# Patient Record
Sex: FEMALE | Race: WHITE | Employment: UNEMPLOYED | URBAN - METROPOLITAN AREA
[De-identification: names, ages, dates, MRNs, and addresses within clinical notes are randomized per-mention and may not be internally consistent; named-entity substitution may affect disease eponyms.]

---

## 2017-09-13 ENCOUNTER — TELEPHONE (OUTPATIENT)
Dept: INTERNAL MEDICINE CLINIC | Age: 37
End: 2017-09-13

## 2018-01-01 ENCOUNTER — HOSPITAL ENCOUNTER (EMERGENCY)
Age: 38
Discharge: HOME | End: 2018-11-21
Payer: MEDICAID

## 2018-01-01 ENCOUNTER — HOSPITAL ENCOUNTER (OUTPATIENT)
Age: 38
Setting detail: OBSERVATION
Discharge: HOME | End: 2018-12-22
Attending: EMERGENCY MEDICINE | Admitting: OBSTETRICS & GYNECOLOGY
Payer: MEDICAID

## 2018-01-01 ENCOUNTER — TELEPHONE (OUTPATIENT)
Dept: ORTHOPEDIC SURGERY | Age: 38
End: 2018-01-01

## 2018-01-01 ENCOUNTER — APPOINTMENT (OUTPATIENT)
Dept: GENERAL RADIOLOGY | Age: 38
End: 2018-01-01
Payer: MEDICAID

## 2018-01-01 ENCOUNTER — APPOINTMENT (OUTPATIENT)
Dept: ULTRASOUND IMAGING | Age: 38
End: 2018-01-01
Payer: MEDICAID

## 2018-01-01 ENCOUNTER — OFFICE VISIT (OUTPATIENT)
Dept: ORTHOPEDIC SURGERY | Age: 38
End: 2018-01-01
Payer: MEDICAID

## 2018-01-01 VITALS
OXYGEN SATURATION: 94 % | HEART RATE: 102 BPM | HEIGHT: 66 IN | BODY MASS INDEX: 25.71 KG/M2 | SYSTOLIC BLOOD PRESSURE: 128 MMHG | DIASTOLIC BLOOD PRESSURE: 82 MMHG | RESPIRATION RATE: 16 BRPM | WEIGHT: 160 LBS | TEMPERATURE: 98.8 F

## 2018-01-01 VITALS
BODY MASS INDEX: 29.73 KG/M2 | SYSTOLIC BLOOD PRESSURE: 114 MMHG | TEMPERATURE: 98.4 F | RESPIRATION RATE: 16 BRPM | WEIGHT: 185 LBS | DIASTOLIC BLOOD PRESSURE: 74 MMHG | HEART RATE: 88 BPM | OXYGEN SATURATION: 96 % | HEIGHT: 66 IN

## 2018-01-01 VITALS
HEART RATE: 113 BPM | HEIGHT: 66 IN | DIASTOLIC BLOOD PRESSURE: 84 MMHG | WEIGHT: 188 LBS | BODY MASS INDEX: 30.22 KG/M2 | SYSTOLIC BLOOD PRESSURE: 122 MMHG

## 2018-01-01 DIAGNOSIS — M06.9 RHEUMATOID ARTHRITIS, INVOLVING UNSPECIFIED SITE, UNSPECIFIED RHEUMATOID FACTOR PRESENCE: ICD-10-CM

## 2018-01-01 DIAGNOSIS — Z87.39 H/O RHEUMATOID ARTHRITIS: ICD-10-CM

## 2018-01-01 DIAGNOSIS — M25.562 LEFT KNEE PAIN, UNSPECIFIED CHRONICITY: Primary | ICD-10-CM

## 2018-01-01 DIAGNOSIS — M25.561 RIGHT KNEE PAIN, UNSPECIFIED CHRONICITY: ICD-10-CM

## 2018-01-01 DIAGNOSIS — N93.9 VAGINAL BLEEDING: ICD-10-CM

## 2018-01-01 DIAGNOSIS — D62 ACUTE BLOOD LOSS ANEMIA: Primary | ICD-10-CM

## 2018-01-01 DIAGNOSIS — M25.462 EFFUSION OF LEFT KNEE: Primary | ICD-10-CM

## 2018-01-01 LAB
A/G RATIO: 1.3 (ref 1.1–2.2)
ABO/RH: NORMAL
ALBUMIN SERPL-MCNC: 3.7 G/DL (ref 3.4–5)
ALP BLD-CCNC: 63 U/L (ref 40–129)
ALT SERPL-CCNC: 22 U/L (ref 10–40)
ANA INTERPRETATION: NORMAL
ANION GAP SERPL CALCULATED.3IONS-SCNC: 14 MMOL/L (ref 3–16)
ANTI-NUCLEAR ANTIBODY (ANA): NEGATIVE
ANTIBODY SCREEN: NORMAL
AST SERPL-CCNC: 19 U/L (ref 15–37)
BASOPHILS ABSOLUTE: 0.1 K/UL (ref 0–0.2)
BASOPHILS RELATIVE PERCENT: 1.2 %
BILIRUB SERPL-MCNC: <0.2 MG/DL (ref 0–1)
BUN BLDV-MCNC: 11 MG/DL (ref 7–20)
C-REACTIVE PROTEIN: 4.7 MG/L (ref 0–5.1)
CALCIUM SERPL-MCNC: 9 MG/DL (ref 8.3–10.6)
CHLORIDE BLD-SCNC: 101 MMOL/L (ref 99–110)
CO2: 23 MMOL/L (ref 21–32)
CREAT SERPL-MCNC: <0.5 MG/DL (ref 0.6–1.1)
EOSINOPHILS ABSOLUTE: 0.1 K/UL (ref 0–0.6)
EOSINOPHILS RELATIVE PERCENT: 1.9 %
GFR AFRICAN AMERICAN: >60
GFR NON-AFRICAN AMERICAN: >60
GLOBULIN: 2.9 G/DL
GLUCOSE BLD-MCNC: 133 MG/DL (ref 70–99)
GONADOTROPIN, CHORIONIC (HCG) QUANT: <5 MIU/ML
HCT VFR BLD CALC: 24.9 % (ref 36–48)
HCT VFR BLD CALC: 26 % (ref 36–48)
HCT VFR BLD CALC: 27.4 % (ref 36–48)
HCT VFR BLD CALC: 36.2 % (ref 36–48)
HEMOGLOBIN: 11.8 G/DL (ref 12–16)
HEMOGLOBIN: 8.2 G/DL (ref 12–16)
HEMOGLOBIN: 8.5 G/DL (ref 12–16)
HEMOGLOBIN: 9.2 G/DL (ref 12–16)
LYMPHOCYTES ABSOLUTE: 1.9 K/UL (ref 1–5.1)
LYMPHOCYTES RELATIVE PERCENT: 27.3 %
MCH RBC QN AUTO: 29.5 PG (ref 26–34)
MCH RBC QN AUTO: 30.5 PG (ref 26–34)
MCHC RBC AUTO-ENTMCNC: 32.4 G/DL (ref 31–36)
MCHC RBC AUTO-ENTMCNC: 33.5 G/DL (ref 31–36)
MCV RBC AUTO: 90.9 FL (ref 80–100)
MCV RBC AUTO: 91 FL (ref 80–100)
MONOCYTES ABSOLUTE: 0.3 K/UL (ref 0–1.3)
MONOCYTES RELATIVE PERCENT: 4 %
NEUTROPHILS ABSOLUTE: 4.5 K/UL (ref 1.7–7.7)
NEUTROPHILS RELATIVE PERCENT: 65.6 %
PDW BLD-RTO: 13.2 % (ref 12.4–15.4)
PDW BLD-RTO: 14 % (ref 12.4–15.4)
PLATELET # BLD: 266 K/UL (ref 135–450)
PLATELET # BLD: 448 K/UL (ref 135–450)
PMV BLD AUTO: 9.5 FL (ref 5–10.5)
PMV BLD AUTO: 9.5 FL (ref 5–10.5)
POTASSIUM SERPL-SCNC: 3.8 MMOL/L (ref 3.5–5.1)
RBC # BLD: 3.02 M/UL (ref 4–5.2)
RBC # BLD: 3.99 M/UL (ref 4–5.2)
RHEUMATOID FACTOR: 11 IU/ML
SEDIMENTATION RATE, ERYTHROCYTE: 27 MM/HR (ref 0–20)
SODIUM BLD-SCNC: 138 MMOL/L (ref 136–145)
TOTAL PROTEIN: 6.6 G/DL (ref 6.4–8.2)
TSH REFLEX: 2.7 UIU/ML (ref 0.27–4.2)
URIC ACID, SERUM: 4.6 MG/DL (ref 2.6–6)
WBC # BLD: 6.9 K/UL (ref 4–11)
WBC # BLD: 8.7 K/UL (ref 4–11)

## 2018-01-01 PROCEDURE — 6360000002 HC RX W HCPCS: Performed by: OBSTETRICS & GYNECOLOGY

## 2018-01-01 PROCEDURE — G8417 CALC BMI ABV UP PARAM F/U: HCPCS | Performed by: ORTHOPAEDIC SURGERY

## 2018-01-01 PROCEDURE — G0378 HOSPITAL OBSERVATION PER HR: HCPCS

## 2018-01-01 PROCEDURE — 2580000003 HC RX 258: Performed by: OBSTETRICS & GYNECOLOGY

## 2018-01-01 PROCEDURE — 76830 TRANSVAGINAL US NON-OB: CPT

## 2018-01-01 PROCEDURE — 96375 TX/PRO/DX INJ NEW DRUG ADDON: CPT

## 2018-01-01 PROCEDURE — 85025 COMPLETE CBC W/AUTO DIFF WBC: CPT

## 2018-01-01 PROCEDURE — 2580000003 HC RX 258

## 2018-01-01 PROCEDURE — 80053 COMPREHEN METABOLIC PANEL: CPT

## 2018-01-01 PROCEDURE — 6370000000 HC RX 637 (ALT 250 FOR IP): Performed by: NURSE PRACTITIONER

## 2018-01-01 PROCEDURE — 99285 EMERGENCY DEPT VISIT HI MDM: CPT

## 2018-01-01 PROCEDURE — 6370000000 HC RX 637 (ALT 250 FOR IP): Performed by: OBSTETRICS & GYNECOLOGY

## 2018-01-01 PROCEDURE — 99284 EMERGENCY DEPT VISIT MOD MDM: CPT

## 2018-01-01 PROCEDURE — 99203 OFFICE O/P NEW LOW 30 MIN: CPT | Performed by: ORTHOPAEDIC SURGERY

## 2018-01-01 PROCEDURE — 85014 HEMATOCRIT: CPT

## 2018-01-01 PROCEDURE — 85018 HEMOGLOBIN: CPT

## 2018-01-01 PROCEDURE — 84702 CHORIONIC GONADOTROPIN TEST: CPT

## 2018-01-01 PROCEDURE — 73564 X-RAY EXAM KNEE 4 OR MORE: CPT

## 2018-01-01 PROCEDURE — 2580000003 HC RX 258: Performed by: PHYSICIAN ASSISTANT

## 2018-01-01 PROCEDURE — 20610 DRAIN/INJ JOINT/BURSA W/O US: CPT | Performed by: ORTHOPAEDIC SURGERY

## 2018-01-01 PROCEDURE — G8427 DOCREV CUR MEDS BY ELIG CLIN: HCPCS | Performed by: ORTHOPAEDIC SURGERY

## 2018-01-01 PROCEDURE — 1036F TOBACCO NON-USER: CPT | Performed by: ORTHOPAEDIC SURGERY

## 2018-01-01 PROCEDURE — 96376 TX/PRO/DX INJ SAME DRUG ADON: CPT

## 2018-01-01 PROCEDURE — 86901 BLOOD TYPING SEROLOGIC RH(D): CPT

## 2018-01-01 PROCEDURE — 36415 COLL VENOUS BLD VENIPUNCTURE: CPT

## 2018-01-01 PROCEDURE — 96374 THER/PROPH/DIAG INJ IV PUSH: CPT

## 2018-01-01 PROCEDURE — 86900 BLOOD TYPING SEROLOGIC ABO: CPT

## 2018-01-01 PROCEDURE — 84443 ASSAY THYROID STIM HORMONE: CPT

## 2018-01-01 PROCEDURE — G8484 FLU IMMUNIZE NO ADMIN: HCPCS | Performed by: ORTHOPAEDIC SURGERY

## 2018-01-01 PROCEDURE — 86850 RBC ANTIBODY SCREEN: CPT

## 2018-01-01 RX ORDER — IBUPROFEN 800 MG/1
800 TABLET ORAL EVERY 8 HOURS PRN
Qty: 20 TABLET | Refills: 0 | Status: SHIPPED | OUTPATIENT
Start: 2018-01-01 | End: 2018-01-01 | Stop reason: ALTCHOICE

## 2018-01-01 RX ORDER — 0.9 % SODIUM CHLORIDE 0.9 %
1000 INTRAVENOUS SOLUTION INTRAVENOUS ONCE
Status: COMPLETED | OUTPATIENT
Start: 2018-01-01 | End: 2018-01-01

## 2018-01-01 RX ORDER — NAPROXEN 250 MG/1
500 TABLET ORAL ONCE
Status: COMPLETED | OUTPATIENT
Start: 2018-01-01 | End: 2018-01-01

## 2018-01-01 RX ORDER — BETAMETHASONE SODIUM PHOSPHATE AND BETAMETHASONE ACETATE 3; 3 MG/ML; MG/ML
12 INJECTION, SUSPENSION INTRA-ARTICULAR; INTRALESIONAL; INTRAMUSCULAR; SOFT TISSUE ONCE
Status: DISCONTINUED | OUTPATIENT
Start: 2018-01-01 | End: 2018-01-01 | Stop reason: HOSPADM

## 2018-01-01 RX ORDER — METHYLPREDNISOLONE 4 MG/1
TABLET ORAL
Qty: 1 KIT | Refills: 0 | Status: SHIPPED | OUTPATIENT
Start: 2018-01-01 | End: 2019-01-01 | Stop reason: ALTCHOICE

## 2018-01-01 RX ORDER — DOCUSATE SODIUM 100 MG/1
100 CAPSULE, LIQUID FILLED ORAL 2 TIMES DAILY
Status: DISCONTINUED | OUTPATIENT
Start: 2018-01-01 | End: 2018-01-01 | Stop reason: HOSPADM

## 2018-01-01 RX ORDER — SODIUM CHLORIDE 0.9 % (FLUSH) 0.9 %
10 SYRINGE (ML) INJECTION PRN
Status: DISCONTINUED | OUTPATIENT
Start: 2018-01-01 | End: 2018-01-01 | Stop reason: HOSPADM

## 2018-01-01 RX ORDER — SODIUM CHLORIDE, SODIUM LACTATE, POTASSIUM CHLORIDE, CALCIUM CHLORIDE 600; 310; 30; 20 MG/100ML; MG/100ML; MG/100ML; MG/100ML
INJECTION, SOLUTION INTRAVENOUS CONTINUOUS
Status: DISCONTINUED | OUTPATIENT
Start: 2018-01-01 | End: 2018-01-01 | Stop reason: HOSPADM

## 2018-01-01 RX ORDER — PROMETHAZINE HYDROCHLORIDE 25 MG/1
25 TABLET ORAL EVERY 6 HOURS PRN
Qty: 20 TABLET | Refills: 0 | Status: SHIPPED | OUTPATIENT
Start: 2018-01-01 | End: 2018-01-01

## 2018-01-01 RX ORDER — PSEUDOEPHEDRINE HCL 30 MG
100 TABLET ORAL 2 TIMES DAILY
Qty: 30 CAPSULE | Refills: 0 | Status: SHIPPED | OUTPATIENT
Start: 2018-01-01 | End: 2019-01-01 | Stop reason: ALTCHOICE

## 2018-01-01 RX ORDER — OXYCODONE HYDROCHLORIDE AND ACETAMINOPHEN 5; 325 MG/1; MG/1
1 TABLET ORAL
Status: COMPLETED | OUTPATIENT
Start: 2018-01-01 | End: 2018-01-01

## 2018-01-01 RX ORDER — PROMETHAZINE HYDROCHLORIDE 25 MG/1
25 TABLET ORAL EVERY 6 HOURS PRN
Status: DISCONTINUED | OUTPATIENT
Start: 2018-01-01 | End: 2018-01-01 | Stop reason: HOSPADM

## 2018-01-01 RX ORDER — SODIUM CHLORIDE 0.9 % (FLUSH) 0.9 %
10 SYRINGE (ML) INJECTION EVERY 12 HOURS SCHEDULED
Status: DISCONTINUED | OUTPATIENT
Start: 2018-01-01 | End: 2018-01-01 | Stop reason: HOSPADM

## 2018-01-01 RX ORDER — ACETAMINOPHEN 325 MG/1
650 TABLET ORAL EVERY 4 HOURS PRN
Status: DISCONTINUED | OUTPATIENT
Start: 2018-01-01 | End: 2018-01-01 | Stop reason: HOSPADM

## 2018-01-01 RX ORDER — ONDANSETRON 2 MG/ML
4 INJECTION INTRAMUSCULAR; INTRAVENOUS EVERY 6 HOURS PRN
Status: DISCONTINUED | OUTPATIENT
Start: 2018-01-01 | End: 2018-01-01 | Stop reason: HOSPADM

## 2018-01-01 RX ADMIN — OXYCODONE AND ACETAMINOPHEN 1 TABLET: 5; 325 TABLET ORAL at 08:22

## 2018-01-01 RX ADMIN — WATER 5 ML: 1 INJECTION INTRAMUSCULAR; INTRAVENOUS; SUBCUTANEOUS at 00:23

## 2018-01-01 RX ADMIN — Medication 10 ML: at 00:23

## 2018-01-01 RX ADMIN — CONJUGATED ESTROGENS 25 MG: 25 INJECTION, POWDER, LYOPHILIZED, FOR SOLUTION INTRAMUSCULAR; INTRAVENOUS at 15:01

## 2018-01-01 RX ADMIN — SODIUM CHLORIDE, POTASSIUM CHLORIDE, SODIUM LACTATE AND CALCIUM CHLORIDE: 600; 310; 30; 20 INJECTION, SOLUTION INTRAVENOUS at 15:43

## 2018-01-01 RX ADMIN — Medication 10 ML: at 08:48

## 2018-01-01 RX ADMIN — NAPROXEN 500 MG: 250 TABLET ORAL at 07:34

## 2018-01-01 RX ADMIN — ONDANSETRON HYDROCHLORIDE 4 MG: 2 INJECTION, SOLUTION INTRAMUSCULAR; INTRAVENOUS at 19:19

## 2018-01-01 RX ADMIN — OXYCODONE AND ACETAMINOPHEN 1 TABLET: 5; 325 TABLET ORAL at 07:34

## 2018-01-01 RX ADMIN — DOCUSATE SODIUM 100 MG: 100 CAPSULE, LIQUID FILLED ORAL at 15:43

## 2018-01-01 RX ADMIN — ACETAMINOPHEN 650 MG: 325 TABLET, FILM COATED ORAL at 03:06

## 2018-01-01 RX ADMIN — PROMETHAZINE HYDROCHLORIDE 25 MG: 25 TABLET ORAL at 04:11

## 2018-01-01 RX ADMIN — DOCUSATE SODIUM 100 MG: 100 CAPSULE, LIQUID FILLED ORAL at 08:48

## 2018-01-01 RX ADMIN — SODIUM CHLORIDE 1000 ML: 9 INJECTION, SOLUTION INTRAVENOUS at 11:34

## 2018-01-01 RX ADMIN — ACETAMINOPHEN 650 MG: 325 TABLET, FILM COATED ORAL at 18:29

## 2018-01-01 RX ADMIN — CONJUGATED ESTROGENS 25 MG: 25 INJECTION, POWDER, LYOPHILIZED, FOR SOLUTION INTRAMUSCULAR; INTRAVENOUS at 00:23

## 2018-01-01 RX ADMIN — ONDANSETRON HYDROCHLORIDE 4 MG: 2 INJECTION, SOLUTION INTRAMUSCULAR; INTRAVENOUS at 03:06

## 2018-01-01 ASSESSMENT — PAIN SCALES - GENERAL
PAINLEVEL_OUTOF10: 9
PAINLEVEL_OUTOF10: 10
PAINLEVEL_OUTOF10: 4
PAINLEVEL_OUTOF10: 0
PAINLEVEL_OUTOF10: 3
PAINLEVEL_OUTOF10: 3
PAINLEVEL_OUTOF10: 10

## 2018-01-01 ASSESSMENT — ENCOUNTER SYMPTOMS
SORE THROAT: 0
NAUSEA: 0
RHINORRHEA: 0
DIARRHEA: 0
DIARRHEA: 0
BLOOD IN STOOL: 0
ABDOMINAL PAIN: 0
VOMITING: 0
CONSTIPATION: 0
NAUSEA: 0
ABDOMINAL PAIN: 0
VOMITING: 0
SHORTNESS OF BREATH: 0
SHORTNESS OF BREATH: 0
CONSTIPATION: 0

## 2018-01-01 ASSESSMENT — PAIN DESCRIPTION - ORIENTATION: ORIENTATION: LOWER

## 2018-01-01 ASSESSMENT — PAIN DESCRIPTION - LOCATION: LOCATION: ABDOMEN

## 2018-07-23 LAB
HPV COMMENT: ABNORMAL
HPV TYPE 16: DETECTED
HPV TYPE 18: NOT DETECTED
HPVOH (OTHER TYPES): NOT DETECTED

## 2018-11-21 NOTE — ED NOTES
PA notified of HR, medicated with additional percocet ok for D/C     Christiano Chao RN  11/21/18 5597

## 2018-11-21 NOTE — ED PROVIDER NOTES
hematuria. Musculoskeletal: Positive for joint swelling. Skin: Negative for rash. Neurological: Negative for weakness and headaches. All other systems reviewed and are negative. Positives and Pertinent negatives as per HPI. Except as noted above in the ROS, all other systems were reviewed and negative. PAST MEDICAL HISTORY     Past Medical History:   Diagnosis Date    Endometriosis     Fibromyalgia     GERD (gastroesophageal reflux disease)     Hip fracture ([facility] [address]     LEFT     Hypoglycemia     Ovarian cyst     Seizures ([facility] [address]          SURGICAL HISTORY       Past Surgical History:   Procedure Laterality Date    BRONCHOSCOPY  10/16/14    Urgent intubation, direct laryngoscopy, subglottic tracheoscopy    BRONCHOSCOPY  10/18/2014    WITH EXTUBATION IN OR AND LARYNGOSCOPY     SECTION      x 3    CHOLECYSTECTOMY           CURRENT MEDICATIONS       Previous Medications    ALBUTEROL (PROVENTIL HFA;VENTOLIN HFA) 108 (90 BASE) MCG/ACT INHALER    Inhale 2 puffs into the lungs every 4 hours as needed for Wheezing. BACLOFEN (LIORESAL) 20 MG TABLET    Take 20 mg by mouth 3 times daily    DULOXETINE (CYMBALTA) 60 MG EXTENDED RELEASE CAPSULE    Take 60 mg by mouth daily    ESCITALOPRAM (LEXAPRO) 10 MG TABLET      Take 20 mg by mouth daily     GABAPENTIN (NEURONTIN) 300 MG CAPSULE    Take 300 mg by mouth 2 times daily    KETOROLAC (TORADOL) 10 MG TABLET    Take 1 tablet by mouth every 6 hours as needed for Pain    NORETHINDRONE-ETHINYL ESTRADIOL (JUNEL FE 1/20) 1-20 MG-MCG PER TABLET    Take 1 tablet by mouth daily    NORGESTIM-ETH ESTRAD TRIPHASIC (TRI-PREVIFEM PO)    Take 1 tablet by mouth daily    OMEPRAZOLE (PRILOSEC) 20 MG CAPSULE    Take 20 mg by mouth daily    ONDANSETRON (ZOFRAN ODT) 4 MG DISINTEGRATING TABLET    Take 1-2 tablets by mouth every 12 hours as needed for Nausea May Sub regular tablet (non-ODT) if insurance does not cover ODT.     ONDANSETRON (ZOFRAN ODT) 4 MG occasionally wordsare mis-transcribed.)    MAGGIE Cantrell CNP (electronically signed)        MAGGIE Cantrell CNP  11/21/18 6536

## 2018-12-21 PROBLEM — N93.9 VAGINAL BLEEDING: Status: ACTIVE | Noted: 2018-01-01

## 2018-12-21 NOTE — ED PROVIDER NOTES
I independently performed a history and physical on Damion [place] Aimee Beatty. All diagnostic, treatment, and disposition decisions were made by myself in conjunction with the advanced practice provider. Briefly, this is a 41 y.o. female here for vaginal bleeding, worse over the past 3 days. She is passing clots. Lower abdominal pain. Are moderate. Denies trauma or injury. No chest pain. On exam, mild to moderate lower abdominal tenderness, slightly pale, normal orientation. No acute distress. Obese. No respiratory distress    Screenings            MDM      Case has been discussed with the gynecology team.  I felt that she was slightly pale in color. She is hemodynamically stable, although did have some drop some grams of hemoglobin. They did request an ultrasound. Ultrasound was performed. GYN end up admitting the patient. Patient will probably be transfused by the gynecology team.      Critical Care  There was a high probability of life-threatening clinical deterioration in the patient's condition requiring my urgent intervention. Total critical care time with the patient was 33 minutes excluding separately reportable procedures. Critical care required due to patients acute blood loss anemia, vaginal bleeding      Patient Referrals:  Shirley Sofia, [address] [place] [address]  [phone]            Discharge Medications:  New Prescriptions    No medications on file       FINAL IMPRESSION  1. Acute blood loss anemia    2. Vaginal bleeding        Blood pressure 127/66, pulse 91, temperature 98.6 °F (37 °C), temperature source Oral, resp. rate 16, height 5' 6\" (1.676 m), weight 185 lb (83.9 kg), last menstrual period 11/19/2018, SpO2 99 %. For further details of [facility] emergency department encounter, please see documentation by advanced practice provider.         Abundio Kelly III, DO  12/21/18 7695

## 2018-12-22 NOTE — DISCHARGE SUMMARY
Physician Discharge Summary     Patient ID:  Alejandro Puri  [id number]  89 y.o.  1980    Admit date: 12/21/2018    Discharge date: 12/22/2018     Admitting Physician: Rosana Mcneil    Discharge Diagnoses: Vaginal bleeding [N93.9]  Vaginal bleeding [N93.9] Severe anemia    Discharged Condition: STABLE    Procedures Performed: none    Hospital Course: Patient was admitted with severe DUB and anemia, responded well to IV premarin     Discharge Exam:  Appears well, AVSS, abdomen soft  No VBin over 12 hours    Disposition:good    Patient Instructions:   PO Pg for 21 days, PO fe    Diet: regular      Discharge Medication:    [facility] Medication Instructions MPB:[id number]    Printed on:12/22/18 6737   Medication Information                      baclofen (LIORESAL) 20 MG tablet  Take 20 mg by mouth 2 times daily              docusate sodium (COLACE, DULCOLAX) 100 MG CAPS  Take 100 mg by mouth 2 times daily             DULoxetine (CYMBALTA) 60 MG extended release capsule  Take 60 mg by mouth daily             gabapentin (NEURONTIN) 300 MG capsule  Take 300 mg by mouth 3 times daily. .             methylPREDNISolone (MEDROL, CAROLYN,) 4 MG tablet  Take by mouth.              norethindrone (AYGESTIN) 5 MG tablet  Take 1 tablet by mouth every 12 hours for 21 days             promethazine (PHENERGAN) 25 MG tablet  Take 1 tablet by mouth every 6 hours as needed for Nausea                  Follow-up with Dr Rosmery Boston in 1 week    Signed:  Rosana Mcneil  12/22/2018  5:36 PM

## 2018-12-31 NOTE — PROGRESS NOTES
plateau or extensor mechanism. Range of Motion:  0 to 120 with mild pain in both knees at the extremes of motion    Strength:  Quad 5 minus bilateral / 5  ; Hamstrings 5 bilateral / 5. Gross motor to hip and ankle intact, no pain with logroll the hip. Stinchfield examination negative. Special Tests:      Negative Lachman    negative anterior drawer    no posterior sag    no posterior drawer   Discomfort with patellar grind in both knees.  does not open to valgus or varus stress at 0 or 30°    negative Tony's    negative Homans    Posterior tibial pulses are +2/4 capillary refill is brisk sensation is intact. Skin: There are no rashes, ulcerations or lesions. Gait: Tandem gait without significant limp. Radiology:     X-rays obtained 11/21/18 and today and reviewed in office:  Views between her x-rays obtained November 21 of her left knee as well as standing bilateral AP, flexion PA and skyline views today as well as lateral view of the right knee there are now 4 views of both knees for review. Impression No evidence for acute fracture or subluxation / dislocation. Both knees show some mild effusion in the knees. Left knee shows some calcific change in the suprapatellar pouch but no lytic changes noted in either knee. Both knee showed neutral alignment. No significant joint space narrowing bilaterally. Impression:  Encounter Diagnoses   Name Primary?     Left knee pain, unspecified chronicity Yes    Right knee pain, unspecified chronicity     Rheumatoid arthritis, involving unspecified site, unspecified rheumatoid factor presence ([facility] [address]        Office Procedures:  Orders Placed This Encounter   Procedures    XR Knee Bilateral Standing     Order Specific Question:   Reason for exam:     Answer:   Knee Pain    XR KNEE RIGHT (3 VIEWS)     3V Rt Knee AP Standing     Order Specific Question:   Reason for exam:     Answer:   Knee Pain    RHEUMATOID FACTOR     Standing Status:

## 2019-01-01 ENCOUNTER — APPOINTMENT (OUTPATIENT)
Dept: CT IMAGING | Age: 39
DRG: 463 | End: 2019-01-01
Payer: MEDICAID

## 2019-01-01 ENCOUNTER — APPOINTMENT (OUTPATIENT)
Dept: ULTRASOUND IMAGING | Age: 39
DRG: 530 | End: 2019-01-01
Payer: MEDICAID

## 2019-01-01 ENCOUNTER — APPOINTMENT (OUTPATIENT)
Dept: CT IMAGING | Age: 39
DRG: 530 | End: 2019-01-01
Payer: MEDICAID

## 2019-01-01 ENCOUNTER — OFFICE VISIT (OUTPATIENT)
Dept: SURGERY | Age: 39
End: 2019-01-01
Payer: MEDICAID

## 2019-01-01 ENCOUNTER — TELEPHONE (OUTPATIENT)
Dept: SURGERY | Age: 39
End: 2019-01-01

## 2019-01-01 ENCOUNTER — APPOINTMENT (OUTPATIENT)
Dept: GENERAL RADIOLOGY | Age: 39
DRG: 530 | End: 2019-01-01
Payer: MEDICAID

## 2019-01-01 ENCOUNTER — HOSPITAL ENCOUNTER (EMERGENCY)
Age: 39
Discharge: HOME | End: 2019-02-10
Attending: EMERGENCY MEDICINE
Payer: MEDICAID

## 2019-01-01 ENCOUNTER — ANESTHESIA EVENT (OUTPATIENT)
Dept: OPERATING ROOM | Age: 39
DRG: 530 | End: 2019-01-01
Payer: MEDICAID

## 2019-01-01 ENCOUNTER — HOSPITAL ENCOUNTER (OUTPATIENT)
Dept: NUCLEAR MEDICINE | Age: 39
Discharge: HOME | End: 2019-03-25
Payer: MEDICAID

## 2019-01-01 ENCOUNTER — APPOINTMENT (OUTPATIENT)
Dept: CT IMAGING | Age: 39
DRG: 469 | End: 2019-01-01
Payer: MEDICAID

## 2019-01-01 ENCOUNTER — APPOINTMENT (OUTPATIENT)
Dept: INTERVENTIONAL RADIOLOGY/VASCULAR | Age: 39
DRG: 530 | End: 2019-01-01
Payer: MEDICAID

## 2019-01-01 ENCOUNTER — HOSPITAL ENCOUNTER (OUTPATIENT)
Dept: ONCOLOGY | Age: 39
Setting detail: INFUSION SERIES
Discharge: HOME | End: 2019-02-08
Payer: MEDICAID

## 2019-01-01 ENCOUNTER — OFFICE VISIT (OUTPATIENT)
Dept: RHEUMATOLOGY | Age: 39
End: 2019-01-01
Payer: MEDICAID

## 2019-01-01 ENCOUNTER — HOSPITAL ENCOUNTER (OUTPATIENT)
Dept: MRI IMAGING | Age: 39
Discharge: HOME | End: 2019-06-17
Payer: MEDICAID

## 2019-01-01 ENCOUNTER — HOSPITAL ENCOUNTER (INPATIENT)
Age: 39
LOS: 3 days | Discharge: HOME | DRG: 463 | End: 2019-04-20
Attending: EMERGENCY MEDICINE | Admitting: INTERNAL MEDICINE
Payer: MEDICAID

## 2019-01-01 ENCOUNTER — HOSPITAL ENCOUNTER (EMERGENCY)
Age: 39
Discharge: HOME | End: 2019-01-18
Attending: EMERGENCY MEDICINE
Payer: MEDICAID

## 2019-01-01 ENCOUNTER — ANESTHESIA (OUTPATIENT)
Dept: OPERATING ROOM | Age: 39
DRG: 530 | End: 2019-01-01
Payer: MEDICAID

## 2019-01-01 ENCOUNTER — HOSPITAL ENCOUNTER (OUTPATIENT)
Dept: CT IMAGING | Age: 39
Discharge: HOME | End: 2019-01-28
Payer: MEDICAID

## 2019-01-01 ENCOUNTER — TELEPHONE (OUTPATIENT)
Dept: INTERNAL MEDICINE CLINIC | Age: 39
End: 2019-01-01

## 2019-01-01 ENCOUNTER — TELEPHONE (OUTPATIENT)
Dept: GENERAL RADIOLOGY | Age: 39
End: 2019-01-01

## 2019-01-01 ENCOUNTER — OFFICE VISIT (OUTPATIENT)
Dept: PULMONOLOGY | Age: 39
End: 2019-01-01
Payer: MEDICAID

## 2019-01-01 ENCOUNTER — APPOINTMENT (OUTPATIENT)
Dept: GENERAL RADIOLOGY | Age: 39
DRG: 463 | End: 2019-01-01
Payer: MEDICAID

## 2019-01-01 ENCOUNTER — HOSPITAL ENCOUNTER (INPATIENT)
Age: 39
LOS: 12 days | Discharge: HOME | DRG: 530 | End: 2019-02-27
Attending: EMERGENCY MEDICINE | Admitting: INTERNAL MEDICINE
Payer: MEDICAID

## 2019-01-01 ENCOUNTER — HOSPITAL ENCOUNTER (INPATIENT)
Age: 39
LOS: 5 days | Discharge: HOME | DRG: 530 | End: 2019-03-05
Attending: EMERGENCY MEDICINE | Admitting: HOSPITALIST
Payer: MEDICAID

## 2019-01-01 ENCOUNTER — OFFICE VISIT (OUTPATIENT)
Dept: NEUROLOGY | Age: 39
End: 2019-01-01
Payer: MEDICAID

## 2019-01-01 ENCOUNTER — APPOINTMENT (OUTPATIENT)
Dept: MRI IMAGING | Age: 39
DRG: 530 | End: 2019-01-01
Payer: MEDICAID

## 2019-01-01 ENCOUNTER — HOSPITAL ENCOUNTER (OUTPATIENT)
Age: 39
Discharge: HOME | End: 2019-01-11
Payer: MEDICAID

## 2019-01-01 ENCOUNTER — HOSPITAL ENCOUNTER (OUTPATIENT)
Dept: GENERAL RADIOLOGY | Age: 39
Discharge: HOME | End: 2019-01-11
Payer: MEDICAID

## 2019-01-01 ENCOUNTER — HOSPITAL ENCOUNTER (INPATIENT)
Age: 39
LOS: 4 days | Discharge: HOME | DRG: 469 | End: 2019-07-07
Attending: EMERGENCY MEDICINE | Admitting: INTERNAL MEDICINE
Payer: MEDICAID

## 2019-01-01 ENCOUNTER — HOSPITAL ENCOUNTER (OUTPATIENT)
Age: 39
Discharge: HOME | DRG: 530 | End: 2019-02-14
Payer: MEDICAID

## 2019-01-01 ENCOUNTER — APPOINTMENT (OUTPATIENT)
Dept: CT IMAGING | Age: 39
End: 2019-01-01
Payer: MEDICAID

## 2019-01-01 ENCOUNTER — APPOINTMENT (OUTPATIENT)
Dept: GENERAL RADIOLOGY | Age: 39
DRG: 469 | End: 2019-01-01
Payer: MEDICAID

## 2019-01-01 ENCOUNTER — ANESTHESIA EVENT (OUTPATIENT)
Dept: OPERATING ROOM | Age: 39
DRG: 463 | End: 2019-01-01
Payer: MEDICAID

## 2019-01-01 ENCOUNTER — TELEPHONE (OUTPATIENT)
Dept: PULMONOLOGY | Age: 39
End: 2019-01-01

## 2019-01-01 ENCOUNTER — HOSPITAL ENCOUNTER (OUTPATIENT)
Dept: ONCOLOGY | Age: 39
Setting detail: INFUSION SERIES
End: 2019-01-01
Payer: MEDICAID

## 2019-01-01 ENCOUNTER — HOSPITAL ENCOUNTER (INPATIENT)
Age: 39
LOS: 21 days | DRG: 530 | End: 2019-08-07
Attending: EMERGENCY MEDICINE | Admitting: INTERNAL MEDICINE
Payer: MEDICAID

## 2019-01-01 ENCOUNTER — HOSPITAL ENCOUNTER (EMERGENCY)
Age: 39
Discharge: HOME | DRG: 530 | End: 2019-07-17
Attending: FAMILY MEDICINE
Payer: MEDICAID

## 2019-01-01 ENCOUNTER — TELEPHONE (OUTPATIENT)
Dept: RHEUMATOLOGY | Age: 39
End: 2019-01-01

## 2019-01-01 ENCOUNTER — HOSPITAL ENCOUNTER (OUTPATIENT)
Dept: CT IMAGING | Age: 39
Discharge: HOME | End: 2019-06-17
Payer: MEDICAID

## 2019-01-01 ENCOUNTER — ANESTHESIA (OUTPATIENT)
Dept: OPERATING ROOM | Age: 39
DRG: 463 | End: 2019-01-01
Payer: MEDICAID

## 2019-01-01 ENCOUNTER — HOSPITAL ENCOUNTER (OUTPATIENT)
Dept: PULMONOLOGY | Age: 39
Discharge: HOME | DRG: 530 | End: 2019-02-14
Payer: MEDICAID

## 2019-01-01 ENCOUNTER — HOSPITAL ENCOUNTER (OUTPATIENT)
Age: 39
Discharge: HOME | End: 2019-01-28
Payer: MEDICAID

## 2019-01-01 VITALS
WEIGHT: 193 LBS | BODY MASS INDEX: 31.15 KG/M2 | TEMPERATURE: 97.1 F | RESPIRATION RATE: 16 BRPM | HEART RATE: 107 BPM | OXYGEN SATURATION: 97 % | DIASTOLIC BLOOD PRESSURE: 77 MMHG | SYSTOLIC BLOOD PRESSURE: 109 MMHG

## 2019-01-01 VITALS
HEART RATE: 99 BPM | SYSTOLIC BLOOD PRESSURE: 95 MMHG | RESPIRATION RATE: 16 BRPM | WEIGHT: 193.2 LBS | OXYGEN SATURATION: 93 % | BODY MASS INDEX: 30.32 KG/M2 | HEIGHT: 67 IN | TEMPERATURE: 97.6 F | DIASTOLIC BLOOD PRESSURE: 60 MMHG

## 2019-01-01 VITALS
WEIGHT: 195 LBS | HEART RATE: 104 BPM | DIASTOLIC BLOOD PRESSURE: 73 MMHG | OXYGEN SATURATION: 99 % | RESPIRATION RATE: 18 BRPM | SYSTOLIC BLOOD PRESSURE: 111 MMHG | BODY MASS INDEX: 31.47 KG/M2

## 2019-01-01 VITALS
TEMPERATURE: 98.6 F | HEART RATE: 87 BPM | OXYGEN SATURATION: 100 % | RESPIRATION RATE: 16 BRPM | SYSTOLIC BLOOD PRESSURE: 109 MMHG | BODY MASS INDEX: 29.82 KG/M2 | HEIGHT: 67 IN | DIASTOLIC BLOOD PRESSURE: 62 MMHG | WEIGHT: 190 LBS

## 2019-01-01 VITALS
HEIGHT: 66 IN | HEART RATE: 95 BPM | OXYGEN SATURATION: 100 % | RESPIRATION RATE: 20 BRPM | WEIGHT: 195 LBS | SYSTOLIC BLOOD PRESSURE: 106 MMHG | DIASTOLIC BLOOD PRESSURE: 58 MMHG | BODY MASS INDEX: 31.34 KG/M2 | TEMPERATURE: 98.2 F

## 2019-01-01 VITALS
OXYGEN SATURATION: 95 % | BODY MASS INDEX: 31.15 KG/M2 | WEIGHT: 193.8 LBS | DIASTOLIC BLOOD PRESSURE: 67 MMHG | HEIGHT: 66 IN | RESPIRATION RATE: 18 BRPM | SYSTOLIC BLOOD PRESSURE: 102 MMHG | HEART RATE: 96 BPM | TEMPERATURE: 98.2 F

## 2019-01-01 VITALS
DIASTOLIC BLOOD PRESSURE: 78 MMHG | SYSTOLIC BLOOD PRESSURE: 106 MMHG | HEART RATE: 88 BPM | WEIGHT: 190.6 LBS | BODY MASS INDEX: 30.76 KG/M2

## 2019-01-01 VITALS
SYSTOLIC BLOOD PRESSURE: 158 MMHG | OXYGEN SATURATION: 100 % | WEIGHT: 192.7 LBS | RESPIRATION RATE: 16 BRPM | DIASTOLIC BLOOD PRESSURE: 95 MMHG | BODY MASS INDEX: 30.97 KG/M2 | TEMPERATURE: 97.6 F | HEIGHT: 66 IN | HEART RATE: 70 BPM

## 2019-01-01 VITALS
HEART RATE: 85 BPM | OXYGEN SATURATION: 96 % | HEIGHT: 67 IN | BODY MASS INDEX: 30.51 KG/M2 | TEMPERATURE: 97.1 F | WEIGHT: 194.4 LBS | DIASTOLIC BLOOD PRESSURE: 88 MMHG | RESPIRATION RATE: 16 BRPM | SYSTOLIC BLOOD PRESSURE: 123 MMHG

## 2019-01-01 VITALS
SYSTOLIC BLOOD PRESSURE: 143 MMHG | DIASTOLIC BLOOD PRESSURE: 85 MMHG | OXYGEN SATURATION: 94 % | BODY MASS INDEX: 27.47 KG/M2 | RESPIRATION RATE: 18 BRPM | TEMPERATURE: 98.1 F | WEIGHT: 175 LBS | HEIGHT: 67 IN | HEART RATE: 78 BPM

## 2019-01-01 VITALS
OXYGEN SATURATION: 100 % | RESPIRATION RATE: 15 BRPM | SYSTOLIC BLOOD PRESSURE: 132 MMHG | DIASTOLIC BLOOD PRESSURE: 62 MMHG

## 2019-01-01 VITALS
HEIGHT: 67 IN | HEART RATE: 80 BPM | BODY MASS INDEX: 29.98 KG/M2 | DIASTOLIC BLOOD PRESSURE: 85 MMHG | SYSTOLIC BLOOD PRESSURE: 132 MMHG | WEIGHT: 191 LBS

## 2019-01-01 VITALS
SYSTOLIC BLOOD PRESSURE: 116 MMHG | DIASTOLIC BLOOD PRESSURE: 72 MMHG | HEART RATE: 88 BPM | WEIGHT: 186.6 LBS | BODY MASS INDEX: 29.99 KG/M2 | HEIGHT: 66 IN

## 2019-01-01 VITALS
DIASTOLIC BLOOD PRESSURE: 77 MMHG | TEMPERATURE: 97.9 F | SYSTOLIC BLOOD PRESSURE: 133 MMHG | RESPIRATION RATE: 15 BRPM | OXYGEN SATURATION: 100 %

## 2019-01-01 VITALS
RESPIRATION RATE: 18 BRPM | HEART RATE: 105 BPM | WEIGHT: 184 LBS | BODY MASS INDEX: 28.82 KG/M2 | SYSTOLIC BLOOD PRESSURE: 122 MMHG | OXYGEN SATURATION: 99 % | DIASTOLIC BLOOD PRESSURE: 88 MMHG

## 2019-01-01 VITALS
OXYGEN SATURATION: 78 % | HEIGHT: 67 IN | WEIGHT: 170.86 LBS | BODY MASS INDEX: 26.82 KG/M2 | SYSTOLIC BLOOD PRESSURE: 89 MMHG | HEART RATE: 129 BPM | TEMPERATURE: 102.8 F | DIASTOLIC BLOOD PRESSURE: 63 MMHG | RESPIRATION RATE: 50 BRPM

## 2019-01-01 VITALS
DIASTOLIC BLOOD PRESSURE: 71 MMHG | WEIGHT: 192 LBS | HEIGHT: 66 IN | BODY MASS INDEX: 30.86 KG/M2 | SYSTOLIC BLOOD PRESSURE: 106 MMHG

## 2019-01-01 VITALS — OXYGEN SATURATION: 98 % | DIASTOLIC BLOOD PRESSURE: 110 MMHG | SYSTOLIC BLOOD PRESSURE: 181 MMHG

## 2019-01-01 VITALS — SYSTOLIC BLOOD PRESSURE: 130 MMHG | OXYGEN SATURATION: 100 % | TEMPERATURE: 97.2 F | DIASTOLIC BLOOD PRESSURE: 77 MMHG

## 2019-01-01 VITALS — TEMPERATURE: 98 F | DIASTOLIC BLOOD PRESSURE: 70 MMHG | HEART RATE: 108 BPM | SYSTOLIC BLOOD PRESSURE: 113 MMHG

## 2019-01-01 VITALS — HEART RATE: 103 BPM | RESPIRATION RATE: 18 BRPM | OXYGEN SATURATION: 97 %

## 2019-01-01 DIAGNOSIS — C53.9 MALIGNANT NEOPLASM OF CERVIX, UNSPECIFIED SITE (HCC): ICD-10-CM

## 2019-01-01 DIAGNOSIS — M25.50 MULTIPLE JOINT PAIN: ICD-10-CM

## 2019-01-01 DIAGNOSIS — R10.31 RLQ ABDOMINAL PAIN: ICD-10-CM

## 2019-01-01 DIAGNOSIS — M35.00 SICCA COMPLEX (HCC): ICD-10-CM

## 2019-01-01 DIAGNOSIS — J18.9 MULTIFOCAL PNEUMONIA: Primary | ICD-10-CM

## 2019-01-01 DIAGNOSIS — C53.0 MALIGNANT NEOPLASM OF ENDOCERVIX (HCC): ICD-10-CM

## 2019-01-01 DIAGNOSIS — N39.0 COMPLICATED UTI (URINARY TRACT INFECTION): ICD-10-CM

## 2019-01-01 DIAGNOSIS — R91.8 LUNG NODULES: ICD-10-CM

## 2019-01-01 DIAGNOSIS — E83.42 HYPOMAGNESEMIA: ICD-10-CM

## 2019-01-01 DIAGNOSIS — C56.9 MALIGNANT NEOPLASM OF OVARY, UNSPECIFIED LATERALITY (HCC): ICD-10-CM

## 2019-01-01 DIAGNOSIS — R23.1 PALLOR: Primary | ICD-10-CM

## 2019-01-01 DIAGNOSIS — N13.30 HYDROURETERONEPHROSIS: ICD-10-CM

## 2019-01-01 DIAGNOSIS — Z79.899 HIGH RISK MEDICATION USE: ICD-10-CM

## 2019-01-01 DIAGNOSIS — R52 INTRACTABLE PAIN: ICD-10-CM

## 2019-01-01 DIAGNOSIS — N30.01 ACUTE CYSTITIS WITH HEMATURIA: ICD-10-CM

## 2019-01-01 DIAGNOSIS — N93.9 VAGINAL BLEEDING: ICD-10-CM

## 2019-01-01 DIAGNOSIS — Z85.9 HISTORY OF CANCER: ICD-10-CM

## 2019-01-01 DIAGNOSIS — M32.9 H/O SYSTEMIC LUPUS ERYTHEMATOSUS (SLE) (HCC): ICD-10-CM

## 2019-01-01 DIAGNOSIS — N13.5 URETERAL OBSTRUCTION, LEFT: ICD-10-CM

## 2019-01-01 DIAGNOSIS — G89.3 NEOPLASM RELATED PAIN: ICD-10-CM

## 2019-01-01 DIAGNOSIS — R06.09 DOE (DYSPNEA ON EXERTION): ICD-10-CM

## 2019-01-01 DIAGNOSIS — R31.0 GROSS HEMATURIA: ICD-10-CM

## 2019-01-01 DIAGNOSIS — N93.8 DUB (DYSFUNCTIONAL UTERINE BLEEDING): ICD-10-CM

## 2019-01-01 DIAGNOSIS — M32.8 OTHER FORMS OF SYSTEMIC LUPUS ERYTHEMATOSUS, UNSPECIFIED ORGAN INVOLVEMENT STATUS (HCC): Chronic | ICD-10-CM

## 2019-01-01 DIAGNOSIS — C78.00 MALIGNANT NEOPLASM METASTATIC TO LUNG, UNSPECIFIED LATERALITY (HCC): ICD-10-CM

## 2019-01-01 DIAGNOSIS — R91.8 PULMONARY NODULES: ICD-10-CM

## 2019-01-01 DIAGNOSIS — D64.9 ANEMIA, UNSPECIFIED TYPE: ICD-10-CM

## 2019-01-01 DIAGNOSIS — O99.019 APLASTIC ANEMIA SECONDARY TO PREGNANCY, ANTEPARTUM (HCC): ICD-10-CM

## 2019-01-01 DIAGNOSIS — G43.019 INTRACTABLE MIGRAINE WITHOUT AURA AND WITHOUT STATUS MIGRAINOSUS: ICD-10-CM

## 2019-01-01 DIAGNOSIS — R76.0 LUPUS ANTICOAGULANT POSITIVE: Chronic | ICD-10-CM

## 2019-01-01 DIAGNOSIS — M79.7 FIBROMYALGIA: Primary | ICD-10-CM

## 2019-01-01 DIAGNOSIS — R51.9 INTRACTABLE HEADACHE, UNSPECIFIED CHRONICITY PATTERN, UNSPECIFIED HEADACHE TYPE: ICD-10-CM

## 2019-01-01 DIAGNOSIS — R91.8 PULMONARY NODULES: Primary | ICD-10-CM

## 2019-01-01 DIAGNOSIS — A41.9 SEPTICEMIA (HCC): ICD-10-CM

## 2019-01-01 DIAGNOSIS — N13.30 BILATERAL HYDRONEPHROSIS: ICD-10-CM

## 2019-01-01 DIAGNOSIS — D64.9 CHRONIC ANEMIA: ICD-10-CM

## 2019-01-01 DIAGNOSIS — Z87.39 HISTORY OF JUVENILE RHEUMATOID ARTHRITIS: Primary | ICD-10-CM

## 2019-01-01 DIAGNOSIS — N12 PYELITIS: ICD-10-CM

## 2019-01-01 DIAGNOSIS — R10.31 RLQ ABDOMINAL PAIN: Primary | ICD-10-CM

## 2019-01-01 DIAGNOSIS — T45.1X5A CINV (CHEMOTHERAPY-INDUCED NAUSEA AND VOMITING): ICD-10-CM

## 2019-01-01 DIAGNOSIS — D61.9 APLASTIC ANEMIA SECONDARY TO PREGNANCY, ANTEPARTUM (HCC): ICD-10-CM

## 2019-01-01 DIAGNOSIS — R77.8 ELEVATED TROPONIN: ICD-10-CM

## 2019-01-01 DIAGNOSIS — Z87.39 HISTORY OF JUVENILE RHEUMATOID ARTHRITIS: Chronic | ICD-10-CM

## 2019-01-01 DIAGNOSIS — T40.601A: ICD-10-CM

## 2019-01-01 DIAGNOSIS — R10.2 PELVIC PAIN: Primary | ICD-10-CM

## 2019-01-01 DIAGNOSIS — R11.2 CINV (CHEMOTHERAPY-INDUCED NAUSEA AND VOMITING): ICD-10-CM

## 2019-01-01 DIAGNOSIS — N17.9 ACUTE RENAL FAILURE, UNSPECIFIED ACUTE RENAL FAILURE TYPE (HCC): Primary | ICD-10-CM

## 2019-01-01 DIAGNOSIS — J18.9 PNEUMONIA DUE TO ORGANISM: ICD-10-CM

## 2019-01-01 DIAGNOSIS — R91.1 PULMONARY NODULE: ICD-10-CM

## 2019-01-01 DIAGNOSIS — Z87.39 HISTORY OF JUVENILE RHEUMATOID ARTHRITIS: Primary | Chronic | ICD-10-CM

## 2019-01-01 DIAGNOSIS — R10.9 FLANK PAIN: Primary | ICD-10-CM

## 2019-01-01 DIAGNOSIS — R79.89 ABNORMAL LFTS: ICD-10-CM

## 2019-01-01 DIAGNOSIS — R91.8 LUNG NODULES: Primary | ICD-10-CM

## 2019-01-01 DIAGNOSIS — C34.90 PRIMARY MALIGNANT NEOPLASM OF LUNG METASTATIC TO OTHER SITE, UNSPECIFIED LATERALITY (HCC): ICD-10-CM

## 2019-01-01 DIAGNOSIS — R90.89 ABNORMAL FINDING ON MRI OF BRAIN: Primary | ICD-10-CM

## 2019-01-01 DIAGNOSIS — A41.9 SEPSIS, DUE TO UNSPECIFIED ORGANISM: Primary | ICD-10-CM

## 2019-01-01 DIAGNOSIS — Z86.69 HISTORY OF SEIZURE DISORDER: ICD-10-CM

## 2019-01-01 DIAGNOSIS — C53.9 MALIGNANT NEOPLASM OF CERVIX, UNSPECIFIED SITE (HCC): Primary | ICD-10-CM

## 2019-01-01 DIAGNOSIS — R00.0 SINUS TACHYCARDIA: ICD-10-CM

## 2019-01-01 DIAGNOSIS — M54.50 ACUTE BILATERAL LOW BACK PAIN WITHOUT SCIATICA: Primary | ICD-10-CM

## 2019-01-01 DIAGNOSIS — R06.02 SHORTNESS OF BREATH: Primary | ICD-10-CM

## 2019-01-01 DIAGNOSIS — R11.2 NAUSEA AND VOMITING, INTRACTABILITY OF VOMITING NOT SPECIFIED, UNSPECIFIED VOMITING TYPE: ICD-10-CM

## 2019-01-01 DIAGNOSIS — R10.9 RIGHT FLANK PAIN: Primary | ICD-10-CM

## 2019-01-01 LAB
(1,3)-BETA-D-GLUCAN (FUNGITELL) INTERPRETATION: NEGATIVE
(1,3)-BETA-D-GLUCAN (FUNGITELL): <31 PG/ML
A/G RATIO: 0.8 (ref 1.1–2.2)
A/G RATIO: 0.8 (ref 1.1–2.2)
A/G RATIO: 0.9 (ref 1.1–2.2)
A/G RATIO: 1 (ref 1.1–2.2)
A/G RATIO: 1.1 (ref 1.1–2.2)
A/G RATIO: 1.2 (ref 1.1–2.2)
A/G RATIO: 1.3 (ref 1.1–2.2)
A/G RATIO: 1.3 (ref 1.1–2.2)
A/G RATIO: 1.5 (ref 1.1–2.2)
A/G RATIO: 1.8 (ref 1.1–2.2)
ABO/RH: NORMAL
ACANTHOCYTES: ABNORMAL
ALBUMIN SERPL-MCNC: 2.7 G/DL (ref 3.4–5)
ALBUMIN SERPL-MCNC: 2.8 G/DL (ref 3.4–5)
ALBUMIN SERPL-MCNC: 2.9 G/DL (ref 3.4–5)
ALBUMIN SERPL-MCNC: 3.3 G/DL (ref 3.4–5)
ALBUMIN SERPL-MCNC: 3.4 G/DL (ref 3.4–5)
ALBUMIN SERPL-MCNC: 3.5 G/DL (ref 3.4–5)
ALBUMIN SERPL-MCNC: 3.5 G/DL (ref 3.4–5)
ALBUMIN SERPL-MCNC: 3.6 G/DL (ref 3.1–4.9)
ALBUMIN SERPL-MCNC: 3.7 G/DL (ref 3.4–5)
ALBUMIN SERPL-MCNC: 3.7 G/DL (ref 3.4–5)
ALBUMIN SERPL-MCNC: 3.8 G/DL (ref 3.4–5)
ALBUMIN SERPL-MCNC: 3.9 G/DL (ref 3.4–5)
ALBUMIN SERPL-MCNC: 4 G/DL (ref 3.4–5)
ALBUMIN SERPL-MCNC: 4.1 G/DL (ref 3.4–5)
ALBUMIN SERPL-MCNC: 4.4 G/DL (ref 3.4–5)
ALP BLD-CCNC: 100 U/L (ref 40–129)
ALP BLD-CCNC: 113 U/L (ref 40–129)
ALP BLD-CCNC: 130 U/L (ref 40–129)
ALP BLD-CCNC: 187 U/L (ref 40–129)
ALP BLD-CCNC: 189 U/L (ref 40–129)
ALP BLD-CCNC: 233 U/L (ref 40–129)
ALP BLD-CCNC: 561 U/L (ref 40–129)
ALP BLD-CCNC: 563 U/L (ref 40–129)
ALP BLD-CCNC: 622 U/L (ref 40–129)
ALP BLD-CCNC: 653 U/L (ref 40–129)
ALP BLD-CCNC: 657 U/L (ref 40–129)
ALP BLD-CCNC: 665 U/L (ref 40–129)
ALP BLD-CCNC: 677 U/L (ref 40–129)
ALP BLD-CCNC: 70 U/L (ref 40–129)
ALP BLD-CCNC: 77 U/L (ref 40–129)
ALP BLD-CCNC: 88 U/L (ref 40–129)
ALP BLD-CCNC: 93 U/L (ref 40–129)
ALP BLD-CCNC: 97 U/L (ref 40–129)
ALPHA-1-GLOBULIN: 0.4 G/DL (ref 0.2–0.4)
ALPHA-2-GLOBULIN: 1.3 G/DL (ref 0.4–1.1)
ALT SERPL-CCNC: 105 U/L (ref 10–40)
ALT SERPL-CCNC: 111 U/L (ref 10–40)
ALT SERPL-CCNC: 115 U/L (ref 10–40)
ALT SERPL-CCNC: 119 U/L (ref 10–40)
ALT SERPL-CCNC: 121 U/L (ref 10–40)
ALT SERPL-CCNC: 130 U/L (ref 10–40)
ALT SERPL-CCNC: 134 U/L (ref 10–40)
ALT SERPL-CCNC: 136 U/L (ref 10–40)
ALT SERPL-CCNC: 136 U/L (ref 10–40)
ALT SERPL-CCNC: 22 U/L (ref 10–40)
ALT SERPL-CCNC: 23 U/L (ref 10–40)
ALT SERPL-CCNC: 24 U/L (ref 10–40)
ALT SERPL-CCNC: 28 U/L (ref 10–40)
ALT SERPL-CCNC: 31 U/L (ref 10–40)
ALT SERPL-CCNC: 42 U/L (ref 10–40)
ALT SERPL-CCNC: 49 U/L (ref 10–40)
ALT SERPL-CCNC: 58 U/L (ref 10–40)
ALT SERPL-CCNC: 94 U/L (ref 10–40)
AMMONIA: 41 UMOL/L (ref 11–51)
AMMONIA: 67 UMOL/L (ref 11–51)
AMORPHOUS: ABNORMAL /HPF
AMORPHOUS: ABNORMAL /HPF
ANION GAP SERPL CALCULATED.3IONS-SCNC: 10 MMOL/L (ref 3–16)
ANION GAP SERPL CALCULATED.3IONS-SCNC: 11 MMOL/L (ref 3–16)
ANION GAP SERPL CALCULATED.3IONS-SCNC: 12 MMOL/L (ref 3–16)
ANION GAP SERPL CALCULATED.3IONS-SCNC: 13 MMOL/L (ref 3–16)
ANION GAP SERPL CALCULATED.3IONS-SCNC: 14 MMOL/L (ref 3–16)
ANION GAP SERPL CALCULATED.3IONS-SCNC: 15 MMOL/L (ref 3–16)
ANION GAP SERPL CALCULATED.3IONS-SCNC: 15 MMOL/L (ref 3–16)
ANION GAP SERPL CALCULATED.3IONS-SCNC: 18 MMOL/L (ref 3–16)
ANION GAP SERPL CALCULATED.3IONS-SCNC: 8 MMOL/L (ref 3–16)
ANION GAP SERPL CALCULATED.3IONS-SCNC: 9 MMOL/L (ref 3–16)
ANISOCYTOSIS: ABNORMAL
ANTI-DSDNA IGG: <1 IU/ML (ref 0–9)
ANTI-NUCLEAR ANTIBODY (ANA): NEGATIVE
ANTI-SMITH IGG: <0.2 AI (ref 0–0.9)
ANTIBODY SCREEN: NORMAL
ANTICARDIOLIPIN IGG ANTIBODY: 2 GPL (ref 0–14)
APTT: 35.6 SEC (ref 26–36)
APTT: 37.9 SEC (ref 26–36)
APTT: 41.2 SEC (ref 26–36)
ASPERGILLUS ANTIBODY ID: NORMAL
ASPERGILLUS GALACTO AG: NEGATIVE
ASPERGILLUS GALACTO INDEX: 0.05
AST SERPL-CCNC: 208 U/L (ref 15–37)
AST SERPL-CCNC: 220 U/L (ref 15–37)
AST SERPL-CCNC: 23 U/L (ref 15–37)
AST SERPL-CCNC: 25 U/L (ref 15–37)
AST SERPL-CCNC: 253 U/L (ref 15–37)
AST SERPL-CCNC: 27 U/L (ref 15–37)
AST SERPL-CCNC: 27 U/L (ref 15–37)
AST SERPL-CCNC: 294 U/L (ref 15–37)
AST SERPL-CCNC: 295 U/L (ref 15–37)
AST SERPL-CCNC: 31 U/L (ref 15–37)
AST SERPL-CCNC: 320 U/L (ref 15–37)
AST SERPL-CCNC: 337 U/L (ref 15–37)
AST SERPL-CCNC: 35 U/L (ref 15–37)
AST SERPL-CCNC: 39 U/L (ref 15–37)
AST SERPL-CCNC: 46 U/L (ref 15–37)
AST SERPL-CCNC: 93 U/L (ref 15–37)
AST SERPL-CCNC: 96 U/L (ref 15–37)
AST SERPL-CCNC: 97 U/L (ref 15–37)
BACTERIA: ABNORMAL /HPF
BACTERIA: ABNORMAL /HPF
BANDED NEUTROPHILS RELATIVE PERCENT: 1 % (ref 0–7)
BANDED NEUTROPHILS RELATIVE PERCENT: 10 % (ref 0–7)
BANDED NEUTROPHILS RELATIVE PERCENT: 10 % (ref 0–7)
BANDED NEUTROPHILS RELATIVE PERCENT: 18 % (ref 0–7)
BANDED NEUTROPHILS RELATIVE PERCENT: 3 % (ref 0–7)
BANDED NEUTROPHILS RELATIVE PERCENT: 5 % (ref 0–7)
BASE EXCESS ARTERIAL: -1.9 MMOL/L (ref -3–3)
BASE EXCESS ARTERIAL: 5.1 MMOL/L (ref -3–3)
BASOPHILIC STIPPLING: ABNORMAL
BASOPHILS ABSOLUTE: 0 K/UL (ref 0–0.2)
BASOPHILS ABSOLUTE: 0.1 K/UL (ref 0–0.1)
BASOPHILS ABSOLUTE: 0.1 K/UL (ref 0–0.2)
BASOPHILS ABSOLUTE: 0.2 K/UL (ref 0–0.2)
BASOPHILS ABSOLUTE: 0.3 K/UL (ref 0–0.2)
BASOPHILS RELATIVE PERCENT: 0 %
BASOPHILS RELATIVE PERCENT: 0.2 %
BASOPHILS RELATIVE PERCENT: 0.3 %
BASOPHILS RELATIVE PERCENT: 0.4 %
BASOPHILS RELATIVE PERCENT: 0.6 %
BASOPHILS RELATIVE PERCENT: 0.6 %
BASOPHILS RELATIVE PERCENT: 0.7 %
BASOPHILS RELATIVE PERCENT: 0.8
BASOPHILS RELATIVE PERCENT: 0.8 %
BASOPHILS RELATIVE PERCENT: 0.9 %
BASOPHILS RELATIVE PERCENT: 1 %
BASOPHILS RELATIVE PERCENT: 1.1 %
BASOPHILS RELATIVE PERCENT: 1.2 %
BASOPHILS RELATIVE PERCENT: 1.3 %
BASOPHILS RELATIVE PERCENT: 1.4 %
BASOPHILS RELATIVE PERCENT: 1.4 %
BASOPHILS RELATIVE PERCENT: 2 %
BASOPHILS RELATIVE PERCENT: 2 %
BASOPHILS RELATIVE PERCENT: 2.5 %
BETA GLOBULIN: 1.5 G/DL (ref 0.9–1.6)
BETA-2 GLYCOPROTEIN 1 IGG ANTIBODY: 0 SGU (ref 0–20)
BETA-2 GLYCOPROTEIN 1 IGM ANTIBODY: 2 SMU (ref 0–20)
BILIRUB SERPL-MCNC: 0.4 MG/DL (ref 0–1)
BILIRUB SERPL-MCNC: 0.5 MG/DL (ref 0–1)
BILIRUB SERPL-MCNC: 0.6 MG/DL (ref 0–1)
BILIRUB SERPL-MCNC: 0.8 MG/DL (ref 0–1)
BILIRUB SERPL-MCNC: 2.1 MG/DL (ref 0–1)
BILIRUB SERPL-MCNC: 2.8 MG/DL (ref 0–1)
BILIRUB SERPL-MCNC: 3.4 MG/DL (ref 0–1)
BILIRUB SERPL-MCNC: 3.9 MG/DL (ref 0–1)
BILIRUB SERPL-MCNC: 4.6 MG/DL (ref 0–1)
BILIRUB SERPL-MCNC: 4.7 MG/DL (ref 0–1)
BILIRUB SERPL-MCNC: 5.6 MG/DL (ref 0–1)
BILIRUB SERPL-MCNC: <0.2 MG/DL (ref 0–1)
BILIRUBIN DIRECT: 2.3 MG/DL (ref 0–0.3)
BILIRUBIN DIRECT: 3.5 MG/DL (ref 0–0.3)
BILIRUBIN DIRECT: 3.9 MG/DL (ref 0–0.3)
BILIRUBIN DIRECT: 4.6 MG/DL (ref 0–0.3)
BILIRUBIN DIRECT: <0.2 MG/DL (ref 0–0.3)
BILIRUBIN URINE: ABNORMAL
BILIRUBIN URINE: NEGATIVE
BILIRUBIN, INDIRECT: 0.4 MG/DL (ref 0–1)
BILIRUBIN, INDIRECT: 0.5 MG/DL (ref 0–1)
BILIRUBIN, INDIRECT: 0.8 MG/DL (ref 0–1)
BILIRUBIN, INDIRECT: 1 MG/DL (ref 0–1)
BILIRUBIN, INDIRECT: ABNORMAL MG/DL (ref 0–1)
BLASTOMYCES ANTIBODY ID: NORMAL
BLASTOMYCES ANTIBODY ID: NORMAL
BLOOD BANK DISPENSE STATUS: NORMAL
BLOOD BANK PRODUCT CODE: NORMAL
BLOOD CULTURE, ROUTINE: ABNORMAL
BLOOD CULTURE, ROUTINE: ABNORMAL
BLOOD CULTURE, ROUTINE: NORMAL
BLOOD, URINE: ABNORMAL
BPU ID: NORMAL
BUN BLDV-MCNC: 10 MG/DL (ref 7–20)
BUN BLDV-MCNC: 11 MG/DL (ref 7–20)
BUN BLDV-MCNC: 12 MG/DL (ref 7–20)
BUN BLDV-MCNC: 13 MG/DL (ref 7–20)
BUN BLDV-MCNC: 14 MG/DL (ref 7–20)
BUN BLDV-MCNC: 14 MG/DL (ref 7–20)
BUN BLDV-MCNC: 15 MG/DL (ref 7–20)
BUN BLDV-MCNC: 15 MG/DL (ref 7–20)
BUN BLDV-MCNC: 16 MG/DL (ref 7–20)
BUN BLDV-MCNC: 17 MG/DL (ref 7–20)
BUN BLDV-MCNC: 18 MG/DL (ref 7–20)
BUN BLDV-MCNC: 18 MG/DL (ref 7–20)
BUN BLDV-MCNC: 29 MG/DL (ref 7–20)
BUN BLDV-MCNC: 3 MG/DL (ref 7–20)
BUN BLDV-MCNC: 31 MG/DL (ref 7–20)
BUN BLDV-MCNC: 34 MG/DL (ref 7–20)
BUN BLDV-MCNC: 35 MG/DL (ref 7–20)
BUN BLDV-MCNC: 36 MG/DL (ref 7–20)
BUN BLDV-MCNC: 4 MG/DL (ref 7–20)
BUN BLDV-MCNC: 6 MG/DL (ref 7–20)
BUN BLDV-MCNC: 7 MG/DL (ref 7–20)
BUN BLDV-MCNC: 8 MG/DL (ref 7–20)
BUN BLDV-MCNC: 9 MG/DL (ref 7–20)
BUN BLDV-MCNC: 9 MG/DL (ref 7–20)
C3 COMPLEMENT: 127 MG/DL (ref 90–180)
C3 COMPLEMENT: 141.4 MG/DL (ref 90–180)
C4 COMPLEMENT: 19.9 MG/DL (ref 10–40)
C4 COMPLEMENT: 30.8 MG/DL (ref 10–40)
CALCIUM IONIZED: 1.07 MMOL/L (ref 1.12–1.32)
CALCIUM IONIZED: 1.08 MMOL/L (ref 1.12–1.32)
CALCIUM IONIZED: 1.12 MMOL/L (ref 1.12–1.32)
CALCIUM IONIZED: 1.14 MMOL/L (ref 1.12–1.32)
CALCIUM IONIZED: 1.14 MMOL/L (ref 1.12–1.32)
CALCIUM SERPL-MCNC: 10.2 MG/DL (ref 8.3–10.6)
CALCIUM SERPL-MCNC: 7.9 MG/DL (ref 8.3–10.6)
CALCIUM SERPL-MCNC: 8 MG/DL (ref 8.3–10.6)
CALCIUM SERPL-MCNC: 8.1 MG/DL (ref 8.3–10.6)
CALCIUM SERPL-MCNC: 8.1 MG/DL (ref 8.3–10.6)
CALCIUM SERPL-MCNC: 8.4 MG/DL (ref 8.3–10.6)
CALCIUM SERPL-MCNC: 8.4 MG/DL (ref 8.3–10.6)
CALCIUM SERPL-MCNC: 8.5 MG/DL (ref 8.3–10.6)
CALCIUM SERPL-MCNC: 8.5 MG/DL (ref 8.3–10.6)
CALCIUM SERPL-MCNC: 8.6 MG/DL (ref 8.3–10.6)
CALCIUM SERPL-MCNC: 8.7 MG/DL (ref 8.3–10.6)
CALCIUM SERPL-MCNC: 8.8 MG/DL (ref 8.3–10.6)
CALCIUM SERPL-MCNC: 8.9 MG/DL (ref 8.3–10.6)
CALCIUM SERPL-MCNC: 9 MG/DL (ref 8.3–10.6)
CALCIUM SERPL-MCNC: 9.1 MG/DL (ref 8.3–10.6)
CALCIUM SERPL-MCNC: 9.2 MG/DL (ref 8.3–10.6)
CALCIUM SERPL-MCNC: 9.3 MG/DL (ref 8.3–10.6)
CALCIUM SERPL-MCNC: 9.5 MG/DL (ref 8.3–10.6)
CALCIUM SERPL-MCNC: 9.5 MG/DL (ref 8.3–10.6)
CALCIUM SERPL-MCNC: 9.7 MG/DL (ref 8.3–10.6)
CALCIUM SERPL-MCNC: 9.9 MG/DL (ref 8.3–10.6)
CARBOXYHEMOGLOBIN ARTERIAL: 0 % (ref 0–1.5)
CARBOXYHEMOGLOBIN ARTERIAL: 1 % (ref 0–1.5)
CARDIOLIPIN AB IGM: 6 MPL (ref 0–12)
CASTS UA: ABNORMAL /LPF
CCP IGG ANTIBODIES: 3 UNITS (ref 0–19)
CHLORIDE BLD-SCNC: 100 MMOL/L (ref 99–110)
CHLORIDE BLD-SCNC: 101 MMOL/L (ref 99–110)
CHLORIDE BLD-SCNC: 102 MMOL/L (ref 99–110)
CHLORIDE BLD-SCNC: 103 MMOL/L (ref 99–110)
CHLORIDE BLD-SCNC: 104 MMOL/L (ref 99–110)
CHLORIDE BLD-SCNC: 105 MMOL/L (ref 99–110)
CHLORIDE BLD-SCNC: 105 MMOL/L (ref 99–110)
CHLORIDE BLD-SCNC: 106 MMOL/L (ref 99–110)
CHLORIDE BLD-SCNC: 107 MMOL/L (ref 99–110)
CHLORIDE BLD-SCNC: 108 MMOL/L (ref 99–110)
CHLORIDE BLD-SCNC: 109 MMOL/L (ref 99–110)
CHLORIDE BLD-SCNC: 109 MMOL/L (ref 99–110)
CHLORIDE BLD-SCNC: 111 MMOL/L (ref 99–110)
CHLORIDE BLD-SCNC: 95 MMOL/L (ref 99–110)
CHLORIDE BLD-SCNC: 97 MMOL/L (ref 99–110)
CHLORIDE BLD-SCNC: 97 MMOL/L (ref 99–110)
CHLORIDE BLD-SCNC: 98 MMOL/L (ref 99–110)
CHLORIDE BLD-SCNC: 99 MMOL/L (ref 99–110)
CLARITY: ABNORMAL
CLARITY: CLEAR
CLARITY: CLEAR
CO2: 21 MMOL/L (ref 21–32)
CO2: 22 MMOL/L (ref 21–32)
CO2: 23 MMOL/L (ref 21–32)
CO2: 23 MMOL/L (ref 21–32)
CO2: 24 MMOL/L (ref 21–32)
CO2: 25 MMOL/L (ref 21–32)
CO2: 26 MMOL/L (ref 21–32)
CO2: 27 MMOL/L (ref 21–32)
CO2: 28 MMOL/L (ref 21–32)
CO2: 29 MMOL/L (ref 21–32)
CO2: 29 MMOL/L (ref 21–32)
CO2: 30 MMOL/L (ref 21–32)
CO2: 32 MMOL/L (ref 21–32)
COCCIDIOIDES AG EIA: NORMAL
COCCIDIOIDES AG-SOURCE: NORMAL
COCCIDIOIDES ANTIBODY ID: NORMAL
COCCIDIOIDES ANTIBODY ID: NORMAL
COLOR: ABNORMAL
COLOR: YELLOW
COMMENT UA: ABNORMAL
CREAT SERPL-MCNC: 0.5 MG/DL (ref 0.6–1.1)
CREAT SERPL-MCNC: 0.5 MG/DL (ref 0.6–1.1)
CREAT SERPL-MCNC: 0.6 MG/DL (ref 0.6–1.1)
CREAT SERPL-MCNC: 0.7 MG/DL (ref 0.6–1.1)
CREAT SERPL-MCNC: 0.8 MG/DL (ref 0.6–1.1)
CREAT SERPL-MCNC: 0.9 MG/DL (ref 0.6–1.1)
CREAT SERPL-MCNC: 1.1 MG/DL (ref 0.6–1.1)
CREAT SERPL-MCNC: 1.5 MG/DL (ref 0.6–1.1)
CREAT SERPL-MCNC: 3.1 MG/DL (ref 0.6–1.1)
CREAT SERPL-MCNC: 3.9 MG/DL (ref 0.6–1.1)
CREAT SERPL-MCNC: 4.5 MG/DL (ref 0.6–1.1)
CREAT SERPL-MCNC: <0.5 MG/DL (ref 0.6–1.1)
CREATININE URINE: 68.8 MG/DL (ref 28–259)
CRYPTOCOCCAL ANTIGEN: NEGATIVE
CULTURE, BLOOD 2: NORMAL
DESCRIPTION BLOOD BANK: NORMAL
DOHLE BODIES: PRESENT
DRVVT CONFIRMATION TEST: POSITIVE RATIO
DRVVT SCREEN: 54 SEC (ref 33–44)
DRVVT,DIL: 45 SEC (ref 33–44)
EKG ATRIAL RATE: 105 BPM
EKG ATRIAL RATE: 125 BPM
EKG ATRIAL RATE: 128 BPM
EKG DIAGNOSIS: NORMAL
EKG P AXIS: -9 DEGREES
EKG P AXIS: 30 DEGREES
EKG P AXIS: 34 DEGREES
EKG P AXIS: 40 DEGREES
EKG P AXIS: 60 DEGREES
EKG P-R INTERVAL: 104 MS
EKG P-R INTERVAL: 132 MS
EKG P-R INTERVAL: 148 MS
EKG Q-T INTERVAL: 304 MS
EKG Q-T INTERVAL: 306 MS
EKG Q-T INTERVAL: 338 MS
EKG Q-T INTERVAL: 362 MS
EKG Q-T INTERVAL: 422 MS
EKG QRS DURATION: 80 MS
EKG QRS DURATION: 82 MS
EKG QRS DURATION: 82 MS
EKG QRS DURATION: 86 MS
EKG QRS DURATION: 88 MS
EKG QTC CALCULATION (BAZETT): 404 MS
EKG QTC CALCULATION (BAZETT): 443 MS
EKG QTC CALCULATION (BAZETT): 446 MS
EKG QTC CALCULATION (BAZETT): 478 MS
EKG QTC CALCULATION (BAZETT): 609 MS
EKG R AXIS: 48 DEGREES
EKG R AXIS: 52 DEGREES
EKG R AXIS: 57 DEGREES
EKG R AXIS: 63 DEGREES
EKG R AXIS: 92 DEGREES
EKG T AXIS: 16 DEGREES
EKG T AXIS: 23 DEGREES
EKG T AXIS: 27 DEGREES
EKG T AXIS: 3 DEGREES
EKG T AXIS: 58 DEGREES
EKG VENTRICULAR RATE: 105 BPM
EKG VENTRICULAR RATE: 125 BPM
EKG VENTRICULAR RATE: 128 BPM
ENA TO SSA (RO) ANTIBODY: NEGATIVE EU
ENA TO SSB (LA) ANTIBODY: NEGATIVE EU
EOSINOPHILS ABSOLUTE: 0 K/UL (ref 0–0.6)
EOSINOPHILS ABSOLUTE: 0.1 K/UL (ref 0–0.6)
EOSINOPHILS ABSOLUTE: 0.2 K/UL (ref 0–0.4)
EOSINOPHILS ABSOLUTE: 0.2 K/UL (ref 0–0.6)
EOSINOPHILS ABSOLUTE: 0.3 K/UL (ref 0–0.6)
EOSINOPHILS ABSOLUTE: 0.3 K/UL (ref 0–0.6)
EOSINOPHILS ABSOLUTE: 0.5 K/UL (ref 0–0.6)
EOSINOPHILS RELATIVE PERCENT: 0 %
EOSINOPHILS RELATIVE PERCENT: 0.1 %
EOSINOPHILS RELATIVE PERCENT: 0.1 %
EOSINOPHILS RELATIVE PERCENT: 0.2 %
EOSINOPHILS RELATIVE PERCENT: 0.3 %
EOSINOPHILS RELATIVE PERCENT: 1 %
EOSINOPHILS RELATIVE PERCENT: 1.1 %
EOSINOPHILS RELATIVE PERCENT: 1.2 %
EOSINOPHILS RELATIVE PERCENT: 1.3 %
EOSINOPHILS RELATIVE PERCENT: 1.4 %
EOSINOPHILS RELATIVE PERCENT: 1.5 %
EOSINOPHILS RELATIVE PERCENT: 1.7 %
EOSINOPHILS RELATIVE PERCENT: 1.8 %
EOSINOPHILS RELATIVE PERCENT: 1.9 %
EOSINOPHILS RELATIVE PERCENT: 2 %
EOSINOPHILS RELATIVE PERCENT: 2.1
EOSINOPHILS RELATIVE PERCENT: 2.2 %
EOSINOPHILS RELATIVE PERCENT: 2.3 %
EOSINOPHILS RELATIVE PERCENT: 2.4 %
EOSINOPHILS RELATIVE PERCENT: 2.5 %
EOSINOPHILS RELATIVE PERCENT: 2.8 %
EOSINOPHILS RELATIVE PERCENT: 3 %
EOSINOPHILS RELATIVE PERCENT: 3.2 %
EOSINOPHILS RELATIVE PERCENT: 3.4 %
EOSINOPHILS RELATIVE PERCENT: 3.5 %
EOSINOPHILS RELATIVE PERCENT: 3.6 %
EOSINOPHILS RELATIVE PERCENT: 3.7 %
EOSINOPHILS RELATIVE PERCENT: 4.2 %
EOSINOPHILS RELATIVE PERCENT: 4.4 %
EOSINOPHILS RELATIVE PERCENT: 4.8 %
EPITHELIAL CELLS, UA: 1 /HPF (ref 0–5)
EPITHELIAL CELLS, UA: 2 /HPF (ref 0–5)
EPITHELIAL CELLS, UA: 3 /HPF (ref 0–5)
EPITHELIAL CELLS, UA: 3 /HPF (ref 0–5)
EPITHELIAL CELLS, UA: ABNORMAL /HPF
EPITHELIAL CELLS, UA: ABNORMAL /HPF
F-ACTIN AB IGG: 13 UNITS (ref 0–19)
FERRITIN: 614.1 NG/ML (ref 15–150)
FERRITIN: 829.2 NG/ML (ref 15–150)
FOLATE: 17.18 NG/ML (ref 4.78–24.2)
G-6-PD, QUANT: 17.2 U/G HB (ref 9.9–16.6)
GAMMA GLOBULIN: 0.9 G/DL (ref 0.6–1.8)
GAMMA GLUTAMYL TRANSFERASE: 108 U/L (ref 5–36)
GAMMA GLUTAMYL TRANSFERASE: 19 U/L (ref 5–36)
GFR AFRICAN AMERICAN: 13
GFR AFRICAN AMERICAN: 16
GFR AFRICAN AMERICAN: 20
GFR AFRICAN AMERICAN: 47
GFR AFRICAN AMERICAN: >60
GFR NON-AFRICAN AMERICAN: 11
GFR NON-AFRICAN AMERICAN: 13
GFR NON-AFRICAN AMERICAN: 17
GFR NON-AFRICAN AMERICAN: 39
GFR NON-AFRICAN AMERICAN: 56
GFR NON-AFRICAN AMERICAN: >60
GLOBULIN: 1.9 G/DL
GLOBULIN: 2.5 G/DL
GLOBULIN: 2.9 G/DL
GLOBULIN: 3 G/DL
GLOBULIN: 3.1 G/DL
GLOBULIN: 3.1 G/DL
GLOBULIN: 3.2 G/DL
GLOBULIN: 3.3 G/DL
GLOBULIN: 3.3 G/DL
GLOBULIN: 3.4 G/DL
GLOBULIN: 3.5 G/DL
GLOBULIN: 3.6 G/DL
GLOBULIN: 3.6 G/DL
GLUCOSE BLD-MCNC: 102 MG/DL (ref 70–99)
GLUCOSE BLD-MCNC: 102 MG/DL (ref 70–99)
GLUCOSE BLD-MCNC: 103 MG/DL (ref 70–99)
GLUCOSE BLD-MCNC: 104 MG/DL (ref 70–99)
GLUCOSE BLD-MCNC: 110 MG/DL (ref 70–99)
GLUCOSE BLD-MCNC: 111 MG/DL (ref 70–99)
GLUCOSE BLD-MCNC: 111 MG/DL (ref 70–99)
GLUCOSE BLD-MCNC: 115 MG/DL (ref 70–99)
GLUCOSE BLD-MCNC: 115 MG/DL (ref 70–99)
GLUCOSE BLD-MCNC: 117 MG/DL (ref 70–99)
GLUCOSE BLD-MCNC: 118 MG/DL (ref 70–99)
GLUCOSE BLD-MCNC: 119 MG/DL (ref 70–99)
GLUCOSE BLD-MCNC: 119 MG/DL (ref 70–99)
GLUCOSE BLD-MCNC: 120 MG/DL (ref 70–99)
GLUCOSE BLD-MCNC: 121 MG/DL (ref 70–99)
GLUCOSE BLD-MCNC: 126 MG/DL (ref 70–99)
GLUCOSE BLD-MCNC: 127 MG/DL (ref 70–99)
GLUCOSE BLD-MCNC: 128 MG/DL (ref 70–99)
GLUCOSE BLD-MCNC: 130 MG/DL (ref 70–99)
GLUCOSE BLD-MCNC: 133 MG/DL (ref 70–99)
GLUCOSE BLD-MCNC: 135 MG/DL (ref 70–99)
GLUCOSE BLD-MCNC: 135 MG/DL (ref 70–99)
GLUCOSE BLD-MCNC: 136 MG/DL (ref 70–99)
GLUCOSE BLD-MCNC: 137 MG/DL (ref 70–99)
GLUCOSE BLD-MCNC: 138 MG/DL (ref 70–99)
GLUCOSE BLD-MCNC: 142 MG/DL (ref 70–99)
GLUCOSE BLD-MCNC: 144 MG/DL (ref 70–99)
GLUCOSE BLD-MCNC: 145 MG/DL (ref 70–99)
GLUCOSE BLD-MCNC: 153 MG/DL (ref 70–99)
GLUCOSE BLD-MCNC: 156 MG/DL (ref 70–99)
GLUCOSE BLD-MCNC: 163 MG/DL (ref 70–99)
GLUCOSE BLD-MCNC: 163 MG/DL (ref 70–99)
GLUCOSE BLD-MCNC: 168 MG/DL (ref 70–99)
GLUCOSE BLD-MCNC: 173 MG/DL (ref 70–99)
GLUCOSE BLD-MCNC: 191 MG/DL (ref 70–99)
GLUCOSE BLD-MCNC: 82 MG/DL (ref 70–99)
GLUCOSE BLD-MCNC: 91 MG/DL (ref 70–99)
GLUCOSE BLD-MCNC: 94 MG/DL (ref 70–99)
GLUCOSE BLD-MCNC: 95 MG/DL (ref 70–99)
GLUCOSE BLD-MCNC: 95 MG/DL (ref 70–99)
GLUCOSE BLD-MCNC: 97 MG/DL (ref 70–99)
GLUCOSE BLD-MCNC: 97 MG/DL (ref 70–99)
GLUCOSE BLD-MCNC: 98 MG/DL (ref 70–99)
GLUCOSE BLD-MCNC: 98 MG/DL (ref 70–99)
GLUCOSE URINE: ABNORMAL MG/DL
GLUCOSE URINE: NEGATIVE MG/DL
GONADOTROPIN, CHORIONIC (HCG) QUANT: <5 MIU/ML
HAPTOGLOBIN: 70 MG/DL (ref 30–200)
HAV IGM SER IA-ACNC: NORMAL
HCG QUALITATIVE: NEGATIVE
HCG(URINE) PREGNANCY TEST: NEGATIVE
HCO3 ARTERIAL: 22.8 MMOL/L (ref 21–29)
HCO3 ARTERIAL: 28.8 MMOL/L (ref 21–29)
HCT VFR BLD CALC: 19.8 % (ref 36–48)
HCT VFR BLD CALC: 21.2 % (ref 36–48)
HCT VFR BLD CALC: 21.4 % (ref 36–48)
HCT VFR BLD CALC: 22.2 % (ref 36–48)
HCT VFR BLD CALC: 22.7 % (ref 36–48)
HCT VFR BLD CALC: 23.9 % (ref 36–48)
HCT VFR BLD CALC: 24.1 % (ref 36–48)
HCT VFR BLD CALC: 24.1 % (ref 36–48)
HCT VFR BLD CALC: 24.3 % (ref 36–48)
HCT VFR BLD CALC: 24.7 % (ref 36–48)
HCT VFR BLD CALC: 24.7 % (ref 36–48)
HCT VFR BLD CALC: 24.9 % (ref 36–48)
HCT VFR BLD CALC: 25.2 % (ref 36–48)
HCT VFR BLD CALC: 25.3 % (ref 36–48)
HCT VFR BLD CALC: 26 % (ref 36–48)
HCT VFR BLD CALC: 26 % (ref 36–48)
HCT VFR BLD CALC: 26.6 % (ref 36–48)
HCT VFR BLD CALC: 26.9 % (ref 36–48)
HCT VFR BLD CALC: 26.9 % (ref 36–48)
HCT VFR BLD CALC: 27.8 % (ref 36–48)
HCT VFR BLD CALC: 27.9 % (ref 36–48)
HCT VFR BLD CALC: 28 % (ref 36–48)
HCT VFR BLD CALC: 28.2 % (ref 36–48)
HCT VFR BLD CALC: 28.4 % (ref 36–48)
HCT VFR BLD CALC: 28.6 % (ref 36–48)
HCT VFR BLD CALC: 28.8 % (ref 36–48)
HCT VFR BLD CALC: 29 % (ref 36–48)
HCT VFR BLD CALC: 29.1 % (ref 36–48)
HCT VFR BLD CALC: 29.5 % (ref 36–48)
HCT VFR BLD CALC: 29.6 % (ref 36–48)
HCT VFR BLD CALC: 29.8 % (ref 35.8–46.5)
HCT VFR BLD CALC: 30 % (ref 36–48)
HCT VFR BLD CALC: 30.3 % (ref 36–48)
HCT VFR BLD CALC: 30.5 % (ref 36–48)
HCT VFR BLD CALC: 30.9 % (ref 36–48)
HCT VFR BLD CALC: 31.2 % (ref 36–48)
HCT VFR BLD CALC: 31.4 % (ref 36–48)
HCT VFR BLD CALC: 31.7 % (ref 36–48)
HCT VFR BLD CALC: 32 % (ref 36–48)
HCT VFR BLD CALC: 32.2 % (ref 36–48)
HCT VFR BLD CALC: 32.6 % (ref 36–48)
HCT VFR BLD CALC: 32.6 % (ref 36–48)
HCT VFR BLD CALC: 34.5 % (ref 36–48)
HCT VFR BLD CALC: 36.7 % (ref 36–48)
HCT VFR BLD CALC: 37.1 % (ref 36–48)
HEMOGLOBIN, ART, EXTENDED: 12.1 G/DL (ref 12–16)
HEMOGLOBIN, ART, EXTENDED: 8.8 G/DL (ref 12–16)
HEMOGLOBIN: 10.1 G/DL (ref 12–16)
HEMOGLOBIN: 10.1 G/DL (ref 12–16)
HEMOGLOBIN: 10.2 G/DL (ref 12–16)
HEMOGLOBIN: 10.2 G/DL (ref 12–16)
HEMOGLOBIN: 10.3 G/DL (ref 12–16)
HEMOGLOBIN: 11.1 G/DL (ref 12–16)
HEMOGLOBIN: 11.5 G/DL (ref 12–16)
HEMOGLOBIN: 11.6 G/DL (ref 12–16)
HEMOGLOBIN: 6.2 G/DL (ref 12–16)
HEMOGLOBIN: 6.8 G/DL (ref 12–16)
HEMOGLOBIN: 7 G/DL (ref 12–16)
HEMOGLOBIN: 7.4 G/DL (ref 12–16)
HEMOGLOBIN: 7.6 G/DL (ref 12–16)
HEMOGLOBIN: 7.8 G/DL (ref 12–16)
HEMOGLOBIN: 7.9 G/DL (ref 12–16)
HEMOGLOBIN: 7.9 G/DL (ref 12–16)
HEMOGLOBIN: 8 G/DL (ref 12–16)
HEMOGLOBIN: 8.1 G/DL (ref 12–16)
HEMOGLOBIN: 8.2 G/DL (ref 12–16)
HEMOGLOBIN: 8.2 G/DL (ref 12–16)
HEMOGLOBIN: 8.3 G/DL (ref 12–16)
HEMOGLOBIN: 8.4 G/DL (ref 12–16)
HEMOGLOBIN: 8.5 G/DL (ref 12–16)
HEMOGLOBIN: 8.8 G/DL (ref 12–16)
HEMOGLOBIN: 8.9 G/DL (ref 12–16)
HEMOGLOBIN: 9.1 G/DL (ref 12–16)
HEMOGLOBIN: 9.2 G/DL (ref 12–16)
HEMOGLOBIN: 9.3 G/DL (ref 12–16)
HEMOGLOBIN: 9.4 G/DL (ref 12.1–15.8)
HEMOGLOBIN: 9.4 G/DL (ref 12–16)
HEMOGLOBIN: 9.4 G/DL (ref 12–16)
HEMOGLOBIN: 9.8 G/DL (ref 12–16)
HEMOGLOBIN: 9.9 G/DL (ref 12–16)
HEPATITIS B CORE IGM ANTIBODY: NORMAL
HEPATITIS B SURFACE ANTIGEN INTERPRETATION: NORMAL
HEPATITIS C ANTIBODY INTERPRETATION: NORMAL
HEXAGONAL PHOSPHOLIPID NEUTRALIZAT TEST: ABNORMAL
HISTOPLASMA ABS, ID: NORMAL
HISTOPLASMA ANTIGEN URINE INTERP: NOT DETECTED
HISTOPLASMA ANTIGEN URINE: NOT DETECTED NG/ML
HYALINE CASTS: 1 /LPF (ref 0–8)
HYALINE CASTS: 1 /LPF (ref 0–8)
HYALINE CASTS: 2 /LPF (ref 0–8)
HYALINE CASTS: 3 /LPF (ref 0–8)
HYALINE CASTS: 3 /LPF (ref 0–8)
HYALINE CASTS: 5 /LPF (ref 0–8)
HYALINE CASTS: 5 /LPF (ref 0–8)
HYALINE CASTS: 9 /LPF (ref 0–8)
HYPOCHROMIA: ABNORMAL
IMMATURE RETIC FRACT: 0.65 (ref 0.21–0.37)
INR BLD: 1.08 (ref 0.86–1.14)
INR BLD: 1.11 (ref 0.86–1.14)
INR BLD: 1.13 (ref 0.86–1.14)
INR BLD: 1.21 (ref 0.86–1.14)
INR BLD: 1.25 (ref 0.86–1.14)
INR BLD: 1.42 (ref 0.86–1.14)
IRON SATURATION: 61 % (ref 15–50)
IRON SATURATION: 9 % (ref 15–50)
IRON: 126 UG/DL (ref 37–145)
IRON: 18 UG/DL (ref 37–145)
KETONES, URINE: ABNORMAL MG/DL
KETONES, URINE: NEGATIVE MG/DL
L. PNEUMOPHILA SEROGP 1 UR AG: NORMAL
L. PNEUMOPHILA SEROGP 1 UR AG: NORMAL
LACTATE DEHYDROGENASE: 311 U/L (ref 100–190)
LACTIC ACID, SEPSIS: 0.9 MMOL/L (ref 0.4–1.9)
LACTIC ACID, SEPSIS: 0.9 MMOL/L (ref 0.4–1.9)
LACTIC ACID, SEPSIS: 1 MMOL/L (ref 0.4–1.9)
LACTIC ACID, SEPSIS: 1 MMOL/L (ref 0.4–1.9)
LACTIC ACID, SEPSIS: 1.3 MMOL/L (ref 0.4–1.9)
LACTIC ACID, SEPSIS: 1.3 MMOL/L (ref 0.4–1.9)
LACTIC ACID, SEPSIS: 1.6 MMOL/L (ref 0.4–1.9)
LACTIC ACID, SEPSIS: 1.8 MMOL/L (ref 0.4–1.9)
LACTIC ACID, SEPSIS: 2.3 MMOL/L (ref 0.4–1.9)
LACTIC ACID: 0.8 MMOL/L (ref 0.4–2)
LACTIC ACID: 1.3 MMOL/L (ref 0.4–2)
LACTIC ACID: 1.9 MMOL/L (ref 0.4–2)
LACTIC ACID: 2.3 MMOL/L (ref 0.4–2)
LACTIC ACID: 2.5 MMOL/L (ref 0.4–2)
LACTIC ACID: 6.9 MMOL/L (ref 0.4–2)
LEFT VENTRICULAR EJECTION FRACTION HIGH VALUE: 60 %
LEFT VENTRICULAR EJECTION FRACTION MODE: NORMAL
LEUKOCYTE ESTERASE, URINE: ABNORMAL
LEUKOCYTE ESTERASE, URINE: NEGATIVE
LEUKOCYTE ESTERASE, URINE: NEGATIVE
LIPASE: 25 U/L (ref 13–60)
LUPUS ANTICOAG INTERP: ABNORMAL
LV EF: 63 %
LVEF MODALITY: NORMAL
LYMPHOCYTES ABSOLUTE: 0.3 K/UL (ref 1–5.1)
LYMPHOCYTES ABSOLUTE: 0.5 K/UL (ref 1–5.1)
LYMPHOCYTES ABSOLUTE: 0.6 K/UL (ref 1–5.1)
LYMPHOCYTES ABSOLUTE: 0.6 K/UL (ref 1–5.1)
LYMPHOCYTES ABSOLUTE: 0.8 K/UL (ref 1–5.1)
LYMPHOCYTES ABSOLUTE: 0.9 K/UL (ref 1–5.1)
LYMPHOCYTES ABSOLUTE: 1 K/UL (ref 1–5.1)
LYMPHOCYTES ABSOLUTE: 1 K/UL (ref 1–5.1)
LYMPHOCYTES ABSOLUTE: 1.1 K/UL (ref 1–5.1)
LYMPHOCYTES ABSOLUTE: 1.2 K/UL (ref 1–5.1)
LYMPHOCYTES ABSOLUTE: 1.3 K/UL (ref 1–5.1)
LYMPHOCYTES ABSOLUTE: 1.4 K/UL (ref 1–5.1)
LYMPHOCYTES ABSOLUTE: 1.5 K/UL (ref 1–5.1)
LYMPHOCYTES ABSOLUTE: 1.5 K/UL (ref 1–5.1)
LYMPHOCYTES ABSOLUTE: 1.6 K/UL (ref 1–5.1)
LYMPHOCYTES ABSOLUTE: 1.7 K/UL (ref 1–5.1)
LYMPHOCYTES ABSOLUTE: 1.8 K/UL (ref 0.8–3.6)
LYMPHOCYTES ABSOLUTE: 1.9 K/UL (ref 1–5.1)
LYMPHOCYTES ABSOLUTE: 2 K/UL (ref 1–5.1)
LYMPHOCYTES ABSOLUTE: 2 K/UL (ref 1–5.1)
LYMPHOCYTES ABSOLUTE: 2.3 K/UL (ref 1–5.1)
LYMPHOCYTES ABSOLUTE: 2.3 K/UL (ref 1–5.1)
LYMPHOCYTES ABSOLUTE: 2.4 K/UL (ref 1–5.1)
LYMPHOCYTES RELATIVE PERCENT: 1.9 %
LYMPHOCYTES RELATIVE PERCENT: 10 %
LYMPHOCYTES RELATIVE PERCENT: 10.2 %
LYMPHOCYTES RELATIVE PERCENT: 10.9 %
LYMPHOCYTES RELATIVE PERCENT: 11.1 %
LYMPHOCYTES RELATIVE PERCENT: 12.2 %
LYMPHOCYTES RELATIVE PERCENT: 12.4 %
LYMPHOCYTES RELATIVE PERCENT: 13 %
LYMPHOCYTES RELATIVE PERCENT: 13.3 %
LYMPHOCYTES RELATIVE PERCENT: 13.6 %
LYMPHOCYTES RELATIVE PERCENT: 13.9 %
LYMPHOCYTES RELATIVE PERCENT: 14 %
LYMPHOCYTES RELATIVE PERCENT: 14.9 %
LYMPHOCYTES RELATIVE PERCENT: 15 %
LYMPHOCYTES RELATIVE PERCENT: 16 %
LYMPHOCYTES RELATIVE PERCENT: 16.2 %
LYMPHOCYTES RELATIVE PERCENT: 17.8 %
LYMPHOCYTES RELATIVE PERCENT: 19.1 %
LYMPHOCYTES RELATIVE PERCENT: 19.9 %
LYMPHOCYTES RELATIVE PERCENT: 20.6 %
LYMPHOCYTES RELATIVE PERCENT: 20.9 %
LYMPHOCYTES RELATIVE PERCENT: 21.7 %
LYMPHOCYTES RELATIVE PERCENT: 21.8 %
LYMPHOCYTES RELATIVE PERCENT: 22.2
LYMPHOCYTES RELATIVE PERCENT: 23.5 %
LYMPHOCYTES RELATIVE PERCENT: 25.5 %
LYMPHOCYTES RELATIVE PERCENT: 25.8 %
LYMPHOCYTES RELATIVE PERCENT: 26 %
LYMPHOCYTES RELATIVE PERCENT: 29.1 %
LYMPHOCYTES RELATIVE PERCENT: 29.7 %
LYMPHOCYTES RELATIVE PERCENT: 30.1 %
LYMPHOCYTES RELATIVE PERCENT: 31.7 %
LYMPHOCYTES RELATIVE PERCENT: 38.5 %
LYMPHOCYTES RELATIVE PERCENT: 39.6 %
LYMPHOCYTES RELATIVE PERCENT: 4 %
LYMPHOCYTES RELATIVE PERCENT: 6 %
LYMPHOCYTES RELATIVE PERCENT: 7.1 %
LYMPHOCYTES RELATIVE PERCENT: 7.3 %
LYMPHOCYTES RELATIVE PERCENT: 8.8 %
LYMPHOCYTES RELATIVE PERCENT: 9 %
LYMPHOCYTES RELATIVE PERCENT: 9.1 %
MACROCYTES: ABNORMAL
MACROCYTES: ABNORMAL
MAGNESIUM: 1.2 MG/DL (ref 1.8–2.4)
MAGNESIUM: 1.4 MG/DL (ref 1.8–2.4)
MAGNESIUM: 1.4 MG/DL (ref 1.8–2.4)
MAGNESIUM: 1.5 MG/DL (ref 1.8–2.4)
MAGNESIUM: 1.5 MG/DL (ref 1.8–2.4)
MAGNESIUM: 1.6 MG/DL (ref 1.8–2.4)
MAGNESIUM: 1.6 MG/DL (ref 1.8–2.4)
MAGNESIUM: 1.7 MG/DL (ref 1.8–2.4)
MAGNESIUM: 1.9 MG/DL (ref 1.8–2.4)
MCH RBC QN AUTO: 25.1 PG (ref 26–34)
MCH RBC QN AUTO: 25.1 PG (ref 28.4–33.4)
MCH RBC QN AUTO: 25.2 PG (ref 26–34)
MCH RBC QN AUTO: 25.5 PG (ref 26–34)
MCH RBC QN AUTO: 25.6 PG (ref 26–34)
MCH RBC QN AUTO: 25.6 PG (ref 26–34)
MCH RBC QN AUTO: 25.7 PG (ref 26–34)
MCH RBC QN AUTO: 25.8 PG (ref 26–34)
MCH RBC QN AUTO: 25.9 PG (ref 26–34)
MCH RBC QN AUTO: 25.9 PG (ref 26–34)
MCH RBC QN AUTO: 26.2 PG (ref 26–34)
MCH RBC QN AUTO: 26.4 PG (ref 26–34)
MCH RBC QN AUTO: 26.6 PG (ref 26–34)
MCH RBC QN AUTO: 26.7 PG (ref 26–34)
MCH RBC QN AUTO: 27.2 PG (ref 26–34)
MCH RBC QN AUTO: 31.1 PG (ref 26–34)
MCH RBC QN AUTO: 31.5 PG (ref 26–34)
MCH RBC QN AUTO: 32.1 PG (ref 26–34)
MCH RBC QN AUTO: 32.2 PG (ref 26–34)
MCH RBC QN AUTO: 32.2 PG (ref 26–34)
MCH RBC QN AUTO: 32.3 PG (ref 26–34)
MCH RBC QN AUTO: 32.6 PG (ref 26–34)
MCH RBC QN AUTO: 32.8 PG (ref 26–34)
MCH RBC QN AUTO: 33 PG (ref 26–34)
MCH RBC QN AUTO: 33 PG (ref 26–34)
MCH RBC QN AUTO: 33.3 PG (ref 26–34)
MCH RBC QN AUTO: 33.3 PG (ref 26–34)
MCH RBC QN AUTO: 33.4 PG (ref 26–34)
MCH RBC QN AUTO: 33.4 PG (ref 26–34)
MCH RBC QN AUTO: 33.8 PG (ref 26–34)
MCH RBC QN AUTO: 33.9 PG (ref 26–34)
MCH RBC QN AUTO: 34.3 PG (ref 26–34)
MCH RBC QN AUTO: 34.4 PG (ref 26–34)
MCH RBC QN AUTO: 34.6 PG (ref 26–34)
MCH RBC QN AUTO: 34.9 PG (ref 26–34)
MCHC RBC AUTO-ENTMCNC: 30.9 G/DL (ref 31–36)
MCHC RBC AUTO-ENTMCNC: 31 G/DL (ref 31–36)
MCHC RBC AUTO-ENTMCNC: 31.1 G/DL (ref 31–36)
MCHC RBC AUTO-ENTMCNC: 31.3 G/DL (ref 31–36)
MCHC RBC AUTO-ENTMCNC: 31.4 G/DL (ref 31–36)
MCHC RBC AUTO-ENTMCNC: 31.5 G/DL (ref 31–36)
MCHC RBC AUTO-ENTMCNC: 31.5 G/DL (ref 31–36)
MCHC RBC AUTO-ENTMCNC: 31.6 G/DL (ref 31.1–37)
MCHC RBC AUTO-ENTMCNC: 31.6 G/DL (ref 31–36)
MCHC RBC AUTO-ENTMCNC: 31.6 G/DL (ref 31–36)
MCHC RBC AUTO-ENTMCNC: 31.7 G/DL (ref 31–36)
MCHC RBC AUTO-ENTMCNC: 31.8 G/DL (ref 31–36)
MCHC RBC AUTO-ENTMCNC: 31.9 G/DL (ref 31–36)
MCHC RBC AUTO-ENTMCNC: 32 G/DL (ref 31–36)
MCHC RBC AUTO-ENTMCNC: 32.1 G/DL (ref 31–36)
MCHC RBC AUTO-ENTMCNC: 32.2 G/DL (ref 31–36)
MCHC RBC AUTO-ENTMCNC: 32.3 G/DL (ref 31–36)
MCHC RBC AUTO-ENTMCNC: 32.4 G/DL (ref 31–36)
MCHC RBC AUTO-ENTMCNC: 32.5 G/DL (ref 31–36)
MCHC RBC AUTO-ENTMCNC: 32.6 G/DL (ref 31–36)
MCHC RBC AUTO-ENTMCNC: 32.6 G/DL (ref 31–36)
MCHC RBC AUTO-ENTMCNC: 32.7 G/DL (ref 31–36)
MCHC RBC AUTO-ENTMCNC: 32.8 G/DL (ref 31–36)
MCHC RBC AUTO-ENTMCNC: 33.4 G/DL (ref 31–36)
MCHC RBC AUTO-ENTMCNC: 33.5 G/DL (ref 31–36)
MCHC RBC AUTO-ENTMCNC: 34.3 G/DL (ref 31–36)
MCV RBC AUTO: 100 FL (ref 80–100)
MCV RBC AUTO: 100.1 FL (ref 80–100)
MCV RBC AUTO: 100.2 FL (ref 80–100)
MCV RBC AUTO: 101.1 FL (ref 80–100)
MCV RBC AUTO: 101.2 FL (ref 80–100)
MCV RBC AUTO: 101.7 FL (ref 80–100)
MCV RBC AUTO: 101.9 FL (ref 80–100)
MCV RBC AUTO: 102.4 FL (ref 80–100)
MCV RBC AUTO: 102.4 FL (ref 80–100)
MCV RBC AUTO: 102.6 FL (ref 80–100)
MCV RBC AUTO: 102.8 FL (ref 80–100)
MCV RBC AUTO: 103.2 FL (ref 80–100)
MCV RBC AUTO: 103.2 FL (ref 80–100)
MCV RBC AUTO: 103.5 FL (ref 80–100)
MCV RBC AUTO: 104 FL (ref 80–100)
MCV RBC AUTO: 104.8 FL (ref 80–100)
MCV RBC AUTO: 104.9 FL (ref 80–100)
MCV RBC AUTO: 105.4 FL (ref 80–100)
MCV RBC AUTO: 105.6 FL (ref 80–100)
MCV RBC AUTO: 109.4 FL (ref 80–100)
MCV RBC AUTO: 79.4 FL (ref 85–99)
MCV RBC AUTO: 80.6 FL (ref 80–100)
MCV RBC AUTO: 80.7 FL (ref 80–100)
MCV RBC AUTO: 81 FL (ref 80–100)
MCV RBC AUTO: 81.1 FL (ref 80–100)
MCV RBC AUTO: 81.2 FL (ref 80–100)
MCV RBC AUTO: 81.3 FL (ref 80–100)
MCV RBC AUTO: 81.5 FL (ref 80–100)
MCV RBC AUTO: 81.7 FL (ref 80–100)
MCV RBC AUTO: 81.8 FL (ref 80–100)
MCV RBC AUTO: 81.9 FL (ref 80–100)
MCV RBC AUTO: 82.3 FL (ref 80–100)
MCV RBC AUTO: 82.6 FL (ref 80–100)
MCV RBC AUTO: 83 FL (ref 80–100)
MCV RBC AUTO: 83.1 FL (ref 80–100)
MCV RBC AUTO: 83.3 FL (ref 80–100)
MCV RBC AUTO: 83.4 FL (ref 80–100)
MCV RBC AUTO: 83.5 FL (ref 80–100)
MCV RBC AUTO: 83.9 FL (ref 80–100)
MCV RBC AUTO: 84.6 FL (ref 80–100)
MCV RBC AUTO: 99.6 FL (ref 80–100)
MCV RBC AUTO: 99.8 FL (ref 80–100)
METAMYELOCYTES RELATIVE PERCENT: 1 %
METAMYELOCYTES RELATIVE PERCENT: 2 %
METAMYELOCYTES RELATIVE PERCENT: 2 %
METAMYELOCYTES RELATIVE PERCENT: 3 %
METAMYELOCYTES RELATIVE PERCENT: 4 %
METHEMOGLOBIN ARTERIAL: 0.7 %
METHEMOGLOBIN ARTERIAL: 0.8 %
MICROCYTES: ABNORMAL
MICROSCOPIC EXAMINATION: YES
MONOCYTES ABSOLUTE: 0 K/UL (ref 0–1.3)
MONOCYTES ABSOLUTE: 0.1 K/UL (ref 0–1.3)
MONOCYTES ABSOLUTE: 0.2 K/UL (ref 0–1.3)
MONOCYTES ABSOLUTE: 0.3 K/UL (ref 0–1.3)
MONOCYTES ABSOLUTE: 0.4 K/UL (ref 0.3–0.9)
MONOCYTES ABSOLUTE: 0.4 K/UL (ref 0–1.3)
MONOCYTES ABSOLUTE: 0.5 K/UL (ref 0–1.3)
MONOCYTES ABSOLUTE: 0.6 K/UL (ref 0–1.3)
MONOCYTES ABSOLUTE: 0.7 K/UL (ref 0–1.3)
MONOCYTES ABSOLUTE: 0.7 K/UL (ref 0–1.3)
MONOCYTES ABSOLUTE: 0.9 K/UL (ref 0–1.3)
MONOCYTES ABSOLUTE: 1.3 K/UL (ref 0–1.3)
MONOCYTES RELATIVE PERCENT: 0.3 %
MONOCYTES RELATIVE PERCENT: 0.6 %
MONOCYTES RELATIVE PERCENT: 1 %
MONOCYTES RELATIVE PERCENT: 1 %
MONOCYTES RELATIVE PERCENT: 2 %
MONOCYTES RELATIVE PERCENT: 3 %
MONOCYTES RELATIVE PERCENT: 3 %
MONOCYTES RELATIVE PERCENT: 3.1 %
MONOCYTES RELATIVE PERCENT: 3.8 %
MONOCYTES RELATIVE PERCENT: 4 %
MONOCYTES RELATIVE PERCENT: 4.3 %
MONOCYTES RELATIVE PERCENT: 4.5 %
MONOCYTES RELATIVE PERCENT: 4.6
MONOCYTES RELATIVE PERCENT: 4.7 %
MONOCYTES RELATIVE PERCENT: 4.7 %
MONOCYTES RELATIVE PERCENT: 5 %
MONOCYTES RELATIVE PERCENT: 5 %
MONOCYTES RELATIVE PERCENT: 5.2 %
MONOCYTES RELATIVE PERCENT: 5.3 %
MONOCYTES RELATIVE PERCENT: 5.6 %
MONOCYTES RELATIVE PERCENT: 5.7 %
MONOCYTES RELATIVE PERCENT: 5.7 %
MONOCYTES RELATIVE PERCENT: 6 %
MONOCYTES RELATIVE PERCENT: 6.4 %
MONOCYTES RELATIVE PERCENT: 6.4 %
MONOCYTES RELATIVE PERCENT: 6.6 %
MONOCYTES RELATIVE PERCENT: 6.6 %
MONOCYTES RELATIVE PERCENT: 6.7 %
MONOCYTES RELATIVE PERCENT: 6.7 %
MONOCYTES RELATIVE PERCENT: 7.4 %
MONOCYTES RELATIVE PERCENT: 7.5 %
MONOCYTES RELATIVE PERCENT: 7.8 %
MONOCYTES RELATIVE PERCENT: 7.9 %
MONOCYTES RELATIVE PERCENT: 8 %
MONOCYTES RELATIVE PERCENT: 8 %
MONOCYTES RELATIVE PERCENT: 8.4 %
MONOCYTES RELATIVE PERCENT: 8.6 %
MONOCYTES RELATIVE PERCENT: 8.6 %
MONOCYTES RELATIVE PERCENT: 8.8 %
MONOCYTES RELATIVE PERCENT: 9 %
MONOCYTES RELATIVE PERCENT: 9.1 %
MYELOCYTE PERCENT: 1 %
NEUTROPHILS ABSOLUTE: 1.8 K/UL (ref 1.7–7.7)
NEUTROPHILS ABSOLUTE: 11.2 K/UL (ref 1.7–7.7)
NEUTROPHILS ABSOLUTE: 11.4 K/UL (ref 1.7–7.7)
NEUTROPHILS ABSOLUTE: 12 K/UL (ref 1.7–7.7)
NEUTROPHILS ABSOLUTE: 12.1 K/UL (ref 1.7–7.7)
NEUTROPHILS ABSOLUTE: 12.3 K/UL (ref 1.7–7.7)
NEUTROPHILS ABSOLUTE: 12.6 K/UL (ref 1.7–7.7)
NEUTROPHILS ABSOLUTE: 14.1 K/UL (ref 1.7–7.7)
NEUTROPHILS ABSOLUTE: 15.5 K/UL (ref 1.7–7.7)
NEUTROPHILS ABSOLUTE: 16.6 K/UL (ref 1.7–7.7)
NEUTROPHILS ABSOLUTE: 2 K/UL (ref 1.7–7.7)
NEUTROPHILS ABSOLUTE: 2.3 K/UL (ref 1.7–7.7)
NEUTROPHILS ABSOLUTE: 2.3 K/UL (ref 1.7–7.7)
NEUTROPHILS ABSOLUTE: 2.4 K/UL (ref 1.7–7.7)
NEUTROPHILS ABSOLUTE: 2.5 K/UL (ref 1.7–7.7)
NEUTROPHILS ABSOLUTE: 2.5 K/UL (ref 1.7–7.7)
NEUTROPHILS ABSOLUTE: 2.7 K/UL (ref 1.7–7.7)
NEUTROPHILS ABSOLUTE: 2.8 K/UL (ref 1.7–7.7)
NEUTROPHILS ABSOLUTE: 20 K/UL (ref 1.7–7.7)
NEUTROPHILS ABSOLUTE: 3.2 K/UL (ref 1.7–7.7)
NEUTROPHILS ABSOLUTE: 3.3 K/UL (ref 1.7–7.7)
NEUTROPHILS ABSOLUTE: 4.5 K/UL (ref 1.7–7.7)
NEUTROPHILS ABSOLUTE: 5.2 K/UL (ref 1.7–7.7)
NEUTROPHILS ABSOLUTE: 5.3 K/UL (ref 1.7–7.7)
NEUTROPHILS ABSOLUTE: 5.3 K/UL (ref 1.7–7.7)
NEUTROPHILS ABSOLUTE: 5.5 K/UL (ref 1.7–7.7)
NEUTROPHILS ABSOLUTE: 5.6 K/UL (ref 2–7.3)
NEUTROPHILS ABSOLUTE: 5.9 K/UL (ref 1.7–7.7)
NEUTROPHILS ABSOLUTE: 6 K/UL (ref 1.7–7.7)
NEUTROPHILS ABSOLUTE: 6.1 K/UL (ref 1.7–7.7)
NEUTROPHILS ABSOLUTE: 6.1 K/UL (ref 1.7–7.7)
NEUTROPHILS ABSOLUTE: 6.2 K/UL (ref 1.7–7.7)
NEUTROPHILS ABSOLUTE: 6.3 K/UL (ref 1.7–7.7)
NEUTROPHILS ABSOLUTE: 6.5 K/UL (ref 1.7–7.7)
NEUTROPHILS ABSOLUTE: 6.8 K/UL (ref 1.7–7.7)
NEUTROPHILS ABSOLUTE: 6.9 K/UL (ref 1.7–7.7)
NEUTROPHILS ABSOLUTE: 7.2 K/UL (ref 1.7–7.7)
NEUTROPHILS ABSOLUTE: 7.2 K/UL (ref 1.7–7.7)
NEUTROPHILS ABSOLUTE: 7.6 K/UL (ref 1.7–7.7)
NEUTROPHILS RELATIVE PERCENT: 45.8 %
NEUTROPHILS RELATIVE PERCENT: 50.4 %
NEUTROPHILS RELATIVE PERCENT: 55 %
NEUTROPHILS RELATIVE PERCENT: 55.5 %
NEUTROPHILS RELATIVE PERCENT: 55.8 %
NEUTROPHILS RELATIVE PERCENT: 58.3 %
NEUTROPHILS RELATIVE PERCENT: 58.7 %
NEUTROPHILS RELATIVE PERCENT: 63 %
NEUTROPHILS RELATIVE PERCENT: 63.7 %
NEUTROPHILS RELATIVE PERCENT: 64 %
NEUTROPHILS RELATIVE PERCENT: 64.9 %
NEUTROPHILS RELATIVE PERCENT: 66.3 %
NEUTROPHILS RELATIVE PERCENT: 67.6 %
NEUTROPHILS RELATIVE PERCENT: 68 %
NEUTROPHILS RELATIVE PERCENT: 68.1 %
NEUTROPHILS RELATIVE PERCENT: 69 %
NEUTROPHILS RELATIVE PERCENT: 70 %
NEUTROPHILS RELATIVE PERCENT: 70.5 %
NEUTROPHILS RELATIVE PERCENT: 72.4 %
NEUTROPHILS RELATIVE PERCENT: 74.9 %
NEUTROPHILS RELATIVE PERCENT: 76 %
NEUTROPHILS RELATIVE PERCENT: 76 %
NEUTROPHILS RELATIVE PERCENT: 77 %
NEUTROPHILS RELATIVE PERCENT: 77 %
NEUTROPHILS RELATIVE PERCENT: 79 %
NEUTROPHILS RELATIVE PERCENT: 79.2 %
NEUTROPHILS RELATIVE PERCENT: 79.6 %
NEUTROPHILS RELATIVE PERCENT: 79.8 %
NEUTROPHILS RELATIVE PERCENT: 80 %
NEUTROPHILS RELATIVE PERCENT: 80 %
NEUTROPHILS RELATIVE PERCENT: 83.2 %
NEUTROPHILS RELATIVE PERCENT: 84.1 %
NEUTROPHILS RELATIVE PERCENT: 84.4 %
NEUTROPHILS RELATIVE PERCENT: 85 %
NEUTROPHILS RELATIVE PERCENT: 85 %
NEUTROPHILS RELATIVE PERCENT: 85.1 %
NEUTROPHILS RELATIVE PERCENT: 85.6 %
NEUTROPHILS RELATIVE PERCENT: 91.7 %
NEUTROPHILS RELATIVE PERCENT: 92.1 %
NEUTROPHILS RELATIVE PERCENT: 96.8 %
NITRITE, URINE: ABNORMAL
NITRITE, URINE: NEGATIVE
NITRITE, URINE: POSITIVE
NUCLEATED RED BLOOD CELLS: 2 /100 WBC
O2 CONTENT ARTERIAL: 12 ML/DL
O2 CONTENT ARTERIAL: 15 ML/DL
O2 SAT, ARTERIAL: 89.3 %
O2 SAT, ARTERIAL: 95.8 %
O2 THERAPY: ABNORMAL
O2 THERAPY: ABNORMAL
ORGANISM: ABNORMAL
OSMOLALITY URINE: 198 MOSM/KG (ref 390–1070)
OVALOCYTES: ABNORMAL
OVALOCYTES: ABNORMAL
PCO2 ARTERIAL: 37.4 MMHG (ref 35–45)
PCO2 ARTERIAL: 38.3 MMHG (ref 35–45)
PDW BLD-RTO: 16.6 % (ref 12.4–15.4)
PDW BLD-RTO: 17 % (ref 12.4–15.4)
PDW BLD-RTO: 17.1 % (ref 12.4–15.4)
PDW BLD-RTO: 17.1 % (ref 12.4–15.4)
PDW BLD-RTO: 17.2 % (ref 12.4–15.4)
PDW BLD-RTO: 17.4 % (ref 11.7–15.2)
PDW BLD-RTO: 17.4 % (ref 12.4–15.4)
PDW BLD-RTO: 17.4 % (ref 12.4–15.4)
PDW BLD-RTO: 17.7 % (ref 12.4–15.4)
PDW BLD-RTO: 17.8 % (ref 12.4–15.4)
PDW BLD-RTO: 17.9 % (ref 12.4–15.4)
PDW BLD-RTO: 18.1 % (ref 12.4–15.4)
PDW BLD-RTO: 18.2 % (ref 12.4–15.4)
PDW BLD-RTO: 18.3 % (ref 12.4–15.4)
PDW BLD-RTO: 18.3 % (ref 12.4–15.4)
PDW BLD-RTO: 18.4 % (ref 12.4–15.4)
PDW BLD-RTO: 18.5 % (ref 12.4–15.4)
PDW BLD-RTO: 18.9 % (ref 12.4–15.4)
PDW BLD-RTO: 19.4 % (ref 12.4–15.4)
PDW BLD-RTO: 19.5 % (ref 12.4–15.4)
PDW BLD-RTO: 19.6 % (ref 12.4–15.4)
PDW BLD-RTO: 19.7 % (ref 12.4–15.4)
PDW BLD-RTO: 19.7 % (ref 12.4–15.4)
PDW BLD-RTO: 20.1 % (ref 12.4–15.4)
PDW BLD-RTO: 20.3 % (ref 12.4–15.4)
PDW BLD-RTO: 20.4 % (ref 12.4–15.4)
PDW BLD-RTO: 20.5 % (ref 12.4–15.4)
PDW BLD-RTO: 20.7 % (ref 12.4–15.4)
PDW BLD-RTO: 20.9 % (ref 12.4–15.4)
PDW BLD-RTO: 21.2 % (ref 12.4–15.4)
PDW BLD-RTO: 21.3 % (ref 12.4–15.4)
PDW BLD-RTO: 21.4 % (ref 12.4–15.4)
PDW BLD-RTO: 21.6 % (ref 12.4–15.4)
PDW BLD-RTO: 21.7 % (ref 12.4–15.4)
PDW BLD-RTO: 21.9 % (ref 12.4–15.4)
PDW BLD-RTO: 22 % (ref 12.4–15.4)
PDW BLD-RTO: 22.4 % (ref 12.4–15.4)
PDW BLD-RTO: 23 % (ref 12.4–15.4)
PERFORMED ON: ABNORMAL
PERFORMED ON: ABNORMAL
PH ARTERIAL: 7.39 (ref 7.35–7.45)
PH ARTERIAL: 7.49 (ref 7.35–7.45)
PH UA: 5.5
PH UA: 5.5 (ref 5–8)
PH UA: 5.5 (ref 5–8)
PH UA: 6
PH UA: 6 (ref 5–8)
PH UA: 6.5
PH UA: 7
PH UA: ABNORMAL
PH UA: ABNORMAL (ref 5–8)
PH UA: ABNORMAL (ref 5–8)
PH VENOUS: 7.36 (ref 7.35–7.45)
PH VENOUS: 7.4 (ref 7.35–7.45)
PH VENOUS: 7.41 (ref 7.35–7.45)
PH VENOUS: 7.43 (ref 7.35–7.45)
PH VENOUS: 7.44 (ref 7.35–7.45)
PHOSPHORUS: 2.7 MG/DL (ref 2.5–4.9)
PHOSPHORUS: 3.1 MG/DL (ref 2.5–4.9)
PHOSPHORUS: 3.4 MG/DL (ref 2.5–4.9)
PHOSPHORUS: 3.5 MG/DL (ref 2.5–4.9)
PHOSPHORUS: 4.2 MG/DL (ref 2.5–4.9)
PHOSPHORUS: 4.3 MG/DL (ref 2.5–4.9)
PLATELET # BLD: 127 K/UL (ref 135–450)
PLATELET # BLD: 137 K/UL (ref 135–450)
PLATELET # BLD: 138 K/UL (ref 135–450)
PLATELET # BLD: 138 K/UL (ref 135–450)
PLATELET # BLD: 143 K/UL (ref 135–450)
PLATELET # BLD: 147 K/UL (ref 135–450)
PLATELET # BLD: 148 K/UL (ref 135–450)
PLATELET # BLD: 150 K/UL (ref 135–450)
PLATELET # BLD: 161 K/UL (ref 135–450)
PLATELET # BLD: 161 K/UL (ref 135–450)
PLATELET # BLD: 163 K/UL (ref 135–450)
PLATELET # BLD: 172 K/UL (ref 135–450)
PLATELET # BLD: 178 K/UL (ref 135–450)
PLATELET # BLD: 196 K/UL (ref 135–450)
PLATELET # BLD: 212 K/UL (ref 135–450)
PLATELET # BLD: 216 K/UL (ref 135–450)
PLATELET # BLD: 217 K/UL (ref 135–450)
PLATELET # BLD: 228 K/UL (ref 135–450)
PLATELET # BLD: 235 K/UL (ref 135–450)
PLATELET # BLD: 240 K/UL (ref 135–450)
PLATELET # BLD: 240 K/UL (ref 135–450)
PLATELET # BLD: 244 K/UL (ref 135–450)
PLATELET # BLD: 249 K/UL (ref 135–450)
PLATELET # BLD: 251 K/UL (ref 135–450)
PLATELET # BLD: 286 K/UL (ref 135–450)
PLATELET # BLD: 316 K/UL (ref 135–450)
PLATELET # BLD: 320 K/UL (ref 135–450)
PLATELET # BLD: 323 K/UL (ref 135–450)
PLATELET # BLD: 337 K/UL (ref 135–450)
PLATELET # BLD: 340 K/UL (ref 135–450)
PLATELET # BLD: 340 K/UL (ref 135–450)
PLATELET # BLD: 355 K/UL (ref 135–450)
PLATELET # BLD: 356 K/UL (ref 135–450)
PLATELET # BLD: 362 K/UL (ref 135–450)
PLATELET # BLD: 409 K/UL (ref 135–450)
PLATELET # BLD: 424 K/UL (ref 135–450)
PLATELET # BLD: 453 K/UL (ref 135–450)
PLATELET # BLD: 486 K/UL (ref 154–393)
PLATELET # BLD: 85 K/UL (ref 135–450)
PLATELET # BLD: 87 K/UL (ref 135–450)
PLATELET # BLD: 91 K/UL (ref 135–450)
PLATELET # BLD: 91 K/UL (ref 135–450)
PLATELET SLIDE REVIEW: ABNORMAL
PLATELET SLIDE REVIEW: ADEQUATE
PLT NEUTA: ABNORMAL
PMV BLD AUTO: 10 FL (ref 5–10.5)
PMV BLD AUTO: 10.1 FL (ref 5–10.5)
PMV BLD AUTO: 10.2 FL (ref 5–10.5)
PMV BLD AUTO: 10.4 FL (ref 5–10.5)
PMV BLD AUTO: 10.7 FL (ref 5–10.5)
PMV BLD AUTO: 10.7 FL (ref 5–10.5)
PMV BLD AUTO: 8.3 FL (ref 5–10.5)
PMV BLD AUTO: 8.5 FL (ref 5–10.5)
PMV BLD AUTO: 8.6 FL (ref 5–10.5)
PMV BLD AUTO: 8.7 FL (ref 5–10.5)
PMV BLD AUTO: 8.8 FL (ref 5–10.5)
PMV BLD AUTO: 8.8 FL (ref 5–10.5)
PMV BLD AUTO: 8.9 FL (ref 5–10.5)
PMV BLD AUTO: 9 FL (ref 5–10.5)
PMV BLD AUTO: 9.1 FL (ref 5–10.5)
PMV BLD AUTO: 9.2 FL (ref 5–10.5)
PMV BLD AUTO: 9.2 FL (ref 5–10.5)
PMV BLD AUTO: 9.3 FL (ref 5–10.5)
PMV BLD AUTO: 9.4 FL (ref 5–10.5)
PMV BLD AUTO: 9.5 FL (ref 5–10.5)
PMV BLD AUTO: 9.6 FL (ref 5–10.5)
PMV BLD AUTO: 9.6 FL (ref 5–10.5)
PMV BLD AUTO: 9.7 FL (ref 5–10.5)
PMV BLD AUTO: 9.7 FL (ref 5–10.5)
PMV BLD AUTO: 9.8 FL (ref 5–10.5)
PO2 ARTERIAL: 104 MMHG (ref 75–108)
PO2 ARTERIAL: 61.2 MMHG (ref 75–108)
POLYCHROMASIA: ABNORMAL
POTASSIUM REFLEX MAGNESIUM: 3.2 MMOL/L (ref 3.5–5.1)
POTASSIUM REFLEX MAGNESIUM: 3.4 MMOL/L (ref 3.5–5.1)
POTASSIUM REFLEX MAGNESIUM: 3.7 MMOL/L (ref 3.5–5.1)
POTASSIUM REFLEX MAGNESIUM: 3.9 MMOL/L (ref 3.5–5.1)
POTASSIUM REFLEX MAGNESIUM: 3.9 MMOL/L (ref 3.5–5.1)
POTASSIUM REFLEX MAGNESIUM: 4 MMOL/L (ref 3.5–5.1)
POTASSIUM REFLEX MAGNESIUM: 4.1 MMOL/L (ref 3.5–5.1)
POTASSIUM REFLEX MAGNESIUM: 4.2 MMOL/L (ref 3.5–5.1)
POTASSIUM REFLEX MAGNESIUM: 4.3 MMOL/L (ref 3.5–5.1)
POTASSIUM REFLEX MAGNESIUM: 4.4 MMOL/L (ref 3.5–5.1)
POTASSIUM REFLEX MAGNESIUM: 4.5 MMOL/L (ref 3.5–5.1)
POTASSIUM REFLEX MAGNESIUM: 5 MMOL/L (ref 3.5–5.1)
POTASSIUM SERPL-SCNC: 3.4 MMOL/L (ref 3.5–5.1)
POTASSIUM SERPL-SCNC: 3.4 MMOL/L (ref 3.5–5.1)
POTASSIUM SERPL-SCNC: 3.6 MMOL/L (ref 3.5–5.1)
POTASSIUM SERPL-SCNC: 3.8 MMOL/L (ref 3.5–5.1)
POTASSIUM SERPL-SCNC: 3.9 MMOL/L (ref 3.5–5.1)
POTASSIUM SERPL-SCNC: 4 MMOL/L (ref 3.5–5.1)
POTASSIUM SERPL-SCNC: 4 MMOL/L (ref 3.5–5.1)
POTASSIUM SERPL-SCNC: 4.1 MMOL/L (ref 3.5–5.1)
POTASSIUM SERPL-SCNC: 4.2 MMOL/L (ref 3.5–5.1)
POTASSIUM SERPL-SCNC: 4.2 MMOL/L (ref 3.5–5.1)
POTASSIUM SERPL-SCNC: 4.3 MMOL/L (ref 3.5–5.1)
POTASSIUM SERPL-SCNC: 4.4 MMOL/L (ref 3.5–5.1)
POTASSIUM SERPL-SCNC: 4.4 MMOL/L (ref 3.5–5.1)
POTASSIUM SERPL-SCNC: 4.5 MMOL/L (ref 3.5–5.1)
POTASSIUM SERPL-SCNC: 4.5 MMOL/L (ref 3.5–5.1)
POTASSIUM SERPL-SCNC: 4.6 MMOL/L (ref 3.5–5.1)
POTASSIUM SERPL-SCNC: 4.7 MMOL/L (ref 3.5–5.1)
POTASSIUM SERPL-SCNC: 5 MMOL/L (ref 3.5–5.1)
PREGNANCY, URINE: NEGATIVE
PRO-BNP: 34 PG/ML (ref 0–124)
PRO-BNP: 587 PG/ML (ref 0–124)
PRO-BNP: 5946 PG/ML (ref 0–124)
PRO-BNP: 75 PG/ML (ref 0–124)
PRO-BNP: 90 PG/ML (ref 0–124)
PRO-BNP: ABNORMAL PG/ML (ref 0–124)
PROCALCITONIN: 0.09 NG/ML (ref 0–0.15)
PROCALCITONIN: 0.29 NG/ML (ref 0–0.15)
PROCALCITONIN: 0.54 NG/ML (ref 0–0.15)
PROMONOCYTES: 1 %
PROTEIN PROTEIN: 0.09 G/DL
PROTEIN PROTEIN: 43 MG/DL
PROTEIN PROTEIN: 89 MG/DL
PROTEIN UA: 100 MG/DL
PROTEIN UA: >=300 MG/DL
PROTEIN UA: ABNORMAL MG/DL
PROTEIN UA: NEGATIVE MG/DL
PROTHROMBIN TIME: 12.3 SEC (ref 9.8–13)
PROTHROMBIN TIME: 12.7 SEC (ref 9.8–13)
PROTHROMBIN TIME: 12.9 SEC (ref 9.8–13)
PROTHROMBIN TIME: 13.8 SEC (ref 9.8–13)
PROTHROMBIN TIME: 14.3 SEC (ref 9.8–13)
PROTHROMBIN TIME: 16.2 SEC (ref 9.8–13)
PT D: 13.4 SEC (ref 12–15.5)
PTT D: 44 SEC (ref 32–48)
PTT-D CORR REFLEX: ABNORMAL SEC (ref 32–48)
PTT-HEPARIN NEUTRALIZED: ABNORMAL SEC (ref 32–48)
RAPID INFLUENZA  B AGN: NEGATIVE
RAPID INFLUENZA A AGN: NEGATIVE
RBC # BLD: 1.8 M/UL (ref 4–5.2)
RBC # BLD: 2.01 M/UL (ref 4–5.2)
RBC # BLD: 2.15 M/UL (ref 4–5.2)
RBC # BLD: 2.31 M/UL (ref 4–5.2)
RBC # BLD: 2.33 M/UL (ref 4–5.2)
RBC # BLD: 2.56 M/UL (ref 4–5.2)
RBC # BLD: 2.59 M/UL (ref 4–5.2)
RBC # BLD: 2.64 M/UL (ref 4–5.2)
RBC # BLD: 2.71 M/UL (ref 4–5.2)
RBC # BLD: 2.74 M/UL (ref 4–5.2)
RBC # BLD: 2.81 M/UL (ref 4–5.2)
RBC # BLD: 2.85 M/UL (ref 4–5.2)
RBC # BLD: 2.9 M/UL (ref 4–5.2)
RBC # BLD: 2.93 M/UL (ref 4–5.2)
RBC # BLD: 2.95 M/UL (ref 4–5.2)
RBC # BLD: 2.96 M/UL (ref 4–5.2)
RBC # BLD: 2.99 M/UL (ref 4–5.2)
RBC # BLD: 3 M/UL (ref 4–5.2)
RBC # BLD: 3.01 M/UL (ref 4–5.2)
RBC # BLD: 3.04 M/UL (ref 4–5.2)
RBC # BLD: 3.07 M/UL (ref 4–5.2)
RBC # BLD: 3.08 M/UL (ref 4–5.2)
RBC # BLD: 3.12 M/UL (ref 4–5.2)
RBC # BLD: 3.15 M/UL (ref 4–5.2)
RBC # BLD: 3.18 M/UL (ref 4–5.2)
RBC # BLD: 3.22 M/UL (ref 4–5.2)
RBC # BLD: 3.23 M/UL (ref 4–5.2)
RBC # BLD: 3.24 M/UL (ref 4–5.2)
RBC # BLD: 3.29 M/UL (ref 4–5.2)
RBC # BLD: 3.34 M/UL (ref 4–5.2)
RBC # BLD: 3.39 M/UL (ref 4–5.2)
RBC # BLD: 3.43 M/UL (ref 4–5.2)
RBC # BLD: 3.45 M/UL (ref 4–5.2)
RBC # BLD: 3.55 M/UL (ref 4–5.2)
RBC # BLD: 3.58 M/UL (ref 4–5.2)
RBC # BLD: 3.62 M/UL (ref 4–5.2)
RBC # BLD: 3.67 M/UL (ref 4–5.2)
RBC # BLD: 3.71 M/UL (ref 4–5.2)
RBC # BLD: 3.75 M/UL (ref 3.86–5.17)
RBC # BLD: 3.76 M/UL (ref 4–5.2)
RBC # BLD: 3.85 M/UL (ref 4–5.2)
RBC # BLD: 3.91 M/UL (ref 4–5.2)
RBC # BLD: NORMAL 10*6/UL
RBC UA: 281 /HPF (ref 0–4)
RBC UA: 30 /HPF (ref 0–4)
RBC UA: >900 /HPF (ref 0–4)
RBC UA: ABNORMAL /HPF (ref 0–2)
REPORT: NORMAL
REPTILASE TIME: ABNORMAL SEC
RESPIRATORY PANEL PCR: NORMAL
RETICULOCYTE ABSOLUTE COUNT: 0.18 M/UL (ref 0.02–0.1)
RETICULOCYTE COUNT PCT: 6.44 % (ref 0.5–2.18)
SEGMENTED NEUTROPHILS RELATIVE PERCENT: 70.3
SLIDE REVIEW: ABNORMAL
SODIUM BLD-SCNC: 131 MMOL/L (ref 136–145)
SODIUM BLD-SCNC: 135 MMOL/L (ref 136–145)
SODIUM BLD-SCNC: 136 MMOL/L (ref 136–145)
SODIUM BLD-SCNC: 137 MMOL/L (ref 136–145)
SODIUM BLD-SCNC: 138 MMOL/L (ref 136–145)
SODIUM BLD-SCNC: 139 MMOL/L (ref 136–145)
SODIUM BLD-SCNC: 140 MMOL/L (ref 136–145)
SODIUM BLD-SCNC: 141 MMOL/L (ref 136–145)
SODIUM BLD-SCNC: 142 MMOL/L (ref 136–145)
SODIUM BLD-SCNC: 143 MMOL/L (ref 136–145)
SODIUM BLD-SCNC: 144 MMOL/L (ref 136–145)
SODIUM BLD-SCNC: 146 MMOL/L (ref 136–145)
SODIUM URINE: 33 MMOL/L
SPE/IFE INTERPRETATION: NORMAL
SPECIFIC GRAVITY UA: 1.01
SPECIFIC GRAVITY UA: 1.01
SPECIFIC GRAVITY UA: 1.01 (ref 1–1.03)
SPECIFIC GRAVITY UA: 1.02
SPECIFIC GRAVITY UA: 1.02
SPECIFIC GRAVITY UA: 1.02 (ref 1–1.03)
SPECIFIC GRAVITY UA: 1.02 (ref 1–1.03)
SPECIFIC GRAVITY UA: 1.03
SPECIFIC GRAVITY UA: <=1.005 (ref 1–1.03)
SPECIFIC GRAVITY UA: <=1.005 (ref 1–1.03)
STREP PNEUMONIAE ANTIGEN, URINE: NORMAL
STREP PNEUMONIAE ANTIGEN, URINE: NORMAL
TCO2 ARTERIAL: 53.6 MMOL/L
TCO2 ARTERIAL: 67.2 MMOL/L
THROMBIN TIME: ABNORMAL SEC (ref 14.7–19.5)
TOTAL CK: 771 U/L (ref 26–192)
TOTAL IRON BINDING CAPACITY: 202 UG/DL (ref 260–445)
TOTAL IRON BINDING CAPACITY: 208 UG/DL (ref 260–445)
TOTAL PROTEIN: 5.4 G/DL (ref 6.4–8.2)
TOTAL PROTEIN: 5.8 G/DL (ref 6.4–8.2)
TOTAL PROTEIN: 5.9 G/DL (ref 6.4–8.2)
TOTAL PROTEIN: 5.9 G/DL (ref 6.4–8.2)
TOTAL PROTEIN: 6 G/DL (ref 6.4–8.2)
TOTAL PROTEIN: 6 G/DL (ref 6.4–8.2)
TOTAL PROTEIN: 6.1 G/DL (ref 6.4–8.2)
TOTAL PROTEIN: 6.1 G/DL (ref 6.4–8.2)
TOTAL PROTEIN: 6.3 G/DL (ref 6.4–8.2)
TOTAL PROTEIN: 6.4 G/DL (ref 6.4–8.2)
TOTAL PROTEIN: 6.5 G/DL (ref 6.4–8.2)
TOTAL PROTEIN: 6.6 G/DL (ref 6.4–8.2)
TOTAL PROTEIN: 6.7 G/DL (ref 6.4–8.2)
TOTAL PROTEIN: 7 G/DL (ref 6.4–8.2)
TOTAL PROTEIN: 7.5 G/DL (ref 6.4–8.2)
TOTAL PROTEIN: 7.5 G/DL (ref 6.4–8.2)
TOTAL PROTEIN: 7.6 G/DL (ref 6.4–8.2)
TOTAL PROTEIN: 7.7 G/DL (ref 6.4–8.2)
TOTAL PROTEIN: 7.8 G/DL (ref 6.4–8.2)
TOXIC GRANULATION: PRESENT
TRANSFERRIN: 163 MG/DL (ref 200–360)
TROPONIN: 0.02 NG/ML
TROPONIN: 0.02 NG/ML
TROPONIN: 0.06 NG/ML
TROPONIN: 0.1 NG/ML
URINE CULTURE, ROUTINE: ABNORMAL
URINE CULTURE, ROUTINE: NORMAL
URINE ELECTROPHORESIS INTERP: NORMAL
URINE REFLEX TO CULTURE: ABNORMAL
URINE REFLEX TO CULTURE: YES
URINE TYPE: ABNORMAL
UROBILINOGEN, URINE: 0.2 E.U./DL
UROBILINOGEN, URINE: 1 E.U./DL
UROBILINOGEN, URINE: ABNORMAL E.U./DL
VACUOLATED NEUTROPHILS: PRESENT
VANCOMYCIN RANDOM: 13.7 UG/ML
VANCOMYCIN TROUGH: 10.9 UG/ML (ref 10–20)
VANCOMYCIN TROUGH: 11.9 UG/ML (ref 10–20)
VANCOMYCIN TROUGH: 7.1 UG/ML (ref 10–20)
VITAMIN B-12: >2000 PG/ML (ref 211–911)
VITAMIN B-12: >2000 PG/ML (ref 211–911)
WBC # BLD: 13.5 K/UL (ref 4–11)
WBC # BLD: 14.1 K/UL (ref 4–11)
WBC # BLD: 14.4 K/UL (ref 4–11)
WBC # BLD: 14.6 K/UL (ref 4–11)
WBC # BLD: 14.7 K/UL (ref 4–11)
WBC # BLD: 15.5 K/UL (ref 4–11)
WBC # BLD: 16 K/UL (ref 4–11)
WBC # BLD: 16.1 K/UL (ref 4–11)
WBC # BLD: 18 K/UL (ref 4–11)
WBC # BLD: 21.7 K/UL (ref 4–11)
WBC # BLD: 3.6 K/UL (ref 4–11)
WBC # BLD: 3.7 K/UL (ref 4–11)
WBC # BLD: 3.9 K/UL (ref 4–11)
WBC # BLD: 3.9 K/UL (ref 4–11)
WBC # BLD: 4 K/UL (ref 4–11)
WBC # BLD: 4.1 K/UL (ref 4–11)
WBC # BLD: 4.1 K/UL (ref 4–11)
WBC # BLD: 4.3 K/UL (ref 4–11)
WBC # BLD: 4.5 K/UL (ref 4–11)
WBC # BLD: 4.6 K/UL (ref 4–11)
WBC # BLD: 4.7 K/UL (ref 4–11)
WBC # BLD: 5.5 K/UL (ref 4–11)
WBC # BLD: 6.6 K/UL (ref 4–11)
WBC # BLD: 6.6 K/UL (ref 4–11)
WBC # BLD: 6.7 K/UL (ref 4–11)
WBC # BLD: 6.9 K/UL (ref 4–11)
WBC # BLD: 7.8 K/UL (ref 4–11)
WBC # BLD: 8 K/UL (ref 4–10.5)
WBC # BLD: 8.5 K/UL (ref 4–11)
WBC # BLD: 8.6 K/UL (ref 4–11)
WBC # BLD: 8.7 K/UL (ref 4–11)
WBC # BLD: 8.9 K/UL (ref 4–11)
WBC # BLD: 9 K/UL (ref 4–11)
WBC # BLD: 9.1 K/UL (ref 4–11)
WBC # BLD: 9.1 K/UL (ref 4–11)
WBC # BLD: 9.4 K/UL (ref 4–11)
WBC # BLD: 9.6 K/UL (ref 4–11)
WBC # BLD: 9.8 K/UL (ref 4–11)
WBC UA: 13 /HPF (ref 0–5)
WBC UA: 19 /HPF (ref 0–5)
WBC UA: 23 /HPF (ref 0–5)
WBC UA: 4 /HPF (ref 0–5)
WBC UA: 4 /HPF (ref 0–5)
WBC UA: 5 /HPF (ref 0–5)
WBC UA: 7 /HPF (ref 0–5)
WBC UA: 8 /HPF (ref 0–5)
WBC UA: ABNORMAL /HPF (ref 0–5)

## 2019-01-01 PROCEDURE — 6370000000 HC RX 637 (ALT 250 FOR IP): Performed by: OBSTETRICS & GYNECOLOGY

## 2019-01-01 PROCEDURE — 86923 COMPATIBILITY TEST ELECTRIC: CPT

## 2019-01-01 PROCEDURE — 7100000000 HC PACU RECOVERY - FIRST 15 MIN: Performed by: UROLOGY

## 2019-01-01 PROCEDURE — 6370000000 HC RX 637 (ALT 250 FOR IP): Performed by: UROLOGY

## 2019-01-01 PROCEDURE — 2580000003 HC RX 258: Performed by: INTERNAL MEDICINE

## 2019-01-01 PROCEDURE — 81001 URINALYSIS AUTO W/SCOPE: CPT

## 2019-01-01 PROCEDURE — 80048 BASIC METABOLIC PNL TOTAL CA: CPT

## 2019-01-01 PROCEDURE — 2580000003 HC RX 258

## 2019-01-01 PROCEDURE — C1769 GUIDE WIRE: HCPCS

## 2019-01-01 PROCEDURE — 6360000002 HC RX W HCPCS: Performed by: RADIOLOGY

## 2019-01-01 PROCEDURE — 99223 1ST HOSP IP/OBS HIGH 75: CPT | Performed by: INTERNAL MEDICINE

## 2019-01-01 PROCEDURE — 6360000002 HC RX W HCPCS: Performed by: INTERNAL MEDICINE

## 2019-01-01 PROCEDURE — 6370000000 HC RX 637 (ALT 250 FOR IP): Performed by: RADIOLOGY

## 2019-01-01 PROCEDURE — 94640 AIRWAY INHALATION TREATMENT: CPT

## 2019-01-01 PROCEDURE — 94760 N-INVAS EAR/PLS OXIMETRY 1: CPT

## 2019-01-01 PROCEDURE — 96375 TX/PRO/DX INJ NEW DRUG ADDON: CPT

## 2019-01-01 PROCEDURE — 86606 ASPERGILLUS ANTIBODY: CPT

## 2019-01-01 PROCEDURE — 6370000000 HC RX 637 (ALT 250 FOR IP): Performed by: INTERNAL MEDICINE

## 2019-01-01 PROCEDURE — 85610 PROTHROMBIN TIME: CPT

## 2019-01-01 PROCEDURE — 82728 ASSAY OF FERRITIN: CPT

## 2019-01-01 PROCEDURE — 86146 BETA-2 GLYCOPROTEIN ANTIBODY: CPT

## 2019-01-01 PROCEDURE — 3600000004 HC SURGERY LEVEL 4 BASE: Performed by: UROLOGY

## 2019-01-01 PROCEDURE — 2500000003 HC RX 250 WO HCPCS: Performed by: RADIOLOGY

## 2019-01-01 PROCEDURE — 99254 IP/OBS CNSLTJ NEW/EST MOD 60: CPT | Performed by: PSYCHIATRY & NEUROLOGY

## 2019-01-01 PROCEDURE — 99244 OFF/OP CNSLTJ NEW/EST MOD 40: CPT | Performed by: INTERNAL MEDICINE

## 2019-01-01 PROCEDURE — 83605 ASSAY OF LACTIC ACID: CPT

## 2019-01-01 PROCEDURE — 6370000000 HC RX 637 (ALT 250 FOR IP): Performed by: NURSE PRACTITIONER

## 2019-01-01 PROCEDURE — 99233 SBSQ HOSP IP/OBS HIGH 50: CPT | Performed by: INTERNAL MEDICINE

## 2019-01-01 PROCEDURE — 86900 BLOOD TYPING SEROLOGIC ABO: CPT

## 2019-01-01 PROCEDURE — 2700000000 HC OXYGEN THERAPY PER DAY

## 2019-01-01 PROCEDURE — 2580000003 HC RX 258: Performed by: NURSE PRACTITIONER

## 2019-01-01 PROCEDURE — 83735 ASSAY OF MAGNESIUM: CPT

## 2019-01-01 PROCEDURE — 0TP98DZ REMOVAL OF INTRALUMINAL DEVICE FROM URETER, VIA NATURAL OR ARTIFICIAL OPENING ENDOSCOPIC: ICD-10-PCS | Performed by: UROLOGY

## 2019-01-01 PROCEDURE — 86160 COMPLEMENT ANTIGEN: CPT

## 2019-01-01 PROCEDURE — 6360000004 HC RX CONTRAST MEDICATION: Performed by: EMERGENCY MEDICINE

## 2019-01-01 PROCEDURE — 2500000003 HC RX 250 WO HCPCS: Performed by: NURSE ANESTHETIST, CERTIFIED REGISTERED

## 2019-01-01 PROCEDURE — 71260 CT THORAX DX C+: CPT

## 2019-01-01 PROCEDURE — 2709999900 HC NON-CHARGEABLE SUPPLY: Performed by: UROLOGY

## 2019-01-01 PROCEDURE — 02HV33Z INSERTION OF INFUSION DEVICE INTO SUPERIOR VENA CAVA, PERCUTANEOUS APPROACH: ICD-10-PCS | Performed by: RADIOLOGY

## 2019-01-01 PROCEDURE — 84145 PROCALCITONIN (PCT): CPT

## 2019-01-01 PROCEDURE — G8484 FLU IMMUNIZE NO ADMIN: HCPCS | Performed by: INTERNAL MEDICINE

## 2019-01-01 PROCEDURE — 6370000000 HC RX 637 (ALT 250 FOR IP): Performed by: PHYSICIAN ASSISTANT

## 2019-01-01 PROCEDURE — 94729 DIFFUSING CAPACITY: CPT

## 2019-01-01 PROCEDURE — 2500000003 HC RX 250 WO HCPCS: Performed by: INTERNAL MEDICINE

## 2019-01-01 PROCEDURE — 94668 MNPJ CHEST WALL SBSQ: CPT

## 2019-01-01 PROCEDURE — 1200000000 HC SEMI PRIVATE

## 2019-01-01 PROCEDURE — 36430 TRANSFUSION BLD/BLD COMPNT: CPT

## 2019-01-01 PROCEDURE — 87305 ASPERGILLUS AG IA: CPT

## 2019-01-01 PROCEDURE — 6360000002 HC RX W HCPCS: Performed by: EMERGENCY MEDICINE

## 2019-01-01 PROCEDURE — 2580000003 HC RX 258: Performed by: ANESTHESIOLOGY

## 2019-01-01 PROCEDURE — 94761 N-INVAS EAR/PLS OXIMETRY MLT: CPT

## 2019-01-01 PROCEDURE — 3E04305 INTRODUCTION OF OTHER ANTINEOPLASTIC INTO CENTRAL VEIN, PERCUTANEOUS APPROACH: ICD-10-PCS | Performed by: INTERNAL MEDICINE

## 2019-01-01 PROCEDURE — 6360000002 HC RX W HCPCS: Performed by: ANESTHESIOLOGY

## 2019-01-01 PROCEDURE — 96361 HYDRATE IV INFUSION ADD-ON: CPT

## 2019-01-01 PROCEDURE — 76937 US GUIDE VASCULAR ACCESS: CPT

## 2019-01-01 PROCEDURE — 0TJB8ZZ INSPECTION OF BLADDER, VIA NATURAL OR ARTIFICIAL OPENING ENDOSCOPIC: ICD-10-PCS | Performed by: UROLOGY

## 2019-01-01 PROCEDURE — 85014 HEMATOCRIT: CPT

## 2019-01-01 PROCEDURE — 71046 X-RAY EXAM CHEST 2 VIEWS: CPT

## 2019-01-01 PROCEDURE — 96415 CHEMO IV INFUSION ADDL HR: CPT

## 2019-01-01 PROCEDURE — 96374 THER/PROPH/DIAG INJ IV PUSH: CPT

## 2019-01-01 PROCEDURE — 80076 HEPATIC FUNCTION PANEL: CPT

## 2019-01-01 PROCEDURE — 87086 URINE CULTURE/COLONY COUNT: CPT

## 2019-01-01 PROCEDURE — 6360000002 HC RX W HCPCS: Performed by: OBSTETRICS & GYNECOLOGY

## 2019-01-01 PROCEDURE — 71045 X-RAY EXAM CHEST 1 VIEW: CPT

## 2019-01-01 PROCEDURE — 99285 EMERGENCY DEPT VISIT HI MDM: CPT

## 2019-01-01 PROCEDURE — 87385 HISTOPLASMA CAPSUL AG IA: CPT

## 2019-01-01 PROCEDURE — 6370000000 HC RX 637 (ALT 250 FOR IP): Performed by: ANESTHESIOLOGY

## 2019-01-01 PROCEDURE — 94680 O2 UPTK RST&XERS DIR SIMPLE: CPT

## 2019-01-01 PROCEDURE — 6370000000 HC RX 637 (ALT 250 FOR IP): Performed by: HOSPITALIST

## 2019-01-01 PROCEDURE — 74177 CT ABD & PELVIS W/CONTRAST: CPT

## 2019-01-01 PROCEDURE — 6360000002 HC RX W HCPCS: Performed by: HOSPITALIST

## 2019-01-01 PROCEDURE — 85025 COMPLETE CBC W/AUTO DIFF WBC: CPT

## 2019-01-01 PROCEDURE — 99232 SBSQ HOSP IP/OBS MODERATE 35: CPT | Performed by: INTERNAL MEDICINE

## 2019-01-01 PROCEDURE — 36415 COLL VENOUS BLD VENIPUNCTURE: CPT

## 2019-01-01 PROCEDURE — 86698 HISTOPLASMA ANTIBODY: CPT

## 2019-01-01 PROCEDURE — 6360000004 HC RX CONTRAST MEDICATION: Performed by: UROLOGY

## 2019-01-01 PROCEDURE — 87040 BLOOD CULTURE FOR BACTERIA: CPT

## 2019-01-01 PROCEDURE — 2500000003 HC RX 250 WO HCPCS: Performed by: UROLOGY

## 2019-01-01 PROCEDURE — 84703 CHORIONIC GONADOTROPIN ASSAY: CPT

## 2019-01-01 PROCEDURE — 80202 ASSAY OF VANCOMYCIN: CPT

## 2019-01-01 PROCEDURE — 99152 MOD SED SAME PHYS/QHP 5/>YRS: CPT

## 2019-01-01 PROCEDURE — 80053 COMPREHEN METABOLIC PANEL: CPT

## 2019-01-01 PROCEDURE — 85045 AUTOMATED RETICULOCYTE COUNT: CPT

## 2019-01-01 PROCEDURE — 36591 DRAW BLOOD OFF VENOUS DEVICE: CPT

## 2019-01-01 PROCEDURE — 2500000003 HC RX 250 WO HCPCS: Performed by: PHYSICIAN ASSISTANT

## 2019-01-01 PROCEDURE — 2580000003 HC RX 258: Performed by: RADIOLOGY

## 2019-01-01 PROCEDURE — 6360000004 HC RX CONTRAST MEDICATION: Performed by: NURSE PRACTITIONER

## 2019-01-01 PROCEDURE — 2500000003 HC RX 250 WO HCPCS

## 2019-01-01 PROCEDURE — 1111F DSCHRG MED/CURRENT MED MERGE: CPT | Performed by: INTERNAL MEDICINE

## 2019-01-01 PROCEDURE — 36592 COLLECT BLOOD FROM PICC: CPT

## 2019-01-01 PROCEDURE — 93010 ELECTROCARDIOGRAM REPORT: CPT | Performed by: INTERNAL MEDICINE

## 2019-01-01 PROCEDURE — 3600000014 HC SURGERY LEVEL 4 ADDTL 15MIN: Performed by: UROLOGY

## 2019-01-01 PROCEDURE — C1894 INTRO/SHEATH, NON-LASER: HCPCS | Performed by: UROLOGY

## 2019-01-01 PROCEDURE — 84166 PROTEIN E-PHORESIS/URINE/CSF: CPT

## 2019-01-01 PROCEDURE — 96417 CHEMO IV INFUS EACH ADDL SEQ: CPT

## 2019-01-01 PROCEDURE — 83880 ASSAY OF NATRIURETIC PEPTIDE: CPT

## 2019-01-01 PROCEDURE — 84300 ASSAY OF URINE SODIUM: CPT

## 2019-01-01 PROCEDURE — G8417 CALC BMI ABV UP PARAM F/U: HCPCS | Performed by: INTERNAL MEDICINE

## 2019-01-01 PROCEDURE — 2580000003 HC RX 258: Performed by: UROLOGY

## 2019-01-01 PROCEDURE — 82607 VITAMIN B-12: CPT

## 2019-01-01 PROCEDURE — 1036F TOBACCO NON-USER: CPT | Performed by: INTERNAL MEDICINE

## 2019-01-01 PROCEDURE — 74420 UROGRAPHY RTRGR +-KUB: CPT

## 2019-01-01 PROCEDURE — 76770 US EXAM ABDO BACK WALL COMP: CPT

## 2019-01-01 PROCEDURE — G8417 CALC BMI ABV UP PARAM F/U: HCPCS | Performed by: SURGERY

## 2019-01-01 PROCEDURE — 86038 ANTINUCLEAR ANTIBODIES: CPT

## 2019-01-01 PROCEDURE — 99213 OFFICE O/P EST LOW 20 MIN: CPT | Performed by: INTERNAL MEDICINE

## 2019-01-01 PROCEDURE — 78306 BONE IMAGING WHOLE BODY: CPT

## 2019-01-01 PROCEDURE — 2580000003 HC RX 258: Performed by: HOSPITALIST

## 2019-01-01 PROCEDURE — 82330 ASSAY OF CALCIUM: CPT

## 2019-01-01 PROCEDURE — 85730 THROMBOPLASTIN TIME PARTIAL: CPT

## 2019-01-01 PROCEDURE — 99284 EMERGENCY DEPT VISIT MOD MDM: CPT

## 2019-01-01 PROCEDURE — 6360000002 HC RX W HCPCS: Performed by: UROLOGY

## 2019-01-01 PROCEDURE — 94010 BREATHING CAPACITY TEST: CPT

## 2019-01-01 PROCEDURE — 86235 NUCLEAR ANTIGEN ANTIBODY: CPT

## 2019-01-01 PROCEDURE — C1769 GUIDE WIRE: HCPCS | Performed by: UROLOGY

## 2019-01-01 PROCEDURE — 80074 ACUTE HEPATITIS PANEL: CPT

## 2019-01-01 PROCEDURE — 87449 NOS EACH ORGANISM AG IA: CPT

## 2019-01-01 PROCEDURE — 2500000003 HC RX 250 WO HCPCS: Performed by: ANESTHESIOLOGY

## 2019-01-01 PROCEDURE — 83935 ASSAY OF URINE OSMOLALITY: CPT

## 2019-01-01 PROCEDURE — 84156 ASSAY OF PROTEIN URINE: CPT

## 2019-01-01 PROCEDURE — 0T768DZ DILATION OF RIGHT URETER WITH INTRALUMINAL DEVICE, VIA NATURAL OR ARTIFICIAL OPENING ENDOSCOPIC: ICD-10-PCS | Performed by: RADIOLOGY

## 2019-01-01 PROCEDURE — 6360000002 HC RX W HCPCS: Performed by: PHYSICIAN ASSISTANT

## 2019-01-01 PROCEDURE — 94770 HC ETCO2 MONITOR DAILY: CPT

## 2019-01-01 PROCEDURE — 2500000003 HC RX 250 WO HCPCS: Performed by: HOSPITALIST

## 2019-01-01 PROCEDURE — 2000000000 HC ICU R&B

## 2019-01-01 PROCEDURE — 6360000004 HC RX CONTRAST MEDICATION: Performed by: INTERNAL MEDICINE

## 2019-01-01 PROCEDURE — 84484 ASSAY OF TROPONIN QUANT: CPT

## 2019-01-01 PROCEDURE — 3700000000 HC ANESTHESIA ATTENDED CARE: Performed by: UROLOGY

## 2019-01-01 PROCEDURE — 83540 ASSAY OF IRON: CPT

## 2019-01-01 PROCEDURE — 86901 BLOOD TYPING SEROLOGIC RH(D): CPT

## 2019-01-01 PROCEDURE — P9016 RBC LEUKOCYTES REDUCED: HCPCS

## 2019-01-01 PROCEDURE — 71250 CT THORAX DX C-: CPT

## 2019-01-01 PROCEDURE — 93970 EXTREMITY STUDY: CPT

## 2019-01-01 PROCEDURE — 2580000003 HC RX 258: Performed by: OBSTETRICS & GYNECOLOGY

## 2019-01-01 PROCEDURE — 73130 X-RAY EXAM OF HAND: CPT

## 2019-01-01 PROCEDURE — 70553 MRI BRAIN STEM W/O & W/DYE: CPT

## 2019-01-01 PROCEDURE — 86850 RBC ANTIBODY SCREEN: CPT

## 2019-01-01 PROCEDURE — A9503 TC99M MEDRONATE: HCPCS | Performed by: OBSTETRICS & GYNECOLOGY

## 2019-01-01 PROCEDURE — 6360000002 HC RX W HCPCS: Performed by: NURSE ANESTHETIST, CERTIFIED REGISTERED

## 2019-01-01 PROCEDURE — 93005 ELECTROCARDIOGRAM TRACING: CPT | Performed by: PHYSICIAN ASSISTANT

## 2019-01-01 PROCEDURE — 82550 ASSAY OF CK (CPK): CPT

## 2019-01-01 PROCEDURE — 83690 ASSAY OF LIPASE: CPT

## 2019-01-01 PROCEDURE — 86612 BLASTOMYCES ANTIBODY: CPT

## 2019-01-01 PROCEDURE — 94664 DEMO&/EVAL PT USE INHALER: CPT

## 2019-01-01 PROCEDURE — 82140 ASSAY OF AMMONIA: CPT

## 2019-01-01 PROCEDURE — G8427 DOCREV CUR MEDS BY ELIG CLIN: HCPCS | Performed by: PSYCHIATRY & NEUROLOGY

## 2019-01-01 PROCEDURE — 76705 ECHO EXAM OF ABDOMEN: CPT

## 2019-01-01 PROCEDURE — 93005 ELECTROCARDIOGRAM TRACING: CPT | Performed by: EMERGENCY MEDICINE

## 2019-01-01 PROCEDURE — 99244 OFF/OP CNSLTJ NEW/EST MOD 40: CPT | Performed by: SURGERY

## 2019-01-01 PROCEDURE — 99255 IP/OBS CONSLTJ NEW/EST HI 80: CPT | Performed by: INTERNAL MEDICINE

## 2019-01-01 PROCEDURE — 86635 COCCIDIOIDES ANTIBODY: CPT

## 2019-01-01 PROCEDURE — 85018 HEMOGLOBIN: CPT

## 2019-01-01 PROCEDURE — 6360000004 HC RX CONTRAST MEDICATION: Performed by: PHYSICIAN ASSISTANT

## 2019-01-01 PROCEDURE — 84100 ASSAY OF PHOSPHORUS: CPT

## 2019-01-01 PROCEDURE — 6360000004 HC RX CONTRAST MEDICATION: Performed by: FAMILY MEDICINE

## 2019-01-01 PROCEDURE — 84466 ASSAY OF TRANSFERRIN: CPT

## 2019-01-01 PROCEDURE — 96376 TX/PRO/DX INJ SAME DRUG ADON: CPT

## 2019-01-01 PROCEDURE — 0T9130Z DRAINAGE OF LEFT KIDNEY WITH DRAINAGE DEVICE, PERCUTANEOUS APPROACH: ICD-10-PCS | Performed by: RADIOLOGY

## 2019-01-01 PROCEDURE — 36600 WITHDRAWAL OF ARTERIAL BLOOD: CPT

## 2019-01-01 PROCEDURE — 93306 TTE W/DOPPLER COMPLETE: CPT

## 2019-01-01 PROCEDURE — G8427 DOCREV CUR MEDS BY ELIG CLIN: HCPCS | Performed by: INTERNAL MEDICINE

## 2019-01-01 PROCEDURE — 3700000001 HC ADD 15 MINUTES (ANESTHESIA): Performed by: UROLOGY

## 2019-01-01 PROCEDURE — C1726 CATH, BAL DIL, NON-VASCULAR: HCPCS | Performed by: UROLOGY

## 2019-01-01 PROCEDURE — 99153 MOD SED SAME PHYS/QHP EA: CPT

## 2019-01-01 PROCEDURE — 6360000002 HC RX W HCPCS: Performed by: NURSE PRACTITIONER

## 2019-01-01 PROCEDURE — 92526 ORAL FUNCTION THERAPY: CPT

## 2019-01-01 PROCEDURE — 84155 ASSAY OF PROTEIN SERUM: CPT

## 2019-01-01 PROCEDURE — 0T768DZ DILATION OF RIGHT URETER WITH INTRALUMINAL DEVICE, VIA NATURAL OR ARTIFICIAL OPENING ENDOSCOPIC: ICD-10-PCS | Performed by: UROLOGY

## 2019-01-01 PROCEDURE — 99232 SBSQ HOSP IP/OBS MODERATE 35: CPT | Performed by: PSYCHIATRY & NEUROLOGY

## 2019-01-01 PROCEDURE — 7100000001 HC PACU RECOVERY - ADDTL 15 MIN: Performed by: UROLOGY

## 2019-01-01 PROCEDURE — 94660 CPAP INITIATION&MGMT: CPT

## 2019-01-01 PROCEDURE — 99214 OFFICE O/P EST MOD 30 MIN: CPT | Performed by: INTERNAL MEDICINE

## 2019-01-01 PROCEDURE — 82803 BLOOD GASES ANY COMBINATION: CPT

## 2019-01-01 PROCEDURE — 1800000000 HC LEAVE OF ABSENCE

## 2019-01-01 PROCEDURE — 6360000002 HC RX W HCPCS

## 2019-01-01 PROCEDURE — 6360000004 HC RX CONTRAST MEDICATION: Performed by: SURGERY

## 2019-01-01 PROCEDURE — 84165 PROTEIN E-PHORESIS SERUM: CPT

## 2019-01-01 PROCEDURE — 99221 1ST HOSP IP/OBS SF/LOW 40: CPT | Performed by: INTERNAL MEDICINE

## 2019-01-01 PROCEDURE — 86225 DNA ANTIBODY NATIVE: CPT

## 2019-01-01 PROCEDURE — 87486 CHLMYD PNEUM DNA AMP PROBE: CPT

## 2019-01-01 PROCEDURE — 2580000003 HC RX 258: Performed by: PHYSICIAN ASSISTANT

## 2019-01-01 PROCEDURE — 93005 ELECTROCARDIOGRAM TRACING: CPT | Performed by: FAMILY MEDICINE

## 2019-01-01 PROCEDURE — 86335 IMMUNFIX E-PHORSIS/URINE/CSF: CPT

## 2019-01-01 PROCEDURE — 99283 EMERGENCY DEPT VISIT LOW MDM: CPT

## 2019-01-01 PROCEDURE — 6370000000 HC RX 637 (ALT 250 FOR IP): Performed by: FAMILY MEDICINE

## 2019-01-01 PROCEDURE — 82955 ASSAY OF G6PD ENZYME: CPT

## 2019-01-01 PROCEDURE — 99291 CRITICAL CARE FIRST HOUR: CPT | Performed by: INTERNAL MEDICINE

## 2019-01-01 PROCEDURE — 70450 CT HEAD/BRAIN W/O DYE: CPT

## 2019-01-01 PROCEDURE — 36593 DECLOT VASCULAR DEVICE: CPT

## 2019-01-01 PROCEDURE — 86200 CCP ANTIBODY: CPT

## 2019-01-01 PROCEDURE — 2580000003 HC RX 258: Performed by: NURSE ANESTHETIST, CERTIFIED REGISTERED

## 2019-01-01 PROCEDURE — 87581 M.PNEUMON DNA AMP PROBE: CPT

## 2019-01-01 PROCEDURE — G8484 FLU IMMUNIZE NO ADMIN: HCPCS | Performed by: SURGERY

## 2019-01-01 PROCEDURE — 94667 MNPJ CHEST WALL 1ST: CPT

## 2019-01-01 PROCEDURE — G8427 DOCREV CUR MEDS BY ELIG CLIN: HCPCS | Performed by: SURGERY

## 2019-01-01 PROCEDURE — C1729 CATH, DRAINAGE: HCPCS

## 2019-01-01 PROCEDURE — 6360000002 HC RX W HCPCS: Performed by: FAMILY MEDICINE

## 2019-01-01 PROCEDURE — 83516 IMMUNOASSAY NONANTIBODY: CPT

## 2019-01-01 PROCEDURE — 87633 RESP VIRUS 12-25 TARGETS: CPT

## 2019-01-01 PROCEDURE — 77001 FLUOROGUIDE FOR VEIN DEVICE: CPT

## 2019-01-01 PROCEDURE — 3430000000 HC RX DIAGNOSTIC RADIOPHARMACEUTICAL: Performed by: OBSTETRICS & GYNECOLOGY

## 2019-01-01 PROCEDURE — 74176 CT ABD & PELVIS W/O CONTRAST: CPT

## 2019-01-01 PROCEDURE — 0T9030Z DRAINAGE OF RIGHT KIDNEY WITH DRAINAGE DEVICE, PERCUTANEOUS APPROACH: ICD-10-PCS | Performed by: RADIOLOGY

## 2019-01-01 PROCEDURE — BT1D0ZZ FLUOROSCOPY OF RIGHT KIDNEY, URETER AND BLADDER USING HIGH OSMOLAR CONTRAST: ICD-10-PCS | Performed by: UROLOGY

## 2019-01-01 PROCEDURE — 92610 EVALUATE SWALLOWING FUNCTION: CPT

## 2019-01-01 PROCEDURE — C1887 CATHETER, GUIDING: HCPCS

## 2019-01-01 PROCEDURE — 0T7D8ZZ DILATION OF URETHRA, VIA NATURAL OR ARTIFICIAL OPENING ENDOSCOPIC: ICD-10-PCS | Performed by: UROLOGY

## 2019-01-01 PROCEDURE — 50432 PLMT NEPHROSTOMY CATHETER: CPT

## 2019-01-01 PROCEDURE — A9579 GAD-BASE MR CONTRAST NOS,1ML: HCPCS | Performed by: NURSE PRACTITIONER

## 2019-01-01 PROCEDURE — 82570 ASSAY OF URINE CREATININE: CPT

## 2019-01-01 PROCEDURE — 82977 ASSAY OF GGT: CPT

## 2019-01-01 PROCEDURE — 36561 INSERT TUNNELED CV CATH: CPT

## 2019-01-01 PROCEDURE — BT1DZZZ FLUOROSCOPY OF RIGHT KIDNEY, URETER AND BLADDER: ICD-10-PCS | Performed by: UROLOGY

## 2019-01-01 PROCEDURE — 85613 RUSSELL VIPER VENOM DILUTED: CPT

## 2019-01-01 PROCEDURE — 83010 ASSAY OF HAPTOGLOBIN QUANT: CPT

## 2019-01-01 PROCEDURE — 96365 THER/PROPH/DIAG IV INF INIT: CPT

## 2019-01-01 PROCEDURE — A9579 GAD-BASE MR CONTRAST NOS,1ML: HCPCS | Performed by: INTERNAL MEDICINE

## 2019-01-01 PROCEDURE — C1894 INTRO/SHEATH, NON-LASER: HCPCS

## 2019-01-01 PROCEDURE — 86147 CARDIOLIPIN ANTIBODY EA IG: CPT

## 2019-01-01 PROCEDURE — 0JH63WZ INSERTION OF TOTALLY IMPLANTABLE VASCULAR ACCESS DEVICE INTO CHEST SUBCUTANEOUS TISSUE AND FASCIA, PERCUTANEOUS APPROACH: ICD-10-PCS | Performed by: RADIOLOGY

## 2019-01-01 PROCEDURE — 87804 INFLUENZA ASSAY W/OPTIC: CPT

## 2019-01-01 PROCEDURE — 84702 CHORIONIC GONADOTROPIN TEST: CPT

## 2019-01-01 PROCEDURE — 87327 CRYPTOCOCCUS NEOFORM AG IA: CPT

## 2019-01-01 PROCEDURE — 87798 DETECT AGENT NOS DNA AMP: CPT

## 2019-01-01 PROCEDURE — 83550 IRON BINDING TEST: CPT

## 2019-01-01 PROCEDURE — 82746 ASSAY OF FOLIC ACID SERUM: CPT

## 2019-01-01 PROCEDURE — G8417 CALC BMI ABV UP PARAM F/U: HCPCS | Performed by: PSYCHIATRY & NEUROLOGY

## 2019-01-01 PROCEDURE — 94060 EVALUATION OF WHEEZING: CPT

## 2019-01-01 PROCEDURE — 83615 LACTATE (LD) (LDH) ENZYME: CPT

## 2019-01-01 PROCEDURE — 99211 OFF/OP EST MAY X REQ PHY/QHP: CPT

## 2019-01-01 PROCEDURE — BT131ZZ FLUOROSCOPY OF BILATERAL KIDNEYS USING LOW OSMOLAR CONTRAST: ICD-10-PCS | Performed by: RADIOLOGY

## 2019-01-01 PROCEDURE — 94726 PLETHYSMOGRAPHY LUNG VOLUMES: CPT

## 2019-01-01 PROCEDURE — C1758 CATHETER, URETERAL: HCPCS | Performed by: UROLOGY

## 2019-01-01 PROCEDURE — 2580000003 HC RX 258: Performed by: EMERGENCY MEDICINE

## 2019-01-01 PROCEDURE — 6360000004 HC RX CONTRAST MEDICATION: Performed by: OBSTETRICS & GYNECOLOGY

## 2019-01-01 PROCEDURE — 2580000003 HC RX 258: Performed by: FAMILY MEDICINE

## 2019-01-01 PROCEDURE — 99245 OFF/OP CONSLTJ NEW/EST HI 55: CPT | Performed by: PSYCHIATRY & NEUROLOGY

## 2019-01-01 PROCEDURE — 2500000003 HC RX 250 WO HCPCS: Performed by: FAMILY MEDICINE

## 2019-01-01 PROCEDURE — 85027 COMPLETE CBC AUTOMATED: CPT

## 2019-01-01 PROCEDURE — 50694 PLMT URETERAL STENT PRQ: CPT

## 2019-01-01 PROCEDURE — C2625 STENT, NON-COR, TEM W/DEL SY: HCPCS | Performed by: UROLOGY

## 2019-01-01 DEVICE — RESONANCE, METALLIC URETERAL STENT AND INTRODUCER
Type: IMPLANTABLE DEVICE | Site: URETER | Status: FUNCTIONAL
Brand: RESONANCE

## 2019-01-01 RX ORDER — KETOROLAC TROMETHAMINE 15 MG/ML
15 INJECTION, SOLUTION INTRAMUSCULAR; INTRAVENOUS EVERY 6 HOURS PRN
Status: DISCONTINUED | OUTPATIENT
Start: 2019-01-01 | End: 2019-01-01

## 2019-01-01 RX ORDER — ONDANSETRON 2 MG/ML
8 INJECTION INTRAMUSCULAR; INTRAVENOUS ONCE
Status: COMPLETED | OUTPATIENT
Start: 2019-01-01 | End: 2019-01-01

## 2019-01-01 RX ORDER — ONDANSETRON 2 MG/ML
INJECTION INTRAMUSCULAR; INTRAVENOUS
Status: COMPLETED
Start: 2019-01-01 | End: 2019-01-01

## 2019-01-01 RX ORDER — 0.9 % SODIUM CHLORIDE 0.9 %
250 INTRAVENOUS SOLUTION INTRAVENOUS ONCE
Status: COMPLETED | OUTPATIENT
Start: 2019-01-01 | End: 2019-01-01

## 2019-01-01 RX ORDER — ATROPA BELLADONNA AND OPIUM 16.2; 3 MG/1; MG/1
30 SUPPOSITORY RECTAL EVERY 8 HOURS PRN
Status: DISCONTINUED | OUTPATIENT
Start: 2019-01-01 | End: 2019-01-01 | Stop reason: ALTCHOICE

## 2019-01-01 RX ORDER — PROMETHAZINE HYDROCHLORIDE 25 MG/ML
6.25 INJECTION, SOLUTION INTRAMUSCULAR; INTRAVENOUS
Status: DISCONTINUED | OUTPATIENT
Start: 2019-01-01 | End: 2019-01-01 | Stop reason: HOSPADM

## 2019-01-01 RX ORDER — HYDROMORPHONE HYDROCHLORIDE 1 MG/ML
0.5 INJECTION, SOLUTION INTRAMUSCULAR; INTRAVENOUS; SUBCUTANEOUS EVERY 4 HOURS PRN
Status: DISCONTINUED | OUTPATIENT
Start: 2019-01-01 | End: 2019-01-01

## 2019-01-01 RX ORDER — LORAZEPAM 2 MG/ML
1 INJECTION INTRAMUSCULAR ONCE
Status: COMPLETED | OUTPATIENT
Start: 2019-01-01 | End: 2019-01-01

## 2019-01-01 RX ORDER — FENTANYL 50 UG/H
1 PATCH TRANSDERMAL
Status: DISCONTINUED | OUTPATIENT
Start: 2019-01-01 | End: 2019-01-01

## 2019-01-01 RX ORDER — LIDOCAINE HYDROCHLORIDE 20 MG/ML
INJECTION, SOLUTION INFILTRATION; PERINEURAL PRN
Status: DISCONTINUED | OUTPATIENT
Start: 2019-01-01 | End: 2019-01-01 | Stop reason: SDUPTHER

## 2019-01-01 RX ORDER — SODIUM CHLORIDE 9 MG/ML
INJECTION, SOLUTION INTRAVENOUS
Status: COMPLETED
Start: 2019-01-01 | End: 2019-01-01

## 2019-01-01 RX ORDER — OXYCODONE HYDROCHLORIDE 5 MG/1
10 TABLET ORAL
Status: DISCONTINUED | OUTPATIENT
Start: 2019-01-01 | End: 2019-01-01

## 2019-01-01 RX ORDER — MIDAZOLAM HYDROCHLORIDE 1 MG/ML
INJECTION INTRAMUSCULAR; INTRAVENOUS
Status: COMPLETED | OUTPATIENT
Start: 2019-01-01 | End: 2019-01-01

## 2019-01-01 RX ORDER — METRONIDAZOLE 500 MG/1
500 TABLET ORAL 3 TIMES DAILY
Qty: 30 TABLET | Refills: 0 | Status: SHIPPED | OUTPATIENT
Start: 2019-01-01 | End: 2019-01-01

## 2019-01-01 RX ORDER — OXYCODONE HYDROCHLORIDE 15 MG/1
15 TABLET ORAL
Status: DISCONTINUED | OUTPATIENT
Start: 2019-01-01 | End: 2019-01-01 | Stop reason: HOSPADM

## 2019-01-01 RX ORDER — PROMETHAZINE HYDROCHLORIDE 25 MG/ML
12.5 INJECTION, SOLUTION INTRAMUSCULAR; INTRAVENOUS EVERY 6 HOURS PRN
Status: DISCONTINUED | OUTPATIENT
Start: 2019-01-01 | End: 2019-01-01

## 2019-01-01 RX ORDER — LABETALOL HYDROCHLORIDE 5 MG/ML
5 INJECTION, SOLUTION INTRAVENOUS EVERY 10 MIN PRN
Status: DISCONTINUED | OUTPATIENT
Start: 2019-01-01 | End: 2019-01-01 | Stop reason: HOSPADM

## 2019-01-01 RX ORDER — POTASSIUM CHLORIDE 7.45 MG/ML
10 INJECTION INTRAVENOUS PRN
Status: DISCONTINUED | OUTPATIENT
Start: 2019-01-01 | End: 2019-01-01 | Stop reason: ALTCHOICE

## 2019-01-01 RX ORDER — LORAZEPAM 1 MG/1
1 TABLET ORAL EVERY 8 HOURS PRN
COMMUNITY

## 2019-01-01 RX ORDER — ACETAMINOPHEN 325 MG/1
650 TABLET ORAL ONCE
Status: COMPLETED | OUTPATIENT
Start: 2019-01-01 | End: 2019-01-01

## 2019-01-01 RX ORDER — KETOROLAC TROMETHAMINE 30 MG/ML
INJECTION, SOLUTION INTRAMUSCULAR; INTRAVENOUS PRN
Status: DISCONTINUED | OUTPATIENT
Start: 2019-01-01 | End: 2019-01-01 | Stop reason: SDUPTHER

## 2019-01-01 RX ORDER — ONDANSETRON 2 MG/ML
4 INJECTION INTRAMUSCULAR; INTRAVENOUS EVERY 6 HOURS PRN
Status: DISCONTINUED | OUTPATIENT
Start: 2019-01-01 | End: 2019-01-01 | Stop reason: HOSPADM

## 2019-01-01 RX ORDER — PHENAZOPYRIDINE HYDROCHLORIDE 100 MG/1
100 TABLET, FILM COATED ORAL 3 TIMES DAILY PRN
Qty: 30 TABLET | Refills: 0 | Status: SHIPPED | OUTPATIENT
Start: 2019-01-01 | End: 2019-01-01

## 2019-01-01 RX ORDER — PROMETHAZINE HYDROCHLORIDE 25 MG/1
25 TABLET ORAL EVERY 6 HOURS PRN
Status: ON HOLD | COMMUNITY
End: 2019-01-01 | Stop reason: SDUPTHER

## 2019-01-01 RX ORDER — MORPHINE SULFATE 2 MG/ML
2 INJECTION, SOLUTION INTRAMUSCULAR; INTRAVENOUS
Status: DISCONTINUED | OUTPATIENT
Start: 2019-01-01 | End: 2019-01-01

## 2019-01-01 RX ORDER — FENTANYL CITRATE 50 UG/ML
INJECTION, SOLUTION INTRAMUSCULAR; INTRAVENOUS PRN
Status: DISCONTINUED | OUTPATIENT
Start: 2019-01-01 | End: 2019-01-01 | Stop reason: SDUPTHER

## 2019-01-01 RX ORDER — OXYCODONE HYDROCHLORIDE 5 MG/1
10 TABLET ORAL EVERY 4 HOURS PRN
Status: DISCONTINUED | OUTPATIENT
Start: 2019-01-01 | End: 2019-01-01 | Stop reason: HOSPADM

## 2019-01-01 RX ORDER — PHENAZOPYRIDINE HYDROCHLORIDE 100 MG/1
200 TABLET, FILM COATED ORAL 3 TIMES DAILY PRN
Qty: 8 TABLET | Refills: 0 | Status: SHIPPED | OUTPATIENT
Start: 2019-01-01 | End: 2019-01-01

## 2019-01-01 RX ORDER — ZOLPIDEM TARTRATE 10 MG/1
10 TABLET ORAL NIGHTLY
Status: DISCONTINUED | OUTPATIENT
Start: 2019-01-01 | End: 2019-01-01 | Stop reason: HOSPADM

## 2019-01-01 RX ORDER — OXYCODONE HYDROCHLORIDE AND ACETAMINOPHEN 5; 325 MG/1; MG/1
TABLET ORAL
Refills: 0 | Status: ON HOLD | COMMUNITY
Start: 2019-01-01 | End: 2019-01-01 | Stop reason: HOSPADM

## 2019-01-01 RX ORDER — OXYCODONE AND ACETAMINOPHEN 10; 325 MG/1; MG/1
1 TABLET ORAL EVERY 4 HOURS PRN
Status: DISCONTINUED | OUTPATIENT
Start: 2019-01-01 | End: 2019-01-01

## 2019-01-01 RX ORDER — PROPOFOL 10 MG/ML
INJECTION, EMULSION INTRAVENOUS PRN
Status: DISCONTINUED | OUTPATIENT
Start: 2019-01-01 | End: 2019-01-01 | Stop reason: SDUPTHER

## 2019-01-01 RX ORDER — MORPHINE SULFATE 4 MG/ML
4 INJECTION, SOLUTION INTRAMUSCULAR; INTRAVENOUS ONCE
Status: COMPLETED | OUTPATIENT
Start: 2019-01-01 | End: 2019-01-01

## 2019-01-01 RX ORDER — ZOLPIDEM TARTRATE 5 MG/1
5 TABLET ORAL NIGHTLY PRN
Status: DISCONTINUED | OUTPATIENT
Start: 2019-01-01 | End: 2019-01-01 | Stop reason: HOSPADM

## 2019-01-01 RX ORDER — IBUPROFEN 800 MG/1
800 TABLET ORAL EVERY 8 HOURS PRN
Status: DISCONTINUED | OUTPATIENT
Start: 2019-01-01 | End: 2019-01-01 | Stop reason: HOSPADM

## 2019-01-01 RX ORDER — HYDROMORPHONE HYDROCHLORIDE 1 MG/ML
1 INJECTION, SOLUTION INTRAMUSCULAR; INTRAVENOUS; SUBCUTANEOUS ONCE
Status: DISCONTINUED | OUTPATIENT
Start: 2019-01-01 | End: 2019-01-01

## 2019-01-01 RX ORDER — ONDANSETRON 8 MG/1
8 TABLET, ORALLY DISINTEGRATING ORAL ONCE
Status: DISCONTINUED | OUTPATIENT
Start: 2019-01-01 | End: 2019-01-01

## 2019-01-01 RX ORDER — POLYETHYLENE GLYCOL 3350 17 G/17G
17 POWDER, FOR SOLUTION ORAL DAILY PRN
Status: DISCONTINUED | OUTPATIENT
Start: 2019-01-01 | End: 2019-01-01

## 2019-01-01 RX ORDER — BACLOFEN 10 MG/1
20 TABLET ORAL 2 TIMES DAILY
Status: DISCONTINUED | OUTPATIENT
Start: 2019-01-01 | End: 2019-01-01 | Stop reason: HOSPADM

## 2019-01-01 RX ORDER — TAMSULOSIN HYDROCHLORIDE 0.4 MG/1
0.4 CAPSULE ORAL DAILY
Status: DISCONTINUED | OUTPATIENT
Start: 2019-01-01 | End: 2019-01-01 | Stop reason: HOSPADM

## 2019-01-01 RX ORDER — 0.9 % SODIUM CHLORIDE 0.9 %
30 INTRAVENOUS SOLUTION INTRAVENOUS ONCE
Status: COMPLETED | OUTPATIENT
Start: 2019-01-01 | End: 2019-01-01

## 2019-01-01 RX ORDER — FENTANYL CITRATE 50 UG/ML
100 INJECTION, SOLUTION INTRAMUSCULAR; INTRAVENOUS ONCE
Status: COMPLETED | OUTPATIENT
Start: 2019-01-01 | End: 2019-01-01

## 2019-01-01 RX ORDER — SODIUM CHLORIDE 0.9 % (FLUSH) 0.9 %
10 SYRINGE (ML) INJECTION PRN
Status: CANCELLED | OUTPATIENT
Start: 2019-01-01

## 2019-01-01 RX ORDER — FUROSEMIDE 10 MG/ML
40 INJECTION INTRAMUSCULAR; INTRAVENOUS 2 TIMES DAILY
Status: DISCONTINUED | OUTPATIENT
Start: 2019-01-01 | End: 2019-01-01

## 2019-01-01 RX ORDER — MEPERIDINE HYDROCHLORIDE 25 MG/ML
12.5 INJECTION INTRAMUSCULAR; INTRAVENOUS; SUBCUTANEOUS EVERY 5 MIN PRN
Status: DISCONTINUED | OUTPATIENT
Start: 2019-01-01 | End: 2019-01-01 | Stop reason: HOSPADM

## 2019-01-01 RX ORDER — FENTANYL CITRATE 50 UG/ML
INJECTION, SOLUTION INTRAMUSCULAR; INTRAVENOUS
Status: COMPLETED | OUTPATIENT
Start: 2019-01-01 | End: 2019-01-01

## 2019-01-01 RX ORDER — BACLOFEN 10 MG/1
10 TABLET ORAL 2 TIMES DAILY
Status: DISCONTINUED | OUTPATIENT
Start: 2019-01-01 | End: 2019-01-01

## 2019-01-01 RX ORDER — HYDROMORPHONE HCL 110MG/55ML
PATIENT CONTROLLED ANALGESIA SYRINGE INTRAVENOUS PRN
Status: DISCONTINUED | OUTPATIENT
Start: 2019-01-01 | End: 2019-01-01 | Stop reason: SDUPTHER

## 2019-01-01 RX ORDER — LABETALOL HYDROCHLORIDE 5 MG/ML
10 INJECTION, SOLUTION INTRAVENOUS ONCE
Status: COMPLETED | OUTPATIENT
Start: 2019-01-01 | End: 2019-01-01

## 2019-01-01 RX ORDER — OXYCODONE HYDROCHLORIDE 15 MG/1
7.5 TABLET ORAL EVERY 4 HOURS PRN
Qty: 15 TABLET | Refills: 0
Start: 2019-01-01 | End: 2019-01-01

## 2019-01-01 RX ORDER — OXYCODONE HYDROCHLORIDE 5 MG/1
5 TABLET ORAL
Status: DISCONTINUED | OUTPATIENT
Start: 2019-01-01 | End: 2019-01-01

## 2019-01-01 RX ORDER — 0.9 % SODIUM CHLORIDE 0.9 %
1000 INTRAVENOUS SOLUTION INTRAVENOUS ONCE
Status: COMPLETED | OUTPATIENT
Start: 2019-01-01 | End: 2019-01-01

## 2019-01-01 RX ORDER — SODIUM CHLORIDE 9 MG/ML
INJECTION, SOLUTION INTRAVENOUS CONTINUOUS
Status: DISCONTINUED | OUTPATIENT
Start: 2019-01-01 | End: 2019-01-01

## 2019-01-01 RX ORDER — ONDANSETRON 2 MG/ML
4 INJECTION INTRAMUSCULAR; INTRAVENOUS ONCE
Status: COMPLETED | OUTPATIENT
Start: 2019-01-01 | End: 2019-01-01

## 2019-01-01 RX ORDER — ALBUTEROL SULFATE 90 UG/1
4 AEROSOL, METERED RESPIRATORY (INHALATION) ONCE
Status: COMPLETED | OUTPATIENT
Start: 2019-01-01 | End: 2019-01-01

## 2019-01-01 RX ORDER — KETOROLAC TROMETHAMINE 30 MG/ML
30 INJECTION, SOLUTION INTRAMUSCULAR; INTRAVENOUS ONCE
Status: COMPLETED | OUTPATIENT
Start: 2019-01-01 | End: 2019-01-01

## 2019-01-01 RX ORDER — SENNA PLUS 8.6 MG/1
1 TABLET ORAL NIGHTLY
Status: DISCONTINUED | OUTPATIENT
Start: 2019-01-01 | End: 2019-01-01 | Stop reason: HOSPADM

## 2019-01-01 RX ORDER — SODIUM CHLORIDE 0.9 % (FLUSH) 0.9 %
10 SYRINGE (ML) INJECTION PRN
Status: DISCONTINUED | OUTPATIENT
Start: 2019-01-01 | End: 2019-01-01 | Stop reason: HOSPADM

## 2019-01-01 RX ORDER — IPRATROPIUM BROMIDE AND ALBUTEROL SULFATE 2.5; .5 MG/3ML; MG/3ML
1 SOLUTION RESPIRATORY (INHALATION) 3 TIMES DAILY
Status: DISCONTINUED | OUTPATIENT
Start: 2019-01-01 | End: 2019-01-01 | Stop reason: HOSPADM

## 2019-01-01 RX ORDER — KETOROLAC TROMETHAMINE 15 MG/ML
15 INJECTION, SOLUTION INTRAMUSCULAR; INTRAVENOUS EVERY 6 HOURS PRN
Status: DISCONTINUED | OUTPATIENT
Start: 2019-01-01 | End: 2019-01-01 | Stop reason: HOSPADM

## 2019-01-01 RX ORDER — MAGNESIUM SULFATE IN WATER 40 MG/ML
2 INJECTION, SOLUTION INTRAVENOUS ONCE
Status: COMPLETED | OUTPATIENT
Start: 2019-01-01 | End: 2019-01-01

## 2019-01-01 RX ORDER — BACLOFEN 10 MG/1
10 TABLET ORAL 2 TIMES DAILY
Status: DISCONTINUED | OUTPATIENT
Start: 2019-01-01 | End: 2019-01-01 | Stop reason: HOSPADM

## 2019-01-01 RX ORDER — LIDOCAINE HYDROCHLORIDE 10 MG/ML
INJECTION, SOLUTION EPIDURAL; INFILTRATION; INTRACAUDAL; PERINEURAL
Status: DISCONTINUED
Start: 2019-01-01 | End: 2019-01-01 | Stop reason: WASHOUT

## 2019-01-01 RX ORDER — GABAPENTIN 300 MG/1
300 CAPSULE ORAL NIGHTLY
Qty: 30 CAPSULE | Refills: 0
Start: 2019-01-01 | End: 2019-01-01

## 2019-01-01 RX ORDER — PROMETHAZINE HYDROCHLORIDE 25 MG/ML
25 INJECTION, SOLUTION INTRAMUSCULAR; INTRAVENOUS EVERY 4 HOURS PRN
Status: DISCONTINUED | OUTPATIENT
Start: 2019-01-01 | End: 2019-01-01

## 2019-01-01 RX ORDER — HYDROMORPHONE HYDROCHLORIDE 1 MG/ML
1 INJECTION, SOLUTION INTRAMUSCULAR; INTRAVENOUS; SUBCUTANEOUS
Status: DISCONTINUED | OUTPATIENT
Start: 2019-01-01 | End: 2019-01-01

## 2019-01-01 RX ORDER — ROCURONIUM BROMIDE 10 MG/ML
INJECTION, SOLUTION INTRAVENOUS PRN
Status: DISCONTINUED | OUTPATIENT
Start: 2019-01-01 | End: 2019-01-01 | Stop reason: SDUPTHER

## 2019-01-01 RX ORDER — HYDROMORPHONE HYDROCHLORIDE 1 MG/ML
0.5 INJECTION, SOLUTION INTRAMUSCULAR; INTRAVENOUS; SUBCUTANEOUS EVERY 4 HOURS PRN
Status: DISCONTINUED | OUTPATIENT
Start: 2019-01-01 | End: 2019-01-01 | Stop reason: HOSPADM

## 2019-01-01 RX ORDER — TAMSULOSIN HYDROCHLORIDE 0.4 MG/1
0.4 CAPSULE ORAL DAILY
Qty: 30 CAPSULE | Refills: 3 | Status: SHIPPED | OUTPATIENT
Start: 2019-01-01

## 2019-01-01 RX ORDER — HYDROMORPHONE HCL 110MG/55ML
0.25 PATIENT CONTROLLED ANALGESIA SYRINGE INTRAVENOUS EVERY 5 MIN PRN
Status: DISCONTINUED | OUTPATIENT
Start: 2019-01-01 | End: 2019-01-01 | Stop reason: HOSPADM

## 2019-01-01 RX ORDER — SODIUM CHLORIDE 0.9 % (FLUSH) 0.9 %
10 SYRINGE (ML) INJECTION EVERY 12 HOURS SCHEDULED
Status: DISCONTINUED | OUTPATIENT
Start: 2019-01-01 | End: 2019-01-01

## 2019-01-01 RX ORDER — ACETAMINOPHEN 500 MG
500 TABLET ORAL EVERY 4 HOURS PRN
Status: DISCONTINUED | OUTPATIENT
Start: 2019-01-01 | End: 2019-01-01 | Stop reason: HOSPADM

## 2019-01-01 RX ORDER — SODIUM CHLORIDE 9 MG/ML
INJECTION, SOLUTION INTRAVENOUS CONTINUOUS
Status: DISCONTINUED | OUTPATIENT
Start: 2019-01-01 | End: 2019-01-01 | Stop reason: HOSPADM

## 2019-01-01 RX ORDER — PROCHLORPERAZINE MALEATE 10 MG
10 TABLET ORAL EVERY 6 HOURS PRN
Status: ON HOLD | COMMUNITY
End: 2019-01-01 | Stop reason: HOSPADM

## 2019-01-01 RX ORDER — GUAIFENESIN 600 MG/1
600 TABLET, EXTENDED RELEASE ORAL 2 TIMES DAILY
Status: DISCONTINUED | OUTPATIENT
Start: 2019-01-01 | End: 2019-01-01 | Stop reason: HOSPADM

## 2019-01-01 RX ORDER — ACETAMINOPHEN 325 MG/1
650 TABLET ORAL EVERY 4 HOURS PRN
Status: DISCONTINUED | OUTPATIENT
Start: 2019-01-01 | End: 2019-01-01 | Stop reason: HOSPADM

## 2019-01-01 RX ORDER — HYDROMORPHONE HYDROCHLORIDE 1 MG/ML
1 INJECTION, SOLUTION INTRAMUSCULAR; INTRAVENOUS; SUBCUTANEOUS EVERY 4 HOURS PRN
Status: DISCONTINUED | OUTPATIENT
Start: 2019-01-01 | End: 2019-01-01 | Stop reason: HOSPADM

## 2019-01-01 RX ORDER — HYDROMORPHONE HCL 110MG/55ML
2 PATIENT CONTROLLED ANALGESIA SYRINGE INTRAVENOUS
Status: DISCONTINUED | OUTPATIENT
Start: 2019-01-01 | End: 2019-01-01

## 2019-01-01 RX ORDER — ONDANSETRON 4 MG/1
4 TABLET, ORALLY DISINTEGRATING ORAL EVERY 8 HOURS PRN
Qty: 42 TABLET | Refills: 1 | Status: SHIPPED | OUTPATIENT
Start: 2019-01-01 | End: 2019-01-01

## 2019-01-01 RX ORDER — SENNA AND DOCUSATE SODIUM 50; 8.6 MG/1; MG/1
2 TABLET, FILM COATED ORAL DAILY
Status: DISCONTINUED | OUTPATIENT
Start: 2019-01-01 | End: 2019-01-01 | Stop reason: HOSPADM

## 2019-01-01 RX ORDER — ONDANSETRON 8 MG/1
8 TABLET, ORALLY DISINTEGRATING ORAL EVERY 8 HOURS PRN
Status: ACTIVE | OUTPATIENT
Start: 2019-01-01 | End: 2019-01-01

## 2019-01-01 RX ORDER — OXYCODONE HYDROCHLORIDE 5 MG/1
5 TABLET ORAL PRN
Status: DISCONTINUED | OUTPATIENT
Start: 2019-01-01 | End: 2019-01-01 | Stop reason: HOSPADM

## 2019-01-01 RX ORDER — NALOXONE HYDROCHLORIDE 0.4 MG/ML
0.4 INJECTION, SOLUTION INTRAMUSCULAR; INTRAVENOUS; SUBCUTANEOUS PRN
Status: DISCONTINUED | OUTPATIENT
Start: 2019-01-01 | End: 2019-01-01

## 2019-01-01 RX ORDER — HYDROXYCHLOROQUINE SULFATE 200 MG/1
TABLET, FILM COATED ORAL
Qty: 60 TABLET | Refills: 2 | Status: SHIPPED | OUTPATIENT
Start: 2019-01-01 | End: 2019-01-01 | Stop reason: ALTCHOICE

## 2019-01-01 RX ORDER — DULOXETIN HYDROCHLORIDE 20 MG/1
40 CAPSULE, DELAYED RELEASE ORAL ONCE
Status: DISCONTINUED | OUTPATIENT
Start: 2019-01-01 | End: 2019-01-01

## 2019-01-01 RX ORDER — VANCOMYCIN HYDROCHLORIDE 1 G/200ML
1000 INJECTION, SOLUTION INTRAVENOUS ONCE
Status: COMPLETED | OUTPATIENT
Start: 2019-01-01 | End: 2019-01-01

## 2019-01-01 RX ORDER — PROMETHAZINE HYDROCHLORIDE 25 MG/1
25 TABLET ORAL EVERY 6 HOURS PRN
Status: DISCONTINUED | OUTPATIENT
Start: 2019-01-01 | End: 2019-01-01 | Stop reason: HOSPADM

## 2019-01-01 RX ORDER — DULOXETIN HYDROCHLORIDE 30 MG/1
60 CAPSULE, DELAYED RELEASE ORAL DAILY
Status: DISCONTINUED | OUTPATIENT
Start: 2019-01-01 | End: 2019-01-01 | Stop reason: HOSPADM

## 2019-01-01 RX ORDER — HYDROCODONE BITARTRATE AND ACETAMINOPHEN 5; 325 MG/1; MG/1
1 TABLET ORAL PRN
Status: DISCONTINUED | OUTPATIENT
Start: 2019-01-01 | End: 2019-01-01 | Stop reason: HOSPADM

## 2019-01-01 RX ORDER — SODIUM CHLORIDE 0.9 % (FLUSH) 0.9 %
10 SYRINGE (ML) INJECTION EVERY 12 HOURS SCHEDULED
Status: DISCONTINUED | OUTPATIENT
Start: 2019-01-01 | End: 2019-01-01 | Stop reason: HOSPADM

## 2019-01-01 RX ORDER — GABAPENTIN 300 MG/1
300 CAPSULE ORAL NIGHTLY
Status: DISCONTINUED | OUTPATIENT
Start: 2019-01-01 | End: 2019-01-01 | Stop reason: HOSPADM

## 2019-01-01 RX ORDER — ONDANSETRON 2 MG/ML
INJECTION INTRAMUSCULAR; INTRAVENOUS PRN
Status: DISCONTINUED | OUTPATIENT
Start: 2019-01-01 | End: 2019-01-01 | Stop reason: SDUPTHER

## 2019-01-01 RX ORDER — PHENAZOPYRIDINE HYDROCHLORIDE 100 MG/1
200 TABLET, FILM COATED ORAL
Status: DISCONTINUED | OUTPATIENT
Start: 2019-01-01 | End: 2019-01-01

## 2019-01-01 RX ORDER — HYDRALAZINE HYDROCHLORIDE 20 MG/ML
10 INJECTION INTRAMUSCULAR; INTRAVENOUS EVERY 6 HOURS PRN
Status: DISCONTINUED | OUTPATIENT
Start: 2019-01-01 | End: 2019-01-01

## 2019-01-01 RX ORDER — ONDANSETRON 4 MG/1
4 TABLET, FILM COATED ORAL EVERY 8 HOURS PRN
COMMUNITY

## 2019-01-01 RX ORDER — MORPHINE SULFATE 30 MG/1
30 TABLET, FILM COATED, EXTENDED RELEASE ORAL 2 TIMES DAILY
Status: DISCONTINUED | OUTPATIENT
Start: 2019-01-01 | End: 2019-01-01 | Stop reason: HOSPADM

## 2019-01-01 RX ORDER — MORPHINE SULFATE 4 MG/ML
4 INJECTION, SOLUTION INTRAMUSCULAR; INTRAVENOUS EVERY 30 MIN PRN
Status: DISCONTINUED | OUTPATIENT
Start: 2019-01-01 | End: 2019-01-01 | Stop reason: HOSPADM

## 2019-01-01 RX ORDER — MORPHINE SULFATE 2 MG/ML
2 INJECTION, SOLUTION INTRAMUSCULAR; INTRAVENOUS EVERY 4 HOURS PRN
Status: DISCONTINUED | OUTPATIENT
Start: 2019-01-01 | End: 2019-01-01

## 2019-01-01 RX ORDER — ACETAMINOPHEN 500 MG
500 TABLET ORAL EVERY 6 HOURS
Status: DISCONTINUED | OUTPATIENT
Start: 2019-01-01 | End: 2019-01-01 | Stop reason: HOSPADM

## 2019-01-01 RX ORDER — LIDOCAINE HYDROCHLORIDE 20 MG/ML
INJECTION, SOLUTION EPIDURAL; INFILTRATION; INTRACAUDAL; PERINEURAL PRN
Status: DISCONTINUED | OUTPATIENT
Start: 2019-01-01 | End: 2019-01-01 | Stop reason: SDUPTHER

## 2019-01-01 RX ORDER — HYDROMORPHONE HCL 110MG/55ML
0.5 PATIENT CONTROLLED ANALGESIA SYRINGE INTRAVENOUS EVERY 5 MIN PRN
Status: DISCONTINUED | OUTPATIENT
Start: 2019-01-01 | End: 2019-01-01 | Stop reason: HOSPADM

## 2019-01-01 RX ORDER — OXYCODONE AND ACETAMINOPHEN 7.5; 325 MG/1; MG/1
1 TABLET ORAL EVERY 4 HOURS PRN
Status: DISCONTINUED | OUTPATIENT
Start: 2019-01-01 | End: 2019-01-01

## 2019-01-01 RX ORDER — DIPHENHYDRAMINE HYDROCHLORIDE 50 MG/ML
50 INJECTION INTRAMUSCULAR; INTRAVENOUS ONCE
Status: COMPLETED | OUTPATIENT
Start: 2019-01-01 | End: 2019-01-01

## 2019-01-01 RX ORDER — LORAZEPAM 1 MG/1
1 TABLET ORAL EVERY 8 HOURS PRN
Status: DISCONTINUED | OUTPATIENT
Start: 2019-01-01 | End: 2019-01-01 | Stop reason: HOSPADM

## 2019-01-01 RX ORDER — TOPIRAMATE 25 MG/1
25 TABLET ORAL 2 TIMES DAILY
Status: DISCONTINUED | OUTPATIENT
Start: 2019-01-01 | End: 2019-01-01

## 2019-01-01 RX ORDER — FAMOTIDINE 20 MG/1
20 TABLET, FILM COATED ORAL 2 TIMES DAILY
Status: DISCONTINUED | OUTPATIENT
Start: 2019-01-01 | End: 2019-01-01 | Stop reason: HOSPADM

## 2019-01-01 RX ORDER — ZOLPIDEM TARTRATE 5 MG/1
5 TABLET ORAL NIGHTLY PRN
COMMUNITY

## 2019-01-01 RX ORDER — DULOXETIN HYDROCHLORIDE 60 MG/1
60 CAPSULE, DELAYED RELEASE ORAL DAILY
Status: DISCONTINUED | OUTPATIENT
Start: 2019-01-01 | End: 2019-01-01 | Stop reason: SDUPTHER

## 2019-01-01 RX ORDER — DULOXETIN HYDROCHLORIDE 20 MG/1
20 CAPSULE, DELAYED RELEASE ORAL DAILY
Status: DISCONTINUED | OUTPATIENT
Start: 2019-01-01 | End: 2019-01-01 | Stop reason: HOSPADM

## 2019-01-01 RX ORDER — VANCOMYCIN HYDROCHLORIDE 1 G/200ML
1000 INJECTION, SOLUTION INTRAVENOUS EVERY 12 HOURS
Status: DISCONTINUED | OUTPATIENT
Start: 2019-01-01 | End: 2019-01-01 | Stop reason: DRUGHIGH

## 2019-01-01 RX ORDER — SODIUM CHLORIDE 9 MG/ML
INJECTION, SOLUTION INTRAVENOUS ONCE
Status: COMPLETED | OUTPATIENT
Start: 2019-01-01 | End: 2019-01-01

## 2019-01-01 RX ORDER — LORAZEPAM 1 MG/1
1 TABLET ORAL EVERY 8 HOURS PRN
Status: DISCONTINUED | OUTPATIENT
Start: 2019-01-01 | End: 2019-01-01

## 2019-01-01 RX ORDER — SODIUM CHLORIDE 9 MG/ML
INJECTION, SOLUTION INTRAVENOUS CONTINUOUS
Status: CANCELLED | OUTPATIENT
Start: 2019-01-01

## 2019-01-01 RX ORDER — ONDANSETRON 2 MG/ML
8 INJECTION INTRAMUSCULAR; INTRAVENOUS EVERY 8 HOURS PRN
Status: DISCONTINUED | OUTPATIENT
Start: 2019-01-01 | End: 2019-01-01

## 2019-01-01 RX ORDER — POTASSIUM CHLORIDE 20 MEQ/1
40 TABLET, EXTENDED RELEASE ORAL PRN
Status: DISCONTINUED | OUTPATIENT
Start: 2019-01-01 | End: 2019-01-01 | Stop reason: HOSPADM

## 2019-01-01 RX ORDER — PROCHLORPERAZINE MALEATE 5 MG/1
10 TABLET ORAL EVERY 6 HOURS PRN
Status: DISCONTINUED | OUTPATIENT
Start: 2019-01-01 | End: 2019-01-01 | Stop reason: HOSPADM

## 2019-01-01 RX ORDER — DIPHENHYDRAMINE HYDROCHLORIDE 50 MG/ML
25 INJECTION INTRAMUSCULAR; INTRAVENOUS ONCE
Status: DISCONTINUED | OUTPATIENT
Start: 2019-01-01 | End: 2019-01-01

## 2019-01-01 RX ORDER — PROMETHAZINE HYDROCHLORIDE 25 MG/ML
6.25 INJECTION, SOLUTION INTRAMUSCULAR; INTRAVENOUS EVERY 30 MIN PRN
Status: DISCONTINUED | OUTPATIENT
Start: 2019-01-01 | End: 2019-01-01 | Stop reason: HOSPADM

## 2019-01-01 RX ORDER — OXYCODONE HYDROCHLORIDE 15 MG/1
15 TABLET ORAL
Status: ON HOLD | COMMUNITY
End: 2019-01-01 | Stop reason: SDUPTHER

## 2019-01-01 RX ORDER — LORAZEPAM 2 MG/ML
INJECTION INTRAMUSCULAR
Status: DISCONTINUED
Start: 2019-01-01 | End: 2019-01-01 | Stop reason: HOSPADM

## 2019-01-01 RX ORDER — HYDROMORPHONE HYDROCHLORIDE 1 MG/ML
1 INJECTION, SOLUTION INTRAMUSCULAR; INTRAVENOUS; SUBCUTANEOUS
Status: DISCONTINUED | OUTPATIENT
Start: 2019-01-01 | End: 2019-01-01 | Stop reason: SDUPTHER

## 2019-01-01 RX ORDER — MAGNESIUM HYDROXIDE 1200 MG/15ML
LIQUID ORAL
Status: COMPLETED | OUTPATIENT
Start: 2019-01-01 | End: 2019-01-01

## 2019-01-01 RX ORDER — OXYCODONE HYDROCHLORIDE 15 MG/1
30 TABLET ORAL EVERY 4 HOURS PRN
Status: DISCONTINUED | OUTPATIENT
Start: 2019-01-01 | End: 2019-01-01

## 2019-01-01 RX ORDER — HALOPERIDOL 5 MG/ML
5 INJECTION INTRAMUSCULAR ONCE
Status: DISCONTINUED | OUTPATIENT
Start: 2019-01-01 | End: 2019-01-01

## 2019-01-01 RX ORDER — ATROPA BELLADONNA AND OPIUM 16.2; 6 MG/1; MG/1
SUPPOSITORY RECTAL
Status: COMPLETED | OUTPATIENT
Start: 2019-01-01 | End: 2019-01-01

## 2019-01-01 RX ORDER — AMITRIPTYLINE HYDROCHLORIDE 25 MG/1
25 TABLET, FILM COATED ORAL NIGHTLY
Qty: 30 TABLET | Refills: 5 | Status: SHIPPED | OUTPATIENT
Start: 2019-01-01

## 2019-01-01 RX ORDER — DEXAMETHASONE SODIUM PHOSPHATE 4 MG/ML
INJECTION, SOLUTION INTRA-ARTICULAR; INTRALESIONAL; INTRAMUSCULAR; INTRAVENOUS; SOFT TISSUE PRN
Status: DISCONTINUED | OUTPATIENT
Start: 2019-01-01 | End: 2019-01-01 | Stop reason: SDUPTHER

## 2019-01-01 RX ORDER — ONDANSETRON 4 MG/1
4 TABLET, ORALLY DISINTEGRATING ORAL EVERY 8 HOURS PRN
Status: DISCONTINUED | OUTPATIENT
Start: 2019-01-01 | End: 2019-01-01

## 2019-01-01 RX ORDER — OXYCODONE HYDROCHLORIDE AND ACETAMINOPHEN 5; 325 MG/1; MG/1
1 TABLET ORAL EVERY 6 HOURS PRN
Status: DISCONTINUED | OUTPATIENT
Start: 2019-01-01 | End: 2019-01-01

## 2019-01-01 RX ORDER — NITROFURANTOIN 25; 75 MG/1; MG/1
100 CAPSULE ORAL 2 TIMES DAILY
Qty: 20 CAPSULE | Refills: 0 | Status: SHIPPED | OUTPATIENT
Start: 2019-01-01 | End: 2019-01-01

## 2019-01-01 RX ORDER — KETOROLAC TROMETHAMINE 30 MG/ML
30 INJECTION, SOLUTION INTRAMUSCULAR; INTRAVENOUS EVERY 6 HOURS PRN
Status: DISCONTINUED | OUTPATIENT
Start: 2019-01-01 | End: 2019-01-01

## 2019-01-01 RX ORDER — SODIUM CHLORIDE 9 MG/ML
INJECTION, SOLUTION INTRAVENOUS CONTINUOUS PRN
Status: DISCONTINUED | OUTPATIENT
Start: 2019-01-01 | End: 2019-01-01 | Stop reason: SDUPTHER

## 2019-01-01 RX ORDER — AMITRIPTYLINE HYDROCHLORIDE 25 MG/1
25 TABLET, FILM COATED ORAL NIGHTLY
Status: DISCONTINUED | OUTPATIENT
Start: 2019-01-01 | End: 2019-01-01 | Stop reason: ALTCHOICE

## 2019-01-01 RX ORDER — MAGNESIUM SULFATE IN WATER 40 MG/ML
2 INJECTION, SOLUTION INTRAVENOUS ONCE
Status: DISCONTINUED | OUTPATIENT
Start: 2019-01-01 | End: 2019-01-01

## 2019-01-01 RX ORDER — MORPHINE SULFATE 30 MG/1
30 TABLET, FILM COATED, EXTENDED RELEASE ORAL EVERY 12 HOURS SCHEDULED
Status: DISCONTINUED | OUTPATIENT
Start: 2019-01-01 | End: 2019-01-01 | Stop reason: HOSPADM

## 2019-01-01 RX ORDER — MAGNESIUM SULFATE 1 G/100ML
1 INJECTION INTRAVENOUS PRN
Status: DISCONTINUED | OUTPATIENT
Start: 2019-01-01 | End: 2019-01-01 | Stop reason: HOSPADM

## 2019-01-01 RX ORDER — BACLOFEN 10 MG/1
10 TABLET ORAL 3 TIMES DAILY
Status: DISCONTINUED | OUTPATIENT
Start: 2019-01-01 | End: 2019-01-01

## 2019-01-01 RX ORDER — MORPHINE SULFATE 15 MG/1
15 TABLET, FILM COATED, EXTENDED RELEASE ORAL EVERY 12 HOURS SCHEDULED
Status: DISCONTINUED | OUTPATIENT
Start: 2019-01-01 | End: 2019-01-01

## 2019-01-01 RX ORDER — IPRATROPIUM BROMIDE AND ALBUTEROL SULFATE 2.5; .5 MG/3ML; MG/3ML
1 SOLUTION RESPIRATORY (INHALATION) 3 TIMES DAILY
Status: DISCONTINUED | OUTPATIENT
Start: 2019-01-01 | End: 2019-01-01

## 2019-01-01 RX ORDER — KETAMINE HCL IN NACL, ISO-OSM 100MG/10ML
SYRINGE (ML) INJECTION PRN
Status: DISCONTINUED | OUTPATIENT
Start: 2019-01-01 | End: 2019-01-01 | Stop reason: SDUPTHER

## 2019-01-01 RX ORDER — PHENAZOPYRIDINE HYDROCHLORIDE 100 MG/1
100 TABLET, FILM COATED ORAL 3 TIMES DAILY PRN
Status: DISCONTINUED | OUTPATIENT
Start: 2019-01-01 | End: 2019-01-01

## 2019-01-01 RX ORDER — OXYCODONE HYDROCHLORIDE AND ACETAMINOPHEN 5; 325 MG/1; MG/1
1 TABLET ORAL EVERY 4 HOURS PRN
Qty: 12 TABLET | Refills: 0 | Status: ON HOLD | OUTPATIENT
Start: 2019-01-01 | End: 2019-01-01 | Stop reason: HOSPADM

## 2019-01-01 RX ORDER — LIDOCAINE 50 MG/G
1 PATCH TOPICAL DAILY
Qty: 30 PATCH | Refills: 0 | Status: SHIPPED | OUTPATIENT
Start: 2019-01-01 | End: 2019-01-01

## 2019-01-01 RX ORDER — GABAPENTIN 300 MG/1
300 CAPSULE ORAL 3 TIMES DAILY
Status: DISCONTINUED | OUTPATIENT
Start: 2019-01-01 | End: 2019-01-01 | Stop reason: DRUGHIGH

## 2019-01-01 RX ORDER — FUROSEMIDE 10 MG/ML
40 INJECTION INTRAMUSCULAR; INTRAVENOUS ONCE
Status: COMPLETED | OUTPATIENT
Start: 2019-01-01 | End: 2019-01-01

## 2019-01-01 RX ORDER — DIPHENHYDRAMINE HYDROCHLORIDE 50 MG/ML
12.5 INJECTION INTRAMUSCULAR; INTRAVENOUS
Status: DISCONTINUED | OUTPATIENT
Start: 2019-01-01 | End: 2019-01-01 | Stop reason: HOSPADM

## 2019-01-01 RX ORDER — OXYCODONE HYDROCHLORIDE AND ACETAMINOPHEN 5; 325 MG/1; MG/1
1 TABLET ORAL EVERY 4 HOURS PRN
Status: DISCONTINUED | OUTPATIENT
Start: 2019-01-01 | End: 2019-01-01

## 2019-01-01 RX ORDER — KETOROLAC TROMETHAMINE 15 MG/ML
15 INJECTION, SOLUTION INTRAMUSCULAR; INTRAVENOUS ONCE
Status: COMPLETED | OUTPATIENT
Start: 2019-01-01 | End: 2019-01-01

## 2019-01-01 RX ORDER — KETOROLAC TROMETHAMINE 30 MG/ML
15 INJECTION, SOLUTION INTRAMUSCULAR; INTRAVENOUS ONCE
Status: COMPLETED | OUTPATIENT
Start: 2019-01-01 | End: 2019-01-01

## 2019-01-01 RX ORDER — ASPIRIN 81 MG/1
324 TABLET, CHEWABLE ORAL ONCE
Status: COMPLETED | OUTPATIENT
Start: 2019-01-01 | End: 2019-01-01

## 2019-01-01 RX ORDER — BACTERIOSTATIC SODIUM CHLORIDE 0.9 %
15 VIAL (ML) INJECTION PRN
Status: DISCONTINUED | OUTPATIENT
Start: 2019-01-01 | End: 2019-01-01 | Stop reason: HOSPADM

## 2019-01-01 RX ORDER — PHENAZOPYRIDINE HYDROCHLORIDE 100 MG/1
200 TABLET, FILM COATED ORAL 2 TIMES DAILY
Status: DISCONTINUED | OUTPATIENT
Start: 2019-01-01 | End: 2019-01-01 | Stop reason: HOSPADM

## 2019-01-01 RX ORDER — LIDOCAINE HYDROCHLORIDE 10 MG/ML
10 INJECTION, SOLUTION EPIDURAL; INFILTRATION; INTRACAUDAL; PERINEURAL ONCE
Status: COMPLETED | OUTPATIENT
Start: 2019-01-01 | End: 2019-01-01

## 2019-01-01 RX ORDER — METOCLOPRAMIDE HYDROCHLORIDE 5 MG/ML
10 INJECTION INTRAMUSCULAR; INTRAVENOUS ONCE
Status: COMPLETED | OUTPATIENT
Start: 2019-01-01 | End: 2019-01-01

## 2019-01-01 RX ORDER — ONDANSETRON 8 MG/1
8 TABLET, ORALLY DISINTEGRATING ORAL EVERY 8 HOURS PRN
Status: DISCONTINUED | OUTPATIENT
Start: 2019-01-01 | End: 2019-01-01 | Stop reason: HOSPADM

## 2019-01-01 RX ORDER — NALOXONE HYDROCHLORIDE 1 MG/ML
1 INJECTION INTRAMUSCULAR; INTRAVENOUS; SUBCUTANEOUS ONCE
Status: COMPLETED | OUTPATIENT
Start: 2019-01-01 | End: 2019-01-01

## 2019-01-01 RX ORDER — SENNA PLUS 8.6 MG/1
1 TABLET ORAL 3 TIMES DAILY
Status: DISCONTINUED | OUTPATIENT
Start: 2019-01-01 | End: 2019-01-01

## 2019-01-01 RX ORDER — ACETAMINOPHEN 325 MG/1
650 TABLET ORAL EVERY 4 HOURS PRN
Status: DISCONTINUED | OUTPATIENT
Start: 2019-01-01 | End: 2019-01-01

## 2019-01-01 RX ORDER — PROMETHAZINE HYDROCHLORIDE 25 MG/ML
25 INJECTION, SOLUTION INTRAMUSCULAR; INTRAVENOUS EVERY 6 HOURS PRN
Status: DISCONTINUED | OUTPATIENT
Start: 2019-01-01 | End: 2019-01-01 | Stop reason: HOSPADM

## 2019-01-01 RX ORDER — DULOXETIN HYDROCHLORIDE 20 MG/1
20 CAPSULE, DELAYED RELEASE ORAL DAILY
Status: DISCONTINUED | OUTPATIENT
Start: 2019-01-01 | End: 2019-01-01

## 2019-01-01 RX ORDER — CYCLOBENZAPRINE HCL 10 MG
10 TABLET ORAL 3 TIMES DAILY PRN
Status: DISCONTINUED | OUTPATIENT
Start: 2019-01-01 | End: 2019-01-01

## 2019-01-01 RX ORDER — BISACODYL 10 MG
10 SUPPOSITORY, RECTAL RECTAL DAILY PRN
Status: DISCONTINUED | OUTPATIENT
Start: 2019-01-01 | End: 2019-01-01 | Stop reason: HOSPADM

## 2019-01-01 RX ORDER — LINEZOLID 2 MG/ML
600 INJECTION, SOLUTION INTRAVENOUS EVERY 12 HOURS
Status: DISCONTINUED | OUTPATIENT
Start: 2019-01-01 | End: 2019-01-01

## 2019-01-01 RX ORDER — TRAZODONE HYDROCHLORIDE 50 MG/1
50 TABLET ORAL NIGHTLY
Status: DISCONTINUED | OUTPATIENT
Start: 2019-01-01 | End: 2019-01-01

## 2019-01-01 RX ORDER — BACLOFEN 20 MG/1
10 TABLET ORAL 2 TIMES DAILY
Qty: 60 TABLET | Refills: 0 | Status: SHIPPED | OUTPATIENT
Start: 2019-01-01

## 2019-01-01 RX ORDER — DIPHENHYDRAMINE HCL 25 MG
25 TABLET ORAL EVERY 6 HOURS PRN
Status: DISCONTINUED | OUTPATIENT
Start: 2019-01-01 | End: 2019-01-01 | Stop reason: HOSPADM

## 2019-01-01 RX ORDER — IBUPROFEN 400 MG/1
800 TABLET ORAL EVERY 8 HOURS PRN
Status: DISCONTINUED | OUTPATIENT
Start: 2019-01-01 | End: 2019-01-01 | Stop reason: HOSPADM

## 2019-01-01 RX ORDER — POTASSIUM CHLORIDE 7.45 MG/ML
10 INJECTION INTRAVENOUS PRN
Status: DISCONTINUED | OUTPATIENT
Start: 2019-01-01 | End: 2019-01-01 | Stop reason: HOSPADM

## 2019-01-01 RX ORDER — ONDANSETRON 4 MG/1
4 TABLET, ORALLY DISINTEGRATING ORAL EVERY 8 HOURS PRN
Qty: 20 TABLET | Refills: 0 | Status: SHIPPED | OUTPATIENT
Start: 2019-01-01 | End: 2019-01-01

## 2019-01-01 RX ORDER — HYDROCODONE BITARTRATE AND ACETAMINOPHEN 5; 325 MG/1; MG/1
TABLET ORAL
Refills: 0 | COMMUNITY
Start: 2019-01-01 | End: 2019-01-01

## 2019-01-01 RX ORDER — SODIUM CHLORIDE, SODIUM LACTATE, POTASSIUM CHLORIDE, AND CALCIUM CHLORIDE .6; .31; .03; .02 G/100ML; G/100ML; G/100ML; G/100ML
500 INJECTION, SOLUTION INTRAVENOUS ONCE
Status: DISCONTINUED | OUTPATIENT
Start: 2019-01-01 | End: 2019-01-01

## 2019-01-01 RX ORDER — FENTANYL CITRATE 50 UG/ML
25 INJECTION, SOLUTION INTRAMUSCULAR; INTRAVENOUS EVERY 5 MIN PRN
Status: DISCONTINUED | OUTPATIENT
Start: 2019-01-01 | End: 2019-01-01 | Stop reason: HOSPADM

## 2019-01-01 RX ORDER — FAMOTIDINE 20 MG/1
20 TABLET, FILM COATED ORAL 2 TIMES DAILY PRN
Status: DISCONTINUED | OUTPATIENT
Start: 2019-01-01 | End: 2019-01-01

## 2019-01-01 RX ORDER — OXYCODONE HYDROCHLORIDE 15 MG/1
15 TABLET ORAL EVERY 6 HOURS PRN
Qty: 1 TABLET | Refills: 0
Start: 2019-01-01 | End: 2019-01-01

## 2019-01-01 RX ORDER — LORAZEPAM 2 MG/ML
INJECTION INTRAMUSCULAR
Status: COMPLETED
Start: 2019-01-01 | End: 2019-01-01

## 2019-01-01 RX ORDER — ONDANSETRON 4 MG/1
4 TABLET, ORALLY DISINTEGRATING ORAL EVERY 8 HOURS PRN
Status: DISCONTINUED | OUTPATIENT
Start: 2019-01-01 | End: 2019-01-01 | Stop reason: HOSPADM

## 2019-01-01 RX ORDER — HYDROMORPHONE HYDROCHLORIDE 1 MG/ML
1 INJECTION, SOLUTION INTRAMUSCULAR; INTRAVENOUS; SUBCUTANEOUS ONCE
Status: COMPLETED | OUTPATIENT
Start: 2019-01-01 | End: 2019-01-01

## 2019-01-01 RX ORDER — OXYCODONE HYDROCHLORIDE 5 MG/1
5 TABLET ORAL EVERY 4 HOURS PRN
Status: DISCONTINUED | OUTPATIENT
Start: 2019-01-01 | End: 2019-01-01

## 2019-01-01 RX ORDER — PHENAZOPYRIDINE HYDROCHLORIDE 100 MG/1
100 TABLET, FILM COATED ORAL
Status: DISCONTINUED | OUTPATIENT
Start: 2019-01-01 | End: 2019-01-01

## 2019-01-01 RX ORDER — VANCOMYCIN HYDROCHLORIDE 1 G/200ML
1000 INJECTION, SOLUTION INTRAVENOUS ONCE
Status: DISCONTINUED | OUTPATIENT
Start: 2019-01-01 | End: 2019-01-01

## 2019-01-01 RX ORDER — TOPIRAMATE 25 MG/1
TABLET ORAL
Qty: 90 TABLET | Refills: 1 | Status: SHIPPED | OUTPATIENT
Start: 2019-01-01

## 2019-01-01 RX ORDER — IPRATROPIUM BROMIDE AND ALBUTEROL SULFATE 2.5; .5 MG/3ML; MG/3ML
1 SOLUTION RESPIRATORY (INHALATION) 4 TIMES DAILY
Status: DISCONTINUED | OUTPATIENT
Start: 2019-01-01 | End: 2019-01-01 | Stop reason: HOSPADM

## 2019-01-01 RX ORDER — FENTANYL CITRATE 50 UG/ML
50 INJECTION, SOLUTION INTRAMUSCULAR; INTRAVENOUS ONCE
Status: COMPLETED | OUTPATIENT
Start: 2019-01-01 | End: 2019-01-01

## 2019-01-01 RX ORDER — IBUPROFEN 800 MG/1
800 TABLET ORAL ONCE
Status: COMPLETED | OUTPATIENT
Start: 2019-01-01 | End: 2019-01-01

## 2019-01-01 RX ORDER — DULOXETIN HYDROCHLORIDE 60 MG/1
60 CAPSULE, DELAYED RELEASE ORAL DAILY
Status: DISCONTINUED | OUTPATIENT
Start: 2019-01-01 | End: 2019-01-01

## 2019-01-01 RX ORDER — HYDROMORPHONE HYDROCHLORIDE 1 MG/ML
1 INJECTION, SOLUTION INTRAMUSCULAR; INTRAVENOUS; SUBCUTANEOUS
Status: DISCONTINUED | OUTPATIENT
Start: 2019-01-01 | End: 2019-01-01 | Stop reason: HOSPADM

## 2019-01-01 RX ORDER — NALOXONE HYDROCHLORIDE 0.4 MG/ML
0.1 INJECTION, SOLUTION INTRAMUSCULAR; INTRAVENOUS; SUBCUTANEOUS PRN
Status: DISCONTINUED | OUTPATIENT
Start: 2019-01-01 | End: 2019-01-01 | Stop reason: HOSPADM

## 2019-01-01 RX ORDER — FENTANYL 25 UG/H
1 PATCH TRANSDERMAL
Status: DISCONTINUED | OUTPATIENT
Start: 2019-01-01 | End: 2019-01-01

## 2019-01-01 RX ORDER — NALOXONE HYDROCHLORIDE 1 MG/ML
2 INJECTION INTRAMUSCULAR; INTRAVENOUS; SUBCUTANEOUS ONCE
Status: COMPLETED | OUTPATIENT
Start: 2019-01-01 | End: 2019-01-01

## 2019-01-01 RX ORDER — HYDROMORPHONE HYDROCHLORIDE 1 MG/ML
0.5 INJECTION, SOLUTION INTRAMUSCULAR; INTRAVENOUS; SUBCUTANEOUS
Status: DISCONTINUED | OUTPATIENT
Start: 2019-01-01 | End: 2019-01-01 | Stop reason: HOSPADM

## 2019-01-01 RX ORDER — ZOLPIDEM TARTRATE 5 MG/1
5 TABLET ORAL NIGHTLY PRN
Status: DISCONTINUED | OUTPATIENT
Start: 2019-01-01 | End: 2019-01-01

## 2019-01-01 RX ORDER — ATROPA BELLADONNA AND OPIUM 16.2; 3 MG/1; MG/1
30 SUPPOSITORY RECTAL EVERY 8 HOURS PRN
Status: DISCONTINUED | OUTPATIENT
Start: 2019-01-01 | End: 2019-01-01 | Stop reason: HOSPADM

## 2019-01-01 RX ORDER — BUPIVACAINE HYDROCHLORIDE 5 MG/ML
10 INJECTION, SOLUTION EPIDURAL; INTRACAUDAL
Status: COMPLETED | OUTPATIENT
Start: 2019-01-01 | End: 2019-01-01

## 2019-01-01 RX ORDER — DIPHENHYDRAMINE HYDROCHLORIDE 50 MG/ML
25 INJECTION INTRAMUSCULAR; INTRAVENOUS EVERY 6 HOURS PRN
Status: DISCONTINUED | OUTPATIENT
Start: 2019-01-01 | End: 2019-01-01 | Stop reason: HOSPADM

## 2019-01-01 RX ORDER — CLONIDINE HYDROCHLORIDE 0.1 MG/1
0.2 TABLET ORAL EVERY 6 HOURS PRN
Status: DISCONTINUED | OUTPATIENT
Start: 2019-01-01 | End: 2019-01-01

## 2019-01-01 RX ORDER — PHENAZOPYRIDINE HYDROCHLORIDE 100 MG/1
100 TABLET, FILM COATED ORAL 3 TIMES DAILY PRN
Status: DISCONTINUED | OUTPATIENT
Start: 2019-01-01 | End: 2019-01-01 | Stop reason: HOSPADM

## 2019-01-01 RX ORDER — MAGNESIUM SULFATE 1 G/100ML
1 INJECTION INTRAVENOUS ONCE
Status: COMPLETED | OUTPATIENT
Start: 2019-01-01 | End: 2019-01-01

## 2019-01-01 RX ORDER — IBUPROFEN 800 MG/1
800 TABLET ORAL EVERY 8 HOURS PRN
Qty: 20 TABLET | Refills: 0 | Status: SHIPPED | OUTPATIENT
Start: 2019-01-01 | End: 2019-01-01

## 2019-01-01 RX ORDER — SENNA PLUS 8.6 MG/1
1 TABLET ORAL 3 TIMES DAILY
Status: DISCONTINUED | OUTPATIENT
Start: 2019-01-01 | End: 2019-01-01 | Stop reason: HOSPADM

## 2019-01-01 RX ORDER — POTASSIUM CHLORIDE 20 MEQ/1
40 TABLET, EXTENDED RELEASE ORAL PRN
Status: DISCONTINUED | OUTPATIENT
Start: 2019-01-01 | End: 2019-01-01

## 2019-01-01 RX ORDER — ONDANSETRON 2 MG/ML
4 INJECTION INTRAMUSCULAR; INTRAVENOUS EVERY 4 HOURS PRN
Status: DISCONTINUED | OUTPATIENT
Start: 2019-01-01 | End: 2019-01-01 | Stop reason: HOSPADM

## 2019-01-01 RX ORDER — MAGNESIUM OXIDE 400 MG/1
400 TABLET ORAL DAILY
Qty: 30 TABLET | Refills: 1 | Status: SHIPPED | OUTPATIENT
Start: 2019-01-01

## 2019-01-01 RX ORDER — BACLOFEN 10 MG/1
10 TABLET ORAL DAILY
Status: DISCONTINUED | OUTPATIENT
Start: 2019-01-01 | End: 2019-01-01

## 2019-01-01 RX ORDER — 0.9 % SODIUM CHLORIDE 0.9 %
2000 INTRAVENOUS SOLUTION INTRAVENOUS ONCE
Status: COMPLETED | OUTPATIENT
Start: 2019-01-01 | End: 2019-01-01

## 2019-01-01 RX ORDER — HYDROCODONE BITARTRATE AND ACETAMINOPHEN 5; 325 MG/1; MG/1
2 TABLET ORAL PRN
Status: DISCONTINUED | OUTPATIENT
Start: 2019-01-01 | End: 2019-01-01 | Stop reason: HOSPADM

## 2019-01-01 RX ORDER — 0.9 % SODIUM CHLORIDE 0.9 %
500 INTRAVENOUS SOLUTION INTRAVENOUS PRN
Status: DISCONTINUED | OUTPATIENT
Start: 2019-01-01 | End: 2019-01-01 | Stop reason: HOSPADM

## 2019-01-01 RX ORDER — SODIUM CHLORIDE 0.9 % (FLUSH) 0.9 %
10 SYRINGE (ML) INJECTION EVERY 12 HOURS SCHEDULED
Status: CANCELLED | OUTPATIENT
Start: 2019-01-01

## 2019-01-01 RX ORDER — FERROUS SULFATE 325(65) MG
325 TABLET ORAL 2 TIMES DAILY WITH MEALS
Status: DISCONTINUED | OUTPATIENT
Start: 2019-01-01 | End: 2019-01-01 | Stop reason: HOSPADM

## 2019-01-01 RX ORDER — FENTANYL CITRATE 50 UG/ML
50 INJECTION, SOLUTION INTRAMUSCULAR; INTRAVENOUS EVERY 5 MIN PRN
Status: DISCONTINUED | OUTPATIENT
Start: 2019-01-01 | End: 2019-01-01 | Stop reason: HOSPADM

## 2019-01-01 RX ORDER — MIDAZOLAM HYDROCHLORIDE 1 MG/ML
INJECTION INTRAMUSCULAR; INTRAVENOUS PRN
Status: DISCONTINUED | OUTPATIENT
Start: 2019-01-01 | End: 2019-01-01 | Stop reason: SDUPTHER

## 2019-01-01 RX ORDER — LORAZEPAM 2 MG/ML
0.5 INJECTION INTRAMUSCULAR ONCE
Status: COMPLETED | OUTPATIENT
Start: 2019-01-01 | End: 2019-01-01

## 2019-01-01 RX ORDER — LORAZEPAM 2 MG/ML
2 INJECTION INTRAMUSCULAR ONCE
Status: COMPLETED | OUTPATIENT
Start: 2019-01-01 | End: 2019-01-01

## 2019-01-01 RX ORDER — GABAPENTIN 100 MG/1
100 CAPSULE ORAL 3 TIMES DAILY
Status: DISCONTINUED | OUTPATIENT
Start: 2019-01-01 | End: 2019-01-01

## 2019-01-01 RX ORDER — MORPHINE SULFATE 30 MG/1
30 TABLET, FILM COATED, EXTENDED RELEASE ORAL 2 TIMES DAILY
Status: DISCONTINUED | OUTPATIENT
Start: 2019-01-01 | End: 2019-01-01

## 2019-01-01 RX ORDER — 0.9 % SODIUM CHLORIDE 0.9 %
500 VIAL (ML) INJECTION PRN
Status: DISCONTINUED | OUTPATIENT
Start: 2019-01-01 | End: 2019-01-01 | Stop reason: HOSPADM

## 2019-01-01 RX ORDER — SODIUM CHLORIDE 9 MG/ML
INJECTION, SOLUTION INTRAVENOUS
Status: DISPENSED
Start: 2019-01-01 | End: 2019-01-01

## 2019-01-01 RX ORDER — ATROPA BELLADONNA AND OPIUM 16.2; 3 MG/1; MG/1
60 SUPPOSITORY RECTAL EVERY 8 HOURS PRN
Status: DISCONTINUED | OUTPATIENT
Start: 2019-01-01 | End: 2019-01-01

## 2019-01-01 RX ORDER — MORPHINE SULFATE 30 MG/1
30 TABLET, FILM COATED, EXTENDED RELEASE ORAL 2 TIMES DAILY
Status: ON HOLD | COMMUNITY
End: 2019-01-01 | Stop reason: HOSPADM

## 2019-01-01 RX ORDER — ACETAMINOPHEN 325 MG/1
650 TABLET ORAL EVERY 6 HOURS PRN
COMMUNITY

## 2019-01-01 RX ORDER — SENNA AND DOCUSATE SODIUM 50; 8.6 MG/1; MG/1
2 TABLET, FILM COATED ORAL DAILY PRN
Status: DISCONTINUED | OUTPATIENT
Start: 2019-01-01 | End: 2019-01-01

## 2019-01-01 RX ORDER — OXYCODONE HYDROCHLORIDE AND ACETAMINOPHEN 5; 325 MG/1; MG/1
2 TABLET ORAL ONCE
Status: COMPLETED | OUTPATIENT
Start: 2019-01-01 | End: 2019-01-01

## 2019-01-01 RX ORDER — NEOSTIGMINE METHYLSULFATE 5 MG/5 ML
SYRINGE (ML) INTRAVENOUS PRN
Status: DISCONTINUED | OUTPATIENT
Start: 2019-01-01 | End: 2019-01-01 | Stop reason: SDUPTHER

## 2019-01-01 RX ORDER — GLYCOPYRROLATE 1 MG/5 ML
SYRINGE (ML) INTRAVENOUS PRN
Status: DISCONTINUED | OUTPATIENT
Start: 2019-01-01 | End: 2019-01-01 | Stop reason: SDUPTHER

## 2019-01-01 RX ORDER — METRONIDAZOLE 500 MG/1
500 TABLET ORAL 3 TIMES DAILY
Qty: 21 TABLET | Refills: 0 | Status: SHIPPED | OUTPATIENT
Start: 2019-01-01 | End: 2019-01-01

## 2019-01-01 RX ORDER — SODIUM CHLORIDE 0.9 % (FLUSH) 0.9 %
10 SYRINGE (ML) INJECTION PRN
Status: DISCONTINUED | OUTPATIENT
Start: 2019-01-01 | End: 2019-01-01

## 2019-01-01 RX ORDER — HYDROMORPHONE HCL 110MG/55ML
4 PATIENT CONTROLLED ANALGESIA SYRINGE INTRAVENOUS EVERY 4 HOURS PRN
Status: DISPENSED | OUTPATIENT
Start: 2019-01-01 | End: 2019-01-01

## 2019-01-01 RX ORDER — ACETAMINOPHEN 325 MG/1
650 TABLET ORAL EVERY 6 HOURS PRN
Status: DISCONTINUED | OUTPATIENT
Start: 2019-01-01 | End: 2019-01-01

## 2019-01-01 RX ORDER — DULOXETIN HYDROCHLORIDE 60 MG/1
60 CAPSULE, DELAYED RELEASE ORAL DAILY
Status: DISCONTINUED | OUTPATIENT
Start: 2019-01-01 | End: 2019-01-01 | Stop reason: DRUGHIGH

## 2019-01-01 RX ORDER — TC 99M MEDRONATE 20 MG/10ML
25.22 INJECTION, POWDER, LYOPHILIZED, FOR SOLUTION INTRAVENOUS
Status: COMPLETED | OUTPATIENT
Start: 2019-01-01 | End: 2019-01-01

## 2019-01-01 RX ORDER — GABAPENTIN 300 MG/1
300 CAPSULE ORAL 3 TIMES DAILY
Status: DISCONTINUED | OUTPATIENT
Start: 2019-01-01 | End: 2019-01-01 | Stop reason: HOSPADM

## 2019-01-01 RX ORDER — SENNA PLUS 8.6 MG/1
2 TABLET ORAL 2 TIMES DAILY
Status: DISCONTINUED | OUTPATIENT
Start: 2019-01-01 | End: 2019-01-01 | Stop reason: HOSPADM

## 2019-01-01 RX ORDER — HYDRALAZINE HYDROCHLORIDE 20 MG/ML
5 INJECTION INTRAMUSCULAR; INTRAVENOUS ONCE
Status: COMPLETED | OUTPATIENT
Start: 2019-01-01 | End: 2019-01-01

## 2019-01-01 RX ORDER — HYDROMORPHONE HYDROCHLORIDE 1 MG/ML
0.5 INJECTION, SOLUTION INTRAMUSCULAR; INTRAVENOUS; SUBCUTANEOUS
Status: DISCONTINUED | OUTPATIENT
Start: 2019-01-01 | End: 2019-01-01

## 2019-01-01 RX ORDER — LORAZEPAM 2 MG/ML
0.25 INJECTION INTRAMUSCULAR EVERY 6 HOURS PRN
Status: DISCONTINUED | OUTPATIENT
Start: 2019-01-01 | End: 2019-01-01 | Stop reason: HOSPADM

## 2019-01-01 RX ORDER — PROMETHAZINE HYDROCHLORIDE 25 MG/ML
6.25 INJECTION, SOLUTION INTRAMUSCULAR; INTRAVENOUS EVERY 6 HOURS PRN
Status: DISCONTINUED | OUTPATIENT
Start: 2019-01-01 | End: 2019-01-01 | Stop reason: HOSPADM

## 2019-01-01 RX ORDER — GABAPENTIN 400 MG/1
400 CAPSULE ORAL NIGHTLY
Status: DISCONTINUED | OUTPATIENT
Start: 2019-01-01 | End: 2019-01-01

## 2019-01-01 RX ORDER — DIPHENHYDRAMINE HYDROCHLORIDE 50 MG/ML
25 INJECTION INTRAMUSCULAR; INTRAVENOUS ONCE
Status: COMPLETED | OUTPATIENT
Start: 2019-01-01 | End: 2019-01-01

## 2019-01-01 RX ORDER — PROMETHAZINE HYDROCHLORIDE 25 MG/1
25 TABLET ORAL EVERY 6 HOURS PRN
Qty: 56 TABLET | Refills: 1 | Status: SHIPPED | OUTPATIENT
Start: 2019-01-01 | End: 2019-01-01

## 2019-01-01 RX ORDER — PROMETHAZINE HYDROCHLORIDE 25 MG/1
25 TABLET ORAL EVERY 6 HOURS PRN
Qty: 30 TABLET | Refills: 0 | Status: SHIPPED | OUTPATIENT
Start: 2019-01-01 | End: 2019-01-01

## 2019-01-01 RX ORDER — HYDROMORPHONE HCL 110MG/55ML
0.5 PATIENT CONTROLLED ANALGESIA SYRINGE INTRAVENOUS EVERY 5 MIN PRN
Status: COMPLETED | OUTPATIENT
Start: 2019-01-01 | End: 2019-01-01

## 2019-01-01 RX ORDER — KETOROLAC TROMETHAMINE 15 MG/ML
15 INJECTION, SOLUTION INTRAMUSCULAR; INTRAVENOUS EVERY 6 HOURS
Status: COMPLETED | OUTPATIENT
Start: 2019-01-01 | End: 2019-01-01

## 2019-01-01 RX ORDER — FENTANYL CITRATE 50 UG/ML
25 INJECTION, SOLUTION INTRAMUSCULAR; INTRAVENOUS EVERY 5 MIN PRN
Status: COMPLETED | OUTPATIENT
Start: 2019-01-01 | End: 2019-01-01

## 2019-01-01 RX ORDER — PROMETHAZINE HYDROCHLORIDE 25 MG/ML
12.5 INJECTION, SOLUTION INTRAMUSCULAR; INTRAVENOUS EVERY 4 HOURS PRN
Status: DISCONTINUED | OUTPATIENT
Start: 2019-01-01 | End: 2019-01-01 | Stop reason: HOSPADM

## 2019-01-01 RX ORDER — SODIUM CHLORIDE 0.9 % (FLUSH) 0.9 %
10 SYRINGE (ML) INJECTION EVERY 12 HOURS
Status: DISCONTINUED | OUTPATIENT
Start: 2019-01-01 | End: 2019-01-01

## 2019-01-01 RX ORDER — IPRATROPIUM BROMIDE AND ALBUTEROL SULFATE 2.5; .5 MG/3ML; MG/3ML
1 SOLUTION RESPIRATORY (INHALATION) EVERY 4 HOURS PRN
Status: DISCONTINUED | OUTPATIENT
Start: 2019-01-01 | End: 2019-01-01 | Stop reason: HOSPADM

## 2019-01-01 RX ORDER — HYDRALAZINE HYDROCHLORIDE 20 MG/ML
10 INJECTION INTRAMUSCULAR; INTRAVENOUS EVERY 6 HOURS PRN
Status: DISCONTINUED | OUTPATIENT
Start: 2019-01-01 | End: 2019-01-01 | Stop reason: HOSPADM

## 2019-01-01 RX ORDER — BUPIVACAINE HYDROCHLORIDE 5 MG/ML
30 INJECTION, SOLUTION EPIDURAL; INTRACAUDAL ONCE
Status: COMPLETED | OUTPATIENT
Start: 2019-01-01 | End: 2019-01-01

## 2019-01-01 RX ORDER — DULOXETIN HYDROCHLORIDE 60 MG/1
60 CAPSULE, DELAYED RELEASE ORAL NIGHTLY
Status: DISCONTINUED | OUTPATIENT
Start: 2019-01-01 | End: 2019-01-01 | Stop reason: HOSPADM

## 2019-01-01 RX ORDER — SODIUM CHLORIDE 0.9 % (FLUSH) 0.9 %
10 SYRINGE (ML) INJECTION ONCE
Status: COMPLETED | OUTPATIENT
Start: 2019-01-01 | End: 2019-01-01

## 2019-01-01 RX ORDER — TOPIRAMATE 25 MG/1
25 TABLET ORAL 2 TIMES DAILY
Status: DISCONTINUED | OUTPATIENT
Start: 2019-01-01 | End: 2019-01-01 | Stop reason: HOSPADM

## 2019-01-01 RX ORDER — IBUPROFEN 800 MG/1
800 TABLET ORAL
COMMUNITY
Start: 2019-01-01 | End: 2019-01-01 | Stop reason: SDUPTHER

## 2019-01-01 RX ORDER — IPRATROPIUM BROMIDE AND ALBUTEROL SULFATE 2.5; .5 MG/3ML; MG/3ML
1 SOLUTION RESPIRATORY (INHALATION)
Status: DISCONTINUED | OUTPATIENT
Start: 2019-01-01 | End: 2019-01-01

## 2019-01-01 RX ORDER — PROMETHAZINE HYDROCHLORIDE 25 MG/1
25 TABLET ORAL EVERY 6 HOURS PRN
Refills: 3 | Status: ON HOLD | COMMUNITY
Start: 2019-01-01 | End: 2019-01-01

## 2019-01-01 RX ORDER — DIPHENHYDRAMINE HYDROCHLORIDE 50 MG/ML
INJECTION INTRAMUSCULAR; INTRAVENOUS
Status: COMPLETED
Start: 2019-01-01 | End: 2019-01-01

## 2019-01-01 RX ORDER — CALCIUM POLYCARBOPHIL 625 MG 625 MG/1
625 TABLET ORAL DAILY
Status: DISCONTINUED | OUTPATIENT
Start: 2019-01-01 | End: 2019-01-01 | Stop reason: HOSPADM

## 2019-01-01 RX ORDER — OXYCODONE HYDROCHLORIDE 15 MG/1
15 TABLET ORAL EVERY 6 HOURS PRN
Status: DISCONTINUED | OUTPATIENT
Start: 2019-01-01 | End: 2019-01-01

## 2019-01-01 RX ORDER — ONDANSETRON 2 MG/ML
4 INJECTION INTRAMUSCULAR; INTRAVENOUS EVERY 6 HOURS PRN
Status: DISCONTINUED | OUTPATIENT
Start: 2019-01-01 | End: 2019-01-01

## 2019-01-01 RX ORDER — PROMETHAZINE HYDROCHLORIDE 12.5 MG/1
12.5 TABLET ORAL EVERY 6 HOURS PRN
COMMUNITY

## 2019-01-01 RX ORDER — AMITRIPTYLINE HYDROCHLORIDE 25 MG/1
25 TABLET, FILM COATED ORAL ONCE
Status: COMPLETED | OUTPATIENT
Start: 2019-01-01 | End: 2019-01-01

## 2019-01-01 RX ORDER — PROMETHAZINE HYDROCHLORIDE 25 MG/ML
6.25 INJECTION, SOLUTION INTRAMUSCULAR; INTRAVENOUS PRN
Status: DISCONTINUED | OUTPATIENT
Start: 2019-01-01 | End: 2019-01-01 | Stop reason: HOSPADM

## 2019-01-01 RX ORDER — IBUPROFEN 800 MG/1
800 TABLET ORAL EVERY 8 HOURS PRN
Qty: 20 TABLET | Refills: 0 | Status: ON HOLD | OUTPATIENT
Start: 2019-01-01 | End: 2019-01-01 | Stop reason: HOSPADM

## 2019-01-01 RX ORDER — SODIUM CHLORIDE 0.9 % (FLUSH) 0.9 %
10 SYRINGE (ML) INJECTION EVERY 12 HOURS SCHEDULED
Status: DISCONTINUED | OUTPATIENT
Start: 2019-01-01 | End: 2019-01-01 | Stop reason: SDUPTHER

## 2019-01-01 RX ORDER — FENTANYL 25 UG/H
1 PATCH TRANSDERMAL
Qty: 1 PATCH | Refills: 0
Start: 2019-01-01 | End: 2019-08-19

## 2019-01-01 RX ORDER — LIDOCAINE HYDROCHLORIDE AND EPINEPHRINE BITARTRATE 20; .01 MG/ML; MG/ML
20 INJECTION, SOLUTION SUBCUTANEOUS ONCE
Status: COMPLETED | OUTPATIENT
Start: 2019-01-01 | End: 2019-01-01

## 2019-01-01 RX ORDER — ZALEPLON 5 MG/1
5 CAPSULE ORAL NIGHTLY
Status: DISCONTINUED | OUTPATIENT
Start: 2019-01-01 | End: 2019-01-01 | Stop reason: ALTCHOICE

## 2019-01-01 RX ORDER — PROMETHAZINE HYDROCHLORIDE 25 MG/1
25 TABLET ORAL EVERY 6 HOURS PRN
Status: DISCONTINUED | OUTPATIENT
Start: 2019-01-01 | End: 2019-01-01 | Stop reason: SDUPTHER

## 2019-01-01 RX ORDER — ATROPA BELLADONNA AND OPIUM 16.2; 3 MG/1; MG/1
30 SUPPOSITORY RECTAL EVERY 8 HOURS PRN
Status: DISCONTINUED | OUTPATIENT
Start: 2019-01-01 | End: 2019-01-01

## 2019-01-01 RX ORDER — ATROPA BELLADONNA AND OPIUM 16.2; 3 MG/1; MG/1
30 SUPPOSITORY RECTAL EVERY 8 HOURS PRN
Qty: 5 SUPPOSITORY | Refills: 0 | Status: SHIPPED | OUTPATIENT
Start: 2019-01-01 | End: 2019-01-01

## 2019-01-01 RX ORDER — POLYVINYL ALCOHOL 14 MG/ML
1 SOLUTION/ DROPS OPHTHALMIC PRN
Status: DISCONTINUED | OUTPATIENT
Start: 2019-01-01 | End: 2019-01-01 | Stop reason: HOSPADM

## 2019-01-01 RX ORDER — LEVOFLOXACIN 500 MG/1
500 TABLET, FILM COATED ORAL DAILY
Qty: 7 TABLET | Refills: 0 | Status: SHIPPED | OUTPATIENT
Start: 2019-01-01 | End: 2019-01-01

## 2019-01-01 RX ORDER — FENTANYL 25 UG/H
1 PATCH TRANSDERMAL
Status: CANCELLED | OUTPATIENT
Start: 2019-01-01

## 2019-01-01 RX ORDER — AMOXICILLIN AND CLAVULANATE POTASSIUM 875; 125 MG/1; MG/1
1 TABLET, FILM COATED ORAL 2 TIMES DAILY
Qty: 14 TABLET | Refills: 0 | Status: SHIPPED | OUTPATIENT
Start: 2019-01-01 | End: 2019-01-01 | Stop reason: HOSPADM

## 2019-01-01 RX ORDER — HYDROMORPHONE HYDROCHLORIDE 1 MG/ML
1 INJECTION, SOLUTION INTRAMUSCULAR; INTRAVENOUS; SUBCUTANEOUS EVERY 4 HOURS PRN
Status: DISCONTINUED | OUTPATIENT
Start: 2019-01-01 | End: 2019-01-01

## 2019-01-01 RX ORDER — BACLOFEN 10 MG/1
10 TABLET ORAL ONCE
Status: COMPLETED | OUTPATIENT
Start: 2019-01-01 | End: 2019-01-01

## 2019-01-01 RX ORDER — MAGNESIUM SULFATE 1 G/100ML
1 INJECTION INTRAVENOUS PRN
Status: DISCONTINUED | OUTPATIENT
Start: 2019-01-01 | End: 2019-01-01

## 2019-01-01 RX ORDER — MORPHINE SULFATE 4 MG/ML
4 INJECTION, SOLUTION INTRAMUSCULAR; INTRAVENOUS EVERY 4 HOURS PRN
Status: DISCONTINUED | OUTPATIENT
Start: 2019-01-01 | End: 2019-01-01

## 2019-01-01 RX ORDER — POTASSIUM CHLORIDE 7.45 MG/ML
10 INJECTION INTRAVENOUS PRN
Status: DISCONTINUED | OUTPATIENT
Start: 2019-01-01 | End: 2019-01-01

## 2019-01-01 RX ORDER — MORPHINE SULFATE 30 MG/1
30 TABLET, FILM COATED, EXTENDED RELEASE ORAL EVERY 12 HOURS SCHEDULED
Qty: 60 TABLET | Refills: 0 | Status: SHIPPED | OUTPATIENT
Start: 2019-01-01 | End: 2019-01-01

## 2019-01-01 RX ORDER — CIPROFLOXACIN 500 MG/1
500 TABLET, FILM COATED ORAL 2 TIMES DAILY
Qty: 20 TABLET | Refills: 0 | Status: SHIPPED | OUTPATIENT
Start: 2019-01-01 | End: 2019-01-01

## 2019-01-01 RX ORDER — PHENAZOPYRIDINE HYDROCHLORIDE 100 MG/1
200 TABLET, FILM COATED ORAL
Status: DISCONTINUED | OUTPATIENT
Start: 2019-01-01 | End: 2019-01-01 | Stop reason: HOSPADM

## 2019-01-01 RX ORDER — SENNA PLUS 8.6 MG/1
2 TABLET ORAL 2 TIMES DAILY
Status: DISCONTINUED | OUTPATIENT
Start: 2019-01-01 | End: 2019-01-01

## 2019-01-01 RX ORDER — CALCIUM CARBONATE 200(500)MG
1000 TABLET,CHEWABLE ORAL 4 TIMES DAILY PRN
Status: DISCONTINUED | OUTPATIENT
Start: 2019-01-01 | End: 2019-01-01

## 2019-01-01 RX ORDER — HEPARIN SODIUM 5000 [USP'U]/ML
5000 INJECTION, SOLUTION INTRAVENOUS; SUBCUTANEOUS EVERY 8 HOURS SCHEDULED
Status: DISCONTINUED | OUTPATIENT
Start: 2019-01-01 | End: 2019-01-01

## 2019-01-01 RX ORDER — SENNA PLUS 8.6 MG/1
1 TABLET ORAL 3 TIMES DAILY
COMMUNITY

## 2019-01-01 RX ORDER — DULOXETIN HYDROCHLORIDE 60 MG/1
60 CAPSULE, DELAYED RELEASE ORAL DAILY
Status: DISCONTINUED | OUTPATIENT
Start: 2019-01-01 | End: 2019-01-01 | Stop reason: HOSPADM

## 2019-01-01 RX ORDER — SODIUM CHLORIDE 0.9 % (FLUSH) 0.9 %
10 SYRINGE (ML) INJECTION PRN
Status: DISCONTINUED | OUTPATIENT
Start: 2019-01-01 | End: 2019-01-01 | Stop reason: SDUPTHER

## 2019-01-01 RX ORDER — PHENAZOPYRIDINE HYDROCHLORIDE 100 MG/1
200 TABLET, FILM COATED ORAL ONCE
Status: COMPLETED | OUTPATIENT
Start: 2019-01-01 | End: 2019-01-01

## 2019-01-01 RX ORDER — SUCCINYLCHOLINE/SOD CL,ISO/PF 200MG/10ML
SYRINGE (ML) INTRAVENOUS PRN
Status: DISCONTINUED | OUTPATIENT
Start: 2019-01-01 | End: 2019-01-01 | Stop reason: SDUPTHER

## 2019-01-01 RX ORDER — BACLOFEN 10 MG/1
10 TABLET ORAL 2 TIMES DAILY
Status: COMPLETED | OUTPATIENT
Start: 2019-01-01 | End: 2019-01-01

## 2019-01-01 RX ORDER — ACETAMINOPHEN 650 MG/1
650 SUPPOSITORY RECTAL EVERY 4 HOURS PRN
Status: DISCONTINUED | OUTPATIENT
Start: 2019-01-01 | End: 2019-01-01

## 2019-01-01 RX ORDER — BUPIVACAINE HYDROCHLORIDE AND EPINEPHRINE 2.5; 5 MG/ML; UG/ML
7.5 INJECTION, SOLUTION EPIDURAL; INFILTRATION; INTRACAUDAL; PERINEURAL ONCE
Status: COMPLETED | OUTPATIENT
Start: 2019-01-01 | End: 2019-01-01

## 2019-01-01 RX ORDER — OXYCODONE HYDROCHLORIDE 5 MG/1
10 TABLET ORAL PRN
Status: DISCONTINUED | OUTPATIENT
Start: 2019-01-01 | End: 2019-01-01 | Stop reason: HOSPADM

## 2019-01-01 RX ORDER — SODIUM CHLORIDE 9 MG/ML
INJECTION, SOLUTION INTRAVENOUS ONCE
Status: DISCONTINUED | OUTPATIENT
Start: 2019-01-01 | End: 2019-01-01

## 2019-01-01 RX ORDER — PROMETHAZINE HYDROCHLORIDE 25 MG/ML
12.5 INJECTION, SOLUTION INTRAMUSCULAR; INTRAVENOUS EVERY 4 HOURS PRN
Status: DISCONTINUED | OUTPATIENT
Start: 2019-01-01 | End: 2019-01-01

## 2019-01-01 RX ORDER — ACETAMINOPHEN 80 MG
TABLET,CHEWABLE ORAL
Status: DISPENSED
Start: 2019-01-01 | End: 2019-01-01

## 2019-01-01 RX ORDER — PHENYLEPHRINE HCL IN 0.9% NACL 1 MG/10 ML
SYRINGE (ML) INTRAVENOUS PRN
Status: DISCONTINUED | OUTPATIENT
Start: 2019-01-01 | End: 2019-01-01 | Stop reason: SDUPTHER

## 2019-01-01 RX ORDER — PHENAZOPYRIDINE HYDROCHLORIDE 100 MG/1
100 TABLET, FILM COATED ORAL 3 TIMES DAILY PRN
COMMUNITY
End: 2019-01-01

## 2019-01-01 RX ORDER — TAMSULOSIN HYDROCHLORIDE 0.4 MG/1
0.4 CAPSULE ORAL DAILY
Status: DISCONTINUED | OUTPATIENT
Start: 2019-01-01 | End: 2019-01-01

## 2019-01-01 RX ORDER — ACETAMINOPHEN 325 MG/1
650 TABLET ORAL EVERY 6 HOURS PRN
Status: DISCONTINUED | OUTPATIENT
Start: 2019-01-01 | End: 2019-01-01 | Stop reason: HOSPADM

## 2019-01-01 RX ORDER — POLYETHYLENE GLYCOL 3350 17 G/17G
17 POWDER, FOR SOLUTION ORAL DAILY PRN
Status: DISCONTINUED | OUTPATIENT
Start: 2019-01-01 | End: 2019-01-01 | Stop reason: HOSPADM

## 2019-01-01 RX ORDER — OXYCODONE HYDROCHLORIDE 15 MG/1
15 TABLET ORAL
Qty: 112 TABLET | Refills: 0 | Status: SHIPPED | OUTPATIENT
Start: 2019-01-01 | End: 2019-01-01

## 2019-01-01 RX ADMIN — ONDANSETRON 8 MG: 2 INJECTION INTRAMUSCULAR; INTRAVENOUS at 20:26

## 2019-01-01 RX ADMIN — PROMETHAZINE HYDROCHLORIDE 12.5 MG: 25 INJECTION INTRAMUSCULAR; INTRAVENOUS at 10:16

## 2019-01-01 RX ADMIN — TOPIRAMATE 25 MG: 25 TABLET, FILM COATED ORAL at 22:20

## 2019-01-01 RX ADMIN — GABAPENTIN 300 MG: 300 CAPSULE ORAL at 09:45

## 2019-01-01 RX ADMIN — FUROSEMIDE 40 MG: 10 INJECTION, SOLUTION INTRAMUSCULAR; INTRAVENOUS at 16:22

## 2019-01-01 RX ADMIN — FENTANYL CITRATE 50 MCG: 50 INJECTION, SOLUTION INTRAMUSCULAR; INTRAVENOUS at 15:51

## 2019-01-01 RX ADMIN — SODIUM CHLORIDE: 9 INJECTION, SOLUTION INTRAVENOUS at 00:57

## 2019-01-01 RX ADMIN — Medication 1 DROP: at 14:34

## 2019-01-01 RX ADMIN — HYDROMORPHONE HYDROCHLORIDE 0.5 MG: 1 INJECTION, SOLUTION INTRAMUSCULAR; INTRAVENOUS; SUBCUTANEOUS at 09:52

## 2019-01-01 RX ADMIN — HYDROMORPHONE HYDROCHLORIDE 1 MG: 1 INJECTION, SOLUTION INTRAMUSCULAR; INTRAVENOUS; SUBCUTANEOUS at 13:00

## 2019-01-01 RX ADMIN — BACLOFEN 10 MG: 10 TABLET ORAL at 08:22

## 2019-01-01 RX ADMIN — PEGFILGRASTIM 6 MG: 6 INJECTION SUBCUTANEOUS at 22:25

## 2019-01-01 RX ADMIN — PROCHLORPERAZINE MALEATE 10 MG: 5 TABLET, FILM COATED ORAL at 09:12

## 2019-01-01 RX ADMIN — TOPIRAMATE 25 MG: 25 TABLET, FILM COATED ORAL at 09:33

## 2019-01-01 RX ADMIN — PROPOFOL 80 MG: 10 INJECTION, EMULSION INTRAVENOUS at 13:57

## 2019-01-01 RX ADMIN — DULOXETINE HYDROCHLORIDE 60 MG: 60 CAPSULE, DELAYED RELEASE ORAL at 22:13

## 2019-01-01 RX ADMIN — MORPHINE SULFATE 30 MG: 30 TABLET, FILM COATED, EXTENDED RELEASE ORAL at 11:17

## 2019-01-01 RX ADMIN — TOPIRAMATE 25 MG: 25 TABLET, FILM COATED ORAL at 20:30

## 2019-01-01 RX ADMIN — GABAPENTIN 300 MG: 300 CAPSULE ORAL at 21:20

## 2019-01-01 RX ADMIN — FENTANYL CITRATE 100 MCG: 50 INJECTION, SOLUTION INTRAMUSCULAR; INTRAVENOUS at 12:16

## 2019-01-01 RX ADMIN — DIPHENHYDRAMINE HCL 25 MG: 25 TABLET ORAL at 21:41

## 2019-01-01 RX ADMIN — Medication 10 ML: at 11:23

## 2019-01-01 RX ADMIN — TOPIRAMATE 25 MG: 25 TABLET, FILM COATED ORAL at 23:00

## 2019-01-01 RX ADMIN — SENNOSIDES AND DOCUSATE SODIUM 2 TABLET: 8.6; 5 TABLET ORAL at 08:52

## 2019-01-01 RX ADMIN — TOPIRAMATE 25 MG: 25 TABLET, FILM COATED ORAL at 21:20

## 2019-01-01 RX ADMIN — HYDROMORPHONE HYDROCHLORIDE 1 MG: 1 INJECTION, SOLUTION INTRAMUSCULAR; INTRAVENOUS; SUBCUTANEOUS at 05:18

## 2019-01-01 RX ADMIN — Medication 10 ML: at 20:31

## 2019-01-01 RX ADMIN — GABAPENTIN 300 MG: 300 CAPSULE ORAL at 08:01

## 2019-01-01 RX ADMIN — HYDROMORPHONE HYDROCHLORIDE 1 MG: 1 INJECTION, SOLUTION INTRAMUSCULAR; INTRAVENOUS; SUBCUTANEOUS at 08:28

## 2019-01-01 RX ADMIN — ONDANSETRON 8 MG: 2 INJECTION INTRAMUSCULAR; INTRAVENOUS at 11:23

## 2019-01-01 RX ADMIN — Medication 10 MG: at 16:46

## 2019-01-01 RX ADMIN — BACLOFEN 10 MG: 10 TABLET ORAL at 09:13

## 2019-01-01 RX ADMIN — ZOLPIDEM TARTRATE 10 MG: 10 TABLET, FILM COATED ORAL at 21:49

## 2019-01-01 RX ADMIN — CLONIDINE HYDROCHLORIDE 0.2 MG: 0.1 TABLET ORAL at 01:37

## 2019-01-01 RX ADMIN — ANTACID TABLETS 1000 MG: 500 TABLET, CHEWABLE ORAL at 01:45

## 2019-01-01 RX ADMIN — PROCHLORPERAZINE MALEATE 10 MG: 5 TABLET, FILM COATED ORAL at 17:32

## 2019-01-01 RX ADMIN — Medication 10 ML: at 08:57

## 2019-01-01 RX ADMIN — PROMETHAZINE HYDROCHLORIDE 25 MG: 25 TABLET ORAL at 20:29

## 2019-01-01 RX ADMIN — Medication 10 ML: at 21:50

## 2019-01-01 RX ADMIN — CYCLOBENZAPRINE HYDROCHLORIDE 10 MG: 10 TABLET, FILM COATED ORAL at 07:45

## 2019-01-01 RX ADMIN — HYDROMORPHONE HYDROCHLORIDE 2 MG: 2 INJECTION INTRAMUSCULAR; INTRAVENOUS; SUBCUTANEOUS at 17:29

## 2019-01-01 RX ADMIN — Medication: at 12:51

## 2019-01-01 RX ADMIN — BACLOFEN 10 MG: 10 TABLET ORAL at 08:33

## 2019-01-01 RX ADMIN — SENNOSIDES 17.2 MG: 8.6 TABLET, FILM COATED ORAL at 20:19

## 2019-01-01 RX ADMIN — ENOXAPARIN SODIUM 40 MG: 40 INJECTION SUBCUTANEOUS at 22:59

## 2019-01-01 RX ADMIN — SODIUM CHLORIDE: 9 INJECTION, SOLUTION INTRAVENOUS at 13:50

## 2019-01-01 RX ADMIN — IPRATROPIUM BROMIDE AND ALBUTEROL SULFATE 1 AMPULE: .5; 3 SOLUTION RESPIRATORY (INHALATION) at 20:00

## 2019-01-01 RX ADMIN — PHENAZOPYRIDINE HYDROCHLORIDE 200 MG: 100 TABLET ORAL at 11:17

## 2019-01-01 RX ADMIN — LORAZEPAM 1 MG: 1 TABLET ORAL at 21:31

## 2019-01-01 RX ADMIN — IPRATROPIUM BROMIDE AND ALBUTEROL SULFATE 1 AMPULE: .5; 3 SOLUTION RESPIRATORY (INHALATION) at 15:46

## 2019-01-01 RX ADMIN — HYDROMORPHONE HYDROCHLORIDE 1 MG: 1 INJECTION, SOLUTION INTRAMUSCULAR; INTRAVENOUS; SUBCUTANEOUS at 05:11

## 2019-01-01 RX ADMIN — PHENAZOPYRIDINE HYDROCHLORIDE 200 MG: 100 TABLET ORAL at 11:11

## 2019-01-01 RX ADMIN — MORPHINE SULFATE 30 MG: 30 TABLET, FILM COATED, EXTENDED RELEASE ORAL at 21:06

## 2019-01-01 RX ADMIN — OXYCODONE HYDROCHLORIDE 15 MG: 15 TABLET ORAL at 07:02

## 2019-01-01 RX ADMIN — HYDRALAZINE HYDROCHLORIDE 10 MG: 20 INJECTION INTRAMUSCULAR; INTRAVENOUS at 00:35

## 2019-01-01 RX ADMIN — CEFEPIME HYDROCHLORIDE 1 G: 1 INJECTION, POWDER, FOR SOLUTION INTRAMUSCULAR; INTRAVENOUS at 04:33

## 2019-01-01 RX ADMIN — TAMSULOSIN HYDROCHLORIDE 0.4 MG: 0.4 CAPSULE ORAL at 08:39

## 2019-01-01 RX ADMIN — ACETAMINOPHEN 500 MG: 500 TABLET, FILM COATED ORAL at 09:34

## 2019-01-01 RX ADMIN — Medication 1 DROP: at 08:34

## 2019-01-01 RX ADMIN — HYDROMORPHONE HYDROCHLORIDE 2 MG: 2 INJECTION INTRAMUSCULAR; INTRAVENOUS; SUBCUTANEOUS at 23:06

## 2019-01-01 RX ADMIN — HYDROMORPHONE HYDROCHLORIDE 0.4 MG: 2 INJECTION, SOLUTION INTRAMUSCULAR; INTRAVENOUS; SUBCUTANEOUS at 14:04

## 2019-01-01 RX ADMIN — PHENAZOPYRIDINE HYDROCHLORIDE 200 MG: 100 TABLET ORAL at 09:13

## 2019-01-01 RX ADMIN — HYDROMORPHONE HYDROCHLORIDE 2 MG: 2 INJECTION INTRAMUSCULAR; INTRAVENOUS; SUBCUTANEOUS at 08:04

## 2019-01-01 RX ADMIN — SENNOSIDES 17.2 MG: 8.6 TABLET, FILM COATED ORAL at 20:30

## 2019-01-01 RX ADMIN — SODIUM CHLORIDE: 9 INJECTION, SOLUTION INTRAVENOUS at 08:57

## 2019-01-01 RX ADMIN — SODIUM CHLORIDE: 9 INJECTION, SOLUTION INTRAVENOUS at 06:55

## 2019-01-01 RX ADMIN — ZOLPIDEM TARTRATE 5 MG: 5 TABLET ORAL at 22:08

## 2019-01-01 RX ADMIN — TOPIRAMATE 25 MG: 25 TABLET, FILM COATED ORAL at 22:01

## 2019-01-01 RX ADMIN — HYDROMORPHONE HYDROCHLORIDE 1 MG: 1 INJECTION, SOLUTION INTRAMUSCULAR; INTRAVENOUS; SUBCUTANEOUS at 00:01

## 2019-01-01 RX ADMIN — PROMETHAZINE HYDROCHLORIDE 25 MG: 25 TABLET ORAL at 14:09

## 2019-01-01 RX ADMIN — ACETAMINOPHEN 650 MG: 325 TABLET, FILM COATED ORAL at 15:07

## 2019-01-01 RX ADMIN — BACLOFEN 10 MG: 10 TABLET ORAL at 05:19

## 2019-01-01 RX ADMIN — SODIUM CHLORIDE 250 ML: 9 INJECTION, SOLUTION INTRAVENOUS at 03:55

## 2019-01-01 RX ADMIN — OXYCODONE HYDROCHLORIDE 15 MG: 15 TABLET ORAL at 06:37

## 2019-01-01 RX ADMIN — Medication 0.4 MG: at 14:02

## 2019-01-01 RX ADMIN — PHENAZOPYRIDINE HYDROCHLORIDE 200 MG: 100 TABLET ORAL at 08:22

## 2019-01-01 RX ADMIN — DEXAMETHASONE SODIUM PHOSPHATE: 4 INJECTION, SOLUTION INTRAMUSCULAR; INTRAVENOUS at 01:29

## 2019-01-01 RX ADMIN — Medication 1 G: at 13:22

## 2019-01-01 RX ADMIN — NALOXEGOL OXALATE 25 MG: 25 TABLET, FILM COATED ORAL at 06:50

## 2019-01-01 RX ADMIN — OXYCODONE HYDROCHLORIDE AND ACETAMINOPHEN 1 TABLET: 7.5; 325 TABLET ORAL at 17:09

## 2019-01-01 RX ADMIN — HYDROMORPHONE HYDROCHLORIDE 0.5 MG: 1 INJECTION, SOLUTION INTRAMUSCULAR; INTRAVENOUS; SUBCUTANEOUS at 22:14

## 2019-01-01 RX ADMIN — DIPHENHYDRAMINE HCL 25 MG: 25 TABLET ORAL at 07:15

## 2019-01-01 RX ADMIN — SODIUM CHLORIDE: 9 INJECTION, SOLUTION INTRAVENOUS at 02:31

## 2019-01-01 RX ADMIN — PROMETHAZINE HYDROCHLORIDE 12.5 MG: 25 INJECTION INTRAMUSCULAR; INTRAVENOUS at 12:46

## 2019-01-01 RX ADMIN — PHENAZOPYRIDINE HYDROCHLORIDE 200 MG: 100 TABLET ORAL at 12:20

## 2019-01-01 RX ADMIN — Medication 10 MG: at 02:03

## 2019-01-01 RX ADMIN — SODIUM CHLORIDE: 9 INJECTION, SOLUTION INTRAVENOUS at 12:23

## 2019-01-01 RX ADMIN — MAGNESIUM SULFATE HEPTAHYDRATE 2 G: 40 INJECTION, SOLUTION INTRAVENOUS at 12:50

## 2019-01-01 RX ADMIN — ATROPA BELLADONNA AND OPIUM 60 MG: 16.2; 3 SUPPOSITORY RECTAL at 21:59

## 2019-01-01 RX ADMIN — HYDROMORPHONE HYDROCHLORIDE 1 MG: 1 INJECTION, SOLUTION INTRAMUSCULAR; INTRAVENOUS; SUBCUTANEOUS at 17:23

## 2019-01-01 RX ADMIN — GABAPENTIN 300 MG: 300 CAPSULE ORAL at 09:33

## 2019-01-01 RX ADMIN — Medication 10 ML: at 21:46

## 2019-01-01 RX ADMIN — Medication 10 ML: at 11:58

## 2019-01-01 RX ADMIN — Medication 10 ML: at 09:27

## 2019-01-01 RX ADMIN — TOPIRAMATE 25 MG: 25 TABLET, FILM COATED ORAL at 07:44

## 2019-01-01 RX ADMIN — HYDRALAZINE HYDROCHLORIDE 10 MG: 20 INJECTION INTRAMUSCULAR; INTRAVENOUS at 00:05

## 2019-01-01 RX ADMIN — DULOXETINE HYDROCHLORIDE 60 MG: 60 CAPSULE, DELAYED RELEASE ORAL at 09:15

## 2019-01-01 RX ADMIN — PROMETHAZINE HYDROCHLORIDE 12.5 MG: 25 INJECTION INTRAMUSCULAR; INTRAVENOUS at 11:16

## 2019-01-01 RX ADMIN — VANCOMYCIN HYDROCHLORIDE 1250 MG: 10 INJECTION, POWDER, LYOPHILIZED, FOR SOLUTION INTRAVENOUS at 09:05

## 2019-01-01 RX ADMIN — HEPARIN SODIUM 5000 UNITS: 5000 INJECTION INTRAVENOUS; SUBCUTANEOUS at 05:30

## 2019-01-01 RX ADMIN — Medication 10 ML: at 20:20

## 2019-01-01 RX ADMIN — LORAZEPAM 1 MG: 1 TABLET ORAL at 00:57

## 2019-01-01 RX ADMIN — ONDANSETRON 4 MG: 2 INJECTION INTRAMUSCULAR; INTRAVENOUS at 23:12

## 2019-01-01 RX ADMIN — DULOXETINE HYDROCHLORIDE 60 MG: 30 CAPSULE, DELAYED RELEASE ORAL at 08:01

## 2019-01-01 RX ADMIN — DULOXETINE HYDROCHLORIDE 20 MG: 20 CAPSULE, DELAYED RELEASE ORAL at 09:03

## 2019-01-01 RX ADMIN — VANCOMYCIN HYDROCHLORIDE 1250 MG: 10 INJECTION, POWDER, LYOPHILIZED, FOR SOLUTION INTRAVENOUS at 17:26

## 2019-01-01 RX ADMIN — MAGNESIUM OXIDE TAB 400 MG (241.3 MG ELEMENTAL MG) 400 MG: 400 (241.3 MG) TAB at 08:48

## 2019-01-01 RX ADMIN — HYOSCYAMINE SULFATE 125 MCG: 0.12 TABLET ORAL; SUBLINGUAL at 03:44

## 2019-01-01 RX ADMIN — PHENAZOPYRIDINE HYDROCHLORIDE 200 MG: 100 TABLET ORAL at 08:33

## 2019-01-01 RX ADMIN — TRAZODONE HYDROCHLORIDE 50 MG: 50 TABLET ORAL at 20:19

## 2019-01-01 RX ADMIN — FERROUS SULFATE TAB 325 MG (65 MG ELEMENTAL FE) 325 MG: 325 (65 FE) TAB at 09:35

## 2019-01-01 RX ADMIN — IOPAMIDOL 50 ML: 755 INJECTION, SOLUTION INTRAVENOUS at 14:32

## 2019-01-01 RX ADMIN — Medication 0.2 MG: at 15:37

## 2019-01-01 RX ADMIN — CEFEPIME HYDROCHLORIDE 2 G: 2 INJECTION, POWDER, FOR SOLUTION INTRAVENOUS at 12:12

## 2019-01-01 RX ADMIN — BACLOFEN 10 MG: 10 TABLET ORAL at 20:29

## 2019-01-01 RX ADMIN — Medication 10 ML: at 10:07

## 2019-01-01 RX ADMIN — PROMETHAZINE HYDROCHLORIDE 12.5 MG: 25 INJECTION INTRAMUSCULAR; INTRAVENOUS at 12:59

## 2019-01-01 RX ADMIN — Medication 1 DROP: at 20:53

## 2019-01-01 RX ADMIN — ZOLPIDEM TARTRATE 5 MG: 5 TABLET ORAL at 21:20

## 2019-01-01 RX ADMIN — PHENAZOPYRIDINE HYDROCHLORIDE 200 MG: 100 TABLET ORAL at 15:45

## 2019-01-01 RX ADMIN — ONDANSETRON 8 MG: 2 INJECTION INTRAMUSCULAR; INTRAVENOUS at 09:01

## 2019-01-01 RX ADMIN — FENTANYL CITRATE 25 MCG: 50 INJECTION INTRAMUSCULAR; INTRAVENOUS at 14:51

## 2019-01-01 RX ADMIN — HYDROMORPHONE HYDROCHLORIDE 2 MG: 2 INJECTION INTRAMUSCULAR; INTRAVENOUS; SUBCUTANEOUS at 16:17

## 2019-01-01 RX ADMIN — IOPAMIDOL 75 ML: 755 INJECTION, SOLUTION INTRAVENOUS at 10:39

## 2019-01-01 RX ADMIN — MEROPENEM 1 G: 1 INJECTION, POWDER, FOR SOLUTION INTRAVENOUS at 04:13

## 2019-01-01 RX ADMIN — HYDROMORPHONE HYDROCHLORIDE 2 MG: 2 INJECTION INTRAMUSCULAR; INTRAVENOUS; SUBCUTANEOUS at 04:47

## 2019-01-01 RX ADMIN — MORPHINE SULFATE 30 MG: 30 TABLET, FILM COATED, EXTENDED RELEASE ORAL at 21:14

## 2019-01-01 RX ADMIN — LINACLOTIDE 145 MCG: 145 CAPSULE, GELATIN COATED ORAL at 06:43

## 2019-01-01 RX ADMIN — FENTANYL CITRATE 100 MCG: 50 INJECTION INTRAMUSCULAR; INTRAVENOUS at 13:40

## 2019-01-01 RX ADMIN — TOPIRAMATE 25 MG: 25 TABLET, FILM COATED ORAL at 09:53

## 2019-01-01 RX ADMIN — HYDROMORPHONE HYDROCHLORIDE 0.5 MG: 1 INJECTION, SOLUTION INTRAMUSCULAR; INTRAVENOUS; SUBCUTANEOUS at 16:24

## 2019-01-01 RX ADMIN — PROMETHAZINE HYDROCHLORIDE 25 MG: 25 INJECTION INTRAMUSCULAR; INTRAVENOUS at 20:10

## 2019-01-01 RX ADMIN — PROMETHAZINE HYDROCHLORIDE 12.5 MG: 25 INJECTION INTRAMUSCULAR; INTRAVENOUS at 06:59

## 2019-01-01 RX ADMIN — LIDOCAINE HYDROCHLORIDE 80 MG: 20 INJECTION, SOLUTION EPIDURAL; INFILTRATION; INTRACAUDAL; PERINEURAL at 11:56

## 2019-01-01 RX ADMIN — OXYCODONE HYDROCHLORIDE 10 MG: 5 TABLET ORAL at 13:04

## 2019-01-01 RX ADMIN — Medication 10 ML: at 08:59

## 2019-01-01 RX ADMIN — ONDANSETRON 4 MG: 2 INJECTION INTRAMUSCULAR; INTRAVENOUS at 15:07

## 2019-01-01 RX ADMIN — POTASSIUM CHLORIDE 10 MEQ: 7.46 INJECTION, SOLUTION INTRAVENOUS at 00:08

## 2019-01-01 RX ADMIN — Medication 20 ML: at 16:26

## 2019-01-01 RX ADMIN — HYDROMORPHONE HYDROCHLORIDE 1 MG: 1 INJECTION, SOLUTION INTRAMUSCULAR; INTRAVENOUS; SUBCUTANEOUS at 06:04

## 2019-01-01 RX ADMIN — SENNOSIDES AND DOCUSATE SODIUM 2 TABLET: 8.6; 5 TABLET ORAL at 10:18

## 2019-01-01 RX ADMIN — GABAPENTIN 300 MG: 300 CAPSULE ORAL at 08:58

## 2019-01-01 RX ADMIN — PROMETHAZINE HYDROCHLORIDE 25 MG: 25 TABLET ORAL at 05:05

## 2019-01-01 RX ADMIN — FAMOTIDINE 20 MG: 20 TABLET ORAL at 21:24

## 2019-01-01 RX ADMIN — BACLOFEN 10 MG: 10 TABLET ORAL at 20:53

## 2019-01-01 RX ADMIN — PHENAZOPYRIDINE HYDROCHLORIDE 200 MG: 100 TABLET ORAL at 13:41

## 2019-01-01 RX ADMIN — VASOPRESSIN 0.03 UNITS/MIN: 20 INJECTION INTRAVENOUS at 01:15

## 2019-01-01 RX ADMIN — SODIUM CHLORIDE: 9 INJECTION, SOLUTION INTRAVENOUS at 22:54

## 2019-01-01 RX ADMIN — POTASSIUM CHLORIDE 40 MEQ: 1500 TABLET, EXTENDED RELEASE ORAL at 08:22

## 2019-01-01 RX ADMIN — SENNOSIDES 17.2 MG: 8.6 TABLET, FILM COATED ORAL at 20:25

## 2019-01-01 RX ADMIN — HYDROMORPHONE HYDROCHLORIDE 0.5 MG: 1 INJECTION, SOLUTION INTRAMUSCULAR; INTRAVENOUS; SUBCUTANEOUS at 16:02

## 2019-01-01 RX ADMIN — ENOXAPARIN SODIUM 40 MG: 40 INJECTION SUBCUTANEOUS at 19:57

## 2019-01-01 RX ADMIN — Medication 10 ML: at 08:54

## 2019-01-01 RX ADMIN — PHENAZOPYRIDINE HYDROCHLORIDE 200 MG: 100 TABLET ORAL at 11:05

## 2019-01-01 RX ADMIN — GABAPENTIN 300 MG: 300 CAPSULE ORAL at 17:09

## 2019-01-01 RX ADMIN — VANCOMYCIN HYDROCHLORIDE 1250 MG: 10 INJECTION, POWDER, LYOPHILIZED, FOR SOLUTION INTRAVENOUS at 14:45

## 2019-01-01 RX ADMIN — OXYCODONE HYDROCHLORIDE 15 MG: 15 TABLET ORAL at 17:41

## 2019-01-01 RX ADMIN — HYDROMORPHONE HYDROCHLORIDE 0.5 MG: 2 INJECTION INTRAMUSCULAR; INTRAVENOUS; SUBCUTANEOUS at 15:03

## 2019-01-01 RX ADMIN — CEFEPIME HYDROCHLORIDE 2 G: 2 INJECTION, POWDER, FOR SOLUTION INTRAVENOUS at 11:23

## 2019-01-01 RX ADMIN — ENOXAPARIN SODIUM 40 MG: 40 INJECTION SUBCUTANEOUS at 20:28

## 2019-01-01 RX ADMIN — OXYCODONE HYDROCHLORIDE 10 MG: 5 TABLET ORAL at 05:30

## 2019-01-01 RX ADMIN — PHENAZOPYRIDINE HYDROCHLORIDE 200 MG: 100 TABLET ORAL at 08:51

## 2019-01-01 RX ADMIN — GABAPENTIN 300 MG: 300 CAPSULE ORAL at 13:14

## 2019-01-01 RX ADMIN — IBUPROFEN 800 MG: 800 TABLET, FILM COATED ORAL at 23:10

## 2019-01-01 RX ADMIN — SODIUM CHLORIDE 150 MG: 9 INJECTION, SOLUTION INTRAVENOUS at 15:32

## 2019-01-01 RX ADMIN — PROMETHAZINE HYDROCHLORIDE 12.5 MG: 25 INJECTION INTRAMUSCULAR; INTRAVENOUS at 13:41

## 2019-01-01 RX ADMIN — AMITRIPTYLINE HYDROCHLORIDE 25 MG: 25 TABLET, FILM COATED ORAL at 21:30

## 2019-01-01 RX ADMIN — TRAZODONE HYDROCHLORIDE 50 MG: 50 TABLET ORAL at 22:53

## 2019-01-01 RX ADMIN — Medication 1 G: at 13:00

## 2019-01-01 RX ADMIN — SODIUM CHLORIDE 100 ML: 9 INJECTION, SOLUTION INTRAVENOUS at 15:24

## 2019-01-01 RX ADMIN — PROMETHAZINE HYDROCHLORIDE 12.5 MG: 25 INJECTION INTRAMUSCULAR; INTRAVENOUS at 14:45

## 2019-01-01 RX ADMIN — NALOXEGOL OXALATE 25 MG: 25 TABLET, FILM COATED ORAL at 05:49

## 2019-01-01 RX ADMIN — NALOXEGOL OXALATE 25 MG: 25 TABLET, FILM COATED ORAL at 17:27

## 2019-01-01 RX ADMIN — AMITRIPTYLINE HYDROCHLORIDE 25 MG: 25 TABLET, FILM COATED ORAL at 21:14

## 2019-01-01 RX ADMIN — ONDANSETRON 4 MG: 2 INJECTION INTRAMUSCULAR; INTRAVENOUS at 10:22

## 2019-01-01 RX ADMIN — MIDAZOLAM HYDROCHLORIDE 2 MG: 1 INJECTION INTRAMUSCULAR; INTRAVENOUS at 09:04

## 2019-01-01 RX ADMIN — ONDANSETRON 8 MG: 2 INJECTION INTRAMUSCULAR; INTRAVENOUS at 02:32

## 2019-01-01 RX ADMIN — IOHEXOL 50 ML: 240 INJECTION, SOLUTION INTRATHECAL; INTRAVASCULAR; INTRAVENOUS; ORAL at 19:31

## 2019-01-01 RX ADMIN — Medication 10 ML: at 00:09

## 2019-01-01 RX ADMIN — ACETAMINOPHEN 650 MG: 325 TABLET, FILM COATED ORAL at 04:50

## 2019-01-01 RX ADMIN — ROCURONIUM BROMIDE 30 MG: 10 INJECTION, SOLUTION INTRAVENOUS at 12:12

## 2019-01-01 RX ADMIN — OXYCODONE HYDROCHLORIDE 10 MG: 5 TABLET ORAL at 19:20

## 2019-01-01 RX ADMIN — FUROSEMIDE 40 MG: 10 INJECTION, SOLUTION INTRAMUSCULAR; INTRAVENOUS at 17:52

## 2019-01-01 RX ADMIN — BUPIVACAINE HYDROCHLORIDE 10 ML: 5 INJECTION, SOLUTION EPIDURAL; INTRACAUDAL at 14:26

## 2019-01-01 RX ADMIN — PHENAZOPYRIDINE HYDROCHLORIDE 200 MG: 100 TABLET ORAL at 17:48

## 2019-01-01 RX ADMIN — Medication 1 DROP: at 12:47

## 2019-01-01 RX ADMIN — TAMSULOSIN HYDROCHLORIDE 0.4 MG: 0.4 CAPSULE ORAL at 08:33

## 2019-01-01 RX ADMIN — GABAPENTIN 300 MG: 300 CAPSULE ORAL at 14:25

## 2019-01-01 RX ADMIN — Medication 10 MG: at 10:36

## 2019-01-01 RX ADMIN — ZOLPIDEM TARTRATE 10 MG: 10 TABLET, FILM COATED ORAL at 20:37

## 2019-01-01 RX ADMIN — Medication 1 DROP: at 14:56

## 2019-01-01 RX ADMIN — TOPIRAMATE 25 MG: 25 TABLET, FILM COATED ORAL at 20:53

## 2019-01-01 RX ADMIN — LORAZEPAM 1 MG: 1 TABLET ORAL at 09:48

## 2019-01-01 RX ADMIN — SODIUM CHLORIDE: 9 INJECTION, SOLUTION INTRAVENOUS at 15:44

## 2019-01-01 RX ADMIN — KETOROLAC TROMETHAMINE 30 MG: 30 INJECTION, SOLUTION INTRAMUSCULAR at 02:33

## 2019-01-01 RX ADMIN — HYDROMORPHONE HYDROCHLORIDE 4 MG: 2 INJECTION INTRAMUSCULAR; INTRAVENOUS; SUBCUTANEOUS at 12:33

## 2019-01-01 RX ADMIN — OXYCODONE HYDROCHLORIDE 15 MG: 15 TABLET ORAL at 10:17

## 2019-01-01 RX ADMIN — MIDAZOLAM HYDROCHLORIDE 1 MG: 1 INJECTION INTRAMUSCULAR; INTRAVENOUS at 16:02

## 2019-01-01 RX ADMIN — Medication 10 MG: at 07:11

## 2019-01-01 RX ADMIN — Medication 1 G: at 14:34

## 2019-01-01 RX ADMIN — PROCHLORPERAZINE EDISYLATE 10 MG: 5 INJECTION INTRAMUSCULAR; INTRAVENOUS at 09:42

## 2019-01-01 RX ADMIN — GABAPENTIN 300 MG: 300 CAPSULE ORAL at 20:27

## 2019-01-01 RX ADMIN — Medication 10 MG: at 00:30

## 2019-01-01 RX ADMIN — BACLOFEN 10 MG: 10 TABLET ORAL at 20:00

## 2019-01-01 RX ADMIN — SODIUM CHLORIDE: 9 INJECTION, SOLUTION INTRAVENOUS at 19:44

## 2019-01-01 RX ADMIN — BACLOFEN 20 MG: 10 TABLET ORAL at 09:33

## 2019-01-01 RX ADMIN — SODIUM CHLORIDE: 9 INJECTION, SOLUTION INTRAVENOUS at 21:39

## 2019-01-01 RX ADMIN — BACLOFEN 10 MG: 10 TABLET ORAL at 10:51

## 2019-01-01 RX ADMIN — PROMETHAZINE HYDROCHLORIDE 12.5 MG: 25 INJECTION INTRAMUSCULAR; INTRAVENOUS at 04:37

## 2019-01-01 RX ADMIN — MAGNESIUM OXIDE TAB 400 MG (241.3 MG ELEMENTAL MG) 400 MG: 400 (241.3 MG) TAB at 09:21

## 2019-01-01 RX ADMIN — TOPIRAMATE 25 MG: 25 TABLET, FILM COATED ORAL at 22:39

## 2019-01-01 RX ADMIN — LIDOCAINE HYDROCHLORIDE,EPINEPHRINE BITARTRATE 3 ML: 20; .01 INJECTION, SOLUTION INFILTRATION; PERINEURAL at 09:39

## 2019-01-01 RX ADMIN — OXYCODONE HYDROCHLORIDE AND ACETAMINOPHEN 1 TABLET: 7.5; 325 TABLET ORAL at 07:28

## 2019-01-01 RX ADMIN — VANCOMYCIN HYDROCHLORIDE 1500 MG: 10 INJECTION, POWDER, LYOPHILIZED, FOR SOLUTION INTRAVENOUS at 04:20

## 2019-01-01 RX ADMIN — BISACODYL 5 MG: 5 TABLET, COATED ORAL at 06:25

## 2019-01-01 RX ADMIN — MORPHINE SULFATE 30 MG: 30 TABLET, FILM COATED, EXTENDED RELEASE ORAL at 08:58

## 2019-01-01 RX ADMIN — OXYCODONE HYDROCHLORIDE AND ACETAMINOPHEN 1 TABLET: 7.5; 325 TABLET ORAL at 00:25

## 2019-01-01 RX ADMIN — DULOXETINE HYDROCHLORIDE 60 MG: 60 CAPSULE, DELAYED RELEASE ORAL at 08:33

## 2019-01-01 RX ADMIN — HYDROMORPHONE HYDROCHLORIDE 1 MG: 1 INJECTION, SOLUTION INTRAMUSCULAR; INTRAVENOUS; SUBCUTANEOUS at 03:57

## 2019-01-01 RX ADMIN — IPRATROPIUM BROMIDE AND ALBUTEROL SULFATE 1 AMPULE: .5; 3 SOLUTION RESPIRATORY (INHALATION) at 15:29

## 2019-01-01 RX ADMIN — Medication 1 DROP: at 13:46

## 2019-01-01 RX ADMIN — DEXAMETHASONE SODIUM PHOSPHATE 10 MG: 4 INJECTION, SOLUTION INTRAMUSCULAR; INTRAVENOUS at 12:01

## 2019-01-01 RX ADMIN — VANCOMYCIN HYDROCHLORIDE 1250 MG: 10 INJECTION, POWDER, LYOPHILIZED, FOR SOLUTION INTRAVENOUS at 16:29

## 2019-01-01 RX ADMIN — SODIUM CHLORIDE 1000 ML: 9 INJECTION, SOLUTION INTRAVENOUS at 12:15

## 2019-01-01 RX ADMIN — ACETAMINOPHEN 500 MG: 500 TABLET, FILM COATED ORAL at 03:44

## 2019-01-01 RX ADMIN — GABAPENTIN 300 MG: 300 CAPSULE ORAL at 13:02

## 2019-01-01 RX ADMIN — DULOXETINE HYDROCHLORIDE 60 MG: 60 CAPSULE, DELAYED RELEASE ORAL at 09:25

## 2019-01-01 RX ADMIN — MEROPENEM 1 G: 1 INJECTION, POWDER, FOR SOLUTION INTRAVENOUS at 03:51

## 2019-01-01 RX ADMIN — Medication 10 ML: at 23:00

## 2019-01-01 RX ADMIN — PROMETHAZINE HYDROCHLORIDE 25 MG: 25 INJECTION INTRAMUSCULAR; INTRAVENOUS at 01:46

## 2019-01-01 RX ADMIN — BACLOFEN 20 MG: 10 TABLET ORAL at 20:36

## 2019-01-01 RX ADMIN — VANCOMYCIN HYDROCHLORIDE 1500 MG: 10 INJECTION, POWDER, LYOPHILIZED, FOR SOLUTION INTRAVENOUS at 21:36

## 2019-01-01 RX ADMIN — TOPIRAMATE 25 MG: 25 TABLET, FILM COATED ORAL at 20:31

## 2019-01-01 RX ADMIN — DIPHENHYDRAMINE HCL 25 MG: 25 TABLET ORAL at 12:01

## 2019-01-01 RX ADMIN — OXYCODONE HYDROCHLORIDE AND ACETAMINOPHEN 1 TABLET: 7.5; 325 TABLET ORAL at 18:37

## 2019-01-01 RX ADMIN — ACETAMINOPHEN 650 MG: 325 TABLET, FILM COATED ORAL at 17:46

## 2019-01-01 RX ADMIN — ONDANSETRON 4 MG: 2 INJECTION INTRAMUSCULAR; INTRAVENOUS at 16:19

## 2019-01-01 RX ADMIN — MAGNESIUM OXIDE TAB 400 MG (241.3 MG ELEMENTAL MG) 400 MG: 400 (241.3 MG) TAB at 10:06

## 2019-01-01 RX ADMIN — ONDANSETRON 4 MG: 2 INJECTION INTRAMUSCULAR; INTRAVENOUS at 13:43

## 2019-01-01 RX ADMIN — SENNOSIDES 17.2 MG: 8.6 TABLET, FILM COATED ORAL at 09:13

## 2019-01-01 RX ADMIN — MEROPENEM 1 G: 1 INJECTION, POWDER, FOR SOLUTION INTRAVENOUS at 11:59

## 2019-01-01 RX ADMIN — Medication 10 ML: at 09:34

## 2019-01-01 RX ADMIN — SENNOSIDES 8.6 MG: 8.6 TABLET, FILM COATED ORAL at 21:24

## 2019-01-01 RX ADMIN — Medication 10 MG: at 08:30

## 2019-01-01 RX ADMIN — BACLOFEN 20 MG: 10 TABLET ORAL at 00:17

## 2019-01-01 RX ADMIN — NALOXEGOL OXALATE 25 MG: 25 TABLET, FILM COATED ORAL at 06:01

## 2019-01-01 RX ADMIN — ZOLPIDEM TARTRATE 10 MG: 10 TABLET, FILM COATED ORAL at 00:05

## 2019-01-01 RX ADMIN — Medication 10 ML: at 21:32

## 2019-01-01 RX ADMIN — MAGNESIUM SULFATE HEPTAHYDRATE 2 G: 40 INJECTION, SOLUTION INTRAVENOUS at 20:23

## 2019-01-01 RX ADMIN — MIDAZOLAM HYDROCHLORIDE 1 MG: 1 INJECTION INTRAMUSCULAR; INTRAVENOUS at 09:10

## 2019-01-01 RX ADMIN — TOPIRAMATE 25 MG: 25 TABLET, FILM COATED ORAL at 22:02

## 2019-01-01 RX ADMIN — GABAPENTIN 300 MG: 300 CAPSULE ORAL at 20:05

## 2019-01-01 RX ADMIN — DULOXETINE HYDROCHLORIDE 60 MG: 60 CAPSULE, DELAYED RELEASE ORAL at 10:06

## 2019-01-01 RX ADMIN — DULOXETINE HYDROCHLORIDE 60 MG: 60 CAPSULE, DELAYED RELEASE ORAL at 08:46

## 2019-01-01 RX ADMIN — NALOXONE HYDROCHLORIDE 1 MG: 1 INJECTION PARENTERAL at 15:12

## 2019-01-01 RX ADMIN — HEPARIN SODIUM 5000 UNITS: 5000 INJECTION INTRAVENOUS; SUBCUTANEOUS at 14:37

## 2019-01-01 RX ADMIN — IOPAMIDOL 75 ML: 755 INJECTION, SOLUTION INTRAVENOUS at 15:29

## 2019-01-01 RX ADMIN — PROMETHAZINE HYDROCHLORIDE 25 MG: 25 TABLET ORAL at 14:39

## 2019-01-01 RX ADMIN — Medication 10 ML: at 04:34

## 2019-01-01 RX ADMIN — HYOSCYAMINE SULFATE 125 MCG: 0.12 TABLET ORAL; SUBLINGUAL at 18:48

## 2019-01-01 RX ADMIN — SENNOSIDES 17.2 MG: 8.6 TABLET, FILM COATED ORAL at 20:31

## 2019-01-01 RX ADMIN — MORPHINE SULFATE 4 MG: 4 INJECTION INTRAVENOUS at 23:24

## 2019-01-01 RX ADMIN — Medication: at 09:41

## 2019-01-01 RX ADMIN — HYDROMORPHONE HYDROCHLORIDE 0.5 MG: 1 INJECTION, SOLUTION INTRAMUSCULAR; INTRAVENOUS; SUBCUTANEOUS at 00:17

## 2019-01-01 RX ADMIN — FAMOTIDINE 20 MG: 20 TABLET ORAL at 00:17

## 2019-01-01 RX ADMIN — PROMETHAZINE HYDROCHLORIDE 12.5 MG: 25 INJECTION INTRAMUSCULAR; INTRAVENOUS at 08:14

## 2019-01-01 RX ADMIN — MEROPENEM 1 G: 1 INJECTION, POWDER, FOR SOLUTION INTRAVENOUS at 11:05

## 2019-01-01 RX ADMIN — PHENAZOPYRIDINE HYDROCHLORIDE 200 MG: 100 TABLET ORAL at 12:46

## 2019-01-01 RX ADMIN — SODIUM CHLORIDE 1000 ML: 9 INJECTION, SOLUTION INTRAVENOUS at 14:20

## 2019-01-01 RX ADMIN — HYDROMORPHONE HYDROCHLORIDE 1 MG: 1 INJECTION, SOLUTION INTRAMUSCULAR; INTRAVENOUS; SUBCUTANEOUS at 06:54

## 2019-01-01 RX ADMIN — SODIUM CHLORIDE 2000 ML: 9 INJECTION, SOLUTION INTRAVENOUS at 02:32

## 2019-01-01 RX ADMIN — GABAPENTIN 300 MG: 300 CAPSULE ORAL at 21:19

## 2019-01-01 RX ADMIN — IPRATROPIUM BROMIDE AND ALBUTEROL SULFATE 1 AMPULE: .5; 3 SOLUTION RESPIRATORY (INHALATION) at 08:23

## 2019-01-01 RX ADMIN — AMITRIPTYLINE HYDROCHLORIDE 25 MG: 25 TABLET, FILM COATED ORAL at 21:20

## 2019-01-01 RX ADMIN — PHENAZOPYRIDINE HYDROCHLORIDE 200 MG: 100 TABLET ORAL at 11:47

## 2019-01-01 RX ADMIN — SODIUM CHLORIDE: 9 INJECTION, SOLUTION INTRAVENOUS at 13:02

## 2019-01-01 RX ADMIN — OXYCODONE HYDROCHLORIDE 10 MG: 5 TABLET ORAL at 06:42

## 2019-01-01 RX ADMIN — OXYCODONE HYDROCHLORIDE AND ACETAMINOPHEN 1 TABLET: 7.5; 325 TABLET ORAL at 08:48

## 2019-01-01 RX ADMIN — PROMETHAZINE HYDROCHLORIDE 12.5 MG: 25 INJECTION INTRAMUSCULAR; INTRAVENOUS at 16:37

## 2019-01-01 RX ADMIN — HYDROMORPHONE HYDROCHLORIDE 4 MG: 2 INJECTION INTRAMUSCULAR; INTRAVENOUS; SUBCUTANEOUS at 15:38

## 2019-01-01 RX ADMIN — OXYCODONE HYDROCHLORIDE 15 MG: 15 TABLET ORAL at 00:42

## 2019-01-01 RX ADMIN — SODIUM CHLORIDE: 9 INJECTION, SOLUTION INTRAVENOUS at 17:56

## 2019-01-01 RX ADMIN — OXYCODONE HYDROCHLORIDE 5 MG: 5 TABLET ORAL at 15:19

## 2019-01-01 RX ADMIN — SODIUM CHLORIDE: 9 INJECTION, SOLUTION INTRAVENOUS at 00:12

## 2019-01-01 RX ADMIN — ATROPA BELLADONNA AND OPIUM 30 MG: 16.2; 3 SUPPOSITORY RECTAL at 06:03

## 2019-01-01 RX ADMIN — IPRATROPIUM BROMIDE AND ALBUTEROL SULFATE 1 AMPULE: .5; 3 SOLUTION RESPIRATORY (INHALATION) at 21:12

## 2019-01-01 RX ADMIN — Medication 1 DROP: at 07:48

## 2019-01-01 RX ADMIN — HYDROMORPHONE HYDROCHLORIDE 2 MG: 2 INJECTION INTRAMUSCULAR; INTRAVENOUS; SUBCUTANEOUS at 10:54

## 2019-01-01 RX ADMIN — KETOROLAC TROMETHAMINE 30 MG: 30 INJECTION, SOLUTION INTRAMUSCULAR; INTRAVENOUS at 15:42

## 2019-01-01 RX ADMIN — POTASSIUM CHLORIDE 40 MEQ: 1500 TABLET, EXTENDED RELEASE ORAL at 17:34

## 2019-01-01 RX ADMIN — TAMSULOSIN HYDROCHLORIDE 0.4 MG: 0.4 CAPSULE ORAL at 09:47

## 2019-01-01 RX ADMIN — DULOXETINE HYDROCHLORIDE 60 MG: 60 CAPSULE, DELAYED RELEASE ORAL at 20:00

## 2019-01-01 RX ADMIN — Medication 10 ML: at 18:50

## 2019-01-01 RX ADMIN — SODIUM CHLORIDE: 9 INJECTION, SOLUTION INTRAVENOUS at 08:08

## 2019-01-01 RX ADMIN — SODIUM CHLORIDE: 9 INJECTION, SOLUTION INTRAVENOUS at 20:51

## 2019-01-01 RX ADMIN — OXYCODONE HYDROCHLORIDE 10 MG: 5 TABLET ORAL at 19:36

## 2019-01-01 RX ADMIN — OXYCODONE HYDROCHLORIDE 5 MG: 5 TABLET ORAL at 22:59

## 2019-01-01 RX ADMIN — Medication 1 DROP: at 09:26

## 2019-01-01 RX ADMIN — OXYCODONE HYDROCHLORIDE 10 MG: 5 TABLET ORAL at 15:34

## 2019-01-01 RX ADMIN — TOPIRAMATE 25 MG: 25 TABLET, FILM COATED ORAL at 20:16

## 2019-01-01 RX ADMIN — GADOTERIDOL 17 ML: 279.3 INJECTION, SOLUTION INTRAVENOUS at 12:08

## 2019-01-01 RX ADMIN — SODIUM CHLORIDE 250 ML: 9 INJECTION, SOLUTION INTRAVENOUS at 20:26

## 2019-01-01 RX ADMIN — SODIUM CHLORIDE: 9 INJECTION, SOLUTION INTRAVENOUS at 06:52

## 2019-01-01 RX ADMIN — HYDROMORPHONE HYDROCHLORIDE 2 MG: 2 INJECTION INTRAMUSCULAR; INTRAVENOUS; SUBCUTANEOUS at 18:08

## 2019-01-01 RX ADMIN — Medication 1 DROP: at 13:22

## 2019-01-01 RX ADMIN — Medication 10 MG: at 00:50

## 2019-01-01 RX ADMIN — SENNOSIDES 8.6 MG: 8.6 TABLET, FILM COATED ORAL at 20:26

## 2019-01-01 RX ADMIN — FERRIC CARBOXYMALTOSE INJECTION 750 MG: 50 INJECTION, SOLUTION INTRAVENOUS at 14:46

## 2019-01-01 RX ADMIN — LORAZEPAM 1 MG: 1 TABLET ORAL at 09:31

## 2019-01-01 RX ADMIN — DIPHENHYDRAMINE HCL 25 MG: 25 TABLET ORAL at 18:18

## 2019-01-01 RX ADMIN — BACLOFEN 20 MG: 10 TABLET ORAL at 09:30

## 2019-01-01 RX ADMIN — MORPHINE SULFATE 30 MG: 30 TABLET, FILM COATED, EXTENDED RELEASE ORAL at 20:37

## 2019-01-01 RX ADMIN — ONDANSETRON 8 MG: 2 INJECTION INTRAMUSCULAR; INTRAVENOUS at 00:50

## 2019-01-01 RX ADMIN — MORPHINE SULFATE 2 MG: 2 INJECTION, SOLUTION INTRAMUSCULAR; INTRAVENOUS at 06:10

## 2019-01-01 RX ADMIN — SODIUM CHLORIDE: 9 INJECTION, SOLUTION INTRAVENOUS at 10:44

## 2019-01-01 RX ADMIN — DULOXETINE HYDROCHLORIDE 60 MG: 60 CAPSULE, DELAYED RELEASE ORAL at 21:33

## 2019-01-01 RX ADMIN — PROMETHAZINE HYDROCHLORIDE 6.25 MG: 25 INJECTION INTRAMUSCULAR; INTRAVENOUS at 23:02

## 2019-01-01 RX ADMIN — SENNOSIDES 8.6 MG: 8.6 TABLET, FILM COATED ORAL at 09:02

## 2019-01-01 RX ADMIN — Medication 10 ML: at 21:38

## 2019-01-01 RX ADMIN — FENTANYL CITRATE 50 MCG: 50 INJECTION INTRAMUSCULAR; INTRAVENOUS at 20:55

## 2019-01-01 RX ADMIN — MIDAZOLAM HYDROCHLORIDE 1 MG: 1 INJECTION INTRAMUSCULAR; INTRAVENOUS at 16:06

## 2019-01-01 RX ADMIN — TOPIRAMATE 25 MG: 25 TABLET, FILM COATED ORAL at 20:20

## 2019-01-01 RX ADMIN — OXYCODONE HYDROCHLORIDE 5 MG: 5 TABLET ORAL at 03:39

## 2019-01-01 RX ADMIN — ENOXAPARIN SODIUM 40 MG: 40 INJECTION SUBCUTANEOUS at 09:02

## 2019-01-01 RX ADMIN — PROCHLORPERAZINE EDISYLATE 10 MG: 5 INJECTION INTRAMUSCULAR; INTRAVENOUS at 05:14

## 2019-01-01 RX ADMIN — HYOSCYAMINE SULFATE 125 MCG: 0.12 TABLET ORAL; SUBLINGUAL at 11:04

## 2019-01-01 RX ADMIN — MEROPENEM 1 G: 1 INJECTION, POWDER, FOR SOLUTION INTRAVENOUS at 17:41

## 2019-01-01 RX ADMIN — HYDROMORPHONE HYDROCHLORIDE 1 MG: 1 INJECTION, SOLUTION INTRAMUSCULAR; INTRAVENOUS; SUBCUTANEOUS at 06:21

## 2019-01-01 RX ADMIN — TAMSULOSIN HYDROCHLORIDE 0.4 MG: 0.4 CAPSULE ORAL at 08:04

## 2019-01-01 RX ADMIN — CEFEPIME HYDROCHLORIDE 2 G: 2 INJECTION, POWDER, FOR SOLUTION INTRAVENOUS at 06:44

## 2019-01-01 RX ADMIN — NALOXEGOL OXALATE 25 MG: 25 TABLET, FILM COATED ORAL at 04:43

## 2019-01-01 RX ADMIN — Medication 10 ML: at 04:30

## 2019-01-01 RX ADMIN — KETOROLAC TROMETHAMINE 15 MG: 15 INJECTION, SOLUTION INTRAMUSCULAR; INTRAVENOUS at 04:28

## 2019-01-01 RX ADMIN — DIPHENHYDRAMINE HCL 25 MG: 25 TABLET ORAL at 20:27

## 2019-01-01 RX ADMIN — SENNOSIDES AND DOCUSATE SODIUM 2 TABLET: 8.6; 5 TABLET ORAL at 09:30

## 2019-01-01 RX ADMIN — GABAPENTIN 300 MG: 300 CAPSULE ORAL at 21:32

## 2019-01-01 RX ADMIN — SENNOSIDES 17.2 MG: 8.6 TABLET, FILM COATED ORAL at 09:24

## 2019-01-01 RX ADMIN — BACLOFEN 10 MG: 10 TABLET ORAL at 15:11

## 2019-01-01 RX ADMIN — PHENAZOPYRIDINE HYDROCHLORIDE 200 MG: 100 TABLET ORAL at 08:57

## 2019-01-01 RX ADMIN — MIDAZOLAM HYDROCHLORIDE 1 MG: 1 INJECTION INTRAMUSCULAR; INTRAVENOUS at 15:54

## 2019-01-01 RX ADMIN — ONDANSETRON 4 MG: 2 INJECTION INTRAMUSCULAR; INTRAVENOUS at 15:38

## 2019-01-01 RX ADMIN — Medication 10 ML: at 11:38

## 2019-01-01 RX ADMIN — TAMSULOSIN HYDROCHLORIDE 0.4 MG: 0.4 CAPSULE ORAL at 09:02

## 2019-01-01 RX ADMIN — OXYCODONE HYDROCHLORIDE 5 MG: 5 TABLET ORAL at 03:37

## 2019-01-01 RX ADMIN — SODIUM CHLORIDE: 9 INJECTION, SOLUTION INTRAVENOUS at 12:59

## 2019-01-01 RX ADMIN — BACLOFEN 10 MG: 10 TABLET ORAL at 09:15

## 2019-01-01 RX ADMIN — Medication 1 DROP: at 22:43

## 2019-01-01 RX ADMIN — PHENAZOPYRIDINE HYDROCHLORIDE 200 MG: 100 TABLET ORAL at 13:21

## 2019-01-01 RX ADMIN — ONDANSETRON 4 MG: 2 INJECTION INTRAMUSCULAR; INTRAVENOUS at 09:31

## 2019-01-01 RX ADMIN — Medication 10 ML: at 10:18

## 2019-01-01 RX ADMIN — GUAIFENESIN 600 MG: 600 TABLET, EXTENDED RELEASE ORAL at 08:01

## 2019-01-01 RX ADMIN — HYDROMORPHONE HYDROCHLORIDE 2 MG: 2 INJECTION INTRAMUSCULAR; INTRAVENOUS; SUBCUTANEOUS at 15:43

## 2019-01-01 RX ADMIN — GABAPENTIN 300 MG: 300 CAPSULE ORAL at 14:40

## 2019-01-01 RX ADMIN — TOPIRAMATE 25 MG: 25 TABLET, FILM COATED ORAL at 20:26

## 2019-01-01 RX ADMIN — KETOROLAC TROMETHAMINE 15 MG: 15 INJECTION, SOLUTION INTRAMUSCULAR; INTRAVENOUS at 00:45

## 2019-01-01 RX ADMIN — KETOROLAC TROMETHAMINE 15 MG: 15 INJECTION, SOLUTION INTRAMUSCULAR; INTRAVENOUS at 12:44

## 2019-01-01 RX ADMIN — PROPOFOL 50 MG: 10 INJECTION, EMULSION INTRAVENOUS at 14:07

## 2019-01-01 RX ADMIN — BACLOFEN 20 MG: 10 TABLET ORAL at 21:41

## 2019-01-01 RX ADMIN — SENNOSIDES 8.6 MG: 8.6 TABLET, FILM COATED ORAL at 10:39

## 2019-01-01 RX ADMIN — BACLOFEN 20 MG: 10 TABLET ORAL at 08:01

## 2019-01-01 RX ADMIN — Medication 10 ML: at 20:01

## 2019-01-01 RX ADMIN — DEXAMETHASONE SODIUM PHOSPHATE: 4 INJECTION, SOLUTION INTRAMUSCULAR; INTRAVENOUS at 15:32

## 2019-01-01 RX ADMIN — LINACLOTIDE 145 MCG: 145 CAPSULE, GELATIN COATED ORAL at 09:48

## 2019-01-01 RX ADMIN — CEFEPIME HYDROCHLORIDE 2 G: 2 INJECTION, POWDER, FOR SOLUTION INTRAVENOUS at 06:18

## 2019-01-01 RX ADMIN — HYDROMORPHONE HYDROCHLORIDE 2 MG: 2 INJECTION INTRAMUSCULAR; INTRAVENOUS; SUBCUTANEOUS at 14:06

## 2019-01-01 RX ADMIN — DULOXETINE HYDROCHLORIDE 60 MG: 60 CAPSULE, DELAYED RELEASE ORAL at 09:53

## 2019-01-01 RX ADMIN — SENNOSIDES 8.6 MG: 8.6 TABLET, FILM COATED ORAL at 09:26

## 2019-01-01 RX ADMIN — MORPHINE SULFATE 15 MG: 15 TABLET, FILM COATED, EXTENDED RELEASE ORAL at 09:31

## 2019-01-01 RX ADMIN — Medication 10 MG: at 23:07

## 2019-01-01 RX ADMIN — SODIUM CHLORIDE 7 ML: 9 INJECTION, SOLUTION INTRAMUSCULAR; INTRAVENOUS; SUBCUTANEOUS at 09:40

## 2019-01-01 RX ADMIN — IPRATROPIUM BROMIDE AND ALBUTEROL SULFATE 1 AMPULE: .5; 3 SOLUTION RESPIRATORY (INHALATION) at 15:36

## 2019-01-01 RX ADMIN — GABAPENTIN 300 MG: 300 CAPSULE ORAL at 21:41

## 2019-01-01 RX ADMIN — Medication 10 MG: at 20:12

## 2019-01-01 RX ADMIN — NORETHINDRONE ACETATE 5 MG: 5 TABLET ORAL at 20:10

## 2019-01-01 RX ADMIN — PROPOFOL 40 MG: 10 INJECTION, EMULSION INTRAVENOUS at 14:04

## 2019-01-01 RX ADMIN — IPRATROPIUM BROMIDE AND ALBUTEROL SULFATE 1 AMPULE: .5; 3 SOLUTION RESPIRATORY (INHALATION) at 12:56

## 2019-01-01 RX ADMIN — FERROUS SULFATE TAB 325 MG (65 MG ELEMENTAL FE) 325 MG: 325 (65 FE) TAB at 09:49

## 2019-01-01 RX ADMIN — DIPHENHYDRAMINE HYDROCHLORIDE 25 MG: 50 INJECTION INTRAMUSCULAR; INTRAVENOUS at 14:46

## 2019-01-01 RX ADMIN — OXYCODONE HYDROCHLORIDE 10 MG: 5 TABLET ORAL at 09:33

## 2019-01-01 RX ADMIN — SODIUM CHLORIDE: 9 INJECTION, SOLUTION INTRAVENOUS at 00:37

## 2019-01-01 RX ADMIN — Medication 10 ML: at 08:52

## 2019-01-01 RX ADMIN — SODIUM CHLORIDE: 9 INJECTION, SOLUTION INTRAVENOUS at 05:48

## 2019-01-01 RX ADMIN — Medication 10 ML: at 08:53

## 2019-01-01 RX ADMIN — KETOROLAC TROMETHAMINE 15 MG: 15 INJECTION, SOLUTION INTRAMUSCULAR; INTRAVENOUS at 21:42

## 2019-01-01 RX ADMIN — ONDANSETRON 4 MG: 2 INJECTION INTRAMUSCULAR; INTRAVENOUS at 02:42

## 2019-01-01 RX ADMIN — HYDROMORPHONE HYDROCHLORIDE 2 MG: 2 INJECTION INTRAMUSCULAR; INTRAVENOUS; SUBCUTANEOUS at 16:08

## 2019-01-01 RX ADMIN — ACETAMINOPHEN 500 MG: 500 TABLET, FILM COATED ORAL at 16:20

## 2019-01-01 RX ADMIN — Medication 10 ML: at 11:20

## 2019-01-01 RX ADMIN — HEPARIN SODIUM 5000 UNITS: 5000 INJECTION INTRAVENOUS; SUBCUTANEOUS at 14:13

## 2019-01-01 RX ADMIN — IPRATROPIUM BROMIDE AND ALBUTEROL SULFATE 1 AMPULE: .5; 3 SOLUTION RESPIRATORY (INHALATION) at 15:51

## 2019-01-01 RX ADMIN — GABAPENTIN 300 MG: 300 CAPSULE ORAL at 20:09

## 2019-01-01 RX ADMIN — LORAZEPAM 1 MG: 1 TABLET ORAL at 11:12

## 2019-01-01 RX ADMIN — GABAPENTIN 300 MG: 300 CAPSULE ORAL at 08:51

## 2019-01-01 RX ADMIN — FERROUS SULFATE TAB 325 MG (65 MG ELEMENTAL FE) 325 MG: 325 (65 FE) TAB at 09:02

## 2019-01-01 RX ADMIN — SENNOSIDES 17.2 MG: 8.6 TABLET, FILM COATED ORAL at 21:19

## 2019-01-01 RX ADMIN — PALONOSETRON 0.25 MG: 0.05 INJECTION, SOLUTION INTRAVENOUS at 15:32

## 2019-01-01 RX ADMIN — SODIUM CHLORIDE 1000 ML: 9 INJECTION, SOLUTION INTRAVENOUS at 04:15

## 2019-01-01 RX ADMIN — PHENAZOPYRIDINE HYDROCHLORIDE 200 MG: 100 TABLET ORAL at 23:26

## 2019-01-01 RX ADMIN — DULOXETINE HYDROCHLORIDE 60 MG: 60 CAPSULE, DELAYED RELEASE ORAL at 20:27

## 2019-01-01 RX ADMIN — BACLOFEN 20 MG: 10 TABLET ORAL at 21:20

## 2019-01-01 RX ADMIN — SENNOSIDES 17.2 MG: 8.6 TABLET, FILM COATED ORAL at 21:12

## 2019-01-01 RX ADMIN — HYDROMORPHONE HYDROCHLORIDE 1 MG: 1 INJECTION, SOLUTION INTRAMUSCULAR; INTRAVENOUS; SUBCUTANEOUS at 11:08

## 2019-01-01 RX ADMIN — PROMETHAZINE HYDROCHLORIDE 12.5 MG: 25 INJECTION INTRAMUSCULAR; INTRAVENOUS at 06:26

## 2019-01-01 RX ADMIN — HYDROMORPHONE HYDROCHLORIDE 0.5 MG: 2 INJECTION, SOLUTION INTRAMUSCULAR; INTRAVENOUS; SUBCUTANEOUS at 14:40

## 2019-01-01 RX ADMIN — Medication 50 MCG: at 13:50

## 2019-01-01 RX ADMIN — CEFEPIME HYDROCHLORIDE 1 G: 1 INJECTION, POWDER, FOR SOLUTION INTRAMUSCULAR; INTRAVENOUS at 16:49

## 2019-01-01 RX ADMIN — PROMETHAZINE HYDROCHLORIDE 25 MG: 25 TABLET ORAL at 15:19

## 2019-01-01 RX ADMIN — Medication: at 16:15

## 2019-01-01 RX ADMIN — KETOROLAC TROMETHAMINE 15 MG: 15 INJECTION, SOLUTION INTRAMUSCULAR; INTRAVENOUS at 06:41

## 2019-01-01 RX ADMIN — BACLOFEN 10 MG: 10 TABLET ORAL at 09:36

## 2019-01-01 RX ADMIN — GABAPENTIN 300 MG: 300 CAPSULE ORAL at 21:24

## 2019-01-01 RX ADMIN — ZOLPIDEM TARTRATE 10 MG: 10 TABLET, FILM COATED ORAL at 01:29

## 2019-01-01 RX ADMIN — MAGNESIUM OXIDE TAB 400 MG (241.3 MG ELEMENTAL MG) 400 MG: 400 (241.3 MG) TAB at 11:36

## 2019-01-01 RX ADMIN — DULOXETINE HYDROCHLORIDE 60 MG: 60 CAPSULE, DELAYED RELEASE ORAL at 20:37

## 2019-01-01 RX ADMIN — Medication: at 19:20

## 2019-01-01 RX ADMIN — PROMETHAZINE HYDROCHLORIDE 25 MG: 25 TABLET ORAL at 20:31

## 2019-01-01 RX ADMIN — Medication 10 ML: at 11:56

## 2019-01-01 RX ADMIN — OXYCODONE HYDROCHLORIDE 10 MG: 5 TABLET ORAL at 21:51

## 2019-01-01 RX ADMIN — PHENAZOPYRIDINE HYDROCHLORIDE 200 MG: 100 TABLET ORAL at 09:33

## 2019-01-01 RX ADMIN — NALOXEGOL OXALATE 25 MG: 25 TABLET, FILM COATED ORAL at 08:50

## 2019-01-01 RX ADMIN — PHENAZOPYRIDINE HYDROCHLORIDE 200 MG: 100 TABLET ORAL at 12:33

## 2019-01-01 RX ADMIN — TAMSULOSIN HYDROCHLORIDE 0.4 MG: 0.4 CAPSULE ORAL at 09:30

## 2019-01-01 RX ADMIN — KETOROLAC TROMETHAMINE 15 MG: 15 INJECTION, SOLUTION INTRAMUSCULAR; INTRAVENOUS at 09:13

## 2019-01-01 RX ADMIN — ONDANSETRON 4 MG: 2 INJECTION INTRAMUSCULAR; INTRAVENOUS at 21:20

## 2019-01-01 RX ADMIN — MAGNESIUM SULFATE HEPTAHYDRATE 2 G: 40 INJECTION, SOLUTION INTRAVENOUS at 10:05

## 2019-01-01 RX ADMIN — HYDROMORPHONE HYDROCHLORIDE 0.5 MG: 1 INJECTION, SOLUTION INTRAMUSCULAR; INTRAVENOUS; SUBCUTANEOUS at 09:25

## 2019-01-01 RX ADMIN — HYDROMORPHONE HYDROCHLORIDE 1 MG: 1 INJECTION, SOLUTION INTRAMUSCULAR; INTRAVENOUS; SUBCUTANEOUS at 16:19

## 2019-01-01 RX ADMIN — MEROPENEM 1 G: 1 INJECTION, POWDER, FOR SOLUTION INTRAVENOUS at 19:46

## 2019-01-01 RX ADMIN — POTASSIUM CHLORIDE 10 MEQ: 7.46 INJECTION, SOLUTION INTRAVENOUS at 05:18

## 2019-01-01 RX ADMIN — IPRATROPIUM BROMIDE AND ALBUTEROL SULFATE 1 AMPULE: .5; 3 SOLUTION RESPIRATORY (INHALATION) at 19:42

## 2019-01-01 RX ADMIN — MAGNESIUM OXIDE TAB 400 MG (241.3 MG ELEMENTAL MG) 400 MG: 400 (241.3 MG) TAB at 08:51

## 2019-01-01 RX ADMIN — Medication 1 DROP: at 09:25

## 2019-01-01 RX ADMIN — HYDROMORPHONE HYDROCHLORIDE 2 MG: 2 INJECTION INTRAMUSCULAR; INTRAVENOUS; SUBCUTANEOUS at 08:01

## 2019-01-01 RX ADMIN — Medication 10 ML: at 20:35

## 2019-01-01 RX ADMIN — PHENAZOPYRIDINE HYDROCHLORIDE 200 MG: 100 TABLET ORAL at 17:26

## 2019-01-01 RX ADMIN — MORPHINE SULFATE 2 MG: 2 INJECTION, SOLUTION INTRAMUSCULAR; INTRAVENOUS at 11:17

## 2019-01-01 RX ADMIN — LORAZEPAM 1 MG: 2 INJECTION INTRAMUSCULAR at 14:54

## 2019-01-01 RX ADMIN — FENTANYL CITRATE 25 MCG: 50 INJECTION, SOLUTION INTRAMUSCULAR; INTRAVENOUS at 15:41

## 2019-01-01 RX ADMIN — ACETAMINOPHEN 650 MG: 325 TABLET, FILM COATED ORAL at 11:01

## 2019-01-01 RX ADMIN — HYDROMORPHONE HYDROCHLORIDE 1 MG: 1 INJECTION, SOLUTION INTRAMUSCULAR; INTRAVENOUS; SUBCUTANEOUS at 17:14

## 2019-01-01 RX ADMIN — PROMETHAZINE HYDROCHLORIDE 12.5 MG: 25 INJECTION INTRAMUSCULAR; INTRAVENOUS at 15:43

## 2019-01-01 RX ADMIN — PHENAZOPYRIDINE HYDROCHLORIDE 200 MG: 100 TABLET ORAL at 11:31

## 2019-01-01 RX ADMIN — HYDROMORPHONE HYDROCHLORIDE 2 MG: 2 INJECTION INTRAMUSCULAR; INTRAVENOUS; SUBCUTANEOUS at 23:00

## 2019-01-01 RX ADMIN — PROMETHAZINE HYDROCHLORIDE 25 MG: 25 TABLET ORAL at 08:32

## 2019-01-01 RX ADMIN — MORPHINE SULFATE 4 MG: 4 INJECTION INTRAVENOUS at 21:56

## 2019-01-01 RX ADMIN — ONDANSETRON 4 MG: 2 INJECTION INTRAMUSCULAR; INTRAVENOUS at 20:04

## 2019-01-01 RX ADMIN — SODIUM CHLORIDE: 9 INJECTION, SOLUTION INTRAVENOUS at 04:32

## 2019-01-01 RX ADMIN — HYDRALAZINE HYDROCHLORIDE 10 MG: 20 INJECTION INTRAMUSCULAR; INTRAVENOUS at 06:32

## 2019-01-01 RX ADMIN — SODIUM CHLORIDE 1779 ML: 9 INJECTION, SOLUTION INTRAVENOUS at 19:02

## 2019-01-01 RX ADMIN — HYDROMORPHONE HYDROCHLORIDE 1 MG: 1 INJECTION, SOLUTION INTRAMUSCULAR; INTRAVENOUS; SUBCUTANEOUS at 11:11

## 2019-01-01 RX ADMIN — MORPHINE SULFATE 30 MG: 30 TABLET, FILM COATED, EXTENDED RELEASE ORAL at 09:03

## 2019-01-01 RX ADMIN — GABAPENTIN 300 MG: 300 CAPSULE ORAL at 22:57

## 2019-01-01 RX ADMIN — TOPIRAMATE 25 MG: 25 TABLET, FILM COATED ORAL at 21:12

## 2019-01-01 RX ADMIN — OXYCODONE HYDROCHLORIDE 15 MG: 15 TABLET ORAL at 14:09

## 2019-01-01 RX ADMIN — CEFEPIME HYDROCHLORIDE 2 G: 2 INJECTION, POWDER, FOR SOLUTION INTRAVENOUS at 17:23

## 2019-01-01 RX ADMIN — Medication 10 ML: at 09:36

## 2019-01-01 RX ADMIN — Medication 10 MG: at 18:50

## 2019-01-01 RX ADMIN — PROMETHAZINE HYDROCHLORIDE 12.5 MG: 25 INJECTION INTRAMUSCULAR; INTRAVENOUS at 15:08

## 2019-01-01 RX ADMIN — BACLOFEN 10 MG: 10 TABLET ORAL at 10:55

## 2019-01-01 RX ADMIN — LIDOCAINE HYDROCHLORIDE 20 MG: 20 INJECTION, SOLUTION EPIDURAL; INFILTRATION; INTRACAUDAL; PERINEURAL at 14:04

## 2019-01-01 RX ADMIN — POLYETHYLENE GLYCOL 3350 17 G: 17 POWDER, FOR SOLUTION ORAL at 15:43

## 2019-01-01 RX ADMIN — IPRATROPIUM BROMIDE AND ALBUTEROL SULFATE 1 AMPULE: .5; 3 SOLUTION RESPIRATORY (INHALATION) at 08:24

## 2019-01-01 RX ADMIN — PHENAZOPYRIDINE HYDROCHLORIDE 200 MG: 100 TABLET ORAL at 09:45

## 2019-01-01 RX ADMIN — BACLOFEN 20 MG: 10 TABLET ORAL at 20:09

## 2019-01-01 RX ADMIN — Medication: at 05:45

## 2019-01-01 RX ADMIN — SODIUM CHLORIDE 250 ML: 9 INJECTION, SOLUTION INTRAVENOUS at 17:26

## 2019-01-01 RX ADMIN — PROMETHAZINE HYDROCHLORIDE 25 MG: 25 TABLET ORAL at 05:11

## 2019-01-01 RX ADMIN — IPRATROPIUM BROMIDE AND ALBUTEROL SULFATE 1 AMPULE: .5; 3 SOLUTION RESPIRATORY (INHALATION) at 12:14

## 2019-01-01 RX ADMIN — Medication 10 ML: at 09:45

## 2019-01-01 RX ADMIN — PROMETHAZINE HYDROCHLORIDE 6.25 MG: 25 INJECTION INTRAMUSCULAR; INTRAVENOUS at 18:47

## 2019-01-01 RX ADMIN — GABAPENTIN 300 MG: 300 CAPSULE ORAL at 14:53

## 2019-01-01 RX ADMIN — ZOLPIDEM TARTRATE 10 MG: 10 TABLET, FILM COATED ORAL at 20:00

## 2019-01-01 RX ADMIN — ACETAMINOPHEN 500 MG: 500 TABLET, FILM COATED ORAL at 20:36

## 2019-01-01 RX ADMIN — ONDANSETRON 4 MG: 2 INJECTION INTRAMUSCULAR; INTRAVENOUS at 16:18

## 2019-01-01 RX ADMIN — OXYCODONE HYDROCHLORIDE 15 MG: 15 TABLET ORAL at 18:13

## 2019-01-01 RX ADMIN — MAGNESIUM HYDROXIDE 30 ML: 400 SUSPENSION ORAL at 15:26

## 2019-01-01 RX ADMIN — OXYCODONE HYDROCHLORIDE 15 MG: 15 TABLET ORAL at 06:43

## 2019-01-01 RX ADMIN — Medication 10 ML: at 12:26

## 2019-01-01 RX ADMIN — SODIUM CHLORIDE 20 ML: 9 INJECTION, SOLUTION INTRAVENOUS at 14:25

## 2019-01-01 RX ADMIN — IPRATROPIUM BROMIDE AND ALBUTEROL SULFATE 1 AMPULE: .5; 3 SOLUTION RESPIRATORY (INHALATION) at 14:02

## 2019-01-01 RX ADMIN — LORAZEPAM 1 MG: 2 INJECTION INTRAMUSCULAR; INTRAVENOUS at 14:54

## 2019-01-01 RX ADMIN — CEFEPIME HYDROCHLORIDE 2 G: 2 INJECTION, POWDER, FOR SOLUTION INTRAVENOUS at 05:59

## 2019-01-01 RX ADMIN — OXYCODONE HYDROCHLORIDE 10 MG: 5 TABLET ORAL at 12:01

## 2019-01-01 RX ADMIN — ACETAMINOPHEN 500 MG: 500 TABLET, FILM COATED ORAL at 02:00

## 2019-01-01 RX ADMIN — ONDANSETRON 4 MG: 2 INJECTION INTRAMUSCULAR; INTRAVENOUS at 13:38

## 2019-01-01 RX ADMIN — CEFEPIME HYDROCHLORIDE 2 G: 2 INJECTION, POWDER, FOR SOLUTION INTRAVENOUS at 18:21

## 2019-01-01 RX ADMIN — ACETAMINOPHEN 500 MG: 500 TABLET, FILM COATED ORAL at 12:32

## 2019-01-01 RX ADMIN — ZOLPIDEM TARTRATE 10 MG: 10 TABLET, FILM COATED ORAL at 21:33

## 2019-01-01 RX ADMIN — OXYCODONE HYDROCHLORIDE 15 MG: 15 TABLET ORAL at 05:06

## 2019-01-01 RX ADMIN — DIPHENHYDRAMINE HCL 25 MG: 25 TABLET ORAL at 15:19

## 2019-01-01 RX ADMIN — OXYCODONE HYDROCHLORIDE 10 MG: 5 TABLET ORAL at 18:37

## 2019-01-01 RX ADMIN — DIPHENHYDRAMINE HCL 25 MG: 25 TABLET ORAL at 06:42

## 2019-01-01 RX ADMIN — ATROPA BELLADONNA AND OPIUM 60 MG: 16.2; 3 SUPPOSITORY RECTAL at 03:17

## 2019-01-01 RX ADMIN — NALOXEGOL OXALATE 25 MG: 25 TABLET, FILM COATED ORAL at 05:53

## 2019-01-01 RX ADMIN — FERROUS SULFATE TAB 325 MG (65 MG ELEMENTAL FE) 325 MG: 325 (65 FE) TAB at 08:57

## 2019-01-01 RX ADMIN — SENNOSIDES AND DOCUSATE SODIUM 2 TABLET: 8.6; 5 TABLET ORAL at 11:36

## 2019-01-01 RX ADMIN — Medication 10 MG: at 09:07

## 2019-01-01 RX ADMIN — HYDROMORPHONE HYDROCHLORIDE 2 MG: 2 INJECTION INTRAMUSCULAR; INTRAVENOUS; SUBCUTANEOUS at 11:36

## 2019-01-01 RX ADMIN — SENNOSIDES 17.2 MG: 8.6 TABLET, FILM COATED ORAL at 22:01

## 2019-01-01 RX ADMIN — GABAPENTIN 300 MG: 300 CAPSULE ORAL at 21:06

## 2019-01-01 RX ADMIN — OXYCODONE HYDROCHLORIDE AND ACETAMINOPHEN 1 TABLET: 5; 325 TABLET ORAL at 11:33

## 2019-01-01 RX ADMIN — OXYCODONE HYDROCHLORIDE 10 MG: 5 TABLET ORAL at 06:22

## 2019-01-01 RX ADMIN — PHENAZOPYRIDINE HYDROCHLORIDE 200 MG: 100 TABLET ORAL at 17:53

## 2019-01-01 RX ADMIN — SODIUM CHLORIDE: 9 INJECTION, SOLUTION INTRAVENOUS at 14:36

## 2019-01-01 RX ADMIN — ONDANSETRON 8 MG: 2 INJECTION INTRAMUSCULAR; INTRAVENOUS at 13:51

## 2019-01-01 RX ADMIN — IBUPROFEN 800 MG: 800 TABLET, FILM COATED ORAL at 09:02

## 2019-01-01 RX ADMIN — KETOROLAC TROMETHAMINE 15 MG: 15 INJECTION, SOLUTION INTRAMUSCULAR; INTRAVENOUS at 20:59

## 2019-01-01 RX ADMIN — Medication 10 ML: at 20:30

## 2019-01-01 RX ADMIN — LIDOCAINE HYDROCHLORIDE 100 MG: 20 INJECTION, SOLUTION INFILTRATION; PERINEURAL at 08:10

## 2019-01-01 RX ADMIN — ONDANSETRON 4 MG: 2 INJECTION INTRAMUSCULAR; INTRAVENOUS at 19:01

## 2019-01-01 RX ADMIN — OXYCODONE HYDROCHLORIDE AND ACETAMINOPHEN 1 TABLET: 7.5; 325 TABLET ORAL at 09:00

## 2019-01-01 RX ADMIN — PROMETHAZINE HYDROCHLORIDE 12.5 MG: 25 INJECTION INTRAMUSCULAR; INTRAVENOUS at 11:48

## 2019-01-01 RX ADMIN — VANCOMYCIN HYDROCHLORIDE 1250 MG: 10 INJECTION, POWDER, LYOPHILIZED, FOR SOLUTION INTRAVENOUS at 21:40

## 2019-01-01 RX ADMIN — GABAPENTIN 300 MG: 300 CAPSULE ORAL at 20:26

## 2019-01-01 RX ADMIN — PROMETHAZINE HYDROCHLORIDE 12.5 MG: 25 INJECTION INTRAMUSCULAR; INTRAVENOUS at 00:01

## 2019-01-01 RX ADMIN — Medication 4 PUFF: at 15:31

## 2019-01-01 RX ADMIN — PROMETHAZINE HYDROCHLORIDE 12.5 MG: 25 INJECTION INTRAMUSCULAR; INTRAVENOUS at 11:32

## 2019-01-01 RX ADMIN — KETOROLAC TROMETHAMINE 15 MG: 15 INJECTION, SOLUTION INTRAMUSCULAR; INTRAVENOUS at 20:31

## 2019-01-01 RX ADMIN — ENOXAPARIN SODIUM 40 MG: 40 INJECTION SUBCUTANEOUS at 09:18

## 2019-01-01 RX ADMIN — MORPHINE SULFATE 2 MG: 2 INJECTION, SOLUTION INTRAMUSCULAR; INTRAVENOUS at 17:52

## 2019-01-01 RX ADMIN — ACETAMINOPHEN 650 MG: 650 SUPPOSITORY RECTAL at 22:00

## 2019-01-01 RX ADMIN — PROCHLORPERAZINE MALEATE 10 MG: 5 TABLET, FILM COATED ORAL at 13:18

## 2019-01-01 RX ADMIN — GABAPENTIN 300 MG: 300 CAPSULE ORAL at 08:39

## 2019-01-01 RX ADMIN — Medication 10 MG: at 00:11

## 2019-01-01 RX ADMIN — ASPIRIN 81 MG 324 MG: 81 TABLET ORAL at 16:42

## 2019-01-01 RX ADMIN — BACLOFEN 10 MG: 10 TABLET ORAL at 20:19

## 2019-01-01 RX ADMIN — HYDROMORPHONE HYDROCHLORIDE 1 MG: 1 INJECTION, SOLUTION INTRAMUSCULAR; INTRAVENOUS; SUBCUTANEOUS at 00:35

## 2019-01-01 RX ADMIN — OXYCODONE HYDROCHLORIDE 10 MG: 5 TABLET ORAL at 13:14

## 2019-01-01 RX ADMIN — FAMOTIDINE 20 MG: 20 TABLET ORAL at 09:02

## 2019-01-01 RX ADMIN — DIPHENHYDRAMINE HCL 25 MG: 25 TABLET ORAL at 21:57

## 2019-01-01 RX ADMIN — BACLOFEN 10 MG: 10 TABLET ORAL at 20:38

## 2019-01-01 RX ADMIN — TOPIRAMATE 25 MG: 25 TABLET, FILM COATED ORAL at 20:00

## 2019-01-01 RX ADMIN — Medication 10 ML: at 12:00

## 2019-01-01 RX ADMIN — BACLOFEN 10 MG: 10 TABLET ORAL at 13:21

## 2019-01-01 RX ADMIN — PROMETHAZINE HYDROCHLORIDE 12.5 MG: 25 INJECTION INTRAMUSCULAR; INTRAVENOUS at 05:24

## 2019-01-01 RX ADMIN — MORPHINE SULFATE 30 MG: 30 TABLET, FILM COATED, EXTENDED RELEASE ORAL at 20:51

## 2019-01-01 RX ADMIN — HYDROMORPHONE HYDROCHLORIDE 1 MG: 1 INJECTION, SOLUTION INTRAMUSCULAR; INTRAVENOUS; SUBCUTANEOUS at 18:06

## 2019-01-01 RX ADMIN — HEPARIN SODIUM 5000 UNITS: 5000 INJECTION INTRAVENOUS; SUBCUTANEOUS at 05:09

## 2019-01-01 RX ADMIN — DULOXETINE HYDROCHLORIDE 60 MG: 60 CAPSULE, DELAYED RELEASE ORAL at 08:04

## 2019-01-01 RX ADMIN — Medication 25.22 MILLICURIE: at 08:44

## 2019-01-01 RX ADMIN — BACLOFEN 10 MG: 10 TABLET ORAL at 20:25

## 2019-01-01 RX ADMIN — HYDROMORPHONE HYDROCHLORIDE 0.5 MG: 1 INJECTION, SOLUTION INTRAMUSCULAR; INTRAVENOUS; SUBCUTANEOUS at 09:30

## 2019-01-01 RX ADMIN — LINACLOTIDE 145 MCG: 145 CAPSULE, GELATIN COATED ORAL at 11:05

## 2019-01-01 RX ADMIN — KETOROLAC TROMETHAMINE 15 MG: 15 INJECTION, SOLUTION INTRAMUSCULAR; INTRAVENOUS at 15:40

## 2019-01-01 RX ADMIN — Medication 400 MG: at 10:06

## 2019-01-01 RX ADMIN — HYDROMORPHONE HYDROCHLORIDE 1 MG: 1 INJECTION, SOLUTION INTRAMUSCULAR; INTRAVENOUS; SUBCUTANEOUS at 09:07

## 2019-01-01 RX ADMIN — HYDROMORPHONE HYDROCHLORIDE 1 MG: 1 INJECTION, SOLUTION INTRAMUSCULAR; INTRAVENOUS; SUBCUTANEOUS at 19:07

## 2019-01-01 RX ADMIN — OXYCODONE HYDROCHLORIDE 15 MG: 15 TABLET ORAL at 19:46

## 2019-01-01 RX ADMIN — HYDROMORPHONE HYDROCHLORIDE 0.5 MG: 1 INJECTION, SOLUTION INTRAMUSCULAR; INTRAVENOUS; SUBCUTANEOUS at 17:50

## 2019-01-01 RX ADMIN — TOPIRAMATE 25 MG: 25 TABLET, FILM COATED ORAL at 08:49

## 2019-01-01 RX ADMIN — HYDROMORPHONE HYDROCHLORIDE 1 MG: 1 INJECTION, SOLUTION INTRAMUSCULAR; INTRAVENOUS; SUBCUTANEOUS at 19:56

## 2019-01-01 RX ADMIN — HYDROMORPHONE HYDROCHLORIDE 1 MG: 1 INJECTION, SOLUTION INTRAMUSCULAR; INTRAVENOUS; SUBCUTANEOUS at 08:34

## 2019-01-01 RX ADMIN — Medication 10 MG: at 12:15

## 2019-01-01 RX ADMIN — PROMETHAZINE HYDROCHLORIDE 25 MG: 25 TABLET ORAL at 11:08

## 2019-01-01 RX ADMIN — SENNOSIDES 17.2 MG: 8.6 TABLET, FILM COATED ORAL at 10:06

## 2019-01-01 RX ADMIN — IOPAMIDOL 75 ML: 755 INJECTION, SOLUTION INTRAVENOUS at 19:31

## 2019-01-01 RX ADMIN — PROMETHAZINE HYDROCHLORIDE 25 MG: 25 TABLET ORAL at 09:20

## 2019-01-01 RX ADMIN — Medication 10 ML: at 15:46

## 2019-01-01 RX ADMIN — NALOXEGOL OXALATE 25 MG: 25 TABLET, FILM COATED ORAL at 06:02

## 2019-01-01 RX ADMIN — KETOROLAC TROMETHAMINE 30 MG: 30 INJECTION, SOLUTION INTRAMUSCULAR; INTRAVENOUS at 23:25

## 2019-01-01 RX ADMIN — AMITRIPTYLINE HYDROCHLORIDE 25 MG: 25 TABLET, FILM COATED ORAL at 22:02

## 2019-01-01 RX ADMIN — GADOTERIDOL 15 ML: 279.3 INJECTION, SOLUTION INTRAVENOUS at 12:39

## 2019-01-01 RX ADMIN — LABETALOL HYDROCHLORIDE 10 MG: 5 INJECTION INTRAVENOUS at 15:32

## 2019-01-01 RX ADMIN — OXYCODONE HYDROCHLORIDE 10 MG: 5 TABLET ORAL at 08:48

## 2019-01-01 RX ADMIN — NYSTATIN 5 ML: 100000 SUSPENSION ORAL at 20:40

## 2019-01-01 RX ADMIN — ONDANSETRON 4 MG: 2 INJECTION INTRAMUSCULAR; INTRAVENOUS at 15:45

## 2019-01-01 RX ADMIN — SODIUM CHLORIDE: 9 INJECTION, SOLUTION INTRAVENOUS at 20:30

## 2019-01-01 RX ADMIN — MORPHINE SULFATE 30 MG: 30 TABLET, FILM COATED, EXTENDED RELEASE ORAL at 20:36

## 2019-01-01 RX ADMIN — PROMETHAZINE HYDROCHLORIDE 12.5 MG: 25 INJECTION INTRAMUSCULAR; INTRAVENOUS at 17:56

## 2019-01-01 RX ADMIN — PROMETHAZINE HYDROCHLORIDE 25 MG: 25 TABLET ORAL at 18:06

## 2019-01-01 RX ADMIN — ZOLPIDEM TARTRATE 5 MG: 5 TABLET ORAL at 22:37

## 2019-01-01 RX ADMIN — TAMSULOSIN HYDROCHLORIDE 0.4 MG: 0.4 CAPSULE ORAL at 11:12

## 2019-01-01 RX ADMIN — CEFEPIME HYDROCHLORIDE 2 G: 2 INJECTION, POWDER, FOR SOLUTION INTRAVENOUS at 18:18

## 2019-01-01 RX ADMIN — NALOXEGOL OXALATE 25 MG: 25 TABLET, FILM COATED ORAL at 07:54

## 2019-01-01 RX ADMIN — SODIUM CHLORIDE: 9 INJECTION, SOLUTION INTRAVENOUS at 21:46

## 2019-01-01 RX ADMIN — CYCLOBENZAPRINE HYDROCHLORIDE 10 MG: 10 TABLET, FILM COATED ORAL at 16:18

## 2019-01-01 RX ADMIN — SODIUM CHLORIDE 1000 ML: 9 INJECTION, SOLUTION INTRAVENOUS at 14:21

## 2019-01-01 RX ADMIN — FENTANYL CITRATE 25 MCG: 50 INJECTION INTRAMUSCULAR; INTRAVENOUS at 15:04

## 2019-01-01 RX ADMIN — Medication 10 ML: at 20:53

## 2019-01-01 RX ADMIN — BACLOFEN 20 MG: 10 TABLET ORAL at 11:36

## 2019-01-01 RX ADMIN — MORPHINE SULFATE 30 MG: 30 TABLET, FILM COATED, EXTENDED RELEASE ORAL at 21:20

## 2019-01-01 RX ADMIN — ENOXAPARIN SODIUM 40 MG: 40 INJECTION SUBCUTANEOUS at 09:35

## 2019-01-01 RX ADMIN — PHENAZOPYRIDINE HYDROCHLORIDE 200 MG: 100 TABLET ORAL at 09:49

## 2019-01-01 RX ADMIN — FERROUS SULFATE TAB 325 MG (65 MG ELEMENTAL FE) 325 MG: 325 (65 FE) TAB at 17:34

## 2019-01-01 RX ADMIN — Medication 10 ML: at 08:38

## 2019-01-01 RX ADMIN — HYDROMORPHONE HYDROCHLORIDE 1 MG: 1 INJECTION, SOLUTION INTRAMUSCULAR; INTRAVENOUS; SUBCUTANEOUS at 08:08

## 2019-01-01 RX ADMIN — PROMETHAZINE HYDROCHLORIDE 25 MG: 25 TABLET ORAL at 10:00

## 2019-01-01 RX ADMIN — BACLOFEN 20 MG: 10 TABLET ORAL at 20:10

## 2019-01-01 RX ADMIN — ZOLPIDEM TARTRATE 10 MG: 10 TABLET, FILM COATED ORAL at 20:09

## 2019-01-01 RX ADMIN — DIPHENHYDRAMINE HCL 25 MG: 25 TABLET ORAL at 19:09

## 2019-01-01 RX ADMIN — GABAPENTIN 300 MG: 300 CAPSULE ORAL at 12:45

## 2019-01-01 RX ADMIN — Medication 10 ML: at 21:24

## 2019-01-01 RX ADMIN — SENNOSIDES 8.6 MG: 8.6 TABLET, FILM COATED ORAL at 21:20

## 2019-01-01 RX ADMIN — Medication 10 ML: at 09:01

## 2019-01-01 RX ADMIN — SENNOSIDES 8.6 MG: 8.6 TABLET, FILM COATED ORAL at 21:14

## 2019-01-01 RX ADMIN — HYDROMORPHONE HYDROCHLORIDE 2 MG: 2 INJECTION INTRAMUSCULAR; INTRAVENOUS; SUBCUTANEOUS at 23:57

## 2019-01-01 RX ADMIN — MAGNESIUM SULFATE HEPTAHYDRATE 2 G: 40 INJECTION, SOLUTION INTRAVENOUS at 21:14

## 2019-01-01 RX ADMIN — CEFEPIME HYDROCHLORIDE 2 G: 2 INJECTION, POWDER, FOR SOLUTION INTRAVENOUS at 20:41

## 2019-01-01 RX ADMIN — FENTANYL CITRATE 25 MCG: 50 INJECTION INTRAMUSCULAR; INTRAVENOUS at 15:19

## 2019-01-01 RX ADMIN — MAGNESIUM HYDROXIDE 30 ML: 400 SUSPENSION ORAL at 21:33

## 2019-01-01 RX ADMIN — DULOXETINE HYDROCHLORIDE 60 MG: 60 CAPSULE, DELAYED RELEASE ORAL at 08:48

## 2019-01-01 RX ADMIN — MEROPENEM 1 G: 1 INJECTION, POWDER, FOR SOLUTION INTRAVENOUS at 05:20

## 2019-01-01 RX ADMIN — MAGNESIUM OXIDE TAB 400 MG (241.3 MG ELEMENTAL MG) 400 MG: 400 (241.3 MG) TAB at 08:05

## 2019-01-01 RX ADMIN — ACETAMINOPHEN 650 MG: 325 TABLET, FILM COATED ORAL at 16:48

## 2019-01-01 RX ADMIN — FERROUS SULFATE TAB 325 MG (65 MG ELEMENTAL FE) 325 MG: 325 (65 FE) TAB at 16:24

## 2019-01-01 RX ADMIN — DULOXETINE HYDROCHLORIDE 20 MG: 20 CAPSULE, DELAYED RELEASE ORAL at 10:06

## 2019-01-01 RX ADMIN — MORPHINE SULFATE 30 MG: 30 TABLET, FILM COATED, EXTENDED RELEASE ORAL at 10:06

## 2019-01-01 RX ADMIN — TOPIRAMATE 25 MG: 25 TABLET, FILM COATED ORAL at 09:24

## 2019-01-01 RX ADMIN — ACETAMINOPHEN 500 MG: 500 TABLET, FILM COATED ORAL at 21:30

## 2019-01-01 RX ADMIN — KETOROLAC TROMETHAMINE 15 MG: 15 INJECTION, SOLUTION INTRAMUSCULAR; INTRAVENOUS at 04:14

## 2019-01-01 RX ADMIN — GABAPENTIN 300 MG: 300 CAPSULE ORAL at 09:34

## 2019-01-01 RX ADMIN — FENTANYL CITRATE 50 MCG: 50 INJECTION, SOLUTION INTRAMUSCULAR; INTRAVENOUS at 16:02

## 2019-01-01 RX ADMIN — TAMSULOSIN HYDROCHLORIDE 0.4 MG: 0.4 CAPSULE ORAL at 09:03

## 2019-01-01 RX ADMIN — BACLOFEN 20 MG: 10 TABLET ORAL at 09:45

## 2019-01-01 RX ADMIN — HYDROMORPHONE HYDROCHLORIDE 1 MG: 1 INJECTION, SOLUTION INTRAMUSCULAR; INTRAVENOUS; SUBCUTANEOUS at 21:37

## 2019-01-01 RX ADMIN — HYDROMORPHONE HYDROCHLORIDE 0.5 MG: 1 INJECTION, SOLUTION INTRAMUSCULAR; INTRAVENOUS; SUBCUTANEOUS at 01:36

## 2019-01-01 RX ADMIN — MIDAZOLAM HYDROCHLORIDE 1 MG: 1 INJECTION INTRAMUSCULAR; INTRAVENOUS at 15:58

## 2019-01-01 RX ADMIN — HYDROMORPHONE HYDROCHLORIDE 2 MG: 2 INJECTION INTRAMUSCULAR; INTRAVENOUS; SUBCUTANEOUS at 18:01

## 2019-01-01 RX ADMIN — HYDROMORPHONE HYDROCHLORIDE 0.5 MG: 1 INJECTION, SOLUTION INTRAMUSCULAR; INTRAVENOUS; SUBCUTANEOUS at 17:34

## 2019-01-01 RX ADMIN — OXYCODONE HYDROCHLORIDE 15 MG: 15 TABLET ORAL at 15:01

## 2019-01-01 RX ADMIN — Medication 10 ML: at 08:39

## 2019-01-01 RX ADMIN — PROMETHAZINE HYDROCHLORIDE 25 MG: 25 TABLET ORAL at 17:18

## 2019-01-01 RX ADMIN — DULOXETINE HYDROCHLORIDE 20 MG: 20 CAPSULE, DELAYED RELEASE ORAL at 20:16

## 2019-01-01 RX ADMIN — HYDRALAZINE HYDROCHLORIDE 5 MG: 20 INJECTION INTRAMUSCULAR; INTRAVENOUS at 16:09

## 2019-01-01 RX ADMIN — IOPAMIDOL 75 ML: 755 INJECTION, SOLUTION INTRAVENOUS at 21:33

## 2019-01-01 RX ADMIN — PROPOFOL 160 MG: 10 INJECTION, EMULSION INTRAVENOUS at 11:56

## 2019-01-01 RX ADMIN — LINEZOLID 600 MG: 600 INJECTION, SOLUTION INTRAVENOUS at 17:43

## 2019-01-01 RX ADMIN — OXYCODONE HYDROCHLORIDE 10 MG: 5 TABLET ORAL at 11:20

## 2019-01-01 RX ADMIN — BACLOFEN 10 MG: 10 TABLET ORAL at 16:49

## 2019-01-01 RX ADMIN — PROMETHAZINE HYDROCHLORIDE 12.5 MG: 25 INJECTION INTRAMUSCULAR; INTRAVENOUS at 00:13

## 2019-01-01 RX ADMIN — ENOXAPARIN SODIUM 40 MG: 40 INJECTION SUBCUTANEOUS at 22:40

## 2019-01-01 RX ADMIN — SODIUM CHLORIDE 200 MG: 9 INJECTION, SOLUTION INTRAVENOUS at 15:31

## 2019-01-01 RX ADMIN — LIDOCAINE HYDROCHLORIDE 10 ML: 10 INJECTION, SOLUTION EPIDURAL; INFILTRATION; INTRACAUDAL; PERINEURAL at 09:41

## 2019-01-01 RX ADMIN — SENNOSIDES 17.2 MG: 8.6 TABLET, FILM COATED ORAL at 11:37

## 2019-01-01 RX ADMIN — BACLOFEN 20 MG: 10 TABLET ORAL at 20:05

## 2019-01-01 RX ADMIN — FAMOTIDINE 20 MG: 20 TABLET ORAL at 08:58

## 2019-01-01 RX ADMIN — BACLOFEN 20 MG: 10 TABLET ORAL at 22:48

## 2019-01-01 RX ADMIN — AMITRIPTYLINE HYDROCHLORIDE 25 MG: 25 TABLET, FILM COATED ORAL at 20:25

## 2019-01-01 RX ADMIN — PHENAZOPYRIDINE HYDROCHLORIDE 200 MG: 100 TABLET ORAL at 17:14

## 2019-01-01 RX ADMIN — ENOXAPARIN SODIUM 40 MG: 40 INJECTION SUBCUTANEOUS at 20:06

## 2019-01-01 RX ADMIN — FENTANYL CITRATE 150 MCG: 50 INJECTION, SOLUTION INTRAMUSCULAR; INTRAVENOUS at 11:53

## 2019-01-01 RX ADMIN — BISACODYL 5 MG: 5 TABLET, COATED ORAL at 20:08

## 2019-01-01 RX ADMIN — ACETAMINOPHEN 500 MG: 500 TABLET, FILM COATED ORAL at 12:45

## 2019-01-01 RX ADMIN — SODIUM CHLORIDE: 9 INJECTION, SOLUTION INTRAVENOUS at 05:30

## 2019-01-01 RX ADMIN — HEPARIN SODIUM 5000 UNITS: 5000 INJECTION INTRAVENOUS; SUBCUTANEOUS at 20:32

## 2019-01-01 RX ADMIN — SODIUM CHLORIDE: 9 INJECTION, SOLUTION INTRAVENOUS at 08:02

## 2019-01-01 RX ADMIN — Medication 10 ML: at 21:14

## 2019-01-01 RX ADMIN — KETOROLAC TROMETHAMINE 15 MG: 15 INJECTION, SOLUTION INTRAMUSCULAR; INTRAVENOUS at 13:45

## 2019-01-01 RX ADMIN — TOPIRAMATE 25 MG: 25 TABLET, FILM COATED ORAL at 10:05

## 2019-01-01 RX ADMIN — PHENAZOPYRIDINE HYDROCHLORIDE 200 MG: 100 TABLET ORAL at 17:22

## 2019-01-01 RX ADMIN — PHENAZOPYRIDINE HYDROCHLORIDE 200 MG: 100 TABLET ORAL at 09:02

## 2019-01-01 RX ADMIN — Medication 10 MG: at 16:00

## 2019-01-01 RX ADMIN — Medication 10 ML: at 21:05

## 2019-01-01 RX ADMIN — HYDROMORPHONE HYDROCHLORIDE 2 MG: 2 INJECTION INTRAMUSCULAR; INTRAVENOUS; SUBCUTANEOUS at 13:08

## 2019-01-01 RX ADMIN — Medication 1 G: at 15:38

## 2019-01-01 RX ADMIN — Medication: at 13:27

## 2019-01-01 RX ADMIN — Medication 1 DROP: at 10:21

## 2019-01-01 RX ADMIN — CEFEPIME HYDROCHLORIDE 1 G: 1 INJECTION, POWDER, FOR SOLUTION INTRAMUSCULAR; INTRAVENOUS at 04:46

## 2019-01-01 RX ADMIN — IPRATROPIUM BROMIDE AND ALBUTEROL SULFATE 1 AMPULE: .5; 3 SOLUTION RESPIRATORY (INHALATION) at 08:11

## 2019-01-01 RX ADMIN — MAGNESIUM HYDROXIDE 30 ML: 400 SUSPENSION ORAL at 20:57

## 2019-01-01 RX ADMIN — LINACLOTIDE 145 MCG: 145 CAPSULE, GELATIN COATED ORAL at 10:18

## 2019-01-01 RX ADMIN — LABETALOL HYDROCHLORIDE 5 MG: 5 INJECTION INTRAVENOUS at 15:21

## 2019-01-01 RX ADMIN — SODIUM CHLORIDE 1000 ML: 9 INJECTION, SOLUTION INTRAVENOUS at 14:38

## 2019-01-01 RX ADMIN — ENOXAPARIN SODIUM 40 MG: 40 INJECTION SUBCUTANEOUS at 11:50

## 2019-01-01 RX ADMIN — NALOXEGOL OXALATE 25 MG: 25 TABLET, FILM COATED ORAL at 05:09

## 2019-01-01 RX ADMIN — BACLOFEN 10 MG: 10 TABLET ORAL at 21:30

## 2019-01-01 RX ADMIN — IOPAMIDOL 75 ML: 755 INJECTION, SOLUTION INTRAVENOUS at 15:49

## 2019-01-01 RX ADMIN — ONDANSETRON 4 MG: 2 INJECTION INTRAMUSCULAR; INTRAVENOUS at 11:56

## 2019-01-01 RX ADMIN — Medication 10 ML: at 19:10

## 2019-01-01 RX ADMIN — OXYCODONE HYDROCHLORIDE 10 MG: 5 TABLET ORAL at 09:45

## 2019-01-01 RX ADMIN — ENOXAPARIN SODIUM 40 MG: 40 INJECTION SUBCUTANEOUS at 09:51

## 2019-01-01 RX ADMIN — ONDANSETRON 4 MG: 2 INJECTION INTRAMUSCULAR; INTRAVENOUS at 19:20

## 2019-01-01 RX ADMIN — CEFEPIME HYDROCHLORIDE 1 G: 1 INJECTION, POWDER, FOR SOLUTION INTRAMUSCULAR; INTRAVENOUS at 16:45

## 2019-01-01 RX ADMIN — BACLOFEN 10 MG: 10 TABLET ORAL at 21:04

## 2019-01-01 RX ADMIN — GABAPENTIN 300 MG: 300 CAPSULE ORAL at 21:02

## 2019-01-01 RX ADMIN — Medication: at 21:35

## 2019-01-01 RX ADMIN — Medication 10 ML: at 11:40

## 2019-01-01 RX ADMIN — VANCOMYCIN HYDROCHLORIDE 1500 MG: 10 INJECTION, POWDER, LYOPHILIZED, FOR SOLUTION INTRAVENOUS at 07:50

## 2019-01-01 RX ADMIN — Medication: at 15:33

## 2019-01-01 RX ADMIN — SODIUM CHLORIDE: 9 INJECTION, SOLUTION INTRAVENOUS at 00:18

## 2019-01-01 RX ADMIN — OXYCODONE AND ACETAMINOPHEN 1 TABLET: 5; 325 TABLET ORAL at 06:39

## 2019-01-01 RX ADMIN — HYDROMORPHONE HYDROCHLORIDE 2 MG: 2 INJECTION INTRAMUSCULAR; INTRAVENOUS; SUBCUTANEOUS at 22:37

## 2019-01-01 RX ADMIN — DULOXETINE HYDROCHLORIDE 60 MG: 60 CAPSULE, DELAYED RELEASE ORAL at 09:12

## 2019-01-01 RX ADMIN — OXYCODONE HYDROCHLORIDE 10 MG: 5 TABLET ORAL at 16:19

## 2019-01-01 RX ADMIN — BACLOFEN 10 MG: 10 TABLET ORAL at 18:00

## 2019-01-01 RX ADMIN — HYDROMORPHONE HYDROCHLORIDE 1 MG: 1 INJECTION, SOLUTION INTRAMUSCULAR; INTRAVENOUS; SUBCUTANEOUS at 20:08

## 2019-01-01 RX ADMIN — PHENAZOPYRIDINE HYDROCHLORIDE 200 MG: 100 TABLET ORAL at 09:24

## 2019-01-01 RX ADMIN — Medication: at 08:59

## 2019-01-01 RX ADMIN — METHYLNALTREXONE BROMIDE 12 MG: 12 INJECTION, SOLUTION SUBCUTANEOUS at 06:26

## 2019-01-01 RX ADMIN — Medication 10 ML: at 22:21

## 2019-01-01 RX ADMIN — MAGNESIUM OXIDE TAB 400 MG (241.3 MG ELEMENTAL MG) 400 MG: 400 (241.3 MG) TAB at 09:15

## 2019-01-01 RX ADMIN — HYDROMORPHONE HYDROCHLORIDE 1 MG: 1 INJECTION, SOLUTION INTRAMUSCULAR; INTRAVENOUS; SUBCUTANEOUS at 03:03

## 2019-01-01 RX ADMIN — HYDROMORPHONE HYDROCHLORIDE 1 MG: 1 INJECTION, SOLUTION INTRAMUSCULAR; INTRAVENOUS; SUBCUTANEOUS at 19:09

## 2019-01-01 RX ADMIN — HYDROMORPHONE HYDROCHLORIDE 1 MG: 1 INJECTION, SOLUTION INTRAMUSCULAR; INTRAVENOUS; SUBCUTANEOUS at 14:33

## 2019-01-01 RX ADMIN — Medication 10 ML: at 09:03

## 2019-01-01 RX ADMIN — MEROPENEM 1 G: 1 INJECTION, POWDER, FOR SOLUTION INTRAVENOUS at 18:13

## 2019-01-01 RX ADMIN — ONDANSETRON 8 MG: 2 INJECTION INTRAMUSCULAR; INTRAVENOUS at 21:56

## 2019-01-01 RX ADMIN — PROMETHAZINE HYDROCHLORIDE 25 MG: 25 TABLET ORAL at 15:07

## 2019-01-01 RX ADMIN — LORAZEPAM 1 MG: 1 TABLET ORAL at 12:20

## 2019-01-01 RX ADMIN — SODIUM CHLORIDE: 9 INJECTION, SOLUTION INTRAVENOUS at 17:31

## 2019-01-01 RX ADMIN — HYDROMORPHONE HYDROCHLORIDE 2 MG: 2 INJECTION INTRAMUSCULAR; INTRAVENOUS; SUBCUTANEOUS at 13:51

## 2019-01-01 RX ADMIN — IPRATROPIUM BROMIDE AND ALBUTEROL SULFATE 1 AMPULE: .5; 3 SOLUTION RESPIRATORY (INHALATION) at 22:21

## 2019-01-01 RX ADMIN — Medication 10 ML: at 15:45

## 2019-01-01 RX ADMIN — PROMETHAZINE HYDROCHLORIDE 12.5 MG: 25 INJECTION INTRAMUSCULAR; INTRAVENOUS at 17:48

## 2019-01-01 RX ADMIN — SODIUM CHLORIDE: 9 INJECTION, SOLUTION INTRAVENOUS at 14:35

## 2019-01-01 RX ADMIN — MIDAZOLAM HYDROCHLORIDE 2 MG: 1 INJECTION, SOLUTION INTRAMUSCULAR; INTRAVENOUS at 11:45

## 2019-01-01 RX ADMIN — Medication 10 ML: at 08:22

## 2019-01-01 RX ADMIN — PHENYLEPHRINE HYDROCHLORIDE 100 MCG: 10 INJECTION INTRAVENOUS at 15:34

## 2019-01-01 RX ADMIN — PHENAZOPYRIDINE HYDROCHLORIDE 100 MG: 100 TABLET ORAL at 08:39

## 2019-01-01 RX ADMIN — OXYCODONE HYDROCHLORIDE 10 MG: 5 TABLET ORAL at 21:29

## 2019-01-01 RX ADMIN — PROMETHAZINE HYDROCHLORIDE 25 MG: 25 INJECTION INTRAMUSCULAR; INTRAVENOUS at 01:24

## 2019-01-01 RX ADMIN — HYDROMORPHONE HYDROCHLORIDE 0.4 MG: 2 INJECTION, SOLUTION INTRAMUSCULAR; INTRAVENOUS; SUBCUTANEOUS at 14:08

## 2019-01-01 RX ADMIN — ACETAMINOPHEN 650 MG: 325 TABLET, FILM COATED ORAL at 04:34

## 2019-01-01 RX ADMIN — LIDOCAINE HYDROCHLORIDE 40 MG: 20 INJECTION, SOLUTION EPIDURAL; INFILTRATION; INTRACAUDAL; PERINEURAL at 14:00

## 2019-01-01 RX ADMIN — Medication 10 ML: at 10:33

## 2019-01-01 RX ADMIN — OXYCODONE HYDROCHLORIDE AND ACETAMINOPHEN 1 TABLET: 10; 325 TABLET ORAL at 13:27

## 2019-01-01 RX ADMIN — Medication 10 ML: at 20:05

## 2019-01-01 RX ADMIN — BENZOCAINE, MENTHOL 1 LOZENGE: 15; 3.6 LOZENGE ORAL at 10:18

## 2019-01-01 RX ADMIN — HYDROMORPHONE HYDROCHLORIDE 1 MG: 1 INJECTION, SOLUTION INTRAMUSCULAR; INTRAVENOUS; SUBCUTANEOUS at 09:29

## 2019-01-01 RX ADMIN — SENNOSIDES 8.6 MG: 8.6 TABLET, FILM COATED ORAL at 14:44

## 2019-01-01 RX ADMIN — CLONIDINE HYDROCHLORIDE 0.2 MG: 0.1 TABLET ORAL at 06:59

## 2019-01-01 RX ADMIN — OXYCODONE HYDROCHLORIDE 15 MG: 15 TABLET ORAL at 20:23

## 2019-01-01 RX ADMIN — Medication 10 ML: at 23:47

## 2019-01-01 RX ADMIN — SENNOSIDES 17.2 MG: 8.6 TABLET, FILM COATED ORAL at 19:56

## 2019-01-01 RX ADMIN — CEFEPIME HYDROCHLORIDE 2 G: 2 INJECTION, POWDER, FOR SOLUTION INTRAVENOUS at 00:42

## 2019-01-01 RX ADMIN — OXYCODONE HYDROCHLORIDE 10 MG: 5 TABLET ORAL at 11:09

## 2019-01-01 RX ADMIN — LIDOCAINE HYDROCHLORIDE 40 MG: 20 INJECTION, SOLUTION EPIDURAL; INFILTRATION; INTRACAUDAL; PERINEURAL at 13:57

## 2019-01-01 RX ADMIN — KETOROLAC TROMETHAMINE 30 MG: 30 INJECTION, SOLUTION INTRAMUSCULAR at 14:38

## 2019-01-01 RX ADMIN — MORPHINE SULFATE 30 MG: 30 TABLET, FILM COATED, EXTENDED RELEASE ORAL at 08:42

## 2019-01-01 RX ADMIN — GABAPENTIN 300 MG: 300 CAPSULE ORAL at 20:10

## 2019-01-01 RX ADMIN — BACLOFEN 20 MG: 10 TABLET ORAL at 08:52

## 2019-01-01 RX ADMIN — DULOXETINE HYDROCHLORIDE 60 MG: 60 CAPSULE, DELAYED RELEASE ORAL at 18:46

## 2019-01-01 RX ADMIN — IPRATROPIUM BROMIDE AND ALBUTEROL SULFATE 1 AMPULE: .5; 3 SOLUTION RESPIRATORY (INHALATION) at 20:37

## 2019-01-01 RX ADMIN — ATROPA BELLADONNA AND OPIUM 30 MG: 16.2; 3 SUPPOSITORY RECTAL at 01:40

## 2019-01-01 RX ADMIN — HYDROMORPHONE HYDROCHLORIDE 2 MG: 2 INJECTION INTRAMUSCULAR; INTRAVENOUS; SUBCUTANEOUS at 15:17

## 2019-01-01 RX ADMIN — PROMETHAZINE HYDROCHLORIDE 12.5 MG: 25 INJECTION INTRAMUSCULAR; INTRAVENOUS at 20:19

## 2019-01-01 RX ADMIN — VANCOMYCIN HYDROCHLORIDE 1500 MG: 10 INJECTION, POWDER, LYOPHILIZED, FOR SOLUTION INTRAVENOUS at 16:24

## 2019-01-01 RX ADMIN — FENTANYL CITRATE 50 MCG: 50 INJECTION, SOLUTION INTRAMUSCULAR; INTRAVENOUS at 15:54

## 2019-01-01 RX ADMIN — FENTANYL CITRATE 25 MCG: 50 INJECTION, SOLUTION INTRAMUSCULAR; INTRAVENOUS at 15:24

## 2019-01-01 RX ADMIN — NALOXEGOL OXALATE 25 MG: 25 TABLET, FILM COATED ORAL at 06:30

## 2019-01-01 RX ADMIN — Medication 10 ML: at 09:41

## 2019-01-01 RX ADMIN — HYDROMORPHONE HYDROCHLORIDE 1 MG: 1 INJECTION, SOLUTION INTRAMUSCULAR; INTRAVENOUS; SUBCUTANEOUS at 00:08

## 2019-01-01 RX ADMIN — FENTANYL CITRATE 25 MCG: 50 INJECTION, SOLUTION INTRAMUSCULAR; INTRAVENOUS at 15:40

## 2019-01-01 RX ADMIN — GABAPENTIN 300 MG: 300 CAPSULE ORAL at 08:52

## 2019-01-01 RX ADMIN — DULOXETINE HYDROCHLORIDE 60 MG: 30 CAPSULE, DELAYED RELEASE ORAL at 08:58

## 2019-01-01 RX ADMIN — OXYCODONE HYDROCHLORIDE 15 MG: 15 TABLET ORAL at 17:09

## 2019-01-01 RX ADMIN — BUPIVACAINE HYDROCHLORIDE AND EPINEPHRINE BITARTRATE 3 ML: 2.5; .005 INJECTION, SOLUTION EPIDURAL; INFILTRATION; INTRACAUDAL; PERINEURAL at 09:38

## 2019-01-01 RX ADMIN — IOPAMIDOL 20 ML: 755 INJECTION, SOLUTION INTRAVENOUS at 16:26

## 2019-01-01 RX ADMIN — Medication 10 ML: at 13:31

## 2019-01-01 RX ADMIN — DULOXETINE HYDROCHLORIDE 60 MG: 60 CAPSULE, DELAYED RELEASE ORAL at 09:47

## 2019-01-01 RX ADMIN — HYDROMORPHONE HYDROCHLORIDE 1 MG: 1 INJECTION, SOLUTION INTRAMUSCULAR; INTRAVENOUS; SUBCUTANEOUS at 16:03

## 2019-01-01 RX ADMIN — Medication 10 ML: at 08:47

## 2019-01-01 RX ADMIN — HYDROMORPHONE HYDROCHLORIDE 2 MG: 2 INJECTION INTRAMUSCULAR; INTRAVENOUS; SUBCUTANEOUS at 08:57

## 2019-01-01 RX ADMIN — CEFEPIME HYDROCHLORIDE 2 G: 2 INJECTION, POWDER, FOR SOLUTION INTRAVENOUS at 06:02

## 2019-01-01 RX ADMIN — IPRATROPIUM BROMIDE AND ALBUTEROL SULFATE 1 AMPULE: .5; 3 SOLUTION RESPIRATORY (INHALATION) at 20:14

## 2019-01-01 RX ADMIN — BUPIVACAINE HYDROCHLORIDE 10 MG: 5 INJECTION, SOLUTION EPIDURAL; INTRACAUDAL; PERINEURAL at 16:25

## 2019-01-01 RX ADMIN — MAGNESIUM OXIDE TAB 400 MG (241.3 MG ELEMENTAL MG) 400 MG: 400 (241.3 MG) TAB at 08:33

## 2019-01-01 RX ADMIN — KETOROLAC TROMETHAMINE 15 MG: 15 INJECTION, SOLUTION INTRAMUSCULAR; INTRAVENOUS at 18:21

## 2019-01-01 RX ADMIN — BACLOFEN 10 MG: 10 TABLET ORAL at 11:36

## 2019-01-01 RX ADMIN — HYDROMORPHONE HYDROCHLORIDE 2 MG: 2 INJECTION INTRAMUSCULAR; INTRAVENOUS; SUBCUTANEOUS at 03:19

## 2019-01-01 RX ADMIN — Medication 1 G: at 14:44

## 2019-01-01 RX ADMIN — SENNOSIDES 17.2 MG: 8.6 TABLET, FILM COATED ORAL at 11:34

## 2019-01-01 RX ADMIN — KETOROLAC TROMETHAMINE 15 MG: 15 INJECTION, SOLUTION INTRAMUSCULAR; INTRAVENOUS at 22:48

## 2019-01-01 RX ADMIN — CEFEPIME HYDROCHLORIDE 2 G: 2 INJECTION, POWDER, FOR SOLUTION INTRAVENOUS at 12:55

## 2019-01-01 RX ADMIN — SODIUM CHLORIDE: 9 INJECTION, SOLUTION INTRAVENOUS at 22:28

## 2019-01-01 RX ADMIN — HYDROMORPHONE HYDROCHLORIDE 1 MG: 1 INJECTION, SOLUTION INTRAMUSCULAR; INTRAVENOUS; SUBCUTANEOUS at 22:47

## 2019-01-01 RX ADMIN — Medication 10 ML: at 20:23

## 2019-01-01 RX ADMIN — GABAPENTIN 300 MG: 300 CAPSULE ORAL at 09:02

## 2019-01-01 RX ADMIN — LABETALOL HYDROCHLORIDE 10 MG: 5 INJECTION INTRAVENOUS at 15:54

## 2019-01-01 RX ADMIN — MORPHINE SULFATE 30 MG: 30 TABLET, FILM COATED, EXTENDED RELEASE ORAL at 20:16

## 2019-01-01 RX ADMIN — DULOXETINE HYDROCHLORIDE 60 MG: 60 CAPSULE, DELAYED RELEASE ORAL at 21:41

## 2019-01-01 RX ADMIN — Medication 1 DROP: at 08:46

## 2019-01-01 RX ADMIN — AMITRIPTYLINE HYDROCHLORIDE 25 MG: 25 TABLET, FILM COATED ORAL at 22:40

## 2019-01-01 RX ADMIN — KETOROLAC TROMETHAMINE 15 MG: 15 INJECTION, SOLUTION INTRAMUSCULAR; INTRAVENOUS at 14:40

## 2019-01-01 RX ADMIN — FAMOTIDINE 20 MG: 20 TABLET, FILM COATED ORAL at 20:30

## 2019-01-01 RX ADMIN — CEFEPIME HYDROCHLORIDE 1 G: 1 INJECTION, POWDER, FOR SOLUTION INTRAMUSCULAR; INTRAVENOUS at 17:19

## 2019-01-01 RX ADMIN — CYCLOBENZAPRINE HYDROCHLORIDE 10 MG: 10 TABLET, FILM COATED ORAL at 03:24

## 2019-01-01 RX ADMIN — HYDROMORPHONE HYDROCHLORIDE 0.5 MG: 1 INJECTION, SOLUTION INTRAMUSCULAR; INTRAVENOUS; SUBCUTANEOUS at 22:28

## 2019-01-01 RX ADMIN — PHENYLEPHRINE HYDROCHLORIDE 100 MCG: 10 INJECTION INTRAVENOUS at 15:52

## 2019-01-01 RX ADMIN — Medication 10 ML: at 17:39

## 2019-01-01 RX ADMIN — ALTEPLASE 1 MG: 2.2 INJECTION, POWDER, LYOPHILIZED, FOR SOLUTION INTRAVENOUS at 17:43

## 2019-01-01 RX ADMIN — POLYETHYLENE GLYCOL 3350 17 G: 17 POWDER, FOR SOLUTION ORAL at 10:00

## 2019-01-01 RX ADMIN — OXYCODONE HYDROCHLORIDE AND ACETAMINOPHEN 2 TABLET: 5; 325 TABLET ORAL at 00:39

## 2019-01-01 RX ADMIN — BACLOFEN 10 MG: 10 TABLET ORAL at 07:44

## 2019-01-01 RX ADMIN — PROMETHAZINE HYDROCHLORIDE 25 MG: 25 TABLET ORAL at 13:15

## 2019-01-01 RX ADMIN — Medication 10 ML: at 08:02

## 2019-01-01 RX ADMIN — IPRATROPIUM BROMIDE AND ALBUTEROL SULFATE 1 AMPULE: .5; 3 SOLUTION RESPIRATORY (INHALATION) at 08:54

## 2019-01-01 RX ADMIN — ONDANSETRON 8 MG: 2 INJECTION INTRAMUSCULAR; INTRAVENOUS at 21:50

## 2019-01-01 RX ADMIN — GABAPENTIN 300 MG: 300 CAPSULE ORAL at 08:47

## 2019-01-01 RX ADMIN — ONDANSETRON 4 MG: 2 INJECTION INTRAMUSCULAR; INTRAVENOUS at 18:40

## 2019-01-01 RX ADMIN — Medication 10 ML: at 08:10

## 2019-01-01 RX ADMIN — NYSTATIN 5 ML: 100000 SUSPENSION ORAL at 11:32

## 2019-01-01 RX ADMIN — ZOLPIDEM TARTRATE 5 MG: 5 TABLET ORAL at 21:31

## 2019-01-01 RX ADMIN — PROMETHAZINE HYDROCHLORIDE 12.5 MG: 25 INJECTION INTRAMUSCULAR; INTRAVENOUS at 17:38

## 2019-01-01 RX ADMIN — PROMETHAZINE HYDROCHLORIDE 12.5 MG: 25 INJECTION INTRAMUSCULAR; INTRAVENOUS at 10:37

## 2019-01-01 RX ADMIN — DULOXETINE HYDROCHLORIDE 60 MG: 60 CAPSULE, DELAYED RELEASE ORAL at 22:48

## 2019-01-01 RX ADMIN — Medication 1 DROP: at 15:00

## 2019-01-01 RX ADMIN — PROMETHAZINE HYDROCHLORIDE 12.5 MG: 25 INJECTION INTRAMUSCULAR; INTRAVENOUS at 19:16

## 2019-01-01 RX ADMIN — SODIUM CHLORIDE: 9 INJECTION, SOLUTION INTRAVENOUS at 00:45

## 2019-01-01 RX ADMIN — MAGNESIUM OXIDE TAB 400 MG (241.3 MG ELEMENTAL MG) 400 MG: 400 (241.3 MG) TAB at 08:22

## 2019-01-01 RX ADMIN — LORAZEPAM 1 MG: 1 TABLET ORAL at 20:20

## 2019-01-01 RX ADMIN — OXYCODONE HYDROCHLORIDE 5 MG: 5 TABLET ORAL at 07:15

## 2019-01-01 RX ADMIN — HYDROMORPHONE HYDROCHLORIDE 2 MG: 2 INJECTION INTRAMUSCULAR; INTRAVENOUS; SUBCUTANEOUS at 10:50

## 2019-01-01 RX ADMIN — ENOXAPARIN SODIUM 40 MG: 40 INJECTION SUBCUTANEOUS at 20:43

## 2019-01-01 RX ADMIN — ENOXAPARIN SODIUM 40 MG: 40 INJECTION SUBCUTANEOUS at 21:13

## 2019-01-01 RX ADMIN — SODIUM CHLORIDE: 9 INJECTION, SOLUTION INTRAVENOUS at 12:25

## 2019-01-01 RX ADMIN — Medication 3 MG: at 14:02

## 2019-01-01 RX ADMIN — PHENAZOPYRIDINE HYDROCHLORIDE 200 MG: 100 TABLET ORAL at 16:18

## 2019-01-01 RX ADMIN — ENOXAPARIN SODIUM 40 MG: 40 INJECTION SUBCUTANEOUS at 20:29

## 2019-01-01 RX ADMIN — OXYCODONE HYDROCHLORIDE 15 MG: 15 TABLET ORAL at 16:20

## 2019-01-01 RX ADMIN — IPRATROPIUM BROMIDE AND ALBUTEROL SULFATE 1 AMPULE: .5; 3 SOLUTION RESPIRATORY (INHALATION) at 13:15

## 2019-01-01 RX ADMIN — FUROSEMIDE 40 MG: 10 INJECTION, SOLUTION INTRAMUSCULAR; INTRAVENOUS at 17:29

## 2019-01-01 RX ADMIN — BACLOFEN 10 MG: 10 TABLET ORAL at 22:59

## 2019-01-01 RX ADMIN — IPRATROPIUM BROMIDE AND ALBUTEROL SULFATE 1 AMPULE: .5; 3 SOLUTION RESPIRATORY (INHALATION) at 09:43

## 2019-01-01 RX ADMIN — IBUPROFEN 800 MG: 800 TABLET, FILM COATED ORAL at 08:32

## 2019-01-01 RX ADMIN — ACETAMINOPHEN 500 MG: 500 TABLET, FILM COATED ORAL at 17:13

## 2019-01-01 RX ADMIN — PROMETHAZINE HYDROCHLORIDE 12.5 MG: 25 INJECTION INTRAMUSCULAR; INTRAVENOUS at 18:09

## 2019-01-01 RX ADMIN — DULOXETINE HYDROCHLORIDE 60 MG: 60 CAPSULE, DELAYED RELEASE ORAL at 20:23

## 2019-01-01 RX ADMIN — MORPHINE SULFATE 2 MG: 2 INJECTION, SOLUTION INTRAMUSCULAR; INTRAVENOUS at 09:05

## 2019-01-01 RX ADMIN — TOPIRAMATE 25 MG: 25 TABLET, FILM COATED ORAL at 22:30

## 2019-01-01 RX ADMIN — SENNOSIDES 17.2 MG: 8.6 TABLET, FILM COATED ORAL at 21:46

## 2019-01-01 RX ADMIN — BISACODYL 5 MG: 5 TABLET, COATED ORAL at 20:49

## 2019-01-01 RX ADMIN — SODIUM CHLORIDE 250 ML: 9 INJECTION, SOLUTION INTRAVENOUS at 11:10

## 2019-01-01 RX ADMIN — HYDROMORPHONE HYDROCHLORIDE 2 MG: 2 INJECTION INTRAMUSCULAR; INTRAVENOUS; SUBCUTANEOUS at 09:46

## 2019-01-01 RX ADMIN — TRAZODONE HYDROCHLORIDE 50 MG: 50 TABLET ORAL at 21:12

## 2019-01-01 RX ADMIN — HYOSCYAMINE SULFATE 125 MCG: 0.12 TABLET ORAL; SUBLINGUAL at 21:02

## 2019-01-01 RX ADMIN — GABAPENTIN 300 MG: 300 CAPSULE ORAL at 09:26

## 2019-01-01 RX ADMIN — TAMSULOSIN HYDROCHLORIDE 0.4 MG: 0.4 CAPSULE ORAL at 09:23

## 2019-01-01 RX ADMIN — HYDROMORPHONE HYDROCHLORIDE 0.5 MG: 1 INJECTION, SOLUTION INTRAMUSCULAR; INTRAVENOUS; SUBCUTANEOUS at 06:33

## 2019-01-01 RX ADMIN — BACLOFEN 20 MG: 10 TABLET ORAL at 22:57

## 2019-01-01 RX ADMIN — MORPHINE SULFATE 30 MG: 30 TABLET, FILM COATED, EXTENDED RELEASE ORAL at 20:27

## 2019-01-01 RX ADMIN — HYDROMORPHONE HYDROCHLORIDE 0.5 MG: 1 INJECTION, SOLUTION INTRAMUSCULAR; INTRAVENOUS; SUBCUTANEOUS at 08:59

## 2019-01-01 RX ADMIN — OXYCODONE HYDROCHLORIDE 5 MG: 5 TABLET ORAL at 18:18

## 2019-01-01 RX ADMIN — Medication 160 MG: at 11:56

## 2019-01-01 RX ADMIN — TOPIRAMATE 25 MG: 25 TABLET, FILM COATED ORAL at 21:46

## 2019-01-01 RX ADMIN — ONDANSETRON 4 MG: 2 INJECTION INTRAMUSCULAR; INTRAVENOUS at 00:35

## 2019-01-01 RX ADMIN — HYOSCYAMINE SULFATE 125 MCG: 0.12 TABLET ORAL; SUBLINGUAL at 04:10

## 2019-01-01 RX ADMIN — HYDROMORPHONE HYDROCHLORIDE 2 MG: 2 INJECTION INTRAMUSCULAR; INTRAVENOUS; SUBCUTANEOUS at 21:51

## 2019-01-01 RX ADMIN — OXYCODONE AND ACETAMINOPHEN 1 TABLET: 5; 325 TABLET ORAL at 12:05

## 2019-01-01 RX ADMIN — ONDANSETRON 8 MG: 2 INJECTION INTRAMUSCULAR; INTRAVENOUS at 08:48

## 2019-01-01 RX ADMIN — BACLOFEN 10 MG: 10 TABLET ORAL at 08:50

## 2019-01-01 RX ADMIN — CEFEPIME HYDROCHLORIDE 2 G: 2 INJECTION, POWDER, FOR SOLUTION INTRAVENOUS at 20:23

## 2019-01-01 RX ADMIN — FERROUS SULFATE TAB 325 MG (65 MG ELEMENTAL FE) 325 MG: 325 (65 FE) TAB at 18:08

## 2019-01-01 RX ADMIN — DULOXETINE HYDROCHLORIDE 60 MG: 30 CAPSULE, DELAYED RELEASE ORAL at 09:49

## 2019-01-01 RX ADMIN — MORPHINE SULFATE 30 MG: 30 TABLET, FILM COATED, EXTENDED RELEASE ORAL at 10:39

## 2019-01-01 RX ADMIN — BACLOFEN 20 MG: 10 TABLET ORAL at 09:02

## 2019-01-01 RX ADMIN — Medication 1 G: at 15:07

## 2019-01-01 RX ADMIN — Medication 1 DROP: at 21:28

## 2019-01-01 RX ADMIN — SODIUM CHLORIDE: 9 INJECTION, SOLUTION INTRAVENOUS at 17:27

## 2019-01-01 RX ADMIN — OXYCODONE HYDROCHLORIDE 5 MG: 5 TABLET ORAL at 08:56

## 2019-01-01 RX ADMIN — TOPIRAMATE 25 MG: 25 TABLET, FILM COATED ORAL at 09:13

## 2019-01-01 RX ADMIN — Medication 10 ML: at 20:16

## 2019-01-01 RX ADMIN — ONDANSETRON 4 MG: 2 INJECTION INTRAMUSCULAR; INTRAVENOUS at 14:21

## 2019-01-01 RX ADMIN — IOPAMIDOL 75 ML: 755 INJECTION, SOLUTION INTRAVENOUS at 20:22

## 2019-01-01 RX ADMIN — ONDANSETRON 4 MG: 2 INJECTION INTRAMUSCULAR; INTRAVENOUS at 04:36

## 2019-01-01 RX ADMIN — MORPHINE SULFATE 30 MG: 30 TABLET, FILM COATED, EXTENDED RELEASE ORAL at 09:34

## 2019-01-01 RX ADMIN — Medication 10 ML: at 22:00

## 2019-01-01 RX ADMIN — GUAIFENESIN 600 MG: 600 TABLET, EXTENDED RELEASE ORAL at 08:57

## 2019-01-01 RX ADMIN — ACETAMINOPHEN 650 MG: 325 TABLET, FILM COATED ORAL at 19:15

## 2019-01-01 RX ADMIN — SENNOSIDES 8.6 MG: 8.6 TABLET, FILM COATED ORAL at 20:16

## 2019-01-01 RX ADMIN — DULOXETINE HYDROCHLORIDE 60 MG: 60 CAPSULE, DELAYED RELEASE ORAL at 09:21

## 2019-01-01 RX ADMIN — IPRATROPIUM BROMIDE AND ALBUTEROL SULFATE 1 AMPULE: .5; 3 SOLUTION RESPIRATORY (INHALATION) at 13:01

## 2019-01-01 RX ADMIN — HYDROMORPHONE HYDROCHLORIDE 2 MG: 2 INJECTION INTRAMUSCULAR; INTRAVENOUS; SUBCUTANEOUS at 16:20

## 2019-01-01 RX ADMIN — BACLOFEN 20 MG: 10 TABLET ORAL at 07:56

## 2019-01-01 RX ADMIN — Medication 10 ML: at 21:06

## 2019-01-01 RX ADMIN — METOCLOPRAMIDE 10 MG: 5 INJECTION, SOLUTION INTRAMUSCULAR; INTRAVENOUS at 15:16

## 2019-01-01 RX ADMIN — PROMETHAZINE HYDROCHLORIDE 25 MG: 25 TABLET ORAL at 09:22

## 2019-01-01 RX ADMIN — MEROPENEM 1 G: 1 INJECTION, POWDER, FOR SOLUTION INTRAVENOUS at 11:06

## 2019-01-01 RX ADMIN — ENOXAPARIN SODIUM 40 MG: 40 INJECTION SUBCUTANEOUS at 14:46

## 2019-01-01 RX ADMIN — TOPIRAMATE 25 MG: 25 TABLET, FILM COATED ORAL at 20:38

## 2019-01-01 RX ADMIN — BACLOFEN 20 MG: 10 TABLET ORAL at 21:24

## 2019-01-01 RX ADMIN — HYDROMORPHONE HYDROCHLORIDE 1 MG: 1 INJECTION, SOLUTION INTRAMUSCULAR; INTRAVENOUS; SUBCUTANEOUS at 21:50

## 2019-01-01 RX ADMIN — PROMETHAZINE HYDROCHLORIDE 12.5 MG: 25 INJECTION INTRAMUSCULAR; INTRAVENOUS at 00:48

## 2019-01-01 RX ADMIN — FAMOTIDINE 20 MG: 20 TABLET ORAL at 08:01

## 2019-01-01 RX ADMIN — ATROPA BELLADONNA AND OPIUM 30 MG: 16.2; 3 SUPPOSITORY RECTAL at 04:19

## 2019-01-01 RX ADMIN — OXYCODONE HYDROCHLORIDE 15 MG: 15 TABLET ORAL at 19:23

## 2019-01-01 RX ADMIN — SODIUM CHLORIDE: 9 INJECTION, SOLUTION INTRAVENOUS at 15:16

## 2019-01-01 RX ADMIN — ACETAMINOPHEN 500 MG: 500 TABLET, FILM COATED ORAL at 09:49

## 2019-01-01 RX ADMIN — HYDROMORPHONE HYDROCHLORIDE 2 MG: 2 INJECTION INTRAMUSCULAR; INTRAVENOUS; SUBCUTANEOUS at 19:48

## 2019-01-01 RX ADMIN — VANCOMYCIN HYDROCHLORIDE 1250 MG: 10 INJECTION, POWDER, LYOPHILIZED, FOR SOLUTION INTRAVENOUS at 05:23

## 2019-01-01 RX ADMIN — HYDROMORPHONE HYDROCHLORIDE 2 MG: 2 INJECTION INTRAMUSCULAR; INTRAVENOUS; SUBCUTANEOUS at 15:37

## 2019-01-01 RX ADMIN — ALTEPLASE 1 MG: 2.2 INJECTION, POWDER, LYOPHILIZED, FOR SOLUTION INTRAVENOUS at 10:33

## 2019-01-01 RX ADMIN — HYDROMORPHONE HYDROCHLORIDE 1 MG: 1 INJECTION, SOLUTION INTRAMUSCULAR; INTRAVENOUS; SUBCUTANEOUS at 11:32

## 2019-01-01 RX ADMIN — SODIUM CHLORIDE: 9 INJECTION, SOLUTION INTRAVENOUS at 15:21

## 2019-01-01 RX ADMIN — PROMETHAZINE HYDROCHLORIDE 12.5 MG: 25 INJECTION INTRAMUSCULAR; INTRAVENOUS at 22:08

## 2019-01-01 RX ADMIN — TAMSULOSIN HYDROCHLORIDE 0.4 MG: 0.4 CAPSULE ORAL at 09:13

## 2019-01-01 RX ADMIN — HYDROMORPHONE HYDROCHLORIDE 1 MG: 1 INJECTION, SOLUTION INTRAMUSCULAR; INTRAVENOUS; SUBCUTANEOUS at 03:16

## 2019-01-01 RX ADMIN — ACETAMINOPHEN 500 MG: 500 TABLET, FILM COATED ORAL at 04:10

## 2019-01-01 RX ADMIN — PACLITAXEL 270 MG: 6 INJECTION, SOLUTION INTRAVENOUS at 16:44

## 2019-01-01 RX ADMIN — SODIUM CHLORIDE: 9 INJECTION, SOLUTION INTRAVENOUS at 03:31

## 2019-01-01 RX ADMIN — HEPARIN SODIUM IN SODIUM CHLORIDE 30 ML/HR: 200 INJECTION INTRAVENOUS at 09:41

## 2019-01-01 RX ADMIN — HYDROMORPHONE HYDROCHLORIDE 0.5 MG: 1 INJECTION, SOLUTION INTRAMUSCULAR; INTRAVENOUS; SUBCUTANEOUS at 18:22

## 2019-01-01 RX ADMIN — MORPHINE SULFATE 15 MG: 15 TABLET, FILM COATED, EXTENDED RELEASE ORAL at 07:56

## 2019-01-01 RX ADMIN — AMITRIPTYLINE HYDROCHLORIDE 25 MG: 25 TABLET, FILM COATED ORAL at 20:19

## 2019-01-01 RX ADMIN — KETOROLAC TROMETHAMINE 30 MG: 30 INJECTION, SOLUTION INTRAMUSCULAR; INTRAVENOUS at 17:38

## 2019-01-01 RX ADMIN — LORAZEPAM 0.25 MG: 2 INJECTION, SOLUTION INTRAMUSCULAR; INTRAVENOUS at 12:11

## 2019-01-01 RX ADMIN — OXYCODONE AND ACETAMINOPHEN 1 TABLET: 5; 325 TABLET ORAL at 18:20

## 2019-01-01 RX ADMIN — NALOXEGOL OXALATE 25 MG: 25 TABLET, FILM COATED ORAL at 06:49

## 2019-01-01 RX ADMIN — FENTANYL CITRATE 25 MCG: 50 INJECTION, SOLUTION INTRAMUSCULAR; INTRAVENOUS at 15:27

## 2019-01-01 RX ADMIN — Medication 1 DROP: at 09:36

## 2019-01-01 RX ADMIN — PROMETHAZINE HYDROCHLORIDE 12.5 MG: 25 INJECTION INTRAMUSCULAR; INTRAVENOUS at 18:03

## 2019-01-01 RX ADMIN — ONDANSETRON 4 MG: 2 INJECTION INTRAMUSCULAR; INTRAVENOUS at 05:18

## 2019-01-01 RX ADMIN — KETOROLAC TROMETHAMINE 30 MG: 30 INJECTION, SOLUTION INTRAMUSCULAR at 13:27

## 2019-01-01 RX ADMIN — BACLOFEN 10 MG: 10 TABLET ORAL at 09:48

## 2019-01-01 RX ADMIN — KETOROLAC TROMETHAMINE 15 MG: 15 INJECTION, SOLUTION INTRAMUSCULAR; INTRAVENOUS at 09:45

## 2019-01-01 RX ADMIN — BACLOFEN 20 MG: 10 TABLET ORAL at 09:05

## 2019-01-01 RX ADMIN — OXYCODONE HYDROCHLORIDE AND ACETAMINOPHEN 1 TABLET: 7.5; 325 TABLET ORAL at 22:43

## 2019-01-01 RX ADMIN — BACLOFEN 10 MG: 10 TABLET ORAL at 22:20

## 2019-01-01 RX ADMIN — BACLOFEN 20 MG: 10 TABLET ORAL at 08:39

## 2019-01-01 RX ADMIN — SODIUM CHLORIDE: 9 INJECTION, SOLUTION INTRAVENOUS at 18:34

## 2019-01-01 RX ADMIN — SENNOSIDES 17.2 MG: 8.6 TABLET, FILM COATED ORAL at 20:53

## 2019-01-01 RX ADMIN — TAMSULOSIN HYDROCHLORIDE 0.4 MG: 0.4 CAPSULE ORAL at 13:18

## 2019-01-01 RX ADMIN — Medication 10 ML: at 15:33

## 2019-01-01 RX ADMIN — ENOXAPARIN SODIUM 40 MG: 40 INJECTION SUBCUTANEOUS at 08:39

## 2019-01-01 RX ADMIN — CEFEPIME HYDROCHLORIDE 2 G: 2 INJECTION, POWDER, FOR SOLUTION INTRAVENOUS at 09:16

## 2019-01-01 RX ADMIN — KETOROLAC TROMETHAMINE 15 MG: 15 INJECTION, SOLUTION INTRAMUSCULAR; INTRAVENOUS at 13:02

## 2019-01-01 RX ADMIN — BACLOFEN 20 MG: 10 TABLET ORAL at 20:23

## 2019-01-01 RX ADMIN — MORPHINE SULFATE 30 MG: 30 TABLET, FILM COATED, EXTENDED RELEASE ORAL at 09:02

## 2019-01-01 RX ADMIN — Medication 10 ML: at 20:26

## 2019-01-01 RX ADMIN — GABAPENTIN 300 MG: 300 CAPSULE ORAL at 09:48

## 2019-01-01 RX ADMIN — SODIUM CHLORIDE: 9 INJECTION, SOLUTION INTRAVENOUS at 23:07

## 2019-01-01 RX ADMIN — PROPOFOL 100 MG: 10 INJECTION, EMULSION INTRAVENOUS at 08:16

## 2019-01-01 RX ADMIN — HYDROMORPHONE HYDROCHLORIDE 1 MG: 1 INJECTION, SOLUTION INTRAMUSCULAR; INTRAVENOUS; SUBCUTANEOUS at 11:58

## 2019-01-01 RX ADMIN — DIPHENHYDRAMINE HCL 25 MG: 25 TABLET ORAL at 06:22

## 2019-01-01 RX ADMIN — NALOXEGOL OXALATE 25 MG: 25 TABLET, FILM COATED ORAL at 05:34

## 2019-01-01 RX ADMIN — MAGNESIUM OXIDE TAB 400 MG (241.3 MG ELEMENTAL MG) 400 MG: 400 (241.3 MG) TAB at 08:04

## 2019-01-01 RX ADMIN — PROMETHAZINE HYDROCHLORIDE 12.5 MG: 25 INJECTION INTRAMUSCULAR; INTRAVENOUS at 23:23

## 2019-01-01 RX ADMIN — HYDROMORPHONE HYDROCHLORIDE 1 MG: 1 INJECTION, SOLUTION INTRAMUSCULAR; INTRAVENOUS; SUBCUTANEOUS at 14:22

## 2019-01-01 RX ADMIN — BISACODYL 5 MG: 5 TABLET, COATED ORAL at 08:48

## 2019-01-01 RX ADMIN — Medication 1 DROP: at 13:30

## 2019-01-01 RX ADMIN — DULOXETINE HYDROCHLORIDE 60 MG: 60 CAPSULE, DELAYED RELEASE ORAL at 20:09

## 2019-01-01 RX ADMIN — PROMETHAZINE HYDROCHLORIDE 12.5 MG: 25 INJECTION INTRAMUSCULAR; INTRAVENOUS at 04:19

## 2019-01-01 RX ADMIN — PHENAZOPYRIDINE HYDROCHLORIDE 200 MG: 100 TABLET ORAL at 16:33

## 2019-01-01 RX ADMIN — SENNOSIDES 17.2 MG: 8.6 TABLET, FILM COATED ORAL at 23:00

## 2019-01-01 RX ADMIN — OXYCODONE HYDROCHLORIDE 15 MG: 15 TABLET ORAL at 04:46

## 2019-01-01 RX ADMIN — HYDROMORPHONE HYDROCHLORIDE 2 MG: 2 INJECTION INTRAMUSCULAR; INTRAVENOUS; SUBCUTANEOUS at 05:37

## 2019-01-01 RX ADMIN — METOCLOPRAMIDE 10 MG: 5 INJECTION, SOLUTION INTRAMUSCULAR; INTRAVENOUS at 16:40

## 2019-01-01 RX ADMIN — CEFEPIME HYDROCHLORIDE 2 G: 2 INJECTION, POWDER, FOR SOLUTION INTRAVENOUS at 06:22

## 2019-01-01 RX ADMIN — Medication 1 DROP: at 11:46

## 2019-01-01 RX ADMIN — BACLOFEN 20 MG: 10 TABLET ORAL at 20:26

## 2019-01-01 RX ADMIN — Medication 10 ML: at 09:46

## 2019-01-01 RX ADMIN — PROCHLORPERAZINE MALEATE 10 MG: 5 TABLET, FILM COATED ORAL at 01:45

## 2019-01-01 RX ADMIN — KETOROLAC TROMETHAMINE 15 MG: 15 INJECTION, SOLUTION INTRAMUSCULAR; INTRAVENOUS at 08:52

## 2019-01-01 RX ADMIN — BACLOFEN 10 MG: 10 TABLET ORAL at 21:19

## 2019-01-01 RX ADMIN — ENOXAPARIN SODIUM 40 MG: 40 INJECTION SUBCUTANEOUS at 21:12

## 2019-01-01 RX ADMIN — HYDROMORPHONE HYDROCHLORIDE 0.5 MG: 1 INJECTION, SOLUTION INTRAMUSCULAR; INTRAVENOUS; SUBCUTANEOUS at 08:01

## 2019-01-01 RX ADMIN — Medication: at 06:13

## 2019-01-01 RX ADMIN — ENOXAPARIN SODIUM 40 MG: 40 INJECTION SUBCUTANEOUS at 20:23

## 2019-01-01 RX ADMIN — PROMETHAZINE HYDROCHLORIDE 12.5 MG: 25 INJECTION INTRAMUSCULAR; INTRAVENOUS at 11:11

## 2019-01-01 RX ADMIN — Medication 10 ML: at 20:17

## 2019-01-01 RX ADMIN — OXYCODONE HYDROCHLORIDE 15 MG: 15 TABLET ORAL at 22:40

## 2019-01-01 RX ADMIN — MORPHINE SULFATE 4 MG: 4 INJECTION INTRAVENOUS at 14:39

## 2019-01-01 RX ADMIN — Medication 1 DROP: at 20:22

## 2019-01-01 RX ADMIN — GUAIFENESIN 600 MG: 600 TABLET, EXTENDED RELEASE ORAL at 09:49

## 2019-01-01 RX ADMIN — HEPARIN SODIUM 5000 UNITS: 5000 INJECTION INTRAVENOUS; SUBCUTANEOUS at 21:29

## 2019-01-01 RX ADMIN — GABAPENTIN 300 MG: 300 CAPSULE ORAL at 10:18

## 2019-01-01 RX ADMIN — Medication 10 ML: at 17:34

## 2019-01-01 RX ADMIN — TRAZODONE HYDROCHLORIDE 50 MG: 50 TABLET ORAL at 20:38

## 2019-01-01 RX ADMIN — SODIUM CHLORIDE 250 ML: 9 INJECTION, SOLUTION INTRAVENOUS at 00:34

## 2019-01-01 RX ADMIN — LINACLOTIDE 145 MCG: 145 CAPSULE, GELATIN COATED ORAL at 09:45

## 2019-01-01 RX ADMIN — MEROPENEM 1 G: 1 INJECTION, POWDER, FOR SOLUTION INTRAVENOUS at 03:41

## 2019-01-01 RX ADMIN — MAGESIUM CITRATE 296 ML: 1.75 LIQUID ORAL at 11:43

## 2019-01-01 RX ADMIN — SENNOSIDES 17.2 MG: 8.6 TABLET, FILM COATED ORAL at 22:20

## 2019-01-01 RX ADMIN — PROMETHAZINE HYDROCHLORIDE 6.25 MG: 25 INJECTION INTRAMUSCULAR; INTRAVENOUS at 16:21

## 2019-01-01 RX ADMIN — LORAZEPAM 1 MG: 1 TABLET ORAL at 22:41

## 2019-01-01 RX ADMIN — CISPLATIN: 1 INJECTION, SOLUTION INTRAVENOUS at 01:23

## 2019-01-01 RX ADMIN — MAGNESIUM OXIDE TAB 400 MG (241.3 MG ELEMENTAL MG) 400 MG: 400 (241.3 MG) TAB at 11:10

## 2019-01-01 RX ADMIN — IPRATROPIUM BROMIDE AND ALBUTEROL SULFATE 1 AMPULE: .5; 3 SOLUTION RESPIRATORY (INHALATION) at 08:13

## 2019-01-01 RX ADMIN — Medication 10 ML: at 20:39

## 2019-01-01 RX ADMIN — IPRATROPIUM BROMIDE AND ALBUTEROL SULFATE 1 AMPULE: .5; 3 SOLUTION RESPIRATORY (INHALATION) at 15:40

## 2019-01-01 RX ADMIN — GUAIFENESIN 600 MG: 600 TABLET, EXTENDED RELEASE ORAL at 20:36

## 2019-01-01 RX ADMIN — IPRATROPIUM BROMIDE AND ALBUTEROL SULFATE 1 AMPULE: .5; 3 SOLUTION RESPIRATORY (INHALATION) at 13:23

## 2019-01-01 RX ADMIN — IBUPROFEN 800 MG: 800 TABLET, FILM COATED ORAL at 18:47

## 2019-01-01 RX ADMIN — PROMETHAZINE HYDROCHLORIDE 6.25 MG: 25 INJECTION INTRAMUSCULAR; INTRAVENOUS at 09:38

## 2019-01-01 RX ADMIN — SENNOSIDES AND DOCUSATE SODIUM 2 TABLET: 8.6; 5 TABLET ORAL at 09:33

## 2019-01-01 RX ADMIN — ZOLPIDEM TARTRATE 5 MG: 5 TABLET ORAL at 21:24

## 2019-01-01 RX ADMIN — PROMETHAZINE HYDROCHLORIDE 25 MG: 25 TABLET ORAL at 06:37

## 2019-01-01 RX ADMIN — HYDROMORPHONE HYDROCHLORIDE 2 MG: 2 INJECTION INTRAMUSCULAR; INTRAVENOUS; SUBCUTANEOUS at 03:28

## 2019-01-01 RX ADMIN — TAMSULOSIN HYDROCHLORIDE 0.4 MG: 0.4 CAPSULE ORAL at 08:22

## 2019-01-01 RX ADMIN — SODIUM CHLORIDE: 9 INJECTION, SOLUTION INTRAVENOUS at 16:49

## 2019-01-01 RX ADMIN — SENNOSIDES 17.2 MG: 8.6 TABLET, FILM COATED ORAL at 22:30

## 2019-01-01 RX ADMIN — BACLOFEN 10 MG: 10 TABLET ORAL at 19:56

## 2019-01-01 RX ADMIN — SODIUM CHLORIDE: 9 INJECTION, SOLUTION INTRAVENOUS at 12:15

## 2019-01-01 RX ADMIN — KETOROLAC TROMETHAMINE 15 MG: 15 INJECTION, SOLUTION INTRAMUSCULAR; INTRAVENOUS at 09:33

## 2019-01-01 RX ADMIN — PROMETHAZINE HYDROCHLORIDE 25 MG: 25 TABLET ORAL at 05:40

## 2019-01-01 RX ADMIN — HYDROMORPHONE HYDROCHLORIDE 1 MG: 1 INJECTION, SOLUTION INTRAMUSCULAR; INTRAVENOUS; SUBCUTANEOUS at 07:42

## 2019-01-01 RX ADMIN — NALOXEGOL OXALATE 25 MG: 25 TABLET, FILM COATED ORAL at 06:52

## 2019-01-01 RX ADMIN — HEPARIN SODIUM 5000 UNITS: 5000 INJECTION INTRAVENOUS; SUBCUTANEOUS at 05:44

## 2019-01-01 RX ADMIN — ENOXAPARIN SODIUM 40 MG: 40 INJECTION SUBCUTANEOUS at 20:01

## 2019-01-01 RX ADMIN — BACLOFEN 20 MG: 10 TABLET ORAL at 20:37

## 2019-01-01 RX ADMIN — ZOLPIDEM TARTRATE 10 MG: 10 TABLET, FILM COATED ORAL at 20:06

## 2019-01-01 RX ADMIN — ACETAMINOPHEN 650 MG: 325 TABLET, FILM COATED ORAL at 01:45

## 2019-01-01 RX ADMIN — TRAZODONE HYDROCHLORIDE 50 MG: 50 TABLET ORAL at 21:51

## 2019-01-01 RX ADMIN — DULOXETINE HYDROCHLORIDE 60 MG: 60 CAPSULE, DELAYED RELEASE ORAL at 08:22

## 2019-01-01 RX ADMIN — VANCOMYCIN HYDROCHLORIDE 1250 MG: 10 INJECTION, POWDER, LYOPHILIZED, FOR SOLUTION INTRAVENOUS at 03:53

## 2019-01-01 RX ADMIN — BACLOFEN 10 MG: 10 TABLET ORAL at 10:05

## 2019-01-01 RX ADMIN — DIPHENHYDRAMINE HYDROCHLORIDE 50 MG: 50 INJECTION, SOLUTION INTRAMUSCULAR; INTRAVENOUS at 15:33

## 2019-01-01 RX ADMIN — Medication 1 DROP: at 22:59

## 2019-01-01 RX ADMIN — SODIUM CHLORIDE 250 ML: 9 INJECTION, SOLUTION INTRAVENOUS at 23:02

## 2019-01-01 RX ADMIN — PROMETHAZINE HYDROCHLORIDE 25 MG: 25 TABLET ORAL at 18:46

## 2019-01-01 RX ADMIN — GABAPENTIN 300 MG: 300 CAPSULE ORAL at 13:44

## 2019-01-01 RX ADMIN — DULOXETINE HYDROCHLORIDE 20 MG: 20 CAPSULE, DELAYED RELEASE ORAL at 10:38

## 2019-01-01 RX ADMIN — Medication 10 ML: at 06:41

## 2019-01-01 RX ADMIN — VANCOMYCIN HYDROCHLORIDE 1250 MG: 10 INJECTION, POWDER, LYOPHILIZED, FOR SOLUTION INTRAVENOUS at 08:54

## 2019-01-01 RX ADMIN — MAGNESIUM OXIDE TAB 400 MG (241.3 MG ELEMENTAL MG) 400 MG: 400 (241.3 MG) TAB at 09:48

## 2019-01-01 RX ADMIN — IOPAMIDOL 75 ML: 755 INJECTION, SOLUTION INTRAVENOUS at 14:05

## 2019-01-01 RX ADMIN — OXYCODONE HYDROCHLORIDE 5 MG: 5 TABLET ORAL at 11:37

## 2019-01-01 RX ADMIN — PROMETHAZINE HYDROCHLORIDE 25 MG: 25 TABLET ORAL at 20:24

## 2019-01-01 RX ADMIN — PROMETHAZINE HYDROCHLORIDE 12.5 MG: 25 INJECTION INTRAMUSCULAR; INTRAVENOUS at 19:24

## 2019-01-01 RX ADMIN — Medication 1 G: at 13:54

## 2019-01-01 RX ADMIN — BACLOFEN 20 MG: 10 TABLET ORAL at 21:01

## 2019-01-01 RX ADMIN — SODIUM CHLORIDE: 9 INJECTION, SOLUTION INTRAVENOUS at 21:25

## 2019-01-01 RX ADMIN — Medication 20 MG: at 11:47

## 2019-01-01 RX ADMIN — PROMETHAZINE HYDROCHLORIDE 12.5 MG: 25 INJECTION INTRAMUSCULAR; INTRAVENOUS at 22:31

## 2019-01-01 RX ADMIN — ZOLPIDEM TARTRATE 10 MG: 10 TABLET, FILM COATED ORAL at 20:24

## 2019-01-01 RX ADMIN — POTASSIUM CHLORIDE 10 MEQ: 7.46 INJECTION, SOLUTION INTRAVENOUS at 08:02

## 2019-01-01 RX ADMIN — MIDAZOLAM HYDROCHLORIDE 1 MG: 1 INJECTION INTRAMUSCULAR; INTRAVENOUS at 15:51

## 2019-01-01 RX ADMIN — HYDROMORPHONE HYDROCHLORIDE 0.5 MG: 1 INJECTION, SOLUTION INTRAMUSCULAR; INTRAVENOUS; SUBCUTANEOUS at 03:07

## 2019-01-01 RX ADMIN — PHENAZOPYRIDINE HYDROCHLORIDE 200 MG: 100 TABLET ORAL at 20:37

## 2019-01-01 RX ADMIN — HYDROMORPHONE HYDROCHLORIDE 2 MG: 2 INJECTION INTRAMUSCULAR; INTRAVENOUS; SUBCUTANEOUS at 21:18

## 2019-01-01 RX ADMIN — TOPIRAMATE 25 MG: 25 TABLET, FILM COATED ORAL at 21:14

## 2019-01-01 RX ADMIN — PROMETHAZINE HYDROCHLORIDE 12.5 MG: 25 INJECTION INTRAMUSCULAR; INTRAVENOUS at 04:23

## 2019-01-01 RX ADMIN — PHENAZOPYRIDINE HYDROCHLORIDE 200 MG: 100 TABLET ORAL at 16:48

## 2019-01-01 RX ADMIN — AMITRIPTYLINE HYDROCHLORIDE 25 MG: 25 TABLET, FILM COATED ORAL at 21:46

## 2019-01-01 RX ADMIN — MEROPENEM 1 G: 1 INJECTION, POWDER, FOR SOLUTION INTRAVENOUS at 18:22

## 2019-01-01 RX ADMIN — TAZOBACTAM SODIUM AND PIPERACILLIN SODIUM 3.38 G: 375; 3 INJECTION, SOLUTION INTRAVENOUS at 19:21

## 2019-01-01 RX ADMIN — TOPIRAMATE 25 MG: 25 TABLET, FILM COATED ORAL at 10:06

## 2019-01-01 RX ADMIN — TOPIRAMATE 25 MG: 25 TABLET, FILM COATED ORAL at 21:30

## 2019-01-01 RX ADMIN — PROPOFOL 50 MG: 10 INJECTION, EMULSION INTRAVENOUS at 08:19

## 2019-01-01 RX ADMIN — GUAIFENESIN 600 MG: 600 TABLET, EXTENDED RELEASE ORAL at 09:03

## 2019-01-01 RX ADMIN — HYDROMORPHONE HYDROCHLORIDE 2 MG: 2 INJECTION INTRAMUSCULAR; INTRAVENOUS; SUBCUTANEOUS at 12:04

## 2019-01-01 RX ADMIN — GABAPENTIN 300 MG: 300 CAPSULE ORAL at 09:05

## 2019-01-01 RX ADMIN — Medication 10 ML: at 12:37

## 2019-01-01 RX ADMIN — OXYCODONE HYDROCHLORIDE AND ACETAMINOPHEN 1 TABLET: 7.5; 325 TABLET ORAL at 04:33

## 2019-01-01 RX ADMIN — DIPHENHYDRAMINE HCL 25 MG: 25 TABLET ORAL at 11:49

## 2019-01-01 RX ADMIN — TAMSULOSIN HYDROCHLORIDE 0.4 MG: 0.4 CAPSULE ORAL at 10:05

## 2019-01-01 RX ADMIN — IBUPROFEN 800 MG: 800 TABLET, FILM COATED ORAL at 18:08

## 2019-01-01 RX ADMIN — ACETAMINOPHEN 500 MG: 500 TABLET, FILM COATED ORAL at 09:02

## 2019-01-01 RX ADMIN — DIPHENHYDRAMINE HCL 25 MG: 25 TABLET ORAL at 19:20

## 2019-01-01 RX ADMIN — CYCLOBENZAPRINE HYDROCHLORIDE 10 MG: 10 TABLET, FILM COATED ORAL at 14:33

## 2019-01-01 RX ADMIN — ZOLPIDEM TARTRATE 5 MG: 5 TABLET ORAL at 20:30

## 2019-01-01 RX ADMIN — Medication 10 ML: at 10:35

## 2019-01-01 RX ADMIN — Medication 10 ML: at 20:36

## 2019-01-01 RX ADMIN — HYDROMORPHONE HYDROCHLORIDE 1 MG: 1 INJECTION, SOLUTION INTRAMUSCULAR; INTRAVENOUS; SUBCUTANEOUS at 14:53

## 2019-01-01 RX ADMIN — CYCLOBENZAPRINE HYDROCHLORIDE 10 MG: 10 TABLET, FILM COATED ORAL at 14:56

## 2019-01-01 RX ADMIN — ENOXAPARIN SODIUM 40 MG: 40 INJECTION SUBCUTANEOUS at 22:30

## 2019-01-01 RX ADMIN — SENNOSIDES 17.2 MG: 8.6 TABLET, FILM COATED ORAL at 08:48

## 2019-01-01 RX ADMIN — TAMSULOSIN HYDROCHLORIDE 0.4 MG: 0.4 CAPSULE ORAL at 10:38

## 2019-01-01 RX ADMIN — IOHEXOL 50 ML: 240 INJECTION, SOLUTION INTRATHECAL; INTRAVASCULAR; INTRAVENOUS; ORAL at 10:39

## 2019-01-01 RX ADMIN — DEXAMETHASONE SODIUM PHOSPHATE: 4 INJECTION, SOLUTION INTRAMUSCULAR; INTRAVENOUS at 23:59

## 2019-01-01 RX ADMIN — PROPOFOL 80 MG: 10 INJECTION, EMULSION INTRAVENOUS at 14:00

## 2019-01-01 RX ADMIN — PROMETHAZINE HYDROCHLORIDE 12.5 MG: 25 INJECTION INTRAMUSCULAR; INTRAVENOUS at 15:45

## 2019-01-01 RX ADMIN — FUROSEMIDE 40 MG: 10 INJECTION, SOLUTION INTRAMUSCULAR; INTRAVENOUS at 10:22

## 2019-01-01 RX ADMIN — VANCOMYCIN HYDROCHLORIDE 1500 MG: 10 INJECTION, POWDER, LYOPHILIZED, FOR SOLUTION INTRAVENOUS at 17:26

## 2019-01-01 RX ADMIN — IPRATROPIUM BROMIDE AND ALBUTEROL SULFATE 1 AMPULE: .5; 3 SOLUTION RESPIRATORY (INHALATION) at 09:12

## 2019-01-01 RX ADMIN — GABAPENTIN 300 MG: 300 CAPSULE ORAL at 22:48

## 2019-01-01 RX ADMIN — OXYCODONE HYDROCHLORIDE AND ACETAMINOPHEN 1 TABLET: 10; 325 TABLET ORAL at 09:43

## 2019-01-01 RX ADMIN — TOPIRAMATE 25 MG: 25 TABLET, FILM COATED ORAL at 09:30

## 2019-01-01 RX ADMIN — LORAZEPAM 1 MG: 1 TABLET ORAL at 09:08

## 2019-01-01 RX ADMIN — HYDROMORPHONE HYDROCHLORIDE 1 MG: 1 INJECTION, SOLUTION INTRAMUSCULAR; INTRAVENOUS; SUBCUTANEOUS at 13:44

## 2019-01-01 RX ADMIN — LORAZEPAM 1 MG: 1 TABLET ORAL at 15:26

## 2019-01-01 RX ADMIN — VANCOMYCIN HYDROCHLORIDE 1250 MG: 10 INJECTION, POWDER, LYOPHILIZED, FOR SOLUTION INTRAVENOUS at 21:24

## 2019-01-01 RX ADMIN — PROMETHAZINE HYDROCHLORIDE 12.5 MG: 25 INJECTION INTRAMUSCULAR; INTRAVENOUS at 22:33

## 2019-01-01 RX ADMIN — PROMETHAZINE HYDROCHLORIDE 12.5 MG: 25 INJECTION INTRAMUSCULAR; INTRAVENOUS at 20:15

## 2019-01-01 RX ADMIN — Medication 10 ML: at 20:10

## 2019-01-01 RX ADMIN — IPRATROPIUM BROMIDE AND ALBUTEROL SULFATE 1 AMPULE: .5; 3 SOLUTION RESPIRATORY (INHALATION) at 08:04

## 2019-01-01 RX ADMIN — Medication 10 ML: at 21:40

## 2019-01-01 RX ADMIN — BACLOFEN 20 MG: 10 TABLET ORAL at 09:49

## 2019-01-01 RX ADMIN — KETOROLAC TROMETHAMINE 15 MG: 15 INJECTION, SOLUTION INTRAMUSCULAR; INTRAVENOUS at 03:42

## 2019-01-01 RX ADMIN — SODIUM CHLORIDE 250 ML: 9 INJECTION, SOLUTION INTRAVENOUS at 09:35

## 2019-01-01 RX ADMIN — Medication 10 ML: at 22:30

## 2019-01-01 RX ADMIN — HYDROMORPHONE HYDROCHLORIDE 1 MG: 1 INJECTION, SOLUTION INTRAMUSCULAR; INTRAVENOUS; SUBCUTANEOUS at 04:53

## 2019-01-01 RX ADMIN — FERROUS SULFATE TAB 325 MG (65 MG ELEMENTAL FE) 325 MG: 325 (65 FE) TAB at 16:48

## 2019-01-01 RX ADMIN — MORPHINE SULFATE 4 MG: 4 INJECTION INTRAVENOUS at 02:34

## 2019-01-01 RX ADMIN — KETOROLAC TROMETHAMINE 15 MG: 15 INJECTION, SOLUTION INTRAMUSCULAR; INTRAVENOUS at 06:23

## 2019-01-01 RX ADMIN — Medication 1 G: at 17:52

## 2019-01-01 RX ADMIN — HYDROMORPHONE HYDROCHLORIDE 2 MG: 2 INJECTION INTRAMUSCULAR; INTRAVENOUS; SUBCUTANEOUS at 23:50

## 2019-01-01 RX ADMIN — Medication 10 ML: at 12:40

## 2019-01-01 RX ADMIN — LORAZEPAM 1 MG: 1 TABLET ORAL at 12:24

## 2019-01-01 RX ADMIN — ZOLPIDEM TARTRATE 5 MG: 5 TABLET ORAL at 20:53

## 2019-01-01 RX ADMIN — MAGNESIUM OXIDE TAB 400 MG (241.3 MG ELEMENTAL MG) 400 MG: 400 (241.3 MG) TAB at 09:24

## 2019-01-01 RX ADMIN — FENTANYL CITRATE 25 MCG: 50 INJECTION, SOLUTION INTRAMUSCULAR; INTRAVENOUS at 15:23

## 2019-01-01 RX ADMIN — PHENAZOPYRIDINE HYDROCHLORIDE 200 MG: 100 TABLET ORAL at 12:45

## 2019-01-01 RX ADMIN — GABAPENTIN 300 MG: 300 CAPSULE ORAL at 09:30

## 2019-01-01 RX ADMIN — HYDROMORPHONE HYDROCHLORIDE 1 MG: 1 INJECTION, SOLUTION INTRAMUSCULAR; INTRAVENOUS; SUBCUTANEOUS at 18:28

## 2019-01-01 RX ADMIN — ENOXAPARIN SODIUM 40 MG: 40 INJECTION SUBCUTANEOUS at 20:19

## 2019-01-01 RX ADMIN — TOPIRAMATE 25 MG: 25 TABLET, FILM COATED ORAL at 08:04

## 2019-01-01 RX ADMIN — FAMOTIDINE 40 MG: 10 INJECTION, SOLUTION INTRAVENOUS at 15:32

## 2019-01-01 RX ADMIN — PHENAZOPYRIDINE HYDROCHLORIDE 200 MG: 100 TABLET ORAL at 13:49

## 2019-01-01 RX ADMIN — Medication 1 DROP: at 21:43

## 2019-01-01 RX ADMIN — ONDANSETRON 4 MG: 2 INJECTION INTRAMUSCULAR; INTRAVENOUS at 00:44

## 2019-01-01 RX ADMIN — NORETHINDRONE ACETATE 5 MG: 5 TABLET ORAL at 11:17

## 2019-01-01 RX ADMIN — SODIUM CHLORIDE: 9 INJECTION, SOLUTION INTRAVENOUS at 16:36

## 2019-01-01 RX ADMIN — PROMETHAZINE HYDROCHLORIDE 12.5 MG: 25 INJECTION INTRAMUSCULAR; INTRAVENOUS at 18:49

## 2019-01-01 RX ADMIN — SENNOSIDES 17.2 MG: 8.6 TABLET, FILM COATED ORAL at 08:33

## 2019-01-01 RX ADMIN — PHENAZOPYRIDINE HYDROCHLORIDE 200 MG: 100 TABLET ORAL at 12:23

## 2019-01-01 RX ADMIN — Medication 10 ML: at 12:35

## 2019-01-01 RX ADMIN — CEFEPIME HYDROCHLORIDE 2 G: 2 INJECTION, POWDER, FOR SOLUTION INTRAVENOUS at 15:55

## 2019-01-01 RX ADMIN — METHYLNALTREXONE BROMIDE 12 MG: 12 INJECTION, SOLUTION SUBCUTANEOUS at 11:31

## 2019-01-01 RX ADMIN — DIPHENHYDRAMINE HCL 25 MG: 25 TABLET ORAL at 08:47

## 2019-01-01 RX ADMIN — PHENAZOPYRIDINE HYDROCHLORIDE 100 MG: 100 TABLET ORAL at 00:09

## 2019-01-01 RX ADMIN — HYDROMORPHONE HYDROCHLORIDE 2 MG: 2 INJECTION INTRAMUSCULAR; INTRAVENOUS; SUBCUTANEOUS at 01:13

## 2019-01-01 RX ADMIN — GUAIFENESIN 600 MG: 600 TABLET, EXTENDED RELEASE ORAL at 21:20

## 2019-01-01 RX ADMIN — SODIUM CHLORIDE: 9 INJECTION, SOLUTION INTRAVENOUS at 06:01

## 2019-01-01 RX ADMIN — IOPAMIDOL 75 ML: 755 INJECTION, SOLUTION INTRAVENOUS at 13:39

## 2019-01-01 RX ADMIN — Medication 10 ML: at 20:00

## 2019-01-01 RX ADMIN — Medication 1 G: at 13:50

## 2019-01-01 RX ADMIN — SENNOSIDES 17.2 MG: 8.6 TABLET, FILM COATED ORAL at 08:22

## 2019-01-01 RX ADMIN — NYSTATIN 5 ML: 100000 SUSPENSION ORAL at 00:47

## 2019-01-01 RX ADMIN — TAMSULOSIN HYDROCHLORIDE 0.4 MG: 0.4 CAPSULE ORAL at 09:15

## 2019-01-01 RX ADMIN — ANTACID TABLETS 1000 MG: 500 TABLET, CHEWABLE ORAL at 00:50

## 2019-01-01 RX ADMIN — PHENAZOPYRIDINE HYDROCHLORIDE 200 MG: 100 TABLET ORAL at 17:24

## 2019-01-01 RX ADMIN — ONDANSETRON 4 MG: 2 INJECTION INTRAMUSCULAR; INTRAVENOUS at 21:18

## 2019-01-01 RX ADMIN — Medication: at 07:40

## 2019-01-01 RX ADMIN — Medication 1 DROP: at 13:52

## 2019-01-01 RX ADMIN — KETOROLAC TROMETHAMINE 15 MG: 15 INJECTION, SOLUTION INTRAMUSCULAR; INTRAVENOUS at 18:47

## 2019-01-01 RX ADMIN — OXYCODONE HYDROCHLORIDE 5 MG: 5 TABLET ORAL at 19:39

## 2019-01-01 RX ADMIN — LORAZEPAM 1 MG: 1 TABLET ORAL at 21:14

## 2019-01-01 RX ADMIN — SENNOSIDES 17.2 MG: 8.6 TABLET, FILM COATED ORAL at 09:53

## 2019-01-01 RX ADMIN — HYDROMORPHONE HYDROCHLORIDE 0.5 MG: 1 INJECTION, SOLUTION INTRAMUSCULAR; INTRAVENOUS; SUBCUTANEOUS at 08:26

## 2019-01-01 RX ADMIN — CLONIDINE HYDROCHLORIDE 0.2 MG: 0.1 TABLET ORAL at 21:45

## 2019-01-01 RX ADMIN — Medication 10 ML: at 09:52

## 2019-01-01 RX ADMIN — GABAPENTIN 300 MG: 300 CAPSULE ORAL at 07:56

## 2019-01-01 RX ADMIN — MORPHINE SULFATE 2 MG: 2 INJECTION, SOLUTION INTRAMUSCULAR; INTRAVENOUS at 00:56

## 2019-01-01 RX ADMIN — HYDROMORPHONE HYDROCHLORIDE 1 MG: 1 INJECTION, SOLUTION INTRAMUSCULAR; INTRAVENOUS; SUBCUTANEOUS at 11:51

## 2019-01-01 RX ADMIN — SENNOSIDES AND DOCUSATE SODIUM 2 TABLET: 8.6; 5 TABLET ORAL at 07:56

## 2019-01-01 RX ADMIN — DIPHENHYDRAMINE HYDROCHLORIDE 25 MG: 50 INJECTION INTRAMUSCULAR; INTRAVENOUS at 09:05

## 2019-01-01 RX ADMIN — IPRATROPIUM BROMIDE AND ALBUTEROL SULFATE 1 AMPULE: .5; 3 SOLUTION RESPIRATORY (INHALATION) at 21:38

## 2019-01-01 RX ADMIN — OXYCODONE HYDROCHLORIDE 10 MG: 5 TABLET ORAL at 04:50

## 2019-01-01 RX ADMIN — VANCOMYCIN HYDROCHLORIDE 1250 MG: 10 INJECTION, POWDER, LYOPHILIZED, FOR SOLUTION INTRAVENOUS at 21:57

## 2019-01-01 RX ADMIN — SENNOSIDES 17.2 MG: 8.6 TABLET, FILM COATED ORAL at 21:51

## 2019-01-01 RX ADMIN — ACETAMINOPHEN 500 MG: 500 TABLET, FILM COATED ORAL at 13:43

## 2019-01-01 RX ADMIN — GABAPENTIN 300 MG: 300 CAPSULE ORAL at 13:18

## 2019-01-01 RX ADMIN — Medication: at 21:06

## 2019-01-01 RX ADMIN — OXYCODONE HYDROCHLORIDE 15 MG: 15 TABLET ORAL at 17:21

## 2019-01-01 RX ADMIN — BACLOFEN 10 MG: 10 TABLET ORAL at 10:39

## 2019-01-01 RX ADMIN — PROMETHAZINE HYDROCHLORIDE 12.5 MG: 25 INJECTION INTRAMUSCULAR; INTRAVENOUS at 05:39

## 2019-01-01 RX ADMIN — FENTANYL CITRATE 25 MCG: 50 INJECTION INTRAMUSCULAR; INTRAVENOUS at 15:37

## 2019-01-01 RX ADMIN — PROMETHAZINE HYDROCHLORIDE 12.5 MG: 25 INJECTION INTRAMUSCULAR; INTRAVENOUS at 10:28

## 2019-01-01 RX ADMIN — Medication 10 MG: at 17:29

## 2019-01-01 RX ADMIN — ONDANSETRON 4 MG: 2 INJECTION INTRAMUSCULAR; INTRAVENOUS at 15:48

## 2019-01-01 RX ADMIN — HYDROMORPHONE HYDROCHLORIDE 0.5 MG: 2 INJECTION, SOLUTION INTRAMUSCULAR; INTRAVENOUS; SUBCUTANEOUS at 14:33

## 2019-01-01 RX ADMIN — MAGNESIUM SULFATE HEPTAHYDRATE 2 G: 40 INJECTION, SOLUTION INTRAVENOUS at 16:08

## 2019-01-01 RX ADMIN — MAGNESIUM OXIDE TAB 400 MG (241.3 MG ELEMENTAL MG) 400 MG: 400 (241.3 MG) TAB at 08:46

## 2019-01-01 RX ADMIN — DULOXETINE HYDROCHLORIDE 60 MG: 60 CAPSULE, DELAYED RELEASE ORAL at 09:02

## 2019-01-01 RX ADMIN — Medication 10 ML: at 20:32

## 2019-01-01 RX ADMIN — DIPHENHYDRAMINE HCL 25 MG: 25 TABLET ORAL at 13:04

## 2019-01-01 RX ADMIN — MORPHINE SULFATE 30 MG: 30 TABLET, FILM COATED, EXTENDED RELEASE ORAL at 08:52

## 2019-01-01 RX ADMIN — SENNOSIDES 8.6 MG: 8.6 TABLET, FILM COATED ORAL at 08:39

## 2019-01-01 RX ADMIN — Medication 10 ML: at 05:18

## 2019-01-01 RX ADMIN — KETOROLAC TROMETHAMINE 30 MG: 30 INJECTION, SOLUTION INTRAMUSCULAR at 13:40

## 2019-01-01 RX ADMIN — IOPAMIDOL 75 ML: 755 INJECTION, SOLUTION INTRAVENOUS at 00:47

## 2019-01-01 RX ADMIN — HYDROMORPHONE HYDROCHLORIDE 0.5 MG: 2 INJECTION, SOLUTION INTRAMUSCULAR; INTRAVENOUS; SUBCUTANEOUS at 14:45

## 2019-01-01 RX ADMIN — SODIUM CHLORIDE: 9 INJECTION, SOLUTION INTRAVENOUS at 11:17

## 2019-01-01 RX ADMIN — OXYCODONE HYDROCHLORIDE 15 MG: 15 TABLET ORAL at 13:19

## 2019-01-01 RX ADMIN — PROMETHAZINE HYDROCHLORIDE 25 MG: 25 TABLET ORAL at 20:40

## 2019-01-01 RX ADMIN — BACLOFEN 20 MG: 10 TABLET ORAL at 10:18

## 2019-01-01 RX ADMIN — Medication 10 ML: at 11:47

## 2019-01-01 RX ADMIN — OXYCODONE HYDROCHLORIDE 10 MG: 5 TABLET ORAL at 06:43

## 2019-01-01 RX ADMIN — PHENYLEPHRINE HYDROCHLORIDE 100 MCG: 10 INJECTION INTRAVENOUS at 15:38

## 2019-01-01 RX ADMIN — HYDROMORPHONE HYDROCHLORIDE 2 MG: 2 INJECTION INTRAMUSCULAR; INTRAVENOUS; SUBCUTANEOUS at 05:13

## 2019-01-01 RX ADMIN — SENNOSIDES 17.2 MG: 8.6 TABLET, FILM COATED ORAL at 08:05

## 2019-01-01 RX ADMIN — FUROSEMIDE 40 MG: 10 INJECTION, SOLUTION INTRAMUSCULAR; INTRAVENOUS at 10:54

## 2019-01-01 RX ADMIN — ACETAMINOPHEN 500 MG: 500 TABLET, FILM COATED ORAL at 00:42

## 2019-01-01 RX ADMIN — TOPIRAMATE 25 MG: 25 TABLET, FILM COATED ORAL at 08:46

## 2019-01-01 RX ADMIN — HYDROMORPHONE HYDROCHLORIDE 1 MG: 1 INJECTION, SOLUTION INTRAMUSCULAR; INTRAVENOUS; SUBCUTANEOUS at 19:02

## 2019-01-01 RX ADMIN — BACLOFEN 20 MG: 10 TABLET ORAL at 08:36

## 2019-01-01 RX ADMIN — DEXAMETHASONE SODIUM PHOSPHATE 4 MG: 4 INJECTION, SOLUTION INTRAMUSCULAR; INTRAVENOUS at 08:14

## 2019-01-01 RX ADMIN — ONDANSETRON 8 MG: 2 INJECTION INTRAMUSCULAR; INTRAVENOUS at 17:09

## 2019-01-01 RX ADMIN — MORPHINE SULFATE 2 MG: 2 INJECTION, SOLUTION INTRAMUSCULAR; INTRAVENOUS at 20:11

## 2019-01-01 RX ADMIN — HYDROMORPHONE HYDROCHLORIDE 1 MG: 1 INJECTION, SOLUTION INTRAMUSCULAR; INTRAVENOUS; SUBCUTANEOUS at 01:16

## 2019-01-01 RX ADMIN — MAGNESIUM SULFATE HEPTAHYDRATE 1 G: 1 INJECTION, SOLUTION INTRAVENOUS at 20:29

## 2019-01-01 RX ADMIN — PROMETHAZINE HYDROCHLORIDE 6.25 MG: 25 INJECTION INTRAMUSCULAR; INTRAVENOUS at 10:11

## 2019-01-01 RX ADMIN — Medication 10 ML: at 08:48

## 2019-01-01 RX ADMIN — HYDROMORPHONE HYDROCHLORIDE 2 MG: 2 INJECTION INTRAMUSCULAR; INTRAVENOUS; SUBCUTANEOUS at 19:39

## 2019-01-01 RX ADMIN — Medication 1 DROP: at 09:12

## 2019-01-01 RX ADMIN — ONDANSETRON 4 MG: 2 INJECTION INTRAMUSCULAR; INTRAVENOUS at 08:41

## 2019-01-01 RX ADMIN — OXYCODONE HYDROCHLORIDE 15 MG: 15 TABLET ORAL at 03:19

## 2019-01-01 RX ADMIN — HYDROMORPHONE HYDROCHLORIDE 2 MG: 2 INJECTION INTRAMUSCULAR; INTRAVENOUS; SUBCUTANEOUS at 12:36

## 2019-01-01 RX ADMIN — MAGNESIUM OXIDE TAB 400 MG (241.3 MG ELEMENTAL MG) 400 MG: 400 (241.3 MG) TAB at 09:53

## 2019-01-01 RX ADMIN — SENNOSIDES 17.2 MG: 8.6 TABLET, FILM COATED ORAL at 09:15

## 2019-01-01 RX ADMIN — HYDROMORPHONE HYDROCHLORIDE 1 MG: 1 INJECTION, SOLUTION INTRAMUSCULAR; INTRAVENOUS; SUBCUTANEOUS at 02:58

## 2019-01-01 RX ADMIN — GABAPENTIN 300 MG: 300 CAPSULE ORAL at 13:43

## 2019-01-01 RX ADMIN — GUAIFENESIN 600 MG: 600 TABLET, EXTENDED RELEASE ORAL at 21:01

## 2019-01-01 RX ADMIN — LORAZEPAM 0.5 MG: 2 INJECTION INTRAMUSCULAR; INTRAVENOUS at 17:38

## 2019-01-01 RX ADMIN — GABAPENTIN 300 MG: 300 CAPSULE ORAL at 12:32

## 2019-01-01 RX ADMIN — GABAPENTIN 300 MG: 300 CAPSULE ORAL at 13:30

## 2019-01-01 RX ADMIN — SODIUM CHLORIDE: 9 INJECTION, SOLUTION INTRAVENOUS at 11:43

## 2019-01-01 RX ADMIN — DULOXETINE HYDROCHLORIDE 60 MG: 30 CAPSULE, DELAYED RELEASE ORAL at 09:33

## 2019-01-01 RX ADMIN — Medication 10 ML: at 21:12

## 2019-01-01 RX ADMIN — MORPHINE SULFATE 30 MG: 30 TABLET, FILM COATED, EXTENDED RELEASE ORAL at 10:17

## 2019-01-01 RX ADMIN — FAMOTIDINE 20 MG: 20 TABLET ORAL at 21:01

## 2019-01-01 RX ADMIN — SENNOSIDES 17.2 MG: 8.6 TABLET, FILM COATED ORAL at 07:45

## 2019-01-01 RX ADMIN — Medication 10 ML: at 12:10

## 2019-01-01 RX ADMIN — HYDROMORPHONE HYDROCHLORIDE 1 MG: 1 INJECTION, SOLUTION INTRAMUSCULAR; INTRAVENOUS; SUBCUTANEOUS at 12:36

## 2019-01-01 RX ADMIN — HYDRALAZINE HYDROCHLORIDE 10 MG: 20 INJECTION INTRAMUSCULAR; INTRAVENOUS at 19:02

## 2019-01-01 RX ADMIN — IPRATROPIUM BROMIDE AND ALBUTEROL SULFATE 1 AMPULE: .5; 3 SOLUTION RESPIRATORY (INHALATION) at 09:01

## 2019-01-01 RX ADMIN — KETOROLAC TROMETHAMINE 15 MG: 15 INJECTION, SOLUTION INTRAMUSCULAR; INTRAVENOUS at 15:22

## 2019-01-01 RX ADMIN — BACLOFEN 20 MG: 10 TABLET ORAL at 20:00

## 2019-01-01 RX ADMIN — IPRATROPIUM BROMIDE AND ALBUTEROL SULFATE 1 AMPULE: .5; 3 SOLUTION RESPIRATORY (INHALATION) at 16:13

## 2019-01-01 RX ADMIN — Medication 1 DROP: at 13:08

## 2019-01-01 RX ADMIN — MAGNESIUM OXIDE TAB 400 MG (241.3 MG ELEMENTAL MG) 400 MG: 400 (241.3 MG) TAB at 07:45

## 2019-01-01 RX ADMIN — ONDANSETRON 4 MG: 2 INJECTION INTRAMUSCULAR; INTRAVENOUS at 10:05

## 2019-01-01 RX ADMIN — CEFEPIME HYDROCHLORIDE 1 G: 1 INJECTION, POWDER, FOR SOLUTION INTRAMUSCULAR; INTRAVENOUS at 04:34

## 2019-01-01 RX ADMIN — BACLOFEN 10 MG: 10 TABLET ORAL at 21:20

## 2019-01-01 RX ADMIN — ONDANSETRON 8 MG: 2 INJECTION INTRAMUSCULAR; INTRAVENOUS at 04:33

## 2019-01-01 RX ADMIN — SENNOSIDES 17.2 MG: 8.6 TABLET, FILM COATED ORAL at 09:21

## 2019-01-01 RX ADMIN — BACLOFEN 20 MG: 10 TABLET ORAL at 21:06

## 2019-01-01 RX ADMIN — MORPHINE SULFATE 30 MG: 30 TABLET, FILM COATED, EXTENDED RELEASE ORAL at 20:31

## 2019-01-01 RX ADMIN — NALOXEGOL OXALATE 25 MG: 25 TABLET, FILM COATED ORAL at 06:09

## 2019-01-01 RX ADMIN — TOPIRAMATE 25 MG: 25 TABLET, FILM COATED ORAL at 11:12

## 2019-01-01 RX ADMIN — BACLOFEN 10 MG: 10 TABLET ORAL at 14:33

## 2019-01-01 RX ADMIN — ONDANSETRON 4 MG: 2 INJECTION INTRAMUSCULAR; INTRAVENOUS at 10:00

## 2019-01-01 RX ADMIN — ACETAMINOPHEN 500 MG: 500 TABLET, FILM COATED ORAL at 21:01

## 2019-01-01 RX ADMIN — PROMETHAZINE HYDROCHLORIDE 12.5 MG: 25 INJECTION INTRAMUSCULAR; INTRAVENOUS at 22:00

## 2019-01-01 RX ADMIN — FERROUS SULFATE TAB 325 MG (65 MG ELEMENTAL FE) 325 MG: 325 (65 FE) TAB at 08:37

## 2019-01-01 RX ADMIN — MAGNESIUM HYDROXIDE 30 ML: 400 SUSPENSION ORAL at 01:29

## 2019-01-01 RX ADMIN — KETOROLAC TROMETHAMINE 15 MG: 15 INJECTION, SOLUTION INTRAMUSCULAR; INTRAVENOUS at 03:22

## 2019-01-01 RX ADMIN — ONDANSETRON 4 MG: 2 INJECTION INTRAMUSCULAR; INTRAVENOUS at 02:32

## 2019-01-01 RX ADMIN — MAGNESIUM HYDROXIDE 30 ML: 400 SUSPENSION ORAL at 08:22

## 2019-01-01 RX ADMIN — SENNOSIDES 8.6 MG: 8.6 TABLET, FILM COATED ORAL at 13:18

## 2019-01-01 RX ADMIN — GABAPENTIN 300 MG: 300 CAPSULE ORAL at 15:40

## 2019-01-01 RX ADMIN — CEFEPIME HYDROCHLORIDE 2 G: 2 INJECTION, POWDER, FOR SOLUTION INTRAVENOUS at 02:45

## 2019-01-01 RX ADMIN — Medication 10 MG: at 17:04

## 2019-01-01 RX ADMIN — SODIUM CHLORIDE: 9 INJECTION, SOLUTION INTRAVENOUS at 02:33

## 2019-01-01 RX ADMIN — PROMETHAZINE HYDROCHLORIDE 25 MG: 25 TABLET ORAL at 06:11

## 2019-01-01 RX ADMIN — PROMETHAZINE HYDROCHLORIDE 25 MG: 25 TABLET ORAL at 21:41

## 2019-01-01 RX ADMIN — DEXAMETHASONE SODIUM PHOSPHATE 8 MG: 4 INJECTION, SOLUTION INTRAMUSCULAR; INTRAVENOUS at 15:24

## 2019-01-01 RX ADMIN — PHENAZOPYRIDINE HYDROCHLORIDE 200 MG: 100 TABLET ORAL at 17:36

## 2019-01-01 RX ADMIN — PROPOFOL 50 MG: 10 INJECTION, EMULSION INTRAVENOUS at 13:02

## 2019-01-01 RX ADMIN — VANCOMYCIN HYDROCHLORIDE 1250 MG: 10 INJECTION, POWDER, LYOPHILIZED, FOR SOLUTION INTRAVENOUS at 20:13

## 2019-01-01 RX ADMIN — DULOXETINE HYDROCHLORIDE 60 MG: 60 CAPSULE, DELAYED RELEASE ORAL at 21:49

## 2019-01-01 RX ADMIN — KETOROLAC TROMETHAMINE 15 MG: 15 INJECTION, SOLUTION INTRAMUSCULAR; INTRAVENOUS at 03:16

## 2019-01-01 RX ADMIN — OXYCODONE HYDROCHLORIDE 15 MG: 15 TABLET ORAL at 11:31

## 2019-01-01 RX ADMIN — FENTANYL CITRATE 50 MCG: 50 INJECTION, SOLUTION INTRAMUSCULAR; INTRAVENOUS at 16:09

## 2019-01-01 RX ADMIN — TOPIRAMATE 25 MG: 25 TABLET, FILM COATED ORAL at 09:03

## 2019-01-01 RX ADMIN — Medication 10 ML: at 08:07

## 2019-01-01 RX ADMIN — MORPHINE SULFATE 2 MG: 2 INJECTION, SOLUTION INTRAMUSCULAR; INTRAVENOUS at 01:46

## 2019-01-01 RX ADMIN — PHENAZOPYRIDINE HYDROCHLORIDE 200 MG: 100 TABLET ORAL at 16:24

## 2019-01-01 RX ADMIN — ONDANSETRON 4 MG: 2 INJECTION INTRAMUSCULAR; INTRAVENOUS at 12:13

## 2019-01-01 RX ADMIN — HYDROMORPHONE HYDROCHLORIDE 1 MG: 1 INJECTION, SOLUTION INTRAMUSCULAR; INTRAVENOUS; SUBCUTANEOUS at 16:21

## 2019-01-01 RX ADMIN — NALOXONE HYDROCHLORIDE 2 MG: 1 INJECTION PARENTERAL at 16:26

## 2019-01-01 RX ADMIN — Medication 1 DROP: at 15:50

## 2019-01-01 RX ADMIN — HYDROMORPHONE HYDROCHLORIDE 2 MG: 2 INJECTION INTRAMUSCULAR; INTRAVENOUS; SUBCUTANEOUS at 07:46

## 2019-01-01 RX ADMIN — ONDANSETRON 4 MG: 4 TABLET, ORALLY DISINTEGRATING ORAL at 18:53

## 2019-01-01 RX ADMIN — Medication 10 ML: at 09:32

## 2019-01-01 RX ADMIN — KETOROLAC TROMETHAMINE 15 MG: 15 INJECTION, SOLUTION INTRAMUSCULAR; INTRAVENOUS at 18:46

## 2019-01-01 RX ADMIN — OXYCODONE HYDROCHLORIDE 15 MG: 15 TABLET ORAL at 06:11

## 2019-01-01 RX ADMIN — IPRATROPIUM BROMIDE AND ALBUTEROL SULFATE 1 AMPULE: .5; 3 SOLUTION RESPIRATORY (INHALATION) at 04:44

## 2019-01-01 RX ADMIN — Medication 50 MCG: at 13:52

## 2019-01-01 RX ADMIN — SODIUM CHLORIDE: 9 INJECTION, SOLUTION INTRAVENOUS at 03:10

## 2019-01-01 RX ADMIN — HYDROMORPHONE HYDROCHLORIDE 2 MG: 2 INJECTION INTRAMUSCULAR; INTRAVENOUS; SUBCUTANEOUS at 17:43

## 2019-01-01 RX ADMIN — HYDROMORPHONE HYDROCHLORIDE 2 MG: 2 INJECTION INTRAMUSCULAR; INTRAVENOUS; SUBCUTANEOUS at 15:12

## 2019-01-01 RX ADMIN — DULOXETINE HYDROCHLORIDE 60 MG: 30 CAPSULE, DELAYED RELEASE ORAL at 09:02

## 2019-01-01 RX ADMIN — Medication 1 DROP: at 00:02

## 2019-01-01 RX ADMIN — GABAPENTIN 300 MG: 300 CAPSULE ORAL at 14:36

## 2019-01-01 RX ADMIN — SENNOSIDES 17.2 MG: 8.6 TABLET, FILM COATED ORAL at 08:46

## 2019-01-01 RX ADMIN — CALCIUM POLYCARBOPHIL 625 MG: 625 TABLET, FILM COATED ORAL at 14:24

## 2019-01-01 RX ADMIN — PHENAZOPYRIDINE HYDROCHLORIDE 200 MG: 100 TABLET ORAL at 17:01

## 2019-01-01 RX ADMIN — BACLOFEN 10 MG: 10 TABLET ORAL at 22:01

## 2019-01-01 RX ADMIN — OXYCODONE HYDROCHLORIDE 10 MG: 5 TABLET ORAL at 03:24

## 2019-01-01 RX ADMIN — TRAZODONE HYDROCHLORIDE 50 MG: 50 TABLET ORAL at 22:59

## 2019-01-01 RX ADMIN — ACETAMINOPHEN 500 MG: 500 TABLET, FILM COATED ORAL at 06:11

## 2019-01-01 RX ADMIN — ONDANSETRON 4 MG: 2 INJECTION INTRAMUSCULAR; INTRAVENOUS at 14:53

## 2019-01-01 RX ADMIN — GABAPENTIN 300 MG: 300 CAPSULE ORAL at 20:00

## 2019-01-01 RX ADMIN — FENTANYL CITRATE 25 MCG: 50 INJECTION, SOLUTION INTRAMUSCULAR; INTRAVENOUS at 15:30

## 2019-01-01 RX ADMIN — HYDROMORPHONE HYDROCHLORIDE 2 MG: 2 INJECTION INTRAMUSCULAR; INTRAVENOUS; SUBCUTANEOUS at 21:17

## 2019-01-01 RX ADMIN — GABAPENTIN 300 MG: 300 CAPSULE ORAL at 11:36

## 2019-01-01 RX ADMIN — MIDAZOLAM HYDROCHLORIDE 1 MG: 1 INJECTION INTRAMUSCULAR; INTRAVENOUS at 16:12

## 2019-01-01 RX ADMIN — MORPHINE SULFATE 30 MG: 30 TABLET, FILM COATED, EXTENDED RELEASE ORAL at 20:05

## 2019-01-01 RX ADMIN — Medication 10 ML: at 13:45

## 2019-01-01 RX ADMIN — SENNOSIDES 8.6 MG: 8.6 TABLET, FILM COATED ORAL at 09:30

## 2019-01-01 RX ADMIN — HYDROMORPHONE HYDROCHLORIDE 2 MG: 2 INJECTION INTRAMUSCULAR; INTRAVENOUS; SUBCUTANEOUS at 01:56

## 2019-01-01 RX ADMIN — PHENAZOPYRIDINE HYDROCHLORIDE 200 MG: 100 TABLET ORAL at 08:37

## 2019-01-01 RX ADMIN — SODIUM CHLORIDE: 9 INJECTION, SOLUTION INTRAVENOUS at 07:22

## 2019-01-01 RX ADMIN — Medication 10 ML: at 10:38

## 2019-01-01 RX ADMIN — BACLOFEN 10 MG: 10 TABLET ORAL at 09:21

## 2019-01-01 RX ADMIN — HYDRALAZINE HYDROCHLORIDE 10 MG: 20 INJECTION INTRAMUSCULAR; INTRAVENOUS at 18:00

## 2019-01-01 RX ADMIN — DULOXETINE HYDROCHLORIDE 60 MG: 60 CAPSULE, DELAYED RELEASE ORAL at 20:05

## 2019-01-01 RX ADMIN — MIDAZOLAM HYDROCHLORIDE 1 MG: 1 INJECTION INTRAMUSCULAR; INTRAVENOUS at 09:07

## 2019-01-01 RX ADMIN — GUAIFENESIN 600 MG: 600 TABLET, EXTENDED RELEASE ORAL at 21:24

## 2019-01-01 RX ADMIN — BACLOFEN 10 MG: 10 TABLET ORAL at 21:14

## 2019-01-01 RX ADMIN — DIPHENHYDRAMINE HYDROCHLORIDE 25 MG: 50 INJECTION, SOLUTION INTRAMUSCULAR; INTRAVENOUS at 16:40

## 2019-01-01 RX ADMIN — HYOSCYAMINE SULFATE 125 MCG: 0.12 TABLET ORAL; SUBLINGUAL at 13:49

## 2019-01-01 RX ADMIN — HYDROMORPHONE HYDROCHLORIDE 1 MG: 1 INJECTION, SOLUTION INTRAMUSCULAR; INTRAVENOUS; SUBCUTANEOUS at 06:03

## 2019-01-01 RX ADMIN — HYDRALAZINE HYDROCHLORIDE 10 MG: 20 INJECTION INTRAMUSCULAR; INTRAVENOUS at 05:49

## 2019-01-01 RX ADMIN — Medication 1 DROP: at 21:49

## 2019-01-01 RX ADMIN — Medication 10 ML: at 19:57

## 2019-01-01 RX ADMIN — IBUPROFEN 800 MG: 800 TABLET, FILM COATED ORAL at 22:06

## 2019-01-01 RX ADMIN — SODIUM CHLORIDE 2382 ML: 9 INJECTION, SOLUTION INTRAVENOUS at 21:57

## 2019-01-01 RX ADMIN — TAMSULOSIN HYDROCHLORIDE 0.4 MG: 0.4 CAPSULE ORAL at 08:50

## 2019-01-01 RX ADMIN — KETOROLAC TROMETHAMINE 15 MG: 15 INJECTION, SOLUTION INTRAMUSCULAR; INTRAVENOUS at 17:34

## 2019-01-01 RX ADMIN — HYDROMORPHONE HYDROCHLORIDE 2 MG: 2 INJECTION INTRAMUSCULAR; INTRAVENOUS; SUBCUTANEOUS at 21:05

## 2019-01-01 RX ADMIN — BACLOFEN 10 MG: 10 TABLET ORAL at 20:18

## 2019-01-01 RX ADMIN — Medication: at 10:11

## 2019-01-01 RX ADMIN — OXYCODONE HYDROCHLORIDE 15 MG: 15 TABLET ORAL at 04:33

## 2019-01-01 RX ADMIN — BACLOFEN 20 MG: 10 TABLET ORAL at 08:58

## 2019-01-01 RX ADMIN — GABAPENTIN 300 MG: 300 CAPSULE ORAL at 14:34

## 2019-01-01 RX ADMIN — Medication 10 ML: at 14:40

## 2019-01-01 RX ADMIN — PHENAZOPYRIDINE HYDROCHLORIDE 100 MG: 100 TABLET ORAL at 07:27

## 2019-01-01 RX ADMIN — SODIUM CHLORIDE: 9 INJECTION, SOLUTION INTRAVENOUS at 09:52

## 2019-01-01 RX ADMIN — MORPHINE SULFATE 4 MG: 4 INJECTION INTRAVENOUS at 15:16

## 2019-01-01 RX ADMIN — LINACLOTIDE 145 MCG: 145 CAPSULE, GELATIN COATED ORAL at 08:59

## 2019-01-01 RX ADMIN — FAMOTIDINE 20 MG: 20 TABLET ORAL at 20:36

## 2019-01-01 RX ADMIN — HYDROMORPHONE HYDROCHLORIDE 0.5 MG: 1 INJECTION, SOLUTION INTRAMUSCULAR; INTRAVENOUS; SUBCUTANEOUS at 09:47

## 2019-01-01 RX ADMIN — GABAPENTIN 300 MG: 300 CAPSULE ORAL at 20:30

## 2019-01-01 RX ADMIN — Medication 10 ML: at 21:47

## 2019-01-01 RX ADMIN — DULOXETINE HYDROCHLORIDE 60 MG: 60 CAPSULE, DELAYED RELEASE ORAL at 11:35

## 2019-01-01 RX ADMIN — GUAIFENESIN 600 MG: 600 TABLET, EXTENDED RELEASE ORAL at 09:34

## 2019-01-01 RX ADMIN — HYDROMORPHONE HYDROCHLORIDE 2 MG: 2 INJECTION INTRAMUSCULAR; INTRAVENOUS; SUBCUTANEOUS at 06:01

## 2019-01-01 RX ADMIN — CLONIDINE HYDROCHLORIDE 0.2 MG: 0.1 TABLET ORAL at 22:32

## 2019-01-01 RX ADMIN — TOPIRAMATE 25 MG: 25 TABLET, FILM COATED ORAL at 20:18

## 2019-01-01 RX ADMIN — ONDANSETRON 4 MG: 2 INJECTION INTRAMUSCULAR; INTRAVENOUS at 14:25

## 2019-01-01 RX ADMIN — OXYCODONE HYDROCHLORIDE AND ACETAMINOPHEN 1 TABLET: 7.5; 325 TABLET ORAL at 13:33

## 2019-01-01 RX ADMIN — Medication 10 ML: at 22:03

## 2019-01-01 RX ADMIN — Medication 10 ML: at 09:05

## 2019-01-01 RX ADMIN — CEFEPIME HYDROCHLORIDE 1 G: 1 INJECTION, POWDER, FOR SOLUTION INTRAMUSCULAR; INTRAVENOUS at 16:06

## 2019-01-01 RX ADMIN — MAGNESIUM SULFATE HEPTAHYDRATE 1 G: 1 INJECTION, SOLUTION INTRAVENOUS at 08:11

## 2019-01-01 RX ADMIN — VANCOMYCIN HYDROCHLORIDE 1250 MG: 10 INJECTION, POWDER, LYOPHILIZED, FOR SOLUTION INTRAVENOUS at 07:59

## 2019-01-01 RX ADMIN — SODIUM CHLORIDE 1000 ML: 9 INJECTION, SOLUTION INTRAVENOUS at 13:52

## 2019-01-01 RX ADMIN — SODIUM CHLORIDE: 9 INJECTION, SOLUTION INTRAVENOUS at 17:34

## 2019-01-01 RX ADMIN — DULOXETINE HYDROCHLORIDE 60 MG: 60 CAPSULE, DELAYED RELEASE ORAL at 11:37

## 2019-01-01 RX ADMIN — OXYCODONE HYDROCHLORIDE AND ACETAMINOPHEN 1 TABLET: 7.5; 325 TABLET ORAL at 00:50

## 2019-01-01 RX ADMIN — Medication 10 ML: at 21:07

## 2019-01-01 RX ADMIN — BACLOFEN 20 MG: 10 TABLET ORAL at 21:49

## 2019-01-01 RX ADMIN — ONDANSETRON 4 MG: 2 INJECTION INTRAMUSCULAR; INTRAVENOUS at 15:24

## 2019-01-01 RX ADMIN — BACLOFEN 10 MG: 10 TABLET ORAL at 21:46

## 2019-01-01 RX ADMIN — HYDROMORPHONE HYDROCHLORIDE 2 MG: 2 INJECTION INTRAMUSCULAR; INTRAVENOUS; SUBCUTANEOUS at 17:56

## 2019-01-01 RX ADMIN — HYDROMORPHONE HYDROCHLORIDE 0.5 MG: 1 INJECTION, SOLUTION INTRAMUSCULAR; INTRAVENOUS; SUBCUTANEOUS at 04:34

## 2019-01-01 RX ADMIN — OXYCODONE HYDROCHLORIDE AND ACETAMINOPHEN 1 TABLET: 7.5; 325 TABLET ORAL at 13:41

## 2019-01-01 RX ADMIN — ONDANSETRON 4 MG: 2 INJECTION INTRAMUSCULAR; INTRAVENOUS at 12:24

## 2019-01-01 RX ADMIN — PHENAZOPYRIDINE HYDROCHLORIDE 100 MG: 100 TABLET ORAL at 01:47

## 2019-01-01 RX ADMIN — Medication 1 DROP: at 21:11

## 2019-01-01 RX ADMIN — BACLOFEN 10 MG: 10 TABLET ORAL at 09:23

## 2019-01-01 RX ADMIN — HEPARIN SODIUM 5000 UNITS: 5000 INJECTION INTRAVENOUS; SUBCUTANEOUS at 21:26

## 2019-01-01 RX ADMIN — GABAPENTIN 300 MG: 300 CAPSULE ORAL at 20:37

## 2019-01-01 RX ADMIN — MORPHINE SULFATE 2 MG: 2 INJECTION, SOLUTION INTRAMUSCULAR; INTRAVENOUS at 05:30

## 2019-01-01 RX ADMIN — OXYCODONE HYDROCHLORIDE 15 MG: 15 TABLET ORAL at 09:31

## 2019-01-01 RX ADMIN — PHENAZOPYRIDINE HYDROCHLORIDE 200 MG: 100 TABLET ORAL at 12:32

## 2019-01-01 RX ADMIN — TAMSULOSIN HYDROCHLORIDE 0.4 MG: 0.4 CAPSULE ORAL at 07:45

## 2019-01-01 RX ADMIN — Medication 10 ML: at 21:20

## 2019-01-01 RX ADMIN — MORPHINE SULFATE 4 MG: 4 INJECTION INTRAVENOUS at 02:16

## 2019-01-01 RX ADMIN — HYDROMORPHONE HYDROCHLORIDE 0.5 MG: 1 INJECTION, SOLUTION INTRAMUSCULAR; INTRAVENOUS; SUBCUTANEOUS at 12:54

## 2019-01-01 RX ADMIN — HYDROMORPHONE HYDROCHLORIDE 2 MG: 2 INJECTION INTRAMUSCULAR; INTRAVENOUS; SUBCUTANEOUS at 11:09

## 2019-01-01 RX ADMIN — Medication: at 14:57

## 2019-01-01 RX ADMIN — IPRATROPIUM BROMIDE AND ALBUTEROL SULFATE 1 AMPULE: .5; 3 SOLUTION RESPIRATORY (INHALATION) at 09:13

## 2019-01-01 RX ADMIN — Medication 400 MG: at 10:40

## 2019-01-01 RX ADMIN — KETOROLAC TROMETHAMINE 15 MG: 15 INJECTION, SOLUTION INTRAMUSCULAR; INTRAVENOUS at 11:39

## 2019-01-01 RX ADMIN — Medication 10 ML: at 09:00

## 2019-01-01 RX ADMIN — Medication 10 ML: at 09:13

## 2019-01-01 RX ADMIN — Medication 10 MG: at 16:16

## 2019-01-01 RX ADMIN — PROMETHAZINE HYDROCHLORIDE 12.5 MG: 25 INJECTION INTRAMUSCULAR; INTRAVENOUS at 03:19

## 2019-01-01 RX ADMIN — OXYCODONE AND ACETAMINOPHEN 1 TABLET: 5; 325 TABLET ORAL at 18:54

## 2019-01-01 RX ADMIN — POTASSIUM CHLORIDE 10 MEQ: 7.46 INJECTION, SOLUTION INTRAVENOUS at 07:01

## 2019-01-01 RX ADMIN — SODIUM CHLORIDE: 9 INJECTION, SOLUTION INTRAVENOUS at 14:50

## 2019-01-01 RX ADMIN — FERROUS SULFATE TAB 325 MG (65 MG ELEMENTAL FE) 325 MG: 325 (65 FE) TAB at 17:40

## 2019-01-01 RX ADMIN — HYOSCYAMINE SULFATE 125 MCG: 0.12 TABLET ORAL; SUBLINGUAL at 21:30

## 2019-01-01 RX ADMIN — BACLOFEN 20 MG: 10 TABLET ORAL at 08:51

## 2019-01-01 RX ADMIN — BACLOFEN 10 MG: 10 TABLET ORAL at 13:36

## 2019-01-01 RX ADMIN — MORPHINE SULFATE 30 MG: 30 TABLET, FILM COATED, EXTENDED RELEASE ORAL at 21:24

## 2019-01-01 RX ADMIN — HYDROMORPHONE HYDROCHLORIDE 2 MG: 2 INJECTION INTRAMUSCULAR; INTRAVENOUS; SUBCUTANEOUS at 13:27

## 2019-01-01 RX ADMIN — KETOROLAC TROMETHAMINE 30 MG: 30 INJECTION, SOLUTION INTRAMUSCULAR at 13:52

## 2019-01-01 RX ADMIN — PROMETHAZINE HYDROCHLORIDE 12.5 MG: 25 INJECTION INTRAMUSCULAR; INTRAVENOUS at 06:48

## 2019-01-01 RX ADMIN — OXYCODONE HYDROCHLORIDE 10 MG: 5 TABLET ORAL at 17:51

## 2019-01-01 RX ADMIN — CEFEPIME HYDROCHLORIDE 2 G: 2 INJECTION, POWDER, FOR SOLUTION INTRAVENOUS at 12:06

## 2019-01-01 RX ADMIN — Medication 1 G: at 12:23

## 2019-01-01 RX ADMIN — LORAZEPAM 2 MG: 2 INJECTION, SOLUTION INTRAMUSCULAR; INTRAVENOUS at 01:48

## 2019-01-01 RX ADMIN — PHENAZOPYRIDINE HYDROCHLORIDE 200 MG: 100 TABLET ORAL at 07:45

## 2019-01-01 RX ADMIN — IPRATROPIUM BROMIDE AND ALBUTEROL SULFATE 1 AMPULE: .5; 3 SOLUTION RESPIRATORY (INHALATION) at 21:03

## 2019-01-01 RX ADMIN — BACLOFEN 10 MG: 10 TABLET ORAL at 08:49

## 2019-01-01 RX ADMIN — FENTANYL CITRATE 25 MCG: 50 INJECTION, SOLUTION INTRAMUSCULAR; INTRAVENOUS at 16:12

## 2019-01-01 RX ADMIN — AMITRIPTYLINE HYDROCHLORIDE 25 MG: 25 TABLET, FILM COATED ORAL at 22:20

## 2019-01-01 RX ADMIN — POTASSIUM CHLORIDE 10 MEQ: 7.46 INJECTION, SOLUTION INTRAVENOUS at 10:15

## 2019-01-01 RX ADMIN — PROPOFOL 200 MG: 10 INJECTION, EMULSION INTRAVENOUS at 15:21

## 2019-01-01 RX ADMIN — PROMETHAZINE HYDROCHLORIDE 12.5 MG: 25 INJECTION INTRAMUSCULAR; INTRAVENOUS at 16:17

## 2019-01-01 RX ADMIN — BACLOFEN 20 MG: 10 TABLET ORAL at 20:27

## 2019-01-01 RX ADMIN — LINACLOTIDE 145 MCG: 145 CAPSULE, GELATIN COATED ORAL at 10:34

## 2019-01-01 RX ADMIN — Medication 10 ML: at 05:40

## 2019-01-01 RX ADMIN — MORPHINE SULFATE 2 MG: 2 INJECTION, SOLUTION INTRAMUSCULAR; INTRAVENOUS at 22:24

## 2019-01-01 RX ADMIN — SENNOSIDES AND DOCUSATE SODIUM 2 TABLET: 8.6; 5 TABLET ORAL at 08:47

## 2019-01-01 RX ADMIN — PHENAZOPYRIDINE HYDROCHLORIDE 200 MG: 100 TABLET ORAL at 17:25

## 2019-01-01 RX ADMIN — Medication 10 ML: at 22:40

## 2019-01-01 RX ADMIN — GABAPENTIN 300 MG: 300 CAPSULE ORAL at 21:49

## 2019-01-01 RX ADMIN — HYDROMORPHONE HYDROCHLORIDE 1 MG: 1 INJECTION, SOLUTION INTRAMUSCULAR; INTRAVENOUS; SUBCUTANEOUS at 21:13

## 2019-01-01 RX ADMIN — FENTANYL CITRATE 50 MCG: 50 INJECTION, SOLUTION INTRAMUSCULAR; INTRAVENOUS at 08:11

## 2019-01-01 RX ADMIN — Medication 1 DROP: at 09:54

## 2019-01-01 RX ADMIN — TOPIRAMATE 25 MG: 25 TABLET, FILM COATED ORAL at 19:57

## 2019-01-01 RX ADMIN — SODIUM CHLORIDE: 9 INJECTION, SOLUTION INTRAVENOUS at 15:08

## 2019-01-01 RX ADMIN — BACLOFEN 10 MG: 10 TABLET ORAL at 09:03

## 2019-01-01 RX ADMIN — ZOLPIDEM TARTRATE 10 MG: 10 TABLET, FILM COATED ORAL at 21:41

## 2019-01-01 RX ADMIN — MEROPENEM 1 G: 1 INJECTION, POWDER, FOR SOLUTION INTRAVENOUS at 12:33

## 2019-01-01 RX ADMIN — Medication 1 G: at 16:46

## 2019-01-01 RX ADMIN — ROCURONIUM BROMIDE 20 MG: 10 INJECTION, SOLUTION INTRAVENOUS at 12:46

## 2019-01-01 RX ADMIN — Medication 10 ML: at 20:19

## 2019-01-01 RX ADMIN — SENNOSIDES 17.2 MG: 8.6 TABLET, FILM COATED ORAL at 20:38

## 2019-01-01 RX ADMIN — IPRATROPIUM BROMIDE AND ALBUTEROL SULFATE 1 AMPULE: .5; 3 SOLUTION RESPIRATORY (INHALATION) at 20:23

## 2019-01-01 RX ADMIN — Medication: at 23:24

## 2019-01-01 RX ADMIN — MIDAZOLAM HYDROCHLORIDE 2 MG: 1 INJECTION, SOLUTION INTRAMUSCULAR; INTRAVENOUS at 15:15

## 2019-01-01 RX ADMIN — HYDROMORPHONE HYDROCHLORIDE 2 MG: 2 INJECTION INTRAMUSCULAR; INTRAVENOUS; SUBCUTANEOUS at 08:46

## 2019-01-01 RX ADMIN — TOPIRAMATE 25 MG: 25 TABLET, FILM COATED ORAL at 09:15

## 2019-01-01 RX ADMIN — OXYCODONE HYDROCHLORIDE 15 MG: 15 TABLET ORAL at 07:27

## 2019-01-01 RX ADMIN — Medication 1 DROP: at 11:35

## 2019-01-01 RX ADMIN — HYDRALAZINE HYDROCHLORIDE 10 MG: 20 INJECTION INTRAMUSCULAR; INTRAVENOUS at 18:57

## 2019-01-01 RX ADMIN — SENNOSIDES 17.2 MG: 8.6 TABLET, FILM COATED ORAL at 21:20

## 2019-01-01 RX ADMIN — SODIUM CHLORIDE 250 ML: 9 INJECTION, SOLUTION INTRAVENOUS at 01:10

## 2019-01-01 RX ADMIN — IPRATROPIUM BROMIDE AND ALBUTEROL SULFATE 1 AMPULE: .5; 3 SOLUTION RESPIRATORY (INHALATION) at 13:41

## 2019-01-01 RX ADMIN — MAGNESIUM OXIDE TAB 400 MG (241.3 MG ELEMENTAL MG) 400 MG: 400 (241.3 MG) TAB at 09:13

## 2019-01-01 RX ADMIN — Medication 1 DROP: at 17:53

## 2019-01-01 RX ADMIN — FAMOTIDINE 20 MG: 20 TABLET ORAL at 21:20

## 2019-01-01 RX ADMIN — ACETAMINOPHEN 650 MG: 325 TABLET, FILM COATED ORAL at 15:32

## 2019-01-01 RX ADMIN — SENNOSIDES AND DOCUSATE SODIUM 2 TABLET: 8.6; 5 TABLET ORAL at 08:36

## 2019-01-01 RX ADMIN — Medication 10 ML: at 13:42

## 2019-01-01 RX ADMIN — ALTEPLASE 1 MG: 2.2 INJECTION, POWDER, LYOPHILIZED, FOR SOLUTION INTRAVENOUS at 20:30

## 2019-01-01 RX ADMIN — FAMOTIDINE 20 MG: 20 TABLET ORAL at 09:33

## 2019-01-01 RX ADMIN — SODIUM BICARBONATE 0.5 MEQ: 0.2 INJECTION, SOLUTION INTRAVENOUS at 09:40

## 2019-01-01 RX ADMIN — PHENAZOPYRIDINE HYDROCHLORIDE 200 MG: 100 TABLET ORAL at 08:04

## 2019-01-01 RX ADMIN — AMITRIPTYLINE HYDROCHLORIDE 25 MG: 25 TABLET, FILM COATED ORAL at 19:57

## 2019-01-01 RX ADMIN — PROMETHAZINE HYDROCHLORIDE 25 MG: 25 TABLET ORAL at 21:20

## 2019-01-01 RX ADMIN — ENOXAPARIN SODIUM 40 MG: 40 INJECTION SUBCUTANEOUS at 20:38

## 2019-01-01 RX ADMIN — Medication 10 ML: at 12:08

## 2019-01-01 RX ADMIN — HYDROMORPHONE HYDROCHLORIDE 1 MG: 1 INJECTION, SOLUTION INTRAMUSCULAR; INTRAVENOUS; SUBCUTANEOUS at 14:44

## 2019-01-01 RX ADMIN — ZOLPIDEM TARTRATE 5 MG: 5 TABLET ORAL at 20:19

## 2019-01-01 RX ADMIN — SENNOSIDES 17.2 MG: 8.6 TABLET, FILM COATED ORAL at 08:50

## 2019-01-01 RX ADMIN — CLONIDINE HYDROCHLORIDE 0.2 MG: 0.1 TABLET ORAL at 17:51

## 2019-01-01 RX ADMIN — TOPIRAMATE 25 MG: 25 TABLET, FILM COATED ORAL at 11:36

## 2019-01-01 RX ADMIN — DIPHENHYDRAMINE HCL 25 MG: 25 TABLET ORAL at 19:36

## 2019-01-01 RX ADMIN — Medication 10 ML: at 05:49

## 2019-01-01 RX ADMIN — ONDANSETRON 4 MG: 2 INJECTION INTRAMUSCULAR; INTRAVENOUS at 13:40

## 2019-01-01 RX ADMIN — HYDROMORPHONE HYDROCHLORIDE 1 MG: 1 INJECTION, SOLUTION INTRAMUSCULAR; INTRAVENOUS; SUBCUTANEOUS at 22:32

## 2019-01-01 RX ADMIN — HYDROMORPHONE HYDROCHLORIDE 2 MG: 2 INJECTION INTRAMUSCULAR; INTRAVENOUS; SUBCUTANEOUS at 12:50

## 2019-01-01 RX ADMIN — PROMETHAZINE HYDROCHLORIDE 25 MG: 25 INJECTION INTRAMUSCULAR; INTRAVENOUS at 09:07

## 2019-01-01 RX ADMIN — HYDROMORPHONE HYDROCHLORIDE 2 MG: 2 INJECTION INTRAMUSCULAR; INTRAVENOUS; SUBCUTANEOUS at 02:14

## 2019-01-01 RX ADMIN — HYDROMORPHONE HYDROCHLORIDE 1 MG: 1 INJECTION, SOLUTION INTRAMUSCULAR; INTRAVENOUS; SUBCUTANEOUS at 00:19

## 2019-01-01 RX ADMIN — HYDROMORPHONE HYDROCHLORIDE 1 MG: 1 INJECTION, SOLUTION INTRAMUSCULAR; INTRAVENOUS; SUBCUTANEOUS at 23:21

## 2019-01-01 RX ADMIN — HYDROMORPHONE HYDROCHLORIDE 0.5 MG: 1 INJECTION, SOLUTION INTRAMUSCULAR; INTRAVENOUS; SUBCUTANEOUS at 11:12

## 2019-01-01 RX ADMIN — HYDROMORPHONE HYDROCHLORIDE 2 MG: 2 INJECTION INTRAMUSCULAR; INTRAVENOUS; SUBCUTANEOUS at 06:52

## 2019-01-01 RX ADMIN — SODIUM CHLORIDE: 9 INJECTION, SOLUTION INTRAVENOUS at 05:05

## 2019-01-01 RX ADMIN — GABAPENTIN 300 MG: 300 CAPSULE ORAL at 20:35

## 2019-01-01 RX ADMIN — NALOXEGOL OXALATE 25 MG: 25 TABLET, FILM COATED ORAL at 06:10

## 2019-01-01 RX ADMIN — Medication 10 ML: at 05:53

## 2019-01-01 RX ADMIN — SODIUM CHLORIDE: 9 INJECTION, SOLUTION INTRAVENOUS at 21:34

## 2019-01-01 RX ADMIN — PROMETHAZINE HYDROCHLORIDE 12.5 MG: 25 INJECTION INTRAMUSCULAR; INTRAVENOUS at 22:17

## 2019-01-01 RX ADMIN — IPRATROPIUM BROMIDE AND ALBUTEROL SULFATE 1 AMPULE: .5; 3 SOLUTION RESPIRATORY (INHALATION) at 09:18

## 2019-01-01 RX ADMIN — BACLOFEN 10 MG: 10 TABLET ORAL at 11:10

## 2019-01-01 RX ADMIN — MAGNESIUM HYDROXIDE 30 ML: 400 SUSPENSION ORAL at 09:05

## 2019-01-01 RX ADMIN — Medication 100 MCG: at 14:00

## 2019-01-01 RX ADMIN — ZOLPIDEM TARTRATE 5 MG: 5 TABLET ORAL at 19:57

## 2019-01-01 RX ADMIN — ACETAMINOPHEN 500 MG: 500 TABLET, FILM COATED ORAL at 02:33

## 2019-01-01 RX ADMIN — Medication 1 G: at 15:00

## 2019-01-01 RX ADMIN — HYDROMORPHONE HYDROCHLORIDE 1 MG: 1 INJECTION, SOLUTION INTRAMUSCULAR; INTRAVENOUS; SUBCUTANEOUS at 07:21

## 2019-01-01 RX ADMIN — Medication 400 MG: at 09:03

## 2019-01-01 RX ADMIN — SODIUM CHLORIDE: 9 INJECTION, SOLUTION INTRAVENOUS at 03:37

## 2019-01-01 RX ADMIN — PROMETHAZINE HYDROCHLORIDE 12.5 MG: 25 INJECTION INTRAMUSCULAR; INTRAVENOUS at 00:47

## 2019-01-01 RX ADMIN — Medication 10 ML: at 16:44

## 2019-01-01 RX ADMIN — CYCLOBENZAPRINE HYDROCHLORIDE 10 MG: 10 TABLET, FILM COATED ORAL at 01:36

## 2019-01-01 RX ADMIN — POTASSIUM CHLORIDE 10 MEQ: 7.46 INJECTION, SOLUTION INTRAVENOUS at 09:16

## 2019-01-01 RX ADMIN — SENNOSIDES 17.2 MG: 8.6 TABLET, FILM COATED ORAL at 20:17

## 2019-01-01 RX ADMIN — METHYLNALTREXONE BROMIDE 12 MG: 12 INJECTION, SOLUTION SUBCUTANEOUS at 14:24

## 2019-01-01 RX ADMIN — OXYCODONE HYDROCHLORIDE 15 MG: 15 TABLET ORAL at 13:43

## 2019-01-01 RX ADMIN — HYDROMORPHONE HYDROCHLORIDE 1 MG: 1 INJECTION, SOLUTION INTRAMUSCULAR; INTRAVENOUS; SUBCUTANEOUS at 21:05

## 2019-01-01 RX ADMIN — Medication 10 ML: at 02:59

## 2019-01-01 RX ADMIN — Medication 10 ML: at 00:02

## 2019-01-01 RX ADMIN — POTASSIUM CHLORIDE 10 MEQ: 7.46 INJECTION, SOLUTION INTRAVENOUS at 12:12

## 2019-01-01 RX ADMIN — HYDROMORPHONE HYDROCHLORIDE 2 MG: 2 INJECTION INTRAMUSCULAR; INTRAVENOUS; SUBCUTANEOUS at 10:15

## 2019-01-01 RX ADMIN — SENNOSIDES 8.6 MG: 8.6 TABLET, FILM COATED ORAL at 21:06

## 2019-01-01 RX ADMIN — HYDROMORPHONE HYDROCHLORIDE 0.5 MG: 2 INJECTION, SOLUTION INTRAMUSCULAR; INTRAVENOUS; SUBCUTANEOUS at 14:28

## 2019-01-01 RX ADMIN — AMITRIPTYLINE HYDROCHLORIDE 25 MG: 25 TABLET, FILM COATED ORAL at 00:39

## 2019-01-01 RX ADMIN — GABAPENTIN 300 MG: 300 CAPSULE ORAL at 08:36

## 2019-01-01 RX ADMIN — Medication 10 ML: at 16:46

## 2019-01-01 RX ADMIN — PHENAZOPYRIDINE HYDROCHLORIDE 200 MG: 100 TABLET ORAL at 09:47

## 2019-01-01 RX ADMIN — SENNOSIDES 17.2 MG: 8.6 TABLET, FILM COATED ORAL at 09:48

## 2019-01-01 RX ADMIN — OXYCODONE HYDROCHLORIDE 15 MG: 15 TABLET ORAL at 15:45

## 2019-01-01 RX ADMIN — GABAPENTIN 300 MG: 300 CAPSULE ORAL at 20:23

## 2019-01-01 RX ADMIN — BACLOFEN 10 MG: 10 TABLET ORAL at 20:30

## 2019-01-01 RX ADMIN — Medication 10 ML: at 15:24

## 2019-01-01 RX ADMIN — ONDANSETRON 4 MG: 2 INJECTION INTRAMUSCULAR; INTRAVENOUS at 07:47

## 2019-01-01 RX ADMIN — Medication 10 MG: at 16:15

## 2019-01-01 RX ADMIN — ONDANSETRON 4 MG: 2 INJECTION INTRAMUSCULAR; INTRAVENOUS at 08:18

## 2019-01-01 RX ADMIN — IPRATROPIUM BROMIDE AND ALBUTEROL SULFATE 1 AMPULE: .5; 3 SOLUTION RESPIRATORY (INHALATION) at 12:12

## 2019-01-01 RX ADMIN — FENTANYL CITRATE 100 MCG: 50 INJECTION INTRAMUSCULAR; INTRAVENOUS at 14:30

## 2019-01-01 RX ADMIN — MAGNESIUM SULFATE HEPTAHYDRATE 2 G: 40 INJECTION, SOLUTION INTRAVENOUS at 17:34

## 2019-01-01 RX ADMIN — PHENAZOPYRIDINE HYDROCHLORIDE 200 MG: 100 TABLET ORAL at 17:51

## 2019-01-01 RX ADMIN — HYDRALAZINE HYDROCHLORIDE 10 MG: 20 INJECTION INTRAMUSCULAR; INTRAVENOUS at 08:14

## 2019-01-01 RX ADMIN — TOPIRAMATE 25 MG: 25 TABLET, FILM COATED ORAL at 08:50

## 2019-01-01 RX ADMIN — ONDANSETRON 4 MG: 2 INJECTION INTRAMUSCULAR; INTRAVENOUS at 01:16

## 2019-01-01 RX ADMIN — KETOROLAC TROMETHAMINE 15 MG: 30 INJECTION, SOLUTION INTRAMUSCULAR at 20:55

## 2019-01-01 RX ADMIN — MIDAZOLAM HYDROCHLORIDE 2 MG: 1 INJECTION, SOLUTION INTRAMUSCULAR; INTRAVENOUS at 08:10

## 2019-01-01 RX ADMIN — OXYCODONE HYDROCHLORIDE 15 MG: 15 TABLET ORAL at 01:47

## 2019-01-01 RX ADMIN — MORPHINE SULFATE 30 MG: 30 TABLET, FILM COATED, EXTENDED RELEASE ORAL at 21:01

## 2019-01-01 RX ADMIN — TOPIRAMATE 25 MG: 25 TABLET, FILM COATED ORAL at 09:48

## 2019-01-01 RX ADMIN — MORPHINE SULFATE 30 MG: 30 TABLET, FILM COATED, EXTENDED RELEASE ORAL at 20:23

## 2019-01-01 RX ADMIN — TOPIRAMATE 25 MG: 25 TABLET, FILM COATED ORAL at 08:33

## 2019-01-01 RX ADMIN — Medication: at 22:18

## 2019-01-01 RX ADMIN — Medication 10 ML: at 22:49

## 2019-01-01 RX ADMIN — FENTANYL CITRATE 50 MCG: 50 INJECTION, SOLUTION INTRAMUSCULAR; INTRAVENOUS at 08:15

## 2019-01-01 RX ADMIN — SODIUM CHLORIDE 1000 ML: 9 INJECTION, SOLUTION INTRAVENOUS at 13:40

## 2019-01-01 RX ADMIN — LINACLOTIDE 145 MCG: 145 CAPSULE, GELATIN COATED ORAL at 11:40

## 2019-01-01 RX ADMIN — PROMETHAZINE HYDROCHLORIDE 25 MG: 25 TABLET ORAL at 18:18

## 2019-01-01 RX ADMIN — ACETAMINOPHEN 500 MG: 500 TABLET, FILM COATED ORAL at 08:58

## 2019-01-01 RX ADMIN — Medication: at 21:27

## 2019-01-01 RX ADMIN — DIPHENHYDRAMINE HCL 25 MG: 25 TABLET ORAL at 14:35

## 2019-01-01 RX ADMIN — HYDROMORPHONE HYDROCHLORIDE 0.5 MG: 1 INJECTION, SOLUTION INTRAMUSCULAR; INTRAVENOUS; SUBCUTANEOUS at 13:27

## 2019-01-01 RX ADMIN — SODIUM CHLORIDE: 9 INJECTION, SOLUTION INTRAVENOUS at 16:20

## 2019-01-01 RX ADMIN — FAMOTIDINE 20 MG: 20 TABLET ORAL at 09:49

## 2019-01-01 RX ADMIN — LABETALOL HYDROCHLORIDE 5 MG: 5 INJECTION INTRAVENOUS at 15:02

## 2019-01-01 RX ADMIN — Medication 10 MG: at 14:35

## 2019-01-01 RX ADMIN — CEFEPIME HYDROCHLORIDE 2 G: 2 INJECTION, POWDER, FOR SOLUTION INTRAVENOUS at 00:19

## 2019-01-01 RX ADMIN — IPRATROPIUM BROMIDE AND ALBUTEROL SULFATE 1 AMPULE: .5; 3 SOLUTION RESPIRATORY (INHALATION) at 21:16

## 2019-01-01 RX ADMIN — DULOXETINE HYDROCHLORIDE 60 MG: 60 CAPSULE, DELAYED RELEASE ORAL at 08:51

## 2019-01-01 RX ADMIN — BACLOFEN 10 MG: 10 TABLET ORAL at 22:39

## 2019-01-01 RX ADMIN — KETOROLAC TROMETHAMINE 15 MG: 15 INJECTION, SOLUTION INTRAMUSCULAR; INTRAVENOUS at 21:41

## 2019-01-01 RX ADMIN — HYOSCYAMINE SULFATE 125 MCG: 0.12 TABLET ORAL; SUBLINGUAL at 04:20

## 2019-01-01 RX ADMIN — BACLOFEN 10 MG: 10 TABLET ORAL at 13:51

## 2019-01-01 RX ADMIN — HYDROMORPHONE HYDROCHLORIDE 2 MG: 2 INJECTION INTRAMUSCULAR; INTRAVENOUS; SUBCUTANEOUS at 23:47

## 2019-01-01 RX ADMIN — HYDROMORPHONE HYDROCHLORIDE 0.5 MG: 1 INJECTION, SOLUTION INTRAMUSCULAR; INTRAVENOUS; SUBCUTANEOUS at 21:26

## 2019-01-01 RX ADMIN — CALCIUM POLYCARBOPHIL 625 MG: 625 TABLET, FILM COATED ORAL at 11:50

## 2019-01-01 RX ADMIN — PHENAZOPYRIDINE HYDROCHLORIDE 100 MG: 100 TABLET ORAL at 20:26

## 2019-01-01 RX ADMIN — PHENAZOPYRIDINE HYDROCHLORIDE 200 MG: 100 TABLET ORAL at 11:51

## 2019-01-01 RX ADMIN — HYDROMORPHONE HYDROCHLORIDE 0.5 MG: 1 INJECTION, SOLUTION INTRAMUSCULAR; INTRAVENOUS; SUBCUTANEOUS at 10:33

## 2019-01-01 RX ADMIN — OXYCODONE HYDROCHLORIDE 10 MG: 5 TABLET ORAL at 18:43

## 2019-01-01 RX ADMIN — IBUPROFEN 800 MG: 800 TABLET, FILM COATED ORAL at 05:30

## 2019-01-01 RX ADMIN — PROMETHAZINE HYDROCHLORIDE 25 MG: 25 INJECTION INTRAMUSCULAR; INTRAVENOUS at 05:49

## 2019-01-01 RX ADMIN — HYDROMORPHONE HYDROCHLORIDE 0.5 MG: 1 INJECTION, SOLUTION INTRAMUSCULAR; INTRAVENOUS; SUBCUTANEOUS at 01:59

## 2019-01-01 RX ADMIN — PROMETHAZINE HYDROCHLORIDE 12.5 MG: 25 INJECTION INTRAMUSCULAR; INTRAVENOUS at 19:54

## 2019-01-01 RX ADMIN — BACLOFEN 10 MG: 10 TABLET ORAL at 22:30

## 2019-01-01 RX ADMIN — Medication 10 ML: at 14:47

## 2019-01-01 RX ADMIN — HYDROMORPHONE HYDROCHLORIDE 1 MG: 1 INJECTION, SOLUTION INTRAMUSCULAR; INTRAVENOUS; SUBCUTANEOUS at 16:20

## 2019-01-01 RX ADMIN — TOPIRAMATE 25 MG: 25 TABLET, FILM COATED ORAL at 10:40

## 2019-01-01 RX ADMIN — FENTANYL CITRATE 50 MCG: 50 INJECTION, SOLUTION INTRAMUSCULAR; INTRAVENOUS at 15:58

## 2019-01-01 RX ADMIN — VANCOMYCIN HYDROCHLORIDE 1000 MG: 1 INJECTION, SOLUTION INTRAVENOUS at 20:13

## 2019-01-01 RX ADMIN — HYDROMORPHONE HYDROCHLORIDE 0.5 MG: 1 INJECTION, SOLUTION INTRAMUSCULAR; INTRAVENOUS; SUBCUTANEOUS at 15:55

## 2019-01-01 RX ADMIN — ONDANSETRON 4 MG: 2 INJECTION INTRAMUSCULAR; INTRAVENOUS at 14:38

## 2019-01-01 RX ADMIN — BACLOFEN 10 MG: 10 TABLET ORAL at 13:01

## 2019-01-01 RX ADMIN — MORPHINE SULFATE 15 MG: 15 TABLET, FILM COATED, EXTENDED RELEASE ORAL at 20:00

## 2019-01-01 RX ADMIN — PROMETHAZINE HYDROCHLORIDE 12.5 MG: 25 INJECTION INTRAMUSCULAR; INTRAVENOUS at 06:30

## 2019-01-01 RX ADMIN — AMITRIPTYLINE HYDROCHLORIDE 25 MG: 25 TABLET, FILM COATED ORAL at 20:00

## 2019-01-01 RX ADMIN — SENNOSIDES 8.6 MG: 8.6 TABLET, FILM COATED ORAL at 08:04

## 2019-01-01 RX ADMIN — MORPHINE SULFATE 30 MG: 30 TABLET, FILM COATED, EXTENDED RELEASE ORAL at 08:37

## 2019-01-01 RX ADMIN — Medication 10 ML: at 08:46

## 2019-01-01 RX ADMIN — DULOXETINE HYDROCHLORIDE 60 MG: 60 CAPSULE, DELAYED RELEASE ORAL at 07:44

## 2019-01-01 RX ADMIN — ALTEPLASE 1 MG: 2.2 INJECTION, POWDER, LYOPHILIZED, FOR SOLUTION INTRAVENOUS at 09:47

## 2019-01-01 RX ADMIN — LORAZEPAM 1 MG: 1 TABLET ORAL at 10:11

## 2019-01-01 RX ADMIN — IPRATROPIUM BROMIDE AND ALBUTEROL SULFATE 1 AMPULE: .5; 3 SOLUTION RESPIRATORY (INHALATION) at 13:54

## 2019-01-01 RX ADMIN — HYDROMORPHONE HYDROCHLORIDE 1 MG: 1 INJECTION, SOLUTION INTRAMUSCULAR; INTRAVENOUS; SUBCUTANEOUS at 19:04

## 2019-01-01 RX ADMIN — PROMETHAZINE HYDROCHLORIDE 25 MG: 25 TABLET ORAL at 12:01

## 2019-01-01 RX ADMIN — CEFEPIME HYDROCHLORIDE 2 G: 2 INJECTION, POWDER, FOR SOLUTION INTRAVENOUS at 21:38

## 2019-01-01 RX ADMIN — HYDROMORPHONE HYDROCHLORIDE 2 MG: 2 INJECTION INTRAMUSCULAR; INTRAVENOUS; SUBCUTANEOUS at 10:04

## 2019-01-01 RX ADMIN — DULOXETINE HYDROCHLORIDE 60 MG: 60 CAPSULE, DELAYED RELEASE ORAL at 08:39

## 2019-01-01 RX ADMIN — HYDROMORPHONE HYDROCHLORIDE 1 MG: 1 INJECTION, SOLUTION INTRAMUSCULAR; INTRAVENOUS; SUBCUTANEOUS at 19:46

## 2019-01-01 RX ADMIN — ACETAMINOPHEN 500 MG: 500 TABLET, FILM COATED ORAL at 21:19

## 2019-01-01 RX ADMIN — HYDROMORPHONE HYDROCHLORIDE 0.5 MG: 1 INJECTION, SOLUTION INTRAMUSCULAR; INTRAVENOUS; SUBCUTANEOUS at 10:13

## 2019-01-01 RX ADMIN — HYDROMORPHONE HYDROCHLORIDE 1 MG: 1 INJECTION, SOLUTION INTRAMUSCULAR; INTRAVENOUS; SUBCUTANEOUS at 08:53

## 2019-01-01 RX ADMIN — OXYCODONE HYDROCHLORIDE AND ACETAMINOPHEN 1 TABLET: 7.5; 325 TABLET ORAL at 19:06

## 2019-01-01 RX ADMIN — Medication 10 MG: at 08:57

## 2019-01-01 RX ADMIN — SENNOSIDES 17.2 MG: 8.6 TABLET, FILM COATED ORAL at 21:30

## 2019-01-01 RX ADMIN — BACLOFEN 10 MG: 10 TABLET ORAL at 08:46

## 2019-01-01 RX ADMIN — PHENAZOPYRIDINE HYDROCHLORIDE 200 MG: 100 TABLET ORAL at 09:15

## 2019-01-01 RX ADMIN — PHENAZOPYRIDINE HYDROCHLORIDE 200 MG: 100 TABLET ORAL at 11:10

## 2019-01-01 RX ADMIN — HYDROMORPHONE HYDROCHLORIDE 1 MG: 1 INJECTION, SOLUTION INTRAMUSCULAR; INTRAVENOUS; SUBCUTANEOUS at 16:55

## 2019-01-01 RX ADMIN — ONDANSETRON 4 MG: 2 INJECTION INTRAMUSCULAR; INTRAVENOUS at 13:52

## 2019-01-01 RX ADMIN — ONDANSETRON 4 MG: 2 INJECTION INTRAMUSCULAR; INTRAVENOUS at 01:29

## 2019-01-01 RX ADMIN — PHENAZOPYRIDINE HYDROCHLORIDE 200 MG: 100 TABLET ORAL at 11:35

## 2019-01-01 RX ADMIN — Medication 10 ML: at 09:51

## 2019-01-01 RX ADMIN — NALOXEGOL OXALATE 25 MG: 25 TABLET, FILM COATED ORAL at 06:32

## 2019-01-01 RX ADMIN — PHENAZOPYRIDINE HYDROCHLORIDE 100 MG: 100 TABLET ORAL at 22:33

## 2019-01-01 RX ADMIN — ONDANSETRON 4 MG: 2 INJECTION INTRAMUSCULAR; INTRAVENOUS at 07:26

## 2019-01-01 RX ADMIN — SODIUM CHLORIDE, POTASSIUM CHLORIDE, SODIUM LACTATE AND CALCIUM CHLORIDE 500 ML: 600; 310; 30; 20 INJECTION, SOLUTION INTRAVENOUS at 01:12

## 2019-01-01 RX ADMIN — Medication 10 ML: at 09:33

## 2019-01-01 RX ADMIN — BACLOFEN 20 MG: 10 TABLET ORAL at 08:46

## 2019-01-01 RX ADMIN — OXYCODONE HYDROCHLORIDE 15 MG: 15 TABLET ORAL at 19:42

## 2019-01-01 RX ADMIN — OXYCODONE HYDROCHLORIDE AND ACETAMINOPHEN 1 TABLET: 7.5; 325 TABLET ORAL at 16:04

## 2019-01-01 RX ADMIN — TOPIRAMATE 25 MG: 25 TABLET, FILM COATED ORAL at 08:22

## 2019-01-01 RX ADMIN — AMITRIPTYLINE HYDROCHLORIDE 25 MG: 25 TABLET, FILM COATED ORAL at 20:53

## 2019-01-01 RX ADMIN — POTASSIUM CHLORIDE 10 MEQ: 7.46 INJECTION, SOLUTION INTRAVENOUS at 02:59

## 2019-01-01 RX ADMIN — OXYCODONE HYDROCHLORIDE 10 MG: 5 TABLET ORAL at 13:30

## 2019-01-01 RX ADMIN — OXYCODONE HYDROCHLORIDE 15 MG: 15 TABLET ORAL at 16:21

## 2019-01-01 RX ADMIN — Medication 10 ML: at 16:21

## 2019-01-01 RX ADMIN — HYDROMORPHONE HYDROCHLORIDE 1 MG: 1 INJECTION, SOLUTION INTRAMUSCULAR; INTRAVENOUS; SUBCUTANEOUS at 11:40

## 2019-01-01 RX ADMIN — MORPHINE SULFATE 15 MG: 15 TABLET, FILM COATED, EXTENDED RELEASE ORAL at 21:41

## 2019-01-01 RX ADMIN — PROMETHAZINE HYDROCHLORIDE 25 MG: 25 TABLET ORAL at 11:12

## 2019-01-01 RX ADMIN — Medication 10 ML: at 19:47

## 2019-01-01 RX ADMIN — ZOLPIDEM TARTRATE 10 MG: 10 TABLET, FILM COATED ORAL at 00:21

## 2019-01-01 RX ADMIN — PHENAZOPYRIDINE HYDROCHLORIDE 200 MG: 100 TABLET ORAL at 17:40

## 2019-01-01 RX ADMIN — MORPHINE SULFATE 30 MG: 30 TABLET, FILM COATED, EXTENDED RELEASE ORAL at 09:49

## 2019-01-01 RX ADMIN — LORAZEPAM 1 MG: 1 TABLET ORAL at 20:26

## 2019-01-01 RX ADMIN — GUAIFENESIN 600 MG: 600 TABLET, EXTENDED RELEASE ORAL at 00:17

## 2019-01-01 RX ADMIN — MEROPENEM 1 G: 1 INJECTION, POWDER, FOR SOLUTION INTRAVENOUS at 12:45

## 2019-01-01 RX ADMIN — PROMETHAZINE HYDROCHLORIDE 12.5 MG: 25 INJECTION INTRAMUSCULAR; INTRAVENOUS at 10:19

## 2019-01-01 RX ADMIN — IPRATROPIUM BROMIDE AND ALBUTEROL SULFATE 1 AMPULE: .5; 3 SOLUTION RESPIRATORY (INHALATION) at 12:40

## 2019-01-01 RX ADMIN — KETOROLAC TROMETHAMINE 15 MG: 15 INJECTION, SOLUTION INTRAMUSCULAR; INTRAVENOUS at 22:08

## 2019-01-01 RX ADMIN — HYDROMORPHONE HYDROCHLORIDE 0.5 MG: 1 INJECTION, SOLUTION INTRAMUSCULAR; INTRAVENOUS; SUBCUTANEOUS at 05:41

## 2019-01-01 RX ADMIN — DULOXETINE HYDROCHLORIDE 60 MG: 60 CAPSULE, DELAYED RELEASE ORAL at 22:57

## 2019-01-01 RX ADMIN — PHENAZOPYRIDINE HYDROCHLORIDE 200 MG: 100 TABLET ORAL at 16:21

## 2019-01-01 RX ADMIN — PHENAZOPYRIDINE HYDROCHLORIDE 200 MG: 100 TABLET ORAL at 17:23

## 2019-01-01 RX ADMIN — LORAZEPAM 1 MG: 1 TABLET ORAL at 20:31

## 2019-01-01 RX ADMIN — HYDROMORPHONE HYDROCHLORIDE 1 MG: 1 INJECTION, SOLUTION INTRAMUSCULAR; INTRAVENOUS; SUBCUTANEOUS at 16:06

## 2019-01-01 RX ADMIN — CEFEPIME 2 G: 2 INJECTION, POWDER, FOR SOLUTION INTRAVENOUS at 16:42

## 2019-01-01 RX ADMIN — Medication 10 ML: at 00:18

## 2019-01-01 RX ADMIN — AMITRIPTYLINE HYDROCHLORIDE 25 MG: 25 TABLET, FILM COATED ORAL at 20:29

## 2019-01-01 ASSESSMENT — ENCOUNTER SYMPTOMS
BACK PAIN: 0
STRIDOR: 0
BACK PAIN: 0
COUGH: 0
ABDOMINAL DISTENTION: 1
DIARRHEA: 0
ABDOMINAL PAIN: 1
COLOR CHANGE: 0
TROUBLE SWALLOWING: 0
CHEST TIGHTNESS: 0
COLOR CHANGE: 0
BLOOD IN STOOL: 0
PHOTOPHOBIA: 0
GASTROINTESTINAL NEGATIVE: 1
COLOR CHANGE: 0
COLOR CHANGE: 0
DIARRHEA: 0
SHORTNESS OF BREATH: 1
GASTROINTESTINAL NEGATIVE: 1
DIARRHEA: 0
APNEA: 0
COUGH: 0
COLOR CHANGE: 0
CHOKING: 0
CONSTIPATION: 1
PHOTOPHOBIA: 0
TROUBLE SWALLOWING: 0
SORE THROAT: 0
ABDOMINAL PAIN: 1
BACK PAIN: 1
RHINORRHEA: 0
APNEA: 0
ABDOMINAL DISTENTION: 1
WHEEZING: 0
VOMITING: 1
NAUSEA: 0
ABDOMINAL DISTENTION: 0
ABDOMINAL PAIN: 1
EYE REDNESS: 0
CHOKING: 0
SHORTNESS OF BREATH: 1
DIARRHEA: 0
COUGH: 1
VOMITING: 0
RHINORRHEA: 0
CONSTIPATION: 0
CHOKING: 0
CONSTIPATION: 1
SHORTNESS OF BREATH: 1
CHEST TIGHTNESS: 0
ABDOMINAL DISTENTION: 1
VOMITING: 0
COLOR CHANGE: 0
SINUS PRESSURE: 0
COUGH: 0
SINUS PRESSURE: 0
CHOKING: 0
COUGH: 1
SINUS PRESSURE: 0
EYE DISCHARGE: 0
CHOKING: 0
NAUSEA: 0
SINUS PRESSURE: 0
STRIDOR: 0
ANAL BLEEDING: 0
NAUSEA: 0
RHINORRHEA: 0
RHINORRHEA: 0
ABDOMINAL PAIN: 0
STRIDOR: 0
NAUSEA: 1
ABDOMINAL PAIN: 1
CHEST TIGHTNESS: 0
WHEEZING: 0
PHOTOPHOBIA: 0
DIARRHEA: 0
SHORTNESS OF BREATH: 0
STRIDOR: 0
EYE REDNESS: 0
VOMITING: 0
FACIAL SWELLING: 0
BACK PAIN: 1
DIARRHEA: 0
VOMITING: 1
STRIDOR: 0
BLOOD IN STOOL: 0
EYE PAIN: 0
PHOTOPHOBIA: 0
SHORTNESS OF BREATH: 1
CHOKING: 0
CONSTIPATION: 0
BLOOD IN STOOL: 0
SHORTNESS OF BREATH: 0
STRIDOR: 0
ABDOMINAL PAIN: 0
APNEA: 0
RHINORRHEA: 0
VOICE CHANGE: 0
TROUBLE SWALLOWING: 0
APNEA: 0
SHORTNESS OF BREATH: 1
CHEST TIGHTNESS: 0
NAUSEA: 0
ABDOMINAL PAIN: 0
COUGH: 1
DIARRHEA: 0
COUGH: 0
SHORTNESS OF BREATH: 0
BLOOD IN STOOL: 0
ABDOMINAL PAIN: 1
SHORTNESS OF BREATH: 1
COLOR CHANGE: 0
DIARRHEA: 0
CONSTIPATION: 0
SHORTNESS OF BREATH: 1
EYE DISCHARGE: 0
RHINORRHEA: 0
EYE REDNESS: 0
BACK PAIN: 0
ABDOMINAL PAIN: 1
ALLERGIC/IMMUNOLOGIC NEGATIVE: 1
CONSTIPATION: 0
PHOTOPHOBIA: 0
WHEEZING: 0
SORE THROAT: 1
CHEST TIGHTNESS: 0
SHORTNESS OF BREATH: 0
COUGH: 1
SORE THROAT: 0
NAUSEA: 0
NAUSEA: 1
NAUSEA: 1
EYE REDNESS: 0
CHEST TIGHTNESS: 0
SHORTNESS OF BREATH: 1
STRIDOR: 0
EYE ITCHING: 0
DIARRHEA: 0
SINUS PAIN: 0
DIARRHEA: 0
ABDOMINAL PAIN: 0
ABDOMINAL PAIN: 1
APNEA: 0
EYE DISCHARGE: 0
EYE DISCHARGE: 0
GASTROINTESTINAL NEGATIVE: 1
SHORTNESS OF BREATH: 0
BACK PAIN: 1
CHEST TIGHTNESS: 1
ANAL BLEEDING: 0
SHORTNESS OF BREATH: 1
SHORTNESS OF BREATH: 0
ABDOMINAL PAIN: 1
NAUSEA: 1
RHINORRHEA: 0
CHEST TIGHTNESS: 0
EYES NEGATIVE: 1
ABDOMINAL PAIN: 0
WHEEZING: 0
CONSTIPATION: 0
SHORTNESS OF BREATH: 1
DIARRHEA: 0
SORE THROAT: 0
TROUBLE SWALLOWING: 0
CHOKING: 0
STRIDOR: 0
APNEA: 0
SHORTNESS OF BREATH: 0
ABDOMINAL PAIN: 0
NAUSEA: 1
SHORTNESS OF BREATH: 1
NAUSEA: 0
CHEST TIGHTNESS: 0
APNEA: 0
VOICE CHANGE: 0
VOICE CHANGE: 0
CHEST TIGHTNESS: 0
ABDOMINAL DISTENTION: 0
FACIAL SWELLING: 0
COUGH: 0
ABDOMINAL PAIN: 0
ABDOMINAL PAIN: 1
RHINORRHEA: 0
SHORTNESS OF BREATH: 1
NAUSEA: 0
COUGH: 0
EYES NEGATIVE: 1
COUGH: 1
BLOOD IN STOOL: 0
CONSTIPATION: 0
ABDOMINAL PAIN: 0
STRIDOR: 0
FACIAL SWELLING: 0
BACK PAIN: 1
SHORTNESS OF BREATH: 1
CHOKING: 0
EYE DISCHARGE: 0
COUGH: 0
COUGH: 0
COLOR CHANGE: 0
SHORTNESS OF BREATH: 1
NAUSEA: 0
PHOTOPHOBIA: 0
FACIAL SWELLING: 0
CHEST TIGHTNESS: 1
COLOR CHANGE: 0
WHEEZING: 0
BLOOD IN STOOL: 0
SORE THROAT: 0
RECTAL PAIN: 0
COLOR CHANGE: 0
VOMITING: 1
FACIAL SWELLING: 0
ANAL BLEEDING: 0
NAUSEA: 0
BACK PAIN: 0
DIARRHEA: 0
ABDOMINAL PAIN: 1
ABDOMINAL DISTENTION: 1
EYE DISCHARGE: 0
TROUBLE SWALLOWING: 1
PHOTOPHOBIA: 0
SHORTNESS OF BREATH: 1
STRIDOR: 0
TROUBLE SWALLOWING: 0
STRIDOR: 0
FACIAL SWELLING: 0
BLOOD IN STOOL: 0
VOMITING: 0
EYE REDNESS: 0
DIARRHEA: 0
SHORTNESS OF BREATH: 0
ANAL BLEEDING: 0
EYE REDNESS: 0
COUGH: 0
COUGH: 0
RHINORRHEA: 0
BLOOD IN STOOL: 0
BACK PAIN: 1
CHEST TIGHTNESS: 0
APNEA: 0
ABDOMINAL DISTENTION: 0
DIARRHEA: 0
BLOOD IN STOOL: 0
EYE DISCHARGE: 0
ABDOMINAL DISTENTION: 0
DIARRHEA: 0
EYE PAIN: 0
EYE REDNESS: 0
SHORTNESS OF BREATH: 1
SHORTNESS OF BREATH: 0
EYE REDNESS: 0
BLOOD IN STOOL: 0
CONSTIPATION: 0
ABDOMINAL PAIN: 0
RESPIRATORY NEGATIVE: 1
COLOR CHANGE: 0
WHEEZING: 1
ABDOMINAL PAIN: 0
BLOOD IN STOOL: 0
CHOKING: 0
APNEA: 0
CHEST TIGHTNESS: 0
VOMITING: 0
ANAL BLEEDING: 0
NAUSEA: 0
TROUBLE SWALLOWING: 0
SHORTNESS OF BREATH: 1

## 2019-01-01 ASSESSMENT — PULMONARY FUNCTION TESTS
PIF_VALUE: 20
PIF_VALUE: 22
PIF_VALUE: 13
PIF_VALUE: 1
PIF_VALUE: 20
PIF_VALUE: 1
PIF_VALUE: 4
PIF_VALUE: 20
PIF_VALUE: 0
PIF_VALUE: 9
PIF_VALUE: 1
PIF_VALUE: 9
PIF_VALUE: 10
PIF_VALUE: 22
PIF_VALUE: 0
PIF_VALUE: 1
PIF_VALUE: 11
PIF_VALUE: 4
PIF_VALUE: 21
PIF_VALUE: 20
PIF_VALUE: 20
PIF_VALUE: 0
PIF_VALUE: 11
PIF_VALUE: 0
PIF_VALUE: 19
PIF_VALUE: 22
PIF_VALUE: 0
PIF_VALUE: 14
PIF_VALUE: 1
PIF_VALUE: 1
PIF_VALUE: 5
PIF_VALUE: 1
PIF_VALUE: 20
PIF_VALUE: 4
PIF_VALUE: 11
PIF_VALUE: 20
PIF_VALUE: 9
PIF_VALUE: 11
PIF_VALUE: 0
PIF_VALUE: 0
PIF_VALUE: 20
PIF_VALUE: 20
PIF_VALUE: 2
PIF_VALUE: 3
PIF_VALUE: 21
PIF_VALUE: 20
PIF_VALUE: 21
PIF_VALUE: 21
PIF_VALUE: 0
PIF_VALUE: 22
PIF_VALUE: 21
PIF_VALUE: 0
PIF_VALUE: 1
PIF_VALUE: 1
PIF_VALUE: 0
PIF_VALUE: 21
PIF_VALUE: 14
PIF_VALUE: 19
PIF_VALUE: 22
PIF_VALUE: 0
PIF_VALUE: 22
PIF_VALUE: 10
PIF_VALUE: 21
PIF_VALUE: 9
PIF_VALUE: 20
PIF_VALUE: 0
PIF_VALUE: 17
PIF_VALUE: 22
PIF_VALUE: 21
PIF_VALUE: 17
PIF_VALUE: 22
PIF_VALUE: 0
PIF_VALUE: 20
PIF_VALUE: 20
PIF_VALUE: 9
PIF_VALUE: 19
PIF_VALUE: 9
PIF_VALUE: 0
PIF_VALUE: 4
PIF_VALUE: 0
PIF_VALUE: 20
PIF_VALUE: 21
PIF_VALUE: 1
PIF_VALUE: 0
PIF_VALUE: 0
PIF_VALUE: 4
PIF_VALUE: 20
PIF_VALUE: 23
PIF_VALUE: 21
PIF_VALUE: 12
PIF_VALUE: 22
PIF_VALUE: 20
PIF_VALUE: 21
PIF_VALUE: 3
PIF_VALUE: 3
PIF_VALUE: 22
PIF_VALUE: 0
PIF_VALUE: 20
PIF_VALUE: 9
PIF_VALUE: 10
PIF_VALUE: 19
PIF_VALUE: 20
PIF_VALUE: 22
PIF_VALUE: 22
PIF_VALUE: 0
PIF_VALUE: 0
PIF_VALUE: 3
PIF_VALUE: 22
PIF_VALUE: 22
PIF_VALUE: 20
PIF_VALUE: 13
PIF_VALUE: 21
PIF_VALUE: 9
PIF_VALUE: 1
PIF_VALUE: 21
PIF_VALUE: 20
PIF_VALUE: 0
PIF_VALUE: 0
PIF_VALUE: 9
PIF_VALUE: 22
PIF_VALUE: 20
PIF_VALUE: 20
PIF_VALUE: 4
PIF_VALUE: 0
PIF_VALUE: 3
PIF_VALUE: 20
PIF_VALUE: 20
PIF_VALUE: 0
PIF_VALUE: 9
PIF_VALUE: 23
PIF_VALUE: 20
PIF_VALUE: 0
PIF_VALUE: 1
PIF_VALUE: 21
PIF_VALUE: 20
PIF_VALUE: 22
PIF_VALUE: 18
PIF_VALUE: 4
PIF_VALUE: 1
PIF_VALUE: 0
PIF_VALUE: 21
PIF_VALUE: 22
PIF_VALUE: 13
PIF_VALUE: 11
PIF_VALUE: 20
PIF_VALUE: 22
PIF_VALUE: 1
PIF_VALUE: 18
PIF_VALUE: 0
PIF_VALUE: 22
PIF_VALUE: 20
PIF_VALUE: 1
PIF_VALUE: 0
PIF_VALUE: 21
PIF_VALUE: 20
PIF_VALUE: 1
PIF_VALUE: 1
PIF_VALUE: 0
PIF_VALUE: 20
PIF_VALUE: 20
PIF_VALUE: 2
PIF_VALUE: 20
PIF_VALUE: 21
PIF_VALUE: 20
PIF_VALUE: 5
PIF_VALUE: 19
PIF_VALUE: 22
PIF_VALUE: 20
PIF_VALUE: 25
PIF_VALUE: 4
PIF_VALUE: 0
PIF_VALUE: 0
PIF_VALUE: 22
PIF_VALUE: 4
PIF_VALUE: 0
PIF_VALUE: 13
PIF_VALUE: 18
PIF_VALUE: 0
PIF_VALUE: 0
PIF_VALUE: 21
PIF_VALUE: 0
PIF_VALUE: 20
PIF_VALUE: 0
PIF_VALUE: 10
PIF_VALUE: 0
PIF_VALUE: 20
PIF_VALUE: 21
PIF_VALUE: 21
PIF_VALUE: 0
PIF_VALUE: 20
PIF_VALUE: 2
PIF_VALUE: 20
PIF_VALUE: 2
PIF_VALUE: 0
PIF_VALUE: 2
PIF_VALUE: 1
PIF_VALUE: 22
PIF_VALUE: 9
PIF_VALUE: 20
PIF_VALUE: 1
PIF_VALUE: 9
PIF_VALUE: 0
PIF_VALUE: 10
PIF_VALUE: 20
PIF_VALUE: 0
PIF_VALUE: 5
PIF_VALUE: 0
PIF_VALUE: 1
PIF_VALUE: 0
PIF_VALUE: 1
PIF_VALUE: 0
PIF_VALUE: 4
PIF_VALUE: 20
PIF_VALUE: 23
PIF_VALUE: 20
PIF_VALUE: 20
PIF_VALUE: 4
PIF_VALUE: 22
PIF_VALUE: 10
PIF_VALUE: 4
PIF_VALUE: 22
PIF_VALUE: 18
PIF_VALUE: 20
PIF_VALUE: 22
PIF_VALUE: 0
PIF_VALUE: 0
PIF_VALUE: 20
PIF_VALUE: 21
PIF_VALUE: 20
PIF_VALUE: 20
PIF_VALUE: 1
PIF_VALUE: 0
PIF_VALUE: 22
PIF_VALUE: 19
PIF_VALUE: 1
PIF_VALUE: 9
PIF_VALUE: 22
PIF_VALUE: 21
PIF_VALUE: 9
PIF_VALUE: 0
PIF_VALUE: 22
PIF_VALUE: 20
PIF_VALUE: 0
PIF_VALUE: 0
PIF_VALUE: 9
PIF_VALUE: 0
PIF_VALUE: 5
PIF_VALUE: 1
PIF_VALUE: 21
PIF_VALUE: 1

## 2019-01-01 ASSESSMENT — PAIN SCALES - GENERAL
PAINLEVEL_OUTOF10: 4
PAINLEVEL_OUTOF10: 7
PAINLEVEL_OUTOF10: 5
PAINLEVEL_OUTOF10: 7
PAINLEVEL_OUTOF10: 10
PAINLEVEL_OUTOF10: 8
PAINLEVEL_OUTOF10: 10
PAINLEVEL_OUTOF10: 8
PAINLEVEL_OUTOF10: 7
PAINLEVEL_OUTOF10: 9
PAINLEVEL_OUTOF10: 4
PAINLEVEL_OUTOF10: 5
PAINLEVEL_OUTOF10: 8
PAINLEVEL_OUTOF10: 7
PAINLEVEL_OUTOF10: 10
PAINLEVEL_OUTOF10: 0
PAINLEVEL_OUTOF10: 4
PAINLEVEL_OUTOF10: 5
PAINLEVEL_OUTOF10: 8
PAINLEVEL_OUTOF10: 4
PAINLEVEL_OUTOF10: 7
PAINLEVEL_OUTOF10: 7
PAINLEVEL_OUTOF10: 5
PAINLEVEL_OUTOF10: 3
PAINLEVEL_OUTOF10: 6
PAINLEVEL_OUTOF10: 5
PAINLEVEL_OUTOF10: 8
PAINLEVEL_OUTOF10: 10
PAINLEVEL_OUTOF10: 8
PAINLEVEL_OUTOF10: 7
PAINLEVEL_OUTOF10: 7
PAINLEVEL_OUTOF10: 4
PAINLEVEL_OUTOF10: 10
PAINLEVEL_OUTOF10: 8
PAINLEVEL_OUTOF10: 4
PAINLEVEL_OUTOF10: 10
PAINLEVEL_OUTOF10: 3
PAINLEVEL_OUTOF10: 6
PAINLEVEL_OUTOF10: 10
PAINLEVEL_OUTOF10: 9
PAINLEVEL_OUTOF10: 3
PAINLEVEL_OUTOF10: 9
PAINLEVEL_OUTOF10: 7
PAINLEVEL_OUTOF10: 8
PAINLEVEL_OUTOF10: 8
PAINLEVEL_OUTOF10: 6
PAINLEVEL_OUTOF10: 8
PAINLEVEL_OUTOF10: 7
PAINLEVEL_OUTOF10: 9
PAINLEVEL_OUTOF10: 9
PAINLEVEL_OUTOF10: 8
PAINLEVEL_OUTOF10: 10
PAINLEVEL_OUTOF10: 8
PAINLEVEL_OUTOF10: 8
PAINLEVEL_OUTOF10: 3
PAINLEVEL_OUTOF10: 8
PAINLEVEL_OUTOF10: 8
PAINLEVEL_OUTOF10: 5
PAINLEVEL_OUTOF10: 5
PAINLEVEL_OUTOF10: 7
PAINLEVEL_OUTOF10: 5
PAINLEVEL_OUTOF10: 0
PAINLEVEL_OUTOF10: 7
PAINLEVEL_OUTOF10: 9
PAINLEVEL_OUTOF10: 3
PAINLEVEL_OUTOF10: 7
PAINLEVEL_OUTOF10: 8
PAINLEVEL_OUTOF10: 8
PAINLEVEL_OUTOF10: 5
PAINLEVEL_OUTOF10: 10
PAINLEVEL_OUTOF10: 5
PAINLEVEL_OUTOF10: 7
PAINLEVEL_OUTOF10: 9
PAINLEVEL_OUTOF10: 8
PAINLEVEL_OUTOF10: 7
PAINLEVEL_OUTOF10: 5
PAINLEVEL_OUTOF10: 6
PAINLEVEL_OUTOF10: 7
PAINLEVEL_OUTOF10: 8
PAINLEVEL_OUTOF10: 9
PAINLEVEL_OUTOF10: 8
PAINLEVEL_OUTOF10: 7
PAINLEVEL_OUTOF10: 8
PAINLEVEL_OUTOF10: 8
PAINLEVEL_OUTOF10: 6
PAINLEVEL_OUTOF10: 10
PAINLEVEL_OUTOF10: 7
PAINLEVEL_OUTOF10: 3
PAINLEVEL_OUTOF10: 7
PAINLEVEL_OUTOF10: 10
PAINLEVEL_OUTOF10: 7
PAINLEVEL_OUTOF10: 8
PAINLEVEL_OUTOF10: 0
PAINLEVEL_OUTOF10: 7
PAINLEVEL_OUTOF10: 4
PAINLEVEL_OUTOF10: 4
PAINLEVEL_OUTOF10: 8
PAINLEVEL_OUTOF10: 9
PAINLEVEL_OUTOF10: 7
PAINLEVEL_OUTOF10: 6
PAINLEVEL_OUTOF10: 10
PAINLEVEL_OUTOF10: 7
PAINLEVEL_OUTOF10: 5
PAINLEVEL_OUTOF10: 3
PAINLEVEL_OUTOF10: 9
PAINLEVEL_OUTOF10: 7
PAINLEVEL_OUTOF10: 4
PAINLEVEL_OUTOF10: 9
PAINLEVEL_OUTOF10: 5
PAINLEVEL_OUTOF10: 8
PAINLEVEL_OUTOF10: 6
PAINLEVEL_OUTOF10: 10
PAINLEVEL_OUTOF10: 6
PAINLEVEL_OUTOF10: 4
PAINLEVEL_OUTOF10: 7
PAINLEVEL_OUTOF10: 8
PAINLEVEL_OUTOF10: 7
PAINLEVEL_OUTOF10: 10
PAINLEVEL_OUTOF10: 4
PAINLEVEL_OUTOF10: 6
PAINLEVEL_OUTOF10: 2
PAINLEVEL_OUTOF10: 6
PAINLEVEL_OUTOF10: 0
PAINLEVEL_OUTOF10: 6
PAINLEVEL_OUTOF10: 5
PAINLEVEL_OUTOF10: 7
PAINLEVEL_OUTOF10: 2
PAINLEVEL_OUTOF10: 7
PAINLEVEL_OUTOF10: 9
PAINLEVEL_OUTOF10: 8
PAINLEVEL_OUTOF10: 6
PAINLEVEL_OUTOF10: 5
PAINLEVEL_OUTOF10: 8
PAINLEVEL_OUTOF10: 8
PAINLEVEL_OUTOF10: 4
PAINLEVEL_OUTOF10: 6
PAINLEVEL_OUTOF10: 7
PAINLEVEL_OUTOF10: 7
PAINLEVEL_OUTOF10: 5
PAINLEVEL_OUTOF10: 7
PAINLEVEL_OUTOF10: 8
PAINLEVEL_OUTOF10: 5
PAINLEVEL_OUTOF10: 8
PAINLEVEL_OUTOF10: 6
PAINLEVEL_OUTOF10: 10
PAINLEVEL_OUTOF10: 7
PAINLEVEL_OUTOF10: 0
PAINLEVEL_OUTOF10: 8
PAINLEVEL_OUTOF10: 7
PAINLEVEL_OUTOF10: 9
PAINLEVEL_OUTOF10: 7
PAINLEVEL_OUTOF10: 8
PAINLEVEL_OUTOF10: 6
PAINLEVEL_OUTOF10: 5
PAINLEVEL_OUTOF10: 8
PAINLEVEL_OUTOF10: 5
PAINLEVEL_OUTOF10: 10
PAINLEVEL_OUTOF10: 6
PAINLEVEL_OUTOF10: 7
PAINLEVEL_OUTOF10: 3
PAINLEVEL_OUTOF10: 6
PAINLEVEL_OUTOF10: 8
PAINLEVEL_OUTOF10: 0
PAINLEVEL_OUTOF10: 9
PAINLEVEL_OUTOF10: 7
PAINLEVEL_OUTOF10: 7
PAINLEVEL_OUTOF10: 6
PAINLEVEL_OUTOF10: 5
PAINLEVEL_OUTOF10: 8
PAINLEVEL_OUTOF10: 7
PAINLEVEL_OUTOF10: 10
PAINLEVEL_OUTOF10: 6
PAINLEVEL_OUTOF10: 4
PAINLEVEL_OUTOF10: 10
PAINLEVEL_OUTOF10: 3
PAINLEVEL_OUTOF10: 9
PAINLEVEL_OUTOF10: 9
PAINLEVEL_OUTOF10: 6
PAINLEVEL_OUTOF10: 0
PAINLEVEL_OUTOF10: 5
PAINLEVEL_OUTOF10: 7
PAINLEVEL_OUTOF10: 3
PAINLEVEL_OUTOF10: 9
PAINLEVEL_OUTOF10: 7
PAINLEVEL_OUTOF10: 0
PAINLEVEL_OUTOF10: 6
PAINLEVEL_OUTOF10: 6
PAINLEVEL_OUTOF10: 7
PAINLEVEL_OUTOF10: 7
PAINLEVEL_OUTOF10: 5
PAINLEVEL_OUTOF10: 3
PAINLEVEL_OUTOF10: 6
PAINLEVEL_OUTOF10: 8
PAINLEVEL_OUTOF10: 6
PAINLEVEL_OUTOF10: 8
PAINLEVEL_OUTOF10: 10
PAINLEVEL_OUTOF10: 5
PAINLEVEL_OUTOF10: 0
PAINLEVEL_OUTOF10: 7
PAINLEVEL_OUTOF10: 6
PAINLEVEL_OUTOF10: 5
PAINLEVEL_OUTOF10: 7
PAINLEVEL_OUTOF10: 4
PAINLEVEL_OUTOF10: 7
PAINLEVEL_OUTOF10: 10
PAINLEVEL_OUTOF10: 7
PAINLEVEL_OUTOF10: 10
PAINLEVEL_OUTOF10: 9
PAINLEVEL_OUTOF10: 8
PAINLEVEL_OUTOF10: 8
PAINLEVEL_OUTOF10: 0
PAINLEVEL_OUTOF10: 7
PAINLEVEL_OUTOF10: 10
PAINLEVEL_OUTOF10: 3
PAINLEVEL_OUTOF10: 8
PAINLEVEL_OUTOF10: 7
PAINLEVEL_OUTOF10: 2
PAINLEVEL_OUTOF10: 7
PAINLEVEL_OUTOF10: 0
PAINLEVEL_OUTOF10: 7
PAINLEVEL_OUTOF10: 0
PAINLEVEL_OUTOF10: 7
PAINLEVEL_OUTOF10: 4
PAINLEVEL_OUTOF10: 2
PAINLEVEL_OUTOF10: 8
PAINLEVEL_OUTOF10: 6
PAINLEVEL_OUTOF10: 7
PAINLEVEL_OUTOF10: 4
PAINLEVEL_OUTOF10: 7
PAINLEVEL_OUTOF10: 0
PAINLEVEL_OUTOF10: 5
PAINLEVEL_OUTOF10: 0
PAINLEVEL_OUTOF10: 0
PAINLEVEL_OUTOF10: 7
PAINLEVEL_OUTOF10: 5
PAINLEVEL_OUTOF10: 6
PAINLEVEL_OUTOF10: 6
PAINLEVEL_OUTOF10: 7
PAINLEVEL_OUTOF10: 7
PAINLEVEL_OUTOF10: 4
PAINLEVEL_OUTOF10: 6
PAINLEVEL_OUTOF10: 6
PAINLEVEL_OUTOF10: 8
PAINLEVEL_OUTOF10: 8
PAINLEVEL_OUTOF10: 2
PAINLEVEL_OUTOF10: 6
PAINLEVEL_OUTOF10: 7
PAINLEVEL_OUTOF10: 8
PAINLEVEL_OUTOF10: 3
PAINLEVEL_OUTOF10: 10
PAINLEVEL_OUTOF10: 5
PAINLEVEL_OUTOF10: 0
PAINLEVEL_OUTOF10: 7
PAINLEVEL_OUTOF10: 6
PAINLEVEL_OUTOF10: 7
PAINLEVEL_OUTOF10: 5
PAINLEVEL_OUTOF10: 8
PAINLEVEL_OUTOF10: 7
PAINLEVEL_OUTOF10: 7
PAINLEVEL_OUTOF10: 5
PAINLEVEL_OUTOF10: 3
PAINLEVEL_OUTOF10: 6
PAINLEVEL_OUTOF10: 9
PAINLEVEL_OUTOF10: 7
PAINLEVEL_OUTOF10: 9
PAINLEVEL_OUTOF10: 2
PAINLEVEL_OUTOF10: 2
PAINLEVEL_OUTOF10: 3
PAINLEVEL_OUTOF10: 7
PAINLEVEL_OUTOF10: 8
PAINLEVEL_OUTOF10: 0
PAINLEVEL_OUTOF10: 7
PAINLEVEL_OUTOF10: 7
PAINLEVEL_OUTOF10: 4
PAINLEVEL_OUTOF10: 7
PAINLEVEL_OUTOF10: 9
PAINLEVEL_OUTOF10: 6
PAINLEVEL_OUTOF10: 8
PAINLEVEL_OUTOF10: 6
PAINLEVEL_OUTOF10: 8
PAINLEVEL_OUTOF10: 7
PAINLEVEL_OUTOF10: 4
PAINLEVEL_OUTOF10: 9
PAINLEVEL_OUTOF10: 4
PAINLEVEL_OUTOF10: 8
PAINLEVEL_OUTOF10: 7
PAINLEVEL_OUTOF10: 8
PAINLEVEL_OUTOF10: 6
PAINLEVEL_OUTOF10: 7
PAINLEVEL_OUTOF10: 10
PAINLEVEL_OUTOF10: 7
PAINLEVEL_OUTOF10: 8
PAINLEVEL_OUTOF10: 7
PAINLEVEL_OUTOF10: 4
PAINLEVEL_OUTOF10: 6
PAINLEVEL_OUTOF10: 0
PAINLEVEL_OUTOF10: 4
PAINLEVEL_OUTOF10: 4
PAINLEVEL_OUTOF10: 5
PAINLEVEL_OUTOF10: 10
PAINLEVEL_OUTOF10: 7
PAINLEVEL_OUTOF10: 6
PAINLEVEL_OUTOF10: 5
PAINLEVEL_OUTOF10: 7
PAINLEVEL_OUTOF10: 5
PAINLEVEL_OUTOF10: 5
PAINLEVEL_OUTOF10: 7
PAINLEVEL_OUTOF10: 4
PAINLEVEL_OUTOF10: 8
PAINLEVEL_OUTOF10: 10
PAINLEVEL_OUTOF10: 7
PAINLEVEL_OUTOF10: 6
PAINLEVEL_OUTOF10: 6
PAINLEVEL_OUTOF10: 7
PAINLEVEL_OUTOF10: 6
PAINLEVEL_OUTOF10: 10
PAINLEVEL_OUTOF10: 9
PAINLEVEL_OUTOF10: 10
PAINLEVEL_OUTOF10: 0
PAINLEVEL_OUTOF10: 7
PAINLEVEL_OUTOF10: 8
PAINLEVEL_OUTOF10: 8
PAINLEVEL_OUTOF10: 0
PAINLEVEL_OUTOF10: 8
PAINLEVEL_OUTOF10: 7
PAINLEVEL_OUTOF10: 4
PAINLEVEL_OUTOF10: 0
PAINLEVEL_OUTOF10: 0
PAINLEVEL_OUTOF10: 4
PAINLEVEL_OUTOF10: 5
PAINLEVEL_OUTOF10: 5
PAINLEVEL_OUTOF10: 9
PAINLEVEL_OUTOF10: 7
PAINLEVEL_OUTOF10: 3
PAINLEVEL_OUTOF10: 9
PAINLEVEL_OUTOF10: 7
PAINLEVEL_OUTOF10: 7
PAINLEVEL_OUTOF10: 10
PAINLEVEL_OUTOF10: 0
PAINLEVEL_OUTOF10: 5
PAINLEVEL_OUTOF10: 0
PAINLEVEL_OUTOF10: 6
PAINLEVEL_OUTOF10: 6
PAINLEVEL_OUTOF10: 4
PAINLEVEL_OUTOF10: 7
PAINLEVEL_OUTOF10: 9
PAINLEVEL_OUTOF10: 6
PAINLEVEL_OUTOF10: 7
PAINLEVEL_OUTOF10: 6
PAINLEVEL_OUTOF10: 10
PAINLEVEL_OUTOF10: 6
PAINLEVEL_OUTOF10: 3
PAINLEVEL_OUTOF10: 6
PAINLEVEL_OUTOF10: 5
PAINLEVEL_OUTOF10: 6
PAINLEVEL_OUTOF10: 2
PAINLEVEL_OUTOF10: 7
PAINLEVEL_OUTOF10: 0
PAINLEVEL_OUTOF10: 8
PAINLEVEL_OUTOF10: 3
PAINLEVEL_OUTOF10: 7
PAINLEVEL_OUTOF10: 2
PAINLEVEL_OUTOF10: 7
PAINLEVEL_OUTOF10: 8
PAINLEVEL_OUTOF10: 8
PAINLEVEL_OUTOF10: 4
PAINLEVEL_OUTOF10: 6
PAINLEVEL_OUTOF10: 10
PAINLEVEL_OUTOF10: 6
PAINLEVEL_OUTOF10: 6
PAINLEVEL_OUTOF10: 0
PAINLEVEL_OUTOF10: 8
PAINLEVEL_OUTOF10: 7
PAINLEVEL_OUTOF10: 7
PAINLEVEL_OUTOF10: 6
PAINLEVEL_OUTOF10: 7
PAINLEVEL_OUTOF10: 8
PAINLEVEL_OUTOF10: 4
PAINLEVEL_OUTOF10: 6
PAINLEVEL_OUTOF10: 8
PAINLEVEL_OUTOF10: 8
PAINLEVEL_OUTOF10: 9
PAINLEVEL_OUTOF10: 8
PAINLEVEL_OUTOF10: 7
PAINLEVEL_OUTOF10: 0
PAINLEVEL_OUTOF10: 5
PAINLEVEL_OUTOF10: 0
PAINLEVEL_OUTOF10: 4
PAINLEVEL_OUTOF10: 2
PAINLEVEL_OUTOF10: 9
PAINLEVEL_OUTOF10: 5
PAINLEVEL_OUTOF10: 5
PAINLEVEL_OUTOF10: 7
PAINLEVEL_OUTOF10: 7
PAINLEVEL_OUTOF10: 8
PAINLEVEL_OUTOF10: 0
PAINLEVEL_OUTOF10: 7
PAINLEVEL_OUTOF10: 6
PAINLEVEL_OUTOF10: 7
PAINLEVEL_OUTOF10: 6
PAINLEVEL_OUTOF10: 8
PAINLEVEL_OUTOF10: 6
PAINLEVEL_OUTOF10: 5
PAINLEVEL_OUTOF10: 6
PAINLEVEL_OUTOF10: 8
PAINLEVEL_OUTOF10: 9
PAINLEVEL_OUTOF10: 5
PAINLEVEL_OUTOF10: 8
PAINLEVEL_OUTOF10: 5
PAINLEVEL_OUTOF10: 7
PAINLEVEL_OUTOF10: 6
PAINLEVEL_OUTOF10: 8
PAINLEVEL_OUTOF10: 7
PAINLEVEL_OUTOF10: 10
PAINLEVEL_OUTOF10: 7
PAINLEVEL_OUTOF10: 5
PAINLEVEL_OUTOF10: 7
PAINLEVEL_OUTOF10: 5
PAINLEVEL_OUTOF10: 7
PAINLEVEL_OUTOF10: 3
PAINLEVEL_OUTOF10: 8
PAINLEVEL_OUTOF10: 0
PAINLEVEL_OUTOF10: 6
PAINLEVEL_OUTOF10: 8
PAINLEVEL_OUTOF10: 9
PAINLEVEL_OUTOF10: 5
PAINLEVEL_OUTOF10: 9
PAINLEVEL_OUTOF10: 6
PAINLEVEL_OUTOF10: 8
PAINLEVEL_OUTOF10: 2
PAINLEVEL_OUTOF10: 7
PAINLEVEL_OUTOF10: 7
PAINLEVEL_OUTOF10: 8
PAINLEVEL_OUTOF10: 8
PAINLEVEL_OUTOF10: 5
PAINLEVEL_OUTOF10: 7
PAINLEVEL_OUTOF10: 5
PAINLEVEL_OUTOF10: 7
PAINLEVEL_OUTOF10: 6
PAINLEVEL_OUTOF10: 8
PAINLEVEL_OUTOF10: 6
PAINLEVEL_OUTOF10: 8
PAINLEVEL_OUTOF10: 7
PAINLEVEL_OUTOF10: 8
PAINLEVEL_OUTOF10: 8
PAINLEVEL_OUTOF10: 6
PAINLEVEL_OUTOF10: 8
PAINLEVEL_OUTOF10: 7
PAINLEVEL_OUTOF10: 8
PAINLEVEL_OUTOF10: 0
PAINLEVEL_OUTOF10: 7
PAINLEVEL_OUTOF10: 9
PAINLEVEL_OUTOF10: 0
PAINLEVEL_OUTOF10: 10
PAINLEVEL_OUTOF10: 6
PAINLEVEL_OUTOF10: 4
PAINLEVEL_OUTOF10: 10
PAINLEVEL_OUTOF10: 4
PAINLEVEL_OUTOF10: 8
PAINLEVEL_OUTOF10: 8
PAINLEVEL_OUTOF10: 9
PAINLEVEL_OUTOF10: 8
PAINLEVEL_OUTOF10: 5
PAINLEVEL_OUTOF10: 0
PAINLEVEL_OUTOF10: 7
PAINLEVEL_OUTOF10: 3
PAINLEVEL_OUTOF10: 9
PAINLEVEL_OUTOF10: 8
PAINLEVEL_OUTOF10: 5
PAINLEVEL_OUTOF10: 0
PAINLEVEL_OUTOF10: 0
PAINLEVEL_OUTOF10: 7
PAINLEVEL_OUTOF10: 9
PAINLEVEL_OUTOF10: 7
PAINLEVEL_OUTOF10: 5
PAINLEVEL_OUTOF10: 8
PAINLEVEL_OUTOF10: 4
PAINLEVEL_OUTOF10: 6
PAINLEVEL_OUTOF10: 7
PAINLEVEL_OUTOF10: 7
PAINLEVEL_OUTOF10: 9
PAINLEVEL_OUTOF10: 9
PAINLEVEL_OUTOF10: 6
PAINLEVEL_OUTOF10: 7
PAINLEVEL_OUTOF10: 6
PAINLEVEL_OUTOF10: 7
PAINLEVEL_OUTOF10: 4
PAINLEVEL_OUTOF10: 8
PAINLEVEL_OUTOF10: 3
PAINLEVEL_OUTOF10: 7
PAINLEVEL_OUTOF10: 10
PAINLEVEL_OUTOF10: 8
PAINLEVEL_OUTOF10: 7
PAINLEVEL_OUTOF10: 7
PAINLEVEL_OUTOF10: 0
PAINLEVEL_OUTOF10: 8
PAINLEVEL_OUTOF10: 7
PAINLEVEL_OUTOF10: 8
PAINLEVEL_OUTOF10: 7
PAINLEVEL_OUTOF10: 10
PAINLEVEL_OUTOF10: 8
PAINLEVEL_OUTOF10: 4
PAINLEVEL_OUTOF10: 5
PAINLEVEL_OUTOF10: 9
PAINLEVEL_OUTOF10: 5
PAINLEVEL_OUTOF10: 7
PAINLEVEL_OUTOF10: 5
PAINLEVEL_OUTOF10: 3
PAINLEVEL_OUTOF10: 8
PAINLEVEL_OUTOF10: 8
PAINLEVEL_OUTOF10: 9
PAINLEVEL_OUTOF10: 8
PAINLEVEL_OUTOF10: 8
PAINLEVEL_OUTOF10: 9
PAINLEVEL_OUTOF10: 5
PAINLEVEL_OUTOF10: 8
PAINLEVEL_OUTOF10: 9
PAINLEVEL_OUTOF10: 7
PAINLEVEL_OUTOF10: 8
PAINLEVEL_OUTOF10: 7
PAINLEVEL_OUTOF10: 0
PAINLEVEL_OUTOF10: 4
PAINLEVEL_OUTOF10: 6
PAINLEVEL_OUTOF10: 9
PAINLEVEL_OUTOF10: 4
PAINLEVEL_OUTOF10: 6
PAINLEVEL_OUTOF10: 6
PAINLEVEL_OUTOF10: 9
PAINLEVEL_OUTOF10: 0
PAINLEVEL_OUTOF10: 5
PAINLEVEL_OUTOF10: 0
PAINLEVEL_OUTOF10: 7
PAINLEVEL_OUTOF10: 7
PAINLEVEL_OUTOF10: 6
PAINLEVEL_OUTOF10: 6
PAINLEVEL_OUTOF10: 4
PAINLEVEL_OUTOF10: 10
PAINLEVEL_OUTOF10: 10
PAINLEVEL_OUTOF10: 7
PAINLEVEL_OUTOF10: 9
PAINLEVEL_OUTOF10: 10
PAINLEVEL_OUTOF10: 3
PAINLEVEL_OUTOF10: 9
PAINLEVEL_OUTOF10: 10
PAINLEVEL_OUTOF10: 6
PAINLEVEL_OUTOF10: 7
PAINLEVEL_OUTOF10: 5
PAINLEVEL_OUTOF10: 7
PAINLEVEL_OUTOF10: 8
PAINLEVEL_OUTOF10: 6
PAINLEVEL_OUTOF10: 8
PAINLEVEL_OUTOF10: 7
PAINLEVEL_OUTOF10: 8
PAINLEVEL_OUTOF10: 7
PAINLEVEL_OUTOF10: 9
PAINLEVEL_OUTOF10: 5
PAINLEVEL_OUTOF10: 4
PAINLEVEL_OUTOF10: 10
PAINLEVEL_OUTOF10: 6
PAINLEVEL_OUTOF10: 5
PAINLEVEL_OUTOF10: 0
PAINLEVEL_OUTOF10: 4
PAINLEVEL_OUTOF10: 7
PAINLEVEL_OUTOF10: 10
PAINLEVEL_OUTOF10: 6
PAINLEVEL_OUTOF10: 7
PAINLEVEL_OUTOF10: 8
PAINLEVEL_OUTOF10: 8
PAINLEVEL_OUTOF10: 9
PAINLEVEL_OUTOF10: 10
PAINLEVEL_OUTOF10: 8
PAINLEVEL_OUTOF10: 7
PAINLEVEL_OUTOF10: 5
PAINLEVEL_OUTOF10: 7
PAINLEVEL_OUTOF10: 7

## 2019-01-01 ASSESSMENT — PAIN DESCRIPTION - LOCATION
LOCATION: BACK
LOCATION: ABDOMEN;CHEST
LOCATION: HEAD
LOCATION: FLANK
LOCATION: HIP;PELVIS
LOCATION: ABDOMEN;BACK
LOCATION: PELVIS
LOCATION: OTHER (COMMENT)
LOCATION: BACK
LOCATION: FLANK
LOCATION: OTHER (COMMENT)
LOCATION: FLANK;BACK
LOCATION: BACK
LOCATION: PERINEUM
LOCATION: HIP;PELVIS
LOCATION_2: ABDOMEN
LOCATION: BACK;CHEST
LOCATION: HIP;BACK
LOCATION: ABDOMEN
LOCATION: OTHER (COMMENT)
LOCATION: ABDOMEN;PELVIS
LOCATION: ABDOMEN;BACK
LOCATION: HIP;BACK
LOCATION: PELVIS
LOCATION: FLANK
LOCATION: BACK
LOCATION: BACK;PELVIS
LOCATION: PELVIS
LOCATION: PELVIS
LOCATION: BACK;CHEST
LOCATION: BACK
LOCATION: HIP;BACK
LOCATION: BACK
LOCATION: PELVIS
LOCATION: PELVIS
LOCATION: ABDOMEN;BACK
LOCATION: BACK
LOCATION: ABDOMEN
LOCATION: BACK
LOCATION: ABDOMEN;BACK
LOCATION: ABDOMEN
LOCATION: BACK
LOCATION: ABDOMEN
LOCATION: FLANK;PELVIS
LOCATION: BACK
LOCATION: BACK;PELVIS
LOCATION: HIP;BACK
LOCATION: ABDOMEN
LOCATION: ABDOMEN;BACK
LOCATION: HIP
LOCATION: BACK
LOCATION: PELVIS
LOCATION: PELVIS
LOCATION_2: ABDOMEN
LOCATION: ABDOMEN;CHEST
LOCATION: FLANK
LOCATION: ABDOMEN
LOCATION: FLANK
LOCATION: ABDOMEN
LOCATION: BACK;HIP
LOCATION: HIP;BACK
LOCATION: ABDOMEN;BACK
LOCATION: BACK
LOCATION: BACK;HIP
LOCATION: FLANK
LOCATION: BACK;CHEST
LOCATION: GENERALIZED
LOCATION: BACK
LOCATION: ABDOMEN;BACK
LOCATION: GENERALIZED
LOCATION: BACK
LOCATION: BACK;FLANK
LOCATION: BACK
LOCATION: BACK
LOCATION: GENERALIZED
LOCATION: ABDOMEN;PELVIS
LOCATION: ABDOMEN;BACK
LOCATION: BACK;CHEST
LOCATION_2: ABDOMEN
LOCATION: BACK
LOCATION: BACK;HIP
LOCATION: HIP;BACK
LOCATION: PELVIS
LOCATION: BACK
LOCATION_2: ABDOMEN
LOCATION: BACK
LOCATION: HIP;PELVIS
LOCATION: BACK;FLANK
LOCATION: FLANK
LOCATION: PERINEUM
LOCATION: HIP
LOCATION: BACK
LOCATION: ABDOMEN;BACK
LOCATION: OTHER (COMMENT)
LOCATION: ABDOMEN;HEAD
LOCATION: ABDOMEN;BACK
LOCATION: ABDOMEN;BACK
LOCATION: BACK;CHEST
LOCATION: FLANK
LOCATION: HIP;BACK
LOCATION: BACK;LEG
LOCATION: GENERALIZED
LOCATION: BACK;HIP;CHEST
LOCATION_2: BACK
LOCATION: BACK
LOCATION: BACK;PELVIS
LOCATION: PELVIS
LOCATION: BACK;ABDOMEN
LOCATION: BACK
LOCATION: BACK
LOCATION: ABDOMEN
LOCATION: BACK;GENERALIZED
LOCATION: HEAD
LOCATION: PELVIS
LOCATION: PELVIS
LOCATION: BACK;FLANK
LOCATION: BACK
LOCATION: GENERALIZED
LOCATION: ABDOMEN
LOCATION: BACK;CHEST;LEG

## 2019-01-01 ASSESSMENT — PAIN DESCRIPTION - PROGRESSION
CLINICAL_PROGRESSION: NOT CHANGED
CLINICAL_PROGRESSION: GRADUALLY WORSENING
CLINICAL_PROGRESSION: GRADUALLY IMPROVING
CLINICAL_PROGRESSION: GRADUALLY IMPROVING
CLINICAL_PROGRESSION: GRADUALLY WORSENING
CLINICAL_PROGRESSION: GRADUALLY IMPROVING
CLINICAL_PROGRESSION: NOT CHANGED
CLINICAL_PROGRESSION: GRADUALLY IMPROVING
CLINICAL_PROGRESSION: GRADUALLY IMPROVING
CLINICAL_PROGRESSION: NOT CHANGED
CLINICAL_PROGRESSION: GRADUALLY WORSENING
CLINICAL_PROGRESSION: GRADUALLY IMPROVING
CLINICAL_PROGRESSION: NOT CHANGED
CLINICAL_PROGRESSION: GRADUALLY IMPROVING
CLINICAL_PROGRESSION_2: NOT CHANGED
CLINICAL_PROGRESSION: GRADUALLY IMPROVING
CLINICAL_PROGRESSION: GRADUALLY IMPROVING
CLINICAL_PROGRESSION_2: NOT CHANGED
CLINICAL_PROGRESSION: GRADUALLY WORSENING
CLINICAL_PROGRESSION: GRADUALLY IMPROVING
CLINICAL_PROGRESSION: GRADUALLY WORSENING
CLINICAL_PROGRESSION: GRADUALLY IMPROVING
CLINICAL_PROGRESSION: GRADUALLY IMPROVING
CLINICAL_PROGRESSION: NOT CHANGED
CLINICAL_PROGRESSION: NOT CHANGED
CLINICAL_PROGRESSION: GRADUALLY IMPROVING
CLINICAL_PROGRESSION: GRADUALLY WORSENING
CLINICAL_PROGRESSION: GRADUALLY IMPROVING
CLINICAL_PROGRESSION: NOT CHANGED
CLINICAL_PROGRESSION: GRADUALLY IMPROVING
CLINICAL_PROGRESSION: NOT CHANGED
CLINICAL_PROGRESSION: GRADUALLY IMPROVING
CLINICAL_PROGRESSION: GRADUALLY IMPROVING
CLINICAL_PROGRESSION: GRADUALLY WORSENING
CLINICAL_PROGRESSION: GRADUALLY IMPROVING
CLINICAL_PROGRESSION: NOT CHANGED
CLINICAL_PROGRESSION: GRADUALLY IMPROVING
CLINICAL_PROGRESSION: NOT CHANGED
CLINICAL_PROGRESSION: GRADUALLY IMPROVING
CLINICAL_PROGRESSION_2: GRADUALLY WORSENING
CLINICAL_PROGRESSION: GRADUALLY IMPROVING
CLINICAL_PROGRESSION_2: NOT CHANGED
CLINICAL_PROGRESSION: GRADUALLY IMPROVING
CLINICAL_PROGRESSION_2: NOT CHANGED
CLINICAL_PROGRESSION: NOT CHANGED
CLINICAL_PROGRESSION: GRADUALLY IMPROVING
CLINICAL_PROGRESSION: GRADUALLY WORSENING
CLINICAL_PROGRESSION: NOT CHANGED
CLINICAL_PROGRESSION: GRADUALLY IMPROVING
CLINICAL_PROGRESSION: NOT CHANGED
CLINICAL_PROGRESSION: GRADUALLY WORSENING
CLINICAL_PROGRESSION: GRADUALLY WORSENING
CLINICAL_PROGRESSION: GRADUALLY IMPROVING
CLINICAL_PROGRESSION: GRADUALLY IMPROVING
CLINICAL_PROGRESSION: NOT CHANGED
CLINICAL_PROGRESSION: GRADUALLY IMPROVING
CLINICAL_PROGRESSION: GRADUALLY IMPROVING

## 2019-01-01 ASSESSMENT — PAIN DESCRIPTION - PAIN TYPE
TYPE: ACUTE PAIN
TYPE: ACUTE PAIN
TYPE_2: ACUTE PAIN
TYPE: ACUTE PAIN
TYPE: CHRONIC PAIN
TYPE: ACUTE PAIN
TYPE: SURGICAL PAIN
TYPE: ACUTE PAIN
TYPE: SURGICAL PAIN
TYPE: ACUTE PAIN
TYPE: CHRONIC PAIN
TYPE: CHRONIC PAIN
TYPE: ACUTE PAIN
TYPE: ACUTE PAIN
TYPE: CHRONIC PAIN;ACUTE PAIN
TYPE: ACUTE PAIN
TYPE: CHRONIC PAIN
TYPE: CHRONIC PAIN;ACUTE PAIN
TYPE: ACUTE PAIN
TYPE: CHRONIC PAIN
TYPE: ACUTE PAIN
TYPE: ACUTE PAIN;CHRONIC PAIN
TYPE: ACUTE PAIN
TYPE: SURGICAL PAIN
TYPE: ACUTE PAIN
TYPE: ACUTE PAIN;CHRONIC PAIN
TYPE: CHRONIC PAIN
TYPE: ACUTE PAIN
TYPE: ACUTE PAIN
TYPE: ACUTE PAIN;CHRONIC PAIN
TYPE: ACUTE PAIN
TYPE: ACUTE PAIN
TYPE_2: ACUTE PAIN
TYPE: ACUTE PAIN
TYPE_2: ACUTE PAIN
TYPE: ACUTE PAIN
TYPE_2: ACUTE PAIN
TYPE: ACUTE PAIN
TYPE_2: ACUTE PAIN
TYPE_2: ACUTE PAIN
TYPE: ACUTE PAIN
TYPE: ACUTE PAIN

## 2019-01-01 ASSESSMENT — PAIN DESCRIPTION - FREQUENCY
FREQUENCY: CONTINUOUS
FREQUENCY: INTERMITTENT
FREQUENCY: CONTINUOUS

## 2019-01-01 ASSESSMENT — PAIN DESCRIPTION - INTENSITY
RATING_2: 7
RATING_2: 8
RATING_2: 8
RATING_2: 7
RATING_2: 8
RATING_2: 7
RATING_2: 7

## 2019-01-01 ASSESSMENT — COPD QUESTIONNAIRES
CAT_SEVERITY: MODERATE
CAT_SEVERITY: MODERATE

## 2019-01-01 ASSESSMENT — PAIN DESCRIPTION - DESCRIPTORS
DESCRIPTORS: SHARP
DESCRIPTORS: ACHING
DESCRIPTORS_2: SHARP
DESCRIPTORS_2: SHARP
DESCRIPTORS: SHARP
DESCRIPTORS: SHARP
DESCRIPTORS: ACHING;HEADACHE
DESCRIPTORS: ACHING
DESCRIPTORS: DISCOMFORT
DESCRIPTORS: DISCOMFORT
DESCRIPTORS: SHARP;SHOOTING
DESCRIPTORS: ACHING
DESCRIPTORS_2: SHARP
DESCRIPTORS: ACHING
DESCRIPTORS: SHARP
DESCRIPTORS: ACHING
DESCRIPTORS: ACHING;SHARP
DESCRIPTORS: SHARP
DESCRIPTORS: ACHING;SHARP
DESCRIPTORS: SHARP
DESCRIPTORS: ACHING
DESCRIPTORS: SHARP;SHOOTING
DESCRIPTORS: HEADACHE
DESCRIPTORS: ACHING;SHARP
DESCRIPTORS: SHARP
DESCRIPTORS: DISCOMFORT
DESCRIPTORS_2: SHARP
DESCRIPTORS: SHARP
DESCRIPTORS: SHARP
DESCRIPTORS: SHARP;DISCOMFORT
DESCRIPTORS: ACHING
DESCRIPTORS: SHARP
DESCRIPTORS: SHARP
DESCRIPTORS: SHARP;SHOOTING
DESCRIPTORS: ACHING
DESCRIPTORS: SHARP
DESCRIPTORS: ACHING
DESCRIPTORS: DISCOMFORT
DESCRIPTORS: ACHING
DESCRIPTORS: SHARP;SHOOTING
DESCRIPTORS: HEADACHE
DESCRIPTORS: SHARP
DESCRIPTORS: SHARP
DESCRIPTORS: SHARP;DISCOMFORT
DESCRIPTORS: ACHING;DISCOMFORT
DESCRIPTORS: SHARP
DESCRIPTORS: SHARP
DESCRIPTORS: ACHING
DESCRIPTORS: SHARP
DESCRIPTORS: ACHING
DESCRIPTORS: SHARP;DISCOMFORT
DESCRIPTORS: DISCOMFORT
DESCRIPTORS: SHARP
DESCRIPTORS: SHARP;SHOOTING
DESCRIPTORS: ACHING
DESCRIPTORS: SHARP
DESCRIPTORS: SHARP
DESCRIPTORS: DISCOMFORT
DESCRIPTORS: SHARP;SHOOTING
DESCRIPTORS: SHARP
DESCRIPTORS_2: SHARP
DESCRIPTORS: SHARP

## 2019-01-01 ASSESSMENT — PAIN - FUNCTIONAL ASSESSMENT
PAIN_FUNCTIONAL_ASSESSMENT: ACTIVITIES ARE NOT PREVENTED
PAIN_FUNCTIONAL_ASSESSMENT: 0-10
PAIN_FUNCTIONAL_ASSESSMENT: ACTIVITIES ARE NOT PREVENTED

## 2019-01-01 ASSESSMENT — PAIN DESCRIPTION - ONSET
ONSET: ON-GOING
ONSET: PROGRESSIVE
ONSET: ON-GOING
ONSET_2: ON-GOING
ONSET: PROGRESSIVE
ONSET: PROGRESSIVE
ONSET: ON-GOING
ONSET_2: ON-GOING
ONSET: ON-GOING
ONSET_2: ON-GOING
ONSET: ON-GOING
ONSET_2: ON-GOING
ONSET: ON-GOING
ONSET_2: ON-GOING
ONSET: ON-GOING

## 2019-01-01 ASSESSMENT — PAIN DESCRIPTION - DURATION
DURATION_2: CONTINUOUS

## 2019-01-01 ASSESSMENT — PAIN SCALES - WONG BAKER
WONGBAKER_NUMERICALRESPONSE: 8
WONGBAKER_NUMERICALRESPONSE: 0

## 2019-01-01 ASSESSMENT — PAIN DESCRIPTION - ORIENTATION
ORIENTATION: MID
ORIENTATION: RIGHT
ORIENTATION: LEFT
ORIENTATION: LEFT
ORIENTATION: RIGHT
ORIENTATION: RIGHT
ORIENTATION: MID
ORIENTATION: LEFT
ORIENTATION: LEFT;RIGHT;LOWER
ORIENTATION: RIGHT
ORIENTATION_2: LOWER
ORIENTATION: LOWER
ORIENTATION: RIGHT;ANTERIOR;POSTERIOR;UPPER
ORIENTATION: LOWER
ORIENTATION: RIGHT;LEFT
ORIENTATION: MID
ORIENTATION: LOWER
ORIENTATION: RIGHT
ORIENTATION: MID
ORIENTATION_2: LOWER
ORIENTATION: RIGHT
ORIENTATION: RIGHT
ORIENTATION: MID
ORIENTATION: LOWER;RIGHT
ORIENTATION: MID
ORIENTATION: ANTERIOR;RIGHT
ORIENTATION: RIGHT
ORIENTATION: RIGHT;LOWER
ORIENTATION: ANTERIOR;RIGHT
ORIENTATION: INNER
ORIENTATION: ANTERIOR;RIGHT;LOWER
ORIENTATION: MID
ORIENTATION: LOWER
ORIENTATION: RIGHT
ORIENTATION_2: LOWER
ORIENTATION: LOWER
ORIENTATION: ANTERIOR;RIGHT
ORIENTATION: ANTERIOR;RIGHT
ORIENTATION: LOWER
ORIENTATION: RIGHT
ORIENTATION: ANTERIOR;RIGHT
ORIENTATION: RIGHT;LEFT;LOWER
ORIENTATION: MID
ORIENTATION: RIGHT;ANTERIOR
ORIENTATION: RIGHT;LEFT;MID
ORIENTATION: RIGHT
ORIENTATION_2: LOWER
ORIENTATION: ANTERIOR;RIGHT
ORIENTATION: LOWER
ORIENTATION: LOWER
ORIENTATION: LEFT;RIGHT;ANTERIOR
ORIENTATION: RIGHT;LEFT
ORIENTATION: RIGHT
ORIENTATION: LOWER
ORIENTATION: RIGHT
ORIENTATION: LEFT;RIGHT;LOWER
ORIENTATION: RIGHT;ANTERIOR;POSTERIOR;UPPER
ORIENTATION: ANTERIOR;RIGHT
ORIENTATION_2: LOWER
ORIENTATION: RIGHT
ORIENTATION: MID
ORIENTATION: MID
ORIENTATION: LEFT
ORIENTATION: LOWER
ORIENTATION: RIGHT
ORIENTATION: INNER
ORIENTATION: LOWER
ORIENTATION: LOWER
ORIENTATION: RIGHT
ORIENTATION: RIGHT;LEFT
ORIENTATION: INNER

## 2019-01-08 PROBLEM — Z87.39 HISTORY OF JUVENILE RHEUMATOID ARTHRITIS: Chronic | Status: ACTIVE | Noted: 2019-01-01

## 2019-01-22 PROBLEM — M32.9 SLE (SYSTEMIC LUPUS ERYTHEMATOSUS) (HCC): Chronic | Status: ACTIVE | Noted: 2019-01-01

## 2019-01-22 PROBLEM — R76.0 LUPUS ANTICOAGULANT POSITIVE: Chronic | Status: ACTIVE | Noted: 2019-01-01

## 2019-02-06 PROBLEM — D61.9 APLASTIC ANEMIA SECONDARY TO PREGNANCY, ANTEPARTUM (HCC): Status: ACTIVE | Noted: 2019-01-01

## 2019-02-06 PROBLEM — O99.019 APLASTIC ANEMIA SECONDARY TO PREGNANCY, ANTEPARTUM (HCC): Status: ACTIVE | Noted: 2019-01-01

## 2019-02-15 PROBLEM — C53.9 CERVICAL CANCER (HCC): Status: ACTIVE | Noted: 2019-01-01

## 2019-02-15 PROBLEM — R91.1 PULMONARY NODULE: Status: ACTIVE | Noted: 2019-01-01

## 2019-02-15 PROBLEM — N13.30 HYDROURETERONEPHROSIS: Status: ACTIVE | Noted: 2019-01-01

## 2019-02-15 PROBLEM — J18.9 PNEUMONIA: Status: ACTIVE | Noted: 2019-01-01

## 2019-02-15 NOTE — ED PROVIDER NOTES
[address] [facility]  eMERGENCY dEPARTMENT eNCOUnter        Pt Name: Yee Logan  MRN: [id number]  Kassidygfrehana 1980  Date of evaluation: 2/15/2019  Provider: Maryann Koyanagi, PA  PCP: Rosalba Boogie MD    This patient was seen and evaluated by the attending physician Delroy Xiong MD.      CHIEF COMPLAINT       Chief Complaint   Patient presents with    Cervical Cancer     Pt was sent over from Dr Nick Masterson office - states she was sent here from office - was told today that she has stage 3 cervical cancer and was sent here for \"scans and further testing\"       HISTORY OF PRESENT ILLNESS   (Location/Symptom, Timing/Onset, Context/Setting, Quality, Duration, Modifying Factors, Severity)  Note limiting factors. Yee Logan is a 41 y.o. female with past medical history of endometriosis, fibromyalgia, ovarian cyst and seizure disorder who presents to the ED with complaint of shortness of breath. Patient states she has a mass on her cervix and has seen oncology, Dr. Abi Perez, and told she most likely had cervical cancer. Patient states she had biopsies performed but states results will not be back next week. Patient states she has some pulmonary nodules and states she's had increasing shortness of breath especially with exertion. States she was sent to the ED for further evaluation and treatment and concern for potential pulmonary embolism. Patient states she does have some pleuritic pain but denies any chest pain at rest.  Denies hemoptysis, cough, fever/chills, rashes/lesions, becoming diaphoretic, lightheadedness/dizziness, urinary symptoms or changes in bowel movements. Denies vaginal bleeding or discharge. States his abdominal distention and pain that she rates as 7/10 as well. Became concerned and came to the ED at request of oncology and pulmonology for further evaluation and treatment.      Nursing Notes were all reviewed and agreed with or any disagreements were addressed  in the HPI. REVIEW OF SYSTEMS    (2-9 systems for level 4, 10 or more for level 5)     Review of Systems   Constitutional: Negative for activity change, appetite change, chills and fever. Respiratory: Positive for chest tightness and shortness of breath. Negative for cough, wheezing and stridor. Cardiovascular: Positive for chest pain. Negative for palpitations and leg swelling. Gastrointestinal: Positive for abdominal distention and abdominal pain. Negative for anal bleeding, blood in stool, constipation, diarrhea, nausea and vomiting. Genitourinary: Negative for decreased urine volume, difficulty urinating, dysuria, flank pain, frequency, hematuria, menstrual problem, pelvic pain, urgency, vaginal bleeding, vaginal discharge and vaginal pain. Musculoskeletal: Negative for arthralgias, myalgias, neck pain and neck stiffness. Skin: Negative for color change, pallor, rash and wound. Neurological: Negative for dizziness, light-headedness and headaches. Positives and Pertinent negatives as per HPI. Except as noted abovein the ROS, all other systems were reviewed and negative.        PAST MEDICAL HISTORY     Past Medical History:   Diagnosis Date    Endometriosis     Fibromyalgia     GERD (gastroesophageal reflux disease)     Hip fracture ([facility] [address]     LEFT     Hypoglycemia     Neuropathy     Ovarian cyst     Seizures ([facility] [address]          SURGICAL HISTORY     Past Surgical History:   Procedure Laterality Date    BRONCHOSCOPY  10/16/14    Urgent intubation, direct laryngoscopy, subglottic tracheoscopy    BRONCHOSCOPY  10/18/2014    WITH EXTUBATION IN OR AND LARYNGOSCOPY     SECTION      x 3    CHOLECYSTECTOMY           CURRENTMEDICATIONS       Previous Medications    BACLOFEN (LIORESAL) 20 MG TABLET    Take 20 mg by mouth 2 times daily     DULOXETINE (CYMBALTA) 60 MG EXTENDED RELEASE CAPSULE    Take 60 mg by mouth daily    GABAPENTIN (NEURONTIN) 300 MG CAPSULE    Take 300 mg by mouth 3 times daily. .    IBUPROFEN (ADVIL;MOTRIN) 800 MG TABLET    Take 1 tablet by mouth every 8 hours as needed for Pain or Fever    NORETHINDRONE (AYGESTIN) 5 MG TABLET    Take 1 tablet by mouth every 12 hours for 21 days    OXYCODONE-ACETAMINOPHEN (PERCOCET) 5-325 MG PER TABLET    TK 1 T PO Q 4 H PRN P    PROMETHAZINE (PHENERGAN) 25 MG TABLET    Take 25 mg by mouth every 6 hours as needed          ALLERGIES     Food and Spinach    FAMILYHISTORY       Family History   Problem Relation Age of Onset    Diabetes Mother     Crohn's Disease Sister     Hypertension Maternal Grandmother     Heart Failure Maternal Grandfather     Stroke Maternal Grandfather     Diabetes Paternal Grandmother     Asthma Paternal Grandfather           SOCIAL HISTORY       Social History     Social History    Marital status:      Spouse name: N/A    Number of children: N/A    Years of education: N/A     Occupational History          Real Estate     Social History Main Topics    Smoking status: Former Smoker     Packs/day: 0.25     Years: 3.00     Types: Cigarettes     Start date: 1998     Quit date: 4/26/2001    Smokeless tobacco: Never Used    Alcohol use No    Drug use: No    Sexual activity: Yes     Partners: Male     Other Topics Concern    None     Social History Narrative    None       SCREENINGS             PHYSICAL EXAM    (up to 7 for level 4, 8 or more for level 5)     ED Triage Vitals [02/15/19 1211]   BP Temp Temp Source Pulse Resp SpO2 Height Weight   110/74 98.1 °F (36.7 °C) Oral 120 18 96 % 5' 6\" (1.676 m) 190 lb (86.2 kg)       Physical Exam   Constitutional: She is oriented to person, place, and time. She appears well-developed and well-nourished. No distress. HENT:   Head: Normocephalic and atraumatic. Right Ear: External ear normal.   Left Ear: External ear normal.   Mouth/Throat: Oropharynx is clear and moist. No oropharyngeal exudate.    Eyes: Conjunctivae are at:  [facility] [address] [address] [place]   Phone [phone]   URINE RT REFLEX TO CULTURE - Abnormal; Notable for the following:     Clarity, UA CLOUDY (*)     Bilirubin Urine SMALL (*)     Ketones, Urine TRACE (*)     Blood, Urine LARGE (*)     Protein,  (*)     Leukocyte Esterase, Urine TRACE (*)     All other components within normal limits    Narrative:     Performed at:  [facility] [address], [place], [address] [place]   Phone [phone]   MICROSCOPIC URINALYSIS - Abnormal; Notable for the following:     Hyaline Casts, UA 9 (*)     All other components within normal limits    Narrative:     Called ABDIEL Haas informed of Clerical Error  Performed at:  [facility] [address], [place], [address] [place]   Phone [phone]   URINE CULTURE   CULTURE BLOOD #1   CULTURE BLOOD #2   BRAIN NATRIURETIC PEPTIDE    Narrative:     Performed at:  [facility] [address], [place], [address] [place]   Phone [phone]   HCG, SERUM, QUALITATIVE    Narrative:     Performed at:  [facility] [address], [place], [address] [place]   Phone [phone]   LIPASE    Narrative:     Performed at:  [facility] [address], [place], [address] [place]   Phone [phone]   LACTATE, SEPSIS    Narrative:     Performed at:  [facility] [address], [place], [address] [place]   Phone [phone]   LACTATE, SEPSIS       All other labs were within normal range or not returned as of this dictation. EKG: All EKG's are interpreted by the Emergency Department Physician who either signs orCo-signs this chart in the absence of a cardiologist.  Please see their note for interpretation of EKG.       RADIOLOGY:   Non-plain film images such as CT, Ultrasound and MRI are read by the than   left, possibly representing metastatic disease. CT CHEST PULMONARY EMBOLISM W CONTRAST   Final Result   1. No CT evidence of a pulmonary embolism. 2. No acute abnormality of the thoracic aorta. 3. Interval development of widespread ground-glass opacity throughout both   lungs with diffuse intralobular septal thickening. This most likely reflects   a combination of interstitial pulmonary edema and multifocal pneumonia. 4. Interval progression of multiple nodular foci of consolidative opacity   throughout both lungs, a few of which are cavitary in appearance. These   nodules are most likely infectious or inflammatory in etiology, and septic   emboli are a consideration. Given patient's history of cervical cancer,   metastatic disease is on the differential, though considered less likely. 5. New nonspecific moderate right hydronephrosis, without demonstrable cause. However, there is underlying wall thickening and enhancement of the urinary   bladder. This combination of findings could represent a cystitis in the   setting of urinary tract infection with resultant pyelitis and   hydronephrosis. However, given patient history of cervical cancer,   locoregional spread of tumor with resultant obstruction of the distal right   ureter may be a cause of the patient's right hydronephrosis. 6. Interval development of new irregularity of the cervix and nonspecific   endometrial thickening in comparison with the prior study of 1/28/2019. This   likely represents the patient's known underlying cervical cancer causing   obstruction of the endometrial with resultant endometrial thickening. This   could be further evaluated with a follow-up pelvic ultrasound. 7. Stable mild bilateral pelvic chain lymphadenopathy, right greater than   left, possibly representing metastatic disease. No results found.       PROCEDURES   Unless otherwise noted below, none     Procedures    CRITICAL CARE TIME Troponin 0.02. EKG interpreted by attending. Lipase normal.  Given history and physical examination patient is high-risk for PE and did not order dimer or chest x-ray. Did order CT of the chest abdomen pelvis with IV contrast.  CT showed no evidence of pulmonary embolism. No abnormality of the thoracic aorta. There does appear to be widespread groundglass opacities throughout the bilateral lungs with intralobular septal thickening. Radiologist felt likely representative of interstitial pulmonary edema and multifocal pneumonia. Patient's BMP unremarkable no evidence of fluid overload and believe likely represents pneumonitis/pneumonia at this time. There did appear to be multiple nodular foci throughout the bilateral lungs which appear potential cavitary in appearance. Radiologist felt most likely infectious or inflammatory at this time. The potential need to septic emboli. Patient will be treated for pneumonia. Could potentially be metastatic disease as well give a history of cervical cancer. The CT of the abdomen did come back and show moderate right-sided hydronephrosis with underlying wall thickening enhancement of the urinary bladder. Radiologist felt likely representative of cystitis with results and pyelitis and hydronephrosis. Given history of cervical cancer radiologist is concern for locoregional spread of the tumor with resultant obstruction of the distal right ureter. Given patient's history and physical examination has evidence of what appears to be pneumonia with potential metastatic disease versus infectious/inflammatory nodules versus septic emboli. Start on broad spectrum antibiotics. Will also cover for potential pyelitis with broad-spectrum antibiotics. Will require admission for further evaluation by pulmonology, oncology and urology. Case discussed with Dr. Tony Pereira, who admits for PCP, who agreed to admit the patient for further evaluation and treatment.         FINAL IMPRESSION 1. Shortness of breath    2. Elevated troponin    3. Pneumonia due to organism    4. Pyelitis    5. Hydroureteronephrosis          DISPOSITION/PLAN   DISPOSITION Admitted 02/15/2019 04:16:07 PM      PATIENT REFERREDTO:  Yordy Jarrett MD  [address]   [phone]            DISCHARGE MEDICATIONS:  New Prescriptions    No medications on file       DISCONTINUED MEDICATIONS:  Discontinued Medications    DOCUSATE SODIUM (COLACE, DULCOLAX) 100 MG CAPS    Take 100 mg by mouth 2 times daily    HYDROCODONE-ACETAMINOPHEN (NORCO) 5-325 MG PER TABLET    TK 1 T PO  Q 6 H PRF PAIN    HYDROXYCHLOROQUINE (PLAQUENIL) 200 MG TABLET    Take 200 mg with meal daily for 1 week then increase to 200 mg with meal twice daily. IBUPROFEN (ADVIL;MOTRIN) 800 MG TABLET    Take 800 mg by mouth    LIDOCAINE (LIDODERM) 5 %    Place 1 patch onto the skin daily 12 hours on, 12 hours off.     NORETHINDRONE (AYGESTIN) 5 MG TABLET    Take 5 mg by mouth    ONDANSETRON (ZOFRAN ODT) 4 MG DISINTEGRATING TABLET    Take 1 tablet by mouth every 8 hours as needed for Nausea              (Please note that portions ofthis note were completed with a voice recognition program.  Efforts were made to edit the dictations but occasionally words are mis-transcribed.)    MARITA Chiu (electronically signed)         MARITA Sinha  02/15/19 [phone]

## 2019-02-15 NOTE — ED NOTES
Pt. Called out stating that she is still having a lot of pain. Omar Vargas made aware and new orders obtained at this time for Morphine.      Ijeoma Kothari RN  02/15/19 7269

## 2019-02-15 NOTE — PROGRESS NOTES
Pt arrived to 5T. VSS. Oriented to room. Call light within reach. Complaining of 6/10 back pain, will medicate per orders.

## 2019-02-15 NOTE — ED NOTES
PtOni Sierra stuck X1 in her LAC for second blood culture and was successful. Blood cultures obtained, labeled at bedside, and sent down to the lab.        Kuldeep Almendarez RN  02/15/19 3695

## 2019-02-15 NOTE — ED PROVIDER NOTES
I independently performed a history and physical on [address] [facility] [address]. All diagnostic, treatment, and disposition decisions were made by myself in conjunction with the mid-level provider. Patient was being worked up for rheumatologic disease, was found of cervical mass which was biopsied and she was told she has stage III cervical cancer. She had known pulmonary nodules, that were to be followed, however with this diagnosis and the patient's air hunger for the last 3 weeks, tachycardia for the last week the concern was that this could be emboli. The patient does have antiphospholipid syndrome as well which is another risk factor for pulmonary embolism. CT PE study is pending. For further details of [facility] emergency department encounter, please see Sam's documentation. The Ekg interpreted by me shows  sinus tachycardia, rrio=202   Axis is   Normal  QTc is  normal  Intervals and Durations are unremarkable.       ST Segments: normal  No significant change from prior EKG dated July 2017               Hernando Burns MD  02/15/19 4365

## 2019-02-15 NOTE — ED NOTES
Nursing report given to Jenae Christianson RN with no questions or concerns. Pt. To floor via stretcher in stable condition.        Vernard Primrose, RN  02/15/19 1992

## 2019-02-15 NOTE — ED NOTES
Pharmacy Medication History Note      List of current medications patient is taking is complete. Source of information: patient    Changes made to medication list:  Medications flagged for removal (include reason, ex. noncompliance):  · N/A    Medications removed (include reason, ex. therapy complete or physician discontinued): · Hydroxychlorquine- therapy complete  · Ibuprofen- duplicate    Medications added/doses adjusted:  · N/A    Other notes (ex. Recent course of antibiotics, Coumadin dosing):  · Denies use of other OTC or herbal medications. Last dose times updated.    Christine

## 2019-02-16 NOTE — PROGRESS NOTES
Message sent via [employer]: \"Pt. states she usually takes duloxetine 60 mg extended release tab nightly; med rec confirms. Can we order please? Thanks. \"  See orders. Will continue to monitor.

## 2019-02-16 NOTE — PROGRESS NOTES
RESPIRATORY THERAPY ASSESSMENT    Name:  [facility] Record Number:  [id number]  Age: 41 y.o. Gender: female  : 1980  Today's Date:  2019  Room:  [id number]/[id number]    Assessment   Patient Admission Diagnosis      Allergies  Allergies   Allergen Reactions    Food Hives     Raw spinach.  Spinach        Minimum Predicted Vital Capacity:     xxx          Actual Vital Capacity:      xxx          Pulmonary History: CHF/Pulmonary Edema (per CXR-new)  Home Oxygen Therapy:  room air  Home Respiratory Therapy: None  Current Respiratory Therapy:  duoneb q4wa  Treatment Type: HHN  Medications: Albuterol/Ipratropium    Respiratory Severity Index(RSI)   Patients with orders for inhalation medications, oxygen, or any therapeutic treatment modality will be placed on Respiratory Protocol. They will be assessed with the first treatment and at least every 72 hours thereafter. The following severity scale will be used to determine frequency of treatment intervention.     Smoking History: Smoking History Less than 1ppd or less than 15 pack year = 1    Social History  Social History   Substance Use Topics    Smoking status: Former Smoker     Packs/day: 0.25     Years: 3.00     Types: Cigarettes     Start date:      Quit date: 2001    Smokeless tobacco: Never Used    Alcohol use No       Recent Surgical History: None = 0  Past Surgical History  Past Surgical History:   Procedure Laterality Date    BRONCHOSCOPY  10/16/14    Urgent intubation, direct laryngoscopy, subglottic tracheoscopy    BRONCHOSCOPY  10/18/2014    WITH EXTUBATION IN OR AND LARYNGOSCOPY     SECTION      x 3    CHOLECYSTECTOMY         Level of Consciousness: Alert, Oriented, and Cooperative = 0    Level of Activity: Mostly sedentary, minimal walking = 2    Respiratory Pattern: Dyspnea with exertion;Irregular pattern;or RR less than 6 = 2    Breath Sounds: Diminshed bilaterally and/or crackles = 2    Sputum   , , Sputum How Obtained: Cough on request  Cough: Strong, spontaneous, non-productive = 0    Vital Signs   /74   Pulse 93   Temp 98 °F (36.7 °C) (Oral)   Resp 18   Ht 5' 6\" (1.676 m)   Wt 190 lb (86.2 kg)   SpO2 97%   BMI 30.67 kg/m²   SPO2 (COPD values may differ): 88-89% on room air or greater than 92% on FiO2 28- 35% = 2    Peak Flow (asthma only): not applicable = 0    RSI: 4-76 = TID (three times daily) and Q4hr PRN for dyspnea        Plan       Goals: medication delivery and improve oxygenation  Plan of Care: duoneb tid and q4prn    Patient/caregiver was educated on the proper method of use of Respiratory Therapy device:  Yes      Level of understanding, patient was able to:(check appropriate box(es))   [x] Verbalize understanding   [x] Demonstrate understanding       [] Teach back        [] Needs reinforcement       []  No available caregiver               []  Other:     Response to education:  Excellent     Teaching Time:  5  minutes      Is patient being placed on Home Treatment Regimen? No     Electronically signed by Lyndsey Garcia RCP on 2/16/2019 at 2:59 AM    Respiratory Protocol Guidelines     1. Assessment and treatment by Respiratory Therapy will be initiated for medication and therapeutic interventions upon initiation of aerosolized medication. 2. Physician will be contacted for respiratory rate (RR) greater than 35 breaths per minute. Therapy will be held for heart rate (HR) greater than 140 beats per minute, pending direction from physician. 3. Bronchodilators will be administered via Metered Dose Inhaler (MDI) with spacer when the following criteria are met:  a. Alert and cooperative     b. HR < 140 bpm  c. RR < 30 bpm                d. Can demonstrate a 2-3 second inspiratory hold  4. Bronchodilators will be administered via Hand Held Nebulizer [facility]) to patients when ANY of the following criteria are met  a. Incognizant or uncooperative          b.  Patients treated with HHN at Home        c. Unable to demonstrate proper MDI or HFA technique     d. RR > 30 bpm   5. Bronchodilators will be delivered via Metered Dose Inhaler (MDI) or Hydrofluoroalkane (HFA) with spacer to intubated patients on mechanical ventilation. 6. Inhalation medication orders will be delivered and/or substituted as outlined below. Aerosolized Medications Ordering and Administration Guidelines:    1. All Medications will be ordered by a physician, and their frequency and/or modality will be adjusted as defined by the patients Respiratory Severity Index (RSI) score. 2. If the patient does not have documented COPD, consider discontinuing anticholinergics when RSI is less than 9.  3. If the bronchospasm worsens (increased RSI), then the bronchodilator frequency can be increased to a maximum of every 4 hours. If greater than every 4 hours is required, the physician will be contacted. 4. If the bronchospasm improves, the frequency of the bronchodilator can be decreased, based on the patient's RSI, but not less than home treatment regimen frequency. 5. Bronchodilator(s) will be discontinued if patient has a RSI less than 9 and has received no scheduled or as needed treatment for 72  Hrs. Patients Ordered on a Mucolytic Agent:    1. Must always be administered with a bronchodilator. 2. Discontinue if patient experiences worsened bronchospasm, or secretions have lessened to the point that the patient is able to clear them with a cough. Anti-inflammatory and Combination Medications:    1. If the patient lacks prior history of lung disease, is not using inhaled anti-inflammatory medication at home, and lacks wheezing by examination or by history for at least 24 hours, contact physician for possible discontinuation.

## 2019-02-16 NOTE — CONSULTS
Pulmonary Medicine Consultation    ASSESSMENT:  · Diffuse Ground Glass Opacities and Septal Thickening on Chest CT Scan 2/15/19  · Metastatic cervical carcinoma would be most likely diagnosis  · Infection is less likely since she does not have cough or fever  · Bilateral Pulmonary Nodules with some cavitation  · Metastatic cervical carcinoma would be most likely diagnosis  · Subcarinal and Hilar Lymphadenopathy  · Metastatic cervical carcinoma is a possibility  · Infection could be present but she does does not have fever or leukocytosis  · Right Hydronephrosis due to cervical carcinoma - needs stent placement tomorrow  · Metastatic Cervical Carcinoma - just diagnosed in the past few days and biopsy is pending    PLAN:  · She is breathing comfortably on nasal cannula, so I will clear her for surgery tomorrow to place ureteral stent  · She will need pulmonary workup next week to determine the etiology of her Chest CT Scan findings      REASON FOR CONSULTATION/CC: shortness of breath and abnormal Chest CT Scan     CONSULTING PHYSICIAN:  [facility]:  shortness of breath     HISTORY OF PRESENT ILLNESS:  Severe week history of dyspnea on exertion and fatigue. No cough or hemoptysis. No fever or chills. She has not had any recent travel. She is a former cigarette smoker. She saw Dr Hui Meredith on 2/8/19 for pulmonary nodules. Histoplasmosis and Aspergillus labs were ordered. She saw her Gynecologist in the past few days and was told she has metastatic cervical cancer. Biopsies were taken and will be back next week. She has right hydronephrosis related to the metastatic cervical cancer. Urology wants to place a stent tomorrow but needs Pulmonary clearance. She is breathing OK on nasal cannula oxygen.      PAST MEDICAL HISTORY:  Past Medical History:   Diagnosis Date    Endometriosis     Fibromyalgia     GERD (gastroesophageal reflux disease)     Hip fracture ([address]     LEFT 2002  Hypoglycemia     Lupus     Neuropathy     Ovarian cyst     Seizures (HCC)      PAST SURGICAL HISTORY:  Past Surgical History:   Procedure Laterality Date    BRONCHOSCOPY  10/16/14    Urgent intubation, direct laryngoscopy, subglottic tracheoscopy    BRONCHOSCOPY  10/18/2014    WITH EXTUBATION IN OR AND LARYNGOSCOPY     SECTION      x 3    CHOLECYSTECTOMY         FAMILY HISTORY:  family history includes Asthma in her paternal grandfather; Crohn's Disease in her sister; Diabetes in her mother and paternal grandmother; Heart Failure in her maternal grandfather; Hypertension in her maternal grandmother; Stroke in her maternal grandfather. SOCIAL HISTORY:   reports that she quit smoking about 17 years ago. Her smoking use included Cigarettes. She started smoking about 21 years ago. She has a 0.75 pack-year smoking history. She has never used smokeless tobacco.    MEDICATIONS:  Scheduled Meds:   ipratropium-albuterol  1 ampule Inhalation TID    phenazopyridine  200 mg Oral TID WC    gabapentin  300 mg Oral TID    baclofen  20 mg Oral BID    norethindrone  5 mg Oral Q12H    sodium chloride flush  10 mL Intravenous 2 times per day    enoxaparin  40 mg Subcutaneous Daily    vancomycin  15 mg/kg Intravenous Q12H    cefepime  2 g Intravenous Q12H    DULoxetine  60 mg Oral Nightly     Continuous Infusions:   sodium chloride 75 mL/hr at 02/15/19 1834     PRN Meds:  ipratropium-albuterol, morphine, oxyCODONE-acetaminophen, ibuprofen, promethazine, sodium chloride flush, magnesium hydroxide, ondansetron, potassium chloride **OR** potassium bicarb-citric acid **OR** potassium chloride, promethazine    ALLERGIES:  Patient is allergic to food and spinach. PHYSICAL EXAM:  VITAL SIGNS: Blood pressure (!) 92/52, pulse 98, temperature 98 °F (36.7 °C), temperature source Oral, resp. rate 18, height 5' 6\" (1.676 m), weight 190 lb (86.2 kg), SpO2 98 %. Gen:   No distress.  Breathing comfortably throughout both lungs, with   diffuse intralobular septal thickening evident, new from prior exam.  This   most likely reflects a combination of interstitial pulmonary edema and   superimposed multifocal pneumonia.  Additionally, there has been interval   progression and more consolidation of multiple scattered various sized   pulmonary nodules throughout both lungs since the study of 1/28/2019.  A few   of these are cavitary.  The largest of these measures approximately 10 mm in   short axis within the subpleural portion of the left lower lobe. Jannine Dolin may   be secondary to an atypical infectious or inflammatory process, to include   septic emboli.  Metastatic disease is considered less likely, though not   excluded.  There is no evidence of a pneumothorax or pleural effusion.       Soft Tissues/Bones: No acute findings. CHEST CT SCAN 1/28/19:  Mediastinum: Lack of intravenous contrast limits evaluation of the   mediastinum.  The thoracic aorta is normal in appearance.  The coronary   arteries and branch vessels of the superior mediastinum and lower neck are   unremarkable.  The pulmonary arteries are normal in caliber.  The heart is   normal in size.  No pericardial effusion.  The mediastinal esophagus and   thyroid gland are unremarkable.  No pathologically enlarged lymph nodes are   seen in the chest.       Lungs/pleura: The central airways are patent.  No pleural effusion or   pneumothorax.  Mild bilateral dependent atelectasis.  There are diffusely   scattered patchy bilateral nodular/ground-glass opacities.  No consolidation.       Soft Tissues/Bones: No acute osseous or soft tissue abnormality. ABDOMEN AND PELVIS CT SCAN 2/15/19:  Organs:  The patient is status post cholecystectomy.  The liver, spleen, and   pancreas are normal.  The adrenal glands are unremarkable bilaterally.  There   has been interval development of nonspecific moderate right hydronephrosis   since the prior exam, without definite demonstrable cause.  Namely, no   definite obstructing stone or mass is identified.  The left kidney is stable   and unremarkable.       GI/Bowel: Evaluation of the hollow GI tract demonstrates no evidence of   abnormal bowel wall thickening, dilatation, or obstruction.  The appendix is   normal.       Pelvis: The urinary bladder is partially collapsed, though appears diffusely   thick-walled and inflamed, with a mild amount of pericystic stranding noted. These findings are suggestive of an acute cystitis, progressed from prior   exam.  Additionally, the cervix appears enlarged and irregular, and there is   new abnormal thickening of the endometrium within the uterine fundus, which   appears new in comparison with the study of 1/28/2019.  The bilateral adnexa   are unremarkable. Becca Factor is a mild amount of free pelvic fluid, likely   physiologic.  There are stable mildly enlarged bilateral pelvic chain lymph   nodes, the largest measuring approximately 2.6 x 1.6 cm along the right   external iliac chain, similar to the study of 1/28/2019.       Peritoneum/Retroperitoneum: No intraperitoneal free air or free fluid is   identified.  No pathologic lymphadenopathy is seen.  The abdominal aorta is   unremarkable.  No significant abdominal wall hernia is identified.       Bones/Soft Tissues: There is mild bilateral sacroiliitis.  The skeletal   structures are otherwise unremarkable.

## 2019-02-16 NOTE — PROGRESS NOTES
Pt ax performed; VSS - see flowsheets. Pt on RA at this time; spo2 >90%. Pt. rx'd meds given - see MAR. Pt. Denies other needs at this time. Call light/table in reach. Pt. Eneida Archer in room. Will continue to monitor.

## 2019-02-16 NOTE — PROGRESS NOTES
Routine virals stable. Scheduled medication given. Consent obtained for cysto with stent placement in AM. Patient denies any questions or concerns regarding procedure. PRN Dilaudid given for reports of pain. Patient appears much more comfortable at this time. Call light within reach.  at bedside.

## 2019-02-16 NOTE — PROGRESS NOTES
Morning assessment completed. Routine vitals stable. Scheduled medications given. Patient is awake, alert and oriented. Respirations are easy and unlabored. Patient does not appear to be in distress. PRN Morphine given for reports of pain. Call light within reach.

## 2019-02-16 NOTE — PROGRESS NOTES
Aspiration Screen    Name: Blossom Primrose  : 1980  Medical Diagnosis: Pneumonia [J18.9]  Pneumonia [J18.9]    Swallow screen to rule out aspiration completed per pneumonia protocol. Patient demonstrates no significant high risk indicators for potential aspiration per swallow screen at this time. No further swallowing intervention is warranted at this time. Continue current diet as tolerated. Please consult speech therapy for bedside swallow evaluation if symptoms of swallowing dysfunction arise.     Butch Beavers M.A., [address] [place] [address]  Speech-Language Pathologist

## 2019-02-16 NOTE — PROGRESS NOTES
Message sent via [employer]: \"Pt's pain in chest, back, and leg uncontrolled w/ Percocet. Pt states morphine given in ED controlled pain better. Pt. c/o nausea; states Zofran does not help; states Phenergan has controlled nausea in past. VSS. Please advise. Thanks! \"  See orders. Will continue to monitor.

## 2019-02-16 NOTE — H&P
Mother     Crohn's Disease Sister     Hypertension Maternal Grandmother     Heart Failure Maternal Grandfather     Stroke Maternal Grandfather     Diabetes Paternal Grandmother     Asthma Paternal Grandfather        PFSH: The above PMHx, PSHx, SocHx, FamHx has been reviewed by myself. (1 area for detailed, 2-3 for comprehensive)      Code Status: Full Code    Meds - following list of home medications fromelectronic chart has been reviewed by myself  Prior to Admission medications    Medication Sig Start Date End Date Taking? Authorizing Provider   oxyCODONE-acetaminophen (PERCOCET) 5-325 MG per tablet TK 1 T PO Q 4 H PRN P 2/12/19  Yes Historical Provider, MD   promethazine (PHENERGAN) 25 MG tablet Take 25 mg by mouth every 6 hours as needed  1/10/19  Yes Historical Provider, MD   ibuprofen (ADVIL;MOTRIN) 800 MG tablet Take 1 tablet by mouth every 8 hours as needed for Pain or Fever 2/10/19  Yes MAGGIE Ponce - CNP   norethindrone (AYGESTIN) 5 MG tablet Take 1 tablet by mouth every 12 hours for 21 days 12/22/18 2/15/19 Yes Elvia FREED MD   DULoxetine (CYMBALTA) 60 MG extended release capsule Take 60 mg by mouth daily   Yes Historical Provider, MD   baclofen (LIORESAL) 20 MG tablet Take 20 mg by mouth 2 times daily    Yes Historical Provider, MD   gabapentin (NEURONTIN) 300 MG capsule Take 300 mg by mouth 3 times daily. Elaina Silver Historical Provider, MD         Allergies   Allergen Reactions    Food Hives     Raw spinach.     Spinach              EXAM: (2-7 system for EPF/Detailed, ?8 for Comprehensive)  BP (!) 92/52   Pulse 98   Temp 98 °F (36.7 °C) (Oral)   Resp 18   Ht 5' 6\" (1.676 m)   Wt 190 lb (86.2 kg)   SpO2 98%   BMI 30.67 kg/m²   Constitutional: vitals as above: alert, appears stated age and cooperative  Psychiatric: normal insight and judgment, oriented to person, place, time, and general circumstances  Head: Normocephalic, without obvious abnormality, atraumatic  Eyes:lids and lashes normal, conjunctivae and sclerae normal and pupils equal, round, reactive to light and accomodation  EMNT: external ear normal, no discharge. Lips normal  Neck: no adenopathy, no JVD, supple, symmetrical, trachea midline and thyroid not enlarged, symmetric, no tenderness/mass/nodules   Respiratory: clear to auscultation and percussion bilaterally with normal respiratory effort  Cardiovascular: normal rate, regular rhythm, normal S1 and S2 and no gallops  Gastrointestinal: soft, non-tender, non-distended, normal bowel sounds, no masses or organomegaly  Lymphatic:   Extremities: no edema, no clubbing  Skin:No rashes or nodules noted.   Neurologic:    LABS:  Labs Reviewed   CBC WITH AUTO DIFFERENTIAL - Abnormal; Notable for the following:        Result Value    RBC 3.85 (*)     Hemoglobin 10.1 (*)     Hematocrit 32.6 (*)     RDW 20.4 (*)     All other components within normal limits    Narrative:     Performed at:  [facility] [address] [facility] [place]   Phone [phone]   COMPREHENSIVE METABOLIC PANEL - Abnormal; Notable for the following:     Glucose 133 (*)     All other components within normal limits    Narrative:     Performed at:  [facility] [address] [facility] [facility] [place]   Phone [phone]   TROPONIN - Abnormal; Notable for the following:     Troponin 0.02 (*)     All other components within normal limits    Narrative:     Performed at:  [facility] [address] [facility] [facility] [employer]   Phone [phone]   URINE RT REFLEX TO CULTURE - Abnormal; Notable for the following:     Clarity, UA CLOUDY (*)     Bilirubin Urine SMALL (*)     Ketones, Urine TRACE (*)     Blood, Urine LARGE (*)     Protein,  (*)     Leukocyte Esterase, Urine TRACE (*)     All other components within normal limits    Narrative:     Performed at:  [facility]  [address] [address] [place]   Phone [phone]   MICROSCOPIC URINALYSIS - Abnormal; Notable for the following:     Hyaline Casts, UA 9 (*)     All other components within normal limits    Narrative:     Called ABDIEL Wen informed of Clerical Error  Performed at:  [facility] [address],  [place], [facility] [place]   Phone [phone]   LACTATE, SEPSIS - Abnormal; Notable for the following:     Lactic Acid, Sepsis 2.3 (*)     All other components within normal limits    Narrative:     Performed at:  [facility] [address]  [place], [facility] [place]   Phone [phone]   CBC WITH AUTO DIFFERENTIAL - Abnormal; Notable for the following:     RBC 3.34 (*)     Hemoglobin 8.9 (*)     Hematocrit 28.0 (*)     RDW 20.1 (*)     All other components within normal limits    Narrative:     Performed at:  [facility] [address]  [place], [facility] [place]   Phone [phone]   BASIC METABOLIC PANEL - Abnormal; Notable for the following:     Glucose 128 (*)     All other components within normal limits    Narrative:     Performed at:  [facility] [address]  [place], [facility] [place]   Phone [phone]   URINE CULTURE   CULTURE BLOOD #1   CULTURE BLOOD #2   CULTURE BLOOD #1   BRAIN NATRIURETIC PEPTIDE    Narrative:     Performed at:  [facility] [address],  [place], [facility] [place]   Phone [phone]   HCG, SERUM, QUALITATIVE    Narrative:     Performed at:  [facility] [address]  [place], [facility] [place]   Phone [phone]   LIPASE    Narrative:     Performed at:  [facility] [address] [address]  [address] [place]   Phone [phone]   LACTATE, SEPSIS    Narrative:     Performed at:  [facility] [address]  [address], [place] Phone [phone]         IMAGING:  Imaging results from the ER have been reviewed in the computerized chart. Ct Chest Wo Contrast    Result Date: 1/28/2019  EXAMINATION: CT OF THE ABDOMEN AND PELVIS WITH CONTRAST; CT OF THE CHEST WITHOUT CONTRAST 1/28/2019 7:47 pm; 1/28/2019 7:45 pm TECHNIQUE: CT of the abdomen and pelvis was performed with the administration of intravenous contrast. Multiplanar reformatted images are provided for review. Dose modulation, iterative reconstruction, and/or weight based adjustment of the mA/kV was utilized to reduce the radiation dose to as low as reasonably achievable.; CT of the chest was performed without the administration of intravenous contrast. Multiplanar reformatted images are provided for review. Dose modulation, iterative reconstruction, and/or weight based adjustment of the mA/kV was utilized to reduce the radiation dose to as low as reasonably achievable. COMPARISON: CT abdomen and pelvis 01/18/2019. HISTORY: ORDERING SYSTEM PROVIDED HISTORY: RLQ abdominal pain TECHNOLOGIST PROVIDED HISTORY: Additional Contrast?->Oral Reason for exam:->RLQ pain persisting Ordering Physician Provided Reason for Exam: RLQ abdominal pain Acuity: Acute Type of Exam: Initial; ORDERING SYSTEM PROVIDED HISTORY: Other forms of systemic lupus erythematosus, unspecified organ involvement status ([facility] [address] TECHNOLOGIST PROVIDED HISTORY: Reason for exam:->pulmonary nodules seen on recent CT Ordering Physician Provided Reason for Exam: pulmonary nodules seen on recent CT Acuity: Chronic Type of Exam: Subsequent/Follow-up FINDINGS: Chest: Mediastinum: Lack of intravenous contrast limits evaluation of the mediastinum. The thoracic aorta is normal in appearance. The coronary arteries and branch vessels of the superior mediastinum and lower neck are unremarkable. The pulmonary arteries are normal in caliber. The heart is normal in size. No pericardial effusion.   The mediastinal esophagus and thyroid gland are unremarkable. No pathologically enlarged lymph nodes are seen in the chest. Lungs/pleura: The central airways are patent. No pleural effusion or pneumothorax. Mild bilateral dependent atelectasis. There are diffusely scattered patchy bilateral nodular/ground-glass opacities. No consolidation. Soft Tissues/Bones: No acute osseous or soft tissue abnormality. Abdomen/Pelvis: Organs: The liver is unremarkable. The gallbladder is surgically absent. The biliary tree is within normal limits status post cholecystectomy. The pancreas, spleen, and bilateral adrenal glands are unremarkable. The bilateral kidneys and ureters are unremarkable. GI/Bowel: Normal appendix. The colon is unremarkable. No evidence of acute appendicitis. No bowel obstruction or abnormal bowel wall thickening. Pelvis: Mild suspected circumferential urinary bladder wall thickening with mild adjacent stranding. The uterus and bilateral adnexae are unremarkable. Trace free fluid in the pelvis. No pelvic or inguinal lymphadenopathy. Peritoneum/Retroperitoneum: The abdominal aorta is normal in appearance. No retroperitoneal or mesenteric lymphadenopathy is identified. No free air or fluid is seen in the abdomen. Bones/Soft Tissues: No acute osseous or soft tissue abnormality. 1. Scattered small patchy ground-glass/nodular opacities throughout the bilateral lungs likely representing an infectious process. 2. Mild suspected circumferential urinary bladder wall thickening with mild adjacent stranding compatible with cystitis. Recommend correlation with urinalysis. 3. Normal appendix. Ct Abdomen Pelvis W Iv Contrast Additional Contrast? None    Result Date: 2/15/2019  EXAMINATION: CTA OF THE CHEST; CT OF THE ABDOMEN AND PELVIS WITH CONTRAST 2/15/2019 2:11 pm TECHNIQUE: CTA of the chest was performed after the administration of intravenous contrast.  Multiplanar reformatted images are provided for review.   MIP images are provided for review. Dose modulation, iterative reconstruction, and/or weight based adjustment of the mA/kV was utilized to reduce the radiation dose to as low as reasonably achievable.; CT of the abdomen and pelvis was performed with the administration of intravenous contrast. Multiplanar reformatted images are provided for review. Dose modulation, iterative reconstruction, and/or weight based adjustment of the mA/kV was utilized to reduce the radiation dose to as low as reasonably achievable. COMPARISON: 1/28/2019, 4/29/2011 HISTORY: ORDERING SYSTEM PROVIDED HISTORY: cervical ca - SOB - PE??? TECHNOLOGIST PROVIDED HISTORY: Ordering Physician Provided Reason for Exam: SOB Acuity: Unknown Type of Exam: Unknown; ORDERING SYSTEM PROVIDED HISTORY: abd disten - cervical ca? TECHNOLOGIST PROVIDED HISTORY: Additional Contrast?->None Ordering Physician Provided Reason for Exam: abd distention Acuity: Unknown Type of Exam: Unknown Patient with recently diagnosed cervical cancer. FINDINGS: Chest: Pulmonary arteries: The pulmonary arteries opacify normally. There is no evidence of filling defect to suggest a pulmonary embolism. Mediastinum: The thyroid is unremarkable. There is mild subcarinal and bilateral hilar lymphadenopathy, most likely benign and reactive in etiology given the patient's underlying lung findings. The thoracic aorta is unremarkable, without evidence of aneurysm or dissection. 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 No pericardial abnormality is identified. Lungs/pleura: There is mild mucous plugging within the bilateral lower lobes. The central airways are otherwise widely patent. There has been interval development of widespread ground-glass opacity throughout both lungs, with diffuse intralobular septal thickening evident, new from prior exam.  This most likely reflects a combination of interstitial pulmonary edema and superimposed multifocal pneumonia. free air or free fluid is identified. No pathologic lymphadenopathy is seen. The abdominal aorta is unremarkable. No significant abdominal wall hernia is identified. Bones/Soft Tissues: There is mild bilateral sacroiliitis. The skeletal structures are otherwise unremarkable. 1. No CT evidence of a pulmonary embolism. 2. No acute abnormality of the thoracic aorta. 3. Interval development of widespread ground-glass opacity throughout both lungs with diffuse intralobular septal thickening. This most likely reflects a combination of interstitial pulmonary edema and multifocal pneumonia. 4. Interval progression of multiple nodular foci of consolidative opacity throughout both lungs, a few of which are cavitary in appearance. These nodules are most likely infectious or inflammatory in etiology, and septic emboli are a consideration. Given patient's history of cervical cancer, metastatic disease is on the differential, though considered less likely. 5. New nonspecific moderate right hydronephrosis, without demonstrable cause. However, there is underlying wall thickening and enhancement of the urinary bladder. This combination of findings could represent a cystitis in the setting of urinary tract infection with resultant pyelitis and hydronephrosis. However, given patient history of cervical cancer, locoregional spread of tumor with resultant obstruction of the distal right ureter may be a cause of the patient's right hydronephrosis. 6. Interval development of new irregularity of the cervix and nonspecific endometrial thickening in comparison with the prior study of 1/28/2019. This likely represents the patient's known underlying cervical cancer causing obstruction of the endometrial with resultant endometrial thickening. This could be further evaluated with a follow-up pelvic ultrasound. 7. Stable mild bilateral pelvic chain lymphadenopathy, right greater than left, possibly representing metastatic disease. Ct Abdomen Pelvis W Iv Contrast Additional Contrast? Oral    Result Date: 1/28/2019  EXAMINATION: CT OF THE ABDOMEN AND PELVIS WITH CONTRAST; CT OF THE CHEST WITHOUT CONTRAST 1/28/2019 7:47 pm; 1/28/2019 7:45 pm TECHNIQUE: CT of the abdomen and pelvis was performed with the administration of intravenous contrast. Multiplanar reformatted images are provided for review. Dose modulation, iterative reconstruction, and/or weight based adjustment of the mA/kV was utilized to reduce the radiation dose to as low as reasonably achievable.; CT of the chest was performed without the administration of intravenous contrast. Multiplanar reformatted images are provided for review. Dose modulation, iterative reconstruction, and/or weight based adjustment of the mA/kV was utilized to reduce the radiation dose to as low as reasonably achievable. COMPARISON: CT abdomen and pelvis 01/18/2019. HISTORY: ORDERING SYSTEM PROVIDED HISTORY: RLQ abdominal pain TECHNOLOGIST PROVIDED HISTORY: Additional Contrast?->Oral Reason for exam:->RLQ pain persisting Ordering Physician Provided Reason for Exam: RLQ abdominal pain Acuity: Acute Type of Exam: Initial; ORDERING SYSTEM PROVIDED HISTORY: Other forms of systemic lupus erythematosus, unspecified organ involvement status ([facility] [address] TECHNOLOGIST PROVIDED HISTORY: Reason for exam:->pulmonary nodules seen on recent CT Ordering Physician Provided Reason for Exam: pulmonary nodules seen on recent CT Acuity: Chronic Type of Exam: Subsequent/Follow-up FINDINGS: Chest: Mediastinum: Lack of intravenous contrast limits evaluation of the mediastinum. The thoracic aorta is normal in appearance. The coronary arteries and branch vessels of the superior mediastinum and lower neck are unremarkable. The pulmonary arteries are normal in caliber. The heart is normal in size. No pericardial effusion. The mediastinal esophagus and thyroid gland are unremarkable.   No pathologically enlarged lymph nodes are seen in the chest. Lungs/pleura: The central airways are patent. No pleural effusion or pneumothorax. Mild bilateral dependent atelectasis. There are diffusely scattered patchy bilateral nodular/ground-glass opacities. No consolidation. Soft Tissues/Bones: No acute osseous or soft tissue abnormality. Abdomen/Pelvis: Organs: The liver is unremarkable. The gallbladder is surgically absent. The biliary tree is within normal limits status post cholecystectomy. The pancreas, spleen, and bilateral adrenal glands are unremarkable. The bilateral kidneys and ureters are unremarkable. GI/Bowel: Normal appendix. The colon is unremarkable. No evidence of acute appendicitis. No bowel obstruction or abnormal bowel wall thickening. Pelvis: Mild suspected circumferential urinary bladder wall thickening with mild adjacent stranding. The uterus and bilateral adnexae are unremarkable. Trace free fluid in the pelvis. No pelvic or inguinal lymphadenopathy. Peritoneum/Retroperitoneum: The abdominal aorta is normal in appearance. No retroperitoneal or mesenteric lymphadenopathy is identified. No free air or fluid is seen in the abdomen. Bones/Soft Tissues: No acute osseous or soft tissue abnormality. 1. Scattered small patchy ground-glass/nodular opacities throughout the bilateral lungs likely representing an infectious process. 2. Mild suspected circumferential urinary bladder wall thickening with mild adjacent stranding compatible with cystitis. Recommend correlation with urinalysis. 3. Normal appendix. Ct Abdomen Pelvis W Iv Contrast Additional Contrast? None    Result Date: 1/18/2019  EXAMINATION: CT OF THE ABDOMEN AND PELVIS WITH CONTRAST 1/18/2019 3:30 pm TECHNIQUE: CT of the abdomen and pelvis was performed with the administration of intravenous contrast. Multiplanar reformatted images are provided for review.  Dose modulation, iterative reconstruction, and/or weight based adjustment of the mA/kV was utilized to reduce the radiation dose to as low as reasonably achievable. COMPARISON: 06/06/2018 HISTORY: ORDERING SYSTEM PROVIDED HISTORY: low abd pain TECHNOLOGIST PROVIDED HISTORY: If patient is on cardiac monitor and/or pulse ox, they may be taken off cardiac monitor and pulse ox, left on O2 if currently on. All monitors reattached when patient returns to room. Additional Contrast?->None Ordering Physician Provided Reason for Exam: low abd pain Acuity: Unknown Type of Exam: Unknown FINDINGS: Lower Chest: There is bibasilar atelectasis. There multiple solid subcentimeter pulmonary nodules scattered throughout the bilateral bases. Organs: The liver, spleen, pancreas, adrenal glands and kidneys are unremarkable. The liver is enlarged measuring 22 cm in length. Status post cholecystectomy with mild intrahepatic ductal dilatation. GI/Bowel: There is no bowel obstruction. The appendix is within normal limits. Pelvis: The pelvic viscera are within normal limits. The bladder is not distended and cannot be evaluated. Peritoneum/Retroperitoneum: There is no adenopathy. Trace amount of physiologic free fluid is present cul de sac. Bones/Soft Tissues: Degenerative changes involve the bilateral sacroiliac joints. 1. Multiple scattered solid subcentimeter pulmonary nodules throughout the bilateral lungs favoring an infectious/inflammatory process. Ct Chest Pulmonary Embolism W Contrast    Result Date: 2/15/2019  EXAMINATION: CTA OF THE CHEST; CT OF THE ABDOMEN AND PELVIS WITH CONTRAST 2/15/2019 2:11 pm TECHNIQUE: CTA of the chest was performed after the administration of intravenous contrast.  Multiplanar reformatted images are provided for review. MIP images are provided for review.  Dose modulation, iterative reconstruction, and/or weight based adjustment of the mA/kV was utilized to reduce the radiation dose to as low as reasonably achievable.; CT of the abdomen and pelvis was performed with the administration of intravenous contrast. Multiplanar reformatted images are provided for review. Dose modulation, iterative reconstruction, and/or weight based adjustment of the mA/kV was utilized to reduce the radiation dose to as low as reasonably achievable. COMPARISON: 1/28/2019, 4/29/2011 HISTORY: ORDERING SYSTEM PROVIDED HISTORY: cervical ca - SOB - PE??? TECHNOLOGIST PROVIDED HISTORY: Ordering Physician Provided Reason for Exam: SOB Acuity: Unknown Type of Exam: Unknown; ORDERING SYSTEM PROVIDED HISTORY: abd disten - cervical ca? TECHNOLOGIST PROVIDED HISTORY: Additional Contrast?->None Ordering Physician Provided Reason for Exam: abd distention Acuity: Unknown Type of Exam: Unknown Patient with recently diagnosed cervical cancer. FINDINGS: Chest: Pulmonary arteries: The pulmonary arteries opacify normally. There is no evidence of filling defect to suggest a pulmonary embolism. Mediastinum: The thyroid is unremarkable. There is mild subcarinal and bilateral hilar lymphadenopathy, most likely benign and reactive in etiology given the patient's underlying lung findings. The thoracic aorta is unremarkable, without evidence of aneurysm or dissection. 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 No pericardial abnormality is identified. Lungs/pleura: There is mild mucous plugging within the bilateral lower lobes. The central airways are otherwise widely patent. There has been interval development of widespread ground-glass opacity throughout both lungs, with diffuse intralobular septal thickening evident, new from prior exam.  This most likely reflects a combination of interstitial pulmonary edema and superimposed multifocal pneumonia. Additionally, there has been interval progression and more consolidation of multiple scattered various sized pulmonary nodules throughout both lungs since the study of 1/28/2019. A few of these are cavitary.   The largest of these measures approximately 10 mm in short axis within the subpleural portion of the left lower lobe. These may be secondary to an atypical infectious or inflammatory process, to include septic emboli. Metastatic disease is considered less likely, though not excluded. There is no evidence of a pneumothorax or pleural effusion. Soft Tissues/Bones: No acute findings. Abdomen/Pelvis: Organs: The patient is status post cholecystectomy. The liver, spleen, and pancreas are normal.  The adrenal glands are unremarkable bilaterally. There has been interval development of nonspecific moderate right hydronephrosis since the prior exam, without definite demonstrable cause. Namely, no definite obstructing stone or mass is identified. The left kidney is stable and unremarkable. GI/Bowel: Evaluation of the hollow GI tract demonstrates no evidence of abnormal bowel wall thickening, dilatation, or obstruction. The appendix is normal. Pelvis: The urinary bladder is partially collapsed, though appears diffusely thick-walled and inflamed, with a mild amount of pericystic stranding noted. These findings are suggestive of an acute cystitis, progressed from prior exam.  Additionally, the cervix appears enlarged and irregular, and there is new abnormal thickening of the endometrium within the uterine fundus, which appears new in comparison with the study of 1/28/2019. The bilateral adnexa are unremarkable. There is a mild amount of free pelvic fluid, likely physiologic. There are stable mildly enlarged bilateral pelvic chain lymph nodes, the largest measuring approximately 2.6 x 1.6 cm along the right external iliac chain, similar to the study of 1/28/2019. Peritoneum/Retroperitoneum: No intraperitoneal free air or free fluid is identified. No pathologic lymphadenopathy is seen. The abdominal aorta is unremarkable. No significant abdominal wall hernia is identified. Bones/Soft Tissues: There is mild bilateral sacroiliitis.   The skeletal structures are otherwise unremarkable. 1. No CT evidence of a pulmonary embolism. 2. No acute abnormality of the thoracic aorta. 3. Interval development of widespread ground-glass opacity throughout both lungs with diffuse intralobular septal thickening. This most likely reflects a combination of interstitial pulmonary edema and multifocal pneumonia. 4. Interval progression of multiple nodular foci of consolidative opacity throughout both lungs, a few of which are cavitary in appearance. These nodules are most likely infectious or inflammatory in etiology, and septic emboli are a consideration. Given patient's history of cervical cancer, metastatic disease is on the differential, though considered less likely. 5. New nonspecific moderate right hydronephrosis, without demonstrable cause. However, there is underlying wall thickening and enhancement of the urinary bladder. This combination of findings could represent a cystitis in the setting of urinary tract infection with resultant pyelitis and hydronephrosis. However, given patient history of cervical cancer, locoregional spread of tumor with resultant obstruction of the distal right ureter may be a cause of the patient's right hydronephrosis. 6. Interval development of new irregularity of the cervix and nonspecific endometrial thickening in comparison with the prior study of 1/28/2019. This likely represents the patient's known underlying cervical cancer causing obstruction of the endometrial with resultant endometrial thickening. This could be further evaluated with a follow-up pelvic ultrasound. 7. Stable mild bilateral pelvic chain lymphadenopathy, right greater than left, possibly representing metastatic disease. EKG: from ER, interpreted by self. Sinus tach at 105. No acute st elevation. No TWI.  Comparison made to old ekg 7/9/17          MEDICAL DECISION MAKING:    Patient Active Hospital Problem List:   Pneumonia (2/15/2019)    Assessment: New Problem to me.  Seen on ct    Plan: admit, empiric abx started. o2 and hhn ordered. SLE (systemic lupus erythematosus) ([facility] [address] (1/22/2019)    Assessment: Established problem. Stable. Plan: Continue present orders/plan. Lupus anticoagulant positive (1/22/2019)    Assessment: Established problem. Stable. Plan: cont anticoagulation   Cervical cancer ([facility] [address] (2/15/2019)    Assessment: Established problem. Stable. Newly dx. Case d/w dr Mary Paul    Plan: Continue present orders/plan. Pulmonary nodule (2/15/2019)    Assessment: New Problem to me. Seen on ct    Plan: pulm to eval   Hydroureteronephrosis (2/15/2019)    Assessment: New Problem to me. Seen on ct    Plan: urology consult placed. In pain meds ordered for control. May need cysto? Cultures sent          Diagnoses as listed above, designated as new or established and plan outlined for each. Data Reviewed:   (1) Lab tests were reviewed or ordered. (1) Radiology tests were reviewed or ordered. (1) Medical test (Echo, EKG, PFT/dena) were ordered. (1)History was not obtained from someone other than patient  (1) Old records  were reviewed - see HPI/MDM for pertinent details if review done. (2) Case wasdiscussed with another health care provider: CATHIE Schuler  (2) Imaging was viewed by myself. Ct abd  (2) EKG  was viewed by myself. The patient isbeing placed in inpatient status with the expectation of requiring a hospital stay spanning at least two midnights for care and treatment of the problems noted in the problem list.  This determination is also based on thepatients comorbidities and past medical history, the severity and timing of the signs and symptoms upon presentation.         Electronically signed by: Augusto Camacho 2/16/2019

## 2019-02-16 NOTE — PROGRESS NOTES
Dr Sabina Hatfield notified that Pulmonology and Dr Gabrielle Patterson both agree that the patient is stable for anesthesia for cysto.

## 2019-02-16 NOTE — PROGRESS NOTES
Phone call placed to Dr. Gabreille Patterson concerning pt's Lactic acid level of 2.3; no new orders per Dr. Gabrielle Patterson. Will continue to monitor.

## 2019-02-17 NOTE — PROGRESS NOTES
Patient has returned from surgery in stable condition. Vitals assessed; tachycardic. Patient is awake, alert and oriented. Respirations are easy and unlabored. Patient does not appear to be in distress. Patient ambulated to bathroom, voided and returned to bed. PRN Dilaudid given for pain. This RN spoke with  in IR; states nephrostomy tube will be placed tomorrow. Patient and family updated on POC. Call light within reach.

## 2019-02-17 NOTE — BRIEF OP NOTE
Brief Postoperative Note  ______________________________________________________________    Patient: Mook Wilson  YOB: 1980  MRN: [id number]  Date of Procedure: 2/17/2019    Pre-Op Diagnosis: RIGHT HYDRON    Post-Op Diagnosis: Same       Procedure(s):  CYSTOSCOPY     Anesthesia: Monitor Anesthesia Care    Surgeon(s):  Tim Cox MD    Assistant: none    Estimated Blood Loss (mL): none    Complications: None    Specimens:   * No specimens in log *    Implants:  * No implants in log *      Drains:      Findings: tumor at trigone   Unable to see  The right uo   Will arrange for right neph/poss right antegrade stent     Tim Cox MD  Date: 2/17/2019  Time: 9:11 AM

## 2019-02-17 NOTE — PROGRESS NOTES
Routine VS taken; ST rhythm noted; BP elevated; pt had just walked back from restroom; will re-check. Rx'd pain medication given - see MAR. Pt. Lung sounds clearer on auscultation. Call light/table in reach.  Will continue to monitor

## 2019-02-17 NOTE — PLAN OF CARE
Spoke to patient and  yesterday via phone. Attempted to talk to her mother but there was no answer. Spoke to Dr. Ordoñez Him yesterday and Dr. Chao today. Please continue current care with aggressive mgmt of her pain. I increased the frequency of her oxycodone to q 3 hours prn 1-2 tabs based on pain score. To proceed with cytologic treatment I need pathologic diagnosis. I did a biopsy in my office on Friday. I will call the pathologist tmw to put a rush on the result. Once I have result I will officially consult med onc (again Dr. Ordoñez Him has seen pt as an outpatient for other diagnoses and is fully aware of her current status, we have discussed a proposed plan of care as well). I stopped the Aygestin as this will not benefit the patient. Given Dr. Yaz Miranda findings the patient has at least stage JACKI disease (assuming path is c/w cervical ca). Thank you to all parties involved in the patient's care. Cecy Deluca, [address] [place] [address] Oncology  [phone])

## 2019-02-17 NOTE — PROGRESS NOTES
Message sent to Dr. Linda Rodriguez via [employer]: \"Pt. O2 increased to 2L per NC for spo2 89% on 1L O2; resp 20; . BP/other VSS stable. Crackles heard on auscultation of lungs. Will continue to monitor. Just FYI. \"  See orders. Will continue to monitor.

## 2019-02-17 NOTE — PROGRESS NOTES
Message sent via [employer]: \"Pt. O2 increased to 2L per NC for spo2 89% on 1L O2; resp 20; . BP/other VSS stable. Crackles heard on auscultation of lungs. Will continue to monitor. Just FYI. \"  See orders. Will continue to monitor.

## 2019-02-17 NOTE — OP NOTE
[facility]                     [address] [facility], [address] [facility]                                OPERATIVE REPORT    PATIENT NAME: Rigoberto Tolbert                  :        1980  MED REC NO:   [id number]                          ROOM:       [id number]  ACCOUNT NO:   [de-identified]                           ADMIT DATE: 02/15/2019  PROVIDER:     Nigel Toribio MD    DATE OF PROCEDURE:  2019    PREOPERATIVE DIAGNOSIS:  Right hydronephrosis, probable cervical cancer. 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 OPERATION PERFORMED:  Cystoscopy. SURGEON:  Nigel Toribio MD    ANESTHESIA:  MAC. FINDINGS:  The patient with tumor, presumably cervical cancer with a  trigone and unable to identify right ureteral orifice. We will arrange  for the patient to have a right nephrostomy tube with antegrade stent  placement at a late date. OPERATIVE PROCEDURE:  The patient was brought to the operating room. She had been on antibiotics. She was given a MAC anesthetic. She was  placed in a lithotomy position, prepped and draped with Betadine. The  urethral meatus looked normal but on palpation there was obvious  induration of the urethra and at the bladder neck area on vaginal exam.   The scope was placed within the bladder. Visualization was performed. There was obvious tumor at the trigone extending up on the right  posterolateral wall. There was no active bleeding, no clots, no foreign  bodies. I was able to identify the left ureteral orifice and then spent  some time with both a 30 and 12 degree lenses trying to find the right  ureteral orifice but was unsuccessful. We probed multiple areas with a  sensor wire and a 5-Yoruba open-end ureteral catheter but again were  unable to identify the right ureteral orifice secondary to this tumor  extension.   _____ and on multiple attempts we emptied the

## 2019-02-17 NOTE — PROGRESS NOTES
Progress Note - Dr. Tianna Roberson - Internal Medicine  PCP: Josesito Frias  [address] [place] [address] [facility]  [phone]    Hospital Day: 2  Code Status: Full Code  Current Diet: Diet NPO, After Midnight        CC: follow up on medical issues    Subjective:   Jennifer Arzate is a 41 y.o. female. She reports problems     still with back pain  Case d/w dr Billy Garrison  He could not place stent due to large tumor at trigone    She denies chest pain, complains of shortness of breath, denies nausea,  denies emesis. 10 system Review of Systems is reviewed with patient, and pertinent positives are listed here: None . Otherwise, Review of systems is negative. I have reviewed the patient's medical and social history in detail and updated the computerized patient record. To recap: She  has a past medical history of Endometriosis; Fibromyalgia; GERD (gastroesophageal reflux disease); Hip fracture ([facility] Hypoglycemia; Lupus; Neuropathy; Ovarian cyst; and Seizures ([facility] . She  has a past surgical history that includes  section; Cholecystectomy (); bronchoscopy (10/16/14); and bronchoscopy (10/18/2014). . She  reports that she quit smoking about 17 years ago. Her smoking use included Cigarettes. She started smoking about 21 years ago. She has a 0.75 pack-year smoking history. She has never used smokeless tobacco. She reports that she does not drink alcohol or use drugs. .        Active Hospital Problems    Diagnosis Date Noted    Ground glass opacity present on imaging of lung [R91.8]     Lung nodules [R91.8]     Lymphadenopathy [R59.1]     Cervical cancer ([facility] [C53.9] 02/15/2019    Pneumonia [J18.9] 02/15/2019    Pulmonary nodule [R91.1] 02/15/2019    Hydronephrosis [N13.30] 02/15/2019    SLE (systemic lupus erythematosus) ([facility] [M32.9] 2019    Lupus anticoagulant positive [R76.0] 2019       Current Facility-Administered Medications: acetaminophen (TYLENOL) tablet 500 mg, 500 mg, Oral, Q4H PRN  oxyCODONE (ROXICODONE) immediate release tablet 5 mg, 5 mg, Oral, Q3H PRN **OR** oxyCODONE (ROXICODONE) immediate release tablet 10 mg, 10 mg, Oral, Q3H PRN  ipratropium-albuterol (DUONEB) nebulizer solution 1 ampule, 1 ampule, Inhalation, TID  ipratropium-albuterol (DUONEB) nebulizer solution 1 ampule, 1 ampule, Inhalation, Q4H PRN  phenazopyridine (PYRIDIUM) tablet 200 mg, 200 mg, Oral, TID WC  HYDROmorphone HCl PF (DILAUDID) injection 1 mg, 1 mg, Intravenous, Q2H PRN  zolpidem (AMBIEN) tablet 10 mg, 10 mg, Oral, Nightly  gabapentin (NEURONTIN) capsule 300 mg, 300 mg, Oral, TID  baclofen (LIORESAL) tablet 20 mg, 20 mg, Oral, BID  ibuprofen (ADVIL;MOTRIN) tablet 800 mg, 800 mg, Oral, Q8H PRN  promethazine (PHENERGAN) tablet 25 mg, 25 mg, Oral, Q6H PRN  0.9 % sodium chloride infusion, , Intravenous, Continuous  sodium chloride flush 0.9 % injection 10 mL, 10 mL, Intravenous, 2 times per day  sodium chloride flush 0.9 % injection 10 mL, 10 mL, Intravenous, PRN  magnesium hydroxide (MILK OF MAGNESIA) 400 MG/5ML suspension 30 mL, 30 mL, Oral, Daily PRN  ondansetron (ZOFRAN) injection 4 mg, 4 mg, Intravenous, Q6H PRN  potassium chloride (KLOR-CON M) extended release tablet 40 mEq, 40 mEq, Oral, PRN **OR** potassium bicarb-citric acid (EFFER-K) effervescent tablet 40 mEq, 40 mEq, Oral, PRN **OR** potassium chloride 10 mEq/100 mL IVPB (Peripheral Line), 10 mEq, Intravenous, PRN  vancomycin (VANCOCIN) 1,250 mg in dextrose 5 % 250 mL IVPB, 15 mg/kg, Intravenous, Q12H  cefepime (MAXIPIME) 2 g IVPB minibag, 2 g, Intravenous, Q12H  promethazine (PHENERGAN) injection 25 mg, 25 mg, Intramuscular, Q6H PRN  DULoxetine (CYMBALTA) extended release capsule 60 mg, 60 mg, Oral, Nightly         Objective:  /78   Pulse 119   Temp 97.7 °F (36.5 °C) (Axillary)   Resp 16   Ht 5' 6\" (1.676 m)   Wt 190 lb (86.2 kg)   SpO2 94%   BMI 30.67 kg/m²      Patient Vitals for the past 24 hrs:   BP Temp Temp src Pulse Resp SpO2   02/17/19 0944 - - - - 16 94 %   02/17/19 0913 121/78 97.7 °F (36.5 °C) Axillary 119 16 -   02/17/19 0900 112/60 - - 111 13 95 %   02/17/19 0855 107/67 98.2 °F (36.8 °C) Temporal 114 12 94 %   02/17/19 0850 112/64 - - 115 17 99 %   02/17/19 0845 116/62 - - 115 15 100 %   02/17/19 0840 119/75 97.4 °F (36.3 °C) Temporal 115 16 100 %   02/17/19 0530 112/75 - - 106 18 -   02/17/19 0345 (!) 144/83 98.5 °F (36.9 °C) Oral 112 20 94 %   02/16/19 2104 - - - - - 91 %   02/16/19 1945 120/74 97.7 °F (36.5 °C) Oral 120 20 92 %   02/16/19 1715 104/68 98.3 °F (36.8 °C) Oral 89 18 94 %   02/16/19 1536 - - - - - 98 %     Patient Vitals for the past 96 hrs (Last 3 readings):   Weight   02/15/19 1211 190 lb (86.2 kg)           Intake/Output Summary (Last 24 hours) at 02/17/19 1049  Last data filed at 02/17/19 0901   Gross per 24 hour   Intake             2676 ml   Output                0 ml   Net             2676 ml         Physical Exam:   S1, S2 normal, no murmur, rub or gallop, regular rate and rhythm  clear to auscultation bilaterally  abdomen is soft without significant tenderness, masses, organomegaly or guarding  extremities normal, atraumatic, no cyanosis or edema    Labs:  Lab Results   Component Value Date    WBC 9.1 02/17/2019    HGB 8.5 (L) 02/17/2019    HCT 26.9 (L) 02/17/2019     02/17/2019    CHOL 188 10/15/2014    TRIG 132 10/15/2014    HDL 65 (H) 10/15/2014    ALT 28 02/15/2019    AST 25 02/15/2019     02/17/2019    K 4.4 02/17/2019     02/17/2019    CREATININE 0.6 02/17/2019    BUN 10 02/17/2019    CO2 25 02/17/2019    TSH 1.57 06/18/2014    INR 1.08 01/18/2019    LABMICR YES 02/15/2019     Lab Results   Component Value Date    CKTOTAL 185 12/18/2014    TROPONINI 0.02 (H) 02/15/2019       Recent Imaging Results are Reviewed:  Xr Chest Standard (2 Vw)    Result Date: 2/16/2019  EXAMINATION: TWO VIEWS OF THE CHEST 2/16/2019 11:49 am COMPARISON: 10/16/2014 HISTORY: ORDERING SYSTEM PROVIDED HISTORY: pneumonia TECHNOLOGIST PROVIDED HISTORY: Reason for exam:->pneumonia Ordering Physician Provided Reason for Exam: PNA Acuity: Acute Type of Exam: Initial Relevant Medical/Surgical History: cervical ca FINDINGS: There is patchy bibasilar consolidation, right greater than left, superimposed on a background of diffuse interstitial prominence. Heart size, mediastinal contours and pulmonary vascularity are within normal limits. There is no pleural effusion. Multifocal bilateral pneumonia. Ct Chest Wo Contrast    Result Date: 1/28/2019  EXAMINATION: CT OF THE ABDOMEN AND PELVIS WITH CONTRAST; CT OF THE CHEST WITHOUT CONTRAST 1/28/2019 7:47 pm; 1/28/2019 7:45 pm TECHNIQUE: CT of the abdomen and pelvis was performed with the administration of intravenous contrast. Multiplanar reformatted images are provided for review. Dose modulation, iterative reconstruction, and/or weight based adjustment of the mA/kV was utilized to reduce the radiation dose to as low as reasonably achievable.; CT of the chest was performed without the administration of intravenous contrast. Multiplanar reformatted images are provided for review. Dose modulation, iterative reconstruction, and/or weight based adjustment of the mA/kV was utilized to reduce the radiation dose to as low as reasonably achievable. COMPARISON: CT abdomen and pelvis 01/18/2019.  HISTORY: ORDERING SYSTEM PROVIDED HISTORY: RLQ abdominal pain TECHNOLOGIST PROVIDED HISTORY: Additional Contrast?->Oral Reason for exam:->RLQ pain persisting Ordering Physician Provided Reason for Exam: RLQ abdominal pain Acuity: Acute Type of Exam: Initial; ORDERING SYSTEM PROVIDED HISTORY: Other forms of systemic lupus erythematosus, unspecified organ involvement status ([facility] TECHNOLOGIST PROVIDED HISTORY: Reason for exam:->pulmonary nodules seen on recent CT Ordering Physician Provided Reason for Exam: pulmonary nodules seen on recent CT Acuity: Chronic Type of Exam: Subsequent/Follow-up FINDINGS: Chest: Mediastinum: Lack of intravenous contrast limits evaluation of the mediastinum. The thoracic aorta is normal in appearance. The coronary arteries and branch vessels of the superior mediastinum and lower neck are unremarkable. The pulmonary arteries are normal in caliber. The heart is normal in size. No pericardial effusion. The mediastinal esophagus and thyroid gland are unremarkable. No pathologically enlarged lymph nodes are seen in the chest. Lungs/pleura: The central airways are patent. No pleural effusion or pneumothorax. Mild bilateral dependent atelectasis. There are diffusely scattered patchy bilateral nodular/ground-glass opacities. No consolidation. Soft Tissues/Bones: No acute osseous or soft tissue abnormality. Abdomen/Pelvis: Organs: The liver is unremarkable. The gallbladder is surgically absent. The biliary tree is within normal limits status post cholecystectomy. The pancreas, spleen, and bilateral adrenal glands are unremarkable. The bilateral kidneys and ureters are unremarkable. GI/Bowel: Normal appendix. The colon is unremarkable. No evidence of acute appendicitis. No bowel obstruction or abnormal bowel wall thickening. Pelvis: Mild suspected circumferential urinary bladder wall thickening with mild adjacent stranding. The uterus and bilateral adnexae are unremarkable. Trace free fluid in the pelvis. No pelvic or inguinal lymphadenopathy. Peritoneum/Retroperitoneum: The abdominal aorta is normal in appearance. No retroperitoneal or mesenteric lymphadenopathy is identified. No free air or fluid is seen in the abdomen. Bones/Soft Tissues: No acute osseous or soft tissue abnormality. 1. Scattered small patchy ground-glass/nodular opacities throughout the bilateral lungs likely representing an infectious process. 2. Mild suspected circumferential urinary bladder wall thickening with mild adjacent stranding compatible with cystitis. Recommend correlation with urinalysis. 3. Normal appendix. Ct Abdomen Pelvis W Iv Contrast Additional Contrast? None    Result Date: 2/15/2019  EXAMINATION: CTA OF THE CHEST; CT OF THE ABDOMEN AND PELVIS WITH CONTRAST 2/15/2019 2:11 pm TECHNIQUE: CTA of the chest was performed after the administration of intravenous contrast.  Multiplanar reformatted images are provided for review. MIP images are provided for review. Dose modulation, iterative reconstruction, and/or weight based adjustment of the mA/kV was utilized to reduce the radiation dose to as low as reasonably achievable.; CT of the abdomen and pelvis was performed with the administration of intravenous contrast. Multiplanar reformatted images are provided for review. Dose modulation, iterative reconstruction, and/or weight based adjustment of the mA/kV was utilized to reduce the radiation dose to as low as reasonably achievable. COMPARISON: 1/28/2019, 4/29/2011 HISTORY: ORDERING SYSTEM PROVIDED HISTORY: cervical ca - SOB - PE??? TECHNOLOGIST PROVIDED HISTORY: Ordering Physician Provided Reason for Exam: SOB Acuity: Unknown Type of Exam: Unknown; ORDERING SYSTEM PROVIDED HISTORY: abd disten - cervical ca? TECHNOLOGIST PROVIDED HISTORY: Additional Contrast?->None Ordering Physician Provided Reason for Exam: abd distention Acuity: Unknown Type of Exam: Unknown Patient with recently diagnosed cervical cancer. FINDINGS: Chest: Pulmonary arteries: The pulmonary arteries opacify normally. There is no evidence of filling defect to suggest a pulmonary embolism. Mediastinum: The thyroid is unremarkable. There is mild subcarinal and bilateral hilar lymphadenopathy, most likely benign and reactive in etiology given the patient's underlying lung findings. The thoracic aorta is unremarkable, without evidence of aneurysm or dissection. 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 No pericardial abnormality is identified. Lungs/pleura: There is mild mucous plugging within the bilateral lower lobes. The central airways are otherwise widely patent. There has been interval development of widespread ground-glass opacity throughout both lungs, with diffuse intralobular septal thickening evident, new from prior exam.  This most likely reflects a combination of interstitial pulmonary edema and superimposed multifocal pneumonia. Additionally, there has been interval progression and more consolidation of multiple scattered various sized pulmonary nodules throughout both lungs since the study of 1/28/2019. A few of these are cavitary. The largest of these measures approximately 10 mm in short axis within the subpleural portion of the left lower lobe. These may be secondary to an atypical infectious or inflammatory process, to include septic emboli. Metastatic disease is considered less likely, though not excluded. There is no evidence of a pneumothorax or pleural effusion. Soft Tissues/Bones: No acute findings. Abdomen/Pelvis: Organs: The patient is status post cholecystectomy. The liver, spleen, and pancreas are normal.  The adrenal glands are unremarkable bilaterally. There has been interval development of nonspecific moderate right hydronephrosis since the prior exam, without definite demonstrable cause. Namely, no definite obstructing stone or mass is identified. The left kidney is stable and unremarkable. GI/Bowel: Evaluation of the hollow GI tract demonstrates no evidence of abnormal bowel wall thickening, dilatation, or obstruction. The appendix is normal. Pelvis: The urinary bladder is partially collapsed, though appears diffusely thick-walled and inflamed, with a mild amount of pericystic stranding noted.  These findings are suggestive of an acute cystitis, progressed from prior exam.  Additionally, the cervix appears enlarged and irregular, and there is new abnormal thickening of the endometrium within the uterine fundus, which appears new in comparison with the study of 1/28/2019. The bilateral adnexa are unremarkable. There is a mild amount of free pelvic fluid, likely physiologic. There are stable mildly enlarged bilateral pelvic chain lymph nodes, the largest measuring approximately 2.6 x 1.6 cm along the right external iliac chain, similar to the study of 1/28/2019. Peritoneum/Retroperitoneum: No intraperitoneal free air or free fluid is identified. No pathologic lymphadenopathy is seen. The abdominal aorta is unremarkable. No significant abdominal wall hernia is identified. Bones/Soft Tissues: There is mild bilateral sacroiliitis. The skeletal structures are otherwise unremarkable. 1. No CT evidence of a pulmonary embolism. 2. No acute abnormality of the thoracic aorta. 3. Interval development of widespread ground-glass opacity throughout both lungs with diffuse intralobular septal thickening. This most likely reflects a combination of interstitial pulmonary edema and multifocal pneumonia. 4. Interval progression of multiple nodular foci of consolidative opacity throughout both lungs, a few of which are cavitary in appearance. These nodules are most likely infectious or inflammatory in etiology, and septic emboli are a consideration. Given patient's history of cervical cancer, metastatic disease is on the differential, though considered less likely. 5. New nonspecific moderate right hydronephrosis, without demonstrable cause. However, there is underlying wall thickening and enhancement of the urinary bladder. This combination of findings could represent a cystitis in the setting of urinary tract infection with resultant pyelitis and hydronephrosis. However, given patient history of cervical cancer, locoregional spread of tumor with resultant obstruction of the distal right ureter may be a cause of the patient's right hydronephrosis.  6. Interval development of new irregularity of the cervix and nonspecific endometrial thickening in comparison with the prior study of 1/28/2019. This likely represents the patient's known underlying cervical cancer causing obstruction of the endometrial with resultant endometrial thickening. This could be further evaluated with a follow-up pelvic ultrasound. 7. Stable mild bilateral pelvic chain lymphadenopathy, right greater than left, possibly representing metastatic disease. Ct Abdomen Pelvis W Iv Contrast Additional Contrast? Oral    Result Date: 1/28/2019  EXAMINATION: CT OF THE ABDOMEN AND PELVIS WITH CONTRAST; CT OF THE CHEST WITHOUT CONTRAST 1/28/2019 7:47 pm; 1/28/2019 7:45 pm TECHNIQUE: CT of the abdomen and pelvis was performed with the administration of intravenous contrast. Multiplanar reformatted images are provided for review. Dose modulation, iterative reconstruction, and/or weight based adjustment of the mA/kV was utilized to reduce the radiation dose to as low as reasonably achievable.; CT of the chest was performed without the administration of intravenous contrast. Multiplanar reformatted images are provided for review. Dose modulation, iterative reconstruction, and/or weight based adjustment of the mA/kV was utilized to reduce the radiation dose to as low as reasonably achievable. COMPARISON: CT abdomen and pelvis 01/18/2019.  HISTORY: ORDERING SYSTEM PROVIDED HISTORY: RLQ abdominal pain TECHNOLOGIST PROVIDED HISTORY: Additional Contrast?->Oral Reason for exam:->RLQ pain persisting Ordering Physician Provided Reason for Exam: RLQ abdominal pain Acuity: Acute Type of Exam: Initial; ORDERING SYSTEM PROVIDED HISTORY: Other forms of systemic lupus erythematosus, unspecified organ involvement status ([facility] TECHNOLOGIST PROVIDED HISTORY: Reason for exam:->pulmonary nodules seen on recent CT Ordering Physician Provided Reason for Exam: pulmonary nodules seen on recent CT Acuity: Chronic Type of Exam: Subsequent/Follow-up FINDINGS: Chest: Mediastinum: Lack of intravenous contrast limits evaluation of the mediastinum. The thoracic aorta is normal in appearance. The coronary arteries and branch vessels of the superior mediastinum and lower neck are unremarkable. The pulmonary arteries are normal in caliber. The heart is normal in size. No pericardial effusion. The mediastinal esophagus and thyroid gland are unremarkable. No pathologically enlarged lymph nodes are seen in the chest. Lungs/pleura: The central airways are patent. No pleural effusion or pneumothorax. Mild bilateral dependent atelectasis. There are diffusely scattered patchy bilateral nodular/ground-glass opacities. No consolidation. Soft Tissues/Bones: No acute osseous or soft tissue abnormality. Abdomen/Pelvis: Organs: The liver is unremarkable. The gallbladder is surgically absent. The biliary tree is within normal limits status post cholecystectomy. The pancreas, spleen, and bilateral adrenal glands are unremarkable. The bilateral kidneys and ureters are unremarkable. GI/Bowel: Normal appendix. The colon is unremarkable. No evidence of acute appendicitis. No bowel obstruction or abnormal bowel wall thickening. Pelvis: Mild suspected circumferential urinary bladder wall thickening with mild adjacent stranding. The uterus and bilateral adnexae are unremarkable. Trace free fluid in the pelvis. No pelvic or inguinal lymphadenopathy. Peritoneum/Retroperitoneum: The abdominal aorta is normal in appearance. No retroperitoneal or mesenteric lymphadenopathy is identified. No free air or fluid is seen in the abdomen. Bones/Soft Tissues: No acute osseous or soft tissue abnormality. 1. Scattered small patchy ground-glass/nodular opacities throughout the bilateral lungs likely representing an infectious process. 2. Mild suspected circumferential urinary bladder wall thickening with mild adjacent stranding compatible with cystitis. Recommend correlation with urinalysis. 3. Normal appendix.      Ct Abdomen the chest was performed after the administration of intravenous contrast.  Multiplanar reformatted images are provided for review. MIP images are provided for review. Dose modulation, iterative reconstruction, and/or weight based adjustment of the mA/kV was utilized to reduce the radiation dose to as low as reasonably achievable.; CT of the abdomen and pelvis was performed with the administration of intravenous contrast. Multiplanar reformatted images are provided for review. Dose modulation, iterative reconstruction, and/or weight based adjustment of the mA/kV was utilized to reduce the radiation dose to as low as reasonably achievable. COMPARISON: 1/28/2019, 4/29/2011 HISTORY: ORDERING SYSTEM PROVIDED HISTORY: cervical ca - SOB - PE??? TECHNOLOGIST PROVIDED HISTORY: Ordering Physician Provided Reason for Exam: SOB Acuity: Unknown Type of Exam: Unknown; ORDERING SYSTEM PROVIDED HISTORY: abd disten - cervical ca? TECHNOLOGIST PROVIDED HISTORY: Additional Contrast?->None Ordering Physician Provided Reason for Exam: abd distention Acuity: Unknown Type of Exam: Unknown Patient with recently diagnosed cervical cancer. FINDINGS: Chest: Pulmonary arteries: The pulmonary arteries opacify normally. There is no evidence of filling defect to suggest a pulmonary embolism. Mediastinum: The thyroid is unremarkable. There is mild subcarinal and bilateral hilar lymphadenopathy, most likely benign and reactive in etiology given the patient's underlying lung findings. The thoracic aorta is unremarkable, without evidence of aneurysm or dissection. 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 No pericardial abnormality is identified. Lungs/pleura: There is mild mucous plugging within the bilateral lower lobes. The central airways are otherwise widely patent.   There has been interval development of widespread ground-glass opacity throughout both lungs, with diffuse intralobular septal thickening evident, new from prior exam.  This most likely reflects a combination of interstitial pulmonary edema and superimposed multifocal pneumonia. Additionally, there has been interval progression and more consolidation of multiple scattered various sized pulmonary nodules throughout both lungs since the study of 1/28/2019. A few of these are cavitary. The largest of these measures approximately 10 mm in short axis within the subpleural portion of the left lower lobe. These may be secondary to an atypical infectious or inflammatory process, to include septic emboli. Metastatic disease is considered less likely, though not excluded. There is no evidence of a pneumothorax or pleural effusion. Soft Tissues/Bones: No acute findings. Abdomen/Pelvis: Organs: The patient is status post cholecystectomy. The liver, spleen, and pancreas are normal.  The adrenal glands are unremarkable bilaterally. There has been interval development of nonspecific moderate right hydronephrosis since the prior exam, without definite demonstrable cause. Namely, no definite obstructing stone or mass is identified. The left kidney is stable and unremarkable. GI/Bowel: Evaluation of the hollow GI tract demonstrates no evidence of abnormal bowel wall thickening, dilatation, or obstruction. The appendix is normal. Pelvis: The urinary bladder is partially collapsed, though appears diffusely thick-walled and inflamed, with a mild amount of pericystic stranding noted. These findings are suggestive of an acute cystitis, progressed from prior exam.  Additionally, the cervix appears enlarged and irregular, and there is new abnormal thickening of the endometrium within the uterine fundus, which appears new in comparison with the study of 1/28/2019. The bilateral adnexa are unremarkable. There is a mild amount of free pelvic fluid, likely physiologic.   There are stable mildly enlarged bilateral pelvic chain lymph nodes, the largest measuring approximately 2.6 x 1.6 cm along the right external iliac chain, similar to the study of 1/28/2019. Peritoneum/Retroperitoneum: No intraperitoneal free air or free fluid is identified. No pathologic lymphadenopathy is seen. The abdominal aorta is unremarkable. No significant abdominal wall hernia is identified. Bones/Soft Tissues: There is mild bilateral sacroiliitis. The skeletal structures are otherwise unremarkable. 1. No CT evidence of a pulmonary embolism. 2. No acute abnormality of the thoracic aorta. 3. Interval development of widespread ground-glass opacity throughout both lungs with diffuse intralobular septal thickening. This most likely reflects a combination of interstitial pulmonary edema and multifocal pneumonia. 4. Interval progression of multiple nodular foci of consolidative opacity throughout both lungs, a few of which are cavitary in appearance. These nodules are most likely infectious or inflammatory in etiology, and septic emboli are a consideration. Given patient's history of cervical cancer, metastatic disease is on the differential, though considered less likely. 5. New nonspecific moderate right hydronephrosis, without demonstrable cause. However, there is underlying wall thickening and enhancement of the urinary bladder. This combination of findings could represent a cystitis in the setting of urinary tract infection with resultant pyelitis and hydronephrosis. However, given patient history of cervical cancer, locoregional spread of tumor with resultant obstruction of the distal right ureter may be a cause of the patient's right hydronephrosis. 6. Interval development of new irregularity of the cervix and nonspecific endometrial thickening in comparison with the prior study of 1/28/2019. This likely represents the patient's known underlying cervical cancer causing obstruction of the endometrial with resultant endometrial thickening.   This could be further evaluated

## 2019-02-17 NOTE — FLOWSHEET NOTE
02/17/19 1539   Encounter Summary   Services provided to: Patient and family together   Referral/Consult From: Nurse   Support System Spouse;Parent; Children;Family members   Continue Visiting (visit w/pt and family 2/17 CL)   Complexity of Encounter Moderate   Length of Encounter 30 minutes   Spiritual/Congregational   Type Spiritual support   Assessment Calm; Approachable;Coping   Intervention Active listening;Explored feelings, thoughts, concerns; Discussed illness/injury and it's impact   Outcome Engaged in conversation;Coping   pt asked for cancer support resources.  gave her info on some groups in [facility].   Electronically signed by Janna Wilkerson on 2/17/2019 at 3:41 PM

## 2019-02-17 NOTE — PROGRESS NOTES
40 y/o female with right hydronephrosis most likely from cervical cancer. Cleared for surgery and presents today for cysto and right ureteral stent placement. No recent cardiopulm issues   Discussed indications and pt agrees to proceed.

## 2019-02-17 NOTE — PROGRESS NOTES
PRN pain medication given per pt. Request. Denies other needs at this time. Call light/table in reach. Will continue to monitor.

## 2019-02-17 NOTE — PROGRESS NOTES
Pt ax performed; VSS - see flowsheets. Pt rx'd meds given - see MAR. Pt. States that her flank pain has improved since taking pyridium and that her pain control has been better with PRN Dilaudid. Pt. Denies other needs at this time. Call light/table in reach. Will continue to monitor.

## 2019-02-18 NOTE — PROGRESS NOTES
Clinical Pharmacy Note:    Pharmacy consulted by Dr. Tony Pereira to dose vancomycin. Age: 41 y.o. Height: 66 in  Weight: 86.2 kg  CrCl cannot be calculated (This lab value cannot be used to calculate CrCl because it is not a number: <0.5). Vt was 10.9 and Scr has been improving, will increase dose from 1250mg q12h to Vancomycin 1500 mg IV q12h starting 2/18 at 2100. Trough ordered prior to the 4th dose (2/20 at 0830) with instructions to hold dose if trough greater than 20mcg/mL. Pharmacy will continue to follow.      Dean Georges, PharmD, [facility]

## 2019-02-18 NOTE — PRE SEDATION
medications    Medication Sig Start Date End Date Taking? Authorizing Provider   oxyCODONE-acetaminophen (PERCOCET) 5-325 MG per tablet TK 1 T PO Q 4 H PRN P 2/12/19  Yes Historical Provider, MD   promethazine (PHENERGAN) 25 MG tablet Take 25 mg by mouth every 6 hours as needed  1/10/19  Yes Historical Provider, MD   ibuprofen (ADVIL;MOTRIN) 800 MG tablet Take 1 tablet by mouth every 8 hours as needed for Pain or Fever 2/10/19  Yes MAGGIE Ponce - CNP   norethindrone (AYGESTIN) 5 MG tablet Take 1 tablet by mouth every 12 hours for 21 days 12/22/18 2/15/19 Yes Elvia FREED MD   DULoxetine (CYMBALTA) 60 MG extended release capsule Take 60 mg by mouth daily   Yes Historical Provider, MD   baclofen (LIORESAL) 20 MG tablet Take 20 mg by mouth 2 times daily    Yes Historical Provider, MD   gabapentin (NEURONTIN) 300 MG capsule Take 300 mg by mouth 3 times daily. .   Yes Historical Provider, MD     Coumadin Use Last 7 Days:  no  Antiplatelet drug therapy use last 7 days: no  Other anticoagulant use last 7 days: no  Additional Medication Information:  na      Pre-Sedation Documentation and Exam:   I have reviewed the patient's history and review of systems.     Mallampati Airway Assessment:  Mallampati Class II - (soft palate, fauces & uvula are visible)    Prior History of Anesthesia Complications:   none    ASA Classification:  Class 3 - A patient with severe systemic disease that limits activity but is not incapacitating    Sedation/ Anesthesia Plan:   intravenous sedation    Medications Planned:   midazolam (Versed) intravenously and fentanyl intravenously    Patient is an appropriate candidate for plan of sedation: yes    Electronically signed by Phillip Ibarra MD on 2/18/2019 at 3:42 PM

## 2019-02-18 NOTE — PROGRESS NOTES
Pt back to unit. VSS. Pt states pain 8/10, dilaudid given. Dr. Richi Jordan at bedside, PCA pump to be ordered for better pain control. Diet order, general placed and ambassador called. Will continue to monitor.

## 2019-02-18 NOTE — PROGRESS NOTES
Spoke with Radiology regarding nephrostomy tube placement, will need orders from Dr. Ana Dee. Dr. Darel Fothergill ok with procedure. Message sent to Dr. Ana Dee via Urology group, waiting on call back.

## 2019-02-18 NOTE — CONSULTS
GYNECOLOGIC ONCOLOGY CONSULTATION:     2/18/2019 1:20 PM    REASON FOR CONSULT: metastatic cervical cancer    REFERRING PROVIDER:  Patient Care Team:  Radha Cheung MD as PCP - General (Family Medicine)  Chuy Gibbs MD as Consulting Physician (Rheumatology)  PCP: Radha Cheung MD      CHIEF COMPLAINT:     Chief Complaint   Patient presents with    Cervical Cancer     Pt was sent over from Dr Nigel Pickett office - states she was sent here from office - was told today that she has stage 3 cervical cancer and was sent here for \"scans and further testing\"       HISTORY OF PRESENT ILLNESS:     HPI: This is a 41 y.o. female that I saw in my office the day of her admission with the following history:  Taken to OR by Dr. Camila Peraza on 2/12/2019 for a BTL, D&C and possible endometrial ablation. With initiation of the procedure and introduction of the speculum she was found to have a grossly abnormal vaginal exam, cervix was not identifiable and the procedure was turned to laparoscopy for possible tubal ligation. The right fallopian tube was slightly dilated and he thinks was adherent to the right adnexa and the right pelvic sidewall and he was unable to get it up to ligated. On the left side the entire tube was dilated and shape adherent to the left adnexa. The portions of the left ovary seen seem to be unremarkable in the right ovary was not visualized. Procedure was then aborted. In December she did require hospitalization due to heavy menstrual bleeding and placed on IV Premarin to get her bleeding to stop. Examination was performed in the ED 12/21 and was limited by bleeding, but otherwise unremarkable. Hormonal options have been tried, but ineffective. On January 18, patient was seen in the office by Dr. Yanira Anne and she was sent directly from the office to the emergency room for evaluation of abdominal and pelvic pain, though GYN exam at that time appears unremarkable.      Of note July 2018 her Pap smear was normal, however was positive for HPV type 16. Prior colposcopy in 2012 that showed cervical biopsies with GIO 1-2. See pap history as below. In history she has recently been evaluated by Dr Betsy Hodge. She has a history of juvenile rheumatoid arthritis and was treated by Dr Charity Diaz for fibromyalgia. She was previously treated with methotrexate and amitriptyline. She had been treated with cymbalta with better control of her pain. She felt that her arthritis symptoms recurred about 2 years ago. She has been throroughly evaluated by Dr Betsy Hodge with a finding of APA syndrome and was started on hydroxychloroquine, however this has been on hold d/t an indication for further evaluation. She has been found to have multiple small pulmonary nodules and saw pulmonology, she completed PFTs yesterday and reports doing poorly but has not received results. She has been advised to stop her plaquenil until pulmonary evaluation is completed. She was seen by pulmonology last week and plan at that time was histoplasma, Aspergillus, PFTs and repeat imaging to be followed by possible bronchoscopy and possible biopsy. In 2014 she had an episode of epiglottitis which required intubation and ventilator support. Hx left hip fracture in 2002. she has a history of seizure disorder    I sent her to the ER for evaluation for SOB and MCKINNEY and she was found to have O2 sats of 87%. No PE noted. No history exists. PAST MEDICAL HISTORY:     Past Medical History:   Diagnosis Date    Endometriosis     Fibromyalgia     GERD (gastroesophageal reflux disease)     Hip fracture (HCC)     LEFT 2002    Hypoglycemia     Lupus     Neuropathy     Ovarian cyst     Seizures (HCC)        ROS:   Review of Systems   Constitutional: Positive for activity change, appetite change and fatigue. HENT: Negative. Eyes: Negative. Respiratory: Positive for shortness of breath. Cardiovascular: Negative.     Gastrointestinal: Positive for abdominal distention and abdominal pain. Endocrine: Negative. Genitourinary: Positive for flank pain, menstrual problem, pelvic pain and vaginal bleeding. Negative for difficulty urinating, dysuria, frequency, hematuria and vaginal discharge. Musculoskeletal: Positive for back pain. Skin: Negative. PAST SURGICAL HISTORY:        Past Surgical History:   Procedure Laterality Date    BRONCHOSCOPY  10/16/14    Urgent intubation, direct laryngoscopy, subglottic tracheoscopy    BRONCHOSCOPY  10/18/2014    WITH EXTUBATION IN OR AND LARYNGOSCOPY     SECTION      x 3    CHOLECYSTECTOMY  2012    CYSTOSCOPY Right 2019    CYSTOSCOPY performed by Charlette Gale MD at [facility]        OB/GYN HISTORY:   Menstrual History:  OB History      Para Term  AB Living    4 3     1 3    SAB TAB Ectopic Molar Multiple Live Births              3       No LMP recorded. Patient is not currently having periods (Reason: Irregular periods).          SOCIAL HISTORY:     Social History     Social History    Marital status:      Spouse name: N/A    Number of children: N/A    Years of education: N/A     Occupational History          Real Estate     Social History Main Topics    Smoking status: Former Smoker     Packs/day: 0.25     Years: 3.00     Types: Cigarettes     Start date:      Quit date: 2001    Smokeless tobacco: Never Used    Alcohol use No    Drug use: No    Sexual activity: Yes     Partners: Male     Other Topics Concern    Not on file     Social History Narrative    No narrative on file       FAMILY HISTORY:     Family History   Problem Relation Age of Onset    Diabetes Mother     Crohn's Disease Sister     Hypertension Maternal Grandmother     Heart Failure Maternal Grandfather     Stroke Maternal Grandfather     Diabetes Paternal Grandmother     Asthma Paternal Grandfather      HEALTH MAINTENANCE:     Mammogram - Diagnostic (unilateral) on 2017, right breast US- Normal fibroglandular tissue is visualized sonographically, which corresponds to the patient's palpable finding. Clinical follow-up recommended. ASSESSMENT: BI-RADS CATEGORY (2) Benign.    Pap Smear on 06/2012, HIGH-GRADE SQUAMOUS INTRAEPITHELIAL LESION (HSIL)-encompassing moderate and severe dysplasia   Pap Smear on 02/11/2015, NIL, Negative HPV   Pap Smear on 07/2013, NIL   Pap Smear on 07/17/2018, NIL, + HPV 16  Pap Smear on 10/2012, LOW-GRADE SQUAMOUS INTRAEPITHELIAL LESION (LSIL)encompassing HPV/mild dysplasia/CIN1      ALLERGIES:     Allergies as of 02/15/2019 - Review Complete 02/15/2019   Allergen Reaction Noted    Food Hives 02/26/2014    Spinach  10/08/2014       MEDICATIONS:     Current Facility-Administered Medications:     vancomycin (VANCOCIN) 1,500 mg in dextrose 5 % 250 mL IVPB, 1,500 mg, Intravenous, Q12H, Abdiel Zhang MD    acetaminophen (TYLENOL) tablet 500 mg, 500 mg, Oral, Q4H PRN, Mayuri Garvin MD    oxyCODONE (ROXICODONE) immediate release tablet 5 mg, 5 mg, Oral, Q3H PRN **OR** oxyCODONE (ROXICODONE) immediate release tablet 10 mg, 10 mg, Oral, Q3H PRN, Mayuri Garvin MD, 10 mg at 02/17/19 1837    diphenhydrAMINE (BENADRYL) tablet 25 mg, 25 mg, Oral, Q6H PRN, Abdiel Zhang MD, 25 mg at 02/17/19 2157    sennosides-docusate sodium (SENOKOT-S) 8.6-50 MG tablet 2 tablet, 2 tablet, Oral, Daily, Abdiel Zhang MD    ipratropium-albuterol (DUONEB) nebulizer solution 1 ampule, 1 ampule, Inhalation, TID, Abdiel Zhang MD, 1 ampule at 02/18/19 0854    ipratropium-albuterol (DUONEB) nebulizer solution 1 ampule, 1 ampule, Inhalation, Q4H PRN, Abdiel Zhang MD    phenazopyridine (PYRIDIUM) tablet 200 mg, 200 mg, Oral, TID WC, Abdiel Zhang MD, 200 mg at 02/17/19 1723    HYDROmorphone HCl PF (DILAUDID) injection 1 mg, 1 mg, Intravenous, Q2H PRN, Abdiel Zhang MD, 1 mg at 02/18/19 1140    zolpidem (AMBIEN) tablet 10 mg, 10 mg, Oral, Nightly, Abdiel Zhang, MD, 10 mg at 02/17/19 2149    gabapentin (NEURONTIN) capsule 300 mg, 300 mg, Oral, TID, Larisa Gaines MD, 300 mg at 02/17/19 2149    baclofen (LIORESAL) tablet 20 mg, 20 mg, Oral, BID, Larisa Gaines MD, Stopped at 02/18/19 8618    ibuprofen (ADVIL;MOTRIN) tablet 800 mg, 800 mg, Oral, Q8H PRN, Larisa Gainse MD    promethazine [facility]) tablet 25 mg, 25 mg, Oral, Q6H PRN, Larisa Gaines MD    0.9 % sodium chloride infusion, , Intravenous, Continuous, Larisa Gaines MD, Last Rate: 75 mL/hr at 02/18/19 0548    sodium chloride flush 0.9 % injection 10 mL, 10 mL, Intravenous, 2 times per day, Larisa Gaines MD, 10 mL at 02/18/19 0854    sodium chloride flush 0.9 % injection 10 mL, 10 mL, Intravenous, PRN, Larisa Gaines MD, 10 mL at 02/17/19 1345    magnesium hydroxide (MILK OF MAGNESIA) 400 MG/5ML suspension 30 mL, 30 mL, Oral, Daily PRN, Larisa Gaines MD    ondansetron [facility]) injection 4 mg, 4 mg, Intravenous, Q6H PRN, Larisa Gaines MD, 4 mg at 02/17/19 1840    potassium chloride (KLOR-CON M) extended release tablet 40 mEq, 40 mEq, Oral, PRN **OR** potassium bicarb-citric acid (EFFER-K) effervescent tablet 40 mEq, 40 mEq, Oral, PRN **OR** potassium chloride 10 mEq/100 mL IVPB (Peripheral Line), 10 mEq, Intravenous, PRN, Larisa Gaines MD    cefepime (MAXIPIME) 2 g IVPB minibag, 2 g, Intravenous, Q12H, Larisa Gaines MD, Stopped at 02/18/19 0700    promethazine (PHENERGAN) injection 25 mg, 25 mg, Intramuscular, Q6H PRN, Larisa Gaines MD, 25 mg at 02/18/19 0124    DULoxetine (CYMBALTA) extended release capsule 60 mg, 60 mg, Oral, Nightly, Larisa Gaines MD, 60 mg at 02/17/19 1801    PHYSICAL EXAM:       /81   Pulse 102   Temp 98.4 °F (36.9 °C) (Oral)   Resp 16   Ht 5' 6\" (1.676 m)   Wt 190 lb (86.2 kg)   SpO2 97%   BMI 30.67 kg/m²   Physical Exam   Constitutional: She is oriented to person, place, and time. She appears well-developed.    Eyes: No scleral INR   Date Value Ref Range Status   02/18/2019 1.11 0.86 - 1.14 Final     Comment:     Effective 11/28/18 at 02:30pm EST    Normal: 0.94 - 1.06  Therapeutic: 2.0 - 3.0  Pros. Valve: 2.5 - 3.5  AMI: 2.0 - 3.0         IMAGING:     Xr Chest Standard (2 Vw)    Result Date: 2/16/2019  EXAMINATION: TWO VIEWS OF THE CHEST 2/16/2019 11:49 am COMPARISON: 10/16/2014 HISTORY: ORDERING SYSTEM PROVIDED HISTORY: pneumonia TECHNOLOGIST PROVIDED HISTORY: Reason for exam:->pneumonia Ordering Physician Provided Reason for Exam: PNA Acuity: Acute Type of Exam: Initial Relevant Medical/Surgical History: cervical ca FINDINGS: There is patchy bibasilar consolidation, right greater than left, superimposed on a background of diffuse interstitial prominence. Heart size, mediastinal contours and pulmonary vascularity are within normal limits. There is no pleural effusion. Multifocal bilateral pneumonia. Ct Chest Wo Contrast    Result Date: 1/28/2019  EXAMINATION: CT OF THE ABDOMEN AND PELVIS WITH CONTRAST; CT OF THE CHEST WITHOUT CONTRAST 1/28/2019 7:47 pm; 1/28/2019 7:45 pm TECHNIQUE: CT of the abdomen and pelvis was performed with the administration of intravenous contrast. Multiplanar reformatted images are provided for review. Dose modulation, iterative reconstruction, and/or weight based adjustment of the mA/kV was utilized to reduce the radiation dose to as low as reasonably achievable.; CT of the chest was performed without the administration of intravenous contrast. Multiplanar reformatted images are provided for review. Dose modulation, iterative reconstruction, and/or weight based adjustment of the mA/kV was utilized to reduce the radiation dose to as low as reasonably achievable. COMPARISON: CT abdomen and pelvis 01/18/2019.  HISTORY: ORDERING SYSTEM PROVIDED HISTORY: RLQ abdominal pain TECHNOLOGIST PROVIDED HISTORY: Additional Contrast?->Oral Reason for exam:->RLQ pain persisting Ordering Physician Provided Reason for Exam: RLQ abdominal pain Acuity: Acute Type of Exam: Initial; ORDERING SYSTEM PROVIDED HISTORY: Other forms of systemic lupus erythematosus, unspecified organ involvement status ([facility] [address] TECHNOLOGIST PROVIDED HISTORY: Reason for exam:->pulmonary nodules seen on recent CT Ordering Physician Provided Reason for Exam: pulmonary nodules seen on recent CT Acuity: Chronic Type of Exam: Subsequent/Follow-up FINDINGS: Chest: Mediastinum: Lack of intravenous contrast limits evaluation of the mediastinum. The thoracic aorta is normal in appearance. The coronary arteries and branch vessels of the superior mediastinum and lower neck are unremarkable. The pulmonary arteries are normal in caliber. The heart is normal in size. No pericardial effusion. The mediastinal esophagus and thyroid gland are unremarkable. No pathologically enlarged lymph nodes are seen in the chest. Lungs/pleura: The central airways are patent. No pleural effusion or pneumothorax. Mild bilateral dependent atelectasis. There are diffusely scattered patchy bilateral nodular/ground-glass opacities. No consolidation. Soft Tissues/Bones: No acute osseous or soft tissue abnormality. Abdomen/Pelvis: Organs: The liver is unremarkable. The gallbladder is surgically absent. The biliary tree is within normal limits status post cholecystectomy. The pancreas, spleen, and bilateral adrenal glands are unremarkable. The bilateral kidneys and ureters are unremarkable. GI/Bowel: Normal appendix. The colon is unremarkable. No evidence of acute appendicitis. No bowel obstruction or abnormal bowel wall thickening. Pelvis: Mild suspected circumferential urinary bladder wall thickening with mild adjacent stranding. The uterus and bilateral adnexae are unremarkable. Trace free fluid in the pelvis. No pelvic or inguinal lymphadenopathy. Peritoneum/Retroperitoneum: The abdominal aorta is normal in appearance.   No retroperitoneal or mesenteric lymphadenopathy is is unremarkable. There is mild subcarinal and bilateral hilar lymphadenopathy, most likely benign and reactive in etiology given the patient's underlying lung findings. The thoracic aorta is unremarkable, without evidence of aneurysm or dissection. 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 No pericardial abnormality is identified. Lungs/pleura: There is mild mucous plugging within the bilateral lower lobes. The central airways are otherwise widely patent. There has been interval development of widespread ground-glass opacity throughout both lungs, with diffuse intralobular septal thickening evident, new from prior exam.  This most likely reflects a combination of interstitial pulmonary edema and superimposed multifocal pneumonia. Additionally, there has been interval progression and more consolidation of multiple scattered various sized pulmonary nodules throughout both lungs since the study of 1/28/2019. A few of these are cavitary. The largest of these measures approximately 10 mm in short axis within the subpleural portion of the left lower lobe. These may be secondary to an atypical infectious or inflammatory process, to include septic emboli. Metastatic disease is considered less likely, though not excluded. There is no evidence of a pneumothorax or pleural effusion. Soft Tissues/Bones: No acute findings. Abdomen/Pelvis: Organs: The patient is status post cholecystectomy. The liver, spleen, and pancreas are normal.  The adrenal glands are unremarkable bilaterally. There has been interval development of nonspecific moderate right hydronephrosis since the prior exam, without definite demonstrable cause. Namely, no definite obstructing stone or mass is identified. The left kidney is stable and unremarkable. GI/Bowel: Evaluation of the hollow GI tract demonstrates no evidence of abnormal bowel wall thickening, dilatation, or obstruction.   The appendix is could represent a cystitis in the setting of urinary tract infection with resultant pyelitis and hydronephrosis. However, given patient history of cervical cancer, locoregional spread of tumor with resultant obstruction of the distal right ureter may be a cause of the patient's right hydronephrosis. 6. Interval development of new irregularity of the cervix and nonspecific endometrial thickening in comparison with the prior study of 1/28/2019. This likely represents the patient's known underlying cervical cancer causing obstruction of the endometrial with resultant endometrial thickening. This could be further evaluated with a follow-up pelvic ultrasound. 7. Stable mild bilateral pelvic chain lymphadenopathy, right greater than left, possibly representing metastatic disease. Ct Abdomen Pelvis W Iv Contrast Additional Contrast? Oral    Result Date: 1/28/2019  EXAMINATION: CT OF THE ABDOMEN AND PELVIS WITH CONTRAST; CT OF THE CHEST WITHOUT CONTRAST 1/28/2019 7:47 pm; 1/28/2019 7:45 pm TECHNIQUE: CT of the abdomen and pelvis was performed with the administration of intravenous contrast. Multiplanar reformatted images are provided for review. Dose modulation, iterative reconstruction, and/or weight based adjustment of the mA/kV was utilized to reduce the radiation dose to as low as reasonably achievable.; CT of the chest was performed without the administration of intravenous contrast. Multiplanar reformatted images are provided for review. Dose modulation, iterative reconstruction, and/or weight based adjustment of the mA/kV was utilized to reduce the radiation dose to as low as reasonably achievable. COMPARISON: CT abdomen and pelvis 01/18/2019.  HISTORY: ORDERING SYSTEM PROVIDED HISTORY: RLQ abdominal pain TECHNOLOGIST PROVIDED HISTORY: Additional Contrast?->Oral Reason for exam:->RLQ pain persisting Ordering Physician Provided Reason for Exam: RLQ abdominal pain Acuity: Acute Type of Exam: Initial; ORDERING SYSTEM PROVIDED HISTORY: Other forms of systemic lupus erythematosus, unspecified organ involvement status ([facility] [address] TECHNOLOGIST PROVIDED HISTORY: Reason for exam:->pulmonary nodules seen on recent CT Ordering Physician Provided Reason for Exam: pulmonary nodules seen on recent CT Acuity: Chronic Type of Exam: Subsequent/Follow-up FINDINGS: Chest: Mediastinum: Lack of intravenous contrast limits evaluation of the mediastinum. The thoracic aorta is normal in appearance. The coronary arteries and branch vessels of the superior mediastinum and lower neck are unremarkable. The pulmonary arteries are normal in caliber. The heart is normal in size. No pericardial effusion. The mediastinal esophagus and thyroid gland are unremarkable. No pathologically enlarged lymph nodes are seen in the chest. Lungs/pleura: The central airways are patent. No pleural effusion or pneumothorax. Mild bilateral dependent atelectasis. There are diffusely scattered patchy bilateral nodular/ground-glass opacities. No consolidation. Soft Tissues/Bones: No acute osseous or soft tissue abnormality. Abdomen/Pelvis: Organs: The liver is unremarkable. The gallbladder is surgically absent. The biliary tree is within normal limits status post cholecystectomy. The pancreas, spleen, and bilateral adrenal glands are unremarkable. The bilateral kidneys and ureters are unremarkable. GI/Bowel: Normal appendix. The colon is unremarkable. No evidence of acute appendicitis. No bowel obstruction or abnormal bowel wall thickening. Pelvis: Mild suspected circumferential urinary bladder wall thickening with mild adjacent stranding. The uterus and bilateral adnexae are unremarkable. Trace free fluid in the pelvis. No pelvic or inguinal lymphadenopathy. Peritoneum/Retroperitoneum: The abdominal aorta is normal in appearance. No retroperitoneal or mesenteric lymphadenopathy is identified. No free air or fluid is seen in the abdomen.  Bones/Soft Tissues: hydronephrosis. However, given patient history of cervical cancer, locoregional spread of tumor with resultant obstruction of the distal right ureter may be a cause of the patient's right hydronephrosis. 6. Interval development of new irregularity of the cervix and nonspecific endometrial thickening in comparison with the prior study of 1/28/2019. This likely represents the patient's known underlying cervical cancer causing obstruction of the endometrial with resultant endometrial thickening. This could be further evaluated with a follow-up pelvic ultrasound. 7. Stable mild bilateral pelvic chain lymphadenopathy, right greater than left, possibly representing metastatic disease. Fl Retrograde Pyelogram Right    Result Date: 2/17/2019  EXAMINATION: SPOT FLUOROSCOPIC IMAGES 2/17/2019 6:52 am TECHNIQUE: Fluoroscopy was provided by the radiology department for procedure. Radiologist was not present during examination. FLUOROSCOPY DOSE AND TYPE OR TIME AND EXPOSURES: 3 seconds of fluoroscopy was utilized for purposes of intraoperative localization. 1 fluoroscopic spot image was obtained. COMPARISON: None HISTORY: Intraprocedural imaging. FINDINGS: 1 spot images of the abdomen was obtained. Intraprocedural fluoroscopic spot images as above. See separate procedure report for more information. Ir Guided Ureteral Stent Place W Nephro Cath New Access    Result Date: 2/18/2019  PROCEDURE: IR ULTRASOUND AND FLUOROSCOPIC GUIDED RIGHT PERCUTANEOUS NEPHROSTOMY AND NEPHROSTOGRAM IR ANTEGRADE RIGHT URETERAL STENT. MODERATE CONSCIOUS SEDATION 2/18/2019 HISTORY: ORDERING SYSTEM PROVIDED HISTORY: needs R sided nephrostomy tube placed. maria guadalupe could not do via cysto. Adi said that IR would do on monday TECHNOLOGIST PROVIDED HISTORY: Reason for exam:->needs R sided nephrostomy tube placed. maria guadalupe could not do via cysto. Adi said that IR would do on monday COMPARISON: Abdomen pelvic CT 02/15/2019. CONTRAST: 30 cc, nonvascular within collecting system only. . SEDATION: Intravenous sedation was administered with continuous monitoring throughout the procedure. In measured doses, a total of 6 mg intravenous Versed and 275 mcg intravenous fentanyl was administered. My total procedure time with sedation was 26 minutes. FLUOROSCOPY DOSE AND TYPE OR TIME AND EXPOSURES: 3.5 minutes, 7 digital imaging sequences. DESCRIPTION OF PROCEDURE: Informed consent was obtained after a detailed explanation of the procedure including risks, benefits, and alternatives. Universal protocol was observed. TECHNIQUE: Informed consent was previously obtained. In the semi prone position, an appropriate area posterior lateral aspect of the skin overlying the targeted collecting system was demarcated, sterilely prepared draped and anesthetized. Utilizing ultrasound, anatomy from prior CT scan and other imaging, the needle trajectory and depth was predetermined. The micro puncture needle was advanced using fluoroscopic and ultrasound guidance into the collecting system without difficulty. Once urine was aspirated, a small platinum tip wire was advanced into the collecting system and into the proximal ureter. The tract was dilated. With wire exchange, a 6 Swedish sheath was placed. The collecting system is moderately dilated and the ureter is somewhat tortuous. A glide wire was easily advanced down the ureter. Because of this, a 5 Swedish peel-away sheath was advanced into the proximal ureter over the wire. The glide wire was then advanced with a steerable catheter into the urinary bladder without difficulty and only mild discomfort. There is no contrast extravasation. Once the catheter was placed into the urinary bladder, contrast injection is seen diluted within the somewhat distended urinary bladder. The catheter was used to place a Super Stiff wire for ureteral stent placement.   A 26 cm 8 Swedish ureteral stent was then advanced, such that the distal pigtail is well within the urinary bladder. The proximal pigtail was placed in the lower portion of the left renal pelvis. Again there is no contrast extravasation. No filling defect to indicate clot or bleeding was observed on early or later contrast imaging. Following this, dense contrast was injected showing normal expected filling of the ureteral stent and exiting from the distal pigtail directly into the urinary bladder. Following this, the internalized stent was disengaged. The proximal collecting system was then decompressed. The proximal nephrostomy access was then removed entirely, no external drainage necessary at this point. The patient tolerated the procedure well. A sterile bandage was placed over the small incision. From this point forward, the ureteral stent can be exchanged as needed from below. Successful right percutaneous nephrostomy with antegrade pyelogram. High-grade distal ureteral obstruction with severe hydronephrosis, successfully crossed as above. Successful 8 French internalized right ureteral stent placement as above. ASSESSMENT:     Problem List Items Addressed This Visit     Hydronephrosis      Other Visit Diagnoses     Shortness of breath    -  Primary    Elevated troponin        Pneumonia due to organism        Relevant Medications    promethazine (PHENERGAN) 25 MG tablet    cefepime (MAXIPIME) 2 g IVPB minibag (Completed)    promethazine (PHENERGAN) tablet 25 mg    cefepime (MAXIPIME) 2 g IVPB minibag    promethazine (PHENERGAN) injection 25 mg    ipratropium-albuterol (DUONEB) nebulizer solution 1 ampule    ipratropium-albuterol (DUONEB) nebulizer solution 1 ampule    diphenhydrAMINE (BENADRYL) tablet 25 mg    vancomycin (VANCOCIN) 1,500 mg in dextrose 5 % 250 mL IVPB (Start on 2/18/2019  9:00 PM)    Pyelitis            PLAN:   ONC-Lengthy conversation with the patient, and her family.    Received phone call from pathologist and have

## 2019-02-18 NOTE — PROGRESS NOTES
Shift assessment complete. VSS. Pt c/o pain 6/10, Dilaudid given. Vanc started. Held PO meds d/t NPO status for procedure. Consent signed and in chart. Pt awaiting procedure. No further needs at this time. Will monitor.

## 2019-02-18 NOTE — PROGRESS NOTES
Robin Carlson is a 41 y.o. female patient. 1. Shortness of breath    2. Elevated troponin    3. Pneumonia due to organism    4. Pyelitis    5. Hydroureteronephrosis      Past Medical History:   Diagnosis Date    Endometriosis     Fibromyalgia     GERD (gastroesophageal reflux disease)     Hip fracture (HCC)     LEFT 2002    Hypoglycemia     Lupus     Neuropathy     Ovarian cyst     Seizures (HCC)      No past surgical history pertinent negatives on file. Scheduled Meds:   vancomycin  1,500 mg Intravenous Q12H    sennosides-docusate sodium  2 tablet Oral Daily    ipratropium-albuterol  1 ampule Inhalation TID    phenazopyridine  200 mg Oral TID WC    zolpidem  10 mg Oral Nightly    gabapentin  300 mg Oral TID    baclofen  20 mg Oral BID    sodium chloride flush  10 mL Intravenous 2 times per day    cefepime  2 g Intravenous Q12H    DULoxetine  60 mg Oral Nightly     Continuous Infusions:   sodium chloride 75 mL/hr at 02/18/19 0548     PRN Meds:acetaminophen, oxyCODONE **OR** oxyCODONE, diphenhydrAMINE, ipratropium-albuterol, HYDROmorphone, ibuprofen, promethazine, sodium chloride flush, magnesium hydroxide, ondansetron, potassium chloride **OR** potassium bicarb-citric acid **OR** potassium chloride, promethazine    Allergies   Allergen Reactions    Food Hives     Raw spinach.  Spinach      Principal Problem:    Pneumonia  Active Problems:    SLE (systemic lupus erythematosus) (HCC)    Lupus anticoagulant positive    Cervical cancer (HCC)    Pulmonary nodule    Hydronephrosis    Ground glass opacity present on imaging of lung    Lung nodules    Lymphadenopathy  Resolved Problems:    * No resolved hospital problems. *    Blood pressure 130/81, pulse 102, temperature 98.4 °F (36.9 °C), temperature source Oral, resp. rate 16, height 5' 6\" (1.676 m), weight 190 lb (86.2 kg), SpO2 97 %. Subjective:   Diet: NPO. Activity level: Normal.    Pain control: Well controlled. Objective:  Vital signs (most recent): Blood pressure 130/81, pulse 102, temperature 98.4 °F (36.9 °C), temperature source Oral, resp. rate 16, height 5' 6\" (1.676 m), weight 190 lb (86.2 kg), SpO2 97 %. General appearance: Comfortable. Lungs:  Normal effort. Extremities: There is normal range of motion. Neurological: The patient is alert and oriented to person, place and time. Assessment:   Condition: In stable condition.        hydronephrosis  Cervical ca  Hematuria    recc    Unable to place stent  Getting perc nephr tube today  Cr 0.5  Will follow    Babak Lara MD  2/18/2019

## 2019-02-18 NOTE — PROGRESS NOTES
Messages sent to Dr. Rangel Room via [employer]: \"Pt. and family states pt has noticeably yellow tint to skin; this RN noticed very slight yellow tint to skin since last night; sclera white. Pt. also c/o upper Rt. side/back pain since admission. 2/15/19 Labs show: AST 15; ALT 28: Bilirubin <0.2. VSS. Any new orders? Thanks. \"  See orders. Will continue to monitor.

## 2019-02-18 NOTE — OP NOTE
Brief Operative Note      Patient: Jennifer Arzate MRN: [id number]     YOB: 1980  Age: 41 y.o. Sex: female        DATE OF PROCEDURE: 2/18/2019     PROCEDURE PERFORMED: Right perc nephrostomy, nephrostogram and antegrade ureteral stent. SURGEON: Zainab Barbosa MD    ANESTHESIA: Fentanyl, Versed    Estimated Blood Loss:none    Complications: None. Device implanted: 8 Fr 26 cm internalized right ureter stent.            Specimen: none    Electronically signed by Zainab Barbosa MD on 2/18/2019 at 4:24 PM

## 2019-02-19 NOTE — PROGRESS NOTES
Urology Progress Note  [facility]    Provider: Karen Nails MD Patient ID:  Admission Date: 2/15/2019 Name: Renetta Lucia Date: 2/15/2019 MRN: [id number]   Patient Location: [id number] : 1980  Attending: Sunshine Huston MD Date of Service: 2019  PCP: Michelle Fernández MD     Diagnoses:  Hydronephrosis related to pelvic malignancy  Cervical cancer  Hematuria    Assessment/Plan:  Had an antegrade ureteral stent placed yesterday. Doing OK. Urology will continue to work with Dr. Lisa Crooks for urology needs. The patient is aware that the likely next step is a stent change in 3 months, vs removal if her cancer responds to treatment. The patient had a chance to ask questions which were answered. she understands the above plan. Subjective:   Eric Maher is a 41 y.o. female. She was seen and examined this morning. Today we discussed management of her ureteral stent and typical stent related symptoms. Objective:   Vitals:  Vitals:    19 1136   BP: 111/77   Pulse: 101   Resp: 14   Temp: 98 °F (36.7 °C)   SpO2: 93%       Intake/Output Summary (Last 24 hours) at 19 1241  Last data filed at 19 1223   Gross per 24 hour   Intake            575.4 ml   Output              475 ml   Net            100.4 ml     Physical Exam:  Gen: Alert and oriented x3, no acute distress  CV: Regular rate   Resp: unlabored respirations  Abd: Soft, non-distended, non-tender, no masses  Ext: no peripheral edema noted, moves upper and lower extremities spontaneously  Skin: warm and well perfused, no rashes noted on the face, or arms.      Labs:  Lab Results   Component Value Date    WBC 8.9 2019    HGB 8.3 (L) 2019    HCT 26.9 (L) 2019    MCV 83.0 2019     2019     Lab Results   Component Value Date    CREATININE 0.7 2019    BUN 11 2019     2019    K 4.7 2019     2019    CO2 30 2019 Adelia Bueno MD  2/19/2019

## 2019-02-19 NOTE — PROGRESS NOTES
Bedside rounding completed with Ivy Posey RN. Pt denies needs at this time. White board updated. Call light in hand and pt verbalizes correct use. All needs within reach. Bed alarm set. Will continue to monitor.  Electronically signed by Louise Mathis RN on 2/18/2019 at 7:44 PM

## 2019-02-19 NOTE — PRE SEDATION
Sedation Pre-Procedure Note    Patient Name: Sohail Lee   YOB: 1980  Room/Bed: [address] [place], [place] [address]/3128-15  Medical Record Number: [id number]  Date: 2019   Time: 9:34 AM       Indication:  42 yo female with cervical cancer presents for chest port placement    Consent: I have discussed with the patient and/or the patient representative the indication, alternatives, and the possible risks and/or complications of the planned procedure and the anesthesia methods. The patient and/or patient representative appear to understand and agree to proceed. Vital Signs:   Vitals:    19 0921   BP: 114/73   Pulse: 105   Resp: 14   Temp:    SpO2: 95%       Past Medical History:   has a past medical history of Endometriosis; Fibromyalgia; GERD (gastroesophageal reflux disease); Hip fracture ([address] Hypoglycemia; Lupus; Neuropathy; Ovarian cyst; and Seizures ([facility] [address] Past Surgical History:   has a past surgical history that includes  section; Cholecystectomy (); bronchoscopy (10/16/14); bronchoscopy (10/18/2014); Cystoscopy (Right, 2019); and laparoscopy (2019).     Medications:   Scheduled Meds:    lidocaine PF  10 mL Intradermal Once    lidocaine-EPINEPHrine  20 mL Intradermal Once    sodium bicarbonate  5 mL Intravenous Once    topical skin adhesive   Topical Once    bupivacaine-EPINEPHrine PF  7.5 mL Injection Once    sennosides-docusate sodium  2 tablet Oral Daily    zolpidem  10 mg Oral Nightly    gabapentin  300 mg Oral TID    baclofen  20 mg Oral BID    sodium chloride flush  10 mL Intravenous 2 times per day    cefepime  2 g Intravenous Q12H    DULoxetine  60 mg Oral Nightly     Continuous Infusions:    heparin (porcine)      HYDROmorphone      sodium chloride 75 mL/hr at 19 0045     PRN Meds: sodium chloride bacteriostatic, sodium chloride (PF), naloxone, acetaminophen, oxyCODONE **OR** [DISCONTINUED] oxyCODONE, diphenhydrAMINE, ipratropium-albuterol, ibuprofen, promethazine, sodium chloride flush, magnesium hydroxide, ondansetron, potassium chloride **OR** potassium bicarb-citric acid **OR** potassium chloride, promethazine  Home Meds:   Prior to Admission medications    Medication Sig Start Date End Date Taking? Authorizing Provider   oxyCODONE-acetaminophen (PERCOCET) 5-325 MG per tablet TK 1 T PO Q 4 H PRN P 2/12/19  Yes Historical Provider, MD   promethazine (PHENERGAN) 25 MG tablet Take 25 mg by mouth every 6 hours as needed  1/10/19  Yes Historical Provider, MD   ibuprofen (ADVIL;MOTRIN) 800 MG tablet Take 1 tablet by mouth every 8 hours as needed for Pain or Fever 2/10/19  Yes MAGGIE Ozuna - CNP   norethindrone (AYGESTIN) 5 MG tablet Take 1 tablet by mouth every 12 hours for 21 days 12/22/18 2/15/19 Yes Oxana FREED MD   DULoxetine (CYMBALTA) 60 MG extended release capsule Take 60 mg by mouth daily   Yes Historical Provider, MD   baclofen (LIORESAL) 20 MG tablet Take 20 mg by mouth 2 times daily    Yes Historical Provider, MD   gabapentin (NEURONTIN) 300 MG capsule Take 300 mg by mouth 3 times daily. Estephanie Valdez Historical Provider, MD         Pre-Sedation Documentation and Exam:   I have reviewed the patient's history and review of systems.     Mallampati Airway Assessment:  Mallampati Class II - (soft palate, fauces & uvula are visible)    Prior History of Anesthesia Complications:   none    ASA Classification:  Class 2 - A normal healthy patient with mild systemic disease    Sedation/ Anesthesia Plan:   intravenous sedation    Medications Planned:   midazolam (Versed) intravenously and fentanyl intravenously    Patient is an appropriate candidate for plan of sedation: yes    Electronically signed by Neelam Matute MD on 2/19/2019 at 9:34 AM

## 2019-02-19 NOTE — PROGRESS NOTES
Progress Note - Dr. Linda Rodriguez - Internal Medicine  PCP: Pau Phelan  [address] [place] [address] Mahesh Martin 80 [phone]    Hospital Day: 4  Code Status: Full Code  Current Diet: Diet NPO Effective Now        CC: follow up on medical issues    Subjective:   Mayola Pallas is a 41 y.o. female. Pt is doing ok    Per dr Yayo Bateman - confirmation of invasive squamous cell carcinoma which supports the diagnosis of cervical cancer. She is at least stage JACKI d/t metastatic disease to bladder and possibly stage IVB if mets to lung    For IR port placement    Still with kidney/bladder pain, pt is s/p Right perc nephrostomy, nephrostogram and antegrade ureteral stent.       She denies chest pain, denies shortness of breath, denies nausea,  denies emesis. 10 system Review of Systems is reviewed with patient, and pertinent positives are listed here: None . Otherwise, Review of systems is negative. I have reviewed the patient's medical and social history in detail and updated the computerized patient record. To recap: She  has a past medical history of Endometriosis; Fibromyalgia; GERD (gastroesophageal reflux disease); Hip fracture ([address] Hypoglycemia; Lupus; Neuropathy; Ovarian cyst; and Seizures ([address] . She  has a past surgical history that includes  section; Cholecystectomy (); bronchoscopy (10/16/14); bronchoscopy (10/18/2014); Cystoscopy (Right, 2019); and laparoscopy (2019). . She  reports that she quit smoking about 17 years ago. Her smoking use included Cigarettes. She started smoking about 21 years ago. She has a 0.75 pack-year smoking history. She has never used smokeless tobacco. She reports that she does not drink alcohol or use drugs. .        Active Hospital Problems    Diagnosis Date Noted    Ground glass opacity present on imaging of lung [R91.8]     Lung nodules [R91.8]     Lymphadenopathy [R59.1]     Cervical cancer (HCC) [C53.9] 02/15/2019    Pneumonia PRN  cefepime (MAXIPIME) 2 g IVPB minibag, 2 g, Intravenous, Q12H  promethazine (PHENERGAN) injection 25 mg, 25 mg, Intramuscular, Q6H PRN  DULoxetine (CYMBALTA) extended release capsule 60 mg, 60 mg, Oral, Nightly         Objective:  /71   Pulse 90   Temp 98 °F (36.7 °C) (Oral)   Resp 12   Ht 5' 6\" (1.676 m)   Wt 190 lb (86.2 kg)   SpO2 96%   BMI 30.67 kg/m²      Patient Vitals for the past 24 hrs:   BP Temp Temp src Pulse Resp SpO2   02/19/19 0642 103/71 98 °F (36.7 °C) Oral 90 12 96 %   02/19/19 0445 97/62 98 °F (36.7 °C) Oral 86 13 94 %   02/19/19 0240 100/66 97.9 °F (36.6 °C) Oral 87 13 95 %   02/19/19 0040 100/66 97.7 °F (36.5 °C) Oral 94 11 94 %   02/18/19 2345 111/73 97.3 °F (36.3 °C) Oral 90 10 94 %   02/18/19 2245 116/73 98 °F (36.7 °C) Oral 94 13 96 %   02/18/19 2129 113/60 98.5 °F (36.9 °C) Oral 103 14 96 %   02/18/19 2105 133/75 - - 105 - 94 %   02/18/19 2045 125/75 - - 104 - 95 %   02/18/19 2015 133/88 - - 104 - 95 %   02/18/19 2000 114/74 - - 108 - 95 %   02/18/19 1945 122/77 - - 93 - 95 %   02/18/19 1930 126/62 - - 105 - 97 %   02/18/19 1915 128/83 - - 107 - 93 %   02/18/19 1910 117/73 - - 109 - 93 %   02/18/19 1905 121/71 - - 101 - (!) 89 %   02/18/19 1900 104/70 98.1 °F (36.7 °C) Axillary 102 14 94 %   02/18/19 1630 119/75 98.1 °F (36.7 °C) Axillary 103 14 96 %   02/18/19 1616 131/79 - - 106 9 93 %   02/18/19 1614 119/82 - - 103 10 94 %   02/18/19 1610 137/79 - - 116 11 97 %   02/18/19 1602 (!) 143/87 - - 111 14 97 %   02/18/19 1559 (!) 143/87 - - 110 12 98 %   02/18/19 1556 131/86 - - 110 17 97 %   02/18/19 1554 136/83 - - 108 11 98 %   02/18/19 1550 (!) 141/84 - - 109 12 98 %   02/18/19 1421 108/69 98.2 °F (36.8 °C) Oral 107 16 96 %   02/18/19 0854 - - - - - 97 %   02/18/19 0845 130/81 98.4 °F (36.9 °C) Oral 102 16 97 %     Patient Vitals for the past 96 hrs (Last 3 readings):   Weight   02/15/19 1211 190 lb (86.2 kg)           Intake/Output Summary (Last 24 hours) at 02/19/19 1768  Last data filed at 02/19/19 1763   Gross per 24 hour   Intake           2099.6 ml   Output              475 ml   Net           1624.6 ml         Physical Exam:   S1, S2 normal, no murmur, rub or gallop, regular rate and rhythm  clear to auscultation bilaterally  abdomen is soft without significant tenderness, masses, organomegaly or guarding  extremities normal, atraumatic, no cyanosis or edema    Labs:  Lab Results   Component Value Date    WBC 8.9 02/19/2019    HGB 8.3 (L) 02/19/2019    HCT 26.9 (L) 02/19/2019     02/19/2019    CHOL 188 10/15/2014    TRIG 132 10/15/2014    HDL 65 (H) 10/15/2014    ALT 49 (H) 02/18/2019    AST 23 02/18/2019     02/19/2019    K 4.7 02/19/2019     02/19/2019    CREATININE 0.7 02/19/2019    BUN 11 02/19/2019    CO2 30 02/19/2019    TSH 1.57 06/18/2014    INR 1.11 02/18/2019    LABMICR YES 02/18/2019     Lab Results   Component Value Date    CKTOTAL 185 12/18/2014    TROPONINI 0.02 (H) 02/15/2019       Recent Imaging Results are Reviewed:  Xr Chest Standard (2 Vw)    Result Date: 2/16/2019  EXAMINATION: TWO VIEWS OF THE CHEST 2/16/2019 11:49 am COMPARISON: 10/16/2014 HISTORY: ORDERING SYSTEM PROVIDED HISTORY: pneumonia TECHNOLOGIST PROVIDED HISTORY: Reason for exam:->pneumonia Ordering Physician Provided Reason for Exam: PNA Acuity: Acute Type of Exam: Initial Relevant Medical/Surgical History: cervical ca FINDINGS: There is patchy bibasilar consolidation, right greater than left, superimposed on a background of diffuse interstitial prominence. Heart size, mediastinal contours and pulmonary vascularity are within normal limits. There is no pleural effusion. Multifocal bilateral pneumonia.      Ct Chest Wo Contrast    Result Date: 1/28/2019  EXAMINATION: CT OF THE ABDOMEN AND PELVIS WITH CONTRAST; CT OF THE CHEST WITHOUT CONTRAST 1/28/2019 7:47 pm; 1/28/2019 7:45 pm TECHNIQUE: CT of the abdomen and pelvis was performed with the administration of intravenous contrast. Multiplanar reformatted images are provided for review. Dose modulation, iterative reconstruction, and/or weight based adjustment of the mA/kV was utilized to reduce the radiation dose to as low as reasonably achievable.; CT of the chest was performed without the administration of intravenous contrast. Multiplanar reformatted images are provided for review. Dose modulation, iterative reconstruction, and/or weight based adjustment of the mA/kV was utilized to reduce the radiation dose to as low as reasonably achievable. COMPARISON: CT abdomen and pelvis 01/18/2019. HISTORY: ORDERING SYSTEM PROVIDED HISTORY: RLQ abdominal pain TECHNOLOGIST PROVIDED HISTORY: Additional Contrast?->Oral Reason for exam:->RLQ pain persisting Ordering Physician Provided Reason for Exam: RLQ abdominal pain Acuity: Acute Type of Exam: Initial; ORDERING SYSTEM PROVIDED HISTORY: Other forms of systemic lupus erythematosus, unspecified organ involvement status ([facility] [address] TECHNOLOGIST PROVIDED HISTORY: Reason for exam:->pulmonary nodules seen on recent CT Ordering Physician Provided Reason for Exam: pulmonary nodules seen on recent CT Acuity: Chronic Type of Exam: Subsequent/Follow-up FINDINGS: Chest: Mediastinum: Lack of intravenous contrast limits evaluation of the mediastinum. The thoracic aorta is normal in appearance. The coronary arteries and branch vessels of the superior mediastinum and lower neck are unremarkable. The pulmonary arteries are normal in caliber. The heart is normal in size. No pericardial effusion. The mediastinal esophagus and thyroid gland are unremarkable. No pathologically enlarged lymph nodes are seen in the chest. Lungs/pleura: The central airways are patent. No pleural effusion or pneumothorax. Mild bilateral dependent atelectasis. There are diffusely scattered patchy bilateral nodular/ground-glass opacities. No consolidation. Soft Tissues/Bones: No acute osseous or soft tissue abnormality. weight based adjustment of the mA/kV was utilized to reduce the radiation dose to as low as reasonably achievable. COMPARISON: 1/28/2019, 4/29/2011 HISTORY: ORDERING SYSTEM PROVIDED HISTORY: cervical ca - SOB - PE??? TECHNOLOGIST PROVIDED HISTORY: Ordering Physician Provided Reason for Exam: SOB Acuity: Unknown Type of Exam: Unknown; ORDERING SYSTEM PROVIDED HISTORY: abd disten - cervical ca? TECHNOLOGIST PROVIDED HISTORY: Additional Contrast?->None Ordering Physician Provided Reason for Exam: abd distention Acuity: Unknown Type of Exam: Unknown Patient with recently diagnosed cervical cancer. FINDINGS: Chest: Pulmonary arteries: The pulmonary arteries opacify normally. There is no evidence of filling defect to suggest a pulmonary embolism. Mediastinum: The thyroid is unremarkable. There is mild subcarinal and bilateral hilar lymphadenopathy, most likely benign and reactive in etiology given the patient's underlying lung findings. The thoracic aorta is unremarkable, without evidence of aneurysm or dissection. 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 No pericardial abnormality is identified. Lungs/pleura: There is mild mucous plugging within the bilateral lower lobes. The central airways are otherwise widely patent. There has been interval development of widespread ground-glass opacity throughout both lungs, with diffuse intralobular septal thickening evident, new from prior exam.  This most likely reflects a combination of interstitial pulmonary edema and superimposed multifocal pneumonia. Additionally, there has been interval progression and more consolidation of multiple scattered various sized pulmonary nodules throughout both lungs since the study of 1/28/2019. A few of these are cavitary. The largest of these measures approximately 10 mm in short axis within the subpleural portion of the left lower lobe.   These may be secondary to an atypical infectious or inflammatory process, to include septic emboli. Metastatic disease is considered less likely, though not excluded. There is no evidence of a pneumothorax or pleural effusion. Soft Tissues/Bones: No acute findings. Abdomen/Pelvis: Organs: The patient is status post cholecystectomy. The liver, spleen, and pancreas are normal.  The adrenal glands are unremarkable bilaterally. There has been interval development of nonspecific moderate right hydronephrosis since the prior exam, without definite demonstrable cause. Namely, no definite obstructing stone or mass is identified. The left kidney is stable and unremarkable. GI/Bowel: Evaluation of the hollow GI tract demonstrates no evidence of abnormal bowel wall thickening, dilatation, or obstruction. The appendix is normal. Pelvis: The urinary bladder is partially collapsed, though appears diffusely thick-walled and inflamed, with a mild amount of pericystic stranding noted. These findings are suggestive of an acute cystitis, progressed from prior exam.  Additionally, the cervix appears enlarged and irregular, and there is new abnormal thickening of the endometrium within the uterine fundus, which appears new in comparison with the study of 1/28/2019. The bilateral adnexa are unremarkable. There is a mild amount of free pelvic fluid, likely physiologic. There are stable mildly enlarged bilateral pelvic chain lymph nodes, the largest measuring approximately 2.6 x 1.6 cm along the right external iliac chain, similar to the study of 1/28/2019. Peritoneum/Retroperitoneum: No intraperitoneal free air or free fluid is identified. No pathologic lymphadenopathy is seen. The abdominal aorta is unremarkable. No significant abdominal wall hernia is identified. Bones/Soft Tissues: There is mild bilateral sacroiliitis. The skeletal structures are otherwise unremarkable. 1. No CT evidence of a pulmonary embolism. 2. No acute abnormality of the thoracic aorta.  3. Dose modulation, iterative reconstruction, and/or weight based adjustment of the mA/kV was utilized to reduce the radiation dose to as low as reasonably achievable.; CT of the chest was performed without the administration of intravenous contrast. Multiplanar reformatted images are provided for review. Dose modulation, iterative reconstruction, and/or weight based adjustment of the mA/kV was utilized to reduce the radiation dose to as low as reasonably achievable. COMPARISON: CT abdomen and pelvis 01/18/2019. HISTORY: ORDERING SYSTEM PROVIDED HISTORY: RLQ abdominal pain TECHNOLOGIST PROVIDED HISTORY: Additional Contrast?->Oral Reason for exam:->RLQ pain persisting Ordering Physician Provided Reason for Exam: RLQ abdominal pain Acuity: Acute Type of Exam: Initial; ORDERING SYSTEM PROVIDED HISTORY: Other forms of systemic lupus erythematosus, unspecified organ involvement status ([facility] [address] TECHNOLOGIST PROVIDED HISTORY: Reason for exam:->pulmonary nodules seen on recent CT Ordering Physician Provided Reason for Exam: pulmonary nodules seen on recent CT Acuity: Chronic Type of Exam: Subsequent/Follow-up FINDINGS: Chest: Mediastinum: Lack of intravenous contrast limits evaluation of the mediastinum. The thoracic aorta is normal in appearance. The coronary arteries and branch vessels of the superior mediastinum and lower neck are unremarkable. The pulmonary arteries are normal in caliber. The heart is normal in size. No pericardial effusion. The mediastinal esophagus and thyroid gland are unremarkable. No pathologically enlarged lymph nodes are seen in the chest. Lungs/pleura: The central airways are patent. No pleural effusion or pneumothorax. Mild bilateral dependent atelectasis. There are diffusely scattered patchy bilateral nodular/ground-glass opacities. No consolidation. Soft Tissues/Bones: No acute osseous or soft tissue abnormality. Abdomen/Pelvis: Organs: The liver is unremarkable.   The gallbladder is surgically absent. The biliary tree is within normal limits status post cholecystectomy. The pancreas, spleen, and bilateral adrenal glands are unremarkable. The bilateral kidneys and ureters are unremarkable. GI/Bowel: Normal appendix. The colon is unremarkable. No evidence of acute appendicitis. No bowel obstruction or abnormal bowel wall thickening. Pelvis: Mild suspected circumferential urinary bladder wall thickening with mild adjacent stranding. The uterus and bilateral adnexae are unremarkable. Trace free fluid in the pelvis. No pelvic or inguinal lymphadenopathy. Peritoneum/Retroperitoneum: The abdominal aorta is normal in appearance. No retroperitoneal or mesenteric lymphadenopathy is identified. No free air or fluid is seen in the abdomen. Bones/Soft Tissues: No acute osseous or soft tissue abnormality. 1. Scattered small patchy ground-glass/nodular opacities throughout the bilateral lungs likely representing an infectious process. 2. Mild suspected circumferential urinary bladder wall thickening with mild adjacent stranding compatible with cystitis. Recommend correlation with urinalysis. 3. Normal appendix. Ct Chest Pulmonary Embolism W Contrast    Result Date: 2/15/2019  EXAMINATION: CTA OF THE CHEST; CT OF THE ABDOMEN AND PELVIS WITH CONTRAST 2/15/2019 2:11 pm TECHNIQUE: CTA of the chest was performed after the administration of intravenous contrast.  Multiplanar reformatted images are provided for review. MIP images are provided for review. Dose modulation, iterative reconstruction, and/or weight based adjustment of the mA/kV was utilized to reduce the radiation dose to as low as reasonably achievable.; CT of the abdomen and pelvis was performed with the administration of intravenous contrast. Multiplanar reformatted images are provided for review.  Dose modulation, iterative reconstruction, and/or weight based adjustment of the mA/kV was utilized to reduce the radiation dose to as low as reasonably achievable. COMPARISON: 1/28/2019, 4/29/2011 HISTORY: ORDERING SYSTEM PROVIDED HISTORY: cervical ca - SOB - PE??? TECHNOLOGIST PROVIDED HISTORY: Ordering Physician Provided Reason for Exam: SOB Acuity: Unknown Type of Exam: Unknown; ORDERING SYSTEM PROVIDED HISTORY: abd disten - cervical ca? TECHNOLOGIST PROVIDED HISTORY: Additional Contrast?->None Ordering Physician Provided Reason for Exam: abd distention Acuity: Unknown Type of Exam: Unknown Patient with recently diagnosed cervical cancer. FINDINGS: Chest: Pulmonary arteries: The pulmonary arteries opacify normally. There is no evidence of filling defect to suggest a pulmonary embolism. Mediastinum: The thyroid is unremarkable. There is mild subcarinal and bilateral hilar lymphadenopathy, most likely benign and reactive in etiology given the patient's underlying lung findings. The thoracic aorta is unremarkable, without evidence of aneurysm or dissection. 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 No pericardial abnormality is identified. Lungs/pleura: There is mild mucous plugging within the bilateral lower lobes. The central airways are otherwise widely patent. There has been interval development of widespread ground-glass opacity throughout both lungs, with diffuse intralobular septal thickening evident, new from prior exam.  This most likely reflects a combination of interstitial pulmonary edema and superimposed multifocal pneumonia. Additionally, there has been interval progression and more consolidation of multiple scattered various sized pulmonary nodules throughout both lungs since the study of 1/28/2019. A few of these are cavitary. The largest of these measures approximately 10 mm in short axis within the subpleural portion of the left lower lobe. These may be secondary to an atypical infectious or inflammatory process, to include septic emboli.   Metastatic disease is considered less likely, though not excluded. There is no evidence of a pneumothorax or pleural effusion. Soft Tissues/Bones: No acute findings. Abdomen/Pelvis: Organs: The patient is status post cholecystectomy. The liver, spleen, and pancreas are normal.  The adrenal glands are unremarkable bilaterally. There has been interval development of nonspecific moderate right hydronephrosis since the prior exam, without definite demonstrable cause. Namely, no definite obstructing stone or mass is identified. The left kidney is stable and unremarkable. GI/Bowel: Evaluation of the hollow GI tract demonstrates no evidence of abnormal bowel wall thickening, dilatation, or obstruction. The appendix is normal. Pelvis: The urinary bladder is partially collapsed, though appears diffusely thick-walled and inflamed, with a mild amount of pericystic stranding noted. These findings are suggestive of an acute cystitis, progressed from prior exam.  Additionally, the cervix appears enlarged and irregular, and there is new abnormal thickening of the endometrium within the uterine fundus, which appears new in comparison with the study of 1/28/2019. The bilateral adnexa are unremarkable. There is a mild amount of free pelvic fluid, likely physiologic. There are stable mildly enlarged bilateral pelvic chain lymph nodes, the largest measuring approximately 2.6 x 1.6 cm along the right external iliac chain, similar to the study of 1/28/2019. Peritoneum/Retroperitoneum: No intraperitoneal free air or free fluid is identified. No pathologic lymphadenopathy is seen. The abdominal aorta is unremarkable. No significant abdominal wall hernia is identified. Bones/Soft Tissues: There is mild bilateral sacroiliitis. The skeletal structures are otherwise unremarkable. 1. No CT evidence of a pulmonary embolism. 2. No acute abnormality of the thoracic aorta.  3. Interval development of widespread ground-glass opacity throughout both lungs with diffuse intralobular septal thickening. This most likely reflects a combination of interstitial pulmonary edema and multifocal pneumonia. 4. Interval progression of multiple nodular foci of consolidative opacity throughout both lungs, a few of which are cavitary in appearance. These nodules are most likely infectious or inflammatory in etiology, and septic emboli are a consideration. Given patient's history of cervical cancer, metastatic disease is on the differential, though considered less likely. 5. New nonspecific moderate right hydronephrosis, without demonstrable cause. However, there is underlying wall thickening and enhancement of the urinary bladder. This combination of findings could represent a cystitis in the setting of urinary tract infection with resultant pyelitis and hydronephrosis. However, given patient history of cervical cancer, locoregional spread of tumor with resultant obstruction of the distal right ureter may be a cause of the patient's right hydronephrosis. 6. Interval development of new irregularity of the cervix and nonspecific endometrial thickening in comparison with the prior study of 1/28/2019. This likely represents the patient's known underlying cervical cancer causing obstruction of the endometrial with resultant endometrial thickening. This could be further evaluated with a follow-up pelvic ultrasound. 7. Stable mild bilateral pelvic chain lymphadenopathy, right greater than left, possibly representing metastatic disease. Fl Retrograde Pyelogram Right    Result Date: 2/17/2019  EXAMINATION: SPOT FLUOROSCOPIC IMAGES 2/17/2019 6:52 am TECHNIQUE: Fluoroscopy was provided by the radiology department for procedure. Radiologist was not present during examination. FLUOROSCOPY DOSE AND TYPE OR TIME AND EXPOSURES: 3 seconds of fluoroscopy was utilized for purposes of intraoperative localization. 1 fluoroscopic spot image was obtained. COMPARISON: None HISTORY: Intraprocedural imaging. FINDINGS: 1 spot images of the abdomen was obtained. Intraprocedural fluoroscopic spot images as above. See separate procedure report for more information. Ir Guided Ureteral Stent Place W Nephro Cath New Access    Result Date: 2/18/2019  PROCEDURE: IR ULTRASOUND AND FLUOROSCOPIC GUIDED RIGHT PERCUTANEOUS NEPHROSTOMY AND NEPHROSTOGRAM IR ANTEGRADE RIGHT URETERAL STENT. MODERATE CONSCIOUS SEDATION 2/18/2019 HISTORY: ORDERING SYSTEM PROVIDED HISTORY: needs R sided nephrostomy tube placed. maria guadalupe could not do via cysto. Adi said that IR would do on monday TECHNOLOGIST PROVIDED HISTORY: Reason for exam:->needs R sided nephrostomy tube placed. maria guadalupe could not do via cysto. Adi said that IR would do on monday COMPARISON: Abdomen pelvic CT 02/15/2019. CONTRAST: 30 cc, nonvascular within collecting system only. . SEDATION: Intravenous sedation was administered with continuous monitoring throughout the procedure. In measured doses, a total of 6 mg intravenous Versed and 275 mcg intravenous fentanyl was administered. My total procedure time with sedation was 26 minutes. FLUOROSCOPY DOSE AND TYPE OR TIME AND EXPOSURES: 3.5 minutes, 7 digital imaging sequences. DESCRIPTION OF PROCEDURE: Informed consent was obtained after a detailed explanation of the procedure including risks, benefits, and alternatives. Universal protocol was observed. TECHNIQUE: Informed consent was previously obtained. In the semi prone position, an appropriate area posterior lateral aspect of the skin overlying the targeted collecting system was demarcated, sterilely prepared draped and anesthetized. Utilizing ultrasound, anatomy from prior CT scan and other imaging, the needle trajectory and depth was predetermined. The micro puncture needle was advanced using fluoroscopic and ultrasound guidance into the collecting system without difficulty.  Once urine was aspirated, a small platinum tip wire was advanced into the collecting system and into the proximal ureter. The tract was dilated. With wire exchange, a 6 French sheath was placed. The collecting system is moderately dilated and the ureter is somewhat tortuous. A glide wire was easily advanced down the ureter. Because of this, a 5 French peel-away sheath was advanced into the proximal ureter over the wire. The glide wire was then advanced with a steerable catheter into the urinary bladder without difficulty and only mild discomfort. There is no contrast extravasation. Once the catheter was placed into the urinary bladder, contrast injection is seen diluted within the somewhat distended urinary bladder. The catheter was used to place a Super Stiff wire for ureteral stent placement. A 26 cm 8 French ureteral stent was then advanced, such that the distal pigtail is well within the urinary bladder. The proximal pigtail was placed in the lower portion of the left renal pelvis. Again there is no contrast extravasation. No filling defect to indicate clot or bleeding was observed on early or later contrast imaging. Following this, dense contrast was injected showing normal expected filling of the ureteral stent and exiting from the distal pigtail directly into the urinary bladder. Following this, the internalized stent was disengaged. The proximal collecting system was then decompressed. The proximal nephrostomy access was then removed entirely, no external drainage necessary at this point. The patient tolerated the procedure well. A sterile bandage was placed over the small incision. From this point forward, the ureteral stent can be exchanged as needed from below. Successful right percutaneous nephrostomy with antegrade pyelogram. High-grade distal ureteral obstruction with severe hydronephrosis, successfully crossed as above. Successful 8 French internalized right ureteral stent placement as above.        Assessment and Plan:  Patient Active Hospital Problem List:   Pneumonia (2/15/2019)    Assessment: Established problem. Stable.  bilat pna confirmed on cxr 2/16    Plan: cont empiric abx   SLE (systemic lupus erythematosus) ([facility] [address] (1/22/2019)    Assessment: Established problem. Stable.     Plan: Continue present orders/plan.    Lupus anticoagulant positive (1/22/2019)   Cervical cancer ([facility] [address] (2/15/2019)    Assessment: invasive squamous cell carcinoma which supports the diagnosis of cervical cancer. She is at least stage JACKI d/t metastatic disease to bladder and possibly stage IVB if mets to lung    Plan: Initial plan - carbo/taxol/avastin. Port to be placed   Pulmonary nodule (2/15/2019)    Assessment: Established problem. Stable.  Will need workup for this     Plan: per dr Soria Person, they will eval in coming week   Hydronephrosis (2/15/2019)    Assessment: Established problem.  Stent could not be placed by urology    Plan: Right perc nephrostomy, nephrostogram and antegrade ureteral stent done 2/18 per IR   Ground glass opacity present on imaging of lung ()   Lung nodules ()   Lymphadenopathy ()             [place]  2/19/2019

## 2019-02-19 NOTE — PROGRESS NOTES
[facility]    Follow Up Note    Subjective:   CHIEF COMPLAINT / HPI:   Chief Complaint   Patient presents with    Cervical Cancer     Pt was sent over from Dr Iglesia Umana office - states she was sent here from office - was told today that she has stage 3 cervical cancer and was sent here for \"scans and further testing\"     Patient has been admitted with metastatic squamous cell cervical cancer. We became involved in an abnormal CT scan of the chest.  She has multiple pulmonary nodules some of which are cavitary. Also there is evidence of intralobular septal thickening on CT imaging. Patient does have an oxygen requirement at the moment. She has an oxygen saturation of 93% on 3 L nasal cannula oxygen at rest.    Past Medical History:    Reviewed; no changes    Social History:    Reviewed; no changes    REVIEW OF SYSTEMS:    CONSTITUTIONAL:  negative for fevers and chills  RESPIRATORY:  See HPI  CARDIOVASCULAR:  negative for chest pain, palpitations, edema  GASTROINTESTINAL:  negative for nausea, vomiting, diarrhea, constipation and abdominal pain    Objective:   PHYSICAL EXAM:        VITALS:  /77   Pulse 101   Temp 98 °F (36.7 °C) (Oral)   Resp 14   Ht 5' 6\" (1.676 m)   Wt 190 lb (86.2 kg)   SpO2 93%   BMI 30.67 kg/m²  on 3L NC    24HR INTAKE/OUTPUT:    Intake/Output Summary (Last 24 hours) at 02/19/19 1218  Last data filed at 02/19/19 5411   Gross per 24 hour   Intake            573.6 ml   Output              475 ml   Net             98.6 ml       CONSTITUTIONAL:  awake, alert,  no apparent distress, and appears stated age  LUNGS:  No increased work of breathing and clear to auscultation, no crackles or wheezes  CARDIOVASCULAR: S1 and S2 and no JVD  ABDOMEN:  normal bowel sounds, non-distended and non-tender to palpation  EXT: No edema, no calf tenderness. Pulses are present bilaterally.   NEUROLOGIC:  Mental Status Exam:  Level of Alertness:   awake  Orientation:   person, place, time. Non focal  SKIN:  normal skin color, texture, turgor, no redness, warmth, or swelling at IV sites    DATA:    CBC:  Recent Labs      02/17/19   0639  02/18/19   0547  02/19/19   0613   WBC  9.1  8.6  8.9   RBC  3.23*  2.96*  3.24*   HGB  8.5*  7.9*  8.3*   HCT  26.9*  24.7*  26.9*   PLT  320  340  362   MCV  83.4  83.5  83.0   MCH  26.4  26.6  25.7*   MCHC  31.7  31.8  31.0   RDW  21.6*  21.7*  22.0*      BMP:  Recent Labs      02/17/19   0640  02/18/19   0547  02/19/19   0613   NA  137  139  143   K  4.4  4.5  4.7   CL  101  101  101   CO2  25  25  30   BUN  10  6*  11   CREATININE  0.6  <0.5*  0.7   CALCIUM  9.2  9.7  9.5   GLUCOSE  145*  156*  97      ABG:  No results for input(s): PHART, VRX5OPS, PO2ART, DQF9HSM, M0UXOPAL, BEART in the last 72 hours. Cultures:    Abx:    Radiology Review:  Pertinent images / reports were reviewed as a part of this visit. Assessment:     1. Multiple pulmonary nodules  2. Intralobular septal thickening  3. Mediastinal adenopathy  4. Metastatic squamous cell cervical cancer    CT imaging reveals multiple pulmonary nodules some of which are cavitary. She also has hilar and subcarinal lymphadenopathy. There is some intralobular septal thickening noted as well. All of these changes could relate to metastatic disease. Tissue confirmation would be possible with either and bronchial ultrasound and biopsy or needle biopsy of one of the peripheral pulmonary nodules. It is possible BAL could be abnormal as well. However, I would recommend invasive diagnostic procedures only if this is going to affect treatment. Discussed with Dr. Madisyn Hankins. We'll await their recommendations. Also spoke with the patient and her sister at the bedside.

## 2019-02-19 NOTE — PROGRESS NOTES
Pt up to bathroom. Standby assist. Port accessed . Prn given for pain to back and bladder. Pt resting with call light in reach. Lunch ordered.

## 2019-02-19 NOTE — PROGRESS NOTES
Pt is getting port placed, so not seen today. Cecy Odell, [address] Oncology  [phone])

## 2019-02-19 NOTE — PROGRESS NOTES
IR Attending    S/P Right IJ chest port    Moderate sedation and local anesthesia    Left port accessed for immediate use    Pt tolerated well without immediate complication

## 2019-02-19 NOTE — PROGRESS NOTES
RESPIRATORY THERAPY ASSESSMENT    Name:  Rayne Selby [facility] Record Number:  [id number]  Age: 41 y.o. Gender: female  : 1980  Today's Date:  2019  Room:  [facility]1295/0161-02    Assessment   Patient Admission Diagnosis      Allergies  Allergies   Allergen Reactions    Food Hives     Raw spinach.  Spinach        Minimum Predicted Vital Capacity:               Actual Vital Capacity:                Pulmonary History: CHF/Pulmonary Edema (per CXR-new)  Home Oxygen Therapy:  room air  Home Respiratory Therapy: None  Current Respiratory Therapy:  duoneb TID &prn  Treatment Type: HHN  Medications: Albuterol/Ipratropium    Respiratory Severity Index(RSI)   Patients with orders for inhalation medications, oxygen, or any therapeutic treatment modality will be placed on Respiratory Protocol. They will be assessed with the first treatment and at least every 72 hours thereafter. The following severity scale will be used to determine frequency of treatment intervention. Smoking History: Smoking History Less than 1ppd or less than 15 pack year = 1    Social History  Social History   Substance Use Topics    Smoking status: Former Smoker     Packs/day: 0.25     Years: 3.00     Types: Cigarettes     Start date:      Quit date: 2001    Smokeless tobacco: Never Used    Alcohol use No       Recent Surgical History: None = 0  Past Surgical History  Past Surgical History:   Procedure Laterality Date    BRONCHOSCOPY  10/16/14    Urgent intubation, direct laryngoscopy, subglottic tracheoscopy    BRONCHOSCOPY  10/18/2014    WITH EXTUBATION IN OR AND LARYNGOSCOPY     SECTION      x 3, , 2006, 2008    CHOLECYSTECTOMY  2012    CYSTOSCOPY Right 2019    CYSTOSCOPY performed by Scooter Vyas MD at [address] [place] [address]  2019    aborted planned procedure of BTL and uterine ablation.        Level of Consciousness: Alert, Oriented, and Cooperative = 0    Level of Activity: Walking unassisted = 0    Respiratory Pattern: Regular Pattern; RR 8-20 = 0    Breath Sounds: Diminshed bilaterally and/or crackles = 2    Sputum   ,  , Sputum How Obtained: Spontaneous cough  Cough: Strong, spontaneous, non-productive = 0    Vital Signs   /75   Pulse 103   Temp 98.5 °F (36.9 °C) (Oral)   Resp 14   Ht 5' 6\" (1.676 m)   Wt 190 lb (86.2 kg)   SpO2 96%   BMI 30.67 kg/m²   SPO2 (COPD values may differ): 88-89% on room air or greater than 92% on FiO2 28- 35% = 2    Peak Flow (asthma only): not applicable = 0    RSI: 5-6 = Q4hr PRN (every four hours as needed) for dyspnea        Plan       Goals: medication delivery, mobilize retained secretions, volume expansion and improve oxygenation  Plan of Care: Duoneb prn    Patient/caregiver was educated on the proper method of use of Respiratory Therapy device:  No: already done        Level of understanding, patient was able to:(check appropriate box(es))   [] Verbalize understanding   [] Demonstrate understanding       [] Teach back        [] Needs reinforcement       []  No available caregiver               []  Other:     Response to education:  n/a     Teaching Time:  0  minutes      Is patient being placed on Home Treatment Regimen? No     Electronically signed by Margaret Spear RCP on 2/18/2019 at 10:07 PM    Respiratory Protocol Guidelines     1. Assessment and treatment by Respiratory Therapy will be initiated for medication and therapeutic interventions upon initiation of aerosolized medication. 2. Physician will be contacted for respiratory rate (RR) greater than 35 breaths per minute. Therapy will be held for heart rate (HR) greater than 140 beats per minute, pending direction from physician. 3. Bronchodilators will be administered via Metered Dose Inhaler (MDI) with spacer when the following criteria are met:  a.  Alert and cooperative     b. HR < 140 bpm  c. RR < 30 bpm                d. Can demonstrate a 2-3 second inspiratory hold  4. Bronchodilators will be administered via Hand Held Nebulizer [facility]) to patients when ANY of the following criteria are met  a. Incognizant or uncooperative          b. Patients treated with HHN at Home        c. Unable to demonstrate proper MDI or HFA technique     d. RR > 30 bpm   5. Bronchodilators will be delivered via Metered Dose Inhaler (MDI) or Hydrofluoroalkane (HFA) with spacer to intubated patients on mechanical ventilation. 6. Inhalation medication orders will be delivered and/or substituted as outlined below. Aerosolized Medications Ordering and Administration Guidelines:    1. All Medications will be ordered by a physician, and their frequency and/or modality will be adjusted as defined by the patients Respiratory Severity Index (RSI) score. 2. If the patient does not have documented COPD, consider discontinuing anticholinergics when RSI is less than 9.  3. If the bronchospasm worsens (increased RSI), then the bronchodilator frequency can be increased to a maximum of every 4 hours. If greater than every 4 hours is required, the physician will be contacted. 4. If the bronchospasm improves, the frequency of the bronchodilator can be decreased, based on the patient's RSI, but not less than home treatment regimen frequency. 5. Bronchodilator(s) will be discontinued if patient has a RSI less than 9 and has received no scheduled or as needed treatment for 72  Hrs. Patients Ordered on a Mucolytic Agent:    1. Must always be administered with a bronchodilator. 2. Discontinue if patient experiences worsened bronchospasm, or secretions have lessened to the point that the patient is able to clear them with a cough. Anti-inflammatory and Combination Medications:    1.  If the patient lacks prior history of lung disease, is not using inhaled anti-inflammatory medication at home, and lacks wheezing by examination or by history for at least 24 hours, contact physician for possible discontinuation.

## 2019-02-19 NOTE — PROGRESS NOTES
Head to toe assessment complete. Vital signs obtained. Pt resting in bed at this time. PM medication administered. Pt tolerated well. Pt denies pain. Call light in hand. Pt verbalizes correct use. Bed alarm set. Will continue to monitor.  Electronically signed by Lord Sahil RN on 2/18/2019 at 9:39 PM

## 2019-02-19 NOTE — ONCOLOGY
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 She has a history of uterine bleeding and had been tried on several hormonal therapies without success. On  she was taken to the operating room by Dr Hitesh Hernandez She had laparoscopy which was stopped because the fallopian tubres could not be freed up for tubal ligation. She did not have biopsies but was found to have mass in cervix extending down vagina. She was referred to dr Kasey Jules and had biopsy which reveals squamous cell carcinoma. She had papsmear in 2018 which was negative for malignancy but positive for HPV type 16. She has multiple pulmonary nodules which appear to be growing rapidly she was evaluated by pulmonary in early February,. She has hydronephrosis with placement of ureteral stent requiring nephrostomy for placement. She has history of juvenile rheumatoid arthritis. She has positive lupus anticoagulant but has had no episodes of dvt or arterial thrombus. She has been hypoxic requiring oxygen supplementation   She has history of seizure disorder  She had epiglottisi requiring intubation in      Past Medical History:     has a past medical history of Endometriosis; Fibromyalgia; GERD (gastroesophageal reflux disease); Hip fracture ([address] Hypoglycemia; Lupus; Neuropathy; Ovarian cyst; and Seizures ([address]    Past Surgical History:    Past Surgical History:   Procedure Laterality Date    BRONCHOSCOPY  10/16/14    Urgent intubation, direct laryngoscopy, subglottic tracheoscopy    BRONCHOSCOPY  10/18/2014    WITH EXTUBATION IN OR AND LARYNGOSCOPY     SECTION      x 3, 2005, 2006, 2008    CHOLECYSTECTOMY  2012    CYSTOSCOPY Right 2/17/2019    CYSTOSCOPY performed by Dale Ashton MD at [address]  02/2019    aborted planned procedure of BTL and uterine ablation.       Current Medications:    Current Facility-Administered Medications   Medication Dose Route Frequency Provider Last Rate Last Dose    vancomycin (VANCOCIN) 1,500 mg in dextrose 5 % 250 mL IVPB  1,500 mg Intravenous Q12H Olivia Collins MD   Stopped at 02/18/19 2355    sodium chloride (PF) 0.9 % injection 500 mL  500 mL Intercatheter PRN Olivia Collins MD   20 mL at 02/18/19 1626    naloxone (NARCAN) injection 0.4 mg  0.4 mg Intravenous PRN Elizabeth Saarh MD        HYDROmorphone (DILAUDID) 10 mg/50 mL PCA   Intravenous Continuous Elizabeth Sarah MD   10 mg at 02/18/19 1850    acetaminophen (TYLENOL) tablet 500 mg  500 mg Oral Q4H PRN Elizabeth Sarah MD        oxyCODONE (ROXICODONE) immediate release tablet 5 mg  5 mg Oral Q3H PRN Elizabeth Sarah MD   5 mg at 02/19/19 0715    diphenhydrAMINE (BENADRYL) tablet 25 mg  25 mg Oral Q6H PRN Olivia Collins MD   25 mg at 02/19/19 0715    sennosides-docusate sodium (SENOKOT-S) 8.6-50 MG tablet 2 tablet  2 tablet Oral Daily Olivia Collins MD        ipratropium-albuterol (DUONEB) nebulizer solution 1 ampule  1 ampule Inhalation Q4H PRN Olivia Collins MD        zolpidem [facility]) tablet 10 mg  10 mg Oral Nightly Olivia Collins MD   10 mg at 02/18/19 2133    gabapentin (NEURONTIN) capsule 300 mg  300 mg Oral TID Olivia Collins MD   300 mg at 02/18/19 2132    baclofen (LIORESAL) tablet 20 mg  20 mg Oral BID Olivia Collins MD   Stopped at 02/18/19 0904    ibuprofen (ADVIL;MOTRIN) tablet 800 mg  800 mg Oral Q8H PRN Olivia Collins MD        [facility]) tablet 25 mg  25 mg Oral Q6H PRN Olivia Collins MD   25 mg at 02/18/19 1818    0.9 % sodium chloride infusion   Intravenous Continuous Olivia Collins MD 75 mL/hr at 02/19/19 0045      sodium chloride flush 0.9 % injection 10 mL  10 mL Intravenous 2 times per day Sheryle Bird, MD   10 mL at 02/18/19 2138    sodium chloride flush 0.9 % injection 10 mL  10 mL Intravenous PRN Sheryle Bird, MD   10 mL at 02/17/19 1345    magnesium hydroxide (MILK OF MAGNESIA) 400 MG/5ML suspension 30 mL  30 mL Oral Daily PRN Sheryle Bird, MD   30 mL at 02/18/19 2133    ondansetron (ZOFRAN) injection 4 mg  4 mg Intravenous Q6H PRN Sheryle Bird, MD   4 mg at 02/17/19 1840    potassium chloride (KLOR-CON M) extended release tablet 40 mEq  40 mEq Oral PRN Sheryle Bird, MD        Or    potassium bicarb-citric acid (EFFER-K) effervescent tablet 40 mEq  40 mEq Oral PRN Sheryle Bird, MD        Or    potassium chloride 10 mEq/100 mL IVPB (Peripheral Line)  10 mEq Intravenous PRN Sheryle Bird, MD        cefepime (MAXIPIME) 2 g IVPB minibag  2 g Intravenous Q12H Sheryle Bird, MD   Stopped at 02/19/19 0716    promethazine (PHENERGAN) injection 25 mg  25 mg Intramuscular Q6H PRN Sheryle Bird, MD   25 mg at 02/18/19 0124    DULoxetine (CYMBALTA) extended release capsule 60 mg  60 mg Oral Nightly Sheryle Bird, MD   60 mg at 02/18/19 2133     Allergies: Allergies   Allergen Reactions    Food Hives     Raw spinach.  Spinach       Social History:    reports that she quit smoking about 17 years ago. Her smoking use included Cigarettes. She started smoking about 21 years ago. She has a 0.75 pack-year smoking history. She has never used smokeless tobacco. She reports that she does not drink alcohol or use drugs. 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      REVIEW OF SYSTEMS:      · Constitutional: Denies fever, chills, sweats, weight loss. Denies pain. · Eyes: No visual changes or diplopia. No scleral icterus. · ENT: No Headaches, hearing loss or vertigo. No mouth sores or sore throat. · Cardiovascular: No chest pain, dyspnea on exertion, palpitations or loss of consciousness. · Respiratory: No cough or wheezing, no sputum production. No hemoptysis. Hypoxic and short of breath   · Gastrointestinal: No abdominal pain, appetite loss, blood in stools. No change in bowel habits. · Genitourinary: No dysuria, trouble voiding, or hematuria. Has had extensive uterine bleeding new diagnosis carcinoma cervix  · Musculoskeletal:   No generalized weakness. No joint complaints. History of fibromyalgia and juvenile rheumatoid arthritis recently evaluated by dr Cornelia Angel  · Integumentary: No rash or pruritis. · Neurological: No headache, diplopia. No change in gait, balance, or coordination. No focal neurological deficits including weakness, numbness, or tingling. · Endocrine: No temperature intolerance. No excessive thirst, fluid intake, or urination. · Hematologic/Lymphatic: No abnormal bruising or ecchymoses, blood clots or swollen lymph nodes. · Allergic/Immunologic: No nasal congestion or hives.   ·     PHYSICAL EXAM:    Vitals:  Vitals:    02/19/19 0642   BP: 103/71   Pulse: 90   Resp: 12   Temp: 98 °F (36.7 °C)   SpO2: 96%      CONSTITUTIONAL:  awake, alert, cooperative, no apparent distres pale  EYES:  pupils equal, round and reactive to light, sclera clear and conjunctiva normal  ENT:  normocepalic, without obvious abnormality, atramatic  NECK:  supple, symmetrical, no jugular venous distension and no carotid bruits  HEMATOLOGIC/LYMPHATICS:  No cervical,supraclavicular or axillary lymphadenopathy  LUNGS:  No increased work of breathing and clear to auscultation  CARDIOVASCULAR: Regular rate and rhythm, normal S1 and S2, no murmur noted  ABDOMEN:  Normal bowel sounds x 4, soft, non-distended, non-tender, no masses palpated, no hepatosplenomegally  MUSCULOSKELETAL:  full range of motion noted, tone is normal  EXTREMITIES: no LE edema  NEUROLOGIC:  Awake, alert, oriented to name, place and time. Motor skills grossly intact. SKIN:  normal skin color, texture, turgor and no jaundice. Appears intact. DATA:  General Labs:    CBC:   Recent Labs      02/17/19   0639  02/18/19   0547  02/19/19   0613   WBC  9.1  8.6  8.9   HGB  8.5*  7.9*  8.3*   HCT  26.9*  24.7*  26.9*   MCV  83.4  83.5  83.0   PLT  320  340  362     BMP:   Recent Labs      02/17/19   0640  02/18/19   0547  02/19/19   0613   NA  137  139  143   K  4.4  4.5  4.7   CL  101  101  101   CO2  25  25  30   BUN  10  6*  11   CREATININE  0.6  <0.5*  0.7     LIVER PROFILE:   Recent Labs      02/18/19   0547   AST  23   ALT  49*   BILIDIR  <0.2   BILITOT  <0.2   ALKPHOS  97     PT/INR:    Lab Results   Component Value Date    PROTIME 12.7 02/18/2019    PROTIME 12.3 01/18/2019    PROTIME 11.0 05/16/2014    INR 1.11 02/18/2019    INR 1.08 01/18/2019    INR 0.98 05/16/2014     PTT:    Lab Results   Component Value Date    APTT 33.2 05/16/2014     Magnesium:  No results found for: MG    Imaging:  Xr Chest Standard (2 Vw)    Result Date: 2/16/2019  EXAMINATION: TWO VIEWS OF THE CHEST 2/16/2019 11:49 am COMPARISON: 10/16/2014 HISTORY: ORDERING SYSTEM PROVIDED HISTORY: pneumonia TECHNOLOGIST PROVIDED HISTORY: Reason for exam:->pneumonia Ordering Physician Provided Reason for Exam: PNA Acuity: Acute Type of Exam: Initial Relevant Medical/Surgical History: cervical ca FINDINGS: There is patchy bibasilar consolidation, right greater than left, superimposed on a background of diffuse interstitial prominence. Heart size, mediastinal contours and pulmonary vascularity are within normal limits. There is no pleural effusion. Multifocal bilateral pneumonia.      Ct Chest Wo Contrast    Result Date: 1/28/2019  EXAMINATION: CT OF THE ABDOMEN AND PELVIS WITH CONTRAST; CT OF THE CHEST WITHOUT CONTRAST 1/28/2019 7:47 pm; 1/28/2019 7:45 pm TECHNIQUE: CT of the abdomen and pelvis was performed with the administration of intravenous contrast. Multiplanar reformatted images are provided for review. Dose modulation, iterative reconstruction, and/or weight based adjustment of the mA/kV was utilized to reduce the radiation dose to as low as reasonably achievable.; CT of the chest was performed without the administration of intravenous contrast. Multiplanar reformatted images are provided for review. Dose modulation, iterative reconstruction, and/or weight based adjustment of the mA/kV was utilized to reduce the radiation dose to as low as reasonably achievable. COMPARISON: CT abdomen and pelvis 01/18/2019. HISTORY: ORDERING SYSTEM PROVIDED HISTORY: RLQ abdominal pain TECHNOLOGIST PROVIDED HISTORY: Additional Contrast?->Oral Reason for exam:->RLQ pain persisting Ordering Physician Provided Reason for Exam: RLQ abdominal pain Acuity: Acute Type of Exam: Initial; ORDERING SYSTEM PROVIDED HISTORY: Other forms of systemic lupus erythematosus, unspecified organ involvement status ([facility] [address] TECHNOLOGIST PROVIDED HISTORY: Reason for exam:->pulmonary nodules seen on recent CT Ordering Physician Provided Reason for Exam: pulmonary nodules seen on recent CT Acuity: Chronic Type of Exam: Subsequent/Follow-up FINDINGS: Chest: Mediastinum: Lack of intravenous contrast limits evaluation of the mediastinum. The thoracic aorta is normal in appearance. The coronary arteries and branch vessels of the superior mediastinum and lower neck are unremarkable. The pulmonary arteries are normal in caliber. The heart is normal in size. No pericardial effusion. The mediastinal esophagus and thyroid gland are unremarkable. No pathologically enlarged lymph nodes are seen in the chest. Lungs/pleura: The central airways are patent. No pleural effusion or pneumothorax. Mild bilateral dependent atelectasis.   There are diffusely scattered patchy bilateral nodular/ground-glass opacities. No consolidation. Soft Tissues/Bones: No acute osseous or soft tissue abnormality. Abdomen/Pelvis: Organs: The liver is unremarkable. The gallbladder is surgically absent. The biliary tree is within normal limits status post cholecystectomy. The pancreas, spleen, and bilateral adrenal glands are unremarkable. The bilateral kidneys and ureters are unremarkable. GI/Bowel: Normal appendix. The colon is unremarkable. No evidence of acute appendicitis. No bowel obstruction or abnormal bowel wall thickening. Pelvis: Mild suspected circumferential urinary bladder wall thickening with mild adjacent stranding. The uterus and bilateral adnexae are unremarkable. Trace free fluid in the pelvis. No pelvic or inguinal lymphadenopathy. Peritoneum/Retroperitoneum: The abdominal aorta is normal in appearance. No retroperitoneal or mesenteric lymphadenopathy is identified. No free air or fluid is seen in the abdomen. Bones/Soft Tissues: No acute osseous or soft tissue abnormality. 1. Scattered small patchy ground-glass/nodular opacities throughout the bilateral lungs likely representing an infectious process. 2. Mild suspected circumferential urinary bladder wall thickening with mild adjacent stranding compatible with cystitis. Recommend correlation with urinalysis. 3. Normal appendix. Ct Abdomen Pelvis W Iv Contrast Additional Contrast? None    Result Date: 2/15/2019  EXAMINATION: CTA OF THE CHEST; CT OF THE ABDOMEN AND PELVIS WITH CONTRAST 2/15/2019 2:11 pm TECHNIQUE: CTA of the chest was performed after the administration of intravenous contrast.  Multiplanar reformatted images are provided for review. MIP images are provided for review.  Dose modulation, iterative reconstruction, and/or weight based adjustment of the mA/kV was utilized to reduce the radiation dose to as low as reasonably achievable.; CT of the abdomen and pelvis was performed with the administration of intravenous contrast. Multiplanar reformatted images are provided for review. Dose modulation, iterative reconstruction, and/or weight based adjustment of the mA/kV was utilized to reduce the radiation dose to as low as reasonably achievable. COMPARISON: 1/28/2019, 4/29/2011 HISTORY: ORDERING SYSTEM PROVIDED HISTORY: cervical ca - SOB - PE??? TECHNOLOGIST PROVIDED HISTORY: Ordering Physician Provided Reason for Exam: SOB Acuity: Unknown Type of Exam: Unknown; ORDERING SYSTEM PROVIDED HISTORY: abd disten - cervical ca? TECHNOLOGIST PROVIDED HISTORY: Additional Contrast?->None Ordering Physician Provided Reason for Exam: abd distention Acuity: Unknown Type of Exam: Unknown Patient with recently diagnosed cervical cancer. FINDINGS: Chest: Pulmonary arteries: The pulmonary arteries opacify normally. There is no evidence of filling defect to suggest a pulmonary embolism. Mediastinum: The thyroid is unremarkable. There is mild subcarinal and bilateral hilar lymphadenopathy, most likely benign and reactive in etiology given the patient's underlying lung findings. The thoracic aorta is unremarkable, without evidence of aneurysm or dissection. 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 No pericardial abnormality is identified. Lungs/pleura: There is mild mucous plugging within the bilateral lower lobes. The central airways are otherwise widely patent. There has been interval development of widespread ground-glass opacity throughout both lungs, with diffuse intralobular septal thickening evident, new from prior exam.  This most likely reflects a combination of interstitial pulmonary edema and superimposed multifocal pneumonia. Additionally, there has been interval progression and more consolidation of multiple scattered various sized pulmonary nodules throughout both lungs since the study of 1/28/2019. A few of these are cavitary.   The largest of these measures approximately 10 mm in short axis within the subpleural portion of the left lower lobe. These may be secondary to an atypical infectious or inflammatory process, to include septic emboli. Metastatic disease is considered less likely, though not excluded. There is no evidence of a pneumothorax or pleural effusion. Soft Tissues/Bones: No acute findings. Abdomen/Pelvis: Organs: The patient is status post cholecystectomy. The liver, spleen, and pancreas are normal.  The adrenal glands are unremarkable bilaterally. There has been interval development of nonspecific moderate right hydronephrosis since the prior exam, without definite demonstrable cause. Namely, no definite obstructing stone or mass is identified. The left kidney is stable and unremarkable. GI/Bowel: Evaluation of the hollow GI tract demonstrates no evidence of abnormal bowel wall thickening, dilatation, or obstruction. The appendix is normal. Pelvis: The urinary bladder is partially collapsed, though appears diffusely thick-walled and inflamed, with a mild amount of pericystic stranding noted. These findings are suggestive of an acute cystitis, progressed from prior exam.  Additionally, the cervix appears enlarged and irregular, and there is new abnormal thickening of the endometrium within the uterine fundus, which appears new in comparison with the study of 1/28/2019. The bilateral adnexa are unremarkable. There is a mild amount of free pelvic fluid, likely physiologic. There are stable mildly enlarged bilateral pelvic chain lymph nodes, the largest measuring approximately 2.6 x 1.6 cm along the right external iliac chain, similar to the study of 1/28/2019. Peritoneum/Retroperitoneum: No intraperitoneal free air or free fluid is identified. No pathologic lymphadenopathy is seen. The abdominal aorta is unremarkable. No significant abdominal wall hernia is identified. Bones/Soft Tissues: There is mild bilateral sacroiliitis.   The skeletal structures are otherwise unremarkable. 1. No CT evidence of a pulmonary embolism. 2. No acute abnormality of the thoracic aorta. 3. Interval development of widespread ground-glass opacity throughout both lungs with diffuse intralobular septal thickening. This most likely reflects a combination of interstitial pulmonary edema and multifocal pneumonia. 4. Interval progression of multiple nodular foci of consolidative opacity throughout both lungs, a few of which are cavitary in appearance. These nodules are most likely infectious or inflammatory in etiology, and septic emboli are a consideration. Given patient's history of cervical cancer, metastatic disease is on the differential, though considered less likely. 5. New nonspecific moderate right hydronephrosis, without demonstrable cause. However, there is underlying wall thickening and enhancement of the urinary bladder. This combination of findings could represent a cystitis in the setting of urinary tract infection with resultant pyelitis and hydronephrosis. However, given patient history of cervical cancer, locoregional spread of tumor with resultant obstruction of the distal right ureter may be a cause of the patient's right hydronephrosis. 6. Interval development of new irregularity of the cervix and nonspecific endometrial thickening in comparison with the prior study of 1/28/2019. This likely represents the patient's known underlying cervical cancer causing obstruction of the endometrial with resultant endometrial thickening. This could be further evaluated with a follow-up pelvic ultrasound. 7. Stable mild bilateral pelvic chain lymphadenopathy, right greater than left, possibly representing metastatic disease.      Ct Abdomen Pelvis W Iv Contrast Additional Contrast? Oral    Result Date: 1/28/2019  EXAMINATION: CT OF THE ABDOMEN AND PELVIS WITH CONTRAST; CT OF THE CHEST WITHOUT CONTRAST 1/28/2019 7:47 pm; 1/28/2019 7:45 pm TECHNIQUE: CT of the abdomen and pelvis was performed with the administration of intravenous contrast. Multiplanar reformatted images are provided for review. Dose modulation, iterative reconstruction, and/or weight based adjustment of the mA/kV was utilized to reduce the radiation dose to as low as reasonably achievable.; CT of the chest was performed without the administration of intravenous contrast. Multiplanar reformatted images are provided for review. Dose modulation, iterative reconstruction, and/or weight based adjustment of the mA/kV was utilized to reduce the radiation dose to as low as reasonably achievable. COMPARISON: CT abdomen and pelvis 01/18/2019. HISTORY: ORDERING SYSTEM PROVIDED HISTORY: RLQ abdominal pain TECHNOLOGIST PROVIDED HISTORY: Additional Contrast?->Oral Reason for exam:->RLQ pain persisting Ordering Physician Provided Reason for Exam: RLQ abdominal pain Acuity: Acute Type of Exam: Initial; ORDERING SYSTEM PROVIDED HISTORY: Other forms of systemic lupus erythematosus, unspecified organ involvement status ([facility] [address] TECHNOLOGIST PROVIDED HISTORY: Reason for exam:->pulmonary nodules seen on recent CT Ordering Physician Provided Reason for Exam: pulmonary nodules seen on recent CT Acuity: Chronic Type of Exam: Subsequent/Follow-up FINDINGS: Chest: Mediastinum: Lack of intravenous contrast limits evaluation of the mediastinum. The thoracic aorta is normal in appearance. The coronary arteries and branch vessels of the superior mediastinum and lower neck are unremarkable. The pulmonary arteries are normal in caliber. The heart is normal in size. No pericardial effusion. The mediastinal esophagus and thyroid gland are unremarkable. No pathologically enlarged lymph nodes are seen in the chest. Lungs/pleura: The central airways are patent. No pleural effusion or pneumothorax. Mild bilateral dependent atelectasis. There are diffusely scattered patchy bilateral nodular/ground-glass opacities. No consolidation. Soft Tissues/Bones: No acute osseous or soft tissue abnormality. Abdomen/Pelvis: Organs: The liver is unremarkable. The gallbladder is surgically absent. The biliary tree is within normal limits status post cholecystectomy. The pancreas, spleen, and bilateral adrenal glands are unremarkable. The bilateral kidneys and ureters are unremarkable. GI/Bowel: Normal appendix. The colon is unremarkable. No evidence of acute appendicitis. No bowel obstruction or abnormal bowel wall thickening. Pelvis: Mild suspected circumferential urinary bladder wall thickening with mild adjacent stranding. The uterus and bilateral adnexae are unremarkable. Trace free fluid in the pelvis. No pelvic or inguinal lymphadenopathy. Peritoneum/Retroperitoneum: The abdominal aorta is normal in appearance. No retroperitoneal or mesenteric lymphadenopathy is identified. No free air or fluid is seen in the abdomen. Bones/Soft Tissues: No acute osseous or soft tissue abnormality. 1. Scattered small patchy ground-glass/nodular opacities throughout the bilateral lungs likely representing an infectious process. 2. Mild suspected circumferential urinary bladder wall thickening with mild adjacent stranding compatible with cystitis. Recommend correlation with urinalysis. 3. Normal appendix. Ct Chest Pulmonary Embolism W Contrast    Result Date: 2/15/2019  EXAMINATION: CTA OF THE CHEST; CT OF THE ABDOMEN AND PELVIS WITH CONTRAST 2/15/2019 2:11 pm TECHNIQUE: CTA of the chest was performed after the administration of intravenous contrast.  Multiplanar reformatted images are provided for review. MIP images are provided for review. Dose modulation, iterative reconstruction, and/or weight based adjustment of the mA/kV was utilized to reduce the radiation dose to as low as reasonably achievable.; CT of the abdomen and pelvis was performed with the administration of intravenous contrast. Multiplanar reformatted images are provided for review. Dose modulation, iterative reconstruction, and/or weight based adjustment of the mA/kV was utilized to reduce the radiation dose to as low as reasonably achievable. COMPARISON: 1/28/2019, 4/29/2011 HISTORY: ORDERING SYSTEM PROVIDED HISTORY: cervical ca - SOB - PE??? TECHNOLOGIST PROVIDED HISTORY: Ordering Physician Provided Reason for Exam: SOB Acuity: Unknown Type of Exam: Unknown; ORDERING SYSTEM PROVIDED HISTORY: abd disten - cervical ca? TECHNOLOGIST PROVIDED HISTORY: Additional Contrast?->None Ordering Physician Provided Reason for Exam: abd distention Acuity: Unknown Type of Exam: Unknown Patient with recently diagnosed cervical cancer. FINDINGS: Chest: Pulmonary arteries: The pulmonary arteries opacify normally. There is no evidence of filling defect to suggest a pulmonary embolism. Mediastinum: The thyroid is unremarkable. There is mild subcarinal and bilateral hilar lymphadenopathy, most likely benign and reactive in etiology given the patient's underlying lung findings. The thoracic aorta is unremarkable, without evidence of aneurysm or dissection. 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 No pericardial abnormality is identified. Lungs/pleura: There is mild mucous plugging within the bilateral lower lobes. The central airways are otherwise widely patent. There has been interval development of widespread ground-glass opacity throughout both lungs, with diffuse intralobular septal thickening evident, new from prior exam.  This most likely reflects a combination of interstitial pulmonary edema and superimposed multifocal pneumonia. Additionally, there has been interval progression and more consolidation of multiple scattered various sized pulmonary nodules throughout both lungs since the study of 1/28/2019. A few of these are cavitary. The largest of these measures approximately 10 mm in short axis within the subpleural portion of the left lower lobe.   These may be secondary to an atypical infectious or inflammatory process, to include septic emboli. Metastatic disease is considered less likely, though not excluded. There is no evidence of a pneumothorax or pleural effusion. Soft Tissues/Bones: No acute findings. Abdomen/Pelvis: Organs: The patient is status post cholecystectomy. The liver, spleen, and pancreas are normal.  The adrenal glands are unremarkable bilaterally. There has been interval development of nonspecific moderate right hydronephrosis since the prior exam, without definite demonstrable cause. Namely, no definite obstructing stone or mass is identified. The left kidney is stable and unremarkable. GI/Bowel: Evaluation of the hollow GI tract demonstrates no evidence of abnormal bowel wall thickening, dilatation, or obstruction. The appendix is normal. Pelvis: The urinary bladder is partially collapsed, though appears diffusely thick-walled and inflamed, with a mild amount of pericystic stranding noted. These findings are suggestive of an acute cystitis, progressed from prior exam.  Additionally, the cervix appears enlarged and irregular, and there is new abnormal thickening of the endometrium within the uterine fundus, which appears new in comparison with the study of 1/28/2019. The bilateral adnexa are unremarkable. There is a mild amount of free pelvic fluid, likely physiologic. There are stable mildly enlarged bilateral pelvic chain lymph nodes, the largest measuring approximately 2.6 x 1.6 cm along the right external iliac chain, similar to the study of 1/28/2019. Peritoneum/Retroperitoneum: No intraperitoneal free air or free fluid is identified. No pathologic lymphadenopathy is seen. The abdominal aorta is unremarkable. No significant abdominal wall hernia is identified. Bones/Soft Tissues: There is mild bilateral sacroiliitis. The skeletal structures are otherwise unremarkable. 1. No CT evidence of a pulmonary embolism.  2. No acute abnormality of the thoracic aorta. 3. Interval development of widespread ground-glass opacity throughout both lungs with diffuse intralobular septal thickening. This most likely reflects a combination of interstitial pulmonary edema and multifocal pneumonia. 4. Interval progression of multiple nodular foci of consolidative opacity throughout both lungs, a few of which are cavitary in appearance. These nodules are most likely infectious or inflammatory in etiology, and septic emboli are a consideration. Given patient's history of cervical cancer, metastatic disease is on the differential, though considered less likely. 5. New nonspecific moderate right hydronephrosis, without demonstrable cause. However, there is underlying wall thickening and enhancement of the urinary bladder. This combination of findings could represent a cystitis in the setting of urinary tract infection with resultant pyelitis and hydronephrosis. However, given patient history of cervical cancer, locoregional spread of tumor with resultant obstruction of the distal right ureter may be a cause of the patient's right hydronephrosis. 6. Interval development of new irregularity of the cervix and nonspecific endometrial thickening in comparison with the prior study of 1/28/2019. This likely represents the patient's known underlying cervical cancer causing obstruction of the endometrial with resultant endometrial thickening. This could be further evaluated with a follow-up pelvic ultrasound. 7. Stable mild bilateral pelvic chain lymphadenopathy, right greater than left, possibly representing metastatic disease. Fl Retrograde Pyelogram Right    Result Date: 2/17/2019  EXAMINATION: SPOT FLUOROSCOPIC IMAGES 2/17/2019 6:52 am TECHNIQUE: Fluoroscopy was provided by the radiology department for procedure. Radiologist was not present during examination.  FLUOROSCOPY DOSE AND TYPE OR TIME AND EXPOSURES: 3 seconds of fluoroscopy was utilized for Successful 8 [place] internalized right ureteral stent placement as above. 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 Thank you for allowing us to participate in this patients care.     Champ Lal MD  2/19/2019, 7:42 AM

## 2019-02-20 NOTE — PROGRESS NOTES
Pt  [place] c/o heavy menstrual bleeding. Pt states she is now passing clots. Message  Sent to Dr. Thaddeus Delgado. Morning labs drawn and sent to lab awaiting response from Dr. Thaddeus Delgado.

## 2019-02-20 NOTE — PROGRESS NOTES
Pre-medicated before chemo infusion per orders. Denies needs at this time. Multiple family members at bedside.

## 2019-02-20 NOTE — PROGRESS NOTES
Bedside shift report completed with Elwin Meckel. Pt awake with family at bedside, call light in reach. Prn oral pain med given at this time. Will continue to monitor.

## 2019-02-20 NOTE — PROGRESS NOTES
Patients chart was reviewed post [facility] Placement procedure. Patient stated some soreness at the [facility] site otherwise  no complications were noted post procedure.

## 2019-02-20 NOTE — PROGRESS NOTES
Chemo regimen discussed with pt. Pt verbalizes understanding. Care notes regarding all treatment given to pt and pt's family. Pt okay with receiving treatment. Consent signed and placed on chart.

## 2019-02-20 NOTE — PROGRESS NOTES
Pt c/o heavy menstrual bleeding. Pt requesting her progesterone. Message  sent to Dr. Michael Deluca.

## 2019-02-20 NOTE — PROGRESS NOTES
Pt c/o pain in abd and at surgical site of port placement, upper right chest. PRN Oxycodone given at this time. Will continue to monitor.

## 2019-02-20 NOTE — PROGRESS NOTES
[facility]  Gynecologic Oncology   Progress Note    Stage IV (A vs B) [facility] confirmed mets to bladder via cysto possible lung mets  Cis/taxol D1  Port placed  R NU stent placed    SUBJECTIVE:  +SOB, fatigue, anxiety, right back pain    OBJECTIVE:           Physical Exam  VITALS:  /69   Pulse 111   Temp 98.2 °F (36.8 °C) (Oral)   Resp 18   Ht 5' 6.5\" (1.689 m)   Wt 193 lb 3.2 oz (87.6 kg)   SpO2 (!) 82%   BMI 30.72 kg/m²   24HR INTAKE/OUTPUT:    Intake/Output Summary (Last 24 hours) at 02/20/19 1652  Last data filed at 02/20/19 0105   Gross per 24 hour   Intake              4.6 ml   Output              300 ml   Net           -295.4 ml     CONSTITUTIONAL:  fatigued, alert, cooperative and no apparent distress  ABDOMEN:  No scars, normal bowel sounds, soft, non-distended, non-tender, no masses palpated, no hepatosplenomegally    DATA:  CBC with Differential:    Lab Results   Component Value Date    WBC 6.9 02/20/2019    RBC 3.58 02/20/2019    HGB 9.2 02/20/2019    HCT 29.5 02/20/2019     02/20/2019    MCV 82.3 02/20/2019    MCH 25.6 02/20/2019    MCHC 31.1 02/20/2019    RDW 21.9 02/20/2019    SEGSPCT 70.3 02/05/2019    LYMPHOPCT 21.7 02/20/2019    MONOPCT 7.9 02/20/2019    EOSPCT 3.6 04/17/2012    BASOPCT 2.0 02/20/2019    MONOSABS 0.5 02/20/2019    LYMPHSABS 1.5 02/20/2019    EOSABS 0.2 02/20/2019    BASOSABS 0.1 02/20/2019    DIFFTYPE Auto 05/22/2013     BMP:    Lab Results   Component Value Date     02/20/2019    K 4.1 02/20/2019     02/20/2019    CO2 26 02/20/2019    BUN 15 02/20/2019    LABALBU 3.7 02/18/2019    CREATININE 0.7 02/20/2019    CALCIUM 9.1 02/20/2019    GFRAA >60 02/20/2019    GFRAA >60 05/22/2013    LABGLOM >60 02/20/2019    GLUCOSE 111 02/20/2019     Magnesium:  No results found for: MG  Phosphorus:  No results found for: PHOS    ASSESSMENT AND PLAN:    1) ONC-D1 cis/taxol. Likely to add avastin at next cycle. May change to Kira if cis is too emetogenic.  Lung biopsy is not warranted at this time. Can Do PET/CT in the future for radiologic confirmation of metastatic disease. 2) Pain-Pt with continued pain Dilaudid PCA in progress with oxycodone as well. Will increase oxycodone to 10 mg today to determine if PCA can be decreased and eventually eliminated. 3) Pulm-PFTs prior to admission showing Mild restriction with moderate decrease in [facility]. Recent CXR does not show improvement with antibiotics and not sure pt needs to continue given this is not likely PNA but will defer to pulm and primary team. Pt is still requiring O2 will need to determine if needs O2 @ home. 4) Renal-stent in place, good renal fxn. 5) Gyn-vaginal bleeding to be expected. Will continue to monitor. Cecy Villegas, [facility]  [phone] ([phone])

## 2019-02-20 NOTE — PROGRESS NOTES
Message received from Dr. Sheldon Christine, to hold off on Progesterone until  Pt seen by GYN  tomorrow.

## 2019-02-20 NOTE — PROGRESS NOTES
Progress Note - Dr. Khadijah Hammer - Internal Medicine  PCP: Landon Caal MD [address] [place] [address] [place]  [phone]    Hospital Day: 5  Code Status: Full Code  Current Diet: DIET GENERAL;        CC: follow up on medical issues    Subjective:   Skyla Leal is a 41 y.o. female. She denies problems    Doing ok  Had port placed yesterday  Still c/o generalized aching/pain    She denies chest pain, denies shortness of breath, denies nausea,  denies emesis. 10 system Review of Systems is reviewed with patient, and pertinent positives are listed here: None . Otherwise, Review of systems is negative. I have reviewed the patient's medical and social history in detail and updated the computerized patient record. To recap: She  has a past medical history of Endometriosis; Fibromyalgia; GERD (gastroesophageal reflux disease); Hip fracture ([facility] Hypoglycemia; Lupus; Neuropathy; Ovarian cyst; and Seizures ([facility] [place] . She  has a past surgical history that includes  section; Cholecystectomy (); bronchoscopy (10/16/14); bronchoscopy (10/18/2014); Cystoscopy (Right, 2019); and laparoscopy (2019). . She  reports that she quit smoking about 17 years ago. Her smoking use included Cigarettes. She started smoking about 21 years ago. She has a 0.75 pack-year smoking history. She has never used smokeless tobacco. She reports that she does not drink alcohol or use drugs. .        Active Hospital Problems    Diagnosis Date Noted    Ground glass opacity present on imaging of lung [R91.8]     Lung nodules [R91.8]     Lymphadenopathy [R59.1]     Cervical cancer ([facility] [C53.9] 02/15/2019    Pneumonia [J18.9] 02/15/2019    Pulmonary nodule [R91.1] 02/15/2019    Hydronephrosis [N13.30] 02/15/2019    SLE (systemic lupus erythematosus) ([facility] [M32.9] 2019    Lupus anticoagulant positive [R76.0] 2019       Current Facility-Administered Medications: sodium chloride bacteriostatic 0.9 % injection 15 mL, 15 mL, Injection, PRN  ketorolac (TORADOL) injection 15 mg, 15 mg, Intravenous, Q6H  sodium chloride (PF) 0.9 % injection 500 mL, 500 mL, Intercatheter, PRN  naloxone (NARCAN) injection 0.4 mg, 0.4 mg, Intravenous, PRN  HYDROmorphone (DILAUDID) 10 mg/50 mL PCA, , Intravenous, Continuous  acetaminophen (TYLENOL) tablet 500 mg, 500 mg, Oral, Q4H PRN  oxyCODONE (ROXICODONE) immediate release tablet 5 mg, 5 mg, Oral, Q3H PRN **OR** [DISCONTINUED] oxyCODONE (ROXICODONE) immediate release tablet 10 mg, 10 mg, Oral, Q3H PRN  diphenhydrAMINE (BENADRYL) tablet 25 mg, 25 mg, Oral, Q6H PRN  sennosides-docusate sodium (SENOKOT-S) 8.6-50 MG tablet 2 tablet, 2 tablet, Oral, Daily  ipratropium-albuterol (DUONEB) nebulizer solution 1 ampule, 1 ampule, Inhalation, Q4H PRN  zolpidem (AMBIEN) tablet 10 mg, 10 mg, Oral, Nightly  gabapentin (NEURONTIN) capsule 300 mg, 300 mg, Oral, TID  baclofen (LIORESAL) tablet 20 mg, 20 mg, Oral, BID  ibuprofen (ADVIL;MOTRIN) tablet 800 mg, 800 mg, Oral, Q8H PRN  promethazine (PHENERGAN) tablet 25 mg, 25 mg, Oral, Q6H PRN  0.9 % sodium chloride infusion, , Intravenous, Continuous  sodium chloride flush 0.9 % injection 10 mL, 10 mL, Intravenous, 2 times per day  sodium chloride flush 0.9 % injection 10 mL, 10 mL, Intravenous, PRN  magnesium hydroxide (MILK OF MAGNESIA) 400 MG/5ML suspension 30 mL, 30 mL, Oral, Daily PRN  ondansetron (ZOFRAN) injection 4 mg, 4 mg, Intravenous, Q6H PRN  potassium chloride (KLOR-CON M) extended release tablet 40 mEq, 40 mEq, Oral, PRN **OR** potassium bicarb-citric acid (EFFER-K) effervescent tablet 40 mEq, 40 mEq, Oral, PRN **OR** potassium chloride 10 mEq/100 mL IVPB (Peripheral Line), 10 mEq, Intravenous, PRN  cefepime (MAXIPIME) 2 g IVPB minibag, 2 g, Intravenous, Q12H  promethazine (PHENERGAN) injection 25 mg, 25 mg, Intramuscular, Q6H PRN  DULoxetine (CYMBALTA) extended release capsule 60 mg, 60 mg, Oral, Nightly         Objective:  BP (!) 91/56   Pulse 101   Temp 98.4 °F (36.9 °C) (Oral)   Resp 17   Ht 5' 6\" (1.676 m)   Wt 190 lb (86.2 kg)   SpO2 93%   BMI 30.67 kg/m²      Patient Vitals for the past 24 hrs:   BP Temp Temp src Pulse Resp SpO2   02/20/19 0337 (!) 91/56 98.4 °F (36.9 °C) Oral 101 17 93 %   02/20/19 0225 - - - - 12 94 %   02/20/19 0150 - - - 93 20 90 %   02/20/19 0028 98/63 98.6 °F (37 °C) Oral 102 16 94 %   02/19/19 2259 - - - 99 22 95 %   02/19/19 2130 104/70 98.1 °F (36.7 °C) Oral 100 16 95 %   02/19/19 1821 118/80 98.6 °F (37 °C) Oral 99 16 94 %   02/19/19 1136 111/77 98 °F (36.7 °C) Oral 101 14 93 %   02/19/19 0945 107/61 - - 104 14 -   02/19/19 0921 114/73 - - 105 14 95 %   02/19/19 0920 126/71 - - 107 14 95 %   02/19/19 0915 129/81 - - 106 14 94 %   02/19/19 0910 121/79 - - 106 14 95 %   02/19/19 0905 125/78 - - 104 10 97 %   02/19/19 0853 121/79 - - 101 9 96 %     No data found.           Intake/Output Summary (Last 24 hours) at 02/20/19 0815  Last data filed at 02/20/19 0105   Gross per 24 hour   Intake            248.4 ml   Output              800 ml   Net           -551.6 ml         Physical Exam:   S1, S2 normal, no murmur, rub or gallop, regular rate and rhythm  clear to auscultation bilaterally  abdomen is soft without significant tenderness, masses, organomegaly or guarding  extremities normal, atraumatic, no cyanosis or edema    Labs:  Lab Results   Component Value Date    WBC 6.9 02/20/2019    HGB 9.2 (L) 02/20/2019    HCT 29.5 (L) 02/20/2019     02/20/2019    CHOL 188 10/15/2014    TRIG 132 10/15/2014    HDL 65 (H) 10/15/2014    ALT 49 (H) 02/18/2019    AST 23 02/18/2019     02/20/2019    K 4.1 02/20/2019     02/20/2019    CREATININE 0.7 02/20/2019    BUN 15 02/20/2019    CO2 26 02/20/2019    TSH 1.57 06/18/2014    INR 1.11 02/18/2019    LABMICR YES 02/18/2019     Lab Results   Component Value Date    CKTOTAL 185 12/18/2014    TROPONINI 0.02 (H) 02/15/2019       Recent Imaging Results are Reviewed:  Xr Chest Standard (2 Vw)    Result Date: 2/19/2019  EXAMINATION: TWO VIEWS OF THE CHEST 2/19/2019 4:26 pm COMPARISON: 02/16/2019 HISTORY: ORDERING SYSTEM PROVIDED HISTORY: cough TECHNOLOGIST PROVIDED HISTORY: Reason for exam:->cough Ordering Physician Provided Reason for Exam: Cervical Cancer (Pt was sent over from Dr Rickey Aceves office - states she was sent here from office - was told today that she has stage 3 cervical cancer and was sent here for \"scans and further testing\") Acuity: Unknown Type of Exam: Unknown FINDINGS: There is patchy bilateral airspace disease, which appears increased when compared to the previous exam.  As detected on recent CT PA, some of those have a nodular appearance. The heart mediastinal contours are unremarkable. No pneumothorax. No free air. No acute bony abnormalities. Chin catheter noted. Patchy bilateral airspace disease, concerning for pneumonia, which appears increased when compared to the previous exam.  Again, some of these areas of opacity have a nodular appearance and septic emboli should be considered, especially when given the correlation with the CT PA from 02/15/2019. Process should be followed to resolution. Xr Chest Standard (2 Vw)    Result Date: 2/16/2019  EXAMINATION: TWO VIEWS OF THE CHEST 2/16/2019 11:49 am COMPARISON: 10/16/2014 HISTORY: ORDERING SYSTEM PROVIDED HISTORY: pneumonia TECHNOLOGIST PROVIDED HISTORY: Reason for exam:->pneumonia Ordering Physician Provided Reason for Exam: PNA Acuity: Acute Type of Exam: Initial Relevant Medical/Surgical History: cervical ca FINDINGS: There is patchy bibasilar consolidation, right greater than left, superimposed on a background of diffuse interstitial prominence. Heart size, mediastinal contours and pulmonary vascularity are within normal limits. There is no pleural effusion. Multifocal bilateral pneumonia.      Ct Chest Wo Contrast    Result Date: 1/28/2019  EXAMINATION: CT OF THE ABDOMEN AND PELVIS WITH CONTRAST; CT OF THE CHEST WITHOUT CONTRAST 1/28/2019 7:47 pm; 1/28/2019 7:45 pm TECHNIQUE: CT of the abdomen and pelvis was performed with the administration of intravenous contrast. Multiplanar reformatted images are provided for review. Dose modulation, iterative reconstruction, and/or weight based adjustment of the mA/kV was utilized to reduce the radiation dose to as low as reasonably achievable.; CT of the chest was performed without the administration of intravenous contrast. Multiplanar reformatted images are provided for review. Dose modulation, iterative reconstruction, and/or weight based adjustment of the mA/kV was utilized to reduce the radiation dose to as low as reasonably achievable. COMPARISON: CT abdomen and pelvis 01/18/2019. HISTORY: ORDERING SYSTEM PROVIDED HISTORY: RLQ abdominal pain TECHNOLOGIST PROVIDED HISTORY: Additional Contrast?->Oral Reason for exam:->RLQ pain persisting Ordering Physician Provided Reason for Exam: RLQ abdominal pain Acuity: Acute Type of Exam: Initial; ORDERING SYSTEM PROVIDED HISTORY: Other forms of systemic lupus erythematosus, unspecified organ involvement status ([facility] TECHNOLOGIST PROVIDED HISTORY: Reason for exam:->pulmonary nodules seen on recent CT Ordering Physician Provided Reason for Exam: pulmonary nodules seen on recent CT Acuity: Chronic Type of Exam: Subsequent/Follow-up FINDINGS: Chest: Mediastinum: Lack of intravenous contrast limits evaluation of the mediastinum. The thoracic aorta is normal in appearance. The coronary arteries and branch vessels of the superior mediastinum and lower neck are unremarkable. The pulmonary arteries are normal in caliber. The heart is normal in size. No pericardial effusion. The mediastinal esophagus and thyroid gland are unremarkable. No pathologically enlarged lymph nodes are seen in the chest. Lungs/pleura: The central airways are patent. No pleural effusion or pneumothorax.   Mild bilateral dependent atelectasis. There are diffusely scattered patchy bilateral nodular/ground-glass opacities. No consolidation. Soft Tissues/Bones: No acute osseous or soft tissue abnormality. Abdomen/Pelvis: Organs: The liver is unremarkable. The gallbladder is surgically absent. The biliary tree is within normal limits status post cholecystectomy. The pancreas, spleen, and bilateral adrenal glands are unremarkable. The bilateral kidneys and ureters are unremarkable. GI/Bowel: Normal appendix. The colon is unremarkable. No evidence of acute appendicitis. No bowel obstruction or abnormal bowel wall thickening. Pelvis: Mild suspected circumferential urinary bladder wall thickening with mild adjacent stranding. The uterus and bilateral adnexae are unremarkable. Trace free fluid in the pelvis. No pelvic or inguinal lymphadenopathy. Peritoneum/Retroperitoneum: The abdominal aorta is normal in appearance. No retroperitoneal or mesenteric lymphadenopathy is identified. No free air or fluid is seen in the abdomen. Bones/Soft Tissues: No acute osseous or soft tissue abnormality. 1. Scattered small patchy ground-glass/nodular opacities throughout the bilateral lungs likely representing an infectious process. 2. Mild suspected circumferential urinary bladder wall thickening with mild adjacent stranding compatible with cystitis. Recommend correlation with urinalysis. 3. Normal appendix. Ct Abdomen Pelvis W Iv Contrast Additional Contrast? None    Result Date: 2/15/2019  EXAMINATION: CTA OF THE CHEST; CT OF THE ABDOMEN AND PELVIS WITH CONTRAST 2/15/2019 2:11 pm TECHNIQUE: CTA of the chest was performed after the administration of intravenous contrast.  Multiplanar reformatted images are provided for review. MIP images are provided for review.  Dose modulation, iterative reconstruction, and/or weight based adjustment of the mA/kV was utilized to reduce the radiation dose to as low as reasonably achievable.; CT of the abdomen cavitary. The largest of these measures approximately 10 mm in short axis within the subpleural portion of the left lower lobe. These may be secondary to an atypical infectious or inflammatory process, to include septic emboli. Metastatic disease is considered less likely, though not excluded. There is no evidence of a pneumothorax or pleural effusion. Soft Tissues/Bones: No acute findings. Abdomen/Pelvis: Organs: The patient is status post cholecystectomy. The liver, spleen, and pancreas are normal.  The adrenal glands are unremarkable bilaterally. There has been interval development of nonspecific moderate right hydronephrosis since the prior exam, without definite demonstrable cause. Namely, no definite obstructing stone or mass is identified. The left kidney is stable and unremarkable. GI/Bowel: Evaluation of the hollow GI tract demonstrates no evidence of abnormal bowel wall thickening, dilatation, or obstruction. The appendix is normal. Pelvis: The urinary bladder is partially collapsed, though appears diffusely thick-walled and inflamed, with a mild amount of pericystic stranding noted. These findings are suggestive of an acute cystitis, progressed from prior exam.  Additionally, the cervix appears enlarged and irregular, and there is new abnormal thickening of the endometrium within the uterine fundus, which appears new in comparison with the study of 1/28/2019. The bilateral adnexa are unremarkable. There is a mild amount of free pelvic fluid, likely physiologic. There are stable mildly enlarged bilateral pelvic chain lymph nodes, the largest measuring approximately 2.6 x 1.6 cm along the right external iliac chain, similar to the study of 1/28/2019. Peritoneum/Retroperitoneum: No intraperitoneal free air or free fluid is identified. No pathologic lymphadenopathy is seen. The abdominal aorta is unremarkable. No significant abdominal wall hernia is identified. Bones/Soft Tissues:  There is mild bilateral sacroiliitis. The skeletal structures are otherwise unremarkable. 1. No CT evidence of a pulmonary embolism. 2. No acute abnormality of the thoracic aorta. 3. Interval development of widespread ground-glass opacity throughout both lungs with diffuse intralobular septal thickening. This most likely reflects a combination of interstitial pulmonary edema and multifocal pneumonia. 4. Interval progression of multiple nodular foci of consolidative opacity throughout both lungs, a few of which are cavitary in appearance. These nodules are most likely infectious or inflammatory in etiology, and septic emboli are a consideration. Given patient's history of cervical cancer, metastatic disease is on the differential, though considered less likely. 5. New nonspecific moderate right hydronephrosis, without demonstrable cause. However, there is underlying wall thickening and enhancement of the urinary bladder. This combination of findings could represent a cystitis in the setting of urinary tract infection with resultant pyelitis and hydronephrosis. However, given patient history of cervical cancer, locoregional spread of tumor with resultant obstruction of the distal right ureter may be a cause of the patient's right hydronephrosis. 6. Interval development of new irregularity of the cervix and nonspecific endometrial thickening in comparison with the prior study of 1/28/2019. This likely represents the patient's known underlying cervical cancer causing obstruction of the endometrial with resultant endometrial thickening. This could be further evaluated with a follow-up pelvic ultrasound. 7. Stable mild bilateral pelvic chain lymphadenopathy, right greater than left, possibly representing metastatic disease.      Ct Abdomen Pelvis W Iv Contrast Additional Contrast? Oral    Result Date: 1/28/2019  EXAMINATION: CT OF THE ABDOMEN AND PELVIS WITH CONTRAST; CT OF THE CHEST WITHOUT CONTRAST 1/28/2019 7:47 pm; 1/28/2019 7:45 pm TECHNIQUE: CT of the abdomen and pelvis was performed with the administration of intravenous contrast. Multiplanar reformatted images are provided for review. Dose modulation, iterative reconstruction, and/or weight based adjustment of the mA/kV was utilized to reduce the radiation dose to as low as reasonably achievable.; CT of the chest was performed without the administration of intravenous contrast. Multiplanar reformatted images are provided for review. Dose modulation, iterative reconstruction, and/or weight based adjustment of the mA/kV was utilized to reduce the radiation dose to as low as reasonably achievable. COMPARISON: CT abdomen and pelvis 01/18/2019. HISTORY: ORDERING SYSTEM PROVIDED HISTORY: RLQ abdominal pain TECHNOLOGIST PROVIDED HISTORY: Additional Contrast?->Oral Reason for exam:->RLQ pain persisting Ordering Physician Provided Reason for Exam: RLQ abdominal pain Acuity: Acute Type of Exam: Initial; ORDERING SYSTEM PROVIDED HISTORY: Other forms of systemic lupus erythematosus, unspecified organ involvement status ([facility] [address] TECHNOLOGIST PROVIDED HISTORY: Reason for exam:->pulmonary nodules seen on recent CT Ordering Physician Provided Reason for Exam: pulmonary nodules seen on recent CT Acuity: Chronic Type of Exam: Subsequent/Follow-up FINDINGS: Chest: Mediastinum: Lack of intravenous contrast limits evaluation of the mediastinum. The thoracic aorta is normal in appearance. The coronary arteries and branch vessels of the superior mediastinum and lower neck are unremarkable. The pulmonary arteries are normal in caliber. The heart is normal in size. No pericardial effusion. The mediastinal esophagus and thyroid gland are unremarkable. No pathologically enlarged lymph nodes are seen in the chest. Lungs/pleura: The central airways are patent. No pleural effusion or pneumothorax. Mild bilateral dependent atelectasis.   There are diffusely scattered patchy bilateral nodular/ground-glass opacities. No consolidation. Soft Tissues/Bones: No acute osseous or soft tissue abnormality. Abdomen/Pelvis: Organs: The liver is unremarkable. The gallbladder is surgically absent. The biliary tree is within normal limits status post cholecystectomy. The pancreas, spleen, and bilateral adrenal glands are unremarkable. The bilateral kidneys and ureters are unremarkable. GI/Bowel: Normal appendix. The colon is unremarkable. No evidence of acute appendicitis. No bowel obstruction or abnormal bowel wall thickening. Pelvis: Mild suspected circumferential urinary bladder wall thickening with mild adjacent stranding. The uterus and bilateral adnexae are unremarkable. Trace free fluid in the pelvis. No pelvic or inguinal lymphadenopathy. Peritoneum/Retroperitoneum: The abdominal aorta is normal in appearance. No retroperitoneal or mesenteric lymphadenopathy is identified. No free air or fluid is seen in the abdomen. Bones/Soft Tissues: No acute osseous or soft tissue abnormality. 1. Scattered small patchy ground-glass/nodular opacities throughout the bilateral lungs likely representing an infectious process. 2. Mild suspected circumferential urinary bladder wall thickening with mild adjacent stranding compatible with cystitis. Recommend correlation with urinalysis. 3. Normal appendix. Ct Chest Pulmonary Embolism W Contrast    Result Date: 2/15/2019  EXAMINATION: CTA OF THE CHEST; CT OF THE ABDOMEN AND PELVIS WITH CONTRAST 2/15/2019 2:11 pm TECHNIQUE: CTA of the chest was performed after the administration of intravenous contrast.  Multiplanar reformatted images are provided for review. MIP images are provided for review.  Dose modulation, iterative reconstruction, and/or weight based adjustment of the mA/kV was utilized to reduce the radiation dose to as low as reasonably achievable.; CT of the abdomen and pelvis was performed with the administration of intravenous contrast. Multiplanar reformatted images are provided for review. Dose modulation, iterative reconstruction, and/or weight based adjustment of the mA/kV was utilized to reduce the radiation dose to as low as reasonably achievable. COMPARISON: 1/28/2019, 4/29/2011 HISTORY: ORDERING SYSTEM PROVIDED HISTORY: cervical ca - SOB - PE??? TECHNOLOGIST PROVIDED HISTORY: Ordering Physician Provided Reason for Exam: SOB Acuity: Unknown Type of Exam: Unknown; ORDERING SYSTEM PROVIDED HISTORY: abd disten - cervical ca? TECHNOLOGIST PROVIDED HISTORY: Additional Contrast?->None Ordering Physician Provided Reason for Exam: abd distention Acuity: Unknown Type of Exam: Unknown Patient with recently diagnosed cervical cancer. FINDINGS: Chest: Pulmonary arteries: The pulmonary arteries opacify normally. There is no evidence of filling defect to suggest a pulmonary embolism. Mediastinum: The thyroid is unremarkable. There is mild subcarinal and bilateral hilar lymphadenopathy, most likely benign and reactive in etiology given the patient's underlying lung findings. The thoracic aorta is unremarkable, without evidence of aneurysm or dissection. 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 No pericardial abnormality is identified. Lungs/pleura: There is mild mucous plugging within the bilateral lower lobes. The central airways are otherwise widely patent. There has been interval development of widespread ground-glass opacity throughout both lungs, with diffuse intralobular septal thickening evident, new from prior exam.  This most likely reflects a combination of interstitial pulmonary edema and superimposed multifocal pneumonia. Additionally, there has been interval progression and more consolidation of multiple scattered various sized pulmonary nodules throughout both lungs since the study of 1/28/2019. A few of these are cavitary.   The largest of these measures approximately 10 mm in short axis within the subpleural portion of the left lower lobe. These may be secondary to an atypical infectious or inflammatory process, to include septic emboli. Metastatic disease is considered less likely, though not excluded. There is no evidence of a pneumothorax or pleural effusion. Soft Tissues/Bones: No acute findings. Abdomen/Pelvis: Organs: The patient is status post cholecystectomy. The liver, spleen, and pancreas are normal.  The adrenal glands are unremarkable bilaterally. There has been interval development of nonspecific moderate right hydronephrosis since the prior exam, without definite demonstrable cause. Namely, no definite obstructing stone or mass is identified. The left kidney is stable and unremarkable. GI/Bowel: Evaluation of the hollow GI tract demonstrates no evidence of abnormal bowel wall thickening, dilatation, or obstruction. The appendix is normal. Pelvis: The urinary bladder is partially collapsed, though appears diffusely thick-walled and inflamed, with a mild amount of pericystic stranding noted. These findings are suggestive of an acute cystitis, progressed from prior exam.  Additionally, the cervix appears enlarged and irregular, and there is new abnormal thickening of the endometrium within the uterine fundus, which appears new in comparison with the study of 1/28/2019. The bilateral adnexa are unremarkable. There is a mild amount of free pelvic fluid, likely physiologic. There are stable mildly enlarged bilateral pelvic chain lymph nodes, the largest measuring approximately 2.6 x 1.6 cm along the right external iliac chain, similar to the study of 1/28/2019. Peritoneum/Retroperitoneum: No intraperitoneal free air or free fluid is identified. No pathologic lymphadenopathy is seen. The abdominal aorta is unremarkable. No significant abdominal wall hernia is identified. Bones/Soft Tissues: There is mild bilateral sacroiliitis.   The skeletal structures are otherwise unremarkable. 1. No CT evidence of a pulmonary embolism. 2. No acute abnormality of the thoracic aorta. 3. Interval development of widespread ground-glass opacity throughout both lungs with diffuse intralobular septal thickening. This most likely reflects a combination of interstitial pulmonary edema and multifocal pneumonia. 4. Interval progression of multiple nodular foci of consolidative opacity throughout both lungs, a few of which are cavitary in appearance. These nodules are most likely infectious or inflammatory in etiology, and septic emboli are a consideration. Given patient's history of cervical cancer, metastatic disease is on the differential, though considered less likely. 5. New nonspecific moderate right hydronephrosis, without demonstrable cause. However, there is underlying wall thickening and enhancement of the urinary bladder. This combination of findings could represent a cystitis in the setting of urinary tract infection with resultant pyelitis and hydronephrosis. However, given patient history of cervical cancer, locoregional spread of tumor with resultant obstruction of the distal right ureter may be a cause of the patient's right hydronephrosis. 6. Interval development of new irregularity of the cervix and nonspecific endometrial thickening in comparison with the prior study of 1/28/2019. This likely represents the patient's known underlying cervical cancer causing obstruction of the endometrial with resultant endometrial thickening. This could be further evaluated with a follow-up pelvic ultrasound. 7. Stable mild bilateral pelvic chain lymphadenopathy, right greater than left, possibly representing metastatic disease. Ir Port Placement > 5 Years    Result Date: 2/19/2019  PROCEDURE: ULTRASOUND GUIDED VASCULAR ACCESS. FLUOROSCOPY GUIDED PLACEMENT OF A RIGHT IJ SUBCUTANEOUS PORT-A-CATH. MODERATE CONSCIOUS SEDATION 2/19/2019.  HISTORY: ORDERING SYSTEM PROVIDED HISTORY: cervical cancer to get chemo TECHNOLOGIST PROVIDED HISTORY: Reason for exam:->cervical cancer to get chemo How many lumens are being requested?->1 What site is the preferred site? ->No preference What side should this line be placed? ->Either 33-year-old female with cervical cancer, presents for chest port placement. SEDATION: Moderate sedation was administered under my supervision by a qualified nurse. 4 mg of Versed was administered intravenously. Approximate intra procedural sedation time was 23 minutes. FLUOROSCOPY DOSE AND TYPE OR TIME AND EXPOSURES: 54 seconds of fluoroscopy with 2 exposures. TECHNIQUE: Informed consent was obtained after a detailed explanation of the procedure including risks, benefits, and alternatives. Universal protocol was observed. The right neck and chest were prepped and draped in sterile fashion using maximum sterile barrier technique. Local anesthesia was achieved with lidocaine. A micropuncture needle was used to access the right internal jugular vein using ultrasound guidance. An ultrasound image demonstrating patency of the vein with needle tip located within it. An image was obtained and stored in PACs. A 0.035 guidewire was used to place a peel-away sheath. A chest wall incision was made and a subcutaneous pocket was created. The port reservoir was placed within the pocket and the port tubing was attached and tunneled subcutaneously to the venotomy site. The port tubing was cut to an appropriate length and advanced through the peel-away sheath under fluoroscopic guidance to the cavo-atrial junction. The chest wall incision was closed using 3-0 and 4-0 Vicryl suture and tissue adhesive was applied to the venotomy site and chest wall incision. The port flushed easily and there was a good blood return. The port was locked with heparinized saline. The patient tolerated the procedure well and there were no immediate complications.  FINDINGS: Fluoroscopic image demonstrates the tip of the port in the right atrium. 1. Successful ultrasound and fluoroscopy guided Port-A-Cath placement via right IJ access, as described above. 2.  The port was left accessed for immediate use. Fl Retrograde Pyelogram Right    Result Date: 2/17/2019  EXAMINATION: SPOT FLUOROSCOPIC IMAGES 2/17/2019 6:52 am TECHNIQUE: Fluoroscopy was provided by the radiology department for procedure. Radiologist was not present during examination. FLUOROSCOPY DOSE AND TYPE OR TIME AND EXPOSURES: 3 seconds of fluoroscopy was utilized for purposes of intraoperative localization. 1 fluoroscopic spot image was obtained. COMPARISON: None HISTORY: Intraprocedural imaging. FINDINGS: 1 spot images of the abdomen was obtained. Intraprocedural fluoroscopic spot images as above. See separate procedure report for more information. Ir Guided Ureteral Stent Place W Nephro Cath New Access    Result Date: 2/18/2019  PROCEDURE: IR ULTRASOUND AND FLUOROSCOPIC GUIDED RIGHT PERCUTANEOUS NEPHROSTOMY AND NEPHROSTOGRAM IR ANTEGRADE RIGHT URETERAL STENT. MODERATE CONSCIOUS SEDATION 2/18/2019 HISTORY: ORDERING SYSTEM PROVIDED HISTORY: needs R sided nephrostomy tube placed. maria guadalupe could not do via cysto. Adi said that IR would do on monday TECHNOLOGIST PROVIDED HISTORY: Reason for exam:->needs R sided nephrostomy tube placed. maria guadalupe could not do via cysto. Adi said that IR would do on monday COMPARISON: Abdomen pelvic CT 02/15/2019. CONTRAST: 30 cc, nonvascular within collecting system only. . SEDATION: Intravenous sedation was administered with continuous monitoring throughout the procedure. In measured doses, a total of 6 mg intravenous Versed and 275 mcg intravenous fentanyl was administered. My total procedure time with sedation was 26 minutes. FLUOROSCOPY DOSE AND TYPE OR TIME AND EXPOSURES: 3.5 minutes, 7 digital imaging sequences.  DESCRIPTION OF PROCEDURE: Informed consent was obtained after a detailed explanation of the procedure including risks, benefits, and alternatives. Universal protocol was observed. TECHNIQUE: Informed consent was previously obtained. In the semi prone position, an appropriate area posterior lateral aspect of the skin overlying the targeted collecting system was demarcated, sterilely prepared draped and anesthetized. Utilizing ultrasound, anatomy from prior CT scan and other imaging, the needle trajectory and depth was predetermined. The micro puncture needle was advanced using fluoroscopic and ultrasound guidance into the collecting system without difficulty. Once urine was aspirated, a small platinum tip wire was advanced into the collecting system and into the proximal ureter. The tract was dilated. With wire exchange, a 6 Lao sheath was placed. The collecting system is moderately dilated and the ureter is somewhat tortuous. A glide wire was easily advanced down the ureter. Because of this, a 5 Lao peel-away sheath was advanced into the proximal ureter over the wire. The glide wire was then advanced with a steerable catheter into the urinary bladder without difficulty and only mild discomfort. There is no contrast extravasation. Once the catheter was placed into the urinary bladder, contrast injection is seen diluted within the somewhat distended urinary bladder. The catheter was used to place a Super Stiff wire for ureteral stent placement. A 26 cm 8 Lao ureteral stent was then advanced, such that the distal pigtail is well within the urinary bladder. The proximal pigtail was placed in the lower portion of the left renal pelvis. Again there is no contrast extravasation. No filling defect to indicate clot or bleeding was observed on early or later contrast imaging. Following this, dense contrast was injected showing normal expected filling of the ureteral stent and exiting from the distal pigtail directly into the urinary bladder.  Following this, the internalized stent was disengaged. The proximal collecting system was then decompressed. The proximal nephrostomy access was then removed entirely, no external drainage necessary at this point. The patient tolerated the procedure well. A sterile bandage was placed over the small incision. From this point forward, the ureteral stent can be exchanged as needed from below. Successful right percutaneous nephrostomy with antegrade pyelogram. High-grade distal ureteral obstruction with severe hydronephrosis, successfully crossed as above. Successful 8 French internalized right ureteral stent placement as above. Assessment and Plan:  Patient Active Hospital Problem List:   Pneumonia (2/15/2019)    Assessment: Established problem. Stable. Infiltrates seen 2/19. pulm thinks this may be more related to metastatic disease and not infection    Plan: cont empiric abx - maxipime   SLE (systemic lupus erythematosus) ([facility] [address] (1/22/2019)    Assessment: Established problem. Stable.     Plan: Continue present orders/plan.    Lupus anticoagulant positive (1/22/2019)   Cervical cancer ([facility] [address] (2/15/2019)    Assessment: invasive squamous cell carcinoma which supports the diagnosis of cervical cancer. She is at least stage JACKI d/t metastatic disease to bladder and possibly stage IVB if mets to lung    Plan: Initial plan - carbo/taxol/avastin. Port placed 2/19   Pulmonary nodule (2/15/2019)    Assessment: Established problem. Stable.  Will need workup for this     Plan: per dr Soria Person, they will eval in coming week   Hydronephrosis (2/15/2019)    Assessment: Established problem.  Stent could not be placed by urology    Plan: Right perc nephrostomy, nephrostogram and antegrade ureteral stent done 2/18 per IR   Ground glass opacity present on imaging of lung ()   Lung nodules ()   Lymphadenopathy ()             Al [place]  2/20/2019

## 2019-02-20 NOTE — PROGRESS NOTES
Type of Exam: Initial; ORDERING SYSTEM PROVIDED HISTORY: Other forms of systemic lupus erythematosus, unspecified organ involvement status ([facility] [address] TECHNOLOGIST PROVIDED HISTORY: Reason for exam:->pulmonary nodules seen on recent CT Ordering Physician Provided Reason for Exam: pulmonary nodules seen on recent CT Acuity: Chronic Type of Exam: Subsequent/Follow-up FINDINGS: Chest: Mediastinum: Lack of intravenous contrast limits evaluation of the mediastinum. The thoracic aorta is normal in appearance. The coronary arteries and branch vessels of the superior mediastinum and lower neck are unremarkable. The pulmonary arteries are normal in caliber. The heart is normal in size. No pericardial effusion. The mediastinal esophagus and thyroid gland are unremarkable. No pathologically enlarged lymph nodes are seen in the chest. Lungs/pleura: The central airways are patent. No pleural effusion or pneumothorax. Mild bilateral dependent atelectasis. There are diffusely scattered patchy bilateral nodular/ground-glass opacities. No consolidation. Soft Tissues/Bones: No acute osseous or soft tissue abnormality. Abdomen/Pelvis: Organs: The liver is unremarkable. The gallbladder is surgically absent. The biliary tree is within normal limits status post cholecystectomy. The pancreas, spleen, and bilateral adrenal glands are unremarkable. The bilateral kidneys and ureters are unremarkable. GI/Bowel: Normal appendix. The colon is unremarkable. No evidence of acute appendicitis. No bowel obstruction or abnormal bowel wall thickening. Pelvis: Mild suspected circumferential urinary bladder wall thickening with mild adjacent stranding. The uterus and bilateral adnexae are unremarkable. Trace free fluid in the pelvis. No pelvic or inguinal lymphadenopathy. Peritoneum/Retroperitoneum: The abdominal aorta is normal in appearance. No retroperitoneal or mesenteric lymphadenopathy is identified.   No free air or fluid is seen in the abdomen. Bones/Soft Tissues: No acute osseous or soft tissue abnormality. 1. Scattered small patchy ground-glass/nodular opacities throughout the bilateral lungs likely representing an infectious process. 2. Mild suspected circumferential urinary bladder wall thickening with mild adjacent stranding compatible with cystitis. Recommend correlation with urinalysis. 3. Normal appendix. Ct Abdomen Pelvis W Iv Contrast Additional Contrast? None    Result Date: 2/15/2019      1. No CT evidence of a pulmonary embolism. 2. No acute abnormality of the thoracic aorta. 3. Interval development of widespread ground-glass opacity throughout both lungs with diffuse intralobular septal thickening. This most likely reflects a combination of interstitial pulmonary edema and multifocal pneumonia. 4. Interval progression of multiple nodular foci of consolidative opacity throughout both lungs, a few of which are cavitary in appearance. These nodules are most likely infectious or inflammatory in etiology, and septic emboli are a consideration. Given patient's history of cervical cancer, metastatic disease is on the differential, though considered less likely. 5. New nonspecific moderate right hydronephrosis, without demonstrable cause. However, there is underlying wall thickening and enhancement of the urinary bladder. This combination of findings could represent a cystitis in the setting of urinary tract infection with resultant pyelitis and hydronephrosis. However, given patient history of cervical cancer, locoregional spread of tumor with resultant obstruction of the distal right ureter may be a cause of the patient's right hydronephrosis. 6. Interval development of new irregularity of the cervix and nonspecific endometrial thickening in comparison with the prior study of 1/28/2019.   This likely represents the patient's known underlying cervical cancer causing obstruction of the endometrial with resultant endometrial thickening. This could be further evaluated with a follow-up pelvic ultrasound. 7. Stable mild bilateral pelvic chain lymphadenopathy, right greater than left, possibly representing metastatic disease. Ct Abdomen Pelvis W Iv Contrast Additional Contrast? Oral    Result Date: 1/28/2019    1. Scattered small patchy ground-glass/nodular opacities throughout the bilateral lungs likely representing an infectious process. 2. Mild suspected circumferential urinary bladder wall thickening with mild adjacent stranding compatible with cystitis. Recommend correlation with urinalysis. 3. Normal appendix. Ct Chest Pulmonary Embolism W Contrast    Result Date: 2/15/2019  1. No CT evidence of a pulmonary embolism. 2. No acute abnormality of the thoracic aorta. 3. Interval development of widespread ground-glass opacity throughout both lungs with diffuse intralobular septal thickening. This most likely reflects a combination of interstitial pulmonary edema and multifocal pneumonia. 4. Interval progression of multiple nodular foci of consolidative opacity throughout both lungs, a few of which are cavitary in appearance. These nodules are most likely infectious or inflammatory in etiology, and septic emboli are a consideration. Given patient's history of cervical cancer, metastatic disease is on the differential, though considered less likely. 5. New nonspecific moderate right hydronephrosis, without demonstrable cause. However, there is underlying wall thickening and enhancement of the urinary bladder. This combination of findings could represent a cystitis in the setting of urinary tract infection with resultant pyelitis and hydronephrosis. However, given patient history of cervical cancer, locoregional spread of tumor with resultant obstruction of the distal right ureter may be a cause of the patient's right hydronephrosis.  6. Interval development of new irregularity of the cervix and nonspecific endometrial thickening in comparison with the prior study of 1/28/2019. This likely represents the patient's known underlying cervical cancer causing obstruction of the endometrial with resultant endometrial thickening. This could be further evaluated with a follow-up pelvic ultrasound. 7. Stable mild bilateral pelvic chain lymphadenopathy, right greater than left, possibly representing metastatic disease. Fl Retrograde Pyelogram Right    Result Date: 2/17/2019  EXAMINATION: SPOT FLUOROSCOPIC IMAGES 2/17/2019 6:52 am TECHNIQUE: Fluoroscopy was provided by the radiology department for procedure. Radiologist was not present during examination. FLUOROSCOPY DOSE AND TYPE OR TIME AND EXPOSURES: 3 seconds of fluoroscopy was utilized for purposes of intraoperative localization. 1 fluoroscopic spot image was obtained. COMPARISON: None HISTORY: Intraprocedural imaging. FINDINGS: 1 spot images of the abdomen was obtained. Intraprocedural fluoroscopic spot images as above. See separate procedure report for more information. Ir Guided Ureteral Stent Place W Nephro Cath New Access    Result Date: 2/18/2019  PROCEDURE: IR ULTRASOUND AND FLUOROSCOPIC GUIDED RIGHT PERCUTANEOUS NEPHROSTOMY AND NEPHROSTOGRAM IR ANTEGRADE RIGHT URETERAL STENT. MODERATE CONSCIOUS SEDATION 2/18/2019 HISTORY: ORDERING SYSTEM PROVIDED HISTORY: needs R sided nephrostomy tube placed. maria guadalupe could not do via cysto. Adi said that IR would do on monday TECHNOLOGIST PROVIDED HISTORY: Reason for exam:->needs R sided nephrostomy tube placed. maria guadalupe could not do via cysto. Adi said that IR would do on monday COMPARISON: Abdomen pelvic CT 02/15/2019. CONTRAST: 30 cc, nonvascular within collecting system only. . SEDATION: Intravenous sedation was administered with continuous monitoring throughout the procedure. In measured doses, a total of 6 mg intravenous Versed and 275 mcg intravenous fentanyl was administered.  My total procedure time with sedation was 26 minutes. FLUOROSCOPY DOSE AND TYPE OR TIME AND EXPOSURES: 3.5 minutes, 7 digital imaging sequences. DESCRIPTION OF PROCEDURE: Informed consent was obtained after a detailed explanation of the procedure including risks, benefits, and alternatives. Universal protocol was observed. TECHNIQUE: Informed consent was previously obtained. In the semi prone position, an appropriate area posterior lateral aspect of the skin overlying the targeted collecting system was demarcated, sterilely prepared draped and anesthetized. Utilizing ultrasound, anatomy from prior CT scan and other imaging, the needle trajectory and depth was predetermined. The micro puncture needle was advanced using fluoroscopic and ultrasound guidance into the collecting system without difficulty. Once urine was aspirated, a small platinum tip wire was advanced into the collecting system and into the proximal ureter. The tract was dilated. With wire exchange, a 6 Latvian sheath was placed. The collecting system is moderately dilated and the ureter is somewhat tortuous. A glide wire was easily advanced down the ureter. Because of this, a 5 Latvian peel-away sheath was advanced into the proximal ureter over the wire. The glide wire was then advanced with a steerable catheter into the urinary bladder without difficulty and only mild discomfort. There is no contrast extravasation. Once the catheter was placed into the urinary bladder, contrast injection is seen diluted within the somewhat distended urinary bladder. The catheter was used to place a Super Stiff wire for ureteral stent placement. A 26 cm 8 Latvian ureteral stent was then advanced, such that the distal pigtail is well within the urinary bladder. The proximal pigtail was placed in the lower portion of the left renal pelvis. Again there is no contrast extravasation. No filling defect to indicate clot or bleeding was observed on early or later contrast imaging.   Following this, dense contrast was injected showing normal expected filling of the ureteral stent and exiting from the distal pigtail directly into the urinary bladder. Following this, the internalized stent was disengaged. The proximal collecting system was then decompressed. The proximal nephrostomy access was then removed entirely, no external drainage necessary at this point. The patient tolerated the procedure well. A sterile bandage was placed over the small incision. From this point forward, the ureteral stent can be exchanged as needed from below. Successful right percutaneous nephrostomy with antegrade pyelogram. High-grade distal ureteral obstruction with severe hydronephrosis, successfully crossed as above. Successful 8 French internalized right ureteral stent placement as above. 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  -s/p Port to be placed ( 2/19/19)  -HCG negative( 2/15/19)  - start C1 Cis + Paclitaxel ( 2/20/19)    History of arthritis positive lupus anticoagulant no history of dvt or arterial thrombosis    Anemia due to blood loss    Hydronephrosis post placement of stent with percutaneous nephrostomy    Possible pneumonia most likely carcinoma cervix metastatic to lung     Care co-ordinated with ( primary oncology)      Patient w advance stage cancer and goal of chemo more controlling dz/palliative    Discuss with patient and Mother,explained Chemo treatment,Benefit,risk and alternatives. Due to her ongoing hematuria and bleeding,will hold Avastin for now    Plan to proceed with Cycle # 1 Cisplatin 50 mg/m2 and Paclitaxel 135 mg/m2 on D1 w neulasta 24 hrs after chemo,w pre-meds,aggressive IVF and MagSo4. ( orders given to Pharmacy)    Risk of SE: MIHAI,neuropathy,Reactive,hairloss,infertility,BM suppression,Infection. ..etc explained in detailed. Patient understood need to avoid pregnancy during chemo tx. Patient want to proceed with First cycle    Continue Supportive care meanwhile    I have discussed the above stated plan with the patient and they verbalized understanding and agreed with the plan. Thank you for allowing us to participate in this patients care.     Tony Villalta MD  2/20/2019, 12:44 PM

## 2019-02-20 NOTE — PROGRESS NOTES
Original chemotherapy orders reviewed and acknowledged. Appropriateness of chemotherapy treatment regimen paclitaxel and cisplatin for diagnosis of Cervical cancer was verified. Patient educated on chemotherapy regimen. Acknowledgement of informed consent for chemotherapy verified. Pt height, weight, and BSA verified. Appropriate dosing calculations of chemotherapy based on height, weight, and BSA verified. Administration: Chemotherapy drug Paclitaxel independently verified with Taylor Triana RN prior to administration. Original order, appropriateness of regimen, drug supplied, height, weight, BSA, dose calculations, expiration dates/times, drug appearance, and two patient identifiers were verified by both RNs. Drug checked for vesicant/irritant status and for risk of hypersensitivity. Most recent laboratory values and allergies, were reviewed. Appropriate IV access available: Positive, brisk blood return via CVC was confirmed prior to administration. Chest x-ray for correct line placement reviewed  Conchita Engle and Taylor Triana RN verified correct rate of chemotherapy and maintenance IV fluids. Patient was educated on chemotherapy regimen prior to administration including indication for treatment related to disease & side effects of chemotherapy drug. Patient verbalizes understanding of all instructions.

## 2019-02-21 NOTE — PROGRESS NOTES
Pt VS taken and are WNL except for slightly low BP of 97/66; will continue to monitor. Pt c/o mild pain 3/10. Pt sleeping, able to wake up and talk, but then went back to sleep. No further needs at this time. Call light within reach.   .Electronically signed by Mariela Henry RN on 2/21/2019 at 2:52 AM

## 2019-02-21 NOTE — PROGRESS NOTES
Bedside rounding completed with Indiana Millan RN. Pt denies needs at this time. Pt tolerating chemo well. White board updated. Call light in hand and pt verbalizes correct use. All needs within reach.   Electronically signed by Angelique Chahal RN on 2/20/2019 at 7:48 PM

## 2019-02-21 NOTE — PROGRESS NOTES
Progress Note - Dr. Ian Eid - Internal Medicine  PCP: Julio Cesar Dunaway  [address] [place] [address] Yas Martin [phone]    Hospital Day: 6  Code Status: Full Code  Current Diet: DIET GENERAL;        CC: follow up on medical issues    Subjective:   Gilbert Freitas is a 42 y.o. female. She denies problems    Doing ok  Seemed to tolerate chemo yest OK  Remains on PCA for pain control    She denies chest pain, denies shortness of breath, denies nausea,  denies emesis. 10 system Review of Systems is reviewed with patient, and pertinent positives are listed here: None . Otherwise, Review of systems is negative. I have reviewed the patient's medical and social history in detail and updated the computerized patient record. To recap: She  has a past medical history of Endometriosis; Fibromyalgia; GERD (gastroesophageal reflux disease); Hip fracture ([email or web address] Hypoglycemia; Lupus; Neuropathy; Ovarian cyst; and Seizures ([facility] . She  has a past surgical history that includes  section; Cholecystectomy (); bronchoscopy (10/16/14); bronchoscopy (10/18/2014); Cystoscopy (Right, 2019); and laparoscopy (2019). . She  reports that she quit smoking about 17 years ago. Her smoking use included Cigarettes. She started smoking about 21 years ago. She has a 0.75 pack-year smoking history. She has never used smokeless tobacco. She reports that she does not drink alcohol or use drugs. .        Active Hospital Problems    Diagnosis Date Noted    Ground glass opacity present on imaging of lung [R91.8]     Lung nodules [R91.8]     Lymphadenopathy [R59.1]     Cervical cancer ([facility] [C53.9] 02/15/2019    Pneumonia [J18.9] 02/15/2019    Pulmonary nodule [R91.1] 02/15/2019    Hydronephrosis [N13.30] 02/15/2019    SLE (systemic lupus erythematosus) ([email or web address] [M32.9] 2019    Lupus anticoagulant positive [R76.0] 2019       Current Facility-Administered Medications: dexamethasone (DECADRON) 20 mg in sodium chloride 0.9 % 50 mL IVPB, , Intravenous, Q24H  LORazepam (ATIVAN) injection 0.25 mg, 0.25 mg, Intravenous, Q6H PRN  CISplatin (PLATINOL) 100 mg in sodium chloride 0.9 % 250 mL chemo IVPB, , Intravenous, Once  0.9 % sodium chloride infusion, , Intravenous, Once  magnesium sulfate 2 g in 50 mL IVPB premix, 2 g, Intravenous, Once  [START ON 2/22/2019] 0.9 % sodium chloride infusion, , Intravenous, Once  [START ON 2/22/2019] pegfilgrastim (NEULASTA) injection 6 mg, 6 mg, Subcutaneous, Once  PACLitaxel (TAXOL) 270 mg in sodium chloride 0.9 % 500 mL chemo infusion, 270 mg, Intravenous, Once  oxyCODONE (ROXICODONE) immediate release tablet 10 mg, 10 mg, Oral, Q3H PRN **OR** [DISCONTINUED] oxyCODONE (ROXICODONE) immediate release tablet 10 mg, 10 mg, Oral, Q3H PRN  polyethylene glycol (GLYCOLAX) packet 17 g, 17 g, Oral, Daily PRN  sodium chloride bacteriostatic 0.9 % injection 15 mL, 15 mL, Injection, PRN  ketorolac (TORADOL) injection 15 mg, 15 mg, Intravenous, Q6H  sodium chloride (PF) 0.9 % injection 500 mL, 500 mL, Intercatheter, PRN  naloxone (NARCAN) injection 0.4 mg, 0.4 mg, Intravenous, PRN  HYDROmorphone (DILAUDID) 10 mg/50 mL PCA, , Intravenous, Continuous  acetaminophen (TYLENOL) tablet 500 mg, 500 mg, Oral, Q4H PRN  diphenhydrAMINE (BENADRYL) tablet 25 mg, 25 mg, Oral, Q6H PRN  sennosides-docusate sodium (SENOKOT-S) 8.6-50 MG tablet 2 tablet, 2 tablet, Oral, Daily  ipratropium-albuterol (DUONEB) nebulizer solution 1 ampule, 1 ampule, Inhalation, Q4H PRN  zolpidem (AMBIEN) tablet 10 mg, 10 mg, Oral, Nightly  gabapentin (NEURONTIN) capsule 300 mg, 300 mg, Oral, TID  baclofen (LIORESAL) tablet 20 mg, 20 mg, Oral, BID  ibuprofen (ADVIL;MOTRIN) tablet 800 mg, 800 mg, Oral, Q8H PRN  promethazine (PHENERGAN) tablet 25 mg, 25 mg, Oral, Q6H PRN  0.9 % sodium chloride infusion, , Intravenous, Continuous  sodium chloride flush 0.9 % injection 10 mL, 10 mL, Intravenous, 2 times per day  sodium chloride (!) 82 % - -   02/20/19 1414 - - - - 20 - - -   02/20/19 1307 110/69 99.3 °F (37.4 °C) Oral 110 18 90 % - -   02/20/19 1034 - - - - - - 5' 6.5\" (1.689 m) 193 lb 3.2 oz (87.6 kg)   02/20/19 1018 - - - - 18 93 % - -     Patient Vitals for the past 96 hrs (Last 3 readings):   Weight   02/20/19 1034 193 lb 3.2 oz (87.6 kg)           Intake/Output Summary (Last 24 hours) at 02/21/19 0953  Last data filed at 02/21/19 0849   Gross per 24 hour   Intake           6199.4 ml   Output             1700 ml   Net           4499.4 ml         Physical Exam:   S1, S2 normal, no murmur, rub or gallop, regular rate and rhythm  clear to auscultation bilaterally  abdomen is soft without significant tenderness, masses, organomegaly or guarding  extremities normal, atraumatic, no cyanosis or edema    Labs:  Lab Results   Component Value Date    WBC 6.6 02/21/2019    HGB 7.8 (L) 02/21/2019    HCT 24.7 (L) 02/21/2019     02/21/2019    CHOL 188 10/15/2014    TRIG 132 10/15/2014    HDL 65 (H) 10/15/2014    ALT 49 (H) 02/18/2019    AST 23 02/18/2019     02/21/2019    K 4.6 02/21/2019     02/21/2019    CREATININE <0.5 (L) 02/21/2019    BUN 10 02/21/2019    CO2 26 02/21/2019    TSH 1.57 06/18/2014    INR 1.11 02/18/2019    LABMICR YES 02/18/2019     Lab Results   Component Value Date    CKTOTAL 185 12/18/2014    TROPONINI 0.02 (H) 02/15/2019       Recent Imaging Results are Reviewed:  Xr Chest Standard (2 Vw)    Result Date: 2/19/2019  EXAMINATION: TWO VIEWS OF THE CHEST 2/19/2019 4:26 pm COMPARISON: 02/16/2019 HISTORY: ORDERING SYSTEM PROVIDED HISTORY: cough TECHNOLOGIST PROVIDED HISTORY: Reason for exam:->cough Ordering Physician Provided Reason for Exam: Cervical Cancer (Pt was sent over from Dr Adelia Preston office - states she was sent here from office - was told today that she has stage 3 cervical cancer and was sent here for \"scans and further testing\") Acuity: Unknown Type of Exam: Unknown FINDINGS: There is patchy bilateral airspace disease, which appears increased when compared to the previous exam.  As detected on recent CT PA, some of those have a nodular appearance. The heart mediastinal contours are unremarkable. No pneumothorax. No free air. No acute bony abnormalities. Chin catheter noted. Patchy bilateral airspace disease, concerning for pneumonia, which appears increased when compared to the previous exam.  Again, some of these areas of opacity have a nodular appearance and septic emboli should be considered, especially when given the correlation with the CT PA from 02/15/2019. Process should be followed to resolution. Xr Chest Standard (2 Vw)    Result Date: 2/16/2019  EXAMINATION: TWO VIEWS OF THE CHEST 2/16/2019 11:49 am COMPARISON: 10/16/2014 HISTORY: ORDERING SYSTEM PROVIDED HISTORY: pneumonia TECHNOLOGIST PROVIDED HISTORY: Reason for exam:->pneumonia Ordering Physician Provided Reason for Exam: PNA Acuity: Acute Type of Exam: Initial Relevant Medical/Surgical History: cervical ca FINDINGS: There is patchy bibasilar consolidation, right greater than left, superimposed on a background of diffuse interstitial prominence. Heart size, mediastinal contours and pulmonary vascularity are within normal limits. There is no pleural effusion. Multifocal bilateral pneumonia. Ct Chest Wo Contrast    Result Date: 1/28/2019  EXAMINATION: CT OF THE ABDOMEN AND PELVIS WITH CONTRAST; CT OF THE CHEST WITHOUT CONTRAST 1/28/2019 7:47 pm; 1/28/2019 7:45 pm TECHNIQUE: CT of the abdomen and pelvis was performed with the administration of intravenous contrast. Multiplanar reformatted images are provided for review. Dose modulation, iterative reconstruction, and/or weight based adjustment of the mA/kV was utilized to reduce the radiation dose to as low as reasonably achievable.; CT of the chest was performed without the administration of intravenous contrast. Multiplanar reformatted images are provided for review.  Dose obstruction or abnormal bowel wall thickening. Pelvis: Mild suspected circumferential urinary bladder wall thickening with mild adjacent stranding. The uterus and bilateral adnexae are unremarkable. Trace free fluid in the pelvis. No pelvic or inguinal lymphadenopathy. Peritoneum/Retroperitoneum: The abdominal aorta is normal in appearance. No retroperitoneal or mesenteric lymphadenopathy is identified. No free air or fluid is seen in the abdomen. Bones/Soft Tissues: No acute osseous or soft tissue abnormality. 1. Scattered small patchy ground-glass/nodular opacities throughout the bilateral lungs likely representing an infectious process. 2. Mild suspected circumferential urinary bladder wall thickening with mild adjacent stranding compatible with cystitis. Recommend correlation with urinalysis. 3. Normal appendix. Ct Abdomen Pelvis W Iv Contrast Additional Contrast? None    Result Date: 2/15/2019  EXAMINATION: CTA OF THE CHEST; CT OF THE ABDOMEN AND PELVIS WITH CONTRAST 2/15/2019 2:11 pm TECHNIQUE: CTA of the chest was performed after the administration of intravenous contrast.  Multiplanar reformatted images are provided for review. MIP images are provided for review. Dose modulation, iterative reconstruction, and/or weight based adjustment of the mA/kV was utilized to reduce the radiation dose to as low as reasonably achievable.; CT of the abdomen and pelvis was performed with the administration of intravenous contrast. Multiplanar reformatted images are provided for review. Dose modulation, iterative reconstruction, and/or weight based adjustment of the mA/kV was utilized to reduce the radiation dose to as low as reasonably achievable.  COMPARISON: 1/28/2019, 4/29/2011 HISTORY: ORDERING SYSTEM PROVIDED HISTORY: cervical ca - SOB - PE??? TECHNOLOGIST PROVIDED HISTORY: Ordering Physician Provided Reason for Exam: SOB Acuity: Unknown Type of Exam: Unknown; ORDERING SYSTEM PROVIDED HISTORY: abd disten - cervical ca? TECHNOLOGIST PROVIDED HISTORY: Additional Contrast?->None Ordering Physician Provided Reason for Exam: abd distention Acuity: Unknown Type of Exam: Unknown Patient with recently diagnosed cervical cancer. FINDINGS: Chest: Pulmonary arteries: The pulmonary arteries opacify normally. There is no evidence of filling defect to suggest a pulmonary embolism. Mediastinum: The thyroid is unremarkable. There is mild subcarinal and bilateral hilar lymphadenopathy, most likely benign and reactive in etiology given the patient's underlying lung findings. The thoracic aorta is unremarkable, without evidence of aneurysm or dissection. 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 No pericardial abnormality is identified. Lungs/pleura: There is mild mucous plugging within the bilateral lower lobes. The central airways are otherwise widely patent. There has been interval development of widespread ground-glass opacity throughout both lungs, with diffuse intralobular septal thickening evident, new from prior exam.  This most likely reflects a combination of interstitial pulmonary edema and superimposed multifocal pneumonia. Additionally, there has been interval progression and more consolidation of multiple scattered various sized pulmonary nodules throughout both lungs since the study of 1/28/2019. A few of these are cavitary. The largest of these measures approximately 10 mm in short axis within the subpleural portion of the left lower lobe. These may be secondary to an atypical infectious or inflammatory process, to include septic emboli. Metastatic disease is considered less likely, though not excluded. There is no evidence of a pneumothorax or pleural effusion. Soft Tissues/Bones: No acute findings. Abdomen/Pelvis: Organs: The patient is status post cholecystectomy. The liver, spleen, and pancreas are normal.  The adrenal glands are unremarkable bilaterally.   There has been interval development of nonspecific moderate right hydronephrosis since the prior exam, without definite demonstrable cause. Namely, no definite obstructing stone or mass is identified. The left kidney is stable and unremarkable. GI/Bowel: Evaluation of the hollow GI tract demonstrates no evidence of abnormal bowel wall thickening, dilatation, or obstruction. The appendix is normal. Pelvis: The urinary bladder is partially collapsed, though appears diffusely thick-walled and inflamed, with a mild amount of pericystic stranding noted. These findings are suggestive of an acute cystitis, progressed from prior exam.  Additionally, the cervix appears enlarged and irregular, and there is new abnormal thickening of the endometrium within the uterine fundus, which appears new in comparison with the study of 1/28/2019. The bilateral adnexa are unremarkable. There is a mild amount of free pelvic fluid, likely physiologic. There are stable mildly enlarged bilateral pelvic chain lymph nodes, the largest measuring approximately 2.6 x 1.6 cm along the right external iliac chain, similar to the study of 1/28/2019. Peritoneum/Retroperitoneum: No intraperitoneal free air or free fluid is identified. No pathologic lymphadenopathy is seen. The abdominal aorta is unremarkable. No significant abdominal wall hernia is identified. Bones/Soft Tissues: There is mild bilateral sacroiliitis. The skeletal structures are otherwise unremarkable. 1. No CT evidence of a pulmonary embolism. 2. No acute abnormality of the thoracic aorta. 3. Interval development of widespread ground-glass opacity throughout both lungs with diffuse intralobular septal thickening. This most likely reflects a combination of interstitial pulmonary edema and multifocal pneumonia. 4. Interval progression of multiple nodular foci of consolidative opacity throughout both lungs, a few of which are cavitary in appearance.   These nodules are most likely infectious or inflammatory in etiology, and septic emboli are a consideration. Given patient's history of cervical cancer, metastatic disease is on the differential, though considered less likely. 5. New nonspecific moderate right hydronephrosis, without demonstrable cause. However, there is underlying wall thickening and enhancement of the urinary bladder. This combination of findings could represent a cystitis in the setting of urinary tract infection with resultant pyelitis and hydronephrosis. However, given patient history of cervical cancer, locoregional spread of tumor with resultant obstruction of the distal right ureter may be a cause of the patient's right hydronephrosis. 6. Interval development of new irregularity of the cervix and nonspecific endometrial thickening in comparison with the prior study of 1/28/2019. This likely represents the patient's known underlying cervical cancer causing obstruction of the endometrial with resultant endometrial thickening. This could be further evaluated with a follow-up pelvic ultrasound. 7. Stable mild bilateral pelvic chain lymphadenopathy, right greater than left, possibly representing metastatic disease. Ct Abdomen Pelvis W Iv Contrast Additional Contrast? Oral    Result Date: 1/28/2019  EXAMINATION: CT OF THE ABDOMEN AND PELVIS WITH CONTRAST; CT OF THE CHEST WITHOUT CONTRAST 1/28/2019 7:47 pm; 1/28/2019 7:45 pm TECHNIQUE: CT of the abdomen and pelvis was performed with the administration of intravenous contrast. Multiplanar reformatted images are provided for review. Dose modulation, iterative reconstruction, and/or weight based adjustment of the mA/kV was utilized to reduce the radiation dose to as low as reasonably achievable.; CT of the chest was performed without the administration of intravenous contrast. Multiplanar reformatted images are provided for review.  Dose modulation, iterative reconstruction, and/or weight based adjustment of the mA/kV was utilized to reduce the radiation dose to as low as reasonably achievable. COMPARISON: CT abdomen and pelvis 01/18/2019. HISTORY: ORDERING SYSTEM PROVIDED HISTORY: RLQ abdominal pain TECHNOLOGIST PROVIDED HISTORY: Additional Contrast?->Oral Reason for exam:->RLQ pain persisting Ordering Physician Provided Reason for Exam: RLQ abdominal pain Acuity: Acute Type of Exam: Initial; ORDERING SYSTEM PROVIDED HISTORY: Other forms of systemic lupus erythematosus, unspecified organ involvement status ([facility] TECHNOLOGIST PROVIDED HISTORY: Reason for exam:->pulmonary nodules seen on recent CT Ordering Physician Provided Reason for Exam: pulmonary nodules seen on recent CT Acuity: Chronic Type of Exam: Subsequent/Follow-up FINDINGS: Chest: Mediastinum: Lack of intravenous contrast limits evaluation of the mediastinum. The thoracic aorta is normal in appearance. The coronary arteries and branch vessels of the superior mediastinum and lower neck are unremarkable. The pulmonary arteries are normal in caliber. The heart is normal in size. No pericardial effusion. The mediastinal esophagus and thyroid gland are unremarkable. No pathologically enlarged lymph nodes are seen in the chest. Lungs/pleura: The central airways are patent. No pleural effusion or pneumothorax. Mild bilateral dependent atelectasis. There are diffusely scattered patchy bilateral nodular/ground-glass opacities. No consolidation. Soft Tissues/Bones: No acute osseous or soft tissue abnormality. Abdomen/Pelvis: Organs: The liver is unremarkable. The gallbladder is surgically absent. The biliary tree is within normal limits status post cholecystectomy. The pancreas, spleen, and bilateral adrenal glands are unremarkable. The bilateral kidneys and ureters are unremarkable. GI/Bowel: Normal appendix. The colon is unremarkable. No evidence of acute appendicitis. No bowel obstruction or abnormal bowel wall thickening.  Pelvis: Mild suspected circumferential urinary bladder wall thickening with mild adjacent stranding. The uterus and bilateral adnexae are unremarkable. Trace free fluid in the pelvis. No pelvic or inguinal lymphadenopathy. Peritoneum/Retroperitoneum: The abdominal aorta is normal in appearance. No retroperitoneal or mesenteric lymphadenopathy is identified. No free air or fluid is seen in the abdomen. Bones/Soft Tissues: No acute osseous or soft tissue abnormality. 1. Scattered small patchy ground-glass/nodular opacities throughout the bilateral lungs likely representing an infectious process. 2. Mild suspected circumferential urinary bladder wall thickening with mild adjacent stranding compatible with cystitis. Recommend correlation with urinalysis. 3. Normal appendix. Ct Chest Pulmonary Embolism W Contrast    Result Date: 2/15/2019  EXAMINATION: CTA OF THE CHEST; CT OF THE ABDOMEN AND PELVIS WITH CONTRAST 2/15/2019 2:11 pm TECHNIQUE: CTA of the chest was performed after the administration of intravenous contrast.  Multiplanar reformatted images are provided for review. MIP images are provided for review. Dose modulation, iterative reconstruction, and/or weight based adjustment of the mA/kV was utilized to reduce the radiation dose to as low as reasonably achievable.; CT of the abdomen and pelvis was performed with the administration of intravenous contrast. Multiplanar reformatted images are provided for review. Dose modulation, iterative reconstruction, and/or weight based adjustment of the mA/kV was utilized to reduce the radiation dose to as low as reasonably achievable. COMPARISON: 1/28/2019, 4/29/2011 HISTORY: ORDERING SYSTEM PROVIDED HISTORY: cervical ca - SOB - PE??? TECHNOLOGIST PROVIDED HISTORY: Ordering Physician Provided Reason for Exam: SOB Acuity: Unknown Type of Exam: Unknown; ORDERING SYSTEM PROVIDED HISTORY: abd disten - cervical ca?  TECHNOLOGIST PROVIDED HISTORY: Additional Contrast?->None Ordering Physician Provided Reason for Exam: abd distention Acuity: Unknown Type of Exam: Unknown Patient with recently diagnosed cervical cancer. FINDINGS: Chest: Pulmonary arteries: The pulmonary arteries opacify normally. There is no evidence of filling defect to suggest a pulmonary embolism. Mediastinum: The thyroid is unremarkable. There is mild subcarinal and bilateral hilar lymphadenopathy, most likely benign and reactive in etiology given the patient's underlying lung findings. The thoracic aorta is unremarkable, without evidence of aneurysm or dissection. 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 No pericardial abnormality is identified. Lungs/pleura: There is mild mucous plugging within the bilateral lower lobes. The central airways are otherwise widely patent. There has been interval development of widespread ground-glass opacity throughout both lungs, with diffuse intralobular septal thickening evident, new from prior exam.  This most likely reflects a combination of interstitial pulmonary edema and superimposed multifocal pneumonia. Additionally, there has been interval progression and more consolidation of multiple scattered various sized pulmonary nodules throughout both lungs since the study of 1/28/2019. A few of these are cavitary. The largest of these measures approximately 10 mm in short axis within the subpleural portion of the left lower lobe. These may be secondary to an atypical infectious or inflammatory process, to include septic emboli. Metastatic disease is considered less likely, though not excluded. There is no evidence of a pneumothorax or pleural effusion. Soft Tissues/Bones: No acute findings. Abdomen/Pelvis: Organs: The patient is status post cholecystectomy. The liver, spleen, and pancreas are normal.  The adrenal glands are unremarkable bilaterally.   There has been interval development of nonspecific moderate right hydronephrosis since the prior exam, without definite demonstrable cause. Namely, no definite obstructing stone or mass is identified. The left kidney is stable and unremarkable. GI/Bowel: Evaluation of the hollow GI tract demonstrates no evidence of abnormal bowel wall thickening, dilatation, or obstruction. The appendix is normal. Pelvis: The urinary bladder is partially collapsed, though appears diffusely thick-walled and inflamed, with a mild amount of pericystic stranding noted. These findings are suggestive of an acute cystitis, progressed from prior exam.  Additionally, the cervix appears enlarged and irregular, and there is new abnormal thickening of the endometrium within the uterine fundus, which appears new in comparison with the study of 1/28/2019. The bilateral adnexa are unremarkable. There is a mild amount of free pelvic fluid, likely physiologic. There are stable mildly enlarged bilateral pelvic chain lymph nodes, the largest measuring approximately 2.6 x 1.6 cm along the right external iliac chain, similar to the study of 1/28/2019. Peritoneum/Retroperitoneum: No intraperitoneal free air or free fluid is identified. No pathologic lymphadenopathy is seen. The abdominal aorta is unremarkable. No significant abdominal wall hernia is identified. Bones/Soft Tissues: There is mild bilateral sacroiliitis. The skeletal structures are otherwise unremarkable. 1. No CT evidence of a pulmonary embolism. 2. No acute abnormality of the thoracic aorta. 3. Interval development of widespread ground-glass opacity throughout both lungs with diffuse intralobular septal thickening. This most likely reflects a combination of interstitial pulmonary edema and multifocal pneumonia. 4. Interval progression of multiple nodular foci of consolidative opacity throughout both lungs, a few of which are cavitary in appearance. These nodules are most likely infectious or inflammatory in etiology, and septic emboli are a consideration.   Given patient's history of cervical cancer, metastatic disease is on the differential, though considered less likely. 5. New nonspecific moderate right hydronephrosis, without demonstrable cause. However, there is underlying wall thickening and enhancement of the urinary bladder. This combination of findings could represent a cystitis in the setting of urinary tract infection with resultant pyelitis and hydronephrosis. However, given patient history of cervical cancer, locoregional spread of tumor with resultant obstruction of the distal right ureter may be a cause of the patient's right hydronephrosis. 6. Interval development of new irregularity of the cervix and nonspecific endometrial thickening in comparison with the prior study of 1/28/2019. This likely represents the patient's known underlying cervical cancer causing obstruction of the endometrial with resultant endometrial thickening. This could be further evaluated with a follow-up pelvic ultrasound. 7. Stable mild bilateral pelvic chain lymphadenopathy, right greater than left, possibly representing metastatic disease. Ir Port Placement > 5 Years    Result Date: 2/19/2019  PROCEDURE: ULTRASOUND GUIDED VASCULAR ACCESS. FLUOROSCOPY GUIDED PLACEMENT OF A RIGHT IJ SUBCUTANEOUS PORT-A-CATH. MODERATE CONSCIOUS SEDATION 2/19/2019. HISTORY: ORDERING SYSTEM PROVIDED HISTORY: cervical cancer to get chemo TECHNOLOGIST PROVIDED HISTORY: Reason for exam:->cervical cancer to get chemo How many lumens are being requested?->1 What site is the preferred site? ->No preference What side should this line be placed? ->Either 61-year-old female with cervical cancer, presents for chest port placement. SEDATION: Moderate sedation was administered under my supervision by a qualified nurse. 4 mg of Versed was administered intravenously. Approximate intra procedural sedation time was 23 minutes. FLUOROSCOPY DOSE AND TYPE OR TIME AND EXPOSURES: 54 seconds of fluoroscopy with 2 exposures. TECHNIQUE: Informed consent was obtained after a detailed explanation of the procedure including risks, benefits, and alternatives. Universal protocol was observed. The right neck and chest were prepped and draped in sterile fashion using maximum sterile barrier technique. Local anesthesia was achieved with lidocaine. A micropuncture needle was used to access the right internal jugular vein using ultrasound guidance. An ultrasound image demonstrating patency of the vein with needle tip located within it. An image was obtained and stored in PACs. A 0.035 guidewire was used to place a peel-away sheath. A chest wall incision was made and a subcutaneous pocket was created. The port reservoir was placed within the pocket and the port tubing was attached and tunneled subcutaneously to the venotomy site. The port tubing was cut to an appropriate length and advanced through the peel-away sheath under fluoroscopic guidance to the cavo-atrial junction. The chest wall incision was closed using 3-0 and 4-0 Vicryl suture and tissue adhesive was applied to the venotomy site and chest wall incision. The port flushed easily and there was a good blood return. The port was locked with heparinized saline. The patient tolerated the procedure well and there were no immediate complications. FINDINGS: Fluoroscopic image demonstrates the tip of the port in the right atrium. 1. Successful ultrasound and fluoroscopy guided Port-A-Cath placement via right IJ access, as described above. 2.  The port was left accessed for immediate use. Fl Retrograde Pyelogram Right    Result Date: 2/17/2019  EXAMINATION: SPOT FLUOROSCOPIC IMAGES 2/17/2019 6:52 am TECHNIQUE: Fluoroscopy was provided by the radiology department for procedure. Radiologist was not present during examination. FLUOROSCOPY DOSE AND TYPE OR TIME AND EXPOSURES: 3 seconds of fluoroscopy was utilized for purposes of intraoperative localization.   1 fluoroscopic spot image was obtained. COMPARISON: None HISTORY: Intraprocedural imaging. FINDINGS: 1 spot images of the abdomen was obtained. Intraprocedural fluoroscopic spot images as above. See separate procedure report for more information. Ir Guided Ureteral Stent Place W Nephro Cath New Access    Result Date: 2/18/2019  PROCEDURE: IR ULTRASOUND AND FLUOROSCOPIC GUIDED RIGHT PERCUTANEOUS NEPHROSTOMY AND NEPHROSTOGRAM IR ANTEGRADE RIGHT URETERAL STENT. MODERATE CONSCIOUS SEDATION 2/18/2019 HISTORY: ORDERING SYSTEM PROVIDED HISTORY: needs R sided nephrostomy tube placed. maria guadalupe could not do via cysto. Adi said that IR would do on monday TECHNOLOGIST PROVIDED HISTORY: Reason for exam:->needs R sided nephrostomy tube placed. maria guadalupe could not do via cysto. Adi said that IR would do on monday COMPARISON: Abdomen pelvic CT 02/15/2019. CONTRAST: 30 cc, nonvascular within collecting system only. . SEDATION: Intravenous sedation was administered with continuous monitoring throughout the procedure. In measured doses, a total of 6 mg intravenous Versed and 275 mcg intravenous fentanyl was administered. My total procedure time with sedation was 26 minutes. FLUOROSCOPY DOSE AND TYPE OR TIME AND EXPOSURES: 3.5 minutes, 7 digital imaging sequences. DESCRIPTION OF PROCEDURE: Informed consent was obtained after a detailed explanation of the procedure including risks, benefits, and alternatives. Universal protocol was observed. TECHNIQUE: Informed consent was previously obtained. In the semi prone position, an appropriate area posterior lateral aspect of the skin overlying the targeted collecting system was demarcated, sterilely prepared draped and anesthetized. Utilizing ultrasound, anatomy from prior CT scan and other imaging, the needle trajectory and depth was predetermined. The micro puncture needle was advanced using fluoroscopic and ultrasound guidance into the collecting system without difficulty.  Once urine was aspirated, a small platinum tip wire was advanced into the collecting system and into the proximal ureter. The tract was dilated. With wire exchange, a 6 French sheath was placed. The collecting system is moderately dilated and the ureter is somewhat tortuous. A glide wire was easily advanced down the ureter. Because of this, a 5 French peel-away sheath was advanced into the proximal ureter over the wire. The glide wire was then advanced with a steerable catheter into the urinary bladder without difficulty and only mild discomfort. There is no contrast extravasation. Once the catheter was placed into the urinary bladder, contrast injection is seen diluted within the somewhat distended urinary bladder. The catheter was used to place a Super Stiff wire for ureteral stent placement. A 26 cm 8 French ureteral stent was then advanced, such that the distal pigtail is well within the urinary bladder. The proximal pigtail was placed in the lower portion of the left renal pelvis. Again there is no contrast extravasation. No filling defect to indicate clot or bleeding was observed on early or later contrast imaging. Following this, dense contrast was injected showing normal expected filling of the ureteral stent and exiting from the distal pigtail directly into the urinary bladder. Following this, the internalized stent was disengaged. The proximal collecting system was then decompressed. The proximal nephrostomy access was then removed entirely, no external drainage necessary at this point. The patient tolerated the procedure well. A sterile bandage was placed over the small incision. From this point forward, the ureteral stent can be exchanged as needed from below. Successful right percutaneous nephrostomy with antegrade pyelogram. High-grade distal ureteral obstruction with severe hydronephrosis, successfully crossed as above. Successful 8 French internalized right ureteral stent placement as above. Assessment and Plan:  Patient Active Hospital Problem List:   Pneumonia (2/15/2019)    Assessment: Established problem. Stable. Infiltrates seen 2/19. pulm thinks this may be more related to metastatic disease and not infection    Plan: case d/w dr Kvng Vazquez - maxipime to be stopped   [facility] (1/22/2019)    Assessment: Established problem. Stable.     Plan: Continue present orders/plan.    Lupus anticoagulant positive (1/22/2019)   Cervical cancer ([facility] (2/15/2019)    Assessment: invasive squamous cell carcinoma which supports the diagnosis of cervical cancer. She is at least stage JACKI d/t metastatic disease to bladder and possibly stage IVB if mets to lung    Plan: Initial plan - carbo/taxol/avastin.  Port placed 2/19   Pulmonary nodule (2/15/2019)    Assessment: Established problem. Stable.  Will need workup for this     Plan: per dr Nixon Finley, they will eval in coming week   Hydronephrosis (2/15/2019)    Assessment: Established problem.  Stent could not be placed by urology    Plan: Right perc nephrostomy, nephrostogram and antegrade ureteral stent done 2/18 per IR   Ground glass opacity present on imaging of lung ()   Lung nodules ()   Lymphadenopathy ()            Casas Lemme  2/21/2019

## 2019-02-21 NOTE — PROGRESS NOTES
Pt assessment completed. Pt VS taken and are WNL. Pt c/o pain 6/10; pt on PCA pump. Pt IV ATB hung per order. Pt receiving chemo; blood return noted to port. No further needs at this time. Call light within reach.   .Electronically signed by Marisa Dean RN on 2/21/2019 at 2:26 AM

## 2019-02-21 NOTE — PROGRESS NOTES
Keyur Brothers is a 42 y.o. female patient. 1. Shortness of breath    2. Elevated troponin    3. Pneumonia due to organism    4. Pyelitis    5. Hydroureteronephrosis      Past Medical History:   Diagnosis Date    Endometriosis     Fibromyalgia     GERD (gastroesophageal reflux disease)     Hip fracture (HCC)     LEFT 2002    Hypoglycemia     Lupus     Neuropathy     Ovarian cyst     Seizures (HCC)      No past surgical history pertinent negatives on file. Scheduled Meds:   IVPB builder   Intravenous Q24H    CISplatin (PLATINOL) and mannitol chemo IVPB   Intravenous Once    magnesium sulfate  2 g Intravenous Once    [START ON 2/22/2019] pegfilgrastim  6 mg Subcutaneous Once    PACLitaxel (TAXOL) chemo infusion  270 mg Intravenous Once    ketorolac  15 mg Intravenous Q6H    sennosides-docusate sodium  2 tablet Oral Daily    zolpidem  10 mg Oral Nightly    gabapentin  300 mg Oral TID    baclofen  20 mg Oral BID    sodium chloride flush  10 mL Intravenous 2 times per day    DULoxetine  60 mg Oral Nightly     Continuous Infusions:   sodium chloride      [START ON 2/22/2019] sodium chloride      HYDROmorphone      sodium chloride 75 mL/hr at 02/20/19 2030     PRN Meds:LORazepam, oxyCODONE **OR** [DISCONTINUED] oxyCODONE, polyethylene glycol, sodium chloride bacteriostatic, sodium chloride (PF), naloxone, acetaminophen, diphenhydrAMINE, ipratropium-albuterol, ibuprofen, promethazine, sodium chloride flush, magnesium hydroxide, ondansetron, potassium chloride **OR** potassium bicarb-citric acid **OR** potassium chloride, promethazine    Allergies   Allergen Reactions    Food Hives     Raw spinach.     Spinach      Principal Problem:    Pneumonia  Active Problems:    SLE (systemic lupus erythematosus) (HCC)    Lupus anticoagulant positive    Cervical cancer (HCC)    Pulmonary nodule    Hydronephrosis    Ground glass opacity present on imaging of lung    Lung nodules Lymphadenopathy  Resolved Problems:    * No resolved hospital problems. *    Blood pressure 111/74, pulse 70, temperature 97.8 °F (36.6 °C), temperature source Oral, resp. rate 16, height 5' 6.5\" (1.689 m), weight 193 lb 3.2 oz (87.6 kg), SpO2 95 %. Subjective:   Diet: Poor intake. Activity level: Returning to normal.    Pain control: Well controlled. Objective:  Vital signs (most recent): Blood pressure 111/74, pulse 70, temperature 97.8 °F (36.6 °C), temperature source Oral, resp. rate 16, height 5' 6.5\" (1.689 m), weight 193 lb 3.2 oz (87.6 kg), SpO2 95 %. General appearance: Comfortable. Extremities: There is normal range of motion. Neurological: The patient is alert and oriented to person, place and time. Assessment:   Condition: In stable condition.        cerv ca  Hydronephrosis  S/p stent    recc  Tolerating stent well  Cr nl  Urine clearing  Needs fu nathaly in april    Latasha Grace MD  2/21/2019

## 2019-02-21 NOTE — PROGRESS NOTES
Ground glass opacity present on imaging of lung R91.8    Lung nodules R91.8    Lymphadenopathy R59.1       Impression/Plan:   1. Stage JACKI vs. IVB SCCA of cervix  - C#1 of cisplatin/taxol started today 2/21/19    2. Right hydronephrosis    3. Anemia    PLAN:   1. Cont chemo. Plan to add avastin with next cycle of chemo  2. Will repeat labs in am. Consider 1 unit PRBC if H/H remains < 8/25 or patient symptomatic  3. VB improving. Continue to monitor. No plans to resume progesterone at this time. 4. CM and pulm to eval for possible home O2 needs. 5. Cont PCA for now. Goal to wean off PCA and get pain managed with po pain meds. 6. Will cont to follow. 7. Emend was give prior to chemo. Will add prn compazine given emetogenic potential of cisplatin.     Marinell Lust [facility]  Gynecologic Oncology  [facility]  [phone]  2/21/2019  4:58 PM

## 2019-02-21 NOTE — PROGRESS NOTES
Bedside report received from [facility]. Pt sitting up in bed with no s/s pain or distress. No needs at this time. Pt currently getting Chemo and is on PCA pump. Continuing to monitor. Call light within reach.   .Electronically signed by Kennedi Cote RN on 2/21/2019 at 12:57 AM

## 2019-02-22 NOTE — PROGRESS NOTES
Pt Chemotherapy started. Pt VS taken and are WNL. Blood return verified from patient port. Pt order checked with Sree Lu RN. Pt diagnosis and treatment regimen verified for cervical cancer. Double sign-off for chemotherapy with Sree Lu RN. Christie Rothman Electronically signed by Wade Jaeger RN on 2/22/2019 at 1:33 AM

## 2019-02-22 NOTE — PROGRESS NOTES
Bedside report received from ABDIEL Pruitt and Rachelle Almendarez RN. Pt lying in bed with no s/s pain or distress. Family at bedside. Call light within reach.   .Electronically signed by Abril Chacon RN on 2/21/2019 at 8:05 PM

## 2019-02-22 NOTE — PROGRESS NOTES
0    Level of Activity: Mostly sedentary, minimal walking = 2    Respiratory Pattern: Regular Pattern; RR 8-20 = 0    Breath Sounds: Diminshed bilaterally and/or crackles = 2    Sputum   ,  , Sputum How Obtained: Spontaneous cough  Cough: Strong, spontaneous, non-productive = 0    Vital Signs   /68   Pulse 77   Temp 96.8 °F (36 °C) (Axillary)   Resp 16   Ht 5' 6.5\" (1.689 m)   Wt 193 lb 3.2 oz (87.6 kg)   SpO2 96%   BMI 30.72 kg/m²   SPO2 (COPD values may differ): 88-89% on room air or greater than 92% on FiO2 28- 35% = 2    Peak Flow (asthma only): not applicable = 0    RSI: 0-58 = TID (three times daily) and Q4hr PRN for dyspnea        Plan       Goals: medication delivery and improve oxygenation  Plan of Care: duoneb tid and q4prn    Patient/caregiver was educated on the proper method of use of Respiratory Therapy device:  No:       Level of understanding, no education at this time was able to:(check appropriate box(es))   [] Verbalize understanding   [] Demonstrate understanding       [] Teach back        [] Needs reinforcement       []  No available caregiver               []  Other:     Response to education:  no education at this time     Teaching Time:  0  minutes      Is patient being placed on Home Treatment Regimen? No     Electronically signed by Meek Loving RCP on 2/22/2019 at 2:07 AM    Respiratory Protocol Guidelines     1. Assessment and treatment by Respiratory Therapy will be initiated for medication and therapeutic interventions upon initiation of aerosolized medication. 2. Physician will be contacted for respiratory rate (RR) greater than 35 breaths per minute. Therapy will be held for heart rate (HR) greater than 140 beats per minute, pending direction from physician. 3. Bronchodilators will be administered via Metered Dose Inhaler (MDI) with spacer when the following criteria are met:  a.  Alert and cooperative     b. HR < 140 bpm  c. RR < 30 bpm                d. Can physician for possible discontinuation.

## 2019-02-22 NOTE — ONCOLOGY
Oncology and Hematology Care   Progress Note    2/21/2019    SUBJECTIVE: patient tolerated chemotherapy yesterday without problem. She has persistent but less vaginal bleeding. Feels that she is stable still requiring oxygen     OBJECTIVE:    Physical Assessment:  Vitals:  /80   Pulse 78   Temp 98.4 °F (36.9 °C) (Oral)   Resp 15   Ht 5' 6.5\" (1.689 m)   Wt 193 lb 3.2 oz (87.6 kg)   SpO2 96%   BMI 30.72 kg/m²    24HR INTAKE/OUTPUT:    Intake/Output Summary (Last 24 hours) at 02/21/19 1958  Last data filed at 02/21/19 1937   Gross per 24 hour   Intake           5960.8 ml   Output             3750 ml   Net           2210.8 ml       CONSTITUTIONAL:  Awake, alert & oriented x3  HEENT: PERRL, Neck: soft, supple, no cervical, supraclavicular or axillary adenopathy  RESPIRATORY:  No increased work of breathing, breath sounds decreased. CARDIOVASCULAR:  Cardiac S1/S2, RRR  GASTROINTESTINAL:  abdomen soft +BS x4, no hepatosplenomegaly  SKIN:  negative for rash and skin lesion(s)  MUSCULOSKELETAL:  negative for pain and muscle weakness  EXTREMITIES: Negative for Lower extremity edema    Labs Results:    CBC: Recent Labs      02/19/19   0613  02/20/19   0533  02/21/19   0500   WBC  8.9  6.9  6.6   HGB  8.3*  9.2*  7.8*   HCT  26.9*  29.5*  24.7*   MCV  83.0  82.3  81.3   PLT  362  340  355     BMP: Recent Labs      02/19/19   0613  02/20/19   0533  02/21/19   0500   NA  143  140  140   K  4.7  4.1  4.6   CL  101  101  104   CO2  30  26  26   BUN  11  15  10   CREATININE  0.7  0.7  <0.5*     LIVER PROFILE: No results for input(s): AST, ALT, LIPASE, BILIDIR, BILITOT, ALKPHOS in the last 72 hours. Invalid input(s):   AMYLASE,  ALB  PT/INR:    Lab Results   Component Value Date    PROTIME 12.7 02/18/2019    PROTIME 12.3 01/18/2019    PROTIME 11.0 05/16/2014    INR 1.11 02/18/2019    INR 1.08 01/18/2019    INR 0.98 05/16/2014     PTT:    Lab Results   Component Value Date    APTT 33.2 05/16/2014     UA:No results for input(s): NITRITE, COLORU, PHUR, LABCAST, WBCUA, RBCUA, MUCUS, TRICHOMONAS, YEAST, BACTERIA, CLARITYU, SPECGRAV, LEUKOCYTESUR, UROBILINOGEN, BILIRUBINUR, BLOODU, GLUCOSEU, AMORPHOUS in the last 72 hours. Invalid input(s): KETONESU  Magnesium: No results found for: MG  VAISHNAVI:  Lab Results   Component Value Date    VAISHNAVI Negative 12/05/2018       RADIOLOGY:    Xr Chest Standard (2 Vw)    Result Date: 2/19/2019  EXAMINATION: TWO VIEWS OF THE CHEST 2/19/2019 4:26 pm COMPARISON: 02/16/2019 HISTORY: ORDERING SYSTEM PROVIDED HISTORY: cough TECHNOLOGIST PROVIDED HISTORY: Reason for exam:->cough Ordering Physician Provided Reason for Exam: Cervical Cancer (Pt was sent over from Dr Cassandra Ochoa office - states she was sent here from office - was told today that she has stage 3 cervical cancer and was sent here for \"scans and further testing\") Acuity: Unknown Type of Exam: Unknown FINDINGS: There is patchy bilateral airspace disease, which appears increased when compared to the previous exam.  As detected on recent CT PA, some of those have a nodular appearance. The heart mediastinal contours are unremarkable. No pneumothorax. No free air. No acute bony abnormalities. Chin catheter noted. Patchy bilateral airspace disease, concerning for pneumonia, which appears increased when compared to the previous exam.  Again, some of these areas of opacity have a nodular appearance and septic emboli should be considered, especially when given the correlation with the CT PA from 02/15/2019. Process should be followed to resolution.      Xr Chest Standard (2 Vw)    Result Date: 2/16/2019  EXAMINATION: TWO VIEWS OF THE CHEST 2/16/2019 11:49 am COMPARISON: 10/16/2014 HISTORY: ORDERING SYSTEM PROVIDED HISTORY: pneumonia TECHNOLOGIST PROVIDED HISTORY: Reason for exam:->pneumonia Ordering Physician Provided Reason for Exam: PNA Acuity: Acute Type of Exam: Initial Relevant Medical/Surgical History: cervical ca FINDINGS: There is patchy bibasilar consolidation, right greater than left, superimposed on a background of diffuse interstitial prominence. Heart size, mediastinal contours and pulmonary vascularity are within normal limits. There is no pleural effusion. Multifocal bilateral pneumonia. Ct Chest Wo Contrast    Result Date: 1/28/2019  EXAMINATION: CT OF THE ABDOMEN AND PELVIS WITH CONTRAST; CT OF THE CHEST WITHOUT CONTRAST 1/28/2019 7:47 pm; 1/28/2019 7:45 pm TECHNIQUE: CT of the abdomen and pelvis was performed with the administration of intravenous contrast. Multiplanar reformatted images are provided for review. Dose modulation, iterative reconstruction, and/or weight based adjustment of the mA/kV was utilized to reduce the radiation dose to as low as reasonably achievable.; CT of the chest was performed without the administration of intravenous contrast. Multiplanar reformatted images are provided for review. Dose modulation, iterative reconstruction, and/or weight based adjustment of the mA/kV was utilized to reduce the radiation dose to as low as reasonably achievable. COMPARISON: CT abdomen and pelvis 01/18/2019. HISTORY: ORDERING SYSTEM PROVIDED HISTORY: RLQ abdominal pain TECHNOLOGIST PROVIDED HISTORY: Additional Contrast?->Oral Reason for exam:->RLQ pain persisting Ordering Physician Provided Reason for Exam: RLQ abdominal pain Acuity: Acute Type of Exam: Initial; ORDERING SYSTEM PROVIDED HISTORY: Other forms of systemic lupus erythematosus, unspecified organ involvement status ([facility] [address] TECHNOLOGIST PROVIDED HISTORY: Reason for exam:->pulmonary nodules seen on recent CT Ordering Physician Provided Reason for Exam: pulmonary nodules seen on recent CT Acuity: Chronic Type of Exam: Subsequent/Follow-up FINDINGS: Chest: Mediastinum: Lack of intravenous contrast limits evaluation of the mediastinum. The thoracic aorta is normal in appearance.   The coronary arteries and branch vessels of the superior mediastinum and lower neck are the chest was performed after the administration of intravenous contrast.  Multiplanar reformatted images are provided for review. MIP images are provided for review. Dose modulation, iterative reconstruction, and/or weight based adjustment of the mA/kV was utilized to reduce the radiation dose to as low as reasonably achievable.; CT of the abdomen and pelvis was performed with the administration of intravenous contrast. Multiplanar reformatted images are provided for review. Dose modulation, iterative reconstruction, and/or weight based adjustment of the mA/kV was utilized to reduce the radiation dose to as low as reasonably achievable. COMPARISON: 1/28/2019, 4/29/2011 HISTORY: ORDERING SYSTEM PROVIDED HISTORY: cervical ca - SOB - PE??? TECHNOLOGIST PROVIDED HISTORY: Ordering Physician Provided Reason for Exam: SOB Acuity: Unknown Type of Exam: Unknown; ORDERING SYSTEM PROVIDED HISTORY: abd disten - cervical ca? TECHNOLOGIST PROVIDED HISTORY: Additional Contrast?->None Ordering Physician Provided Reason for Exam: abd distention Acuity: Unknown Type of Exam: Unknown Patient with recently diagnosed cervical cancer. FINDINGS: Chest: Pulmonary arteries: The pulmonary arteries opacify normally. There is no evidence of filling defect to suggest a pulmonary embolism. Mediastinum: The thyroid is unremarkable. There is mild subcarinal and bilateral hilar lymphadenopathy, most likely benign and reactive in etiology given the patient's underlying lung findings. The thoracic aorta is unremarkable, without evidence of aneurysm or dissection. 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 No pericardial abnormality is identified. Lungs/pleura: There is mild mucous plugging within the bilateral lower lobes. The central airways are otherwise widely patent.   There has been interval development of widespread ground-glass opacity throughout both lungs, with diffuse intralobular septal measuring approximately 2.6 x 1.6 cm along the right external iliac chain, similar to the study of 1/28/2019. Peritoneum/Retroperitoneum: No intraperitoneal free air or free fluid is identified. No pathologic lymphadenopathy is seen. The abdominal aorta is unremarkable. No significant abdominal wall hernia is identified. Bones/Soft Tissues: There is mild bilateral sacroiliitis. The skeletal structures are otherwise unremarkable. 1. No CT evidence of a pulmonary embolism. 2. No acute abnormality of the thoracic aorta. 3. Interval development of widespread ground-glass opacity throughout both lungs with diffuse intralobular septal thickening. This most likely reflects a combination of interstitial pulmonary edema and multifocal pneumonia. 4. Interval progression of multiple nodular foci of consolidative opacity throughout both lungs, a few of which are cavitary in appearance. These nodules are most likely infectious or inflammatory in etiology, and septic emboli are a consideration. Given patient's history of cervical cancer, metastatic disease is on the differential, though considered less likely. 5. New nonspecific moderate right hydronephrosis, without demonstrable cause. However, there is underlying wall thickening and enhancement of the urinary bladder. This combination of findings could represent a cystitis in the setting of urinary tract infection with resultant pyelitis and hydronephrosis. However, given patient history of cervical cancer, locoregional spread of tumor with resultant obstruction of the distal right ureter may be a cause of the patient's right hydronephrosis. 6. Interval development of new irregularity of the cervix and nonspecific endometrial thickening in comparison with the prior study of 1/28/2019. This likely represents the patient's known underlying cervical cancer causing obstruction of the endometrial with resultant endometrial thickening.   This could be further evaluated is normal in size. No pericardial effusion. The mediastinal esophagus and thyroid gland are unremarkable. No pathologically enlarged lymph nodes are seen in the chest. Lungs/pleura: The central airways are patent. No pleural effusion or pneumothorax. Mild bilateral dependent atelectasis. There are diffusely scattered patchy bilateral nodular/ground-glass opacities. No consolidation. Soft Tissues/Bones: No acute osseous or soft tissue abnormality. Abdomen/Pelvis: Organs: The liver is unremarkable. The gallbladder is surgically absent. The biliary tree is within normal limits status post cholecystectomy. The pancreas, spleen, and bilateral adrenal glands are unremarkable. The bilateral kidneys and ureters are unremarkable. GI/Bowel: Normal appendix. The colon is unremarkable. No evidence of acute appendicitis. No bowel obstruction or abnormal bowel wall thickening. Pelvis: Mild suspected circumferential urinary bladder wall thickening with mild adjacent stranding. The uterus and bilateral adnexae are unremarkable. Trace free fluid in the pelvis. No pelvic or inguinal lymphadenopathy. Peritoneum/Retroperitoneum: The abdominal aorta is normal in appearance. No retroperitoneal or mesenteric lymphadenopathy is identified. No free air or fluid is seen in the abdomen. Bones/Soft Tissues: No acute osseous or soft tissue abnormality. 1. Scattered small patchy ground-glass/nodular opacities throughout the bilateral lungs likely representing an infectious process. 2. Mild suspected circumferential urinary bladder wall thickening with mild adjacent stranding compatible with cystitis. Recommend correlation with urinalysis. 3. Normal appendix.      Ct Chest Pulmonary Embolism W Contrast    Result Date: 2/15/2019  EXAMINATION: CTA OF THE CHEST; CT OF THE ABDOMEN AND PELVIS WITH CONTRAST 2/15/2019 2:11 pm TECHNIQUE: CTA of the chest was performed after the administration of intravenous contrast.  Multiplanar reformatted images are provided for review. MIP images are provided for review. Dose modulation, iterative reconstruction, and/or weight based adjustment of the mA/kV was utilized to reduce the radiation dose to as low as reasonably achievable.; CT of the abdomen and pelvis was performed with the administration of intravenous contrast. Multiplanar reformatted images are provided for review. Dose modulation, iterative reconstruction, and/or weight based adjustment of the mA/kV was utilized to reduce the radiation dose to as low as reasonably achievable. COMPARISON: 1/28/2019, 4/29/2011 HISTORY: ORDERING SYSTEM PROVIDED HISTORY: cervical ca - SOB - PE??? TECHNOLOGIST PROVIDED HISTORY: Ordering Physician Provided Reason for Exam: SOB Acuity: Unknown Type of Exam: Unknown; ORDERING SYSTEM PROVIDED HISTORY: abd disten - cervical ca? TECHNOLOGIST PROVIDED HISTORY: Additional Contrast?->None Ordering Physician Provided Reason for Exam: abd distention Acuity: Unknown Type of Exam: Unknown Patient with recently diagnosed cervical cancer. FINDINGS: Chest: Pulmonary arteries: The pulmonary arteries opacify normally. There is no evidence of filling defect to suggest a pulmonary embolism. Mediastinum: The thyroid is unremarkable. There is mild subcarinal and bilateral hilar lymphadenopathy, most likely benign and reactive in etiology given the patient's underlying lung findings. The thoracic aorta is unremarkable, without evidence of aneurysm or dissection. 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 No pericardial abnormality is identified. Lungs/pleura: There is mild mucous plugging within the bilateral lower lobes. The central airways are otherwise widely patent.   There has been interval development of widespread ground-glass opacity throughout both lungs, with diffuse intralobular septal thickening evident, new from prior exam.  This most likely reflects a combination of interstitial pulmonary edema and superimposed multifocal pneumonia. Additionally, there has been interval progression and more consolidation of multiple scattered various sized pulmonary nodules throughout both lungs since the study of 1/28/2019. A few of these are cavitary. The largest of these measures approximately 10 mm in short axis within the subpleural portion of the left lower lobe. These may be secondary to an atypical infectious or inflammatory process, to include septic emboli. Metastatic disease is considered less likely, though not excluded. There is no evidence of a pneumothorax or pleural effusion. Soft Tissues/Bones: No acute findings. Abdomen/Pelvis: Organs: The patient is status post cholecystectomy. The liver, spleen, and pancreas are normal.  The adrenal glands are unremarkable bilaterally. There has been interval development of nonspecific moderate right hydronephrosis since the prior exam, without definite demonstrable cause. Namely, no definite obstructing stone or mass is identified. The left kidney is stable and unremarkable. GI/Bowel: Evaluation of the hollow GI tract demonstrates no evidence of abnormal bowel wall thickening, dilatation, or obstruction. The appendix is normal. Pelvis: The urinary bladder is partially collapsed, though appears diffusely thick-walled and inflamed, with a mild amount of pericystic stranding noted. These findings are suggestive of an acute cystitis, progressed from prior exam.  Additionally, the cervix appears enlarged and irregular, and there is new abnormal thickening of the endometrium within the uterine fundus, which appears new in comparison with the study of 1/28/2019. The bilateral adnexa are unremarkable. There is a mild amount of free pelvic fluid, likely physiologic.   There are stable mildly enlarged bilateral pelvic chain lymph nodes, the largest measuring approximately 2.6 x 1.6 cm along the right external iliac chain, similar to the study of 1/28/2019. Peritoneum/Retroperitoneum: No intraperitoneal free air or free fluid is identified. No pathologic lymphadenopathy is seen. The abdominal aorta is unremarkable. No significant abdominal wall hernia is identified. Bones/Soft Tissues: There is mild bilateral sacroiliitis. The skeletal structures are otherwise unremarkable. 1. No CT evidence of a pulmonary embolism. 2. No acute abnormality of the thoracic aorta. 3. Interval development of widespread ground-glass opacity throughout both lungs with diffuse intralobular septal thickening. This most likely reflects a combination of interstitial pulmonary edema and multifocal pneumonia. 4. Interval progression of multiple nodular foci of consolidative opacity throughout both lungs, a few of which are cavitary in appearance. These nodules are most likely infectious or inflammatory in etiology, and septic emboli are a consideration. Given patient's history of cervical cancer, metastatic disease is on the differential, though considered less likely. 5. New nonspecific moderate right hydronephrosis, without demonstrable cause. However, there is underlying wall thickening and enhancement of the urinary bladder. This combination of findings could represent a cystitis in the setting of urinary tract infection with resultant pyelitis and hydronephrosis. However, given patient history of cervical cancer, locoregional spread of tumor with resultant obstruction of the distal right ureter may be a cause of the patient's right hydronephrosis. 6. Interval development of new irregularity of the cervix and nonspecific endometrial thickening in comparison with the prior study of 1/28/2019. This likely represents the patient's known underlying cervical cancer causing obstruction of the endometrial with resultant endometrial thickening. This could be further evaluated with a follow-up pelvic ultrasound.  7. Stable mild bilateral pelvic chain lymphadenopathy, right greater than left, possibly representing metastatic disease. Ir Port Placement > 5 Years    Result Date: 2/19/2019  PROCEDURE: ULTRASOUND GUIDED VASCULAR ACCESS. FLUOROSCOPY GUIDED PLACEMENT OF A RIGHT IJ SUBCUTANEOUS PORT-A-CATH. MODERATE CONSCIOUS SEDATION 2/19/2019. HISTORY: ORDERING SYSTEM PROVIDED HISTORY: cervical cancer to get chemo TECHNOLOGIST PROVIDED HISTORY: Reason for exam:->cervical cancer to get chemo How many lumens are being requested?->1 What site is the preferred site? ->No preference What side should this line be placed? ->Either 82-year-old female with cervical cancer, presents for chest port placement. SEDATION: Moderate sedation was administered under my supervision by a qualified nurse. 4 mg of Versed was administered intravenously. Approximate intra procedural sedation time was 23 minutes. FLUOROSCOPY DOSE AND TYPE OR TIME AND EXPOSURES: 54 seconds of fluoroscopy with 2 exposures. TECHNIQUE: Informed consent was obtained after a detailed explanation of the procedure including risks, benefits, and alternatives. Universal protocol was observed. The right neck and chest were prepped and draped in sterile fashion using maximum sterile barrier technique. Local anesthesia was achieved with lidocaine. A micropuncture needle was used to access the right internal jugular vein using ultrasound guidance. An ultrasound image demonstrating patency of the vein with needle tip located within it. An image was obtained and stored in PACs. A 0.035 guidewire was used to place a peel-away sheath. A chest wall incision was made and a subcutaneous pocket was created. The port reservoir was placed within the pocket and the port tubing was attached and tunneled subcutaneously to the venotomy site. The port tubing was cut to an appropriate length and advanced through the peel-away sheath under fluoroscopic guidance to the cavo-atrial junction.   The chest wall incision was closed using 3-0 and 4-0 Vicryl suture and tissue adhesive was applied to the venotomy site and chest wall incision. The port flushed easily and there was a good blood return. The port was locked with heparinized saline. The patient tolerated the procedure well and there were no immediate complications. FINDINGS: Fluoroscopic image demonstrates the tip of the port in the right atrium. 1. Successful ultrasound and fluoroscopy guided Port-A-Cath placement via right IJ access, as described above. 2.  The port was left accessed for immediate use. Fl Retrograde Pyelogram Right    Result Date: 2/17/2019  EXAMINATION: SPOT FLUOROSCOPIC IMAGES 2/17/2019 6:52 am TECHNIQUE: Fluoroscopy was provided by the radiology department for procedure. Radiologist was not present during examination. FLUOROSCOPY DOSE AND TYPE OR TIME AND EXPOSURES: 3 seconds of fluoroscopy was utilized for purposes of intraoperative localization. 1 fluoroscopic spot image was obtained. COMPARISON: None HISTORY: Intraprocedural imaging. FINDINGS: 1 spot images of the abdomen was obtained. Intraprocedural fluoroscopic spot images as above. See separate procedure report for more information. Ir Guided Ureteral Stent Place W Nephro Cath New Access    Result Date: 2/18/2019  PROCEDURE: IR ULTRASOUND AND FLUOROSCOPIC GUIDED RIGHT PERCUTANEOUS NEPHROSTOMY AND NEPHROSTOGRAM IR ANTEGRADE RIGHT URETERAL STENT. MODERATE CONSCIOUS SEDATION 2/18/2019 HISTORY: ORDERING SYSTEM PROVIDED HISTORY: needs R sided nephrostomy tube placed. maria guadalupe could not do via cysto. Adi said that IR would do on monday TECHNOLOGIST PROVIDED HISTORY: Reason for exam:->needs R sided nephrostomy tube placed. maria guadalupe could not do via cysto. Adi said that IR would do on monday COMPARISON: Abdomen pelvic CT 02/15/2019. CONTRAST: 30 cc, nonvascular within collecting system only. . SEDATION: Intravenous sedation was administered with continuous monitoring throughout the procedure.   In measured doses, a total of 6 mg intravenous Versed and 275 mcg intravenous fentanyl was administered. My total procedure time with sedation was 26 minutes. FLUOROSCOPY DOSE AND TYPE OR TIME AND EXPOSURES: 3.5 minutes, 7 digital imaging sequences. DESCRIPTION OF PROCEDURE: Informed consent was obtained after a detailed explanation of the procedure including risks, benefits, and alternatives. Universal protocol was observed. TECHNIQUE: Informed consent was previously obtained. In the semi prone position, an appropriate area posterior lateral aspect of the skin overlying the targeted collecting system was demarcated, sterilely prepared draped and anesthetized. Utilizing ultrasound, anatomy from prior CT scan and other imaging, the needle trajectory and depth was predetermined. The micro puncture needle was advanced using fluoroscopic and ultrasound guidance into the collecting system without difficulty. Once urine was aspirated, a small platinum tip wire was advanced into the collecting system and into the proximal ureter. The tract was dilated. With wire exchange, a 6 Irish sheath was placed. The collecting system is moderately dilated and the ureter is somewhat tortuous. A glide wire was easily advanced down the ureter. Because of this, a 5 Irish peel-away sheath was advanced into the proximal ureter over the wire. The glide wire was then advanced with a steerable catheter into the urinary bladder without difficulty and only mild discomfort. There is no contrast extravasation. Once the catheter was placed into the urinary bladder, contrast injection is seen diluted within the somewhat distended urinary bladder. The catheter was used to place a Super Stiff wire for ureteral stent placement. A 26 cm 8 Irish ureteral stent was then advanced, such that the distal pigtail is well within the urinary bladder. The proximal pigtail was placed in the lower portion of the left renal pelvis.  Again there is no contrast extravasation. No filling defect to indicate clot or bleeding was observed on early or later contrast imaging. Following this, dense contrast was injected showing normal expected filling of the ureteral stent and exiting from the distal pigtail directly into the urinary bladder. Following this, the internalized stent was disengaged. The proximal collecting system was then decompressed. The proximal nephrostomy access was then removed entirely, no external drainage necessary at this point. The patient tolerated the procedure well. A sterile bandage was placed over the small incision. From this point forward, the ureteral stent can be exchanged as needed from below. Successful right percutaneous nephrostomy with antegrade pyelogram. High-grade distal ureteral obstruction with severe hydronephrosis, successfully crossed as above. Successful 8 French internalized right ureteral stent placement as above. 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 She received cycle 1 day 1 taxol cisplatinum yesterday tolerated well.  Held avastin because of recent procedures and vaginal bleeding    Hypoxic respiratory failure continue oxygen decided against lung biopsy for now    Anemia due to vaginal bleeding    Right hydronephrosis post stent        I have discussed the above stated plan with the patient and they verbalized understanding and agreed with the plan. Thank you for allowing us to participate in this patients care.     Raul Acuna MD, 2/21/2019, 7:58 PM

## 2019-02-22 NOTE — PROGRESS NOTES
Progress Note - Dr. Mecca Keith - Internal Medicine  PCP: Jcarlos Andujar  [address] [place] [address] Saji Martin  [phone]    Hospital Day: 7  Code Status: Full Code  Current Diet: DIET GENERAL;        CC: follow up on medical issues    Subjective:   Stephanie Thomas is a 42 y.o. female. Doing ok  Having pain and nausea, but tolerable this am    She denies chest pain, denies shortness of breath, complains of nausea,  denies emesis. 10 system Review of Systems is reviewed with patient, and pertinent positives are listed here: None . Otherwise, Review of systems is negative. I have reviewed the patient's medical and social history in detail and updated the computerized patient record. To recap: She  has a past medical history of Endometriosis; Fibromyalgia; GERD (gastroesophageal reflux disease); Hip fracture ([facility] Hypoglycemia; Lupus; Neuropathy; Ovarian cyst; and Seizures ([facility] . She  has a past surgical history that includes  section; Cholecystectomy (); bronchoscopy (10/16/14); bronchoscopy (10/18/2014); Cystoscopy (Right, 2019); and laparoscopy (2019). . She  reports that she quit smoking about 17 years ago. Her smoking use included Cigarettes. She started smoking about 21 years ago. She has a 0.75 pack-year smoking history. She has never used smokeless tobacco. She reports that she does not drink alcohol or use drugs. .        Active Hospital Problems    Diagnosis Date Noted    Ground glass opacity present on imaging of lung [R91.8]     Lung nodules [R91.8]     Lymphadenopathy [R59.1]     Cervical cancer ([facility] [C53.9] 02/15/2019    Pneumonia [J18.9] 02/15/2019    Pulmonary nodule [R91.1] 02/15/2019    Hydronephrosis [N13.30] 02/15/2019    SLE (systemic lupus erythematosus) ([facility] [M32.9] 2019    Lupus anticoagulant positive [R76.0] 2019       Current Facility-Administered Medications: ipratropium-albuterol (DUONEB) nebulizer solution 1 ampule, 1 ampule, Inhalation, TID  prochlorperazine (COMPAZINE) injection 10 mg, 10 mg, Intravenous, Q6H PRN  dexamethasone (DECADRON) 20 mg in sodium chloride 0.9 % 50 mL IVPB, , Intravenous, Q24H  LORazepam (ATIVAN) injection 0.25 mg, 0.25 mg, Intravenous, Q6H PRN  pegfilgrastim (NEULASTA) injection 6 mg, 6 mg, Subcutaneous, Once  oxyCODONE (ROXICODONE) immediate release tablet 10 mg, 10 mg, Oral, Q3H PRN **OR** [DISCONTINUED] oxyCODONE (ROXICODONE) immediate release tablet 10 mg, 10 mg, Oral, Q3H PRN  polyethylene glycol (GLYCOLAX) packet 17 g, 17 g, Oral, Daily PRN  sodium chloride bacteriostatic 0.9 % injection 15 mL, 15 mL, Injection, PRN  ketorolac (TORADOL) injection 15 mg, 15 mg, Intravenous, Q6H  sodium chloride (PF) 0.9 % injection 500 mL, 500 mL, Intercatheter, PRN  naloxone (NARCAN) injection 0.4 mg, 0.4 mg, Intravenous, PRN  HYDROmorphone (DILAUDID) 10 mg/50 mL PCA, , Intravenous, Continuous  acetaminophen (TYLENOL) tablet 500 mg, 500 mg, Oral, Q4H PRN  diphenhydrAMINE (BENADRYL) tablet 25 mg, 25 mg, Oral, Q6H PRN  sennosides-docusate sodium (SENOKOT-S) 8.6-50 MG tablet 2 tablet, 2 tablet, Oral, Daily  ipratropium-albuterol (DUONEB) nebulizer solution 1 ampule, 1 ampule, Inhalation, Q4H PRN  zolpidem (AMBIEN) tablet 10 mg, 10 mg, Oral, Nightly  gabapentin (NEURONTIN) capsule 300 mg, 300 mg, Oral, TID  baclofen (LIORESAL) tablet 20 mg, 20 mg, Oral, BID  ibuprofen (ADVIL;MOTRIN) tablet 800 mg, 800 mg, Oral, Q8H PRN  promethazine (PHENERGAN) tablet 25 mg, 25 mg, Oral, Q6H PRN  0.9 % sodium chloride infusion, , Intravenous, Continuous  sodium chloride flush 0.9 % injection 10 mL, 10 mL, Intravenous, 2 times per day  sodium chloride flush 0.9 % injection 10 mL, 10 mL, Intravenous, PRN  magnesium hydroxide (MILK OF MAGNESIA) 400 MG/5ML suspension 30 mL, 30 mL, Oral, Daily PRN  ondansetron (ZOFRAN) injection 4 mg, 4 mg, Intravenous, Q6H PRN  potassium chloride (KLOR-CON M) extended release tablet 40 mEq, 40 mEq, Oral, PRN **OR** potassium bicarb-citric acid (EFFER-K) effervescent tablet 40 mEq, 40 mEq, Oral, PRN **OR** potassium chloride 10 mEq/100 mL IVPB (Peripheral Line), 10 mEq, Intravenous, PRN  promethazine (PHENERGAN) injection 25 mg, 25 mg, Intramuscular, Q6H PRN  DULoxetine (CYMBALTA) extended release capsule 60 mg, 60 mg, Oral, Nightly         Objective:  /65   Pulse 91   Temp 97.8 °F (36.6 °C) (Oral)   Resp 16   Ht 5' 6.5\" (1.689 m)   Wt 193 lb 3.2 oz (87.6 kg)   SpO2 98%   BMI 30.72 kg/m²      Patient Vitals for the past 24 hrs:   BP Temp Temp src Pulse Resp SpO2   02/22/19 0804 - - - - 16 98 %   02/22/19 0530 116/65 97.8 °F (36.6 °C) Oral 91 18 97 %   02/22/19 0330 109/73 96.9 °F (36.1 °C) Axillary 76 16 95 %   02/22/19 0300 93/61 96.8 °F (36 °C) Axillary 73 15 94 %   02/22/19 0246 - - - 73 14 95 %   02/22/19 0229 107/71 96.6 °F (35.9 °C) Axillary 77 16 97 %   02/22/19 0215 95/66 96.4 °F (35.8 °C) Axillary 79 16 95 %   02/22/19 0200 101/68 96.8 °F (36 °C) Axillary 77 16 96 %   02/22/19 0145 103/65 97.4 °F (36.3 °C) Axillary 79 16 95 %   02/22/19 0115 100/65 98.3 °F (36.8 °C) Oral 81 16 96 %   02/22/19 0000 103/68 - - - - -   02/21/19 2245 102/67 96.9 °F (36.1 °C) Axillary 72 16 95 %   02/21/19 2105 - - - 82 16 97 %   02/21/19 2030 104/70 97.9 °F (36.6 °C) Oral 81 16 96 %   02/21/19 1715 134/80 98.4 °F (36.9 °C) Oral 78 15 96 %   02/21/19 1540 - - - - - 95 %   02/21/19 1527 - - - - 14 93 %   02/21/19 1238 130/78 97.8 °F (36.6 °C) Oral 82 16 95 %   02/21/19 1206 - - - - - 95 %     Patient Vitals for the past 96 hrs (Last 3 readings):   Weight   02/20/19 1034 193 lb 3.2 oz (87.6 kg)           Intake/Output Summary (Last 24 hours) at 02/22/19 0905  Last data filed at 02/22/19 0425   Gross per 24 hour   Intake           1566.4 ml   Output             4250 ml   Net          -2683.6 ml         Physical Exam:   S1, S2 normal, no murmur, rub or gallop, regular rate and rhythm  clear to auscultation Standard (2 Vw)    Result Date: 2/16/2019  EXAMINATION: TWO VIEWS OF THE CHEST 2/16/2019 11:49 am COMPARISON: 10/16/2014 HISTORY: ORDERING SYSTEM PROVIDED HISTORY: pneumonia TECHNOLOGIST PROVIDED HISTORY: Reason for exam:->pneumonia Ordering Physician Provided Reason for Exam: PNA Acuity: Acute Type of Exam: Initial Relevant Medical/Surgical History: cervical ca FINDINGS: There is patchy bibasilar consolidation, right greater than left, superimposed on a background of diffuse interstitial prominence. Heart size, mediastinal contours and pulmonary vascularity are within normal limits. There is no pleural effusion. Multifocal bilateral pneumonia. Ct Chest Wo Contrast    Result Date: 1/28/2019  EXAMINATION: CT OF THE ABDOMEN AND PELVIS WITH CONTRAST; CT OF THE CHEST WITHOUT CONTRAST 1/28/2019 7:47 pm; 1/28/2019 7:45 pm TECHNIQUE: CT of the abdomen and pelvis was performed with the administration of intravenous contrast. Multiplanar reformatted images are provided for review. Dose modulation, iterative reconstruction, and/or weight based adjustment of the mA/kV was utilized to reduce the radiation dose to as low as reasonably achievable.; CT of the chest was performed without the administration of intravenous contrast. Multiplanar reformatted images are provided for review. Dose modulation, iterative reconstruction, and/or weight based adjustment of the mA/kV was utilized to reduce the radiation dose to as low as reasonably achievable. COMPARISON: CT abdomen and pelvis 01/18/2019.  HISTORY: ORDERING SYSTEM PROVIDED HISTORY: RLQ abdominal pain TECHNOLOGIST PROVIDED HISTORY: Additional Contrast?->Oral Reason for exam:->RLQ pain persisting Ordering Physician Provided Reason for Exam: RLQ abdominal pain Acuity: Acute Type of Exam: Initial; ORDERING SYSTEM PROVIDED HISTORY: Other forms of systemic lupus erythematosus, unspecified organ involvement status ([facility] TECHNOLOGIST PROVIDED HISTORY: Reason for representing an infectious process. 2. Mild suspected circumferential urinary bladder wall thickening with mild adjacent stranding compatible with cystitis. Recommend correlation with urinalysis. 3. Normal appendix. Ct Abdomen Pelvis W Iv Contrast Additional Contrast? None    Result Date: 2/15/2019  EXAMINATION: CTA OF THE CHEST; CT OF THE ABDOMEN AND PELVIS WITH CONTRAST 2/15/2019 2:11 pm TECHNIQUE: CTA of the chest was performed after the administration of intravenous contrast.  Multiplanar reformatted images are provided for review. MIP images are provided for review. Dose modulation, iterative reconstruction, and/or weight based adjustment of the mA/kV was utilized to reduce the radiation dose to as low as reasonably achievable.; CT of the abdomen and pelvis was performed with the administration of intravenous contrast. Multiplanar reformatted images are provided for review. Dose modulation, iterative reconstruction, and/or weight based adjustment of the mA/kV was utilized to reduce the radiation dose to as low as reasonably achievable. COMPARISON: 1/28/2019, 4/29/2011 HISTORY: ORDERING SYSTEM PROVIDED HISTORY: cervical ca - SOB - PE??? TECHNOLOGIST PROVIDED HISTORY: Ordering Physician Provided Reason for Exam: SOB Acuity: Unknown Type of Exam: Unknown; ORDERING SYSTEM PROVIDED HISTORY: abd disten - cervical ca? TECHNOLOGIST PROVIDED HISTORY: Additional Contrast?->None Ordering Physician Provided Reason for Exam: abd distention Acuity: Unknown Type of Exam: Unknown Patient with recently diagnosed cervical cancer. FINDINGS: Chest: Pulmonary arteries: The pulmonary arteries opacify normally. There is no evidence of filling defect to suggest a pulmonary embolism. Mediastinum: The thyroid is unremarkable. There is mild subcarinal and bilateral hilar lymphadenopathy, most likely benign and reactive in etiology given the patient's underlying lung findings.   The thoracic aorta is unremarkable, without evidence of aneurysm or dissection. 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 No pericardial abnormality is identified. Lungs/pleura: There is mild mucous plugging within the bilateral lower lobes. The central airways are otherwise widely patent. There has been interval development of widespread ground-glass opacity throughout both lungs, with diffuse intralobular septal thickening evident, new from prior exam.  This most likely reflects a combination of interstitial pulmonary edema and superimposed multifocal pneumonia. Additionally, there has been interval progression and more consolidation of multiple scattered various sized pulmonary nodules throughout both lungs since the study of 1/28/2019. A few of these are cavitary. The largest of these measures approximately 10 mm in short axis within the subpleural portion of the left lower lobe. These may be secondary to an atypical infectious or inflammatory process, to include septic emboli. Metastatic disease is considered less likely, though not excluded. There is no evidence of a pneumothorax or pleural effusion. Soft Tissues/Bones: No acute findings. Abdomen/Pelvis: Organs: The patient is status post cholecystectomy. The liver, spleen, and pancreas are normal.  The adrenal glands are unremarkable bilaterally. There has been interval development of nonspecific moderate right hydronephrosis since the prior exam, without definite demonstrable cause. Namely, no definite obstructing stone or mass is identified. The left kidney is stable and unremarkable. GI/Bowel: Evaluation of the hollow GI tract demonstrates no evidence of abnormal bowel wall thickening, dilatation, or obstruction. The appendix is normal. Pelvis: The urinary bladder is partially collapsed, though appears diffusely thick-walled and inflamed, with a mild amount of pericystic stranding noted.  These findings are suggestive of an acute cystitis, progressed from prior exam.  Additionally, the cervix appears enlarged and irregular, and there is new abnormal thickening of the endometrium within the uterine fundus, which appears new in comparison with the study of 1/28/2019. The bilateral adnexa are unremarkable. There is a mild amount of free pelvic fluid, likely physiologic. There are stable mildly enlarged bilateral pelvic chain lymph nodes, the largest measuring approximately 2.6 x 1.6 cm along the right external iliac chain, similar to the study of 1/28/2019. Peritoneum/Retroperitoneum: No intraperitoneal free air or free fluid is identified. No pathologic lymphadenopathy is seen. The abdominal aorta is unremarkable. No significant abdominal wall hernia is identified. Bones/Soft Tissues: There is mild bilateral sacroiliitis. The skeletal structures are otherwise unremarkable. 1. No CT evidence of a pulmonary embolism. 2. No acute abnormality of the thoracic aorta. 3. Interval development of widespread ground-glass opacity throughout both lungs with diffuse intralobular septal thickening. This most likely reflects a combination of interstitial pulmonary edema and multifocal pneumonia. 4. Interval progression of multiple nodular foci of consolidative opacity throughout both lungs, a few of which are cavitary in appearance. These nodules are most likely infectious or inflammatory in etiology, and septic emboli are a consideration. Given patient's history of cervical cancer, metastatic disease is on the differential, though considered less likely. 5. New nonspecific moderate right hydronephrosis, without demonstrable cause. However, there is underlying wall thickening and enhancement of the urinary bladder. This combination of findings could represent a cystitis in the setting of urinary tract infection with resultant pyelitis and hydronephrosis.   However, given patient history of cervical cancer, locoregional spread of tumor with resultant obstruction of the distal right ureter may be a cause of the patient's right hydronephrosis. 6. Interval development of new irregularity of the cervix and nonspecific endometrial thickening in comparison with the prior study of 1/28/2019. This likely represents the patient's known underlying cervical cancer causing obstruction of the endometrial with resultant endometrial thickening. This could be further evaluated with a follow-up pelvic ultrasound. 7. Stable mild bilateral pelvic chain lymphadenopathy, right greater than left, possibly representing metastatic disease. Ct Abdomen Pelvis W Iv Contrast Additional Contrast? Oral    Result Date: 1/28/2019  EXAMINATION: CT OF THE ABDOMEN AND PELVIS WITH CONTRAST; CT OF THE CHEST WITHOUT CONTRAST 1/28/2019 7:47 pm; 1/28/2019 7:45 pm TECHNIQUE: CT of the abdomen and pelvis was performed with the administration of intravenous contrast. Multiplanar reformatted images are provided for review. Dose modulation, iterative reconstruction, and/or weight based adjustment of the mA/kV was utilized to reduce the radiation dose to as low as reasonably achievable.; CT of the chest was performed without the administration of intravenous contrast. Multiplanar reformatted images are provided for review. Dose modulation, iterative reconstruction, and/or weight based adjustment of the mA/kV was utilized to reduce the radiation dose to as low as reasonably achievable. COMPARISON: CT abdomen and pelvis 01/18/2019.  HISTORY: ORDERING SYSTEM PROVIDED HISTORY: RLQ abdominal pain TECHNOLOGIST PROVIDED HISTORY: Additional Contrast?->Oral Reason for exam:->RLQ pain persisting Ordering Physician Provided Reason for Exam: RLQ abdominal pain Acuity: Acute Type of Exam: Initial; ORDERING SYSTEM PROVIDED HISTORY: Other forms of systemic lupus erythematosus, unspecified organ involvement status ([facility] TECHNOLOGIST PROVIDED HISTORY: Reason for exam:->pulmonary nodules seen on recent CT Ordering Physician Provided bladder wall thickening with mild adjacent stranding compatible with cystitis. Recommend correlation with urinalysis. 3. Normal appendix. Ct Chest Pulmonary Embolism W Contrast    Result Date: 2/15/2019  EXAMINATION: CTA OF THE CHEST; CT OF THE ABDOMEN AND PELVIS WITH CONTRAST 2/15/2019 2:11 pm TECHNIQUE: CTA of the chest was performed after the administration of intravenous contrast.  Multiplanar reformatted images are provided for review. MIP images are provided for review. Dose modulation, iterative reconstruction, and/or weight based adjustment of the mA/kV was utilized to reduce the radiation dose to as low as reasonably achievable.; CT of the abdomen and pelvis was performed with the administration of intravenous contrast. Multiplanar reformatted images are provided for review. Dose modulation, iterative reconstruction, and/or weight based adjustment of the mA/kV was utilized to reduce the radiation dose to as low as reasonably achievable. COMPARISON: 1/28/2019, 4/29/2011 HISTORY: ORDERING SYSTEM PROVIDED HISTORY: cervical ca - SOB - PE??? TECHNOLOGIST PROVIDED HISTORY: Ordering Physician Provided Reason for Exam: SOB Acuity: Unknown Type of Exam: Unknown; ORDERING SYSTEM PROVIDED HISTORY: abd disten - cervical ca? TECHNOLOGIST PROVIDED HISTORY: Additional Contrast?->None Ordering Physician Provided Reason for Exam: abd distention Acuity: Unknown Type of Exam: Unknown Patient with recently diagnosed cervical cancer. FINDINGS: Chest: Pulmonary arteries: The pulmonary arteries opacify normally. There is no evidence of filling defect to suggest a pulmonary embolism. Mediastinum: The thyroid is unremarkable. There is mild subcarinal and bilateral hilar lymphadenopathy, most likely benign and reactive in etiology given the patient's underlying lung findings. The thoracic aorta is unremarkable, without evidence of aneurysm or dissection.   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 No pericardial abnormality is identified. Lungs/pleura: There is mild mucous plugging within the bilateral lower lobes. The central airways are otherwise widely patent. There has been interval development of widespread ground-glass opacity throughout both lungs, with diffuse intralobular septal thickening evident, new from prior exam.  This most likely reflects a combination of interstitial pulmonary edema and superimposed multifocal pneumonia. Additionally, there has been interval progression and more consolidation of multiple scattered various sized pulmonary nodules throughout both lungs since the study of 1/28/2019. A few of these are cavitary. The largest of these measures approximately 10 mm in short axis within the subpleural portion of the left lower lobe. These may be secondary to an atypical infectious or inflammatory process, to include septic emboli. Metastatic disease is considered less likely, though not excluded. There is no evidence of a pneumothorax or pleural effusion. Soft Tissues/Bones: No acute findings. Abdomen/Pelvis: Organs: The patient is status post cholecystectomy. The liver, spleen, and pancreas are normal.  The adrenal glands are unremarkable bilaterally. There has been interval development of nonspecific moderate right hydronephrosis since the prior exam, without definite demonstrable cause. Namely, no definite obstructing stone or mass is identified. The left kidney is stable and unremarkable. GI/Bowel: Evaluation of the hollow GI tract demonstrates no evidence of abnormal bowel wall thickening, dilatation, or obstruction. The appendix is normal. Pelvis: The urinary bladder is partially collapsed, though appears diffusely thick-walled and inflamed, with a mild amount of pericystic stranding noted.  These findings are suggestive of an acute cystitis, progressed from prior exam.  Additionally, the cervix appears enlarged and irregular, and there is new development of new irregularity of the cervix and nonspecific endometrial thickening in comparison with the prior study of 1/28/2019. This likely represents the patient's known underlying cervical cancer causing obstruction of the endometrial with resultant endometrial thickening. This could be further evaluated with a follow-up pelvic ultrasound. 7. Stable mild bilateral pelvic chain lymphadenopathy, right greater than left, possibly representing metastatic disease. Ir Port Placement > 5 Years    Result Date: 2/19/2019  PROCEDURE: ULTRASOUND GUIDED VASCULAR ACCESS. FLUOROSCOPY GUIDED PLACEMENT OF A RIGHT IJ SUBCUTANEOUS PORT-A-CATH. MODERATE CONSCIOUS SEDATION 2/19/2019. HISTORY: ORDERING SYSTEM PROVIDED HISTORY: cervical cancer to get chemo TECHNOLOGIST PROVIDED HISTORY: Reason for exam:->cervical cancer to get chemo How many lumens are being requested?->1 What site is the preferred site? ->No preference What side should this line be placed? ->Either 80-year-old female with cervical cancer, presents for chest port placement. SEDATION: Moderate sedation was administered under my supervision by a qualified nurse. 4 mg of Versed was administered intravenously. Approximate intra procedural sedation time was 23 minutes. FLUOROSCOPY DOSE AND TYPE OR TIME AND EXPOSURES: 54 seconds of fluoroscopy with 2 exposures. TECHNIQUE: Informed consent was obtained after a detailed explanation of the procedure including risks, benefits, and alternatives. Universal protocol was observed. The right neck and chest were prepped and draped in sterile fashion using maximum sterile barrier technique. Local anesthesia was achieved with lidocaine. A micropuncture needle was used to access the right internal jugular vein using ultrasound guidance. An ultrasound image demonstrating patency of the vein with needle tip located within it. An image was obtained and stored in PACs. A 0.035 guidewire was used to place a peel-away sheath.  A chest wall incision was made and a subcutaneous pocket was created. The port reservoir was placed within the pocket and the port tubing was attached and tunneled subcutaneously to the venotomy site. The port tubing was cut to an appropriate length and advanced through the peel-away sheath under fluoroscopic guidance to the cavo-atrial junction. The chest wall incision was closed using 3-0 and 4-0 Vicryl suture and tissue adhesive was applied to the venotomy site and chest wall incision. The port flushed easily and there was a good blood return. The port was locked with heparinized saline. The patient tolerated the procedure well and there were no immediate complications. FINDINGS: Fluoroscopic image demonstrates the tip of the port in the right atrium. 1. Successful ultrasound and fluoroscopy guided Port-A-Cath placement via right IJ access, as described above. 2.  The port was left accessed for immediate use. Fl Retrograde Pyelogram Right    Result Date: 2/17/2019  EXAMINATION: SPOT FLUOROSCOPIC IMAGES 2/17/2019 6:52 am TECHNIQUE: Fluoroscopy was provided by the radiology department for procedure. Radiologist was not present during examination. FLUOROSCOPY DOSE AND TYPE OR TIME AND EXPOSURES: 3 seconds of fluoroscopy was utilized for purposes of intraoperative localization. 1 fluoroscopic spot image was obtained. COMPARISON: None HISTORY: Intraprocedural imaging. FINDINGS: 1 spot images of the abdomen was obtained. Intraprocedural fluoroscopic spot images as above. See separate procedure report for more information. Ir Guided Ureteral Stent Place W Nephro Cath New Access    Result Date: 2/18/2019  PROCEDURE: IR ULTRASOUND AND FLUOROSCOPIC GUIDED RIGHT PERCUTANEOUS NEPHROSTOMY AND NEPHROSTOGRAM IR ANTEGRADE RIGHT URETERAL STENT. MODERATE CONSCIOUS SEDATION 2/18/2019 HISTORY: ORDERING SYSTEM PROVIDED HISTORY: needs R sided nephrostomy tube placed. maria guadalupe could not do via cysto.  Adi said that IR would do on monday TECHNOLOGIST PROVIDED HISTORY: Reason for exam:->needs R sided nephrostomy tube placed. maria guadalupe could not do via cysto. Adi said that IR would do on monday COMPARISON: Abdomen pelvic CT 02/15/2019. CONTRAST: 30 cc, nonvascular within collecting system only. . SEDATION: Intravenous sedation was administered with continuous monitoring throughout the procedure. In measured doses, a total of 6 mg intravenous Versed and 275 mcg intravenous fentanyl was administered. My total procedure time with sedation was 26 minutes. FLUOROSCOPY DOSE AND TYPE OR TIME AND EXPOSURES: 3.5 minutes, 7 digital imaging sequences. DESCRIPTION OF PROCEDURE: Informed consent was obtained after a detailed explanation of the procedure including risks, benefits, and alternatives. Universal protocol was observed. TECHNIQUE: Informed consent was previously obtained. In the semi prone position, an appropriate area posterior lateral aspect of the skin overlying the targeted collecting system was demarcated, sterilely prepared draped and anesthetized. Utilizing ultrasound, anatomy from prior CT scan and other imaging, the needle trajectory and depth was predetermined. The micro puncture needle was advanced using fluoroscopic and ultrasound guidance into the collecting system without difficulty. Once urine was aspirated, a small platinum tip wire was advanced into the collecting system and into the proximal ureter. The tract was dilated. With wire exchange, a 6 Yemeni sheath was placed. The collecting system is moderately dilated and the ureter is somewhat tortuous. A glide wire was easily advanced down the ureter. Because of this, a 5 Yemeni peel-away sheath was advanced into the proximal ureter over the wire. The glide wire was then advanced with a steerable catheter into the urinary bladder without difficulty and only mild discomfort. There is no contrast extravasation.   Once the catheter was placed into the urinary bladder, contrast injection is seen diluted within the somewhat distended urinary bladder. The catheter was used to place a Super Stiff wire for ureteral stent placement. A 26 cm 8 French ureteral stent was then advanced, such that the distal pigtail is well within the urinary bladder. The proximal pigtail was placed in the lower portion of the left renal pelvis. Again there is no contrast extravasation. No filling defect to indicate clot or bleeding was observed on early or later contrast imaging. Following this, dense contrast was injected showing normal expected filling of the ureteral stent and exiting from the distal pigtail directly into the urinary bladder. Following this, the internalized stent was disengaged. The proximal collecting system was then decompressed. The proximal nephrostomy access was then removed entirely, no external drainage necessary at this point. The patient tolerated the procedure well. A sterile bandage was placed over the small incision. From this point forward, the ureteral stent can be exchanged as needed from below. Successful right percutaneous nephrostomy with antegrade pyelogram. High-grade distal ureteral obstruction with severe hydronephrosis, successfully crossed as above. Successful 8 French internalized right ureteral stent placement as above. Assessment and Plan:  Patient Active Hospital Problem List:   Pneumonia (2/15/2019)    Assessment: Established problem. Stable. Infiltrates seen 2/19. pulm thinks this may be more related to metastatic disease and not infection    Plan: case d/w dr Knvg Vazquez - maxipime to be stopped   SLE [facility] lupus erythemat[facility]) [facility] (1/22/2019)    Assessment: Established problem. Stable.     Plan: Continue present orders/plan.    Lupus anticoagulant positive (1/22/2019)   Cervical cancer ([facility] (2/15/2019)    Assessment: invasive squamous cell carcinoma which supports the diagnosis of cervical cancer.  She is at least stage JACKI d/t metastatic disease to bladder and possibly stage IVB if mets to lung    Plan: Initial plan - carbo/taxol/avastin.  Port placed 2/19   Pulmonary nodule (2/15/2019)    Assessment: Established problem. Stable.  Will need workup for this     Plan: per dr Liu Favorite, they will eval in coming week   Hydronephrosis (2/15/2019)    Assessment: Established problem.  Stent could not be placed by urology    Plan: Right perc nephrostomy, nephrostogram and antegrade ureteral stent done 2/18 per IR   Ground glass opacity present on imaging of lung ()   Lung nodules ()   Lymphadenopathy ()            Emma Nash  2/22/2019

## 2019-02-22 NOTE — PROGRESS NOTES
3:35 PM  Received report and taking over patient care. Patient done with her tray. Family in room. Patient given Toradol for pain. Will continue to monitor. 430 PM  Vitals are stable. Patient given PO pain medication. Dinner ordered. Patient denies any other needs at this time. Will continue to monitor.

## 2019-02-22 NOTE — PROGRESS NOTES
Completion of Chemotherapy: Monitoring during infusion done per policy, see Flowsheets. Blood return verified before, during, and after infusion per policy; no signs of extravasation. Pt tolerated chemotherapy well and without incident. Chemotherapy infusion end time on the [facility]. Will continue to monitor.  .Electronically signed by Tim Anderson RN on 2/22/2019 at 8:47 AM

## 2019-02-22 NOTE — PROGRESS NOTES
Nutrition Assessment    Type and Reason for Visit: Initial (LOS assessment )    Nutrition Recommendations:   1. Trial Ensure High Protein BID  2. Encourage PO intake  3. Document all PO intake in flow sheets     Nutrition Assessment: Pt with cervical cancer with mets to bladder and possible lung mets. Pt on chemotherapy. Pt is nutritionally compromised as evidenced by pt report of fair appetite and PO intake during admission d/t nausea and pain. Pt reports usual PO intake is cream of wheat for breakfast and lunch, fruits and vegetables for dinner. RD discussed importance of protein intake, especially during cancer treatment. Pt is agreeable to trial Ensure High Protein BID. Malnutrition Assessment:  · Malnutrition Status: No malnutrition    Nutrition Risk Level: Moderate    Nutrient Needs:  · Estimated Daily Total Kcal: 3448-4335   · Estimated Daily Protein (g):  grams   · Estimated Daily Total Fluid (ml/day): 1584 mL     Nutrition Diagnosis:   · Problem: Inadequate oral intake  · Etiology: related to Insufficient energy/nutrient consumption     Signs and symptoms:  as evidenced by Intake 25-50%, Intake 50-75%    Objective Information:  · Nutrition-Focused Physical Findings: Trace non-pitting BLE edema. +9.8 liters.    · Wound Type: None  · Current Nutrition Therapies:  · Oral Diet Orders: General   · Oral Diet intake: 26-50%, 51-75% (small meals )  · Oral Nutrition Supplement (ONS) Orders: None  · Anthropometric Measures:  · Ht: 5' 6.5\" (168.9 cm)   · Current Body Wt: 193 lb (87.5 kg)  · Admission Body Wt: 193 lb (87.5 kg)  · Ideal Body Wt: 130 lb (59 kg)   · BMI Classification: BMI 30.0 - 34.9 Obese Class I    Nutrition Interventions:   Continue current diet, Start ONS  Continued Inpatient Monitoring, Education Not Indicated    Nutrition Evaluation:   · Evaluation: Goals set   · Goals: Pt will consume at least 50% of meals and supplements     · Monitoring: Meal Intake, Supplement Intake, Diet

## 2019-02-22 NOTE — PROGRESS NOTES
Shift assessment completed. VSS. Denies needs at this time. Oxy IR given along with scheduled meds. Call light in reach. Information on [facility] provided to patient.

## 2019-02-22 NOTE — PROGRESS NOTES
Progress Note     Name: Beti Deng Room: 7PY-0607/5836-41   YOB: 1980 MRN: [id number]   Sex: female CSN: [id number]    LOS: 7   PCP: Becki Moore MD   Attending: Carlos Chambers MD Admitting: Carlos Chambers MD        Subjective:   Patient seen and examined. Completed C1 of cisplatin and taxol yesterday. C/o fatigue, light-headedness and SOB. States that she feels very dizzy and feeling light she's going to pass out when she ambulates. Also c/o mild nausea. No emesis. She has been taken off her PCA and placed on MS CONTIN 15mg po BID with oxycodone 5mg po q 4-6 hrs for breakthrough. She is complaining of worsening right sided back pain and pelvic pain since this afternoon. Physical Exam:   /66   Pulse 85   Temp 98.3 °F (36.8 °C) (Oral)   Resp 14   Ht 5' 6.5\" (1.689 m)   Wt 193 lb 3.2 oz (87.6 kg)   SpO2 95%   BMI 30.72 kg/m²     Gen: AAO  Resp: CTAB  CV: RRR  Abd: +BS, soft, nondistended, appropriately tender  Ext: nontender, no evidence of DVT       Allergies   Allergen Reactions    Food Hives     Raw spinach.  Spinach        Medications:   Reviewed    Diagnostic:   Reviewed    Labs:   reviewed  Recent Labs      02/21/19   0500   WBC  6.6   HGB  7.8*   HCT  24.7*   PLT  355     Recent Labs      02/21/19   0500   NA  140   K  4.6   CL  104   CO2  26   BUN  10   CREATININE  <0.5*   CALCIUM  8.7     No results for input(s): AST, ALT, BILIDIR, BILITOT, ALKPHOS in the last 72 hours. No results for input(s): INR in the last 72 hours. No results for input(s):  in the last 72 hours.     Assessment:     Patient Active Problem List   Diagnosis Code    Stridor R06.1    Acute bronchitis J20.9    Fibromyalgia M79.7    Bradycardia R00.1    Chest pain R07.9    GERD (gastroesophageal reflux disease) K21.9    Vaginal bleeding N93.9    History of juvenile rheumatoid arthritis Z87.39    SLE (systemic lupus erythematosus) ([facility] M32.9    Lupus anticoagulant positive R76.0    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, antepartum ([address] O99.019, D61.9    Cervical cancer ([address] C53.9    Pneumonia J18.9    Pulmonary nodule R91.1    Hydronephrosis N13.30    Ground glass opacity present on imaging of lung R91.8    Lung nodules R91.8    Lymphadenopathy R59.1       Impression/Plan:   1. Stage JACKI vs. IVB SCCA of cervix  - C#1 of cisplatin/taxol started today 2/21/19    2. Right hydronephrosis    3. Anemia    4. Pain 2/2 neoplastic process. PLAN:   1. C1 of cisplatin/taxol completed on 2/21. Tolerated well. C2 planned for 3 weeks from now. 2. Am labs reviewed. Patient with symptomatic anemia. Will transfuse 1 unit PRBCs. Repeat labs post transfusion. 3.  Pain regimen reviewed. Agree with long acting pain regimen with short acting for breakthrough. Patient will likely still need IV pain meds for breakthrough in the acute time period. If MS CONTIN 15mg po BID not sufficient and patient still using IV for breakthrough, may need to increase to 30mg po BID or consider switch to fentanyl patch. 4. CM and pulm to eval for possible home O2 needs. 5. Will cont to follow.     Lamount Babinski [facility]  Gynecologic Oncology  [facility]  [phone]  2/22/2019  5:26 PM

## 2019-02-22 NOTE — PLAN OF CARE
Problem: Pain:  Goal: Pain level will decrease  Pain level will decrease   Outcome: Ongoing  PCA pump and prn Oxy IR, see emar  Goal: Control of acute pain  Control of acute pain   Outcome: Ongoing    Goal: Control of chronic pain  Control of chronic pain   Outcome: Completed Date Met: 02/22/19  No chronic pain reported.     Problem: Nausea/Vomiting:  Goal: Absence of nausea/vomiting  Absence of nausea/vomiting  Outcome: Ongoing    Goal: Able to drink  Able to drink  Outcome: Met This Shift    Goal: Able to eat  Able to eat  Outcome: Met This Shift

## 2019-02-22 NOTE — ONCOLOGY
Oncology and Hematology Care   Progress Note    2/22/2019    SUBJECTIVE:  She had some nausea after chemo, no vomiting. C/o back pain, trying to wean off PCA  OBJECTIVE:      Physical Assessment:  Vitals:  /75   Pulse 95   Temp 98.3 °F (36.8 °C) (Oral)   Resp 18   Ht 5' 6.5\" (1.689 m)   Wt 193 lb 3.2 oz (87.6 kg)   SpO2 96%   BMI 30.72 kg/m²    24HR INTAKE/OUTPUT:      Intake/Output Summary (Last 24 hours) at 02/22/19 1302  Last data filed at 02/22/19 2771   Gross per 24 hour   Intake           1914.6 ml   Output             3300 ml   Net          -1385.4 ml       CONSTITUTIONAL:  Awake, alert & oriented x3  HEENT: PERRL, Neck: soft, supple, no cervical, supraclavicular or axillary adenopathy  RESPIRATORY:  No increased work of breathing, breath sounds decreased. CARDIOVASCULAR:  Cardiac S1/S2, RRR  GASTROINTESTINAL:  abdomen soft +BS x4, no hepatosplenomegaly  SKIN:  negative for rash and skin lesion(s)  MUSCULOSKELETAL:  negative for pain and muscle weakness  EXTREMITIES: Negative for Lower extremity edema    Labs Results:    CBC:   Recent Labs      02/20/19   0533  02/21/19   0500   WBC  6.9  6.6   HGB  9.2*  7.8*   HCT  29.5*  24.7*   MCV  82.3  81.3   PLT  340  355     BMP:   Recent Labs      02/20/19   0533  02/21/19   0500   NA  140  140   K  4.1  4.6   CL  101  104   CO2  26  26   BUN  15  10   CREATININE  0.7  <0.5*     LIVER PROFILE: No results for input(s): AST, ALT, LIPASE, BILIDIR, BILITOT, ALKPHOS in the last 72 hours. Invalid input(s):   AMYLASE,  ALB  PT/INR:    Lab Results   Component Value Date    PROTIME 12.7 02/18/2019    PROTIME 12.3 01/18/2019    PROTIME 11.0 05/16/2014    INR 1.11 02/18/2019    INR 1.08 01/18/2019    INR 0.98 05/16/2014     PTT:    Lab Results   Component Value Date    APTT 33.2 05/16/2014     UA:No results for input(s): NITRITE, COLORU, PHUR, LABCAST, WBCUA, RBCUA, MUCUS, TRICHOMONAS, YEAST, BACTERIA, CLARITYU, SPECGRAV, LEUKOCYTESUR, UROBILINOGEN, BILIRUBINUR, BLOODU, GLUCOSEU, AMORPHOUS in the last 72 hours. Invalid input(s): KETONESU  Magnesium: No results found for: MG  VAISHNAVI:    Lab Results   Component Value Date    VAISHNAVI Negative 12/05/2018       RADIOLOGY:    Xr Chest Standard (2 Vw)    Result Date: 2/19/2019  EXAMINATION: TWO VIEWS OF THE CHEST 2/19/2019 4:26 pm COMPARISON: 02/16/2019 HISTORY: ORDERING SYSTEM PROVIDED HISTORY: cough TECHNOLOGIST PROVIDED HISTORY: Reason for exam:->cough Ordering Physician Provided Reason for Exam: Cervical Cancer (Pt was sent over from Dr Lina Grey office - states she was sent here from office - was told today that she has stage 3 cervical cancer and was sent here for \"scans and further testing\") Acuity: Unknown Type of Exam: Unknown FINDINGS: There is patchy bilateral airspace disease, which appears increased when compared to the previous exam.  As detected on recent CT PA, some of those have a nodular appearance. The heart mediastinal contours are unremarkable. No pneumothorax. No free air. No acute bony abnormalities. Chin catheter noted. Patchy bilateral airspace disease, concerning for pneumonia, which appears increased when compared to the previous exam.  Again, some of these areas of opacity have a nodular appearance and septic emboli should be considered, especially when given the correlation with the CT PA from 02/15/2019. Process should be followed to resolution. Xr Chest Standard (2 Vw)    Result Date: 2/16/2019  EXAMINATION: TWO VIEWS OF THE CHEST 2/16/2019 11:49 am COMPARISON: 10/16/2014 HISTORY: ORDERING SYSTEM PROVIDED HISTORY: pneumonia TECHNOLOGIST PROVIDED HISTORY: Reason for exam:->pneumonia Ordering Physician Provided Reason for Exam: PNA Acuity: Acute Type of Exam: Initial Relevant Medical/Surgical History: cervical ca FINDINGS: There is patchy bibasilar consolidation, right greater than left, superimposed on a background of diffuse interstitial prominence.   Heart size, mediastinal images are provided for review. Dose modulation, iterative reconstruction, and/or weight based adjustment of the mA/kV was utilized to reduce the radiation dose to as low as reasonably achievable.; CT of the abdomen and pelvis was performed with the administration of intravenous contrast. Multiplanar reformatted images are provided for review. Dose modulation, iterative reconstruction, and/or weight based adjustment of the mA/kV was utilized to reduce the radiation dose to as low as reasonably achievable. COMPARISON: 1/28/2019, 4/29/2011 HISTORY: ORDERING SYSTEM PROVIDED HISTORY: cervical ca - SOB - PE??? TECHNOLOGIST PROVIDED HISTORY: Ordering Physician Provided Reason for Exam: SOB Acuity: Unknown Type of Exam: Unknown; ORDERING SYSTEM PROVIDED HISTORY: abd disten - cervical ca? TECHNOLOGIST PROVIDED HISTORY: Additional Contrast?->None Ordering Physician Provided Reason for Exam: abd distention Acuity: Unknown Type of Exam: Unknown Patient with recently diagnosed cervical cancer. FINDINGS: Chest: Pulmonary arteries: The pulmonary arteries opacify normally. There is no evidence of filling defect to suggest a pulmonary embolism. Mediastinum: The thyroid is unremarkable. There is mild subcarinal and bilateral hilar lymphadenopathy, most likely benign and reactive in etiology given the patient's underlying lung findings. The thoracic aorta is unremarkable, without evidence of aneurysm or dissection. 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 No pericardial abnormality is identified. Lungs/pleura: There is mild mucous plugging within the bilateral lower lobes. The central airways are otherwise widely patent.   There has been interval development of widespread ground-glass opacity throughout both lungs, with diffuse intralobular septal thickening evident, new from prior exam.  This most likely reflects a combination of interstitial pulmonary edema and superimposed multifocal intraperitoneal free air or free fluid is identified. No pathologic lymphadenopathy is seen. The abdominal aorta is unremarkable. No significant abdominal wall hernia is identified. Bones/Soft Tissues: There is mild bilateral sacroiliitis. The skeletal structures are otherwise unremarkable. 1. No CT evidence of a pulmonary embolism. 2. No acute abnormality of the thoracic aorta. 3. Interval development of widespread ground-glass opacity throughout both lungs with diffuse intralobular septal thickening. This most likely reflects a combination of interstitial pulmonary edema and multifocal pneumonia. 4. Interval progression of multiple nodular foci of consolidative opacity throughout both lungs, a few of which are cavitary in appearance. These nodules are most likely infectious or inflammatory in etiology, and septic emboli are a consideration. Given patient's history of cervical cancer, metastatic disease is on the differential, though considered less likely. 5. New nonspecific moderate right hydronephrosis, without demonstrable cause. However, there is underlying wall thickening and enhancement of the urinary bladder. This combination of findings could represent a cystitis in the setting of urinary tract infection with resultant pyelitis and hydronephrosis. However, given patient history of cervical cancer, locoregional spread of tumor with resultant obstruction of the distal right ureter may be a cause of the patient's right hydronephrosis. 6. Interval development of new irregularity of the cervix and nonspecific endometrial thickening in comparison with the prior study of 1/28/2019. This likely represents the patient's known underlying cervical cancer causing obstruction of the endometrial with resultant endometrial thickening. This could be further evaluated with a follow-up pelvic ultrasound.  7. Stable mild bilateral pelvic chain lymphadenopathy, right greater than left, possibly representing based adjustment of the mA/kV was utilized to reduce the radiation dose to as low as reasonably achievable.; CT of the abdomen and pelvis was performed with the administration of intravenous contrast. Multiplanar reformatted images are provided for review. Dose modulation, iterative reconstruction, and/or weight based adjustment of the mA/kV was utilized to reduce the radiation dose to as low as reasonably achievable. COMPARISON: 1/28/2019, 4/29/2011 HISTORY: ORDERING SYSTEM PROVIDED HISTORY: cervical ca - SOB - PE??? TECHNOLOGIST PROVIDED HISTORY: Ordering Physician Provided Reason for Exam: SOB Acuity: Unknown Type of Exam: Unknown; ORDERING SYSTEM PROVIDED HISTORY: abd disten - cervical ca? TECHNOLOGIST PROVIDED HISTORY: Additional Contrast?->None Ordering Physician Provided Reason for Exam: abd distention Acuity: Unknown Type of Exam: Unknown Patient with recently diagnosed cervical cancer. FINDINGS: Chest: Pulmonary arteries: The pulmonary arteries opacify normally. There is no evidence of filling defect to suggest a pulmonary embolism. Mediastinum: The thyroid is unremarkable. There is mild subcarinal and bilateral hilar lymphadenopathy, most likely benign and reactive in etiology given the patient's underlying lung findings. The thoracic aorta is unremarkable, without evidence of aneurysm or dissection. 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 No pericardial abnormality is identified. Lungs/pleura: There is mild mucous plugging within the bilateral lower lobes. The central airways are otherwise widely patent. There has been interval development of widespread ground-glass opacity throughout both lungs, with diffuse intralobular septal thickening evident, new from prior exam.  This most likely reflects a combination of interstitial pulmonary edema and superimposed multifocal pneumonia.   Additionally, there has been interval progression and more consolidation of multiple scattered various sized pulmonary nodules throughout both lungs since the study of 1/28/2019. A few of these are cavitary. The largest of these measures approximately 10 mm in short axis within the subpleural portion of the left lower lobe. These may be secondary to an atypical infectious or inflammatory process, to include septic emboli. Metastatic disease is considered less likely, though not excluded. There is no evidence of a pneumothorax or pleural effusion. Soft Tissues/Bones: No acute findings. Abdomen/Pelvis: Organs: The patient is status post cholecystectomy. The liver, spleen, and pancreas are normal.  The adrenal glands are unremarkable bilaterally. There has been interval development of nonspecific moderate right hydronephrosis since the prior exam, without definite demonstrable cause. Namely, no definite obstructing stone or mass is identified. The left kidney is stable and unremarkable. GI/Bowel: Evaluation of the hollow GI tract demonstrates no evidence of abnormal bowel wall thickening, dilatation, or obstruction. The appendix is normal. Pelvis: The urinary bladder is partially collapsed, though appears diffusely thick-walled and inflamed, with a mild amount of pericystic stranding noted. These findings are suggestive of an acute cystitis, progressed from prior exam.  Additionally, the cervix appears enlarged and irregular, and there is new abnormal thickening of the endometrium within the uterine fundus, which appears new in comparison with the study of 1/28/2019. The bilateral adnexa are unremarkable. There is a mild amount of free pelvic fluid, likely physiologic. There are stable mildly enlarged bilateral pelvic chain lymph nodes, the largest measuring approximately 2.6 x 1.6 cm along the right external iliac chain, similar to the study of 1/28/2019. Peritoneum/Retroperitoneum: No intraperitoneal free air or free fluid is identified. No pathologic lymphadenopathy is seen. The abdominal aorta is unremarkable. No significant abdominal wall hernia is identified. Bones/Soft Tissues: There is mild bilateral sacroiliitis. The skeletal structures are otherwise unremarkable. 1. No CT evidence of a pulmonary embolism. 2. No acute abnormality of the thoracic aorta. 3. Interval development of widespread ground-glass opacity throughout both lungs with diffuse intralobular septal thickening. This most likely reflects a combination of interstitial pulmonary edema and multifocal pneumonia. 4. Interval progression of multiple nodular foci of consolidative opacity throughout both lungs, a few of which are cavitary in appearance. These nodules are most likely infectious or inflammatory in etiology, and septic emboli are a consideration. Given patient's history of cervical cancer, metastatic disease is on the differential, though considered less likely. 5. New nonspecific moderate right hydronephrosis, without demonstrable cause. However, there is underlying wall thickening and enhancement of the urinary bladder. This combination of findings could represent a cystitis in the setting of urinary tract infection with resultant pyelitis and hydronephrosis. However, given patient history of cervical cancer, locoregional spread of tumor with resultant obstruction of the distal right ureter may be a cause of the patient's right hydronephrosis. 6. Interval development of new irregularity of the cervix and nonspecific endometrial thickening in comparison with the prior study of 1/28/2019. This likely represents the patient's known underlying cervical cancer causing obstruction of the endometrial with resultant endometrial thickening. This could be further evaluated with a follow-up pelvic ultrasound. 7. Stable mild bilateral pelvic chain lymphadenopathy, right greater than left, possibly representing metastatic disease.      Ir Port Placement > 5 Years    Result Date: 2/19/2019  PROCEDURE: ULTRASOUND GUIDED VASCULAR ACCESS. FLUOROSCOPY GUIDED PLACEMENT OF A RIGHT IJ SUBCUTANEOUS PORT-A-CATH. MODERATE CONSCIOUS SEDATION 2/19/2019. HISTORY: ORDERING SYSTEM PROVIDED HISTORY: cervical cancer to get chemo TECHNOLOGIST PROVIDED HISTORY: Reason for exam:->cervical cancer to get chemo How many lumens are being requested?->1 What site is the preferred site? ->No preference What side should this line be placed? ->Either 30-year-old female with cervical cancer, presents for chest port placement. SEDATION: Moderate sedation was administered under my supervision by a qualified nurse. 4 mg of Versed was administered intravenously. Approximate intra procedural sedation time was 23 minutes. FLUOROSCOPY DOSE AND TYPE OR TIME AND EXPOSURES: 54 seconds of fluoroscopy with 2 exposures. TECHNIQUE: Informed consent was obtained after a detailed explanation of the procedure including risks, benefits, and alternatives. Universal protocol was observed. The right neck and chest were prepped and draped in sterile fashion using maximum sterile barrier technique. Local anesthesia was achieved with lidocaine. A micropuncture needle was used to access the right internal jugular vein using ultrasound guidance. An ultrasound image demonstrating patency of the vein with needle tip located within it. An image was obtained and stored in PACs. A 0.035 guidewire was used to place a peel-away sheath. A chest wall incision was made and a subcutaneous pocket was created. The port reservoir was placed within the pocket and the port tubing was attached and tunneled subcutaneously to the venotomy site. The port tubing was cut to an appropriate length and advanced through the peel-away sheath under fluoroscopic guidance to the cavo-atrial junction. The chest wall incision was closed using 3-0 and 4-0 Vicryl suture and tissue adhesive was applied to the venotomy site and chest wall incision.  The port flushed easily and there was a good blood 26 minutes. FLUOROSCOPY DOSE AND TYPE OR TIME AND EXPOSURES: 3.5 minutes, 7 digital imaging sequences. DESCRIPTION OF PROCEDURE: Informed consent was obtained after a detailed explanation of the procedure including risks, benefits, and alternatives. Universal protocol was observed. TECHNIQUE: Informed consent was previously obtained. In the semi prone position, an appropriate area posterior lateral aspect of the skin overlying the targeted collecting system was demarcated, sterilely prepared draped and anesthetized. Utilizing ultrasound, anatomy from prior CT scan and other imaging, the needle trajectory and depth was predetermined. The micro puncture needle was advanced using fluoroscopic and ultrasound guidance into the collecting system without difficulty. Once urine was aspirated, a small platinum tip wire was advanced into the collecting system and into the proximal ureter. The tract was dilated. With wire exchange, a 6 Malian sheath was placed. The collecting system is moderately dilated and the ureter is somewhat tortuous. A glide wire was easily advanced down the ureter. Because of this, a 5 Malian peel-away sheath was advanced into the proximal ureter over the wire. The glide wire was then advanced with a steerable catheter into the urinary bladder without difficulty and only mild discomfort. There is no contrast extravasation. Once the catheter was placed into the urinary bladder, contrast injection is seen diluted within the somewhat distended urinary bladder. The catheter was used to place a Super Stiff wire for ureteral stent placement. A 26 cm 8 Malian ureteral stent was then advanced, such that the distal pigtail is well within the urinary bladder. The proximal pigtail was placed in the lower portion of the left renal pelvis. Again there is no contrast extravasation. No filling defect to indicate clot or bleeding was observed on early or later contrast imaging.   Following this, dense contrast was injected showing normal expected filling of the ureteral stent and exiting from the distal pigtail directly into the urinary bladder. Following this, the internalized stent was disengaged. The proximal collecting system was then decompressed. The proximal nephrostomy access was then removed entirely, no external drainage necessary at this point. The patient tolerated the procedure well. A sterile bandage was placed over the small incision. From this point forward, the ureteral stent can be exchanged as needed from below. Successful right percutaneous nephrostomy with antegrade pyelogram. High-grade distal ureteral obstruction with severe hydronephrosis, successfully crossed as above. Successful 8 French internalized right ureteral stent placement as above. 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 She received cycle 1 day 1 taxol cisplatinum 2/20/19 tolerated well. Held avastin because of recent procedures and vaginal bleeding    Hypoxic respiratory failure continue oxygen decided against lung biopsy for now    Anemia due to vaginal bleeding    Right hydronephrosis post stent    Neoplasm related pain   - start MS Contin 15 mg BID  - continue oxycodone 5 mg q 4 hours PRN        I have discussed the above stated plan with the patient and they verbalized understanding and agreed with the plan. Thank you for allowing us to participate in this patients care.     MAGGIE Dougherty - RAOUL, 2/22/2019,

## 2019-02-23 PROBLEM — K59.03 CONSTIPATION DUE TO OPIOID THERAPY: Status: ACTIVE | Noted: 2019-01-01

## 2019-02-23 PROBLEM — T40.2X5A CONSTIPATION DUE TO OPIOID THERAPY: Status: ACTIVE | Noted: 2019-01-01

## 2019-02-23 NOTE — PLAN OF CARE
Problem: Pain:  Goal: Pain level will decrease  Pain level will decrease   Outcome: Ongoing    Goal: Control of acute pain  Control of acute pain   Outcome: Ongoing      Problem: Nausea/Vomiting:  Goal: Absence of nausea/vomiting  Absence of nausea/vomiting   Outcome: Ongoing    Goal: Able to drink  Able to drink   Outcome: Ongoing    Goal: Able to eat  Able to eat   Outcome: Ongoing      Problem: Nutrition  Goal: Optimal nutrition therapy  Outcome: Ongoing

## 2019-02-23 NOTE — PROGRESS NOTES
Scheduled medications given. PCA pump discontinued per orders. Patient denies any needs at this time. Call light within reach.

## 2019-02-23 NOTE — PROGRESS NOTES
Progress Note     Name: Jeanie Kay Room: [id number]/5226-71   YOB: 1980 MRN: [id number]   Sex: female CSN: [id number]    LOS: 8   PCP: Marilee Peralta MD   Attending: Nell Engle MD Admitting: Nell Engle MD        Subjective:   Patient seen and examined. Completed C1 of cisplatin and taxol on 2/21/19. Neulasta given on 2/22/19  Pain improved with po MS CONTIN and oxycodone prn  She had been c/o constipation and no BM for three days. Motility agent added on last night/this am. Small BM this am.      Physical Exam:   /68   Pulse 108   Temp 98 °F (36.7 °C) (Oral)   Resp 16   Ht 5' 6.5\" (1.689 m)   Wt 193 lb 3.2 oz (87.6 kg)   SpO2 98%   BMI 30.72 kg/m²     Gen: AAO  Resp: CTAB  CV: RRR  Abd: +BS, soft, nondistended, appropriately tender  Ext: nontender, no evidence of DVT       Allergies   Allergen Reactions    Food Hives     Raw spinach.  Spinach        Medications:   Reviewed    Diagnostic:   Reviewed    Labs:   reviewed  Recent Labs      02/23/19   0442   WBC  16.1*   HGB  8.2*   HCT  26.0*   PLT  323     Recent Labs      02/23/19   0442   NA  142   K  4.3   CL  109   CO2  24   BUN  13   CREATININE  <0.5*   CALCIUM  7.9*     No results for input(s): AST, ALT, BILIDIR, BILITOT, ALKPHOS in the last 72 hours. No results for input(s): INR in the last 72 hours. No results for input(s):  in the last 72 hours.     Assessment:     Patient Active Problem List   Diagnosis Code    Stridor R06.1    Acute bronchitis J20.9    Fibromyalgia M79.7    Bradycardia R00.1    Chest pain R07.9    GERD (gastroesophageal reflux disease) K21.9    Vaginal bleeding N93.9    History of juvenile rheumatoid arthritis Z87.39    SLE (systemic lupus erythematosus) [facility] M32.9    Lupus anticoagulant positive R76.0    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, antepartum ([facility]) O99.019, D61.9    Cervical cancer ([facility]) C53.9    Pneumonia J18.9    Pulmonary nodule R91.1   

## 2019-02-23 NOTE — PROGRESS NOTES
Morning assessment completed. Routine vitals stable. Some scheduled medications given. Patient requesting to take remaining of medications after breakfast. Respirations are easy and unlabored. Patient does not appear to be in distress. PRN Oxy-IR given for pain, with Benadryl given for itching. Call light within reach.

## 2019-02-23 NOTE — PROGRESS NOTES
Blood transfusion complete. VSS. No adverse reactions. Pt in bed with family at bedside. Call light in reach. Will continue to monitor.

## 2019-02-23 NOTE — PROGRESS NOTES
RESPIRATORY THERAPY ASSESSMENT    Name:  Carl Record Number:  [id number]  Age: 42 y.o. Gender: female  : 1980  Today's Date:  2019  Room:  [address] [place], [place] [address]41    Assessment   Patient Admission Diagnosis      Allergies  Allergies   Allergen Reactions    Food Hives     Raw spinach.  Spinach        Minimum Predicted Vital Capacity:     -          Actual Vital Capacity:      -          Pulmonary History: former smoker, lung CA   Home Oxygen Therapy:  room air  Home Respiratory Therapy: none  Current Respiratory Therapy:  Duoneb TID and PRN  Treatment Type: HHN  Medications: Albuterol/Ipratropium    Respiratory Severity Index(RSI)   Patients with orders for inhalation medications, oxygen, or any therapeutic treatment modality will be placed on Respiratory Protocol. They will be assessed with the first treatment and at least every 72 hours thereafter. The following severity scale will be used to determine frequency of treatment intervention. Smoking History: Smoking History Less than 1ppd or less than 15 pack year = 1    Social History  Social History   Substance Use Topics    Smoking status: Former Smoker     Packs/day: 0.25     Years: 3.00     Types: Cigarettes     Start date:      Quit date: 2001    Smokeless tobacco: Never Used    Alcohol use No       Recent Surgical History: Lower Abdominal = 2  Past Surgical History  Past Surgical History:   Procedure Laterality Date    BRONCHOSCOPY  10/16/14    Urgent intubation, direct laryngoscopy, subglottic tracheoscopy    BRONCHOSCOPY  10/18/2014    WITH EXTUBATION IN OR AND LARYNGOSCOPY     SECTION      x 3, , 2006, 2008    CHOLECYSTECTOMY  2012    CYSTOSCOPY Right 2019    CYSTOSCOPY performed by Tommy Lopez MD at [address] [place] [address]  2019    aborted planned procedure of BTL and uterine ablation.        Level of Consciousness: Alert, Oriented, and Cooperative = 0    Level of Activity: Mostly sedentary, minimal walking = 2    Respiratory Pattern: Regular Pattern; RR 8-20 = 0    Breath Sounds: Diminshed bilaterally and/or crackles = 2    Sputum   ,  , Sputum How Obtained: Spontaneous cough  Cough: Strong, spontaneous, non-productive = 0    Vital Signs   /71   Pulse 70   Temp 97.7 °F (36.5 °C) (Oral)   Resp 13   Ht 5' 6.5\" (1.689 m)   Wt 193 lb 3.2 oz (87.6 kg)   SpO2 96%   BMI 30.72 kg/m²   SPO2 (COPD values may differ): 86-87% on room air or greater than 92% on FiO2 35- 50% = 3    Peak Flow (asthma only): not applicable = 0    RSI: 1-93 = TID (three times daily) and Q4hr PRN for dyspnea        Plan       Goals: mobilize retained secretions and volume expansion  Plan of Care: Duoneb TID and PRN    Patient/caregiver was educated on the proper method of use of Respiratory Therapy device:  Yes      Level of understanding, patient and caregiver was able to:(check appropriate box(es))   [x] Verbalize understanding   [] Demonstrate understanding       [] Teach back        [] Needs reinforcement       []  No available caregiver               []  Other:     Response to education:  Good     Teaching Time:  5  minutes      Is patient being placed on Home Treatment Regimen? No     Electronically signed by Jordan Gray RCP on 2/23/2019 at 12:26 AM    Respiratory Protocol Guidelines     1. Assessment and treatment by Respiratory Therapy will be initiated for medication and therapeutic interventions upon initiation of aerosolized medication. 2. Physician will be contacted for respiratory rate (RR) greater than 35 breaths per minute. Therapy will be held for heart rate (HR) greater than 140 beats per minute, pending direction from physician. 3. Bronchodilators will be administered via Metered Dose Inhaler (MDI) with spacer when the following criteria are met:  a.  Alert and cooperative     b. HR < 140 bpm  c. RR < 30 bpm                d. Can demonstrate a 2-3 second inspiratory hold  4. Bronchodilators will be administered via Hand Held Nebulizer [facility]) to patients when ANY of the following criteria are met  a. Incognizant or uncooperative          b. Patients treated with HHN at Home        c. Unable to demonstrate proper MDI or HFA technique     d. RR > 30 bpm   5. Bronchodilators will be delivered via Metered Dose Inhaler (MDI) or Hydrofluoroalkane (HFA) with spacer to intubated patients on mechanical ventilation. 6. Inhalation medication orders will be delivered and/or substituted as outlined below. Aerosolized Medications Ordering and Administration Guidelines:    1. All Medications will be ordered by a physician, and their frequency and/or modality will be adjusted as defined by the patients Respiratory Severity Index (RSI) score. 2. If the patient does not have documented COPD, consider discontinuing anticholinergics when RSI is less than 9.  3. If the bronchospasm worsens (increased RSI), then the bronchodilator frequency can be increased to a maximum of every 4 hours. If greater than every 4 hours is required, the physician will be contacted. 4. If the bronchospasm improves, the frequency of the bronchodilator can be decreased, based on the patient's RSI, but not less than home treatment regimen frequency. 5. Bronchodilator(s) will be discontinued if patient has a RSI less than 9 and has received no scheduled or as needed treatment for 72  Hrs. Patients Ordered on a Mucolytic Agent:    1. Must always be administered with a bronchodilator. 2. Discontinue if patient experiences worsened bronchospasm, or secretions have lessened to the point that the patient is able to clear them with a cough. Anti-inflammatory and Combination Medications:    1.  If the patient lacks prior history of lung disease, is not using inhaled anti-inflammatory medication at home, and lacks wheezing by examination or by history for at least 24 hours, contact physician for possible

## 2019-02-23 NOTE — ONCOLOGY
LEUKOCYTESUR, UROBILINOGEN, BILIRUBINUR, BLOODU, GLUCOSEU, AMORPHOUS in the last 72 hours. Invalid input(s): KETONESU  Magnesium: No results found for: MG  VAISHNAVI:  Lab Results   Component Value Date    VAISHNAVI Negative 12/05/2018       RADIOLOGY:    Xr Chest Standard (2 Vw)    Result Date: 2/19/2019  EXAMINATION: TWO VIEWS OF THE CHEST 2/19/2019 4:26 pm COMPARISON: 02/16/2019 HISTORY: ORDERING SYSTEM PROVIDED HISTORY: cough TECHNOLOGIST PROVIDED HISTORY: Reason for exam:->cough Ordering Physician Provided Reason for Exam: Cervical Cancer (Pt was sent over from Dr Lewis Perales office - states she was sent here from office - was told today that she has stage 3 cervical cancer and was sent here for \"scans and further testing\") Acuity: Unknown Type of Exam: Unknown FINDINGS: There is patchy bilateral airspace disease, which appears increased when compared to the previous exam.  As detected on recent CT PA, some of those have a nodular appearance. The heart mediastinal contours are unremarkable. No pneumothorax. No free air. No acute bony abnormalities. Chin catheter noted. Patchy bilateral airspace disease, concerning for pneumonia, which appears increased when compared to the previous exam.  Again, some of these areas of opacity have a nodular appearance and septic emboli should be considered, especially when given the correlation with the CT PA from 02/15/2019. Process should be followed to resolution. Xr Chest Standard (2 Vw)    Result Date: 2/16/2019  EXAMINATION: TWO VIEWS OF THE CHEST 2/16/2019 11:49 am COMPARISON: 10/16/2014 HISTORY: ORDERING SYSTEM PROVIDED HISTORY: pneumonia TECHNOLOGIST PROVIDED HISTORY: Reason for exam:->pneumonia Ordering Physician Provided Reason for Exam: PNA Acuity: Acute Type of Exam: Initial Relevant Medical/Surgical History: cervical ca FINDINGS: There is patchy bibasilar consolidation, right greater than left, superimposed on a background of diffuse interstitial prominence.   Heart size, mediastinal contours and pulmonary vascularity are within normal limits. There is no pleural effusion. Multifocal bilateral pneumonia. Ct Chest Wo Contrast    Result Date: 1/28/2019  EXAMINATION: CT OF THE ABDOMEN AND PELVIS WITH CONTRAST; CT OF THE CHEST WITHOUT CONTRAST 1/28/2019 7:47 pm; 1/28/2019 7:45 pm TECHNIQUE: CT of the abdomen and pelvis was performed with the administration of intravenous contrast. Multiplanar reformatted images are provided for review. Dose modulation, iterative reconstruction, and/or weight based adjustment of the mA/kV was utilized to reduce the radiation dose to as low as reasonably achievable.; CT of the chest was performed without the administration of intravenous contrast. Multiplanar reformatted images are provided for review. Dose modulation, iterative reconstruction, and/or weight based adjustment of the mA/kV was utilized to reduce the radiation dose to as low as reasonably achievable. COMPARISON: CT abdomen and pelvis 01/18/2019. HISTORY: ORDERING SYSTEM PROVIDED HISTORY: RLQ abdominal pain TECHNOLOGIST PROVIDED HISTORY: Additional Contrast?->Oral Reason for exam:->RLQ pain persisting Ordering Physician Provided Reason for Exam: RLQ abdominal pain Acuity: Acute Type of Exam: Initial; ORDERING SYSTEM PROVIDED HISTORY: Other forms of systemic lupus erythematosus, unspecified organ involvement status ([facility] [address] TECHNOLOGIST PROVIDED HISTORY: Reason for exam:->pulmonary nodules seen on recent CT Ordering Physician Provided Reason for Exam: pulmonary nodules seen on recent CT Acuity: Chronic Type of Exam: Subsequent/Follow-up FINDINGS: Chest: Mediastinum: Lack of intravenous contrast limits evaluation of the mediastinum. The thoracic aorta is normal in appearance. The coronary arteries and branch vessels of the superior mediastinum and lower neck are unremarkable. The pulmonary arteries are normal in caliber. The heart is normal in size. No pericardial effusion.   The mediastinal esophagus and thyroid gland are unremarkable. No pathologically enlarged lymph nodes are seen in the chest. Lungs/pleura: The central airways are patent. No pleural effusion or pneumothorax. Mild bilateral dependent atelectasis. There are diffusely scattered patchy bilateral nodular/ground-glass opacities. No consolidation. Soft Tissues/Bones: No acute osseous or soft tissue abnormality. Abdomen/Pelvis: Organs: The liver is unremarkable. The gallbladder is surgically absent. The biliary tree is within normal limits status post cholecystectomy. The pancreas, spleen, and bilateral adrenal glands are unremarkable. The bilateral kidneys and ureters are unremarkable. GI/Bowel: Normal appendix. The colon is unremarkable. No evidence of acute appendicitis. No bowel obstruction or abnormal bowel wall thickening. Pelvis: Mild suspected circumferential urinary bladder wall thickening with mild adjacent stranding. The uterus and bilateral adnexae are unremarkable. Trace free fluid in the pelvis. No pelvic or inguinal lymphadenopathy. Peritoneum/Retroperitoneum: The abdominal aorta is normal in appearance. No retroperitoneal or mesenteric lymphadenopathy is identified. No free air or fluid is seen in the abdomen. Bones/Soft Tissues: No acute osseous or soft tissue abnormality. 1. Scattered small patchy ground-glass/nodular opacities throughout the bilateral lungs likely representing an infectious process. 2. Mild suspected circumferential urinary bladder wall thickening with mild adjacent stranding compatible with cystitis. Recommend correlation with urinalysis. 3. Normal appendix.      Ct Abdomen Pelvis W Iv Contrast Additional Contrast? None    Result Date: 2/15/2019  EXAMINATION: CTA OF THE CHEST; CT OF THE ABDOMEN AND PELVIS WITH CONTRAST 2/15/2019 2:11 pm TECHNIQUE: CTA of the chest was performed after the administration of intravenous contrast.  Multiplanar reformatted images are provided for review. MIP images are provided for review. Dose modulation, iterative reconstruction, and/or weight based adjustment of the mA/kV was utilized to reduce the radiation dose to as low as reasonably achievable.; CT of the abdomen and pelvis was performed with the administration of intravenous contrast. Multiplanar reformatted images are provided for review. Dose modulation, iterative reconstruction, and/or weight based adjustment of the mA/kV was utilized to reduce the radiation dose to as low as reasonably achievable. COMPARISON: 1/28/2019, 4/29/2011 HISTORY: ORDERING SYSTEM PROVIDED HISTORY: cervical ca - SOB - PE??? TECHNOLOGIST PROVIDED HISTORY: Ordering Physician Provided Reason for Exam: SOB Acuity: Unknown Type of Exam: Unknown; ORDERING SYSTEM PROVIDED HISTORY: abd disten - cervical ca? TECHNOLOGIST PROVIDED HISTORY: Additional Contrast?->None Ordering Physician Provided Reason for Exam: abd distention Acuity: Unknown Type of Exam: Unknown Patient with recently diagnosed cervical cancer. FINDINGS: Chest: Pulmonary arteries: The pulmonary arteries opacify normally. There is no evidence of filling defect to suggest a pulmonary embolism. Mediastinum: The thyroid is unremarkable. There is mild subcarinal and bilateral hilar lymphadenopathy, most likely benign and reactive in etiology given the patient's underlying lung findings. The thoracic aorta is unremarkable, without evidence of aneurysm or dissection. 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 No pericardial abnormality is identified. Lungs/pleura: There is mild mucous plugging within the bilateral lower lobes. The central airways are otherwise widely patent.   There has been interval development of widespread ground-glass opacity throughout both lungs, with diffuse intralobular septal thickening evident, new from prior exam.  This most likely reflects a combination of interstitial pulmonary edema and superimposed multifocal pneumonia. Additionally, there has been interval progression and more consolidation of multiple scattered various sized pulmonary nodules throughout both lungs since the study of 1/28/2019. A few of these are cavitary. The largest of these measures approximately 10 mm in short axis within the subpleural portion of the left lower lobe. These may be secondary to an atypical infectious or inflammatory process, to include septic emboli. Metastatic disease is considered less likely, though not excluded. There is no evidence of a pneumothorax or pleural effusion. Soft Tissues/Bones: No acute findings. Abdomen/Pelvis: Organs: The patient is status post cholecystectomy. The liver, spleen, and pancreas are normal.  The adrenal glands are unremarkable bilaterally. There has been interval development of nonspecific moderate right hydronephrosis since the prior exam, without definite demonstrable cause. Namely, no definite obstructing stone or mass is identified. The left kidney is stable and unremarkable. GI/Bowel: Evaluation of the hollow GI tract demonstrates no evidence of abnormal bowel wall thickening, dilatation, or obstruction. The appendix is normal. Pelvis: The urinary bladder is partially collapsed, though appears diffusely thick-walled and inflamed, with a mild amount of pericystic stranding noted. These findings are suggestive of an acute cystitis, progressed from prior exam.  Additionally, the cervix appears enlarged and irregular, and there is new abnormal thickening of the endometrium within the uterine fundus, which appears new in comparison with the study of 1/28/2019. The bilateral adnexa are unremarkable. There is a mild amount of free pelvic fluid, likely physiologic. There are stable mildly enlarged bilateral pelvic chain lymph nodes, the largest measuring approximately 2.6 x 1.6 cm along the right external iliac chain, similar to the study of 1/28/2019.  Peritoneum/Retroperitoneum: No intraperitoneal free air or free fluid is identified. No pathologic lymphadenopathy is seen. The abdominal aorta is unremarkable. No significant abdominal wall hernia is identified. Bones/Soft Tissues: There is mild bilateral sacroiliitis. The skeletal structures are otherwise unremarkable. 1. No CT evidence of a pulmonary embolism. 2. No acute abnormality of the thoracic aorta. 3. Interval development of widespread ground-glass opacity throughout both lungs with diffuse intralobular septal thickening. This most likely reflects a combination of interstitial pulmonary edema and multifocal pneumonia. 4. Interval progression of multiple nodular foci of consolidative opacity throughout both lungs, a few of which are cavitary in appearance. These nodules are most likely infectious or inflammatory in etiology, and septic emboli are a consideration. Given patient's history of cervical cancer, metastatic disease is on the differential, though considered less likely. 5. New nonspecific moderate right hydronephrosis, without demonstrable cause. However, there is underlying wall thickening and enhancement of the urinary bladder. This combination of findings could represent a cystitis in the setting of urinary tract infection with resultant pyelitis and hydronephrosis. However, given patient history of cervical cancer, locoregional spread of tumor with resultant obstruction of the distal right ureter may be a cause of the patient's right hydronephrosis. 6. Interval development of new irregularity of the cervix and nonspecific endometrial thickening in comparison with the prior study of 1/28/2019. This likely represents the patient's known underlying cervical cancer causing obstruction of the endometrial with resultant endometrial thickening. This could be further evaluated with a follow-up pelvic ultrasound.  7. Stable mild bilateral pelvic chain lymphadenopathy, right greater than left, possibly representing lymph nodes are seen in the chest. Lungs/pleura: The central airways are patent. No pleural effusion or pneumothorax. Mild bilateral dependent atelectasis. There are diffusely scattered patchy bilateral nodular/ground-glass opacities. No consolidation. Soft Tissues/Bones: No acute osseous or soft tissue abnormality. Abdomen/Pelvis: Organs: The liver is unremarkable. The gallbladder is surgically absent. The biliary tree is within normal limits status post cholecystectomy. The pancreas, spleen, and bilateral adrenal glands are unremarkable. The bilateral kidneys and ureters are unremarkable. GI/Bowel: Normal appendix. The colon is unremarkable. No evidence of acute appendicitis. No bowel obstruction or abnormal bowel wall thickening. Pelvis: Mild suspected circumferential urinary bladder wall thickening with mild adjacent stranding. The uterus and bilateral adnexae are unremarkable. Trace free fluid in the pelvis. No pelvic or inguinal lymphadenopathy. Peritoneum/Retroperitoneum: The abdominal aorta is normal in appearance. No retroperitoneal or mesenteric lymphadenopathy is identified. No free air or fluid is seen in the abdomen. Bones/Soft Tissues: No acute osseous or soft tissue abnormality. 1. Scattered small patchy ground-glass/nodular opacities throughout the bilateral lungs likely representing an infectious process. 2. Mild suspected circumferential urinary bladder wall thickening with mild adjacent stranding compatible with cystitis. Recommend correlation with urinalysis. 3. Normal appendix. Ct Chest Pulmonary Embolism W Contrast    Result Date: 2/15/2019  EXAMINATION: CTA OF THE CHEST; CT OF THE ABDOMEN AND PELVIS WITH CONTRAST 2/15/2019 2:11 pm TECHNIQUE: CTA of the chest was performed after the administration of intravenous contrast.  Multiplanar reformatted images are provided for review. MIP images are provided for review.  Dose modulation, iterative reconstruction, and/or weight based adjustment of the mA/kV was utilized to reduce the radiation dose to as low as reasonably achievable.; CT of the abdomen and pelvis was performed with the administration of intravenous contrast. Multiplanar reformatted images are provided for review. Dose modulation, iterative reconstruction, and/or weight based adjustment of the mA/kV was utilized to reduce the radiation dose to as low as reasonably achievable. COMPARISON: 1/28/2019, 4/29/2011 HISTORY: ORDERING SYSTEM PROVIDED HISTORY: cervical ca - SOB - PE??? TECHNOLOGIST PROVIDED HISTORY: Ordering Physician Provided Reason for Exam: SOB Acuity: Unknown Type of Exam: Unknown; ORDERING SYSTEM PROVIDED HISTORY: abd disten - cervical ca? TECHNOLOGIST PROVIDED HISTORY: Additional Contrast?->None Ordering Physician Provided Reason for Exam: abd distention Acuity: Unknown Type of Exam: Unknown Patient with recently diagnosed cervical cancer. FINDINGS: Chest: Pulmonary arteries: The pulmonary arteries opacify normally. There is no evidence of filling defect to suggest a pulmonary embolism. Mediastinum: The thyroid is unremarkable. There is mild subcarinal and bilateral hilar lymphadenopathy, most likely benign and reactive in etiology given the patient's underlying lung findings. The thoracic aorta is unremarkable, without evidence of aneurysm or dissection. 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 No pericardial abnormality is identified. Lungs/pleura: There is mild mucous plugging within the bilateral lower lobes. The central airways are otherwise widely patent. There has been interval development of widespread ground-glass opacity throughout both lungs, with diffuse intralobular septal thickening evident, new from prior exam.  This most likely reflects a combination of interstitial pulmonary edema and superimposed multifocal pneumonia.   Additionally, there has been interval progression and more consolidation of multiple scattered various sized pulmonary nodules throughout both lungs since the study of 1/28/2019. A few of these are cavitary. The largest of these measures approximately 10 mm in short axis within the subpleural portion of the left lower lobe. These may be secondary to an atypical infectious or inflammatory process, to include septic emboli. Metastatic disease is considered less likely, though not excluded. There is no evidence of a pneumothorax or pleural effusion. Soft Tissues/Bones: No acute findings. Abdomen/Pelvis: Organs: The patient is status post cholecystectomy. The liver, spleen, and pancreas are normal.  The adrenal glands are unremarkable bilaterally. There has been interval development of nonspecific moderate right hydronephrosis since the prior exam, without definite demonstrable cause. Namely, no definite obstructing stone or mass is identified. The left kidney is stable and unremarkable. GI/Bowel: Evaluation of the hollow GI tract demonstrates no evidence of abnormal bowel wall thickening, dilatation, or obstruction. The appendix is normal. Pelvis: The urinary bladder is partially collapsed, though appears diffusely thick-walled and inflamed, with a mild amount of pericystic stranding noted. These findings are suggestive of an acute cystitis, progressed from prior exam.  Additionally, the cervix appears enlarged and irregular, and there is new abnormal thickening of the endometrium within the uterine fundus, which appears new in comparison with the study of 1/28/2019. The bilateral adnexa are unremarkable. There is a mild amount of free pelvic fluid, likely physiologic. There are stable mildly enlarged bilateral pelvic chain lymph nodes, the largest measuring approximately 2.6 x 1.6 cm along the right external iliac chain, similar to the study of 1/28/2019. Peritoneum/Retroperitoneum: No intraperitoneal free air or free fluid is identified. No pathologic lymphadenopathy is seen. The abdominal aorta is unremarkable. No significant abdominal wall hernia is identified. Bones/Soft Tissues: There is mild bilateral sacroiliitis. The skeletal structures are otherwise unremarkable. 1. No CT evidence of a pulmonary embolism. 2. No acute abnormality of the thoracic aorta. 3. Interval development of widespread ground-glass opacity throughout both lungs with diffuse intralobular septal thickening. This most likely reflects a combination of interstitial pulmonary edema and multifocal pneumonia. 4. Interval progression of multiple nodular foci of consolidative opacity throughout both lungs, a few of which are cavitary in appearance. These nodules are most likely infectious or inflammatory in etiology, and septic emboli are a consideration. Given patient's history of cervical cancer, metastatic disease is on the differential, though considered less likely. 5. New nonspecific moderate right hydronephrosis, without demonstrable cause. However, there is underlying wall thickening and enhancement of the urinary bladder. This combination of findings could represent a cystitis in the setting of urinary tract infection with resultant pyelitis and hydronephrosis. However, given patient history of cervical cancer, locoregional spread of tumor with resultant obstruction of the distal right ureter may be a cause of the patient's right hydronephrosis. 6. Interval development of new irregularity of the cervix and nonspecific endometrial thickening in comparison with the prior study of 1/28/2019. This likely represents the patient's known underlying cervical cancer causing obstruction of the endometrial with resultant endometrial thickening. This could be further evaluated with a follow-up pelvic ultrasound. 7. Stable mild bilateral pelvic chain lymphadenopathy, right greater than left, possibly representing metastatic disease.      Ir Port Placement > 5 Years    Result Date: 2/19/2019  PROCEDURE: ULTRASOUND GUIDED VASCULAR ACCESS. FLUOROSCOPY GUIDED PLACEMENT OF A RIGHT IJ SUBCUTANEOUS PORT-A-CATH. MODERATE CONSCIOUS SEDATION 2/19/2019. HISTORY: ORDERING SYSTEM PROVIDED HISTORY: cervical cancer to get chemo TECHNOLOGIST PROVIDED HISTORY: Reason for exam:->cervical cancer to get chemo How many lumens are being requested?->1 What site is the preferred site? ->No preference What side should this line be placed? ->Either 72-year-old female with cervical cancer, presents for chest port placement. SEDATION: Moderate sedation was administered under my supervision by a qualified nurse. 4 mg of Versed was administered intravenously. Approximate intra procedural sedation time was 23 minutes. FLUOROSCOPY DOSE AND TYPE OR TIME AND EXPOSURES: 54 seconds of fluoroscopy with 2 exposures. TECHNIQUE: Informed consent was obtained after a detailed explanation of the procedure including risks, benefits, and alternatives. Universal protocol was observed. The right neck and chest were prepped and draped in sterile fashion using maximum sterile barrier technique. Local anesthesia was achieved with lidocaine. A micropuncture needle was used to access the right internal jugular vein using ultrasound guidance. An ultrasound image demonstrating patency of the vein with needle tip located within it. An image was obtained and stored in PACs. A 0.035 guidewire was used to place a peel-away sheath. A chest wall incision was made and a subcutaneous pocket was created. The port reservoir was placed within the pocket and the port tubing was attached and tunneled subcutaneously to the venotomy site. The port tubing was cut to an appropriate length and advanced through the peel-away sheath under fluoroscopic guidance to the cavo-atrial junction. The chest wall incision was closed using 3-0 and 4-0 Vicryl suture and tissue adhesive was applied to the venotomy site and chest wall incision.  The port flushed easily and there was a good blood 26 minutes. FLUOROSCOPY DOSE AND TYPE OR TIME AND EXPOSURES: 3.5 minutes, 7 digital imaging sequences. DESCRIPTION OF PROCEDURE: Informed consent was obtained after a detailed explanation of the procedure including risks, benefits, and alternatives. Universal protocol was observed. TECHNIQUE: Informed consent was previously obtained. In the semi prone position, an appropriate area posterior lateral aspect of the skin overlying the targeted collecting system was demarcated, sterilely prepared draped and anesthetized. Utilizing ultrasound, anatomy from prior CT scan and other imaging, the needle trajectory and depth was predetermined. The micro puncture needle was advanced using fluoroscopic and ultrasound guidance into the collecting system without difficulty. Once urine was aspirated, a small platinum tip wire was advanced into the collecting system and into the proximal ureter. The tract was dilated. With wire exchange, a 6 [place] sheath was placed. The collecting system is moderately dilated and the ureter is somewhat tortuous. A glide wire was easily advanced down the ureter. Because of this, a 5 [place] peel-away sheath was advanced into the proximal ureter over the wire. The glide wire was then advanced with a steerable catheter into the urinary bladder without difficulty and only mild discomfort. There is no contrast extravasation. Once the catheter was placed into the urinary bladder, contrast injection is seen diluted within the somewhat distended urinary bladder. The catheter was used to place a Super Stiff wire for ureteral stent placement. A 26 cm 8 [place] ureteral stent was then advanced, such that the distal pigtail is well within the urinary bladder. The proximal pigtail was placed in the lower portion of the left renal pelvis. Again there is no contrast extravasation. No filling defect to indicate clot or bleeding was observed on early or later contrast imaging.   Following this, dense contrast was injected showing normal expected filling of the ureteral stent and exiting from the distal pigtail directly into the urinary bladder. Following this, the internalized stent was disengaged. The proximal collecting system was then decompressed. The proximal nephrostomy access was then removed entirely, no external drainage necessary at this point. The patient tolerated the procedure well. A sterile bandage was placed over the small incision. From this point forward, the ureteral stent can be exchanged as needed from below. Successful right percutaneous nephrostomy with antegrade pyelogram. High-grade distal ureteral obstruction with severe hydronephrosis, successfully crossed as above. Successful 8 French internalized right ureteral stent placement as above. Current Medications   ipratropium-albuterol  1 ampule Inhalation TID    morphine  15 mg Oral 2 times per day    sodium chloride  250 mL Intravenous Once    ketorolac  15 mg Intravenous Q6H    sennosides-docusate sodium  2 tablet Oral Daily    zolpidem  10 mg Oral Nightly    gabapentin  300 mg Oral TID    baclofen  20 mg Oral BID    sodium chloride flush  10 mL Intravenous 2 times per day    DULoxetine  60 mg Oral Nightly        ASSESSMENT & PLAN:  Carcinoma cervix squamous cell type. Has received one cycle of taxol cisplatinum. Avastin was held first cycle because of recent procedures and persistent vaginal bleeding cycle 1 day 1 2/20/19    Pain due to neoplasm she does not think pca has been helpful will convert to ms contin and oxycodone for breakthrough. Will adjust dose as necessary for comfort\  Constipation due to narcotics inactivity   Hypoxia due to pulmonary metastases. I have discussed the above stated plan with the patient and they verbalized understanding and agreed with the plan. Thank you for allowing us to participate in this patients care.     Comfort Sanabria MD, 2/23/2019, 8:25 AM

## 2019-02-23 NOTE — PROGRESS NOTES
Assessment complete. VSS. Meds given. Pt tolerated well. Pt in bed with family at bedside. Call light in reach. Will continue to monitor.

## 2019-02-23 NOTE — PROGRESS NOTES
Daily PRN  ondansetron (ZOFRAN) injection 4 mg, 4 mg, Intravenous, Q6H PRN  potassium chloride (KLOR-CON M) extended release tablet 40 mEq, 40 mEq, Oral, PRN **OR** potassium bicarb-citric acid (EFFER-K) effervescent tablet 40 mEq, 40 mEq, Oral, PRN **OR** potassium chloride 10 mEq/100 mL IVPB (Peripheral Line), 10 mEq, Intravenous, PRN  promethazine (PHENERGAN) injection 25 mg, 25 mg, Intramuscular, Q6H PRN  DULoxetine (CYMBALTA) extended release capsule 60 mg, 60 mg, Oral, Nightly         Objective:  /68   Pulse 108   Temp 98 °F (36.7 °C) (Oral)   Resp 16   Ht 5' 6.5\" (1.689 m)   Wt 193 lb 3.2 oz (87.6 kg)   SpO2 98%   BMI 30.72 kg/m²      Patient Vitals for the past 24 hrs:   BP Temp Temp src Pulse Resp SpO2   02/23/19 0846 122/68 98 °F (36.7 °C) Oral 108 16 98 %   02/23/19 0813 - - - - 16 95 %   02/23/19 0458 - - - - 14 -   02/23/19 0338 - - - - 15 -   02/23/19 0337 116/76 98.1 °F (36.7 °C) Oral 80 15 94 %   02/23/19 0330 - - - - 13 94 %   02/23/19 0142 105/69 98.2 °F (36.8 °C) Oral 80 16 94 %   02/23/19 0117 - - - - 15 -   02/23/19 0049 102/72 98 °F (36.7 °C) Oral 72 15 96 %   02/22/19 2350 - - - - 13 96 %   02/22/19 2346 109/71 97.7 °F (36.5 °C) Oral 70 16 97 %   02/22/19 2237 109/71 98.1 °F (36.7 °C) Oral 75 15 95 %   02/22/19 2222 110/70 98.1 °F (36.7 °C) Oral 78 16 93 %   02/22/19 2136 114/71 98 °F (36.7 °C) Oral 82 14 96 %   02/22/19 2000 - - - - 12 98 %   02/22/19 1615 120/66 98.3 °F (36.8 °C) Oral 85 14 95 %   02/22/19 1341 - - - - 16 98 %   02/22/19 1328 124/77 98.1 °F (36.7 °C) Oral - 14 96 %     Patient Vitals for the past 96 hrs (Last 3 readings):   Weight   02/20/19 1034 193 lb 3.2 oz (87.6 kg)           Intake/Output Summary (Last 24 hours) at 02/23/19 1000  Last data filed at 02/23/19 6658   Gross per 24 hour   Intake          2130.69 ml   Output                0 ml   Net          2130.69 ml         Physical Exam:   S1, S2 normal, no murmur, rub or gallop, regular rate and rhythm  clear to auscultation bilaterally  Mildly distended. bs+  extremities normal, atraumatic, no cyanosis or edema    Labs:  Lab Results   Component Value Date    WBC 16.1 (H) 02/23/2019    HGB 8.2 (L) 02/23/2019    HCT 26.0 (L) 02/23/2019     02/23/2019    CHOL 188 10/15/2014    TRIG 132 10/15/2014    HDL 65 (H) 10/15/2014    ALT 49 (H) 02/18/2019    AST 23 02/18/2019     02/23/2019    K 4.3 02/23/2019     02/23/2019    CREATININE <0.5 (L) 02/23/2019    BUN 13 02/23/2019    CO2 24 02/23/2019    TSH 1.57 06/18/2014    INR 1.11 02/18/2019    LABMICR YES 02/18/2019     Lab Results   Component Value Date    CKTOTAL 185 12/18/2014    TROPONINI 0.02 (H) 02/15/2019       Recent Imaging Results are Reviewed:  Xr Chest Standard (2 Vw)    Result Date: 2/19/2019  EXAMINATION: TWO VIEWS OF THE CHEST 2/19/2019 4:26 pm COMPARISON: 02/16/2019 HISTORY: ORDERING SYSTEM PROVIDED HISTORY: cough TECHNOLOGIST PROVIDED HISTORY: Reason for exam:->cough Ordering Physician Provided Reason for Exam: Cervical Cancer (Pt was sent over from Dr Shabbir Paul office - states she was sent here from office - was told today that she has stage 3 cervical cancer and was sent here for \"scans and further testing\") Acuity: Unknown Type of Exam: Unknown FINDINGS: There is patchy bilateral airspace disease, which appears increased when compared to the previous exam.  As detected on recent CT PA, some of those have a nodular appearance. The heart mediastinal contours are unremarkable. No pneumothorax. No free air. No acute bony abnormalities. Chin catheter noted. Patchy bilateral airspace disease, concerning for pneumonia, which appears increased when compared to the previous exam.  Again, some of these areas of opacity have a nodular appearance and septic emboli should be considered, especially when given the correlation with the CT PA from 02/15/2019. Process should be followed to resolution.      Xr Chest Standard (2 seen on recent CT Ordering Physician Provided Reason for Exam: pulmonary nodules seen on recent CT Acuity: Chronic Type of Exam: Subsequent/Follow-up FINDINGS: Chest: Mediastinum: Lack of intravenous contrast limits evaluation of the mediastinum. The thoracic aorta is normal in appearance. The coronary arteries and branch vessels of the superior mediastinum and lower neck are unremarkable. The pulmonary arteries are normal in caliber. The heart is normal in size. No pericardial effusion. The mediastinal esophagus and thyroid gland are unremarkable. No pathologically enlarged lymph nodes are seen in the chest. Lungs/pleura: The central airways are patent. No pleural effusion or pneumothorax. Mild bilateral dependent atelectasis. There are diffusely scattered patchy bilateral nodular/ground-glass opacities. No consolidation. Soft Tissues/Bones: No acute osseous or soft tissue abnormality. Abdomen/Pelvis: Organs: The liver is unremarkable. The gallbladder is surgically absent. The biliary tree is within normal limits status post cholecystectomy. The pancreas, spleen, and bilateral adrenal glands are unremarkable. The bilateral kidneys and ureters are unremarkable. GI/Bowel: Normal appendix. The colon is unremarkable. No evidence of acute appendicitis. No bowel obstruction or abnormal bowel wall thickening. Pelvis: Mild suspected circumferential urinary bladder wall thickening with mild adjacent stranding. The uterus and bilateral adnexae are unremarkable. Trace free fluid in the pelvis. No pelvic or inguinal lymphadenopathy. Peritoneum/Retroperitoneum: The abdominal aorta is normal in appearance. No retroperitoneal or mesenteric lymphadenopathy is identified. No free air or fluid is seen in the abdomen. Bones/Soft Tissues: No acute osseous or soft tissue abnormality. 1. Scattered small patchy ground-glass/nodular opacities throughout the bilateral lungs likely representing an infectious process. 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 No pericardial abnormality is identified. Lungs/pleura: There is mild mucous plugging within the bilateral lower lobes. The central airways are otherwise widely patent. There has been interval development of widespread ground-glass opacity throughout both lungs, with diffuse intralobular septal thickening evident, new from prior exam.  This most likely reflects a combination of interstitial pulmonary edema and superimposed multifocal pneumonia. Additionally, there has been interval progression and more consolidation of multiple scattered various sized pulmonary nodules throughout both lungs since the study of 1/28/2019. A few of these are cavitary. The largest of these measures approximately 10 mm in short axis within the subpleural portion of the left lower lobe. These may be secondary to an atypical infectious or inflammatory process, to include septic emboli. Metastatic disease is considered less likely, though not excluded. There is no evidence of a pneumothorax or pleural effusion. Soft Tissues/Bones: No acute findings. Abdomen/Pelvis: Organs: The patient is status post cholecystectomy. The liver, spleen, and pancreas are normal.  The adrenal glands are unremarkable bilaterally. There has been interval development of nonspecific moderate right hydronephrosis since the prior exam, without definite demonstrable cause. Namely, no definite obstructing stone or mass is identified. The left kidney is stable and unremarkable. GI/Bowel: Evaluation of the hollow GI tract demonstrates no evidence of abnormal bowel wall thickening, dilatation, or obstruction. The appendix is normal. Pelvis: The urinary bladder is partially collapsed, though appears diffusely thick-walled and inflamed, with a mild amount of pericystic stranding noted.  These findings are suggestive of an acute cystitis, progressed from prior exam.  Additionally, the cervix appears enlarged and irregular, and there is new abnormal thickening of the endometrium within the uterine fundus, which appears new in comparison with the study of 1/28/2019. The bilateral adnexa are unremarkable. There is a mild amount of free pelvic fluid, likely physiologic. There are stable mildly enlarged bilateral pelvic chain lymph nodes, the largest measuring approximately 2.6 x 1.6 cm along the right external iliac chain, similar to the study of 1/28/2019. Peritoneum/Retroperitoneum: No intraperitoneal free air or free fluid is identified. No pathologic lymphadenopathy is seen. The abdominal aorta is unremarkable. No significant abdominal wall hernia is identified. Bones/Soft Tissues: There is mild bilateral sacroiliitis. The skeletal structures are otherwise unremarkable. 1. No CT evidence of a pulmonary embolism. 2. No acute abnormality of the thoracic aorta. 3. Interval development of widespread ground-glass opacity throughout both lungs with diffuse intralobular septal thickening. This most likely reflects a combination of interstitial pulmonary edema and multifocal pneumonia. 4. Interval progression of multiple nodular foci of consolidative opacity throughout both lungs, a few of which are cavitary in appearance. These nodules are most likely infectious or inflammatory in etiology, and septic emboli are a consideration. Given patient's history of cervical cancer, metastatic disease is on the differential, though considered less likely. 5. New nonspecific moderate right hydronephrosis, without demonstrable cause. However, there is underlying wall thickening and enhancement of the urinary bladder. This combination of findings could represent a cystitis in the setting of urinary tract infection with resultant pyelitis and hydronephrosis.   However, given patient history of cervical cancer, locoregional spread of tumor with resultant obstruction of the distal right ureter may be a cause of the patient's right hydronephrosis. 6. Interval development of new irregularity of the cervix and nonspecific endometrial thickening in comparison with the prior study of 1/28/2019. This likely represents the patient's known underlying cervical cancer causing obstruction of the endometrial with resultant endometrial thickening. This could be further evaluated with a follow-up pelvic ultrasound. 7. Stable mild bilateral pelvic chain lymphadenopathy, right greater than left, possibly representing metastatic disease. Ct Abdomen Pelvis W Iv Contrast Additional Contrast? Oral    Result Date: 1/28/2019  EXAMINATION: CT OF THE ABDOMEN AND PELVIS WITH CONTRAST; CT OF THE CHEST WITHOUT CONTRAST 1/28/2019 7:47 pm; 1/28/2019 7:45 pm TECHNIQUE: CT of the abdomen and pelvis was performed with the administration of intravenous contrast. Multiplanar reformatted images are provided for review. Dose modulation, iterative reconstruction, and/or weight based adjustment of the mA/kV was utilized to reduce the radiation dose to as low as reasonably achievable.; CT of the chest was performed without the administration of intravenous contrast. Multiplanar reformatted images are provided for review. Dose modulation, iterative reconstruction, and/or weight based adjustment of the mA/kV was utilized to reduce the radiation dose to as low as reasonably achievable. COMPARISON: CT abdomen and pelvis 01/18/2019.  HISTORY: ORDERING SYSTEM PROVIDED HISTORY: RLQ abdominal pain TECHNOLOGIST PROVIDED HISTORY: Additional Contrast?->Oral Reason for exam:->RLQ pain persisting Ordering Physician Provided Reason for Exam: RLQ abdominal pain Acuity: Acute Type of Exam: Initial; ORDERING SYSTEM PROVIDED HISTORY: Other forms of systemic lupus erythematosus, unspecified organ involvement status ([facility] [address] TECHNOLOGIST PROVIDED HISTORY: Reason for exam:->pulmonary nodules seen on recent CT Ordering Physician Provided Reason for Exam: pulmonary nodules seen on recent CT Acuity: Chronic Type of Exam: Subsequent/Follow-up FINDINGS: Chest: Mediastinum: Lack of intravenous contrast limits evaluation of the mediastinum. The thoracic aorta is normal in appearance. The coronary arteries and branch vessels of the superior mediastinum and lower neck are unremarkable. The pulmonary arteries are normal in caliber. The heart is normal in size. No pericardial effusion. The mediastinal esophagus and thyroid gland are unremarkable. No pathologically enlarged lymph nodes are seen in the chest. Lungs/pleura: The central airways are patent. No pleural effusion or pneumothorax. Mild bilateral dependent atelectasis. There are diffusely scattered patchy bilateral nodular/ground-glass opacities. No consolidation. Soft Tissues/Bones: No acute osseous or soft tissue abnormality. Abdomen/Pelvis: Organs: The liver is unremarkable. The gallbladder is surgically absent. The biliary tree is within normal limits status post cholecystectomy. The pancreas, spleen, and bilateral adrenal glands are unremarkable. The bilateral kidneys and ureters are unremarkable. GI/Bowel: Normal appendix. The colon is unremarkable. No evidence of acute appendicitis. No bowel obstruction or abnormal bowel wall thickening. Pelvis: Mild suspected circumferential urinary bladder wall thickening with mild adjacent stranding. The uterus and bilateral adnexae are unremarkable. Trace free fluid in the pelvis. No pelvic or inguinal lymphadenopathy. Peritoneum/Retroperitoneum: The abdominal aorta is normal in appearance. No retroperitoneal or mesenteric lymphadenopathy is identified. No free air or fluid is seen in the abdomen. Bones/Soft Tissues: No acute osseous or soft tissue abnormality. 1. Scattered small patchy ground-glass/nodular opacities throughout the bilateral lungs likely representing an infectious process.  2. Mild suspected circumferential urinary bladder wall thickening with mild adjacent stranding compatible with cystitis. Recommend correlation with urinalysis. 3. Normal appendix. Ct Chest Pulmonary Embolism W Contrast    Result Date: 2/15/2019  EXAMINATION: CTA OF THE CHEST; CT OF THE ABDOMEN AND PELVIS WITH CONTRAST 2/15/2019 2:11 pm TECHNIQUE: CTA of the chest was performed after the administration of intravenous contrast.  Multiplanar reformatted images are provided for review. MIP images are provided for review. Dose modulation, iterative reconstruction, and/or weight based adjustment of the mA/kV was utilized to reduce the radiation dose to as low as reasonably achievable.; CT of the abdomen and pelvis was performed with the administration of intravenous contrast. Multiplanar reformatted images are provided for review. Dose modulation, iterative reconstruction, and/or weight based adjustment of the mA/kV was utilized to reduce the radiation dose to as low as reasonably achievable. COMPARISON: 1/28/2019, 4/29/2011 HISTORY: ORDERING SYSTEM PROVIDED HISTORY: cervical ca - SOB - PE??? TECHNOLOGIST PROVIDED HISTORY: Ordering Physician Provided Reason for Exam: SOB Acuity: Unknown Type of Exam: Unknown; ORDERING SYSTEM PROVIDED HISTORY: abd disten - cervical ca? TECHNOLOGIST PROVIDED HISTORY: Additional Contrast?->None Ordering Physician Provided Reason for Exam: abd distention Acuity: Unknown Type of Exam: Unknown Patient with recently diagnosed cervical cancer. FINDINGS: Chest: Pulmonary arteries: The pulmonary arteries opacify normally. There is no evidence of filling defect to suggest a pulmonary embolism. Mediastinum: The thyroid is unremarkable. There is mild subcarinal and bilateral hilar lymphadenopathy, most likely benign and reactive in etiology given the patient's underlying lung findings. The thoracic aorta is unremarkable, without evidence of aneurysm or dissection. 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   No pericardial abnormality is identified. Lungs/pleura: There is mild mucous plugging within the bilateral lower lobes. The central airways are otherwise widely patent. There has been interval development of widespread ground-glass opacity throughout both lungs, with diffuse intralobular septal thickening evident, new from prior exam.  This most likely reflects a combination of interstitial pulmonary edema and superimposed multifocal pneumonia. Additionally, there has been interval progression and more consolidation of multiple scattered various sized pulmonary nodules throughout both lungs since the study of 1/28/2019. A few of these are cavitary. The largest of these measures approximately 10 mm in short axis within the subpleural portion of the left lower lobe. These may be secondary to an atypical infectious or inflammatory process, to include septic emboli. Metastatic disease is considered less likely, though not excluded. There is no evidence of a pneumothorax or pleural effusion. Soft Tissues/Bones: No acute findings. Abdomen/Pelvis: Organs: The patient is status post cholecystectomy. The liver, spleen, and pancreas are normal.  The adrenal glands are unremarkable bilaterally. There has been interval development of nonspecific moderate right hydronephrosis since the prior exam, without definite demonstrable cause. Namely, no definite obstructing stone or mass is identified. The left kidney is stable and unremarkable. GI/Bowel: Evaluation of the hollow GI tract demonstrates no evidence of abnormal bowel wall thickening, dilatation, or obstruction. The appendix is normal. Pelvis: The urinary bladder is partially collapsed, though appears diffusely thick-walled and inflamed, with a mild amount of pericystic stranding noted.  These findings are suggestive of an acute cystitis, progressed from prior exam.  Additionally, the cervix appears enlarged and irregular, and there is new abnormal thickening of the endometrium within the uterine fundus, which appears new in comparison with the study of 1/28/2019. The bilateral adnexa are unremarkable. There is a mild amount of free pelvic fluid, likely physiologic. There are stable mildly enlarged bilateral pelvic chain lymph nodes, the largest measuring approximately 2.6 x 1.6 cm along the right external iliac chain, similar to the study of 1/28/2019. Peritoneum/Retroperitoneum: No intraperitoneal free air or free fluid is identified. No pathologic lymphadenopathy is seen. The abdominal aorta is unremarkable. No significant abdominal wall hernia is identified. Bones/Soft Tissues: There is mild bilateral sacroiliitis. The skeletal structures are otherwise unremarkable. 1. No CT evidence of a pulmonary embolism. 2. No acute abnormality of the thoracic aorta. 3. Interval development of widespread ground-glass opacity throughout both lungs with diffuse intralobular septal thickening. This most likely reflects a combination of interstitial pulmonary edema and multifocal pneumonia. 4. Interval progression of multiple nodular foci of consolidative opacity throughout both lungs, a few of which are cavitary in appearance. These nodules are most likely infectious or inflammatory in etiology, and septic emboli are a consideration. Given patient's history of cervical cancer, metastatic disease is on the differential, though considered less likely. 5. New nonspecific moderate right hydronephrosis, without demonstrable cause. However, there is underlying wall thickening and enhancement of the urinary bladder. This combination of findings could represent a cystitis in the setting of urinary tract infection with resultant pyelitis and hydronephrosis. However, given patient history of cervical cancer, locoregional spread of tumor with resultant obstruction of the distal right ureter may be a cause of the patient's right hydronephrosis.  6. Interval development of new irregularity of the subcutaneous pocket was created. The port reservoir was placed within the pocket and the port tubing was attached and tunneled subcutaneously to the venotomy site. The port tubing was cut to an appropriate length and advanced through the peel-away sheath under fluoroscopic guidance to the cavo-atrial junction. The chest wall incision was closed using 3-0 and 4-0 Vicryl suture and tissue adhesive was applied to the venotomy site and chest wall incision. The port flushed easily and there was a good blood return. The port was locked with heparinized saline. The patient tolerated the procedure well and there were no immediate complications. FINDINGS: Fluoroscopic image demonstrates the tip of the port in the right atrium. 1. Successful ultrasound and fluoroscopy guided Port-A-Cath placement via right IJ access, as described above. 2.  The port was left accessed for immediate use. Fl Retrograde Pyelogram Right    Result Date: 2/17/2019  EXAMINATION: SPOT FLUOROSCOPIC IMAGES 2/17/2019 6:52 am TECHNIQUE: Fluoroscopy was provided by the radiology department for procedure. Radiologist was not present during examination. FLUOROSCOPY DOSE AND TYPE OR TIME AND EXPOSURES: 3 seconds of fluoroscopy was utilized for purposes of intraoperative localization. 1 fluoroscopic spot image was obtained. COMPARISON: None HISTORY: Intraprocedural imaging. FINDINGS: 1 spot images of the abdomen was obtained. Intraprocedural fluoroscopic spot images as above. See separate procedure report for more information. Ir Guided Ureteral Stent Place W Nephro Cath New Access    Result Date: 2/18/2019  PROCEDURE: IR ULTRASOUND AND FLUOROSCOPIC GUIDED RIGHT PERCUTANEOUS NEPHROSTOMY AND NEPHROSTOGRAM IR ANTEGRADE RIGHT URETERAL STENT. MODERATE CONSCIOUS SEDATION 2/18/2019 HISTORY: ORDERING SYSTEM PROVIDED HISTORY: needs R sided nephrostomy tube placed. maria guadalupe could not do via cysto.  Adi said that IR would do on monday TECHNOLOGIST PROVIDED HISTORY: Reason for exam:->needs R sided nephrostomy tube placed. maria guadalupe could not do via cysto. Adi said that IR would do on monday COMPARISON: Abdomen pelvic CT 02/15/2019. CONTRAST: 30 cc, nonvascular within collecting system only. . SEDATION: Intravenous sedation was administered with continuous monitoring throughout the procedure. In measured doses, a total of 6 mg intravenous Versed and 275 mcg intravenous fentanyl was administered. My total procedure time with sedation was 26 minutes. FLUOROSCOPY DOSE AND TYPE OR TIME AND EXPOSURES: 3.5 minutes, 7 digital imaging sequences. DESCRIPTION OF PROCEDURE: Informed consent was obtained after a detailed explanation of the procedure including risks, benefits, and alternatives. Universal protocol was observed. TECHNIQUE: Informed consent was previously obtained. In the semi prone position, an appropriate area posterior lateral aspect of the skin overlying the targeted collecting system was demarcated, sterilely prepared draped and anesthetized. Utilizing ultrasound, anatomy from prior CT scan and other imaging, the needle trajectory and depth was predetermined. The micro puncture needle was advanced using fluoroscopic and ultrasound guidance into the collecting system without difficulty. Once urine was aspirated, a small platinum tip wire was advanced into the collecting system and into the proximal ureter. The tract was dilated. With wire exchange, a 6 Finnish sheath was placed. The collecting system is moderately dilated and the ureter is somewhat tortuous. A glide wire was easily advanced down the ureter. Because of this, a 5 Finnish peel-away sheath was advanced into the proximal ureter over the wire. The glide wire was then advanced with a steerable catheter into the urinary bladder without difficulty and only mild discomfort. There is no contrast extravasation.   Once the catheter was placed into the urinary bladder, contrast

## 2019-02-23 NOTE — PROGRESS NOTES
Routine vitals stable. Scheduled medications given. Patient denies any needs. Call light within reach.

## 2019-02-24 NOTE — PROGRESS NOTES
Pt resting quietly in bed, HS meds given per pt request. Will hold of on meds for constipation until morning per pt request since she has had 2 BMs today. Respirations remain easy and unlabored at rest on 3L NC. Pt reports ongoing MCKINNEY with ambulation unchanged over the past several days she says. Crackles heard upon assessment to bilateral lower lobes. meds given per [facility]. Pt denies any needs at this time.

## 2019-02-24 NOTE — PROGRESS NOTES
Progress Note - Dr. Ian Eid - Internal Medicine  PCP: Julio Cesar Dunaway  [address] [place] [address] Yas Martin 80 [phone]    Hospital Day: 9  Code Status: Full Code  Current Diet: DIET GENERAL;  Dietary Nutrition Supplements: Low Calorie High Protein Supplement        CC: follow up on medical issues    Subjective:   Gilbert Freitas is a 42 y.o. female. She denies problems    Pt with continued congestion  cxr done, review shows new infiltrate  Restarted on Iv cefepime    Pt also c/o increasing back pain overnight  I ordered iv morphine -  Will d/w Onc about changing po meds (as they will likely be managing outpt po meds)    She denies chest pain, denies shortness of breath, complains of nausea,  denies emesis. 10 system Review of Systems is reviewed with patient, and pertinent positives are listed here: None . Otherwise, Review of systems is negative. I have reviewed the patient's medical and social history in detail and updated the computerized patient record. To recap: She  has a past medical history of Endometriosis; Fibromyalgia; GERD (gastroesophageal reflux disease); Hip fracture ([facility] [address] Hypoglycemia; Lupus; Neuropathy; Ovarian cyst; and Seizures ([address] . She  has a past surgical history that includes  section; Cholecystectomy (); bronchoscopy (10/16/14); bronchoscopy (10/18/2014); Cystoscopy (Right, 2019); and laparoscopy (2019). . She  reports that she quit smoking about 17 years ago. Her smoking use included Cigarettes. She started smoking about 21 years ago. She has a 0.75 pack-year smoking history. She has never used smokeless tobacco. She reports that she does not drink alcohol or use drugs. .        Active Hospital Problems    Diagnosis Date Noted    Constipation due to opioid therapy [K59.03, T40.2X5A] 2019    Ground glass opacity present on imaging of lung [R91.8]     Lung nodules [R91.8]     Lymphadenopathy [R59.1]     Cervical cancer ([facility] [address] [C53.9] 02/15/2019    Pneumonia [J18.9] 02/15/2019    Pulmonary nodule [R91.1] 02/15/2019    Hydronephrosis [N13.30] 02/15/2019    SLE (systemic lupus erythematosus) ([facility] [address] [M32.9] 01/22/2019    Lupus anticoagulant positive [R76.0] 01/22/2019       Current Facility-Administered Medications: cefepime (MAXIPIME) 2 g IVPB minibag, 2 g, Intravenous, Q12H  bisacodyl (DULCOLAX) EC tablet 5 mg, 5 mg, Oral, Daily PRN  linaclotide (LINZESS) capsule 145 mcg, 145 mcg, Oral, QAM AC  morphine injection 4 mg, 4 mg, Intravenous, Q4H PRN  ipratropium-albuterol (DUONEB) nebulizer solution 1 ampule, 1 ampule, Inhalation, TID  morphine (MS CONTIN) extended release tablet 15 mg, 15 mg, Oral, 2 times per day  prochlorperazine (COMPAZINE) injection 10 mg, 10 mg, Intravenous, Q6H PRN  LORazepam (ATIVAN) injection 0.25 mg, 0.25 mg, Intravenous, Q6H PRN  oxyCODONE (ROXICODONE) immediate release tablet 10 mg, 10 mg, Oral, Q3H PRN **OR** [DISCONTINUED] oxyCODONE (ROXICODONE) immediate release tablet 10 mg, 10 mg, Oral, Q3H PRN  polyethylene glycol (GLYCOLAX) packet 17 g, 17 g, Oral, Daily PRN  sodium chloride bacteriostatic 0.9 % injection 15 mL, 15 mL, Injection, PRN  sodium chloride (PF) 0.9 % injection 500 mL, 500 mL, Intercatheter, PRN  acetaminophen (TYLENOL) tablet 500 mg, 500 mg, Oral, Q4H PRN  diphenhydrAMINE (BENADRYL) tablet 25 mg, 25 mg, Oral, Q6H PRN  sennosides-docusate sodium (SENOKOT-S) 8.6-50 MG tablet 2 tablet, 2 tablet, Oral, Daily  ipratropium-albuterol (DUONEB) nebulizer solution 1 ampule, 1 ampule, Inhalation, Q4H PRN  zolpidem (AMBIEN) tablet 10 mg, 10 mg, Oral, Nightly  gabapentin (NEURONTIN) capsule 300 mg, 300 mg, Oral, TID  baclofen (LIORESAL) tablet 20 mg, 20 mg, Oral, BID  ibuprofen (ADVIL;MOTRIN) tablet 800 mg, 800 mg, Oral, Q8H PRN  promethazine (PHENERGAN) tablet 25 mg, 25 mg, Oral, Q6H PRN  0.9 % sodium chloride infusion, , Intravenous, Continuous  sodium chloride flush 0.9 % injection 10 mL, 10 mL, Intravenous, 2 times per day  sodium chloride flush 0.9 % injection 10 mL, 10 mL, Intravenous, PRN  magnesium hydroxide (MILK OF MAGNESIA) 400 MG/5ML suspension 30 mL, 30 mL, Oral, Daily PRN  ondansetron (ZOFRAN) injection 4 mg, 4 mg, Intravenous, Q6H PRN  potassium chloride (KLOR-CON M) extended release tablet 40 mEq, 40 mEq, Oral, PRN **OR** potassium bicarb-citric acid (EFFER-K) effervescent tablet 40 mEq, 40 mEq, Oral, PRN **OR** potassium chloride 10 mEq/100 mL IVPB (Peripheral Line), 10 mEq, Intravenous, PRN  promethazine (PHENERGAN) injection 25 mg, 25 mg, Intramuscular, Q6H PRN  DULoxetine (CYMBALTA) extended release capsule 60 mg, 60 mg, Oral, Nightly         Objective:  /70   Pulse 93   Temp 98.1 °F (36.7 °C) (Oral)   Resp 16   Ht 5' 6.5\" (1.689 m)   Wt 193 lb 3.2 oz (87.6 kg)   SpO2 97%   BMI 30.72 kg/m²      Patient Vitals for the past 24 hrs:   BP Temp Temp src Pulse Resp SpO2   02/24/19 0813 - - - - 16 97 %   02/24/19 0423 113/70 98.1 °F (36.7 °C) Oral 93 16 96 %   02/24/19 0214 113/71 97.9 °F (36.6 °C) Oral 87 16 95 %   02/24/19 0100 - - - - 14 -   02/24/19 0015 - - - - 16 -   02/23/19 2212 126/75 97.8 °F (36.6 °C) Oral 81 16 97 %   02/23/19 2023 - - - - 17 96 %   02/23/19 1958 121/76 97.8 °F (36.6 °C) Oral 85 18 96 %   02/23/19 1734 119/72 98 °F (36.7 °C) Oral 87 16 94 %   02/23/19 1354 - - - - 16 90 %   02/23/19 1212 120/76 98.1 °F (36.7 °C) Oral 84 16 95 %     No data found.           Intake/Output Summary (Last 24 hours) at 02/24/19 1105  Last data filed at 02/24/19 0950   Gross per 24 hour   Intake             2082 ml   Output                0 ml   Net             2082 ml         Physical Exam:   S1, S2 normal, no murmur, rub or gallop, regular rate and rhythm  clear to auscultation bilaterally  Soft, mild distension, bs+  extremities normal, atraumatic, no cyanosis or edema    Labs:  Lab Results   Component Value Date    WBC 21.7 (H) 02/24/2019    HGB 8.8 (L) 02/24/2019    HCT 28.4 (L) 02/24/2019     02/24/2019    CHOL 188 10/15/2014    TRIG 132 10/15/2014    HDL 65 (H) 10/15/2014    ALT 49 (H) 02/18/2019    AST 23 02/18/2019     02/24/2019    K 4.1 02/24/2019     02/24/2019    CREATININE <0.5 (L) 02/24/2019    BUN 14 02/24/2019    CO2 27 02/24/2019    TSH 1.57 06/18/2014    INR 1.11 02/18/2019    LABMICR YES 02/18/2019     Lab Results   Component Value Date    CKTOTAL 185 12/18/2014    TROPONINI 0.02 (H) 02/15/2019       Recent Imaging Results are Reviewed:  Xr Chest Standard (2 Vw)    Result Date: 2/19/2019  EXAMINATION: TWO VIEWS OF THE CHEST 2/19/2019 4:26 pm COMPARISON: 02/16/2019 HISTORY: ORDERING SYSTEM PROVIDED HISTORY: cough TECHNOLOGIST PROVIDED HISTORY: Reason for exam:->cough Ordering Physician Provided Reason for Exam: Cervical Cancer (Pt was sent over from Dr Guillermo Hoyt office - states she was sent here from office - was told today that she has stage 3 cervical cancer and was sent here for \"scans and further testing\") Acuity: Unknown Type of Exam: Unknown FINDINGS: There is patchy bilateral airspace disease, which appears increased when compared to the previous exam.  As detected on recent CT PA, some of those have a nodular appearance. The heart mediastinal contours are unremarkable. No pneumothorax. No free air. No acute bony abnormalities. Chin catheter noted. Patchy bilateral airspace disease, concerning for pneumonia, which appears increased when compared to the previous exam.  Again, some of these areas of opacity have a nodular appearance and septic emboli should be considered, especially when given the correlation with the CT PA from 02/15/2019. Process should be followed to resolution.      Xr Chest Standard (2 Vw)    Result Date: 2/16/2019  EXAMINATION: TWO VIEWS OF THE CHEST 2/16/2019 11:49 am COMPARISON: 10/16/2014 HISTORY: ORDERING SYSTEM PROVIDED HISTORY: pneumonia TECHNOLOGIST PROVIDED HISTORY: Reason for exam:->pneumonia Ordering Physician Provided Reason for Exam: PNA Acuity: Acute Type of Exam: Initial Relevant Medical/Surgical History: cervical ca FINDINGS: There is patchy bibasilar consolidation, right greater than left, superimposed on a background of diffuse interstitial prominence. Heart size, mediastinal contours and pulmonary vascularity are within normal limits. There is no pleural effusion. Multifocal bilateral pneumonia. Ct Chest Wo Contrast    Result Date: 1/28/2019  EXAMINATION: CT OF THE ABDOMEN AND PELVIS WITH CONTRAST; CT OF THE CHEST WITHOUT CONTRAST 1/28/2019 7:47 pm; 1/28/2019 7:45 pm TECHNIQUE: CT of the abdomen and pelvis was performed with the administration of intravenous contrast. Multiplanar reformatted images are provided for review. Dose modulation, iterative reconstruction, and/or weight based adjustment of the mA/kV was utilized to reduce the radiation dose to as low as reasonably achievable.; CT of the chest was performed without the administration of intravenous contrast. Multiplanar reformatted images are provided for review. Dose modulation, iterative reconstruction, and/or weight based adjustment of the mA/kV was utilized to reduce the radiation dose to as low as reasonably achievable. COMPARISON: CT abdomen and pelvis 01/18/2019.  HISTORY: ORDERING SYSTEM PROVIDED HISTORY: RLQ abdominal pain TECHNOLOGIST PROVIDED HISTORY: Additional Contrast?->Oral Reason for exam:->RLQ pain persisting Ordering Physician Provided Reason for Exam: RLQ abdominal pain Acuity: Acute Type of Exam: Initial; ORDERING SYSTEM PROVIDED HISTORY: Other forms of systemic lupus erythematosus, unspecified organ involvement status ([facility] [address] TECHNOLOGIST PROVIDED HISTORY: Reason for exam:->pulmonary nodules seen on recent CT Ordering Physician Provided Reason for Exam: pulmonary nodules seen on recent CT Acuity: Chronic Type of Exam: Subsequent/Follow-up FINDINGS: Chest: Mediastinum: Lack of intravenous contrast limits evaluation of the mediastinum. The thoracic aorta is normal in appearance. The coronary arteries and branch vessels of the superior mediastinum and lower neck are unremarkable. The pulmonary arteries are normal in caliber. The heart is normal in size. No pericardial effusion. The mediastinal esophagus and thyroid gland are unremarkable. No pathologically enlarged lymph nodes are seen in the chest. Lungs/pleura: The central airways are patent. No pleural effusion or pneumothorax. Mild bilateral dependent atelectasis. There are diffusely scattered patchy bilateral nodular/ground-glass opacities. No consolidation. Soft Tissues/Bones: No acute osseous or soft tissue abnormality. Abdomen/Pelvis: Organs: The liver is unremarkable. The gallbladder is surgically absent. The biliary tree is within normal limits status post cholecystectomy. The pancreas, spleen, and bilateral adrenal glands are unremarkable. The bilateral kidneys and ureters are unremarkable. GI/Bowel: Normal appendix. The colon is unremarkable. No evidence of acute appendicitis. No bowel obstruction or abnormal bowel wall thickening. Pelvis: Mild suspected circumferential urinary bladder wall thickening with mild adjacent stranding. The uterus and bilateral adnexae are unremarkable. Trace free fluid in the pelvis. No pelvic or inguinal lymphadenopathy. Peritoneum/Retroperitoneum: The abdominal aorta is normal in appearance. No retroperitoneal or mesenteric lymphadenopathy is identified. No free air or fluid is seen in the abdomen. Bones/Soft Tissues: No acute osseous or soft tissue abnormality. 1. Scattered small patchy ground-glass/nodular opacities throughout the bilateral lungs likely representing an infectious process. 2. Mild suspected circumferential urinary bladder wall thickening with mild adjacent stranding compatible with cystitis. Recommend correlation with urinalysis. 3. Normal appendix.      Ct Abdomen Pelvis W Iv Contrast Additional Contrast? None    Result Date: 2/15/2019  EXAMINATION: CTA OF THE CHEST; CT OF THE ABDOMEN AND PELVIS WITH CONTRAST 2/15/2019 2:11 pm TECHNIQUE: CTA of the chest was performed after the administration of intravenous contrast.  Multiplanar reformatted images are provided for review. MIP images are provided for review. Dose modulation, iterative reconstruction, and/or weight based adjustment of the mA/kV was utilized to reduce the radiation dose to as low as reasonably achievable.; CT of the abdomen and pelvis was performed with the administration of intravenous contrast. Multiplanar reformatted images are provided for review. Dose modulation, iterative reconstruction, and/or weight based adjustment of the mA/kV was utilized to reduce the radiation dose to as low as reasonably achievable. COMPARISON: 1/28/2019, 4/29/2011 HISTORY: ORDERING SYSTEM PROVIDED HISTORY: cervical ca - SOB - PE??? TECHNOLOGIST PROVIDED HISTORY: Ordering Physician Provided Reason for Exam: SOB Acuity: Unknown Type of Exam: Unknown; ORDERING SYSTEM PROVIDED HISTORY: abd disten - cervical ca? TECHNOLOGIST PROVIDED HISTORY: Additional Contrast?->None Ordering Physician Provided Reason for Exam: abd distention Acuity: Unknown Type of Exam: Unknown Patient with recently diagnosed cervical cancer. FINDINGS: Chest: Pulmonary arteries: The pulmonary arteries opacify normally. There is no evidence of filling defect to suggest a pulmonary embolism. Mediastinum: The thyroid is unremarkable. There is mild subcarinal and bilateral hilar lymphadenopathy, most likely benign and reactive in etiology given the patient's underlying lung findings. The thoracic aorta is unremarkable, without evidence of aneurysm or dissection. 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 No pericardial abnormality is identified. Lungs/pleura: There is mild mucous plugging within the bilateral lower lobes.  The central airways are otherwise widely patent. There has been interval development of widespread ground-glass opacity throughout both lungs, with diffuse intralobular septal thickening evident, new from prior exam.  This most likely reflects a combination of interstitial pulmonary edema and superimposed multifocal pneumonia. Additionally, there has been interval progression and more consolidation of multiple scattered various sized pulmonary nodules throughout both lungs since the study of 1/28/2019. A few of these are cavitary. The largest of these measures approximately 10 mm in short axis within the subpleural portion of the left lower lobe. These may be secondary to an atypical infectious or inflammatory process, to include septic emboli. Metastatic disease is considered less likely, though not excluded. There is no evidence of a pneumothorax or pleural effusion. Soft Tissues/Bones: No acute findings. Abdomen/Pelvis: Organs: The patient is status post cholecystectomy. The liver, spleen, and pancreas are normal.  The adrenal glands are unremarkable bilaterally. There has been interval development of nonspecific moderate right hydronephrosis since the prior exam, without definite demonstrable cause. Namely, no definite obstructing stone or mass is identified. The left kidney is stable and unremarkable. GI/Bowel: Evaluation of the hollow GI tract demonstrates no evidence of abnormal bowel wall thickening, dilatation, or obstruction. The appendix is normal. Pelvis: The urinary bladder is partially collapsed, though appears diffusely thick-walled and inflamed, with a mild amount of pericystic stranding noted. These findings are suggestive of an acute cystitis, progressed from prior exam.  Additionally, the cervix appears enlarged and irregular, and there is new abnormal thickening of the endometrium within the uterine fundus, which appears new in comparison with the study of 1/28/2019. The bilateral adnexa are unremarkable. patient's known underlying cervical cancer causing obstruction of the endometrial with resultant endometrial thickening. This could be further evaluated with a follow-up pelvic ultrasound. 7. Stable mild bilateral pelvic chain lymphadenopathy, right greater than left, possibly representing metastatic disease. Ct Abdomen Pelvis W Iv Contrast Additional Contrast? Oral    Result Date: 1/28/2019  EXAMINATION: CT OF THE ABDOMEN AND PELVIS WITH CONTRAST; CT OF THE CHEST WITHOUT CONTRAST 1/28/2019 7:47 pm; 1/28/2019 7:45 pm TECHNIQUE: CT of the abdomen and pelvis was performed with the administration of intravenous contrast. Multiplanar reformatted images are provided for review. Dose modulation, iterative reconstruction, and/or weight based adjustment of the mA/kV was utilized to reduce the radiation dose to as low as reasonably achievable.; CT of the chest was performed without the administration of intravenous contrast. Multiplanar reformatted images are provided for review. Dose modulation, iterative reconstruction, and/or weight based adjustment of the mA/kV was utilized to reduce the radiation dose to as low as reasonably achievable. COMPARISON: CT abdomen and pelvis 01/18/2019. HISTORY: ORDERING SYSTEM PROVIDED HISTORY: RLQ abdominal pain TECHNOLOGIST PROVIDED HISTORY: Additional Contrast?->Oral Reason for exam:->RLQ pain persisting Ordering Physician Provided Reason for Exam: RLQ abdominal pain Acuity: Acute Type of Exam: Initial; ORDERING SYSTEM PROVIDED HISTORY: Other forms of systemic lupus erythematosus, unspecified organ involvement status ([facility] [address] TECHNOLOGIST PROVIDED HISTORY: Reason for exam:->pulmonary nodules seen on recent CT Ordering Physician Provided Reason for Exam: pulmonary nodules seen on recent CT Acuity: Chronic Type of Exam: Subsequent/Follow-up FINDINGS: Chest: Mediastinum: Lack of intravenous contrast limits evaluation of the mediastinum. The thoracic aorta is normal in appearance.   The Chest: Pulmonary arteries: The pulmonary arteries opacify normally. There is no evidence of filling defect to suggest a pulmonary embolism. Mediastinum: The thyroid is unremarkable. There is mild subcarinal and bilateral hilar lymphadenopathy, most likely benign and reactive in etiology given the patient's underlying lung findings. The thoracic aorta is unremarkable, without evidence of aneurysm or dissection. 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 No pericardial abnormality is identified. Lungs/pleura: There is mild mucous plugging within the bilateral lower lobes. The central airways are otherwise widely patent. There has been interval development of widespread ground-glass opacity throughout both lungs, with diffuse intralobular septal thickening evident, new from prior exam.  This most likely reflects a combination of interstitial pulmonary edema and superimposed multifocal pneumonia. Additionally, there has been interval progression and more consolidation of multiple scattered various sized pulmonary nodules throughout both lungs since the study of 1/28/2019. A few of these are cavitary. The largest of these measures approximately 10 mm in short axis within the subpleural portion of the left lower lobe. These may be secondary to an atypical infectious or inflammatory process, to include septic emboli. Metastatic disease is considered less likely, though not excluded. There is no evidence of a pneumothorax or pleural effusion. Soft Tissues/Bones: No acute findings. Abdomen/Pelvis: Organs: The patient is status post cholecystectomy. The liver, spleen, and pancreas are normal.  The adrenal glands are unremarkable bilaterally. There has been interval development of nonspecific moderate right hydronephrosis since the prior exam, without definite demonstrable cause. Namely, no definite obstructing stone or mass is identified.   The left kidney is stable and unremarkable. GI/Bowel: Evaluation of the hollow GI tract demonstrates no evidence of abnormal bowel wall thickening, dilatation, or obstruction. The appendix is normal. Pelvis: The urinary bladder is partially collapsed, though appears diffusely thick-walled and inflamed, with a mild amount of pericystic stranding noted. These findings are suggestive of an acute cystitis, progressed from prior exam.  Additionally, the cervix appears enlarged and irregular, and there is new abnormal thickening of the endometrium within the uterine fundus, which appears new in comparison with the study of 1/28/2019. The bilateral adnexa are unremarkable. There is a mild amount of free pelvic fluid, likely physiologic. There are stable mildly enlarged bilateral pelvic chain lymph nodes, the largest measuring approximately 2.6 x 1.6 cm along the right external iliac chain, similar to the study of 1/28/2019. Peritoneum/Retroperitoneum: No intraperitoneal free air or free fluid is identified. No pathologic lymphadenopathy is seen. The abdominal aorta is unremarkable. No significant abdominal wall hernia is identified. Bones/Soft Tissues: There is mild bilateral sacroiliitis. The skeletal structures are otherwise unremarkable. 1. No CT evidence of a pulmonary embolism. 2. No acute abnormality of the thoracic aorta. 3. Interval development of widespread ground-glass opacity throughout both lungs with diffuse intralobular septal thickening. This most likely reflects a combination of interstitial pulmonary edema and multifocal pneumonia. 4. Interval progression of multiple nodular foci of consolidative opacity throughout both lungs, a few of which are cavitary in appearance. These nodules are most likely infectious or inflammatory in etiology, and septic emboli are a consideration. Given patient's history of cervical cancer, metastatic disease is on the differential, though considered less likely.  5. New nonspecific moderate right hydronephrosis, without demonstrable cause. However, there is underlying wall thickening and enhancement of the urinary bladder. This combination of findings could represent a cystitis in the setting of urinary tract infection with resultant pyelitis and hydronephrosis. However, given patient history of cervical cancer, locoregional spread of tumor with resultant obstruction of the distal right ureter may be a cause of the patient's right hydronephrosis. 6. Interval development of new irregularity of the cervix and nonspecific endometrial thickening in comparison with the prior study of 1/28/2019. This likely represents the patient's known underlying cervical cancer causing obstruction of the endometrial with resultant endometrial thickening. This could be further evaluated with a follow-up pelvic ultrasound. 7. Stable mild bilateral pelvic chain lymphadenopathy, right greater than left, possibly representing metastatic disease. Ir Port Placement > 5 Years    Result Date: 2/19/2019  PROCEDURE: ULTRASOUND GUIDED VASCULAR ACCESS. FLUOROSCOPY GUIDED PLACEMENT OF A RIGHT IJ SUBCUTANEOUS PORT-A-CATH. MODERATE CONSCIOUS SEDATION 2/19/2019. HISTORY: ORDERING SYSTEM PROVIDED HISTORY: cervical cancer to get chemo TECHNOLOGIST PROVIDED HISTORY: Reason for exam:->cervical cancer to get chemo How many lumens are being requested?->1 What site is the preferred site? ->No preference What side should this line be placed? ->Either 30-year-old female with cervical cancer, presents for chest port placement. SEDATION: Moderate sedation was administered under my supervision by a qualified nurse. 4 mg of Versed was administered intravenously. Approximate intra procedural sedation time was 23 minutes. FLUOROSCOPY DOSE AND TYPE OR TIME AND EXPOSURES: 54 seconds of fluoroscopy with 2 exposures. TECHNIQUE: Informed consent was obtained after a detailed explanation of the procedure including risks, benefits, and alternatives. Intraprocedural fluoroscopic spot images as above. See separate procedure report for more information. Ir Guided Ureteral Stent Place W Nephro Cath New Access    Result Date: 2/18/2019  PROCEDURE: IR ULTRASOUND AND FLUOROSCOPIC GUIDED RIGHT PERCUTANEOUS NEPHROSTOMY AND NEPHROSTOGRAM IR ANTEGRADE RIGHT URETERAL STENT. MODERATE CONSCIOUS SEDATION 2/18/2019 HISTORY: ORDERING SYSTEM PROVIDED HISTORY: needs R sided nephrostomy tube placed. maria guadalupe could not do via cysto. Adi said that IR would do on monday TECHNOLOGIST PROVIDED HISTORY: Reason for exam:->needs R sided nephrostomy tube placed. maria guadalupe could not do via cysto. Adi said that IR would do on monday COMPARISON: Abdomen pelvic CT 02/15/2019. CONTRAST: 30 cc, nonvascular within collecting system only. . SEDATION: Intravenous sedation was administered with continuous monitoring throughout the procedure. In measured doses, a total of 6 mg intravenous Versed and 275 mcg intravenous fentanyl was administered. My total procedure time with sedation was 26 minutes. FLUOROSCOPY DOSE AND TYPE OR TIME AND EXPOSURES: 3.5 minutes, 7 digital imaging sequences. DESCRIPTION OF PROCEDURE: Informed consent was obtained after a detailed explanation of the procedure including risks, benefits, and alternatives. Universal protocol was observed. TECHNIQUE: Informed consent was previously obtained. In the semi prone position, an appropriate area posterior lateral aspect of the skin overlying the targeted collecting system was demarcated, sterilely prepared draped and anesthetized. Utilizing ultrasound, anatomy from prior CT scan and other imaging, the needle trajectory and depth was predetermined. The micro puncture needle was advanced using fluoroscopic and ultrasound guidance into the collecting system without difficulty. Once urine was aspirated, a small platinum tip wire was advanced into the collecting system and into the proximal ureter.  The tract was dilated. With wire exchange, a 6 French sheath was placed. The collecting system is moderately dilated and the ureter is somewhat tortuous. A glide wire was easily advanced down the ureter. Because of this, a 5 French peel-away sheath was advanced into the proximal ureter over the wire. The glide wire was then advanced with a steerable catheter into the urinary bladder without difficulty and only mild discomfort. There is no contrast extravasation. Once the catheter was placed into the urinary bladder, contrast injection is seen diluted within the somewhat distended urinary bladder. The catheter was used to place a Super Stiff wire for ureteral stent placement. A 26 cm 8 French ureteral stent was then advanced, such that the distal pigtail is well within the urinary bladder. The proximal pigtail was placed in the lower portion of the left renal pelvis. Again there is no contrast extravasation. No filling defect to indicate clot or bleeding was observed on early or later contrast imaging. Following this, dense contrast was injected showing normal expected filling of the ureteral stent and exiting from the distal pigtail directly into the urinary bladder. Following this, the internalized stent was disengaged. The proximal collecting system was then decompressed. The proximal nephrostomy access was then removed entirely, no external drainage necessary at this point. The patient tolerated the procedure well. A sterile bandage was placed over the small incision. From this point forward, the ureteral stent can be exchanged as needed from below. Successful right percutaneous nephrostomy with antegrade pyelogram. High-grade distal ureteral obstruction with severe hydronephrosis, successfully crossed as above. Successful 8 French internalized right ureteral stent placement as above. Assessment and Plan:  Patient Active Hospital Problem List:   Pneumonia (2/15/2019)    Assessment: Established problem. Worsening. Again with cough. cxr shows new infiltrate    Plan: place back on cefepime. Trend wbc. SLE (systemic lupus erythematosus) ([facility] [address] (1/22/2019)    Assessment: Established problem. Stable. Plan: Continue present orders/plan. Lupus anticoagulant positive (1/22/2019)    Assessment: Established problem. Stable. Plan: Continue present orders/plan.     Cervical cancer ([facility] [address] (2/15/2019)    Assessment: invasive squamous cell carcinoma which supports the diagnosis of cervical cancer. She is at least stage JACKI d/t metastatic disease to bladder and possibly stage IVB if mets to lung    Plan: Initial plan - carbo/taxol/avastin.  Port placed 2/19. SOme pain issues - defer oral regimen to Onc   Pulmonary nodule (2/15/2019)    Assessment: Established problem. Stable.  Will need workup for this     Plan: per dr Kellen Valle, they will eval in coming week   Hydronephrosis (2/15/2019)    Assessment: Established problem. Stent could not be placed by urology    Plan: Right perc nephrostomy, nephrostogram and antegrade ureteral stent done 2/18 per IR   Ground glass opacity present on imaging of lung ()   Lung nodules ()   Lymphadenopathy ()   Constipation due to opioid therapy (2/23/2019)    Assessment: Established problem. Stable. Remains on narcotics.  Decent response to linzess    Plan: continue Linzess [address]  2/24/2019

## 2019-02-24 NOTE — PROGRESS NOTES
Pt reports that while she was in the bathroom she felt as she could not take a really deep breath to help suppress her cough. She is unsure if this is truly any different than the past several days and she gets relief while in bed and able to take deeper breaths. No change in lung sounds and vitals remain consistent with prior assessments. Pt does not appear to be in any acute distress at this time. IVF paused at this time since pt is able to take in PO fluids and has been on IVF for several days. Will notify Dr. Tianna Roberson prior to shift change unless pt appears to have any acute changes.

## 2019-02-24 NOTE — PLAN OF CARE
Problem: Pain:  Goal: Control of acute pain  Control of acute pain   Outcome: Ongoing  Dr. Екатерина Hawkins called d/t pt requesting she would like something in between toradol doses overnight for pain. See new orders for PRN morphine 4mg IV for 2x doses only. Problem: Nausea/Vomiting:  Goal: Absence of nausea/vomiting  Absence of nausea/vomiting   Outcome: Ongoing  Pt remains free from nausea and vomiting to present.

## 2019-02-24 NOTE — PROGRESS NOTES
Dr. Unique Moise notified of events with breathing as previously noted and that fluids were stopped around 0430 along with trending upward WBC's. He agreed it was reasonable to stop IVF and wants them to remain stopped for now, new order for stat CXR and he will follow up with this shortly when he rounds on pt.

## 2019-02-24 NOTE — ONCOLOGY
[facility]ology [facility]   Progress Note    2/24/2019    SUBJECTIVE:remains uncomfortable with pain in right flank and pelvis. More short of breath. Feels that vaginal bleeding is somewhat improved. NO other new problems     OBJECTIVE:    Physical Assessment:  Vitals:  /70   Pulse 93   Temp 98.1 °F (36.7 °C) (Oral)   Resp 16   Ht 5' 6.5\" (1.689 m)   Wt 193 lb 3.2 oz (87.6 kg)   SpO2 95%   BMI 30.72 kg/m²    24HR INTAKE/OUTPUT:    Intake/Output Summary (Last 24 hours) at 02/24/19 1549  Last data filed at 02/24/19 0950   Gross per 24 hour   Intake             1722 ml   Output                0 ml   Net             1722 ml       CONSTITUTIONAL:  Awake, alert & oriented x3  HEENT: PERRL, Neck: soft, supple, no cervical, supraclavicular or axillary adenopathy  RESPIRATORY:  No increased work of breathing, breath sounds decreased. CARDIOVASCULAR:  Cardiac S1/S2, RRR  GASTROINTESTINAL:  abdomen soft +BS x4, no hepatosplenomegaly  SKIN:  negative for rash and skin lesion(s)  MUSCULOSKELETAL:  negative for pain and muscle weakness  EXTREMITIES: Negative for Lower extremity edema    Labs Results:    CBC: Recent Labs      02/23/19   0442  02/24/19   0608   WBC  16.1*  21.7*   HGB  8.2*  8.8*   HCT  26.0*  28.4*   MCV  83.3  82.6   PLT  323  337     BMP: Recent Labs      02/23/19   0442  02/24/19   0608   NA  142  142   K  4.3  4.1   CL  109  106   CO2  24  27   BUN  13  14   CREATININE  <0.5*  <0.5*     LIVER PROFILE: No results for input(s): AST, ALT, LIPASE, BILIDIR, BILITOT, ALKPHOS in the last 72 hours. Invalid input(s):   AMYLASE,  ALB  PT/INR:    Lab Results   Component Value Date    PROTIME 12.7 02/18/2019    PROTIME 12.3 01/18/2019    PROTIME 11.0 05/16/2014    INR 1.11 02/18/2019    INR 1.08 01/18/2019    INR 0.98 05/16/2014     PTT:    Lab Results   Component Value Date    APTT 33.2 05/16/2014     UA:No results for input(s): NITRITE, COLORU, PHUR, LABCAST, WBCUA, RBCUA, MUCUS, TRICHOMONAS, YEAST, BACTERIA, CLARITYU, SPECGRAV, LEUKOCYTESUR, UROBILINOGEN, BILIRUBINUR, BLOODU, GLUCOSEU, AMORPHOUS in the last 72 hours. Invalid input(s): KETONESU  Magnesium: No results found for: MG  VAISHNAVI:  Lab Results   Component Value Date    VAISHNAVI Negative 12/05/2018       RADIOLOGY:    Xr Chest Standard (2 Vw)    Result Date: 2/19/2019  EXAMINATION: TWO VIEWS OF THE CHEST 2/19/2019 4:26 pm COMPARISON: 02/16/2019 HISTORY: ORDERING SYSTEM PROVIDED HISTORY: cough TECHNOLOGIST PROVIDED HISTORY: Reason for exam:->cough Ordering Physician Provided Reason for Exam: Cervical Cancer (Pt was sent over from Dr Kathrin Dos Santos office - states she was sent here from office - was told today that she has stage 3 cervical cancer and was sent here for \"scans and further testing\") Acuity: Unknown Type of Exam: Unknown FINDINGS: There is patchy bilateral airspace disease, which appears increased when compared to the previous exam.  As detected on recent CT PA, some of those have a nodular appearance. The heart mediastinal contours are unremarkable. No pneumothorax. No free air. No acute bony abnormalities. Chin catheter noted. Patchy bilateral airspace disease, concerning for pneumonia, which appears increased when compared to the previous exam.  Again, some of these areas of opacity have a nodular appearance and septic emboli should be considered, especially when given the correlation with the CT PA from 02/15/2019. Process should be followed to resolution.      Xr Chest Standard (2 Vw)    Result Date: 2/16/2019  EXAMINATION: TWO VIEWS OF THE CHEST 2/16/2019 11:49 am COMPARISON: 10/16/2014 HISTORY: ORDERING SYSTEM PROVIDED HISTORY: pneumonia TECHNOLOGIST PROVIDED HISTORY: Reason for exam:->pneumonia Ordering Physician Provided Reason for Exam: PNA Acuity: Acute Type of Exam: Initial Relevant Medical/Surgical History: cervical ca FINDINGS: There is patchy bibasilar consolidation, right greater than left, superimposed on a background of diffuse interstitial prominence. Heart size, mediastinal contours and pulmonary vascularity are within normal limits. There is no pleural effusion. Multifocal bilateral pneumonia. Ct Chest Wo Contrast    Result Date: 1/28/2019  EXAMINATION: CT OF THE ABDOMEN AND PELVIS WITH CONTRAST; CT OF THE CHEST WITHOUT CONTRAST 1/28/2019 7:47 pm; 1/28/2019 7:45 pm TECHNIQUE: CT of the abdomen and pelvis was performed with the administration of intravenous contrast. Multiplanar reformatted images are provided for review. Dose modulation, iterative reconstruction, and/or weight based adjustment of the mA/kV was utilized to reduce the radiation dose to as low as reasonably achievable.; CT of the chest was performed without the administration of intravenous contrast. Multiplanar reformatted images are provided for review. Dose modulation, iterative reconstruction, and/or weight based adjustment of the mA/kV was utilized to reduce the radiation dose to as low as reasonably achievable. COMPARISON: CT abdomen and pelvis 01/18/2019. HISTORY: ORDERING SYSTEM PROVIDED HISTORY: RLQ abdominal pain TECHNOLOGIST PROVIDED HISTORY: Additional Contrast?->Oral Reason for exam:->RLQ pain persisting Ordering Physician Provided Reason for Exam: RLQ abdominal pain Acuity: Acute Type of Exam: Initial; ORDERING SYSTEM PROVIDED HISTORY: Other forms of systemic lupus erythematosus, unspecified organ involvement status ([facility] [address] TECHNOLOGIST PROVIDED HISTORY: Reason for exam:->pulmonary nodules seen on recent CT Ordering Physician Provided Reason for Exam: pulmonary nodules seen on recent CT Acuity: Chronic Type of Exam: Subsequent/Follow-up FINDINGS: Chest: Mediastinum: Lack of intravenous contrast limits evaluation of the mediastinum. The thoracic aorta is normal in appearance. The coronary arteries and branch vessels of the superior mediastinum and lower neck are unremarkable. The pulmonary arteries are normal in caliber. The heart is normal in size. No pericardial effusion. The mediastinal esophagus and thyroid gland are unremarkable. No pathologically enlarged lymph nodes are seen in the chest. Lungs/pleura: The central airways are patent. No pleural effusion or pneumothorax. Mild bilateral dependent atelectasis. There are diffusely scattered patchy bilateral nodular/ground-glass opacities. No consolidation. Soft Tissues/Bones: No acute osseous or soft tissue abnormality. Abdomen/Pelvis: Organs: The liver is unremarkable. The gallbladder is surgically absent. The biliary tree is within normal limits status post cholecystectomy. The pancreas, spleen, and bilateral adrenal glands are unremarkable. The bilateral kidneys and ureters are unremarkable. GI/Bowel: Normal appendix. The colon is unremarkable. No evidence of acute appendicitis. No bowel obstruction or abnormal bowel wall thickening. Pelvis: Mild suspected circumferential urinary bladder wall thickening with mild adjacent stranding. The uterus and bilateral adnexae are unremarkable. Trace free fluid in the pelvis. No pelvic or inguinal lymphadenopathy. Peritoneum/Retroperitoneum: The abdominal aorta is normal in appearance. No retroperitoneal or mesenteric lymphadenopathy is identified. No free air or fluid is seen in the abdomen. Bones/Soft Tissues: No acute osseous or soft tissue abnormality. 1. Scattered small patchy ground-glass/nodular opacities throughout the bilateral lungs likely representing an infectious process. 2. Mild suspected circumferential urinary bladder wall thickening with mild adjacent stranding compatible with cystitis. Recommend correlation with urinalysis. 3. Normal appendix.      Ct Abdomen Pelvis W Iv Contrast Additional Contrast? None    Result Date: 2/15/2019  EXAMINATION: CTA OF THE CHEST; CT OF THE ABDOMEN AND PELVIS WITH CONTRAST 2/15/2019 2:11 pm TECHNIQUE: CTA of the chest was performed after the administration of intravenous contrast.  Multiplanar reformatted images are provided for review. MIP images are provided for review. Dose modulation, iterative reconstruction, and/or weight based adjustment of the mA/kV was utilized to reduce the radiation dose to as low as reasonably achievable.; CT of the abdomen and pelvis was performed with the administration of intravenous contrast. Multiplanar reformatted images are provided for review. Dose modulation, iterative reconstruction, and/or weight based adjustment of the mA/kV was utilized to reduce the radiation dose to as low as reasonably achievable. COMPARISON: 1/28/2019, 4/29/2011 HISTORY: ORDERING SYSTEM PROVIDED HISTORY: cervical ca - SOB - PE??? TECHNOLOGIST PROVIDED HISTORY: Ordering Physician Provided Reason for Exam: SOB Acuity: Unknown Type of Exam: Unknown; ORDERING SYSTEM PROVIDED HISTORY: abd disten - cervical ca? TECHNOLOGIST PROVIDED HISTORY: Additional Contrast?->None Ordering Physician Provided Reason for Exam: abd distention Acuity: Unknown Type of Exam: Unknown Patient with recently diagnosed cervical cancer. FINDINGS: Chest: Pulmonary arteries: The pulmonary arteries opacify normally. There is no evidence of filling defect to suggest a pulmonary embolism. Mediastinum: The thyroid is unremarkable. There is mild subcarinal and bilateral hilar lymphadenopathy, most likely benign and reactive in etiology given the patient's underlying lung findings. The thoracic aorta is unremarkable, without evidence of aneurysm or dissection. 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 No pericardial abnormality is identified. Lungs/pleura: There is mild mucous plugging within the bilateral lower lobes. The central airways are otherwise widely patent.   There has been interval development of widespread ground-glass opacity throughout both lungs, with diffuse intralobular septal thickening evident, new from prior exam.  This most likely reflects a combination of interstitial pulmonary edema and superimposed multifocal pneumonia. Additionally, there has been interval progression and more consolidation of multiple scattered various sized pulmonary nodules throughout both lungs since the study of 1/28/2019. A few of these are cavitary. The largest of these measures approximately 10 mm in short axis within the subpleural portion of the left lower lobe. These may be secondary to an atypical infectious or inflammatory process, to include septic emboli. Metastatic disease is considered less likely, though not excluded. There is no evidence of a pneumothorax or pleural effusion. Soft Tissues/Bones: No acute findings. Abdomen/Pelvis: Organs: The patient is status post cholecystectomy. The liver, spleen, and pancreas are normal.  The adrenal glands are unremarkable bilaterally. There has been interval development of nonspecific moderate right hydronephrosis since the prior exam, without definite demonstrable cause. Namely, no definite obstructing stone or mass is identified. The left kidney is stable and unremarkable. GI/Bowel: Evaluation of the hollow GI tract demonstrates no evidence of abnormal bowel wall thickening, dilatation, or obstruction. The appendix is normal. Pelvis: The urinary bladder is partially collapsed, though appears diffusely thick-walled and inflamed, with a mild amount of pericystic stranding noted. These findings are suggestive of an acute cystitis, progressed from prior exam.  Additionally, the cervix appears enlarged and irregular, and there is new abnormal thickening of the endometrium within the uterine fundus, which appears new in comparison with the study of 1/28/2019. The bilateral adnexa are unremarkable. There is a mild amount of free pelvic fluid, likely physiologic.   There are stable mildly enlarged bilateral pelvic chain lymph nodes, the largest measuring approximately 2.6 x 1.6 cm along the right external iliac chain, similar to the study of 1/28/2019. Peritoneum/Retroperitoneum: No intraperitoneal free air or free fluid is identified. No pathologic lymphadenopathy is seen. The abdominal aorta is unremarkable. No significant abdominal wall hernia is identified. Bones/Soft Tissues: There is mild bilateral sacroiliitis. The skeletal structures are otherwise unremarkable. 1. No CT evidence of a pulmonary embolism. 2. No acute abnormality of the thoracic aorta. 3. Interval development of widespread ground-glass opacity throughout both lungs with diffuse intralobular septal thickening. This most likely reflects a combination of interstitial pulmonary edema and multifocal pneumonia. 4. Interval progression of multiple nodular foci of consolidative opacity throughout both lungs, a few of which are cavitary in appearance. These nodules are most likely infectious or inflammatory in etiology, and septic emboli are a consideration. Given patient's history of cervical cancer, metastatic disease is on the differential, though considered less likely. 5. New nonspecific moderate right hydronephrosis, without demonstrable cause. However, there is underlying wall thickening and enhancement of the urinary bladder. This combination of findings could represent a cystitis in the setting of urinary tract infection with resultant pyelitis and hydronephrosis. However, given patient history of cervical cancer, locoregional spread of tumor with resultant obstruction of the distal right ureter may be a cause of the patient's right hydronephrosis. 6. Interval development of new irregularity of the cervix and nonspecific endometrial thickening in comparison with the prior study of 1/28/2019. This likely represents the patient's known underlying cervical cancer causing obstruction of the endometrial with resultant endometrial thickening. This could be further evaluated with a follow-up pelvic ultrasound.  7. Stable mild bilateral pelvic chain lymphadenopathy, right greater than left, possibly representing metastatic disease. Ct Abdomen Pelvis W Iv Contrast Additional Contrast? Oral    Result Date: 1/28/2019  EXAMINATION: CT OF THE ABDOMEN AND PELVIS WITH CONTRAST; CT OF THE CHEST WITHOUT CONTRAST 1/28/2019 7:47 pm; 1/28/2019 7:45 pm TECHNIQUE: CT of the abdomen and pelvis was performed with the administration of intravenous contrast. Multiplanar reformatted images are provided for review. Dose modulation, iterative reconstruction, and/or weight based adjustment of the mA/kV was utilized to reduce the radiation dose to as low as reasonably achievable.; CT of the chest was performed without the administration of intravenous contrast. Multiplanar reformatted images are provided for review. Dose modulation, iterative reconstruction, and/or weight based adjustment of the mA/kV was utilized to reduce the radiation dose to as low as reasonably achievable. COMPARISON: CT abdomen and pelvis 01/18/2019. HISTORY: ORDERING SYSTEM PROVIDED HISTORY: RLQ abdominal pain TECHNOLOGIST PROVIDED HISTORY: Additional Contrast?->Oral Reason for exam:->RLQ pain persisting Ordering Physician Provided Reason for Exam: RLQ abdominal pain Acuity: Acute Type of Exam: Initial; ORDERING SYSTEM PROVIDED HISTORY: Other forms of systemic lupus erythematosus, unspecified organ involvement status ([facility] [address] TECHNOLOGIST PROVIDED HISTORY: Reason for exam:->pulmonary nodules seen on recent CT Ordering Physician Provided Reason for Exam: pulmonary nodules seen on recent CT Acuity: Chronic Type of Exam: Subsequent/Follow-up FINDINGS: Chest: Mediastinum: Lack of intravenous contrast limits evaluation of the mediastinum. The thoracic aorta is normal in appearance. The coronary arteries and branch vessels of the superior mediastinum and lower neck are unremarkable. The pulmonary arteries are normal in caliber. The heart is normal in size. No pericardial effusion.   The mediastinal esophagus and thyroid gland are unremarkable. No pathologically enlarged lymph nodes are seen in the chest. Lungs/pleura: The central airways are patent. No pleural effusion or pneumothorax. Mild bilateral dependent atelectasis. There are diffusely scattered patchy bilateral nodular/ground-glass opacities. No consolidation. Soft Tissues/Bones: No acute osseous or soft tissue abnormality. Abdomen/Pelvis: Organs: The liver is unremarkable. The gallbladder is surgically absent. The biliary tree is within normal limits status post cholecystectomy. The pancreas, spleen, and bilateral adrenal glands are unremarkable. The bilateral kidneys and ureters are unremarkable. GI/Bowel: Normal appendix. The colon is unremarkable. No evidence of acute appendicitis. No bowel obstruction or abnormal bowel wall thickening. Pelvis: Mild suspected circumferential urinary bladder wall thickening with mild adjacent stranding. The uterus and bilateral adnexae are unremarkable. Trace free fluid in the pelvis. No pelvic or inguinal lymphadenopathy. Peritoneum/Retroperitoneum: The abdominal aorta is normal in appearance. No retroperitoneal or mesenteric lymphadenopathy is identified. No free air or fluid is seen in the abdomen. Bones/Soft Tissues: No acute osseous or soft tissue abnormality. 1. Scattered small patchy ground-glass/nodular opacities throughout the bilateral lungs likely representing an infectious process. 2. Mild suspected circumferential urinary bladder wall thickening with mild adjacent stranding compatible with cystitis. Recommend correlation with urinalysis. 3. Normal appendix. Xr Chest Portable    Result Date: 2/24/2019  EXAMINATION: SINGLE XRAY VIEW OF THE CHEST 2/24/2019 7:00 am COMPARISON: Chest radiograph performed 02/19/2019.  HISTORY: ORDERING SYSTEM PROVIDED HISTORY: elevated wbc and ongoing shortness of breath TECHNOLOGIST PROVIDED HISTORY: Reason for exam:->elevated wbc and ongoing shortness of breath Acuity: Unknown Type of Exam: Unknown FINDINGS: There is mild bilateral pulmonary congestion. There is similar left basilar infiltrate. There is no pneumothorax. The mediastinal structures are stable. The upper abdomen is unremarkable. The extrathoracic soft tissues are unremarkable. There is a right internal jugular port. Stable bilateral congestion and left basilar infiltrate. Ct Chest Pulmonary Embolism W Contrast    Result Date: 2/15/2019  EXAMINATION: CTA OF THE CHEST; CT OF THE ABDOMEN AND PELVIS WITH CONTRAST 2/15/2019 2:11 pm TECHNIQUE: CTA of the chest was performed after the administration of intravenous contrast.  Multiplanar reformatted images are provided for review. MIP images are provided for review. Dose modulation, iterative reconstruction, and/or weight based adjustment of the mA/kV was utilized to reduce the radiation dose to as low as reasonably achievable.; CT of the abdomen and pelvis was performed with the administration of intravenous contrast. Multiplanar reformatted images are provided for review. Dose modulation, iterative reconstruction, and/or weight based adjustment of the mA/kV was utilized to reduce the radiation dose to as low as reasonably achievable. COMPARISON: 1/28/2019, 4/29/2011 HISTORY: ORDERING SYSTEM PROVIDED HISTORY: cervical ca - SOB - PE??? TECHNOLOGIST PROVIDED HISTORY: Ordering Physician Provided Reason for Exam: SOB Acuity: Unknown Type of Exam: Unknown; ORDERING SYSTEM PROVIDED HISTORY: abd disten - cervical ca? TECHNOLOGIST PROVIDED HISTORY: Additional Contrast?->None Ordering Physician Provided Reason for Exam: abd distention Acuity: Unknown Type of Exam: Unknown Patient with recently diagnosed cervical cancer. FINDINGS: Chest: Pulmonary arteries: The pulmonary arteries opacify normally. There is no evidence of filling defect to suggest a pulmonary embolism. Mediastinum: The thyroid is unremarkable.   There is mild subcarinal and bilateral hilar lymphadenopathy, most likely benign and reactive in etiology given the patient's underlying lung findings. The thoracic aorta is unremarkable, without evidence of aneurysm or dissection. 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 No pericardial abnormality is identified. Lungs/pleura: There is mild mucous plugging within the bilateral lower lobes. The central airways are otherwise widely patent. There has been interval development of widespread ground-glass opacity throughout both lungs, with diffuse intralobular septal thickening evident, new from prior exam.  This most likely reflects a combination of interstitial pulmonary edema and superimposed multifocal pneumonia. Additionally, there has been interval progression and more consolidation of multiple scattered various sized pulmonary nodules throughout both lungs since the study of 1/28/2019. A few of these are cavitary. The largest of these measures approximately 10 mm in short axis within the subpleural portion of the left lower lobe. These may be secondary to an atypical infectious or inflammatory process, to include septic emboli. Metastatic disease is considered less likely, though not excluded. There is no evidence of a pneumothorax or pleural effusion. Soft Tissues/Bones: No acute findings. Abdomen/Pelvis: Organs: The patient is status post cholecystectomy. The liver, spleen, and pancreas are normal.  The adrenal glands are unremarkable bilaterally. There has been interval development of nonspecific moderate right hydronephrosis since the prior exam, without definite demonstrable cause. Namely, no definite obstructing stone or mass is identified. The left kidney is stable and unremarkable. GI/Bowel: Evaluation of the hollow GI tract demonstrates no evidence of abnormal bowel wall thickening, dilatation, or obstruction. The appendix is normal. Pelvis:  The urinary bladder is partially collapsed, though appears diffusely thick-walled and inflamed, with a mild amount of pericystic stranding noted. These findings are suggestive of an acute cystitis, progressed from prior exam.  Additionally, the cervix appears enlarged and irregular, and there is new abnormal thickening of the endometrium within the uterine fundus, which appears new in comparison with the study of 1/28/2019. The bilateral adnexa are unremarkable. There is a mild amount of free pelvic fluid, likely physiologic. There are stable mildly enlarged bilateral pelvic chain lymph nodes, the largest measuring approximately 2.6 x 1.6 cm along the right external iliac chain, similar to the study of 1/28/2019. Peritoneum/Retroperitoneum: No intraperitoneal free air or free fluid is identified. No pathologic lymphadenopathy is seen. The abdominal aorta is unremarkable. No significant abdominal wall hernia is identified. Bones/Soft Tissues: There is mild bilateral sacroiliitis. The skeletal structures are otherwise unremarkable. 1. No CT evidence of a pulmonary embolism. 2. No acute abnormality of the thoracic aorta. 3. Interval development of widespread ground-glass opacity throughout both lungs with diffuse intralobular septal thickening. This most likely reflects a combination of interstitial pulmonary edema and multifocal pneumonia. 4. Interval progression of multiple nodular foci of consolidative opacity throughout both lungs, a few of which are cavitary in appearance. These nodules are most likely infectious or inflammatory in etiology, and septic emboli are a consideration. Given patient's history of cervical cancer, metastatic disease is on the differential, though considered less likely. 5. New nonspecific moderate right hydronephrosis, without demonstrable cause. However, there is underlying wall thickening and enhancement of the urinary bladder.   This combination of findings could represent a cystitis in the setting of urinary tract infection with resultant pyelitis and hydronephrosis. However, given patient history of cervical cancer, locoregional spread of tumor with resultant obstruction of the distal right ureter may be a cause of the patient's right hydronephrosis. 6. Interval development of new irregularity of the cervix and nonspecific endometrial thickening in comparison with the prior study of 1/28/2019. This likely represents the patient's known underlying cervical cancer causing obstruction of the endometrial with resultant endometrial thickening. This could be further evaluated with a follow-up pelvic ultrasound. 7. Stable mild bilateral pelvic chain lymphadenopathy, right greater than left, possibly representing metastatic disease. Ir Port Placement > 5 Years    Result Date: 2/19/2019  PROCEDURE: ULTRASOUND GUIDED VASCULAR ACCESS. FLUOROSCOPY GUIDED PLACEMENT OF A RIGHT IJ SUBCUTANEOUS PORT-A-CATH. MODERATE CONSCIOUS SEDATION 2/19/2019. HISTORY: ORDERING SYSTEM PROVIDED HISTORY: cervical cancer to get chemo TECHNOLOGIST PROVIDED HISTORY: Reason for exam:->cervical cancer to get chemo How many lumens are being requested?->1 What site is the preferred site? ->No preference What side should this line be placed? ->Either 68-year-old female with cervical cancer, presents for chest port placement. SEDATION: Moderate sedation was administered under my supervision by a qualified nurse. 4 mg of Versed was administered intravenously. Approximate intra procedural sedation time was 23 minutes. FLUOROSCOPY DOSE AND TYPE OR TIME AND EXPOSURES: 54 seconds of fluoroscopy with 2 exposures. TECHNIQUE: Informed consent was obtained after a detailed explanation of the procedure including risks, benefits, and alternatives. Universal protocol was observed. The right neck and chest were prepped and draped in sterile fashion using maximum sterile barrier technique. Local anesthesia was achieved with lidocaine.  A micropuncture needle was used to access the right internal jugular vein using ultrasound guidance. An ultrasound image demonstrating patency of the vein with needle tip located within it. An image was obtained and stored in PACs. A 0.035 guidewire was used to place a peel-away sheath. A chest wall incision was made and a subcutaneous pocket was created. The port reservoir was placed within the pocket and the port tubing was attached and tunneled subcutaneously to the venotomy site. The port tubing was cut to an appropriate length and advanced through the peel-away sheath under fluoroscopic guidance to the cavo-atrial junction. The chest wall incision was closed using 3-0 and 4-0 Vicryl suture and tissue adhesive was applied to the venotomy site and chest wall incision. The port flushed easily and there was a good blood return. The port was locked with heparinized saline. The patient tolerated the procedure well and there were no immediate complications. FINDINGS: Fluoroscopic image demonstrates the tip of the port in the right atrium. 1. Successful ultrasound and fluoroscopy guided Port-A-Cath placement via right IJ access, as described above. 2.  The port was left accessed for immediate use. Fl Retrograde Pyelogram Right    Result Date: 2/17/2019  EXAMINATION: SPOT FLUOROSCOPIC IMAGES 2/17/2019 6:52 am TECHNIQUE: Fluoroscopy was provided by the radiology department for procedure. Radiologist was not present during examination. FLUOROSCOPY DOSE AND TYPE OR TIME AND EXPOSURES: 3 seconds of fluoroscopy was utilized for purposes of intraoperative localization. 1 fluoroscopic spot image was obtained. COMPARISON: None HISTORY: Intraprocedural imaging. FINDINGS: 1 spot images of the abdomen was obtained. Intraprocedural fluoroscopic spot images as above. See separate procedure report for more information.      Ir Guided Ureteral Stent Place W Nephro Cath New Access    Result Date: 2/18/2019  PROCEDURE: IR ULTRASOUND AND FLUOROSCOPIC GUIDED RIGHT PERCUTANEOUS NEPHROSTOMY AND NEPHROSTOGRAM IR ANTEGRADE RIGHT URETERAL STENT. MODERATE CONSCIOUS SEDATION 2/18/2019 HISTORY: ORDERING SYSTEM PROVIDED HISTORY: needs R sided nephrostomy tube placed. maria guadalupe could not do via cysto. Adi said that IR would do on monday TECHNOLOGIST PROVIDED HISTORY: Reason for exam:->needs R sided nephrostomy tube placed. maria guadalupe could not do via cysto. Adi said that IR would do on monday COMPARISON: Abdomen pelvic CT 02/15/2019. CONTRAST: 30 cc, nonvascular within collecting system only. . SEDATION: Intravenous sedation was administered with continuous monitoring throughout the procedure. In measured doses, a total of 6 mg intravenous Versed and 275 mcg intravenous fentanyl was administered. My total procedure time with sedation was 26 minutes. FLUOROSCOPY DOSE AND TYPE OR TIME AND EXPOSURES: 3.5 minutes, 7 digital imaging sequences. DESCRIPTION OF PROCEDURE: Informed consent was obtained after a detailed explanation of the procedure including risks, benefits, and alternatives. Universal protocol was observed. TECHNIQUE: Informed consent was previously obtained. In the semi prone position, an appropriate area posterior lateral aspect of the skin overlying the targeted collecting system was demarcated, sterilely prepared draped and anesthetized. Utilizing ultrasound, anatomy from prior CT scan and other imaging, the needle trajectory and depth was predetermined. The micro puncture needle was advanced using fluoroscopic and ultrasound guidance into the collecting system without difficulty. Once urine was aspirated, a small platinum tip wire was advanced into the collecting system and into the proximal ureter. The tract was dilated. With wire exchange, a 6 Greek sheath was placed. The collecting system is moderately dilated and the ureter is somewhat tortuous. A glide wire was easily advanced down the ureter.   Because of this, a 9 Greek peel-away sheath was advanced into the proximal ureter over the wire. The glide wire was then advanced with a steerable catheter into the urinary bladder without difficulty and only mild discomfort. There is no contrast extravasation. Once the catheter was placed into the urinary bladder, contrast injection is seen diluted within the somewhat distended urinary bladder. The catheter was used to place a Super Stiff wire for ureteral stent placement. A 26 cm 8 French ureteral stent was then advanced, such that the distal pigtail is well within the urinary bladder. The proximal pigtail was placed in the lower portion of the left renal pelvis. Again there is no contrast extravasation. No filling defect to indicate clot or bleeding was observed on early or later contrast imaging. Following this, dense contrast was injected showing normal expected filling of the ureteral stent and exiting from the distal pigtail directly into the urinary bladder. Following this, the internalized stent was disengaged. The proximal collecting system was then decompressed. The proximal nephrostomy access was then removed entirely, no external drainage necessary at this point. The patient tolerated the procedure well. A sterile bandage was placed over the small incision. From this point forward, the ureteral stent can be exchanged as needed from below. Successful right percutaneous nephrostomy with antegrade pyelogram. High-grade distal ureteral obstruction with severe hydronephrosis, successfully crossed as above. Successful 8 French internalized right ureteral stent placement as above.        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 Received first dose of taxol carboplatinum February 20 Avastin held due to persistent vaginal bleeding and recent procedures  Pain due to neoplasm will give iv dilaudid for now and increase ms contin and oxycodone  Anemia due to uterine bleeding   hypoxis repeat chest xray stable. Initial ctpa did not reveal pulmonary embolus  Will start lovenox prophylactic dose because patient at high risk for thromboembolic disease with cancer and inactivity        I have discussed the above stated plan with the patient and they verbalized understanding and agreed with the plan. Thank you for allowing us to participate in this patients care.     Corrina Fishman MD, 2/24/2019, 3:49 PM

## 2019-02-24 NOTE — PROGRESS NOTES
Scheduled medications given. PRN Oxy-IR given for pain. Patient denies any needs. Call light within reach.

## 2019-02-24 NOTE — PROGRESS NOTES
Dr. Bhumika Garcia was called in [facility] to increase in WBC today since prior labs, pt remains afebrile and no respiratory changes per pt. She is noted to have bilateral lower lobe crackles upon my assessment and Dr. Bhumika Garcia explained that they are not sure if this is PNA or d/t the Ca/mets. Will continue to monitor and recheck labs in am. No new orders at this time, will notify MD if any changes occur overnight or pt becomes febrile.

## 2019-02-25 NOTE — PROGRESS NOTES
Oncology [facility]ology [facility]   Progress Note      2/25/2019 1:15 PM        Name: Carleen Clemons . Admitted: 2/15/2019    SUBJECTIVE:  Patient fatigued. Continues to report nausea and pain. Still having dyspnea on exertion, requiring oxygen.      Reviewed interval ancillary notes    Current Medications    0.9 % sodium chloride bolus PRN   cefepime (MAXIPIME) 2 g IVPB minibag Q12H   HYDROmorphone HCl PF (DILAUDID) injection 1 mg Q3H PRN   morphine (MS CONTIN) extended release tablet 30 mg 2 times per day   oxyCODONE (OXY-IR) immediate release tablet 15 mg Q3H PRN   enoxaparin (LOVENOX) injection 40 mg Daily   bisacodyl (DULCOLAX) EC tablet 5 mg Daily PRN   linaclotide (LINZESS) capsule 145 mcg QAM AC   ipratropium-albuterol (DUONEB) nebulizer solution 1 ampule TID   prochlorperazine (COMPAZINE) injection 10 mg Q6H PRN   LORazepam (ATIVAN) injection 0.25 mg Q6H PRN   polyethylene glycol (GLYCOLAX) packet 17 g Daily PRN   sodium chloride bacteriostatic 0.9 % injection 15 mL PRN   sodium chloride (PF) 0.9 % injection 500 mL PRN   acetaminophen (TYLENOL) tablet 500 mg Q4H PRN   diphenhydrAMINE (BENADRYL) tablet 25 mg Q6H PRN   sennosides-docusate sodium (SENOKOT-S) 8.6-50 MG tablet 2 tablet Daily   ipratropium-albuterol (DUONEB) nebulizer solution 1 ampule Q4H PRN   zolpidem (AMBIEN) tablet 10 mg Nightly   gabapentin (NEURONTIN) capsule 300 mg TID   baclofen (LIORESAL) tablet 20 mg BID   ibuprofen (ADVIL;MOTRIN) tablet 800 mg Q8H PRN   promethazine (PHENERGAN) tablet 25 mg Q6H PRN   0.9 % sodium chloride infusion Continuous   sodium chloride flush 0.9 % injection 10 mL 2 times per day   sodium chloride flush 0.9 % injection 10 mL PRN   magnesium hydroxide (MILK OF MAGNESIA) 400 MG/5ML suspension 30 mL Daily PRN   ondansetron (ZOFRAN) injection 4 mg Q6H PRN   potassium chloride (KLOR-CON M) extended release tablet 40 mEq PRN   Or    potassium bicarb-citric acid (EFFER-K) effervescent tablet 40 mEq PRN   Or potassium chloride 10 mEq/100 mL IVPB (Peripheral Line) PRN   promethazine (PHENERGAN) injection 25 mg Q6H PRN   DULoxetine (CYMBALTA) extended release capsule 60 mg Nightly       Objective:  /66   Pulse 99   Temp 97.9 °F (36.6 °C) (Oral)   Resp 16   Ht 5' 6.5\" (1.689 m)   Wt 193 lb 3.2 oz (87.6 kg)   SpO2 97%   BMI 30.72 kg/m²     Intake/Output Summary (Last 24 hours) at 02/25/19 1315  Last data filed at 02/25/19 0104   Gross per 24 hour   Intake            321.1 ml   Output                0 ml   Net            321.1 ml    Wt Readings from Last 3 Encounters:   02/20/19 193 lb 3.2 oz (87.6 kg)   02/10/19 190 lb (86.2 kg)   02/08/19 195 lb (88.5 kg)       CONSTITUTIONAL:  Awake, alert & oriented x3  HEENT: PERRL, Neck: soft, supple, no cervical, supraclavicular or axillary adenopathy  RESPIRATORY:  No increased work of breathing, breath sounds decreased. CARDIOVASCULAR:  Cardiac S1/S2, RRR  GASTROINTESTINAL:  abdomen soft +BS x4, no hepatosplenomegaly  SKIN:  negative for rash and skin lesion(s)  MUSCULOSKELETAL:  negative for pain and muscle weakness  EXTREMITIES: Negative for Lower extremity edema    Labs and Tests:  CBC:   Recent Labs      02/23/19   0442  02/24/19   0608   WBC  16.1*  21.7*   HGB  8.2*  8.8*   PLT  323  337     BMP:  Recent Labs      02/23/19   0442  02/24/19   0608   NA  142  142   K  4.3  4.1   CL  109  106   CO2  24  27   BUN  13  14   CREATININE  <0.5*  <0.5*   GLUCOSE  163*  120*     Hepatic: No results for input(s): AST, ALT, ALB, BILITOT, ALKPHOS in the last 72 hours. ASSESSMENT AND PLAN    Principal Problem:    Pneumonia  Active Problems:    SLE (systemic lupus erythematosus) (HCC)    Lupus anticoagulant positive    Cervical cancer (HCC)    Pulmonary nodule    Hydronephrosis    Ground glass opacity present on imaging of lung    Lung nodules    Lymphadenopathy    Constipation due to opioid therapy  Resolved Problems:    * No resolved hospital problems.  *      Squamous

## 2019-02-25 NOTE — PROGRESS NOTES
Progress Note - Dr. Valentina Encarnacion - Internal Medicine  PCP: Ramin Sierra  [address] [place] [address] [facility]  [phone]    Hospital Day: 10  Code Status: Full Code  Current Diet: DIET GENERAL;  Dietary Nutrition Supplements: Low Calorie High Protein Supplement        CC: follow up on medical issues    Subjective:   Sandoval Loving is a 42 y.o. female. Doing better now  Some symptomatic hypotension overnight  In process of NS bolus now    She denies chest pain, denies shortness of breath, denies nausea,  denies emesis. 10 system Review of Systems is reviewed with patient, and pertinent positives are listed here: None . Otherwise, Review of systems is negative. I have reviewed the patient's medical and social history in detail and updated the computerized patient record. To recap: She  has a past medical history of Endometriosis; Fibromyalgia; GERD (gastroesophageal reflux disease); Hip fracture ([facility] Hypoglycemia; Lupus; Neuropathy; Ovarian cyst; and [facility] . She  has a past surgical history that includes  section; Cholecystectomy (); bronchoscopy (10/16/14); bronchoscopy (10/18/2014); Cystoscopy (Right, 2019); and laparoscopy (2019). . She  reports that she quit smoking about 17 years ago. Her smoking use included Cigarettes. She started smoking about 21 years ago. She has a 0.75 pack-year smoking history. She has never used smokeless tobacco. She reports that she does not drink alcohol or use drugs. .        Active Hospital Problems    Diagnosis Date Noted    Constipation due to opioid therapy [K59.03, Vernal Min 2019    Ground glass opacity present on imaging of lung [R91.8]     Lung nodules [R91.8]     Lymphadenopathy [R59.1]     C[facility] ([facility] [C53.9] 02/15/2019    Pneumonia [J18.9] 02/15/2019    Pulmonary nodule [R91.1] 02/15/2019    Hydronephrosis [N13.30] 02/15/2019    SLE (systemic lupus erythematosus) ([facility] [M32.9] 2019 injection 10 mL, 10 mL, Intravenous, PRN  magnesium hydroxide (MILK OF MAGNESIA) 400 MG/5ML suspension 30 mL, 30 mL, Oral, Daily PRN  ondansetron (ZOFRAN) injection 4 mg, 4 mg, Intravenous, Q6H PRN  potassium chloride (KLOR-CON M) extended release tablet 40 mEq, 40 mEq, Oral, PRN **OR** potassium bicarb-citric acid (EFFER-K) effervescent tablet 40 mEq, 40 mEq, Oral, PRN **OR** potassium chloride 10 mEq/100 mL IVPB (Peripheral Line), 10 mEq, Intravenous, PRN  promethazine (PHENERGAN) injection 25 mg, 25 mg, Intramuscular, Q6H PRN  DULoxetine (CYMBALTA) extended release capsule 60 mg, 60 mg, Oral, Nightly         Objective:  BP (!) 93/56   Pulse 116   Temp 98.5 °F (36.9 °C) (Oral)   Resp 18   Ht 5' 6.5\" (1.689 m)   Wt 193 lb 3.2 oz (87.6 kg)   SpO2 97%   BMI 30.72 kg/m²      Patient Vitals for the past 24 hrs:   BP Temp Temp src Pulse Resp SpO2   02/25/19 0645 (!) 93/56 98.5 °F (36.9 °C) Oral 116 18 -   02/25/19 0500 98/65 98.3 °F (36.8 °C) Oral 96 19 97 %   02/25/19 0019 (!) 89/57 98.3 °F (36.8 °C) - 96 18 96 %   02/24/19 2005 98/65 98.4 °F (36.9 °C) Oral 97 17 95 %   02/24/19 1603 115/77 98.9 °F (37.2 °C) Oral 88 16 94 %   02/24/19 1301 - - - - 16 95 %     No data found.           Intake/Output Summary (Last 24 hours) at 02/25/19 0823  Last data filed at 02/25/19 0104   Gross per 24 hour   Intake            441.1 ml   Output                0 ml   Net            441.1 ml         Physical Exam:   S1, S2 normal, no murmur, rub or gallop, regular rate and rhythm  clear to auscultation bilaterally  abdomen is soft without significant tenderness, masses, organomegaly or guarding  extremities normal, atraumatic, no cyanosis or edema    Labs:  Lab Results   Component Value Date    WBC 21.7 (H) 02/24/2019    HGB 8.8 (L) 02/24/2019    HCT 28.4 (L) 02/24/2019     02/24/2019    CHOL 188 10/15/2014    TRIG 132 10/15/2014    HDL 65 (H) 10/15/2014    ALT 49 (H) 02/18/2019    AST 23 02/18/2019     02/24/2019    K than left, superimposed on a background of diffuse interstitial prominence. Heart size, mediastinal contours and pulmonary vascularity are within normal limits. There is no pleural effusion. Multifocal bilateral pneumonia. Ct Chest Wo Contrast    Result Date: 1/28/2019  EXAMINATION: CT OF THE ABDOMEN AND PELVIS WITH CONTRAST; CT OF THE CHEST WITHOUT CONTRAST 1/28/2019 7:47 pm; 1/28/2019 7:45 pm TECHNIQUE: CT of the abdomen and pelvis was performed with the administration of intravenous contrast. Multiplanar reformatted images are provided for review. Dose modulation, iterative reconstruction, and/or weight based adjustment of the mA/kV was utilized to reduce the radiation dose to as low as reasonably achievable.; CT of the chest was performed without the administration of intravenous contrast. Multiplanar reformatted images are provided for review. Dose modulation, iterative reconstruction, and/or weight based adjustment of the mA/kV was utilized to reduce the radiation dose to as low as reasonably achievable. COMPARISON: CT abdomen and pelvis 01/18/2019. HISTORY: ORDERING SYSTEM PROVIDED HISTORY: RLQ abdominal pain TECHNOLOGIST PROVIDED HISTORY: Additional Contrast?->Oral Reason for exam:->RLQ pain persisting Ordering Physician Provided Reason for Exam: RLQ abdominal pain Acuity: Acute Type of Exam: Initial; ORDERING SYSTEM PROVIDED HISTORY: Other forms of systemic lupus erythematosus, unspecified organ involvement status ([facility] TECHNOLOGIST PROVIDED HISTORY: Reason for exam:->pulmonary nodules seen on recent CT Ordering Physician Provided Reason for Exam: pulmonary nodules seen on recent CT Acuity: Chronic Type of Exam: Subsequent/Follow-up FINDINGS: Chest: Mediastinum: Lack of intravenous contrast limits evaluation of the mediastinum. The thoracic aorta is normal in appearance. The coronary arteries and branch vessels of the superior mediastinum and lower neck are unremarkable.   The pulmonary arteries are normal in caliber. The heart is normal in size. No pericardial effusion. The mediastinal esophagus and thyroid gland are unremarkable. No pathologically enlarged lymph nodes are seen in the chest. Lungs/pleura: The central airways are patent. No pleural effusion or pneumothorax. Mild bilateral dependent atelectasis. There are diffusely scattered patchy bilateral nodular/ground-glass opacities. No consolidation. Soft Tissues/Bones: No acute osseous or soft tissue abnormality. Abdomen/Pelvis: Organs: The liver is unremarkable. The gallbladder is surgically absent. The biliary tree is within normal limits status post cholecystectomy. The pancreas, spleen, and bilateral adrenal glands are unremarkable. The bilateral kidneys and ureters are unremarkable. GI/Bowel: Normal appendix. The colon is unremarkable. No evidence of acute appendicitis. No bowel obstruction or abnormal bowel wall thickening. Pelvis: Mild suspected circumferential urinary bladder wall thickening with mild adjacent stranding. The uterus and bilateral adnexae are unremarkable. Trace free fluid in the pelvis. No pelvic or inguinal lymphadenopathy. Peritoneum/Retroperitoneum: The abdominal aorta is normal in appearance. No retroperitoneal or mesenteric lymphadenopathy is identified. No free air or fluid is seen in the abdomen. Bones/Soft Tissues: No acute osseous or soft tissue abnormality. 1. Scattered small patchy ground-glass/nodular opacities throughout the bilateral lungs likely representing an infectious process. 2. Mild suspected circumferential urinary bladder wall thickening with mild adjacent stranding compatible with cystitis. Recommend correlation with urinalysis. 3. Normal appendix.      Ct Abdomen Pelvis W Iv Contrast Additional Contrast? None    Result Date: 2/15/2019  EXAMINATION: CTA OF THE CHEST; CT OF THE ABDOMEN AND PELVIS WITH CONTRAST 2/15/2019 2:11 pm TECHNIQUE: CTA of the chest was performed after the administration of intravenous contrast.  Multiplanar reformatted images are provided for review. MIP images are provided for review. Dose modulation, iterative reconstruction, and/or weight based adjustment of the mA/kV was utilized to reduce the radiation dose to as low as reasonably achievable.; CT of the abdomen and pelvis was performed with the administration of intravenous contrast. Multiplanar reformatted images are provided for review. Dose modulation, iterative reconstruction, and/or weight based adjustment of the mA/kV was utilized to reduce the radiation dose to as low as reasonably achievable. COMPARISON: 1/28/2019, 4/29/2011 HISTORY: ORDERING SYSTEM PROVIDED HISTORY: cervical ca - SOB - PE??? TECHNOLOGIST PROVIDED HISTORY: Ordering Physician Provided Reason for Exam: SOB Acuity: Unknown Type of Exam: Unknown; ORDERING SYSTEM PROVIDED HISTORY: abd disten - cervical ca? TECHNOLOGIST PROVIDED HISTORY: Additional Contrast?->None Ordering Physician Provided Reason for Exam: abd distention Acuity: Unknown Type of Exam: Unknown Patient with recently diagnosed cervical cancer. FINDINGS: Chest: Pulmonary arteries: The pulmonary arteries opacify normally. There is no evidence of filling defect to suggest a pulmonary embolism. Mediastinum: The thyroid is unremarkable. There is mild subcarinal and bilateral hilar lymphadenopathy, most likely benign and reactive in etiology given the patient's underlying lung findings. The thoracic aorta is unremarkable, without evidence of aneurysm or dissection. 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 No pericardial abnormality is identified. Lungs/pleura: There is mild mucous plugging within the bilateral lower lobes. The central airways are otherwise widely patent.   There has been interval development of widespread ground-glass opacity throughout both lungs, with diffuse intralobular septal thickening evident, new from prior exam. This most likely reflects a combination of interstitial pulmonary edema and superimposed multifocal pneumonia. Additionally, there has been interval progression and more consolidation of multiple scattered various sized pulmonary nodules throughout both lungs since the study of 1/28/2019. A few of these are cavitary. The largest of these measures approximately 10 mm in short axis within the subpleural portion of the left lower lobe. These may be secondary to an atypical infectious or inflammatory process, to include septic emboli. Metastatic disease is considered less likely, though not excluded. There is no evidence of a pneumothorax or pleural effusion. Soft Tissues/Bones: No acute findings. Abdomen/Pelvis: Organs: The patient is status post cholecystectomy. The liver, spleen, and pancreas are normal.  The adrenal glands are unremarkable bilaterally. There has been interval development of nonspecific moderate right hydronephrosis since the prior exam, without definite demonstrable cause. Namely, no definite obstructing stone or mass is identified. The left kidney is stable and unremarkable. GI/Bowel: Evaluation of the hollow GI tract demonstrates no evidence of abnormal bowel wall thickening, dilatation, or obstruction. The appendix is normal. Pelvis: The urinary bladder is partially collapsed, though appears diffusely thick-walled and inflamed, with a mild amount of pericystic stranding noted. These findings are suggestive of an acute cystitis, progressed from prior exam.  Additionally, the cervix appears enlarged and irregular, and there is new abnormal thickening of the endometrium within the uterine fundus, which appears new in comparison with the study of 1/28/2019. The bilateral adnexa are unremarkable. There is a mild amount of free pelvic fluid, likely physiologic.   There are stable mildly enlarged bilateral pelvic chain lymph nodes, the largest measuring approximately 2.6 x 1.6 cm along the right external iliac chain, similar to the study of 1/28/2019. Peritoneum/Retroperitoneum: No intraperitoneal free air or free fluid is identified. No pathologic lymphadenopathy is seen. The abdominal aorta is unremarkable. No significant abdominal wall hernia is identified. Bones/Soft Tissues: There is mild bilateral sacroiliitis. The skeletal structures are otherwise unremarkable. 1. No CT evidence of a pulmonary embolism. 2. No acute abnormality of the thoracic aorta. 3. Interval development of widespread ground-glass opacity throughout both lungs with diffuse intralobular septal thickening. This most likely reflects a combination of interstitial pulmonary edema and multifocal pneumonia. 4. Interval progression of multiple nodular foci of consolidative opacity throughout both lungs, a few of which are cavitary in appearance. These nodules are most likely infectious or inflammatory in etiology, and septic emboli are a consideration. Given patient's history of cervical cancer, metastatic disease is on the differential, though considered less likely. 5. New nonspecific moderate right hydronephrosis, without demonstrable cause. However, there is underlying wall thickening and enhancement of the urinary bladder. This combination of findings could represent a cystitis in the setting of urinary tract infection with resultant pyelitis and hydronephrosis. However, given patient history of cervical cancer, locoregional spread of tumor with resultant obstruction of the distal right ureter may be a cause of the patient's right hydronephrosis. 6. Interval development of new irregularity of the cervix and nonspecific endometrial thickening in comparison with the prior study of 1/28/2019. This likely represents the patient's known underlying cervical cancer causing obstruction of the endometrial with resultant endometrial thickening. This could be further evaluated with a follow-up pelvic ultrasound.  7. Stable The mediastinal esophagus and thyroid gland are unremarkable. No pathologically enlarged lymph nodes are seen in the chest. Lungs/pleura: The central airways are patent. No pleural effusion or pneumothorax. Mild bilateral dependent atelectasis. There are diffusely scattered patchy bilateral nodular/ground-glass opacities. No consolidation. Soft Tissues/Bones: No acute osseous or soft tissue abnormality. Abdomen/Pelvis: Organs: The liver is unremarkable. The gallbladder is surgically absent. The biliary tree is within normal limits status post cholecystectomy. The pancreas, spleen, and bilateral adrenal glands are unremarkable. The bilateral kidneys and ureters are unremarkable. GI/Bowel: Normal appendix. The colon is unremarkable. No evidence of acute appendicitis. No bowel obstruction or abnormal bowel wall thickening. Pelvis: Mild suspected circumferential urinary bladder wall thickening with mild adjacent stranding. The uterus and bilateral adnexae are unremarkable. Trace free fluid in the pelvis. No pelvic or inguinal lymphadenopathy. Peritoneum/Retroperitoneum: The abdominal aorta is normal in appearance. No retroperitoneal or mesenteric lymphadenopathy is identified. No free air or fluid is seen in the abdomen. Bones/Soft Tissues: No acute osseous or soft tissue abnormality. 1. Scattered small patchy ground-glass/nodular opacities throughout the bilateral lungs likely representing an infectious process. 2. Mild suspected circumferential urinary bladder wall thickening with mild adjacent stranding compatible with cystitis. Recommend correlation with urinalysis. 3. Normal appendix. Xr Chest Portable    Result Date: 2/24/2019  EXAMINATION: SINGLE XRAY VIEW OF THE CHEST 2/24/2019 7:00 am COMPARISON: Chest radiograph performed 02/19/2019.  HISTORY: ORDERING SYSTEM PROVIDED HISTORY: elevated wbc and ongoing shortness of breath TECHNOLOGIST PROVIDED HISTORY: Reason for exam:->elevated wbc and ongoing shortness of breath Acuity: Unknown Type of Exam: Unknown FINDINGS: There is mild bilateral pulmonary congestion. There is similar left basilar infiltrate. There is no pneumothorax. The mediastinal structures are stable. The upper abdomen is unremarkable. The extrathoracic soft tissues are unremarkable. There is a right internal jugular port. Stable bilateral congestion and left basilar infiltrate. Ct Chest Pulmonary Embolism W Contrast    Result Date: 2/15/2019  EXAMINATION: CTA OF THE CHEST; CT OF THE ABDOMEN AND PELVIS WITH CONTRAST 2/15/2019 2:11 pm TECHNIQUE: CTA of the chest was performed after the administration of intravenous contrast.  Multiplanar reformatted images are provided for review. MIP images are provided for review. Dose modulation, iterative reconstruction, and/or weight based adjustment of the mA/kV was utilized to reduce the radiation dose to as low as reasonably achievable.; CT of the abdomen and pelvis was performed with the administration of intravenous contrast. Multiplanar reformatted images are provided for review. Dose modulation, iterative reconstruction, and/or weight based adjustment of the mA/kV was utilized to reduce the radiation dose to as low as reasonably achievable. COMPARISON: 1/28/2019, 4/29/2011 HISTORY: ORDERING SYSTEM PROVIDED HISTORY: cervical ca - SOB - PE??? TECHNOLOGIST PROVIDED HISTORY: Ordering Physician Provided Reason for Exam: SOB Acuity: Unknown Type of Exam: Unknown; ORDERING SYSTEM PROVIDED HISTORY: abd disten - cervical ca? TECHNOLOGIST PROVIDED HISTORY: Additional Contrast?->None Ordering Physician Provided Reason for Exam: abd distention Acuity: Unknown Type of Exam: Unknown Patient with recently diagnosed cervical cancer. FINDINGS: Chest: Pulmonary arteries: The pulmonary arteries opacify normally. There is no evidence of filling defect to suggest a pulmonary embolism. Mediastinum: The thyroid is unremarkable.   There is mild subcarinal and bilateral hilar lymphadenopathy, most likely benign and reactive in etiology given the patient's underlying lung findings. The thoracic aorta is unremarkable, without evidence of aneurysm or dissection. 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 No pericardial abnormality is identified. Lungs/pleura: There is mild mucous plugging within the bilateral lower lobes. The central airways are otherwise widely patent. There has been interval development of widespread ground-glass opacity throughout both lungs, with diffuse intralobular septal thickening evident, new from prior exam.  This most likely reflects a combination of interstitial pulmonary edema and superimposed multifocal pneumonia. Additionally, there has been interval progression and more consolidation of multiple scattered various sized pulmonary nodules throughout both lungs since the study of 1/28/2019. A few of these are cavitary. The largest of these measures approximately 10 mm in short axis within the subpleural portion of the left lower lobe. These may be secondary to an atypical infectious or inflammatory process, to include septic emboli. Metastatic disease is considered less likely, though not excluded. There is no evidence of a pneumothorax or pleural effusion. Soft Tissues/Bones: No acute findings. Abdomen/Pelvis: Organs: The patient is status post cholecystectomy. The liver, spleen, and pancreas are normal.  The adrenal glands are unremarkable bilaterally. There has been interval development of nonspecific moderate right hydronephrosis since the prior exam, without definite demonstrable cause. Namely, no definite obstructing stone or mass is identified. The left kidney is stable and unremarkable. GI/Bowel: Evaluation of the hollow GI tract demonstrates no evidence of abnormal bowel wall thickening, dilatation, or obstruction. The appendix is normal. Pelvis:  The urinary bladder is partially collapsed, though appears diffusely thick-walled and inflamed, with a mild amount of pericystic stranding noted. These findings are suggestive of an acute cystitis, progressed from prior exam.  Additionally, the cervix appears enlarged and irregular, and there is new abnormal thickening of the endometrium within the uterine fundus, which appears new in comparison with the study of 1/28/2019. The bilateral adnexa are unremarkable. There is a mild amount of free pelvic fluid, likely physiologic. There are stable mildly enlarged bilateral pelvic chain lymph nodes, the largest measuring approximately 2.6 x 1.6 cm along the right external iliac chain, similar to the study of 1/28/2019. Peritoneum/Retroperitoneum: No intraperitoneal free air or free fluid is identified. No pathologic lymphadenopathy is seen. The abdominal aorta is unremarkable. No significant abdominal wall hernia is identified. Bones/Soft Tissues: There is mild bilateral sacroiliitis. The skeletal structures are otherwise unremarkable. 1. No CT evidence of a pulmonary embolism. 2. No acute abnormality of the thoracic aorta. 3. Interval development of widespread ground-glass opacity throughout both lungs with diffuse intralobular septal thickening. This most likely reflects a combination of interstitial pulmonary edema and multifocal pneumonia. 4. Interval progression of multiple nodular foci of consolidative opacity throughout both lungs, a few of which are cavitary in appearance. These nodules are most likely infectious or inflammatory in etiology, and septic emboli are a consideration. Given patient's history of cervical cancer, metastatic disease is on the differential, though considered less likely. 5. New nonspecific moderate right hydronephrosis, without demonstrable cause. However, there is underlying wall thickening and enhancement of the urinary bladder.   This combination of findings could represent a cystitis in the setting of urinary tract infection with resultant pyelitis and hydronephrosis. However, given patient history of cervical cancer, locoregional spread of tumor with resultant obstruction of the distal right ureter may be a cause of the patient's right hydronephrosis. 6. Interval development of new irregularity of the cervix and nonspecific endometrial thickening in comparison with the prior study of 1/28/2019. This likely represents the patient's known underlying cervical cancer causing obstruction of the endometrial with resultant endometrial thickening. This could be further evaluated with a follow-up pelvic ultrasound. 7. Stable mild bilateral pelvic chain lymphadenopathy, right greater than left, possibly representing metastatic disease. Ir Port Placement > 5 Years    Result Date: 2/19/2019  PROCEDURE: ULTRASOUND GUIDED VASCULAR ACCESS. FLUOROSCOPY GUIDED PLACEMENT OF A RIGHT IJ SUBCUTANEOUS PORT-A-CATH. MODERATE CONSCIOUS SEDATION 2/19/2019. HISTORY: ORDERING SYSTEM PROVIDED HISTORY: cervical cancer to get chemo TECHNOLOGIST PROVIDED HISTORY: Reason for exam:->cervical cancer to get chemo How many lumens are being requested?->1 What site is the preferred site? ->No preference What side should this line be placed? ->Either 17-year-old female with cervical cancer, presents for chest port placement. SEDATION: Moderate sedation was administered under my supervision by a qualified nurse. 4 mg of Versed was administered intravenously. Approximate intra procedural sedation time was 23 minutes. FLUOROSCOPY DOSE AND TYPE OR TIME AND EXPOSURES: 54 seconds of fluoroscopy with 2 exposures. TECHNIQUE: Informed consent was obtained after a detailed explanation of the procedure including risks, benefits, and alternatives. Universal protocol was observed. The right neck and chest were prepped and draped in sterile fashion using maximum sterile barrier technique. Local anesthesia was achieved with lidocaine.  A micropuncture needle was used to access the right internal jugular vein using ultrasound guidance. An ultrasound image demonstrating patency of the vein with needle tip located within it. An image was obtained and stored in PACs. A 0.035 guidewire was used to place a peel-away sheath. A chest wall incision was made and a subcutaneous pocket was created. The port reservoir was placed within the pocket and the port tubing was attached and tunneled subcutaneously to the venotomy site. The port tubing was cut to an appropriate length and advanced through the peel-away sheath under fluoroscopic guidance to the cavo-atrial junction. The chest wall incision was closed using 3-0 and 4-0 Vicryl suture and tissue adhesive was applied to the venotomy site and chest wall incision. The port flushed easily and there was a good blood return. The port was locked with heparinized saline. The patient tolerated the procedure well and there were no immediate complications. FINDINGS: Fluoroscopic image demonstrates the tip of the port in the right atrium. 1. Successful ultrasound and fluoroscopy guided Port-A-Cath placement via right IJ access, as described above. 2.  The port was left accessed for immediate use. Fl Retrograde Pyelogram Right    Result Date: 2/17/2019  EXAMINATION: SPOT FLUOROSCOPIC IMAGES 2/17/2019 6:52 am TECHNIQUE: Fluoroscopy was provided by the radiology department for procedure. Radiologist was not present during examination. FLUOROSCOPY DOSE AND TYPE OR TIME AND EXPOSURES: 3 seconds of fluoroscopy was utilized for purposes of intraoperative localization. 1 fluoroscopic spot image was obtained. COMPARISON: None HISTORY: Intraprocedural imaging. FINDINGS: 1 spot images of the abdomen was obtained. Intraprocedural fluoroscopic spot images as above. See separate procedure report for more information.      Ir Guided Ureteral Stent Place W Nephro Cath New Access    Result Date: 2/18/2019  PROCEDURE: IR ULTRASOUND AND FLUOROSCOPIC GUIDED RIGHT PERCUTANEOUS NEPHROSTOMY AND NEPHROSTOGRAM IR ANTEGRADE RIGHT URETERAL STENT. MODERATE CONSCIOUS SEDATION 2/18/2019 HISTORY: ORDERING SYSTEM PROVIDED HISTORY: needs R sided nephrostomy tube placed. maria guadalupe could not do via cysto. Adi said that IR would do on monday TECHNOLOGIST PROVIDED HISTORY: Reason for exam:->needs R sided nephrostomy tube placed. maria guadalupe could not do via cysto. Adi said that IR would do on monday COMPARISON: Abdomen pelvic CT 02/15/2019. CONTRAST: 30 cc, nonvascular within collecting system only. . SEDATION: Intravenous sedation was administered with continuous monitoring throughout the procedure. In measured doses, a total of 6 mg intravenous Versed and 275 mcg intravenous fentanyl was administered. My total procedure time with sedation was 26 minutes. FLUOROSCOPY DOSE AND TYPE OR TIME AND EXPOSURES: 3.5 minutes, 7 digital imaging sequences. DESCRIPTION OF PROCEDURE: Informed consent was obtained after a detailed explanation of the procedure including risks, benefits, and alternatives. Universal protocol was observed. TECHNIQUE: Informed consent was previously obtained. In the semi prone position, an appropriate area posterior lateral aspect of the skin overlying the targeted collecting system was demarcated, sterilely prepared draped and anesthetized. Utilizing ultrasound, anatomy from prior CT scan and other imaging, the needle trajectory and depth was predetermined. The micro puncture needle was advanced using fluoroscopic and ultrasound guidance into the collecting system without difficulty. Once urine was aspirated, a small platinum tip wire was advanced into the collecting system and into the proximal ureter. The tract was dilated. With wire exchange, a 6 English sheath was placed. The collecting system is moderately dilated and the ureter is somewhat tortuous. A glide wire was easily advanced down the ureter.   Because of this, a [address] [place] [address] peel-away sheath was advanced into the proximal ureter over the wire. The glide wire was then advanced with a steerable catheter into the urinary bladder without difficulty and only mild discomfort. There is no contrast extravasation. Once the catheter was placed into the urinary bladder, contrast injection is seen diluted within the somewhat distended urinary bladder. The catheter was used to place a Super Stiff wire for ureteral stent placement. A 26 cm 8 French ureteral stent was then advanced, such that the distal pigtail is well within the urinary bladder. The proximal pigtail was placed in the lower portion of the left renal pelvis. Again there is no contrast extravasation. No filling defect to indicate clot or bleeding was observed on early or later contrast imaging. Following this, dense contrast was injected showing normal expected filling of the ureteral stent and exiting from the distal pigtail directly into the urinary bladder. Following this, the internalized stent was disengaged. The proximal collecting system was then decompressed. The proximal nephrostomy access was then removed entirely, no external drainage necessary at this point. The patient tolerated the procedure well. A sterile bandage was placed over the small incision. From this point forward, the ureteral stent can be exchanged as needed from below. Successful right percutaneous nephrostomy with antegrade pyelogram. High-grade distal ureteral obstruction with severe hydronephrosis, successfully crossed as above. Successful 8 French internalized right ureteral stent placement as above. Assessment and Plan:  Patient Active Hospital Problem List:   Pneumonia (2/15/2019)    Assessment: Established problem. Worsening. Again with cough. cxr shows new infiltrate    Plan: place back on cefepime. Trend wbc. Would check repeat CT today   SLE (systemic lupus erythematosus) ([facility] [address] (1/22/2019)    Assessment: Established problem.

## 2019-02-25 NOTE — PROGRESS NOTES
Cathflo instilled. Will attempt blood return in 30 minutes. Electronically signed by Feliz Brooks RN on 2/25/2019 at 10:38 AM    Cathflo effective. Specimen obtained. New CLC cap applied. Pt denies further needs at this time. Call light in hand. Will continue to monitor.  Electronically signed by Feliz Brooks RN on 2/25/2019 at 11:34 AM

## 2019-02-25 NOTE — PLAN OF CARE
Problem: Pain:  Goal: Pain level will decrease  Pain level will decrease   Outcome: Ongoing      Problem: Nausea/Vomiting:  Goal: Absence of nausea/vomiting  Absence of nausea/vomiting   Outcome: Ongoing  Pt denied nausea/vomiting at this time, will monitor.

## 2019-02-25 NOTE — PROGRESS NOTES
Bedside rounding completed with Magalie Read RN. Pt denies needs at this time. White board updated. Call light in hand and pt verbalizes correct use. All needs within reach. Bed alarm set. Will continue to monitor.  Electronically signed by Miguel Miller RN on 2/25/2019 at 7:51 AM

## 2019-02-25 NOTE — PROGRESS NOTES
Head to toe assessment complete. Vital signs obtained. Pt resting in bed at this time. AM medication administered. Pt tolerated well. Port flushed, but unable to obtain blood return. Primitivo Michel also tried and unsuccessful. Port re-accessed and remains without blood return. Message sent to Sunday, NP. Lab called to come draw blood peripherally. Pt up to brm.  + blood clots. Pt states they are from her vagina, not urinary. Pt up to brm to change peripad. Pt assisted back to bed. Pt concerned that Lovenox is causing increased bleeding. Will message MD regarding that as well. Pt denies further needs at this time. Call light in hand. Will continue to monitor.  Electronically signed by Best Campbell RN on 2/25/2019 at 9:11 AM

## 2019-02-25 NOTE — PROGRESS NOTES
PRN Dilaudid given for pain, benadryl given for itching.      Bedside report completed with Patrick Long RN

## 2019-02-26 NOTE — DISCHARGE INSTR - COC
Continuity of Care Form    Patient Name: Ban Rose   :  1980  MRN:  [id number]    Admit date:  2/15/2019  Discharge date:  2019    Code Status Order: Full Code   Advance Directives:   Advance Care Flowsheet Documentation     Date/Time Healthcare Directive Type of Healthcare Directive Copy in [address] Agent's Name Healthcare Agent's Phone Number    02/15/19 1859  No, patient does not have an advance directive for healthcare treatment -- -- -- -- --          Admitting Physician:  Reena Ngo MD  PCP: Tiffanie Hill MD    Discharging Nurse: [facility] Unit/Room#: [id number]/2058-96  Discharging Unit Phone Number: [phone]    Emergency Contact:   Extended Emergency Contact Information  Primary Emergency Contact: Alexandr Miller [address] Phone: [phone]  Relation: Spouse  Secondary Emergency Contact: [facility] Phone: [phone]  Work Phone: [phone]  Relation: Parent    Past Surgical History:  Past Surgical History:   Procedure Laterality Date    BRONCHOSCOPY  10/16/14    Urgent intubation, direct laryngoscopy, subglottic tracheoscopy    BRONCHOSCOPY  10/18/2014    WITH EXTUBATION IN OR AND LARYNGOSCOPY     SECTION      x 3, 2005, 2006, 2008    CHOLECYSTECTOMY  2012    CYSTOSCOPY Right 2019    CYSTOSCOPY performed by Zoe Williamson MD at [address] [place] [address]  2019    aborted planned procedure of BTL and uterine ablation.        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, antepartum ([facility]) O99.019, D61.9    Cervical cancer ([facility]) C53.9    Pneumonia J18.9    Pulmonary nodule R91.1    Hydronephrosis N13.30    Ground glass opacity present on imaging of lung R91.8    Lung nodules R91.8    Lymphadenopathy R59.1    Constipation due to opioid therapy K59.03, T40.2X5A       Isolation/Infection:   Isolation          Chemo            Nurse Assessment:  Last Vital Signs: BP (!) 92/57   Pulse 114   Temp 99.7 °F (37.6 °C) (Oral)   Resp 18   Ht 5' 6.5\" (1.689 m)   Wt 193 lb 3.2 oz (87.6 kg)   SpO2 91%   BMI 30.72 kg/m²     Last documented pain score (0-10 scale): Pain Level: 7  Last Weight:   Wt Readings from Last 1 Encounters:   02/20/19 193 lb 3.2 oz (87.6 kg)     Mental Status:  oriented and alert    IV Access:  - Central Venous Catheter  - site  right, insertion date: 2/19/2019    Nursing Mobility/ADLs:  Walking   Independent  Transfer  Independent  Bathing  Independent  Dressing  Independent  Toileting  Independent  Feeding  [address]  Independent  Med Delivery   whole    Wound Care Documentation and Therapy:        Elimination:  Continence:   · Bowel: Yes  · Bladder: Yes  Urinary Catheter: None   Colostomy/Ileostomy/Ileal Conduit: No       Date of Last BM: 2/26/2019  No intake or output data in the 24 hours ending 02/26/19 0823  No intake/output data recorded. Safety Concerns:     None    Impairments/Disabilities:      None    Nutrition Therapy:  Current Nutrition Therapy:   - Oral Diet:  General    Routes of Feeding: Oral  Liquids: Thin Liquids  Daily Fluid Restriction: no  Last Modified Barium Swallow with Video (Video Swallowing Test): not done    Treatments at the Time of Hospital Discharge:   Respiratory Treatments:   Oxygen Therapy:  is on oxygen at 2 L/min per nasal cannula.   Ventilator:    - No ventilator support    Rehab

## 2019-02-26 NOTE — PROGRESS NOTES
Pt c/o pain 7/10 after urination in bladder. States the pain is sharp and constant and increases when urinating and after. Temp low grade at 99.2, tylenol and oxycodone given. Will monitor.

## 2019-02-26 NOTE — PROGRESS NOTES
Shift assessment complete. VSS. Pt states that her BP is lower than normal, but still WNL. Pt c/o pain 7/10, oxycodone given along with MS Contin for pain control. Pt also c/o nausea, not due for PO, zofran IV given. Pt c/o SOB when up in chair, pt in bed. Awaiting for all results to come back for pt to be d/c. Will monitor.

## 2019-02-26 NOTE — PROGRESS NOTES
Progress Note - Dr. Delfina Dangelo - Internal Medicine  PCP: Karen Bueno  [address] [place] [address] [facility]  [phone]    Hospital Day: 11  Code Status: Full Code  Current Diet: DIET GENERAL;  Dietary Nutrition Supplements: Low Calorie High Protein Supplement        CC: follow up on medical issues    Subjective:   Jelena Joya is a 42 y.o. female. She denies problems    Pt with WBC normal now  Still feels nauseous  Case d/w Heme/Onc NP and with Dr Estefany Lennon yesterday  Probably reaching end of hospital need  Will need o2 and po abx from my standpoint    She denies chest pain, complains of shortness of breath, denies nausea,  denies emesis. 10 system Review of Systems is reviewed with patient, and pertinent positives are listed here: None . Otherwise, Review of systems is negative. I have reviewed the patient's medical and social history in detail and updated the computerized patient record. To recap: She  has a past medical history of Endometriosis; Fibromyalgia; GERD (gastroesophageal reflux disease); Hip fracture ([address] Hypoglycemia; Lupus; Neuropathy; Ovarian cyst; and Seizures ([address] . She  has a past surgical history that includes  section; Cholecystectomy (); bronchoscopy (10/16/14); bronchoscopy (10/18/2014); Cystoscopy (Right, 2019); and laparoscopy (2019). . She  reports that she quit smoking about 17 years ago. Her smoking use included Cigarettes. She started smoking about 21 years ago. She has a 0.75 pack-year smoking history. She has never used smokeless tobacco. She reports that she does not drink alcohol or use drugs. .        Active Hospital Problems    Diagnosis Date Noted    Constipation due to opioid therapy [K59.03, [address] 2019    Ground glass opacity present on imaging of lung [R91.8]     Lung nodules [R91.8]     Lymphadenopathy [R59.1]     Cervical cancer ([address] [C53.9] 02/15/2019    Pneumonia [J18.9] 02/15/2019    Pulmonary nodule [R91.1] 02/15/2019    Hydronephrosis [N13.30] 02/15/2019    SLE (systemic lupus erythematosus) [address] [facility] [M32.9] 01/22/2019    Lupus anticoagulant positive [R76.0] 01/22/2019       Current Facility-Administered Medications: 0.9 % sodium chloride bolus, 500 mL, Intravenous, PRN  cefepime (MAXIPIME) 2 g IVPB minibag, 2 g, Intravenous, Q12H  HYDROmorphone HCl PF (DILAUDID) injection 1 mg, 1 mg, Intravenous, Q3H PRN  morphine (MS CONTIN) extended release tablet 30 mg, 30 mg, Oral, 2 times per day  oxyCODONE (OXY-IR) immediate release tablet 15 mg, 15 mg, Oral, Q3H PRN **OR** [DISCONTINUED] oxyCODONE (ROXICODONE) immediate release tablet 10 mg, 10 mg, Oral, Q3H PRN  enoxaparin (LOVENOX) injection 40 mg, 40 mg, Subcutaneous, Daily  bisacodyl (DULCOLAX) EC tablet 5 mg, 5 mg, Oral, Daily PRN  linaclotide (LINZESS) capsule 145 mcg, 145 mcg, Oral, QAM AC  ipratropium-albuterol (DUONEB) nebulizer solution 1 ampule, 1 ampule, Inhalation, TID  prochlorperazine (COMPAZINE) injection 10 mg, 10 mg, Intravenous, Q6H PRN  LORazepam (ATIVAN) injection 0.25 mg, 0.25 mg, Intravenous, Q6H PRN  polyethylene glycol (GLYCOLAX) packet 17 g, 17 g, Oral, Daily PRN  sodium chloride bacteriostatic 0.9 % injection 15 mL, 15 mL, Injection, PRN  sodium chloride (PF) 0.9 % injection 500 mL, 500 mL, Intercatheter, PRN  acetaminophen (TYLENOL) tablet 500 mg, 500 mg, Oral, Q4H PRN  diphenhydrAMINE (BENADRYL) tablet 25 mg, 25 mg, Oral, Q6H PRN  sennosides-docusate sodium (SENOKOT-S) 8.6-50 MG tablet 2 tablet, 2 tablet, Oral, Daily  ipratropium-albuterol (DUONEB) nebulizer solution 1 ampule, 1 ampule, Inhalation, Q4H PRN  zolpidem (AMBIEN) tablet 10 mg, 10 mg, Oral, Nightly  gabapentin (NEURONTIN) capsule 300 mg, 300 mg, Oral, TID  baclofen (LIORESAL) tablet 20 mg, 20 mg, Oral, BID  ibuprofen (ADVIL;MOTRIN) tablet 800 mg, 800 mg, Oral, Q8H PRN  promethazine (PHENERGAN) tablet 25 mg, 25 mg, Oral, Q6H PRN  0.9 % sodium chloride infusion, , Intravenous, Continuous  sodium chloride flush 0.9 % injection 10 mL, 10 mL, Intravenous, 2 times per day  sodium chloride flush 0.9 % injection 10 mL, 10 mL, Intravenous, PRN  magnesium hydroxide (MILK OF MAGNESIA) 400 MG/5ML suspension 30 mL, 30 mL, Oral, Daily PRN  ondansetron (ZOFRAN) injection 4 mg, 4 mg, Intravenous, Q6H PRN  potassium chloride (KLOR-CON M) extended release tablet 40 mEq, 40 mEq, Oral, PRN **OR** potassium bicarb-citric acid (EFFER-K) effervescent tablet 40 mEq, 40 mEq, Oral, PRN **OR** potassium chloride 10 mEq/100 mL IVPB (Peripheral Line), 10 mEq, Intravenous, PRN  promethazine (PHENERGAN) injection 25 mg, 25 mg, Intramuscular, Q6H PRN  DULoxetine (CYMBALTA) extended release capsule 60 mg, 60 mg, Oral, Nightly         Objective:  BP (!) 92/57   Pulse 114   Temp 99.7 °F (37.6 °C) (Oral)   Resp 18   Ht 5' 6.5\" (1.689 m)   Wt 193 lb 3.2 oz (87.6 kg)   SpO2 91%   BMI 30.72 kg/m²      Patient Vitals for the past 24 hrs:   BP Temp Temp src Pulse Resp SpO2   02/26/19 0545 (!) 92/57 99.7 °F (37.6 °C) Oral 114 18 91 %   02/26/19 0135 97/63 98.5 °F (36.9 °C) Oral 110 16 91 %   02/25/19 2342 - - - - 16 92 %   02/25/19 2037 - - - - 16 94 %   02/25/19 2023 103/70 98.5 °F (36.9 °C) Oral 106 16 95 %   02/25/19 1753 96/64 98.4 °F (36.9 °C) Oral 114 18 94 %   02/25/19 1711 (!) 90/58 100.5 °F (38.1 °C) Axillary 116 18 94 %   02/25/19 1414 107/70 98.2 °F (36.8 °C) Oral 116 18 97 %   02/25/19 0901 - - - - 16 97 %   02/25/19 0849 102/66 97.9 °F (36.6 °C) Oral 99 18 -     No data found.         No intake or output data in the 24 hours ending 02/26/19 0820      Physical Exam:   S1, S2 normal, no murmur, rub or gallop, regular rate and rhythm  Rales bilat  abdomen is soft without significant tenderness, masses, organomegaly or guarding  extremities normal, atraumatic, no cyanosis or edema    Labs:  Lab Results   Component Value Date    WBC 7.8 02/26/2019    HGB 9.4 (L) 02/26/2019    HCT 29.1 (L) 02/26/2019     Acuity: Acute Type of Exam: Initial Relevant Medical/Surgical History: cervical ca FINDINGS: There is patchy bibasilar consolidation, right greater than left, superimposed on a background of diffuse interstitial prominence. Heart size, mediastinal contours and pulmonary vascularity are within normal limits. There is no pleural effusion. Multifocal bilateral pneumonia. Ct Chest Wo Contrast    Result Date: 1/28/2019  EXAMINATION: CT OF THE ABDOMEN AND PELVIS WITH CONTRAST; CT OF THE CHEST WITHOUT CONTRAST 1/28/2019 7:47 pm; 1/28/2019 7:45 pm TECHNIQUE: CT of the abdomen and pelvis was performed with the administration of intravenous contrast. Multiplanar reformatted images are provided for review. Dose modulation, iterative reconstruction, and/or weight based adjustment of the mA/kV was utilized to reduce the radiation dose to as low as reasonably achievable.; CT of the chest was performed without the administration of intravenous contrast. Multiplanar reformatted images are provided for review. Dose modulation, iterative reconstruction, and/or weight based adjustment of the mA/kV was utilized to reduce the radiation dose to as low as reasonably achievable. COMPARISON: CT abdomen and pelvis 01/18/2019. HISTORY: ORDERING SYSTEM PROVIDED HISTORY: RLQ abdominal pain TECHNOLOGIST PROVIDED HISTORY: Additional Contrast?->Oral Reason for exam:->RLQ pain persisting Ordering Physician Provided Reason for Exam: RLQ abdominal pain Acuity: Acute Type of Exam: Initial; ORDERING SYSTEM PROVIDED HISTORY: Other forms of systemic lupus erythematosus, unspecified organ involvement status ([facility] [address] TECHNOLOGIST PROVIDED HISTORY: Reason for exam:->pulmonary nodules seen on recent CT Ordering Physician Provided Reason for Exam: pulmonary nodules seen on recent CT Acuity: Chronic Type of Exam: Subsequent/Follow-up FINDINGS: Chest: Mediastinum: Lack of intravenous contrast limits evaluation of the mediastinum.   The thoracic aorta is normal in appearance. The coronary arteries and branch vessels of the superior mediastinum and lower neck are unremarkable. The pulmonary arteries are normal in caliber. The heart is normal in size. No pericardial effusion. The mediastinal esophagus and thyroid gland are unremarkable. No pathologically enlarged lymph nodes are seen in the chest. Lungs/pleura: The central airways are patent. No pleural effusion or pneumothorax. Mild bilateral dependent atelectasis. There are diffusely scattered patchy bilateral nodular/ground-glass opacities. No consolidation. Soft Tissues/Bones: No acute osseous or soft tissue abnormality. Abdomen/Pelvis: Organs: The liver is unremarkable. The gallbladder is surgically absent. The biliary tree is within normal limits status post cholecystectomy. The pancreas, spleen, and bilateral adrenal glands are unremarkable. The bilateral kidneys and ureters are unremarkable. GI/Bowel: Normal appendix. The colon is unremarkable. No evidence of acute appendicitis. No bowel obstruction or abnormal bowel wall thickening. Pelvis: Mild suspected circumferential urinary bladder wall thickening with mild adjacent stranding. The uterus and bilateral adnexae are unremarkable. Trace free fluid in the pelvis. No pelvic or inguinal lymphadenopathy. Peritoneum/Retroperitoneum: The abdominal aorta is normal in appearance. No retroperitoneal or mesenteric lymphadenopathy is identified. No free air or fluid is seen in the abdomen. Bones/Soft Tissues: No acute osseous or soft tissue abnormality. 1. Scattered small patchy ground-glass/nodular opacities throughout the bilateral lungs likely representing an infectious process. 2. Mild suspected circumferential urinary bladder wall thickening with mild adjacent stranding compatible with cystitis. Recommend correlation with urinalysis. 3. Normal appendix.      Ct Chest W Contrast    Result Date: 2/25/2019  EXAMINATION: CT OF THE CHEST WITH CONTRAST 2/25/2019 1:47 pm TECHNIQUE: CT of the chest was performed with the administration of intravenous contrast. Multiplanar reformatted images are provided for review. Dose modulation, iterative reconstruction, and/or weight based adjustment of the mA/kV was utilized to reduce the radiation dose to as low as reasonably achievable. COMPARISON: CT of the chest February 15, 2019 HISTORY: ORDERING SYSTEM PROVIDED HISTORY: PNEUMONIA, UNRESOLVED TECHNOLOGIST PROVIDED HISTORY: Ordering Physician Provided Reason for Exam: Pneumonia, unresolved Acuity: Unknown Type of Exam: Unknown FINDINGS: Mediastinum: Unchanged mildly enlarged lymph nodes. No pericardial effusion. Lungs/pleura: There is increased severity of extensive bilateral pulmonary disease, with extensive foci of ground-glass opacity, in addition to small areas of consolidation. No pneumothorax or pleural effusion. Upper Abdomen: Negative, no acute findings. Soft Tissues/Bones: No acute osseous findings. Increased severity of extensive bilateral pulmonary disease, nonspecific but consistent with pneumonia. ARDS is also considered. Ct Abdomen Pelvis W Iv Contrast Additional Contrast? None    Result Date: 2/15/2019  EXAMINATION: CTA OF THE CHEST; CT OF THE ABDOMEN AND PELVIS WITH CONTRAST 2/15/2019 2:11 pm TECHNIQUE: CTA of the chest was performed after the administration of intravenous contrast.  Multiplanar reformatted images are provided for review. MIP images are provided for review. Dose modulation, iterative reconstruction, and/or weight based adjustment of the mA/kV was utilized to reduce the radiation dose to as low as reasonably achievable.; CT of the abdomen and pelvis was performed with the administration of intravenous contrast. Multiplanar reformatted images are provided for review.  Dose modulation, iterative reconstruction, and/or weight based adjustment of the mA/kV was utilized to reduce the radiation dose to as low as reasonably excluded. There is no evidence of a pneumothorax or pleural effusion. Soft Tissues/Bones: No acute findings. Abdomen/Pelvis: Organs: The patient is status post cholecystectomy. The liver, spleen, and pancreas are normal.  The adrenal glands are unremarkable bilaterally. There has been interval development of nonspecific moderate right hydronephrosis since the prior exam, without definite demonstrable cause. Namely, no definite obstructing stone or mass is identified. The left kidney is stable and unremarkable. GI/Bowel: Evaluation of the hollow GI tract demonstrates no evidence of abnormal bowel wall thickening, dilatation, or obstruction. The appendix is normal. Pelvis: The urinary bladder is partially collapsed, though appears diffusely thick-walled and inflamed, with a mild amount of pericystic stranding noted. These findings are suggestive of an acute cystitis, progressed from prior exam.  Additionally, the cervix appears enlarged and irregular, and there is new abnormal thickening of the endometrium within the uterine fundus, which appears new in comparison with the study of 1/28/2019. The bilateral adnexa are unremarkable. There is a mild amount of free pelvic fluid, likely physiologic. There are stable mildly enlarged bilateral pelvic chain lymph nodes, the largest measuring approximately 2.6 x 1.6 cm along the right external iliac chain, similar to the study of 1/28/2019. Peritoneum/Retroperitoneum: No intraperitoneal free air or free fluid is identified. No pathologic lymphadenopathy is seen. The abdominal aorta is unremarkable. No significant abdominal wall hernia is identified. Bones/Soft Tissues: There is mild bilateral sacroiliitis. The skeletal structures are otherwise unremarkable. 1. No CT evidence of a pulmonary embolism. 2. No acute abnormality of the thoracic aorta.  3. Interval development of widespread ground-glass opacity throughout both lungs with diffuse intralobular septal bilateral adrenal glands are unremarkable. The bilateral kidneys and ureters are unremarkable. GI/Bowel: Normal appendix. The colon is unremarkable. No evidence of acute appendicitis. No bowel obstruction or abnormal bowel wall thickening. Pelvis: Mild suspected circumferential urinary bladder wall thickening with mild adjacent stranding. The uterus and bilateral adnexae are unremarkable. Trace free fluid in the pelvis. No pelvic or inguinal lymphadenopathy. Peritoneum/Retroperitoneum: The abdominal aorta is normal in appearance. No retroperitoneal or mesenteric lymphadenopathy is identified. No free air or fluid is seen in the abdomen. Bones/Soft Tissues: No acute osseous or soft tissue abnormality. 1. Scattered small patchy ground-glass/nodular opacities throughout the bilateral lungs likely representing an infectious process. 2. Mild suspected circumferential urinary bladder wall thickening with mild adjacent stranding compatible with cystitis. Recommend correlation with urinalysis. 3. Normal appendix. Xr Chest Portable    Result Date: 2/24/2019  EXAMINATION: SINGLE XRAY VIEW OF THE CHEST 2/24/2019 7:00 am COMPARISON: Chest radiograph performed 02/19/2019. HISTORY: ORDERING SYSTEM PROVIDED HISTORY: elevated wbc and ongoing shortness of breath TECHNOLOGIST PROVIDED HISTORY: Reason for exam:->elevated wbc and ongoing shortness of breath Acuity: Unknown Type of Exam: Unknown FINDINGS: There is mild bilateral pulmonary congestion. There is similar left basilar infiltrate. There is no pneumothorax. The mediastinal structures are stable. The upper abdomen is unremarkable. The extrathoracic soft tissues are unremarkable. There is a right internal jugular port. Stable bilateral congestion and left basilar infiltrate.      Ct Chest Pulmonary Embolism W Contrast    Result Date: 2/15/2019  EXAMINATION: CTA OF THE CHEST; CT OF THE ABDOMEN AND PELVIS WITH CONTRAST 2/15/2019 2:11 pm TECHNIQUE: CTA of thickening evident, new from prior exam.  This most likely reflects a combination of interstitial pulmonary edema and superimposed multifocal pneumonia. Additionally, there has been interval progression and more consolidation of multiple scattered various sized pulmonary nodules throughout both lungs since the study of 1/28/2019. A few of these are cavitary. The largest of these measures approximately 10 mm in short axis within the subpleural portion of the left lower lobe. These may be secondary to an atypical infectious or inflammatory process, to include septic emboli. Metastatic disease is considered less likely, though not excluded. There is no evidence of a pneumothorax or pleural effusion. Soft Tissues/Bones: No acute findings. Abdomen/Pelvis: Organs: The patient is status post cholecystectomy. The liver, spleen, and pancreas are normal.  The adrenal glands are unremarkable bilaterally. There has been interval development of nonspecific moderate right hydronephrosis since the prior exam, without definite demonstrable cause. Namely, no definite obstructing stone or mass is identified. The left kidney is stable and unremarkable. GI/Bowel: Evaluation of the hollow GI tract demonstrates no evidence of abnormal bowel wall thickening, dilatation, or obstruction. The appendix is normal. Pelvis: The urinary bladder is partially collapsed, though appears diffusely thick-walled and inflamed, with a mild amount of pericystic stranding noted. These findings are suggestive of an acute cystitis, progressed from prior exam.  Additionally, the cervix appears enlarged and irregular, and there is new abnormal thickening of the endometrium within the uterine fundus, which appears new in comparison with the study of 1/28/2019. The bilateral adnexa are unremarkable. There is a mild amount of free pelvic fluid, likely physiologic.   There are stable mildly enlarged bilateral pelvic chain lymph nodes, the largest measuring approximately 2.6 x 1.6 cm along the right external iliac chain, similar to the study of 1/28/2019. Peritoneum/Retroperitoneum: No intraperitoneal free air or free fluid is identified. No pathologic lymphadenopathy is seen. The abdominal aorta is unremarkable. No significant abdominal wall hernia is identified. Bones/Soft Tissues: There is mild bilateral sacroiliitis. The skeletal structures are otherwise unremarkable. 1. No CT evidence of a pulmonary embolism. 2. No acute abnormality of the thoracic aorta. 3. Interval development of widespread ground-glass opacity throughout both lungs with diffuse intralobular septal thickening. This most likely reflects a combination of interstitial pulmonary edema and multifocal pneumonia. 4. Interval progression of multiple nodular foci of consolidative opacity throughout both lungs, a few of which are cavitary in appearance. These nodules are most likely infectious or inflammatory in etiology, and septic emboli are a consideration. Given patient's history of cervical cancer, metastatic disease is on the differential, though considered less likely. 5. New nonspecific moderate right hydronephrosis, without demonstrable cause. However, there is underlying wall thickening and enhancement of the urinary bladder. This combination of findings could represent a cystitis in the setting of urinary tract infection with resultant pyelitis and hydronephrosis. However, given patient history of cervical cancer, locoregional spread of tumor with resultant obstruction of the distal right ureter may be a cause of the patient's right hydronephrosis. 6. Interval development of new irregularity of the cervix and nonspecific endometrial thickening in comparison with the prior study of 1/28/2019. This likely represents the patient's known underlying cervical cancer causing obstruction of the endometrial with resultant endometrial thickening.   This could be further evaluated with a follow-up pelvic ultrasound. 7. Stable mild bilateral pelvic chain lymphadenopathy, right greater than left, possibly representing metastatic disease. Ir Port Placement > 5 Years    Result Date: 2/19/2019  PROCEDURE: ULTRASOUND GUIDED VASCULAR ACCESS. FLUOROSCOPY GUIDED PLACEMENT OF A RIGHT IJ SUBCUTANEOUS PORT-A-CATH. MODERATE CONSCIOUS SEDATION 2/19/2019. HISTORY: ORDERING SYSTEM PROVIDED HISTORY: cervical cancer to get chemo TECHNOLOGIST PROVIDED HISTORY: Reason for exam:->cervical cancer to get chemo How many lumens are being requested?->1 What site is the preferred site? ->No preference What side should this line be placed? ->Either 61-year-old female with cervical cancer, presents for chest port placement. SEDATION: Moderate sedation was administered under my supervision by a qualified nurse. 4 mg of Versed was administered intravenously. Approximate intra procedural sedation time was 23 minutes. FLUOROSCOPY DOSE AND TYPE OR TIME AND EXPOSURES: 54 seconds of fluoroscopy with 2 exposures. TECHNIQUE: Informed consent was obtained after a detailed explanation of the procedure including risks, benefits, and alternatives. Universal protocol was observed. The right neck and chest were prepped and draped in sterile fashion using maximum sterile barrier technique. Local anesthesia was achieved with lidocaine. A micropuncture needle was used to access the right internal jugular vein using ultrasound guidance. An ultrasound image demonstrating patency of the vein with needle tip located within it. An image was obtained and stored in PACs. A 0.035 guidewire was used to place a peel-away sheath. A chest wall incision was made and a subcutaneous pocket was created. The port reservoir was placed within the pocket and the port tubing was attached and tunneled subcutaneously to the venotomy site.   The port tubing was cut to an appropriate length and advanced through the peel-away sheath under fluoroscopic guidance pigtail was placed in the lower portion of the left renal pelvis. Again there is no contrast extravasation. No filling defect to indicate clot or bleeding was observed on early or later contrast imaging. Following this, dense contrast was injected showing normal expected filling of the ureteral stent and exiting from the distal pigtail directly into the urinary bladder. Following this, the internalized stent was disengaged. The proximal collecting system was then decompressed. The proximal nephrostomy access was then removed entirely, no external drainage necessary at this point. The patient tolerated the procedure well. A sterile bandage was placed over the small incision. From this point forward, the ureteral stent can be exchanged as needed from below. Successful right percutaneous nephrostomy with antegrade pyelogram. High-grade distal ureteral obstruction with severe hydronephrosis, successfully crossed as above. Successful 8 French internalized right ureteral stent placement as above. Assessment and Plan:  Patient Active Hospital Problem List:   Pneumonia (2/15/2019)    Assessment: Established problem. WBC now normal. CT 2/25 shows PNA    Plan: place back on cefepime. Can switch to po augmentin on d/c   SLE (systemic lupus erythematosus) ([facility] [address] (1/22/2019)    Assessment: Established problem. Stable.     Plan: Continue present orders/plan.    Lupus anticoagulant positive (1/22/2019)    Assessment: Established problem. Stable.     Plan: Continue present orders/plan.     Cervical cancer ([facility] [address] (2/15/2019)    Assessment: invasive squamous cell carcinoma which supports the diagnosis of cervical cancer. She is at least stage JACKI d/t metastatic disease to bladder and possibly stage IVB if mets to lung    Plan: Initial plan - carbo/taxol/avastin.  Port placed 2/19.  SOme pain issues - defer oral regimen to Onc   Pulmonary nodule (2/15/2019)    Assessment: Established problem.  Stable.  Will need workup for this     Plan: per dr Soria Person, they will eval in coming week   Hydronephrosis (2/15/2019)    Assessment: Established problem. Stent could not be placed by urology    Plan: Right perc nephrostomy, nephrostogram and antegrade ureteral stent done 2/18 per IR   Ground glass opacity present on imaging of lung ()   Lung nodules ()   Lymphadenopathy ()   Constipation due to opioid therapy (2/23/2019)    Assessment: Established problem. Stable.  Remains on narcotics. Decent response to linzess    Plan: continue Linzess 145 - rx written for d/c      Disp - from my standpoint, ok for discharge whenever ok with heme/onc.   Would defer pain meds to [address]  2/26/2019

## 2019-02-26 NOTE — PROGRESS NOTES
Messaged Dr. Francis Cuellar regarding home O2 evaluation for oxygen therapy at home, waiting on call back. Unable to wean down from 2L, pt states when ambulating in room she feels SOB.

## 2019-02-26 NOTE — PROGRESS NOTES
confirmation of metastatic disease if needed. 2) Pain-better with MS contin and oxycodone prn  3) Pulm-PFTs prior to admission showing Mild restriction with moderate decrease in DLCo. Pt is still requiring O2 will need to determine if needs O2 @ home. Spoke to pulmonology today and Dr. Kennedi Mullen does not believe that the patient has an infectious process and that this is more likely disseminated disease. He does not necessarily feel like the patient needs antibiotic treatment for pneumonia. But yes she will need to continue on oxygen. He states he wants to see the patient back next week in his office. 4) Renal-stent in place, good renal fxn. Reporting new left sided pain. Will perform renal ultrasound to ensure pt does not have increasing hydro on left. 5) Gyn-vaginal bleeding to be expected. Will continue to monitor. Will start BID PO Iron. Cecy Dey, [address] [place] [address] Oncology  [phone])

## 2019-02-26 NOTE — PROGRESS NOTES
Home Oxygen Evaluation        Patients room air resting oxygen saturation SpO2 79%   Patient's on oxygen at rest SpO2 94% at         2 lpm = SpO2  94%

## 2019-02-26 NOTE — PROGRESS NOTES
Pt c/o pain in bladder still. States she is not passing blood clots at this time. Pt also c/o sore throat and \"cuts on tongue\", possibly d/t s/e from chemo. Will message Dr. Nicole Umanzor for treatment plan.

## 2019-02-27 NOTE — PLAN OF CARE
Problem: Pain:  Goal: Pain level will decrease  Pain level will decrease   Outcome: Ongoing    Goal: Control of acute pain  Control of acute pain   Outcome: Ongoing      Problem: Nausea/Vomiting:  Goal: Absence of nausea/vomiting  Absence of nausea/vomiting   Outcome: Ongoing    Goal: Able to drink  Able to drink   Outcome: Met This Shift      Problem: OXYGENATION/RESPIRATORY FUNCTION  Goal: Patient will maintain patent airway  Outcome: Ongoing    Goal: Patient will achieve/maintain normal respiratory rate/effort  Respiratory rate and effort will be within normal limits for the patient   Outcome: Ongoing

## 2019-02-27 NOTE — PROGRESS NOTES
Port de accessed. Discharge paperwork reviewed with patient, verbalizes understanding. Denies questions at this time. Portable oxygen tank in room. Belongings packed and with patient. Leaving for discharge in stable condition.

## 2019-02-27 NOTE — CARE COORDINATION
[address] [place] [address] received a referral to this patient for home 02. Home 02 has been arranged for delivery. [phone] to initiate setup. Portable tank has been delivered to the patients room. Thank you for the referral.  Electronically signed by Israel Keita on 2/27/2019 at 12:31 PM  Cell ph# [phone]    NOTE: After 5:00 pm, Weekends, Holidays: Call [facility] at [phone] to coordinate delivery of home medical equipment.

## 2019-02-27 NOTE — PROGRESS NOTES
Pt. Routine VSS -see flowsheets. Pt. rx'd nause and prn pain meds given per request - see MAR. Pt. Denies other needs at this time. Call light/table in reach. Will continue to monitor.

## 2019-02-27 NOTE — PROGRESS NOTES
Progress Note - Dr. Salma Moses - Internal Medicine  PCP: Rogelio Guerrero  [address] [place] [address] [facility]  [phone]    Hospital Day: 12  Code Status: Full Code  Current Diet: DIET GENERAL;  Dietary Nutrition Supplements: Low Calorie High Protein Supplement        CC: follow up on medical issues    Subjective:   Darcy Jaramillo is a 42 y.o. female. She denies problems    Doing ok  Wants to go home  Pain controlled    She denies chest pain, denies shortness of breath, denies nausea,  denies emesis. 10 system Review of Systems is reviewed with patient, and pertinent positives are listed here: None . Otherwise, Review of systems is negative. I have reviewed the patient's medical and social history in detail and updated the computerized patient record. To recap: She  has a past medical history of Endometriosis; Fibromyalgia; GERD (gastroesophageal reflux disease); Hip fracture ([facility] Hypoglycemia; Lupus; Neuropathy; Ovarian cyst; and Seizures ([facility] . She  has a past surgical history that includes  section; Cholecystectomy (); bronchoscopy (10/16/14); bronchoscopy (10/18/2014); Cystoscopy (Right, 2019); and laparoscopy (2019). . She  reports that she quit smoking about 17 years ago. Her smoking use included Cigarettes. She started smoking about 21 years ago. She has a 0.75 pack-year smoking history. She has never used smokeless tobacco. She reports that she does not drink alcohol or use drugs. .        Active Hospital Problems    Diagnosis Date Noted    Constipation due to opioid therapy [K59.03, Alex Vivas 2019    Ground glass opacity present on imaging of lung [R91.8]     Lung nodules [R91.8]     Lymphadenopathy [R59.1]     Cervical cancer ([facility] [C53.9] 02/15/2019    Pneumonia [J18.9] 02/15/2019    Pulmonary nodule [R91.1] 02/15/2019    Hydronephrosis [N13.30] 02/15/2019    SLE (systemic lupus erythematosus) ([facility] [M32.9] 2019    Lupus anticoagulant positive [R76.0] 01/22/2019       Current Facility-Administered Medications: benzocaine-menthol (CEPACOL SORE THROAT) lozenge 1 lozenge, 1 lozenge, Oral, Q2H PRN  ferrous sulfate tablet 325 mg, 325 mg, Oral, BID WC  magic (miracle) mouthwash with nystatin, 5 mL, Swish & Spit, 4x Daily PRN  phenazopyridine (PYRIDIUM) tablet 200 mg, 200 mg, Oral, BID  0.9 % sodium chloride bolus, 500 mL, Intravenous, PRN  cefepime (MAXIPIME) 2 g IVPB minibag, 2 g, Intravenous, Q12H  HYDROmorphone HCl PF (DILAUDID) injection 1 mg, 1 mg, Intravenous, Q3H PRN  morphine (MS CONTIN) extended release tablet 30 mg, 30 mg, Oral, 2 times per day  oxyCODONE (OXY-IR) immediate release tablet 15 mg, 15 mg, Oral, Q3H PRN **OR** [DISCONTINUED] oxyCODONE (ROXICODONE) immediate release tablet 10 mg, 10 mg, Oral, Q3H PRN  enoxaparin (LOVENOX) injection 40 mg, 40 mg, Subcutaneous, Daily  bisacodyl (DULCOLAX) EC tablet 5 mg, 5 mg, Oral, Daily PRN  linaclotide (LINZESS) capsule 145 mcg, 145 mcg, Oral, QAM AC  ipratropium-albuterol (DUONEB) nebulizer solution 1 ampule, 1 ampule, Inhalation, TID  prochlorperazine (COMPAZINE) injection 10 mg, 10 mg, Intravenous, Q6H PRN  LORazepam (ATIVAN) injection 0.25 mg, 0.25 mg, Intravenous, Q6H PRN  polyethylene glycol (GLYCOLAX) packet 17 g, 17 g, Oral, Daily PRN  sodium chloride bacteriostatic 0.9 % injection 15 mL, 15 mL, Injection, PRN  sodium chloride (PF) 0.9 % injection 500 mL, 500 mL, Intercatheter, PRN  acetaminophen (TYLENOL) tablet 500 mg, 500 mg, Oral, Q4H PRN  diphenhydrAMINE (BENADRYL) tablet 25 mg, 25 mg, Oral, Q6H PRN  sennosides-docusate sodium (SENOKOT-S) 8.6-50 MG tablet 2 tablet, 2 tablet, Oral, Daily  ipratropium-albuterol (DUONEB) nebulizer solution 1 ampule, 1 ampule, Inhalation, Q4H PRN  zolpidem (AMBIEN) tablet 10 mg, 10 mg, Oral, Nightly  gabapentin (NEURONTIN) capsule 300 mg, 300 mg, Oral, TID  baclofen (LIORESAL) tablet 20 mg, 20 mg, Oral, BID  ibuprofen (ADVIL;MOTRIN) tablet tenderness, masses, organomegaly or guarding  extremities normal, atraumatic, no cyanosis or edema    Labs:  Lab Results   Component Value Date    WBC 7.8 02/27/2019    HGB 8.9 (L) 02/27/2019    HCT 27.8 (L) 02/27/2019     02/27/2019    CHOL 188 10/15/2014    TRIG 132 10/15/2014    HDL 65 (H) 10/15/2014    ALT 49 (H) 02/18/2019    AST 23 02/18/2019     02/27/2019    K 3.8 02/27/2019     02/27/2019    CREATININE <0.5 (L) 02/27/2019    BUN 10 02/27/2019    CO2 27 02/27/2019    TSH 1.57 06/18/2014    INR 1.11 02/18/2019    LABMICR YES 02/18/2019     Lab Results   Component Value Date    CKTOTAL 185 12/18/2014    TROPONINI 0.02 (H) 02/15/2019       Recent Imaging Results are Reviewed:  Xr Chest Standard (2 Vw)    Result Date: 2/26/2019  EXAMINATION: TWO VIEWS OF THE CHEST 2/26/2019 11:55 am COMPARISON: Frontal view of the chest 02/24/2019, frontal and lateral views of the chest 02/19/2019, CT thorax 02/25/2019. HISTORY: ORDERING SYSTEM PROVIDED HISTORY: cough TECHNOLOGIST PROVIDED HISTORY: Reason for exam:->cough Ordering Physician Provided Reason for Exam: cough FINDINGS: Support devices: Right IJ chest port again noted with distal tip terminating in the proximal right atrium in good position. The cardiopericardial silhouette is stable in size and configuration. Bilateral patchy airspace opacities are redemonstrated most pronounced in the mid and lower lung zones, these were better detailed on the recent CT of the thorax from 02/25/2019, please refer to that report for additional information. The overall radiographic appearance has mildly increased from the prior radiograph 02/24/2019. There is no pneumothorax or pleural effusion. Surgical clips project in the upper abdomen. Mild interval increase in bilateral patchy airspace opacities better detailed on the CT of the thorax from 02/25/2019. Findings are again most suggestive of an infectious/inflammatory process.      Xr Chest Standard (2 Vw)    Result Date: 2/19/2019  EXAMINATION: TWO VIEWS OF THE CHEST 2/19/2019 4:26 pm COMPARISON: 02/16/2019 HISTORY: ORDERING SYSTEM PROVIDED HISTORY: cough TECHNOLOGIST PROVIDED HISTORY: Reason for exam:->cough Ordering Physician Provided Reason for Exam: Cervical Cancer (Pt was sent over from Dr Miguel Villatoro office - states she was sent here from office - was told today that she has stage 3 cervical cancer and was sent here for \"scans and further testing\") Acuity: Unknown Type of Exam: Unknown FINDINGS: There is patchy bilateral airspace disease, which appears increased when compared to the previous exam.  As detected on recent CT PA, some of those have a nodular appearance. The heart mediastinal contours are unremarkable. No pneumothorax. No free air. No acute bony abnormalities. Chin catheter noted. Patchy bilateral airspace disease, concerning for pneumonia, which appears increased when compared to the previous exam.  Again, some of these areas of opacity have a nodular appearance and septic emboli should be considered, especially when given the correlation with the CT PA from 02/15/2019. Process should be followed to resolution. Xr Chest Standard (2 Vw)    Result Date: 2/16/2019  EXAMINATION: TWO VIEWS OF THE CHEST 2/16/2019 11:49 am COMPARISON: 10/16/2014 HISTORY: ORDERING SYSTEM PROVIDED HISTORY: pneumonia TECHNOLOGIST PROVIDED HISTORY: Reason for exam:->pneumonia Ordering Physician Provided Reason for Exam: PNA Acuity: Acute Type of Exam: Initial Relevant Medical/Surgical History: cervical ca FINDINGS: There is patchy bibasilar consolidation, right greater than left, superimposed on a background of diffuse interstitial prominence. Heart size, mediastinal contours and pulmonary vascularity are within normal limits. There is no pleural effusion. Multifocal bilateral pneumonia.      Ct Chest Wo Contrast    Result Date: 1/28/2019  EXAMINATION: CT OF THE ABDOMEN AND PELVIS WITH CONTRAST; CT OF THE scattered patchy bilateral nodular/ground-glass opacities. No consolidation. Soft Tissues/Bones: No acute osseous or soft tissue abnormality. Abdomen/Pelvis: Organs: The liver is unremarkable. The gallbladder is surgically absent. The biliary tree is within normal limits status post cholecystectomy. The pancreas, spleen, and bilateral adrenal glands are unremarkable. The bilateral kidneys and ureters are unremarkable. GI/Bowel: Normal appendix. The colon is unremarkable. No evidence of acute appendicitis. No bowel obstruction or abnormal bowel wall thickening. Pelvis: Mild suspected circumferential urinary bladder wall thickening with mild adjacent stranding. The uterus and bilateral adnexae are unremarkable. Trace free fluid in the pelvis. No pelvic or inguinal lymphadenopathy. Peritoneum/Retroperitoneum: The abdominal aorta is normal in appearance. No retroperitoneal or mesenteric lymphadenopathy is identified. No free air or fluid is seen in the abdomen. Bones/Soft Tissues: No acute osseous or soft tissue abnormality. 1. Scattered small patchy ground-glass/nodular opacities throughout the bilateral lungs likely representing an infectious process. 2. Mild suspected circumferential urinary bladder wall thickening with mild adjacent stranding compatible with cystitis. Recommend correlation with urinalysis. 3. Normal appendix. Ct Chest W Contrast    Result Date: 2/25/2019  EXAMINATION: CT OF THE CHEST WITH CONTRAST 2/25/2019 1:47 pm TECHNIQUE: CT of the chest was performed with the administration of intravenous contrast. Multiplanar reformatted images are provided for review. Dose modulation, iterative reconstruction, and/or weight based adjustment of the mA/kV was utilized to reduce the radiation dose to as low as reasonably achievable.  COMPARISON: CT of the chest February 15, 2019 HISTORY: ORDERING SYSTEM PROVIDED HISTORY: PNEUMONIA, UNRESOLVED TECHNOLOGIST PROVIDED HISTORY: Ordering Physician Provided Reason for Exam: Pneumonia, unresolved Acuity: Unknown Type of Exam: Unknown FINDINGS: Mediastinum: Unchanged mildly enlarged lymph nodes. No pericardial effusion. Lungs/pleura: There is increased severity of extensive bilateral pulmonary disease, with extensive foci of ground-glass opacity, in addition to small areas of consolidation. No pneumothorax or pleural effusion. Upper Abdomen: Negative, no acute findings. Soft Tissues/Bones: No acute osseous findings. Increased severity of extensive bilateral pulmonary disease, nonspecific but consistent with pneumonia. ARDS is also considered. Us Renal Complete    Result Date: 2/26/2019  EXAMINATION: RETROPERITONEAL ULTRASOUND OF THE KIDNEYS AND URINARY BLADDER 2/26/2019 COMPARISON: CT abdomen and pelvis 02/15/2019. HISTORY: ORDERING SYSTEM PROVIDED HISTORY: left sided pain, hydronephrosis TECHNOLOGIST PROVIDED HISTORY: Ordering Physician Provided Reason for Exam: hx of recent rt stent cervical ca Acuity: Unknown Type of Exam: Subsequent/Follow-up FINDINGS: Kidneys: The right kidney measures 11.6 cm in length and the left kidney measures 11.3 cm in length. Kidneys demonstrate normal cortical echogenicity. No evidence of hydronephrosis or intrarenal stones. Bladder: Urinary bladder is underdistended at the time of imaging limiting evaluation. No gross bladder wall abnormalities noted. No postvoid imaging performed. Kidneys are unremarkable. Limited evaluation of urinary bladder secondary to underdistention at the time of imaging without gross bladder wall abnormality noted. Ct Abdomen Pelvis W Iv Contrast Additional Contrast? None    Result Date: 2/15/2019  EXAMINATION: CTA OF THE CHEST; CT OF THE ABDOMEN AND PELVIS WITH CONTRAST 2/15/2019 2:11 pm TECHNIQUE: CTA of the chest was performed after the administration of intravenous contrast.  Multiplanar reformatted images are provided for review. MIP images are provided for review.  Dose identified. No pathologic lymphadenopathy is seen. The abdominal aorta is unremarkable. No significant abdominal wall hernia is identified. Bones/Soft Tissues: There is mild bilateral sacroiliitis. The skeletal structures are otherwise unremarkable. 1. No CT evidence of a pulmonary embolism. 2. No acute abnormality of the thoracic aorta. 3. Interval development of widespread ground-glass opacity throughout both lungs with diffuse intralobular septal thickening. This most likely reflects a combination of interstitial pulmonary edema and multifocal pneumonia. 4. Interval progression of multiple nodular foci of consolidative opacity throughout both lungs, a few of which are cavitary in appearance. These nodules are most likely infectious or inflammatory in etiology, and septic emboli are a consideration. Given patient's history of cervical cancer, metastatic disease is on the differential, though considered less likely. 5. New nonspecific moderate right hydronephrosis, without demonstrable cause. However, there is underlying wall thickening and enhancement of the urinary bladder. This combination of findings could represent a cystitis in the setting of urinary tract infection with resultant pyelitis and hydronephrosis. However, given patient history of cervical cancer, locoregional spread of tumor with resultant obstruction of the distal right ureter may be a cause of the patient's right hydronephrosis. 6. Interval development of new irregularity of the cervix and nonspecific endometrial thickening in comparison with the prior study of 1/28/2019. This likely represents the patient's known underlying cervical cancer causing obstruction of the endometrial with resultant endometrial thickening. This could be further evaluated with a follow-up pelvic ultrasound. 7. Stable mild bilateral pelvic chain lymphadenopathy, right greater than left, possibly representing metastatic disease.      Ct Abdomen Pelvis W Iv Contrast Additional Contrast? Oral    Result Date: 1/28/2019  EXAMINATION: CT OF THE ABDOMEN AND PELVIS WITH CONTRAST; CT OF THE CHEST WITHOUT CONTRAST 1/28/2019 7:47 pm; 1/28/2019 7:45 pm TECHNIQUE: CT of the abdomen and pelvis was performed with the administration of intravenous contrast. Multiplanar reformatted images are provided for review. Dose modulation, iterative reconstruction, and/or weight based adjustment of the mA/kV was utilized to reduce the radiation dose to as low as reasonably achievable.; CT of the chest was performed without the administration of intravenous contrast. Multiplanar reformatted images are provided for review. Dose modulation, iterative reconstruction, and/or weight based adjustment of the mA/kV was utilized to reduce the radiation dose to as low as reasonably achievable. COMPARISON: CT abdomen and pelvis 01/18/2019. HISTORY: ORDERING SYSTEM PROVIDED HISTORY: RLQ abdominal pain TECHNOLOGIST PROVIDED HISTORY: Additional Contrast?->Oral Reason for exam:->RLQ pain persisting Ordering Physician Provided Reason for Exam: RLQ abdominal pain Acuity: Acute Type of Exam: Initial; ORDERING SYSTEM PROVIDED HISTORY: Other forms of systemic lupus erythematosus, unspecified organ involvement status ([facility] TECHNOLOGIST PROVIDED HISTORY: Reason for exam:->pulmonary nodules seen on recent CT Ordering Physician Provided Reason for Exam: pulmonary nodules seen on recent CT Acuity: Chronic Type of Exam: Subsequent/Follow-up FINDINGS: Chest: Mediastinum: Lack of intravenous contrast limits evaluation of the mediastinum. The thoracic aorta is normal in appearance. The coronary arteries and branch vessels of the superior mediastinum and lower neck are unremarkable. The pulmonary arteries are normal in caliber. The heart is normal in size. No pericardial effusion. The mediastinal esophagus and thyroid gland are unremarkable.   No pathologically enlarged lymph nodes are seen in the chest. Lungs/pleura: The central airways are patent. No pleural effusion or pneumothorax. Mild bilateral dependent atelectasis. There are diffusely scattered patchy bilateral nodular/ground-glass opacities. No consolidation. Soft Tissues/Bones: No acute osseous or soft tissue abnormality. Abdomen/Pelvis: Organs: The liver is unremarkable. The gallbladder is surgically absent. The biliary tree is within normal limits status post cholecystectomy. The pancreas, spleen, and bilateral adrenal glands are unremarkable. The bilateral kidneys and ureters are unremarkable. GI/Bowel: Normal appendix. The colon is unremarkable. No evidence of acute appendicitis. No bowel obstruction or abnormal bowel wall thickening. Pelvis: Mild suspected circumferential urinary bladder wall thickening with mild adjacent stranding. The uterus and bilateral adnexae are unremarkable. Trace free fluid in the pelvis. No pelvic or inguinal lymphadenopathy. Peritoneum/Retroperitoneum: The abdominal aorta is normal in appearance. No retroperitoneal or mesenteric lymphadenopathy is identified. No free air or fluid is seen in the abdomen. Bones/Soft Tissues: No acute osseous or soft tissue abnormality. 1. Scattered small patchy ground-glass/nodular opacities throughout the bilateral lungs likely representing an infectious process. 2. Mild suspected circumferential urinary bladder wall thickening with mild adjacent stranding compatible with cystitis. Recommend correlation with urinalysis. 3. Normal appendix. Xr Chest Portable    Result Date: 2/24/2019  EXAMINATION: SINGLE XRAY VIEW OF THE CHEST 2/24/2019 7:00 am COMPARISON: Chest radiograph performed 02/19/2019. HISTORY: ORDERING SYSTEM PROVIDED HISTORY: elevated wbc and ongoing shortness of breath TECHNOLOGIST PROVIDED HISTORY: Reason for exam:->elevated wbc and ongoing shortness of breath Acuity: Unknown Type of Exam: Unknown FINDINGS: There is mild bilateral pulmonary congestion.   There is similar left basilar infiltrate. There is no pneumothorax. The mediastinal structures are stable. The upper abdomen is unremarkable. The extrathoracic soft tissues are unremarkable. There is a right internal jugular port. Stable bilateral congestion and left basilar infiltrate. Ct Chest Pulmonary Embolism W Contrast    Result Date: 2/15/2019  EXAMINATION: CTA OF THE CHEST; CT OF THE ABDOMEN AND PELVIS WITH CONTRAST 2/15/2019 2:11 pm TECHNIQUE: CTA of the chest was performed after the administration of intravenous contrast.  Multiplanar reformatted images are provided for review. MIP images are provided for review. Dose modulation, iterative reconstruction, and/or weight based adjustment of the mA/kV was utilized to reduce the radiation dose to as low as reasonably achievable.; CT of the abdomen and pelvis was performed with the administration of intravenous contrast. Multiplanar reformatted images are provided for review. Dose modulation, iterative reconstruction, and/or weight based adjustment of the mA/kV was utilized to reduce the radiation dose to as low as reasonably achievable. COMPARISON: 1/28/2019, 4/29/2011 HISTORY: ORDERING SYSTEM PROVIDED HISTORY: cervical ca - SOB - PE??? TECHNOLOGIST PROVIDED HISTORY: Ordering Physician Provided Reason for Exam: SOB Acuity: Unknown Type of Exam: Unknown; ORDERING SYSTEM PROVIDED HISTORY: abd disten - cervical ca? TECHNOLOGIST PROVIDED HISTORY: Additional Contrast?->None Ordering Physician Provided Reason for Exam: abd distention Acuity: Unknown Type of Exam: Unknown Patient with recently diagnosed cervical cancer. FINDINGS: Chest: Pulmonary arteries: The pulmonary arteries opacify normally. There is no evidence of filling defect to suggest a pulmonary embolism. Mediastinum: The thyroid is unremarkable. There is mild subcarinal and bilateral hilar lymphadenopathy, most likely benign and reactive in etiology given the patient's underlying lung findings.   The thoracic aorta is unremarkable, without evidence of aneurysm or dissection. 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 No pericardial abnormality is identified. Lungs/pleura: There is mild mucous plugging within the bilateral lower lobes. The central airways are otherwise widely patent. There has been interval development of widespread ground-glass opacity throughout both lungs, with diffuse intralobular septal thickening evident, new from prior exam.  This most likely reflects a combination of interstitial pulmonary edema and superimposed multifocal pneumonia. Additionally, there has been interval progression and more consolidation of multiple scattered various sized pulmonary nodules throughout both lungs since the study of 1/28/2019. A few of these are cavitary. The largest of these measures approximately 10 mm in short axis within the subpleural portion of the left lower lobe. These may be secondary to an atypical infectious or inflammatory process, to include septic emboli. Metastatic disease is considered less likely, though not excluded. There is no evidence of a pneumothorax or pleural effusion. Soft Tissues/Bones: No acute findings. Abdomen/Pelvis: Organs: The patient is status post cholecystectomy. The liver, spleen, and pancreas are normal.  The adrenal glands are unremarkable bilaterally. There has been interval development of nonspecific moderate right hydronephrosis since the prior exam, without definite demonstrable cause. Namely, no definite obstructing stone or mass is identified. The left kidney is stable and unremarkable. GI/Bowel: Evaluation of the hollow GI tract demonstrates no evidence of abnormal bowel wall thickening, dilatation, or obstruction. The appendix is normal. Pelvis: The urinary bladder is partially collapsed, though appears diffusely thick-walled and inflamed, with a mild amount of pericystic stranding noted.  These findings are suggestive of an acute cystitis, progressed from prior exam.  Additionally, the cervix appears enlarged and irregular, and there is new abnormal thickening of the endometrium within the uterine fundus, which appears new in comparison with the study of 1/28/2019. The bilateral adnexa are unremarkable. There is a mild amount of free pelvic fluid, likely physiologic. There are stable mildly enlarged bilateral pelvic chain lymph nodes, the largest measuring approximately 2.6 x 1.6 cm along the right external iliac chain, similar to the study of 1/28/2019. Peritoneum/Retroperitoneum: No intraperitoneal free air or free fluid is identified. No pathologic lymphadenopathy is seen. The abdominal aorta is unremarkable. No significant abdominal wall hernia is identified. Bones/Soft Tissues: There is mild bilateral sacroiliitis. The skeletal structures are otherwise unremarkable. 1. No CT evidence of a pulmonary embolism. 2. No acute abnormality of the thoracic aorta. 3. Interval development of widespread ground-glass opacity throughout both lungs with diffuse intralobular septal thickening. This most likely reflects a combination of interstitial pulmonary edema and multifocal pneumonia. 4. Interval progression of multiple nodular foci of consolidative opacity throughout both lungs, a few of which are cavitary in appearance. These nodules are most likely infectious or inflammatory in etiology, and septic emboli are a consideration. Given patient's history of cervical cancer, metastatic disease is on the differential, though considered less likely. 5. New nonspecific moderate right hydronephrosis, without demonstrable cause. However, there is underlying wall thickening and enhancement of the urinary bladder. This combination of findings could represent a cystitis in the setting of urinary tract infection with resultant pyelitis and hydronephrosis.   However, given patient history of cervical cancer, locoregional spread of tumor with resultant obstruction of the distal right ureter may be a cause of the patient's right hydronephrosis. 6. Interval development of new irregularity of the cervix and nonspecific endometrial thickening in comparison with the prior study of 1/28/2019. This likely represents the patient's known underlying cervical cancer causing obstruction of the endometrial with resultant endometrial thickening. This could be further evaluated with a follow-up pelvic ultrasound. 7. Stable mild bilateral pelvic chain lymphadenopathy, right greater than left, possibly representing metastatic disease. Ir Port Placement > 5 Years    Result Date: 2/19/2019  PROCEDURE: ULTRASOUND GUIDED VASCULAR ACCESS. FLUOROSCOPY GUIDED PLACEMENT OF A RIGHT IJ SUBCUTANEOUS PORT-A-CATH. MODERATE CONSCIOUS SEDATION 2/19/2019. HISTORY: ORDERING SYSTEM PROVIDED HISTORY: cervical cancer to get chemo TECHNOLOGIST PROVIDED HISTORY: Reason for exam:->cervical cancer to get chemo How many lumens are being requested?->1 What site is the preferred site? ->No preference What side should this line be placed? ->Either 72-year-old female with cervical cancer, presents for chest port placement. SEDATION: Moderate sedation was administered under my supervision by a qualified nurse. 4 mg of Versed was administered intravenously. Approximate intra procedural sedation time was 23 minutes. FLUOROSCOPY DOSE AND TYPE OR TIME AND EXPOSURES: 54 seconds of fluoroscopy with 2 exposures. TECHNIQUE: Informed consent was obtained after a detailed explanation of the procedure including risks, benefits, and alternatives. Universal protocol was observed. The right neck and chest were prepped and draped in sterile fashion using maximum sterile barrier technique. Local anesthesia was achieved with lidocaine. A micropuncture needle was used to access the right internal jugular vein using ultrasound guidance.   An ultrasound image demonstrating patency of the vein   Cervical cancer ([facility] [address] (2/15/2019)    Assessment: invasive squamous cell carcinoma which supports the diagnosis of cervical cancer. She is at least stage JACKI d/t metastatic disease to bladder and possibly stage IVB if mets to lung    Plan: Initial plan - carbo/taxol/avastin.  Port placed 2/19.  SOme pain issues - defer oral regimen to Onc   Pulmonary nodule (2/15/2019)    Assessment: Established problem. Stable.  Will need workup for this     Plan: per dr Jovanna Gee, they will eval in coming week   Hydronephrosis (2/15/2019)    Assessment: Established problem. Stent could not be placed by urology    Plan: Right perc nephrostomy, nephrostogram and antegrade ureteral stent done 2/18 per IR   Ground glass opacity present on imaging of lung ()   Lung nodules ()   Lymphadenopathy ()   Constipation due to opioid therapy (2/23/2019)    Assessment: Established problem. Stable.  Remains on narcotics.  Decent response to linzess    Plan: continue Linzess 145 - rx written for d/c       Disp - home today if ok with onc            Gilberto Eisenmenger  2/27/2019

## 2019-02-27 NOTE — PROGRESS NOTES
Oncology and Hematology Care   Progress Note      2/26/2019 7:47 PM        Name: Abdiel Yousif . Admitted: 2/15/2019    SUBJECTIVE:      Doing somewhat better. Fatigue +.  Vaginal bleeding has slowed down    Reviewed interval ancillary notes    Current Medications    benzocaine-menthol (CEPACOL SORE THROAT) lozenge 1 lozenge Q2H PRN   ferrous sulfate tablet 325 mg BID WC   magic (miracle) mouthwash with nystatin 4x Daily PRN   phenazopyridine (PYRIDIUM) tablet 200 mg BID   0.9 % sodium chloride bolus PRN   cefepime (MAXIPIME) 2 g IVPB minibag Q12H   HYDROmorphone HCl PF (DILAUDID) injection 1 mg Q3H PRN   morphine (MS CONTIN) extended release tablet 30 mg 2 times per day   oxyCODONE (OXY-IR) immediate release tablet 15 mg Q3H PRN   enoxaparin (LOVENOX) injection 40 mg Daily   bisacodyl (DULCOLAX) EC tablet 5 mg Daily PRN   linaclotide (LINZESS) capsule 145 mcg QAM AC   ipratropium-albuterol (DUONEB) nebulizer solution 1 ampule TID   prochlorperazine (COMPAZINE) injection 10 mg Q6H PRN   LORazepam (ATIVAN) injection 0.25 mg Q6H PRN   polyethylene glycol (GLYCOLAX) packet 17 g Daily PRN   sodium chloride bacteriostatic 0.9 % injection 15 mL PRN   sodium chloride (PF) 0.9 % injection 500 mL PRN   acetaminophen (TYLENOL) tablet 500 mg Q4H PRN   diphenhydrAMINE (BENADRYL) tablet 25 mg Q6H PRN   sennosides-docusate sodium (SENOKOT-S) 8.6-50 MG tablet 2 tablet Daily   ipratropium-albuterol (DUONEB) nebulizer solution 1 ampule Q4H PRN   zolpidem (AMBIEN) tablet 10 mg Nightly   gabapentin (NEURONTIN) capsule 300 mg TID   baclofen (LIORESAL) tablet 20 mg BID   ibuprofen (ADVIL;MOTRIN) tablet 800 mg Q8H PRN   promethazine (PHENERGAN) tablet 25 mg Q6H PRN   0.9 % sodium chloride infusion Continuous   sodium chloride flush 0.9 % injection 10 mL 2 times per day   sodium chloride flush 0.9 % injection 10 mL PRN   magnesium hydroxide (MILK OF MAGNESIA) 400 MG/5ML suspension 30 mL Daily PRN   ondansetron (ZOFRAN) injection 4 mg Q6H PRN   potassium chloride (KLOR-CON M) extended release tablet 40 mEq PRN   Or    potassium bicarb-citric acid (EFFER-K) effervescent tablet 40 mEq PRN   Or    potassium chloride 10 mEq/100 mL IVPB (Peripheral Line) PRN   promethazine (PHENERGAN) injection 25 mg Q6H PRN   DULoxetine (CYMBALTA) extended release capsule 60 mg Nightly       Objective:  /71   Pulse 118   Temp 99.2 °F (37.3 °C) (Oral)   Resp 16   Ht 5' 6.5\" (1.689 m)   Wt 193 lb 3.2 oz (87.6 kg)   SpO2 94%   BMI 30.72 kg/m²   No intake or output data in the 24 hours ending 02/26/19 1947        Conscious alert oriented  HEENT: No pallor or scleral icterus. Oral mucosa: No mucositis. No thrush. Neck: Supple, no lymphadenopathy. No thyromegaly  Lungs:  respiratory efforts are normal.  CVS: S1-S2 normal.  No murmurs or gallops heard. Abdomen: Not distended  Neuro: No focal deficits. No cranial nerve palsies. Alert and oriented. Skin: No rashes, petechiae, ecchymosis. Extremities: No edema, tenderness, erythema. Labs and Tests:  CBC:   Recent Labs      02/24/19   0608  02/26/19   0600   WBC  21.7*  7.8   HGB  8.8*  9.4*   PLT  337  286     BMP:    Recent Labs      02/24/19   0608  02/26/19   0600   NA  142  136   K  4.1  3.8   CL  106  99   CO2  27  26   BUN  14  14   CREATININE  <0.5*  0.5*   GLUCOSE  120*  135*     Hepatic: No results for input(s): AST, ALT, ALB, BILITOT, ALKPHOS in the last 72 hours. ASSESSMENT AND PLAN    Principal Problem:    Pneumonia  Active Problems:    SLE (systemic lupus erythematosus) (HCC)    Lupus anticoagulant positive    Cervical cancer (HCC)    Pulmonary nodule    Hydronephrosis    Ground glass opacity present on imaging of lung    Lung nodules    Lymphadenopathy    Constipation due to opioid therapy  Resolved Problems:    * No resolved hospital problems. *      Squamous cell carcinoma cervix with invasion of bladder and pulmonary metastases.  Received first dose of taxol Cisplatin on 2/20/19  Avastin held due to persistent vaginal bleeding and recent procedures. Pain due to neoplasm will continue IV dilaudid for now. MS contin and oxycodone increased yesterday. Anemia due to uterine bleeding. Iron studies are c/w iron deficiency and anemia of chronic disease. PO Iron started    Hypoxia repeat chest xray stable. Initial ctpa did not reveal pulmonary embolus. Nausea. Continue antiemetics. Will try PO zofran and phenergan. Lovenox for DVT prophylaxis due to patient at high risk for thromboembolic disease with cancer and inactivity. Hopefully discharge soon.     Brittney Nguyen MD

## 2019-02-27 NOTE — PROGRESS NOTES
Oncology and Hematology Care   Progress Note      2/27/2019 7:54 AM        Name: Bindu Whelan . Admitted: 2/15/2019    SUBJECTIVE:  Patient fatigued. Still requiring oxygen. Pain controlled with PO narcotic pain meds. Nausea controlled with PO antiemetics.        Reviewed interval ancillary notes    Current Medications    benzocaine-menthol (CEPACOL SORE THROAT) lozenge 1 lozenge Q2H PRN   ferrous sulfate tablet 325 mg BID WC   magic (miracle) mouthwash with nystatin 4x Daily PRN   phenazopyridine (PYRIDIUM) tablet 200 mg BID   0.9 % sodium chloride bolus PRN   cefepime (MAXIPIME) 2 g IVPB minibag Q12H   HYDROmorphone HCl PF (DILAUDID) injection 1 mg Q3H PRN   morphine (MS CONTIN) extended release tablet 30 mg 2 times per day   oxyCODONE (OXY-IR) immediate release tablet 15 mg Q3H PRN   enoxaparin (LOVENOX) injection 40 mg Daily   bisacodyl (DULCOLAX) EC tablet 5 mg Daily PRN   linaclotide (LINZESS) capsule 145 mcg QAM AC   ipratropium-albuterol (DUONEB) nebulizer solution 1 ampule TID   prochlorperazine (COMPAZINE) injection 10 mg Q6H PRN   LORazepam (ATIVAN) injection 0.25 mg Q6H PRN   polyethylene glycol (GLYCOLAX) packet 17 g Daily PRN   sodium chloride bacteriostatic 0.9 % injection 15 mL PRN   sodium chloride (PF) 0.9 % injection 500 mL PRN   acetaminophen (TYLENOL) tablet 500 mg Q4H PRN   diphenhydrAMINE (BENADRYL) tablet 25 mg Q6H PRN   sennosides-docusate sodium (SENOKOT-S) 8.6-50 MG tablet 2 tablet Daily   ipratropium-albuterol (DUONEB) nebulizer solution 1 ampule Q4H PRN   zolpidem (AMBIEN) tablet 10 mg Nightly   gabapentin (NEURONTIN) capsule 300 mg TID   baclofen (LIORESAL) tablet 20 mg BID   ibuprofen (ADVIL;MOTRIN) tablet 800 mg Q8H PRN   promethazine (PHENERGAN) tablet 25 mg Q6H PRN   0.9 % sodium chloride infusion Continuous   sodium chloride flush 0.9 % injection 10 mL 2 times per day   sodium chloride flush 0.9 % injection 10 mL PRN   magnesium hydroxide (MILK OF MAGNESIA) 400 MG/5ML suspension 30 mL Daily PRN   ondansetron (ZOFRAN) injection 4 mg Q6H PRN   potassium chloride (KLOR-CON M) extended release tablet 40 mEq PRN   Or    potassium bicarb-citric acid (EFFER-K) effervescent tablet 40 mEq PRN   Or    potassium chloride 10 mEq/100 mL IVPB (Peripheral Line) PRN   promethazine (PHENERGAN) injection 25 mg Q6H PRN   DULoxetine (CYMBALTA) extended release capsule 60 mg Nightly       Objective:  BP 97/64   Pulse 102   Temp 98.4 °F (36.9 °C) (Oral)   Resp 16   Ht 5' 6.5\" (1.689 m)   Wt 193 lb 3.2 oz (87.6 kg)   SpO2 94%   BMI 30.72 kg/m²   No intake or output data in the 24 hours ending 02/27/19 0754 Wt Readings from Last 3 Encounters:   02/20/19 193 lb 3.2 oz (87.6 kg)   02/10/19 190 lb (86.2 kg)   02/08/19 195 lb (88.5 kg)       Conscious alert oriented  HEENT: No pallor or scleral icterus. Oral mucosa: No mucositis. No thrush. Neck: Supple, no lymphadenopathy. No thyromegaly  Lungs:  respiratory efforts are normal.  CVS: S1-S2 normal.  No murmurs or gallops heard. Abdomen: Not distended  Neuro: No focal deficits. No cranial nerve palsies. Alert and oriented. Skin: No rashes, petechiae, ecchymosis. Extremities: No edema, tenderness, erythema. Labs and Tests:  CBC:   Recent Labs      02/26/19   0600  02/27/19   0546   WBC  7.8  7.8   HGB  9.4*  8.9*   PLT  286  251     BMP:  Recent Labs      02/26/19   0600  02/27/19   0546   NA  136  139   K  3.8  3.8   CL  99  101   CO2  26  27   BUN  14  10   CREATININE  0.5*  <0.5*   GLUCOSE  135*  127*     Hepatic: No results for input(s): AST, ALT, ALB, BILITOT, ALKPHOS in the last 72 hours.     ASSESSMENT AND PLAN    Principal Problem:    Pneumonia  Active Problems:    SLE (systemic lupus erythematosus) (HCC)    Lupus anticoagulant positive    Cervical cancer (HCC)    Pulmonary nodule    Hydronephrosis    Ground glass opacity present on imaging of lung    Lung nodules    Lymphadenopathy    Constipation due to opioid therapy  Resolved

## 2019-02-27 NOTE — PROGRESS NOTES
Shift assessment completed. VSS. C/o pain and nausea. PRN ibuprofen and phenergan given with scheduled medications. Denies additional needs. Will monitor.

## 2019-02-28 PROBLEM — A41.9 SEPSIS (HCC): Status: ACTIVE | Noted: 2019-01-01

## 2019-02-28 NOTE — ED PROVIDER NOTES
[address] [facility]  eMERGENCY dEPARTMENT eNCOUnter      Pt Name: Conrado Carrillo  MRN: [id number]  Kassidygfrehana 1980  Date of evaluation: 2/28/2019  Provider: MARITA Cohen  PCP: Dre Engle MD  ED Attending: Dr. Emeli Jaime       Chief Complaint   Patient presents with    Fever     stage 4 cervical ca with mets to bladder and lungs- stent to kidney last week, with fever and pain today. Sees Dr. Lisha Calderón. HISTORY OF PRESENT ILLNESS   (Location/Symptom, Timing/Onset, Context/Setting, Quality, Duration, Modifying Factors, Severity)  Note limiting factors. Conrado Carrillo is a 42 y.o. female who presents to the emergency department with complaints of fever, chest/lung pain and abdominal and back pain. Patient does have a recent diagnosis of cervical cancer with metastasis to bladder and lungs. Patient was recently admitted for 12 days being treated for pneumonia and was discharged home yesterday. She states that last Monday or Tuesday she had a stent placed on the right side but is unsure why the stent was placed. When she was discharged home she was feeling pretty good until later in the day when she started developing all over  Pain and fever. Patient is currently undergoing chemotherapy and had a first round of chemo last Wednesday. Sees Dr. Nelida Bartlett for oncology. Patient is currently on home oxygen, 2 L at all times. Patient does complain of pain with breathing. Patient took a dose of Motrin this afternoon around noon. ppatient feels very fatigued, weak, decreased appetite and activity. She does complain of vaginal bleeding but this is chronic. Dysuria lightheadedness abdominal pain and distention are also present. Denies vomiting or diarrhea. No rash or skin discoloration. Patient denies taking any home antibiotics.     Nursing Notes were all reviewed and agreed with or any disagreements were addressed  in the HPI.    REVIEW OF SYSTEMS    (2-9 systems for level 4, 10 or more for level 5)     Review of Systems   Constitutional: Positive for activity change, appetite change, fatigue and fever. Negative for chills. Respiratory: Positive for cough, shortness of breath and wheezing. Cardiovascular: Positive for chest pain. Gastrointestinal: Positive for abdominal pain and nausea. Negative for constipation, diarrhea and vomiting. Genitourinary: Positive for difficulty urinating, dysuria and vaginal bleeding. Negative for decreased urine volume, hematuria and urgency. Musculoskeletal: Negative for back pain. Skin: Negative for color change and rash. Neurological: Positive for light-headedness. Negative for dizziness and weakness. All other systems reviewed and are negative. Positives and pertinent negatives as per HPI. All other systems were reviewed and are negative. PHYSICAL EXAM    (up to 7 for level 4, 8 or more for level 5)     ED Triage Vitals [02/28/19 1755]   BP Temp Temp Source Pulse Resp SpO2 Height Weight   113/70 101.5 °F (38.6 °C) Infrared 131 16 98 % 5' 6\" (1.676 m) 190 lb (86.2 kg)     Physical Exam   Constitutional: She is oriented to person, place, and time. She appears well-developed and well-nourished. She is active and cooperative. Non-toxic appearance. She has a sickly appearance. She appears ill. Nasal cannula in place. HENT:   Head: Normocephalic. Right Ear: External ear normal.   Left Ear: External ear normal.   Nose: Nose normal.   Eyes: Conjunctivae are normal. Right eye exhibits no discharge. Left eye exhibits no discharge. Neck: Normal range of motion. Neck supple. Cardiovascular: Regular rhythm and normal heart sounds. Tachycardia present. Exam reveals no gallop and no friction rub. No murmur heard. Pulses:       Radial pulses are 2+ on the right side, and 2+ on the left side. Pulmonary/Chest: Effort normal and breath sounds normal. No respiratory distress. OXYCODONE (OXY-IR) 15 MG IMMEDIATE RELEASE TABLET    Take 1 tablet by mouth every 3 hours as needed for Pain for up to 14 days. Rani Isidro     PROMETHAZINE (PHENERGAN) 25 MG TABLET    Take 25 mg by mouth every 6 hours as needed     PROMETHAZINE (PHENERGAN) 25 MG TABLET    Take 1 tablet by mouth every 6 hours as needed for Nausea       ALLERGIES     Food and Spinach    FAMILY HISTORY       Family History   Problem Relation Age of Onset    Diabetes Mother     Crohn's Disease Sister     Hypertension Maternal Grandmother     Heart Failure Maternal Grandfather     Stroke Maternal Grandfather     Diabetes Paternal Grandmother     Asthma Paternal Grandfather         SOCIAL HISTORY       Social History     Social History    Marital status:      Spouse name: N/A    Number of children: N/A    Years of education: N/A     Occupational History          Real Estate     Social History Main Topics    Smoking status: Former Smoker     Packs/day: 0.25     Years: 3.00     Types: Cigarettes     Start date: 1998     Quit date: 4/26/2001    Smokeless tobacco: Never Used    Alcohol use No    Drug use: No    Sexual activity: Yes     Partners: Male     Other Topics Concern    Not on file     Social History Narrative    No narrative on file       SCREENINGS           DIAGNOSTIC RESULTS   LABS:    Labs Reviewed   CBC WITH AUTO DIFFERENTIAL - Abnormal; Notable for the following:        Result Value    WBC 18.0 (*)     RBC 3.62 (*)     Hemoglobin 9.2 (*)     Hematocrit 29.6 (*)     MCH 25.2 (*)     MCHC 30.9 (*)     RDW 20.4 (*)     Neutrophils # 16.6 (*)     Bands Relative 18 (*)     Metamyelocytes Relative 4 (*)     Toxic Granulation Present (*)     Dohle Bodies Present (*)     Anisocytosis Occasional (*)     Microcytes Occasional (*)     Polychromasia 1+ (*)     All other components within normal limits    Narrative:     Performed at:  [facility] [address], [place], [address]   Phone [phone]   COMPREHENSIVE METABOLIC PANEL W/ REFLEX TO MG FOR LOW K - Abnormal; Notable for the following:     Chloride 97 (*)     Glucose 163 (*)     Alkaline Phosphatase 130 (*)     All other components within normal limits    Narrative:     Performed at:  [facility] [address] [facility] [place] [employer]   Phone [phone]   LACTIC ACID, PLASMA - Abnormal; Notable for the following:     Lactic Acid 2.5 (*)     All other components within normal limits    Narrative:     Performed at:  [facility] [address], [place], [facility] [place] [employer]   Phone [phone]   PROTIME-INR - Abnormal; Notable for the following:     Protime 14.3 (*)     INR 1.25 (*)     All other components within normal limits    Narrative:     Performed at:  [facility] [address], [place], [address] [employer]   Phone [phone]   URINE RT REFLEX TO CULTURE - Abnormal; Notable for the following:     Color, UA DARK YELLOW (*)     Clarity, UA CLOUDY (*)     Bilirubin Urine MODERATE (*)     Ketones, Urine TRACE (*)     Blood, Urine LARGE (*)     Protein, UA >=300 (*)     Nitrite, Urine POSITIVE (*)     Leukocyte Esterase, Urine SMALL (*)     All other components within normal limits    Narrative:     Performed at:  [facility] [address], [place], [address] [employer]   Phone [phone]   MICROSCOPIC URINALYSIS - Abnormal; Notable for the following:     WBC, UA 19 (*)     RBC, UA >900 (*)     All other components within normal limits    Narrative:     Performed at:  [facility] [address],  [place], [address] [employer]   Phone [phone]   RAPID INFLUENZA A/B ANTIGENS    Narrative:     Performed at:  [facility] [address] [employer]  [place], [facility] [place] [place] [employer]   Phone [phone]   CULTURE BLOOD #2   CULTURE BLOOD #1   URINE CULTURE   HCG, SERUM, QUALITATIVE    Narrative:     Performed at:  [facility]  [address], [place]   Phone [phone]   BRAIN NATRIURETIC PEPTIDE    Narrative:     Performed at:  [facility]  [address], [address]   Phone [phone]   LACTATE, SEPSIS       All other labs were within normal range or not returned as of this dictation. EKG: All EKG's areinterpreted by the Emergency Department Physician who either signs or Co-signs this chart in the absence of a cardiologist.    RADIOLOGY:   Non-plain film images such as CT, Ultrasound and MRI are read by the radiologist. Melanie Rubiolanieing radiographicimages are visualized and preliminarily interpreted by the  ED Provider with the below findings:    Interpretation per the Radiologist below, if available at the time of this note:    CT CHEST ABDOMEN PELVIS W CONTRAST   Preliminary Result   Diffuse multifocal airspace disease is again noted with slight interval   improvement of more focal areas of consolidation at the left base. Findings   compatible with multifocal pneumonia. Malignancy, metastatic disease is   within the differential though considered less likely. Right ureteral stent is in place with persistent mild dilation of the renal   collecting system      Wall thickening of the bladder is noted which is improved compared to the   prior study. Mild stranding is noted in the perivesicular fat. The cervix is somewhat irregular in its appearance with wall thickening of   the vagina noted. Endometrial fluid noted on the prior study is no longer   identified. In a patient with a reported history of cervical cancer regional, local   spread of malignancy is within the differential if the patient has had   radiation therapy findings may also represent associated inflammatory changes.       Nonspecific adenopathy within the pelvis may be reactive in nature or related   to underlying malignancy. Recommend continued follow-up         XR CHEST PORTABLE   Final Result   Patchy consolidation within the lungs, especially the right perihilar region,   mildly increased when compared to the previous exam.  Given the relatively   rapid increase, pneumonia would be primarily considered. PROCEDURES   Unless otherwisenoted below, none     Procedures    CRITICAL CARE TIME   N/A    CONSULTS:  None    EMERGENCY DEPARTMENT COURSE andDIFFERENTIAL DIAGNOSIS/MDM:   Vitals:    Vitals:    02/28/19 1930 02/28/19 1945 02/28/19 2000 02/28/19 2046   BP: (!) 115/59  (!) 106/46 (!) 110/58   Pulse: 118 119 120 116   Resp: 18 18 24 18   Temp:       TempSrc:       SpO2: 94% 96% 94% 90%   Weight:       Height:           Patient wasgiven the following medications:  Medications   sodium chloride flush 0.9 % injection 10 mL (not administered)   sodium chloride flush 0.9 % injection 10 mL (not administered)   cefepime (MAXIPIME) 2 g in dextrose 5 % 50 mL IVPB (2 g Intravenous New Bag 2/28/19 2138)   ibuprofen (ADVIL;MOTRIN) tablet 800 mg (not administered)   0.9 % sodium chloride bolus (0 mL/kg × 59.3 kg (Ideal) Intravenous Stopped 2/28/19 2002)   acetaminophen (TYLENOL) tablet 650 mg (650 mg Oral Given 2/28/19 1915)   HYDROmorphone HCl PF (DILAUDID) injection 1 mg (1 mg Intravenous Given 2/28/19 1902)   vancomycin (VANCOCIN) 1000 mg in dextrose 5% 200 mL IVPB (0 mg Intravenous Stopped 2/28/19 2139)   iopamidol (ISOVUE-370) 76 % injection 75 mL (75 mLs Intravenous Given 2/28/19 2022)   ketorolac (TORADOL) injection 15 mg (15 mg Intravenous Given 2/28/19 2055)   fentaNYL (SUBLIMAZE) injection 50 mcg (50 mcg Intravenous Given 2/28/19 2055)   Jes Jensen is a 42 y.o. female who presents to the emergency department with complaints of fever, chest/lung pain and abdominal and back pain. Patient does have a recent diagnosis of cervical cancer with metastasis to bladder and lungs.   Patient was recently renal collecting system. Bladder wall thickening has improved compared to previous study. Patient and made aware of  Need for admission, with her current septicemia will admit to hospitalist for ICU. SEP-1 CORE MEASURE DATA    Classification: severe sepsis    Amount of fluids ordered: at least 30mL/kg based on entered actual weight at time of triage    Time at which sepsis was identified: 939PM    Broad-spectrum antibiotics chosen: vancomycin and cefepime based on sepsis order-set for a suspected source of: Pulmonary - Nosocomial    Repeat lactate level: pending    Critical Care  There was a high probability of life-threatening clinical deterioration in the patient's condition requiring my urgent intervention. Total critical care time with the patient was 50 minutes excluding separately reportable procedures. Critical care required due to patients septicemia, hypoxemia requiring oxygen, multifocal pneumonia. .    The patient tolerated their visit well. They were seen and evaluated by the attending physician, who agreed with the assessment and plan. I have discussed the findings of today's workup with the patient and addressed the patient's questions and concerns. FINAL IMPRESSION      1. Multifocal pneumonia    2. Septicemia ([address]    3. History of cancer        DISPOSITION/PLAN   DISPOSITION Decision To Admit 02/28/2019 09:35:31 PM      PATIENT REFERRED TO:  No follow-up provider specified.     DISCHARGE MEDICATIONS:  New Prescriptions    No medications on file       DISCONTINUED MEDICATIONS:  Discontinued Medications    No medications on file            (Please note that portions of this note were completed with a voice recognition program.  Efforts were made to edit the dictations but occasionally words aremis-transcribed.)    MARITA Turner (electronically signed)         MARITA Lowe  02/28/19 2140       MARITA Luciano  02/28/19 2142

## 2019-02-28 NOTE — DISCHARGE SUMMARY
[facility] -- Physician Discharge Summary     Franklyn Mayfield  1980  MRN: [id number]    Admit Date: 2/15/2019  Discharge Date: 2/27/2019  2:24 PM    Attending MD: Ainsley att. providers found  Discharging MD: Nadir Foote MD  PCP: [facility] John[facility] IreneFormerly [phone]    Admission Diagnosis: Pneumonia [J18.9]  Pneumonia [J18.9]  DISCHARGE DIAGNOSIS: cervical cancer, stage IV    Full Hospital Problem List:  Active Hospital Problems    Diagnosis Date Noted    Constipation due to opioid therapy [K59.03, Vera Grad 02/23/2019    Ground glass opacity present on imaging of lung [R91.8]     Lung nodules [R91.8]     Lymphadenopathy [R59.1]     [facility] [C53.9] 02/15/2019    Pneumonia [J18.9] 02/15/2019    Pulmonary nodule [R91.1] 02/15/2019    Hydronephrosis [N13.30] 02/15/2019    SLE (systemic lupus erythematosus) ([facility] [M32.9] 01/22/2019    Lupus anticoagulant positive [R76.0] 01/22/2019           Hospital Course:  41 y.o. female with past medical history of endometriosis, fibromyalgia, ovarian cyst and seizure disorder who presents to the ED with complaint of shortness of breath.  Patient states she has a mass on her cervix and has seen oncology, Dr. Dorota Peralta, and told she most likely had cervical cancer.  Patient states she had biopsies performed but states results will not be back next week. Sameer Frances states she has some pulmonary nodules and states she's had increasing shortness of breath especially with exertion.  States she was sent to the ED for further evaluation and treatment and concern for potential pulmonary embolism.  Patient states she does have some pleuritic pain but denies any chest pain at rest.  Denies hemoptysis, cough, fever/chills, rashes/lesions, becoming diaphoretic, lightheadedness/dizziness, urinary symptoms or changes in bowel movements.  Denies vaginal bleeding or discharge.  States his abdominal distention and pain that she rates as 7/10 as well.  Became concerned and came to the ED at request of oncology and pulmonology for further evaluation and treatment.         CT Abd from ER reviewed by self with radiology       Impression:         1. No CT evidence of a pulmonary embolism. 2. No acute abnormality of the thoracic aorta. 3. Interval development of widespread ground-glass opacity throughout both  lungs with diffuse intralobular septal thickening.  This most likely reflects  a combination of interstitial pulmonary edema and multifocal pneumonia. 4. Interval progression of multiple nodular foci of consolidative opacity  throughout both lungs, a few of which are cavitary in appearance.  These  nodules are most likely infectious or inflammatory in etiology, and septic  emboli are a consideration.  Given patient's history of cervical cancer,  metastatic disease is on the differential, though considered less likely. 5. New nonspecific moderate right hydronephrosis, without demonstrable cause. However, there is underlying wall thickening and enhancement of the urinary  bladder.  This combination of findings could represent a cystitis in the  setting of urinary tract infection with resultant pyelitis and  hydronephrosis.  However, given patient history of cervical cancer,  locoregional spread of tumor with resultant obstruction of the distal right  ureter may be a cause of the patient's right hydronephrosis. 6. Interval development of new irregularity of the cervix and nonspecific  endometrial thickening in comparison with the prior study of 1/28/2019.  This  likely represents the patient's known underlying cervical cancer causing  obstruction of the endometrial with resultant endometrial thickening.  This  could be further evaluated with a follow-up pelvic ultrasound.   7. Stable mild bilateral pelvic chain lymphadenopathy, right greater than  left, possibly representing metastatic disease.       Pt is seen by urology given the hydronephrosis  Nephrostomy tube was attempted in endoscopy, but could not be placed  Pt was then seen by IR, and Dr Diana Nuñez was able to place the tube    She was started on iv abx for possible pneumonia  Serial imaging is done as well as pulmonary consultation  It is felt that pt does not have infectious process, and that the findings on imagings were consistent with pulm nodules  At rec of pulmonary, abx are stopped    Seen by Oncology while here as well  Decision is made to start on chemotherapy  Port is placed without complication  Final plan per Dr Shanna Aguilar:     1) ONC-s/p C1 cis/taxol. Likely to add avastin at next cycle. May change to Kira if cis is too emetogenic. Lung biopsy is not warranted at this time. Can Do PET/CT in the future for radiologic confirmation of metastatic disease if needed. 2) Pain-better with MS contin and oxycodone prn  3) Pulm-PFTs prior to admission showing Mild restriction with moderate decrease in DLCo. Pt is still requiring O2 will need to determine if needs O2 @ home. Spoke to pulmonology today and Dr. Beatris Hubbard does not believe that the patient has an infectious process and that this is more likely disseminated disease. He does not necessarily feel like the patient needs antibiotic treatment for pneumonia. But yes she will need to continue on oxygen. He states he wants to see the patient back next week in his office. Pt is discharged home. To f/u with Dr Abdirahman Baxter in office for continued treatment.  Outpt appt with Pulm is also recommended          Consults made during Hospitalization:  PHARMACY TO DOSE VANCOMYCIN  IP CONSULT TO INTERNAL MEDICINE  IP CONSULT TO ONCOLOGY  IP CONSULT TO PHARMACY  IP CONSULT TO PULMONOLOGY  IP CONSULT TO RADIOLOGY  IP CONSULT  [address] CONSULT TO ONCOLOGY  IP CONSULT TO CASE MANAGEMENT    Treatment team at time of Discharge: Treatment Team: Consulting Physician: Seun Sheets MD; Consulting Physician: Argentina Carroll MD; Consulting Physician: Yenny Boykin MD; Consulting Physician: Chetna Nelson MD; Respiratory Therapist (Day): Tamela Plaza RCP    Imaging Results:  Xr Chest Standard (2 Vw)    Result Date: 2/26/2019  EXAMINATION: TWO VIEWS OF THE CHEST 2/26/2019 11:55 am COMPARISON: Frontal view of the chest 02/24/2019, frontal and lateral views of the chest 02/19/2019, CT thorax 02/25/2019. HISTORY: ORDERING SYSTEM PROVIDED HISTORY: cough TECHNOLOGIST PROVIDED HISTORY: Reason for exam:->cough Ordering Physician Provided Reason for Exam: cough FINDINGS: Support devices: Right IJ chest port again noted with distal tip terminating in the proximal right atrium in good position. The cardiopericardial silhouette is stable in size and configuration. Bilateral patchy airspace opacities are redemonstrated most pronounced in the mid and lower lung zones, these were better detailed on the recent CT of the thorax from 02/25/2019, please refer to that report for additional information. The overall radiographic appearance has mildly increased from the prior radiograph 02/24/2019. There is no pneumothorax or pleural effusion. Surgical clips project in the upper abdomen. Mild interval increase in bilateral patchy airspace opacities better detailed on the CT of the thorax from 02/25/2019. Findings are again most suggestive of an infectious/inflammatory process.      Xr Chest Standard (2 Vw)    Result Date: 2/19/2019  EXAMINATION: TWO VIEWS OF THE CHEST 2/19/2019 4:26 pm COMPARISON: 02/16/2019 HISTORY: ORDERING SYSTEM PROVIDED HISTORY: cough TECHNOLOGIST PROVIDED HISTORY: Reason for exam:->cough Ordering Physician Provided Reason for Exam: Cervical Cancer (Pt was sent over from Dr Rosemarie Hagan office - states she was sent here from office - was told today that she has stage 3 cervical cancer and was sent here for \"scans and further testing\") Acuity: Unknown Type of Exam: Unknown FINDINGS: There is patchy bilateral airspace disease, which appears increased when compared to the previous exam.  As detected on recent CT PA, some of those have a nodular appearance. The heart mediastinal contours are unremarkable. No pneumothorax. No free air. No acute bony abnormalities. Chin catheter noted. Patchy bilateral airspace disease, concerning for pneumonia, which appears increased when compared to the previous exam.  Again, some of these areas of opacity have a nodular appearance and septic emboli should be considered, especially when given the correlation with the CT PA from 02/15/2019. Process should be followed to resolution. Xr Chest Standard (2 Vw)    Result Date: 2/16/2019  EXAMINATION: TWO VIEWS OF THE CHEST 2/16/2019 11:49 am COMPARISON: 10/16/2014 HISTORY: ORDERING SYSTEM PROVIDED HISTORY: pneumonia TECHNOLOGIST PROVIDED HISTORY: Reason for exam:->pneumonia Ordering Physician Provided Reason for Exam: PNA Acuity: Acute Type of Exam: Initial Relevant Medical/Surgical History: cervical ca FINDINGS: There is patchy bibasilar consolidation, right greater than left, superimposed on a background of diffuse interstitial prominence. Heart size, mediastinal contours and pulmonary vascularity are within normal limits. There is no pleural effusion. Multifocal bilateral pneumonia. Ct Chest W Contrast    Result Date: 2/25/2019  EXAMINATION: CT OF THE CHEST WITH CONTRAST 2/25/2019 1:47 pm TECHNIQUE: CT of the chest was performed with the administration of intravenous contrast. Multiplanar reformatted images are provided for review. Dose modulation, iterative reconstruction, and/or weight based adjustment of the mA/kV was utilized to reduce the radiation dose to as low as reasonably achievable.  COMPARISON: CT of the chest February 15, 2019 HISTORY: ORDERING SYSTEM PROVIDED HISTORY: PNEUMONIA, UNRESOLVED TECHNOLOGIST PROVIDED HISTORY: Ordering Physician Provided Reason for Exam: Pneumonia, unresolved Acuity: Unknown Type of Exam: Unknown FINDINGS: Mediastinum: Unchanged mildly enlarged lymph nodes. No pericardial effusion. Lungs/pleura: There is increased severity of extensive bilateral pulmonary disease, with extensive foci of ground-glass opacity, in addition to small areas of consolidation. No pneumothorax or pleural effusion. Upper Abdomen: Negative, no acute findings. Soft Tissues/Bones: No acute osseous findings. Increased severity of extensive bilateral pulmonary disease, nonspecific but consistent with pneumonia. ARDS is also considered. Us Renal Complete    Result Date: 2/26/2019  EXAMINATION: RETROPERITONEAL ULTRASOUND OF THE KIDNEYS AND URINARY BLADDER 2/26/2019 COMPARISON: CT abdomen and pelvis 02/15/2019. HISTORY: ORDERING SYSTEM PROVIDED HISTORY: left sided pain, hydronephrosis TECHNOLOGIST PROVIDED HISTORY: Ordering Physician Provided Reason for Exam: hx of recent rt stent cervical ca Acuity: Unknown Type of Exam: Subsequent/Follow-up FINDINGS: Kidneys: The right kidney measures 11.6 cm in length and the left kidney measures 11.3 cm in length. Kidneys demonstrate normal cortical echogenicity. No evidence of hydronephrosis or intrarenal stones. Bladder: Urinary bladder is underdistended at the time of imaging limiting evaluation. No gross bladder wall abnormalities noted. No postvoid imaging performed. Kidneys are unremarkable. Limited evaluation of urinary bladder secondary to underdistention at the time of imaging without gross bladder wall abnormality noted. Ct Abdomen Pelvis W Iv Contrast Additional Contrast? None    Result Date: 2/15/2019  EXAMINATION: CTA OF THE CHEST; CT OF THE ABDOMEN AND PELVIS WITH CONTRAST 2/15/2019 2:11 pm TECHNIQUE: CTA of the chest was performed after the administration of intravenous contrast.  Multiplanar reformatted images are provided for review. MIP images are provided for review.  Dose modulation, iterative reconstruction, and/or consolidation of multiple scattered various sized pulmonary nodules throughout both lungs since the study of 1/28/2019. A few of these are cavitary. The largest of these measures approximately 10 mm in short axis within the subpleural portion of the left lower lobe. These may be secondary to an atypical infectious or inflammatory process, to include septic emboli. Metastatic disease is considered less likely, though not excluded. There is no evidence of a pneumothorax or pleural effusion. Soft Tissues/Bones: No acute findings. Abdomen/Pelvis: Organs: The patient is status post cholecystectomy. The liver, spleen, and pancreas are normal.  The adrenal glands are unremarkable bilaterally. There has been interval development of nonspecific moderate right hydronephrosis since the prior exam, without definite demonstrable cause. Namely, no definite obstructing stone or mass is identified. The left kidney is stable and unremarkable. GI/Bowel: Evaluation of the hollow GI tract demonstrates no evidence of abnormal bowel wall thickening, dilatation, or obstruction. The appendix is normal. Pelvis: The urinary bladder is partially collapsed, though appears diffusely thick-walled and inflamed, with a mild amount of pericystic stranding noted. These findings are suggestive of an acute cystitis, progressed from prior exam.  Additionally, the cervix appears enlarged and irregular, and there is new abnormal thickening of the endometrium within the uterine fundus, which appears new in comparison with the study of 1/28/2019. The bilateral adnexa are unremarkable. There is a mild amount of free pelvic fluid, likely physiologic. There are stable mildly enlarged bilateral pelvic chain lymph nodes, the largest measuring approximately 2.6 x 1.6 cm along the right external iliac chain, similar to the study of 1/28/2019. Peritoneum/Retroperitoneum: No intraperitoneal free air or free fluid is identified.   No pathologic lymphadenopathy is seen. The abdominal aorta is unremarkable. No significant abdominal wall hernia is identified. Bones/Soft Tissues: There is mild bilateral sacroiliitis. The skeletal structures are otherwise unremarkable. 1. No CT evidence of a pulmonary embolism. 2. No acute abnormality of the thoracic aorta. 3. Interval development of widespread ground-glass opacity throughout both lungs with diffuse intralobular septal thickening. This most likely reflects a combination of interstitial pulmonary edema and multifocal pneumonia. 4. Interval progression of multiple nodular foci of consolidative opacity throughout both lungs, a few of which are cavitary in appearance. These nodules are most likely infectious or inflammatory in etiology, and septic emboli are a consideration. Given patient's history of cervical cancer, metastatic disease is on the differential, though considered less likely. 5. New nonspecific moderate right hydronephrosis, without demonstrable cause. However, there is underlying wall thickening and enhancement of the urinary bladder. This combination of findings could represent a cystitis in the setting of urinary tract infection with resultant pyelitis and hydronephrosis. However, given patient history of cervical cancer, locoregional spread of tumor with resultant obstruction of the distal right ureter may be a cause of the patient's right hydronephrosis. 6. Interval development of new irregularity of the cervix and nonspecific endometrial thickening in comparison with the prior study of 1/28/2019. This likely represents the patient's known underlying cervical cancer causing obstruction of the endometrial with resultant endometrial thickening. This could be further evaluated with a follow-up pelvic ultrasound. 7. Stable mild bilateral pelvic chain lymphadenopathy, right greater than left, possibly representing metastatic disease.      Xr Chest Portable    Result Date: obstructing stone or mass is identified. The left kidney is stable and unremarkable. GI/Bowel: Evaluation of the hollow GI tract demonstrates no evidence of abnormal bowel wall thickening, dilatation, or obstruction. The appendix is normal. Pelvis: The urinary bladder is partially collapsed, though appears diffusely thick-walled and inflamed, with a mild amount of pericystic stranding noted. These findings are suggestive of an acute cystitis, progressed from prior exam.  Additionally, the cervix appears enlarged and irregular, and there is new abnormal thickening of the endometrium within the uterine fundus, which appears new in comparison with the study of 1/28/2019. The bilateral adnexa are unremarkable. There is a mild amount of free pelvic fluid, likely physiologic. There are stable mildly enlarged bilateral pelvic chain lymph nodes, the largest measuring approximately 2.6 x 1.6 cm along the right external iliac chain, similar to the study of 1/28/2019. Peritoneum/Retroperitoneum: No intraperitoneal free air or free fluid is identified. No pathologic lymphadenopathy is seen. The abdominal aorta is unremarkable. No significant abdominal wall hernia is identified. Bones/Soft Tissues: There is mild bilateral sacroiliitis. The skeletal structures are otherwise unremarkable. 1. No CT evidence of a pulmonary embolism. 2. No acute abnormality of the thoracic aorta. 3. Interval development of widespread ground-glass opacity throughout both lungs with diffuse intralobular septal thickening. This most likely reflects a combination of interstitial pulmonary edema and multifocal pneumonia. 4. Interval progression of multiple nodular foci of consolidative opacity throughout both lungs, a few of which are cavitary in appearance. These nodules are most likely infectious or inflammatory in etiology, and septic emboli are a consideration.   Given patient's history of cervical cancer, metastatic disease is on the differential, though considered less likely. 5. New nonspecific moderate right hydronephrosis, without demonstrable cause. However, there is underlying wall thickening and enhancement of the urinary bladder. This combination of findings could represent a cystitis in the setting of urinary tract infection with resultant pyelitis and hydronephrosis. However, given patient history of cervical cancer, locoregional spread of tumor with resultant obstruction of the distal right ureter may be a cause of the patient's right hydronephrosis. 6. Interval development of new irregularity of the cervix and nonspecific endometrial thickening in comparison with the prior study of 1/28/2019. This likely represents the patient's known underlying cervical cancer causing obstruction of the endometrial with resultant endometrial thickening. This could be further evaluated with a follow-up pelvic ultrasound. 7. Stable mild bilateral pelvic chain lymphadenopathy, right greater than left, possibly representing metastatic disease. Ir Port Placement > 5 Years    Result Date: 2/19/2019  PROCEDURE: ULTRASOUND GUIDED VASCULAR ACCESS. FLUOROSCOPY GUIDED PLACEMENT OF A RIGHT IJ SUBCUTANEOUS PORT-A-CATH. MODERATE CONSCIOUS SEDATION 2/19/2019. HISTORY: ORDERING SYSTEM PROVIDED HISTORY: cervical cancer to get chemo TECHNOLOGIST PROVIDED HISTORY: Reason for exam:->cervical cancer to get chemo How many lumens are being requested?->1 What site is the preferred site? ->No preference What side should this line be placed? ->Either 72-year-old female with cervical cancer, presents for chest port placement. SEDATION: Moderate sedation was administered under my supervision by a qualified nurse. 4 mg of Versed was administered intravenously. Approximate intra procedural sedation time was 23 minutes. FLUOROSCOPY DOSE AND TYPE OR TIME AND EXPOSURES: 54 seconds of fluoroscopy with 2 exposures.  TECHNIQUE: Informed consent was obtained after a detailed explanation of the procedure including risks, benefits, and alternatives. Universal protocol was observed. The right neck and chest were prepped and draped in sterile fashion using maximum sterile barrier technique. Local anesthesia was achieved with lidocaine. A micropuncture needle was used to access the right internal jugular vein using ultrasound guidance. An ultrasound image demonstrating patency of the vein with needle tip located within it. An image was obtained and stored in PACs. A 0.035 guidewire was used to place a peel-away sheath. A chest wall incision was made and a subcutaneous pocket was created. The port reservoir was placed within the pocket and the port tubing was attached and tunneled subcutaneously to the venotomy site. The port tubing was cut to an appropriate length and advanced through the peel-away sheath under fluoroscopic guidance to the cavo-atrial junction. The chest wall incision was closed using 3-0 and 4-0 Vicryl suture and tissue adhesive was applied to the venotomy site and chest wall incision. The port flushed easily and there was a good blood return. The port was locked with heparinized saline. The patient tolerated the procedure well and there were no immediate complications. FINDINGS: Fluoroscopic image demonstrates the tip of the port in the right atrium. 1. Successful ultrasound and fluoroscopy guided Port-A-Cath placement via right IJ access, as described above. 2.  The port was left accessed for immediate use. Fl Retrograde Pyelogram Right    Result Date: 2/17/2019  EXAMINATION: SPOT FLUOROSCOPIC IMAGES 2/17/2019 6:52 am TECHNIQUE: Fluoroscopy was provided by the radiology department for procedure. Radiologist was not present during examination. FLUOROSCOPY DOSE AND TYPE OR TIME AND EXPOSURES: 3 seconds of fluoroscopy was utilized for purposes of intraoperative localization. 1 fluoroscopic spot image was obtained.  COMPARISON: None HISTORY: Intraprocedural imaging. FINDINGS: 1 spot images of the abdomen was obtained. Intraprocedural fluoroscopic spot images as above. See separate procedure report for more information. Ir Guided Ureteral Stent Place W Nephro Cath New Access    Result Date: 2/18/2019  PROCEDURE: IR ULTRASOUND AND FLUOROSCOPIC GUIDED RIGHT PERCUTANEOUS NEPHROSTOMY AND NEPHROSTOGRAM IR ANTEGRADE RIGHT URETERAL STENT. MODERATE CONSCIOUS SEDATION 2/18/2019 HISTORY: ORDERING SYSTEM PROVIDED HISTORY: needs R sided nephrostomy tube placed. maria guadalupe could not do via cysto. Adi said that IR would do on monday TECHNOLOGIST PROVIDED HISTORY: Reason for exam:->needs R sided nephrostomy tube placed. maria guadalupe could not do via cysto. Adi said that IR would do on monday COMPARISON: Abdomen pelvic CT 02/15/2019. CONTRAST: 30 cc, nonvascular within collecting system only. . SEDATION: Intravenous sedation was administered with continuous monitoring throughout the procedure. In measured doses, a total of 6 mg intravenous Versed and 275 mcg intravenous fentanyl was administered. My total procedure time with sedation was 26 minutes. FLUOROSCOPY DOSE AND TYPE OR TIME AND EXPOSURES: 3.5 minutes, 7 digital imaging sequences. DESCRIPTION OF PROCEDURE: Informed consent was obtained after a detailed explanation of the procedure including risks, benefits, and alternatives. Universal protocol was observed. TECHNIQUE: Informed consent was previously obtained. In the semi prone position, an appropriate area posterior lateral aspect of the skin overlying the targeted collecting system was demarcated, sterilely prepared draped and anesthetized. Utilizing ultrasound, anatomy from prior CT scan and other imaging, the needle trajectory and depth was predetermined. The micro puncture needle was advanced using fluoroscopic and ultrasound guidance into the collecting system without difficulty.  Once urine was aspirated, a small platinum tip wire was advanced into the collecting system and into the proximal ureter. The tract was dilated. With wire exchange, a 6 French sheath was placed. The collecting system is moderately dilated and the ureter is somewhat tortuous. A glide wire was easily advanced down the ureter. Because of this, a 5 French peel-away sheath was advanced into the proximal ureter over the wire. The glide wire was then advanced with a steerable catheter into the urinary bladder without difficulty and only mild discomfort. There is no contrast extravasation. Once the catheter was placed into the urinary bladder, contrast injection is seen diluted within the somewhat distended urinary bladder. The catheter was used to place a Super Stiff wire for ureteral stent placement. A 26 cm 8 French ureteral stent was then advanced, such that the distal pigtail is well within the urinary bladder. The proximal pigtail was placed in the lower portion of the left renal pelvis. Again there is no contrast extravasation. No filling defect to indicate clot or bleeding was observed on early or later contrast imaging. Following this, dense contrast was injected showing normal expected filling of the ureteral stent and exiting from the distal pigtail directly into the urinary bladder. Following this, the internalized stent was disengaged. The proximal collecting system was then decompressed. The proximal nephrostomy access was then removed entirely, no external drainage necessary at this point. The patient tolerated the procedure well. A sterile bandage was placed over the small incision. From this point forward, the ureteral stent can be exchanged as needed from below. Successful right percutaneous nephrostomy with antegrade pyelogram. High-grade distal ureteral obstruction with severe hydronephrosis, successfully crossed as above. Successful 8 French internalized right ureteral stent placement as above.          Discharge Exam:  BP 95/60   Pulse 99   Temp 97.6 °F (36.4 °C) (Oral)   Resp 16   Ht 5' 6.5\" (1.689 m)   Wt 193 lb 3.2 oz (87.6 kg)   SpO2 93%   BMI 30.72 kg/m²   General appearance: alert, appears stated age and cooperative  Head: Normocephalic, without obvious abnormality, atraumatic  Lungs: crackles bilat  Heart: regular rate and rhythm, S1, S2 normal, no murmur, click, rub or gallop  Abdomen: soft, non-tender; bowel sounds normal; no masses,  no organomegaly  Extremities: extremities normal, atraumatic, no cyanosis or edema  Pulses: 2+ and symmetric    Disposition: home    Condition: stable    Discharge Medications: Jason Saini   Home Medication Instructions SFX:[id number]    Printed on:02/28/19 1627   Medication Information                      baclofen (LIORESAL) 20 MG tablet  Take 20 mg by mouth 2 times daily              DULoxetine (CYMBALTA) 60 MG extended release capsule  Take 60 mg by mouth daily             gabapentin (NEURONTIN) 300 MG capsule  Take 300 mg by mouth 3 times daily. Anthony Garner ibuprofen (ADVIL;MOTRIN) 800 MG tablet  Take 1 tablet by mouth every 8 hours as needed for Pain or Fever             linaclotide (LINZESS) 145 MCG capsule  Take 1 capsule by mouth every morning (before breakfast)             morphine (MS CONTIN) 30 MG extended release tablet  Take 1 tablet by mouth every 12 hours for 30 days. .             norethindrone (AYGESTIN) 5 MG tablet  Take 1 tablet by mouth every 12 hours for 21 days             ondansetron (ZOFRAN ODT) 4 MG disintegrating tablet  Take 1 tablet by mouth every 8 hours as needed for Nausea             oxyCODONE (OXY-IR) 15 MG immediate release tablet  Take 1 tablet by mouth every 3 hours as needed for Pain for up to 14 days. .             promethazine (PHENERGAN) 25 MG tablet  Take 25 mg by mouth every 6 hours as needed              promethazine (PHENERGAN) 25 MG tablet  Take 1 tablet by mouth every 6 hours as needed for Nausea                 Allergies:   Allergies   Allergen Reactions    Food Hives     Raw spinach.  Spinach        Follow up Instructions: Follow-up with PCP: Lazarus Laura, MD in 2 wk .       Total time spent on day of discharge including face-to-face visit, examination, documentation, counseling, preparation of discharge plans and followup, and discharge medicine reconciliation and presciptions is 40 minutes    Signed:  Uri Mas MD  2/28/2019

## 2019-03-01 NOTE — PROGRESS NOTES
Patient c/o nausea ,stating Zofran ineffective for her. Order received,patient medicated with Phenergan IV per order.

## 2019-03-01 NOTE — PROGRESS NOTES
Bedside report received,assessment complete. Patient alert with c/o lower back pain. Medicated with Dilaudid IV per order. VSS. Afebrile.

## 2019-03-01 NOTE — PROGRESS NOTES
Reassessment completed, see doc flowsheets. Afebrile, gown and pillowcase wet from patient sweating. Gown and linens changed. Voiding per BSC. SR on monitor.

## 2019-03-01 NOTE — CONSULTS
Infectious Diseases   Consult Note        Admission Date: 2/28/2019  Hospital Day: Hospital Day: 2  Attending: Hoang Bocanegra MD  Date of service: 3/1/19    Presenting complaint:   Chief Complaint   Patient presents with    Fever     stage 4 cervical ca with mets to bladder and lungs- stent to kidney last week, with fever and pain today. Sees Dr. Tomas Plunkett. Reason for admission: Sepsis ([facility] [address] [A41.9]  Sepsis ([address] [A41.9]    Chief complaint/ Reason for consult:   Septic shock    Problems addressed today:       Patient Active Problem List   Diagnosis Code    Stridor R06.1    Acute bronchitis J20.9    Fibromyalgia M79.7    Bradycardia R00.1    Chest pain R07.9    GERD (gastroesophageal reflux disease) K21.9    Vaginal bleeding N93.9    History of juvenile rheumatoid arthritis Z87.39    SLE (systemic lupus erythematosus) ([facility]) M32.9    Lupus anticoagulant positive R76.0    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, antepartum ([facility]) O99.019, D61.9    Cervical cancer ([facility]) C53.9    Multifocal pneumonia J18.9    Pulmonary nodule R91.1    Hydronephrosis N13.30    Ground glass opacity present on imaging of lung R91.8    Lung nodules R91.8    Lymphadenopathy R59.1    Constipation due to opioid therapy K59.03, T40.2X5A    Sepsis ([facility]) A41.9    Septic shock ([facility]) A41.9, R65.21    Septicemia ([address] A41.9    History of cancer Z85.9    Status post cystoscopy Z98.890    Immunocompromised ([facility] [address] Q39.1    Complicated UTI (urinary tract infection) N39.0    Anemia D64.9    Seizure disorder ([facility]) G40.909    Class 1 obesity due to excess calories with body mass index (BMI) of 31.0 to 31.9 in adult E66.09, Z68.31             Microbiology:        I have reviewed allavailable micro lab data and cultures    · Blood culture (2/2) - collected on 2/28/2019: In process  · Urine culture  - collected on 2/28/2019:  In process        Assessment:     The patient is a 42 y.o. old female who  has a past medical history of Endometriosis; Fibromyalgia; GERD (gastroesophageal reflux disease); Hip fracture ([address] Hypoglycemia; Lupus; Neuropathy; Ovarian cyst; and Seizures ([address] with following problems:    · Septic shock with fever hypotension and lactic acidosis and leukocytosis - septic, hypotension improving  · Recently diagnosed cervical cancer with metastasis to lungs and urinary bladder  · History of cystoscopy and right ureteral stent placement for hydronephrosis 2 weeks ago  · Immunocompromised on chemotherapy with Taxol and cisplatin  · Complicated UTI  · Multifocal pneumonia versus lung metastasis  · Anemia due to vaginal bleeding  · Lupus  · Seizure disorder  · GERD  · Obesity Class 1 due to excess calorie intake : Body mass index is 31.8 kg/m². Discussion:      The patient has a high fever 101.5 on admission with WBC count of 18,000. She is hypotensive with serum lactate level of 2.5    I reviewed the images a CT scan of her chest abdomen and pelvis from this admission and compared them with images a CT scan of the chest from a January 28 of this year. She does have more irregular nodular shadows in both lungs with a new infiltrate in the right middle lobe/lower lobe area concerning for possible infectious process given the presentation. Urinalysis suggestive of a possible UTI.   Blood cultures and urine cultures are in process    Plan:     Diagnostic Workup:    · Will order pro-calcitonin level  · Agree with blood cultures and urine cultures  · Agree with sputum culture  · Will order urine Legionella and pneumococcal antigen  · Urine histoplasma antigen, serum cryptococcal antigen, Coccidioides antigen, fungal serologies  · Follow-up on respiratory film array viral PCR panel  · Continue to follow fever curve, WBC count and blood cultures  · If she does not improve, may need to consider a diagnostic bronchoscopy  · Follow up on liverand renal functions closely    Antimicrobials:    · Agree with IV vancomycin  Check Vancomycin trough before the 5th dose. Target vancomycin trough of around 15. Keep vancomycin trough below 20 at all times. Avoid increasing the dose of vancomycin above a total of 4 grams in a 24-hour period in patients younger than 45 years and above 3 grams in a 24-hour period in a patient of age 39 years or older. Continue to monitor serum creatinine and Vanco levels closely, while the patient is on I/v Vancomycin. · Will stop IV cefepime  · Will order IV meropenem 1 g every 8 hours  · Will follow-up on her culture results and will make further changes in the antibiotics accordingly  · Aspiration precautions  · Cough and deep breathing exercises  · DVT prophylaxis  · Discussed the above plan with patient and RN   · Discussed all above with patient's family at bedside    Drug Monitoring:    · Continue serial monitoring for antibiotic toxicity as follows: Vanco trough, CMP  · Continue to watch for following: new or worsening fever, hypotension, hives, lip swelling and redness or purulence at vascular access sites. I/v access Management:    · Continue to monitor i.v access sites for erythema, induration, discharge or tenderness. · As always, continue efforts to minimizetubes/lines/drains as clinically appropriate to reduce chances of line associated infections. Patient education and counseling:      · The patient was educated in detailabout the side-effects of various antibiotics and things to watch for like new rashes, lip swelling, severe reaction, worsening diarrhea, break through fever etc.  · Discussed patient'scondition and what to expect. All of the patient's questions were addressed in a satisfactory manner and patient verbalized understanding all instructions. Thank you for involving me in the care of your patient. I will continue to follow. If you have anyadditional questions, please do not hesitate to contact me.     Risk of Complications/Morbidity: High     · Illness(es)/ Infection present that pose threat to life/bodily function. · There is potential for severe exacerbation of infection/side effects of treatment. · Therapy requires intensive monitoring for antimicrobial agent toxicity. Subjective:       HPI: Hazel Marrero is a 42 y.o. female patient, who was seen at the request of Dr. Milton Urias MD.    History was obtained from chart review and the patient. The patient was admitted on 2/28/2019. I have been consulted to see the patient for above mentioned reason(s). The patient has multiple medical comorbidities, and presented to the ER for fever, abdominal pain and back pain. The patient apparently has been recently discharged from the hospital after 12 day hospital stay for pneumonia. She was recently diagnosed with cervical cancer and metastasis to lungs and bladder. She underwent cystoscopy on 2/17/19 which was followed by a right percutaneous nephrostomy and anterograde ureteral stent placement on 2/18/19. The patient apparently received IV vancomycin and IV cefepime from 2/15/19 to 2/20/2019 and then received IV cefepime from 2/24/19 to 2/27/19. Looks like her blood cultures and urine cultures throughout the last month have stayed negative. ID service has not been involved in her care in the past.      In the ER, she was noted to be febrile with fever of 101.5, WBC count of 18,000. She was hypotensive and was admitted to the medical ICU. She has been apparently be started on IV vancomycin and IV cefepime    I have been asked to see the patient for this complicated infection. Past Medical History: All past medical history reviewed today.     Past Medical History:   Diagnosis Date    Endometriosis     Fibromyalgia     GERD (gastroesophageal reflux disease)     Hip fracture (H[facility])     LEFT 2002    Hypoglycemia     Lupus     Neuropathy     Ovarian cyst     Seizures ([facility])          Past Surgical History: All pastsurgical history was cough and shortness of breath. Negative for apnea, choking, chest tightness and stridor. Cardiovascular: Negative for chest pain and palpitations. Gastrointestinal: Negative for abdominal pain, blood in stool, diarrhea and nausea. Endocrine: Negative for polydipsia, polyphagia and polyuria. Genitourinary: Positive for dysuria. Negative for difficulty urinating, frequency, hematuria, menstrual problem and vaginal discharge. Musculoskeletal: Negative for arthralgias, joint swelling, myalgias and neck stiffness. Skin: Negative for color change and rash. Allergic/Immunologic: Negative for immunocompromised state. Neurological: Negative for dizziness, seizures, speech difficulty, light-headedness and headaches. Hematological: Negative for adenopathy. Psychiatric/Behavioral: Negative for agitation, hallucinations and suicidal ideas. Objective:       PHYSICAL EXAM:      Vitals:   Vitals:    03/01/19 0500 03/01/19 0504 03/01/19 0600 03/01/19 0815   BP: (!) 99/57 (!) 99/57 (!) 100/55    Pulse: 84 83 85    Resp: 16 17 14 15   Temp:       TempSrc:       SpO2: 96% 96% 98% 100%   Weight:       Height:           Physical Exam   Constitutional: She is oriented to person, place, and time. She appears well-developed. No distress. The patient appears tired   HENT:   Head: Normocephalic. Right Ear: External ear normal.   Left Ear: External ear normal.   Nose: Nose normal.   Mouth/Throat: Oropharynx is clear and moist.   Eyes: Pupils are equal, round, and reactive to light. Conjunctivae are normal. Right eye exhibits no discharge. Left eye exhibits no discharge. No scleral icterus. Neck: Normal range of motion. Neck supple. Cardiovascular: Normal rate and regular rhythm. Exam reveals no friction rub. No murmur heard. Pulmonary/Chest: Effort normal. No stridor. She has no wheezes. She has rales (bilateral patchy crackles). She exhibits no tenderness. Abdominal: Soft. She exhibits no mass.  There is no tenderness. There is no rebound and no guarding. Musculoskeletal: She exhibits no edema or tenderness. Lymphadenopathy:     She has no cervical adenopathy. Neurological: She is alert and oriented to person, place, and time. She exhibits normal muscle tone. Skin: Skin is warm and dry. No rash noted. She is not diaphoretic. No erythema. Psychiatric: She has a normal mood and affect. Judgment normal.   Nursing note and vitals reviewed. Lines: All vascular access sites are healthy with no local erythema, discharge or tenderness. Intake and output:     I/O last 3 completed shifts: In: 2979 [I.V.:700; IV Piggyback:2279]  Out: 400 [Urine:400]    Lab Data:   All available labs were reviewed by me today. CBC:   Recent Labs      02/27/19   0546  02/28/19 1913 03/01/19   0506   WBC  7.8  18.0*  16.0*   RBC  3.45*  3.62*  3.08*   HGB  8.9*  9.2*  7.9*   HCT  27.8*  29.6*  24.9*   PLT  251  316  249   MCV  80.6  81.7  81.0   MCH  25.8*  25.2*  25.6*   MCHC  32.1  30.9*  31.5   RDW  20.5*  20.4*  20.5*   NRBC   --    --   2*   BANDSPCT  10*  18*  10*        BMP:  Recent Labs      02/27/19   0546  02/28/19 1913 03/01/19   0506   NA  139  138  142   K  3.8  3.7  3.4*   CL  101  97*  106   CO2  27  26  25   BUN  10  8  6*   CREATININE  <0.5*  0.6  <0.5*   CALCIUM  9.0  9.0  8.1*   GLUCOSE  127*  163*  142*        Hepatic FunctionPanel:   Lab Results   Component Value Date    ALKPHOS 130 02/28/2019    ALT 31 02/28/2019    AST 27 02/28/2019    PROT 6.5 02/28/2019    PROT 6.3 04/18/2012    BILITOT <0.2 02/28/2019    BILIDIR <0.2 02/18/2019    IBILI see below 02/18/2019    LABALBU 3.5 02/28/2019       CPK:   Lab Results   Component Value Date    CKTOTAL 185 12/18/2014     ESR:   Lab Results   Component Value Date    SEDRATE 27 (H) 12/05/2018     CRP:   Lab Results   Component Value Date    CRP 4.7 12/05/2018         Imaging:     All pertinent images and reports for the current visit were reviewed by meduring this visit. CT CHEST ABDOMEN PELVIS W CONTRAST   Final Result   Diffuse multifocal airspace disease is again noted with slight interval   improvement of more focal areas of consolidation at the left base. Findings   compatible with multifocal pneumonia. Malignancy, metastatic disease is   within the differential though considered less likely. Right ureteral stent is in place with persistent mild dilation of the renal   collecting system. Wall thickening of the bladder is noted which is improved compared to the   prior study. Mild stranding is noted in the perivesicular fat. The cervix is somewhat irregular in its appearance with wall thickening of   the vagina noted. Endometrial fluid noted on the prior study is no longer   identified. In a patient with a reported history of cervical cancer malignancy is within   the differential.  If the patient has had radiation therapy findings may also   represent associated inflammatory changes. Nonspecific adenopathy within the pelvis may be reactive in nature or related   to underlying malignancy. Recommend continued follow-up. XR CHEST PORTABLE   Final Result   Patchy consolidation within the lungs, especially the right perihilar region,   mildly increased when compared to the previous exam.  Given the relatively   rapid increase, pneumonia would be primarily considered. VL Extremity Venous Bilateral    (Results Pending)       Outside records:    Labs, Microbiology, Radiology and pertinent results from Care everywhere, if available, were reviewed as a part ofthe consultation. Known drug Allergies: All allergies were reviewed and updated    Allergies   Allergen Reactions    Food Hives     Raw spinach.  Spinach          Please note that this chart was generated using Dragon dictation software.  Although every effort was made to ensure the accuracy of this automated transcription, some errors in transcription may have occurred inadvertently. If you may need any clarification, please do not hesitate to contact me through EPIC or at the phone number provided below with my electronic signature.       Moses Alonso MD, MPH  3/1/2019, 10:48 AM  [facility]   Office: [phone]  Fax: [phone]  Tuesday AM clinic:   [address] [address]  Thursday AM clinic: [address] [place]

## 2019-03-01 NOTE — CONSULTS
P Pulmonary and Critical Care   Consult Note      Reason for Consult: pneumonia  Requesting Physician: Dr. Mi Granados:   [facility]:    Chief Complaint   Patient presents with    Fever     stage 4 cervical ca with mets to bladder and lungs- stent to kidney last week, with fever and pain today. Sees Dr. Lary Reyes. The patient is a 42 y.o. female with significant past medical history of:      Diagnosis Date    Endometriosis     Fibromyalgia     GERD (gastroesophageal reflux disease)     Hip fracture (HCC)     LEFT     Hypoglycemia     Lupus     Neuropathy     Ovarian cyst     Seizures ([address]      Who presented after recent discharge with recurrent fever. She was hospitalized for almost 2 weeks and discharged 48h ago  She did have urinary stent placement  She denies any cough, sputum production, URI symptoms  She does complain of left lower abdomin pain  She denies any chest pain, SOB or hemoptysis  She has been on 2L nc since her last admission and now on 4L nc      Past Surgical History:        Procedure Laterality Date    BRONCHOSCOPY  10/16/14    Urgent intubation, direct laryngoscopy, subglottic tracheoscopy    BRONCHOSCOPY  10/18/2014    WITH EXTUBATION IN OR AND LARYNGOSCOPY     SECTION      x 3, , 2006, 2008    CHOLECYSTECTOMY  2012    CYSTOSCOPY Right 2019    CYSTOSCOPY performed by Kris Aldana MD at [address] [place] [address]  2019    aborted planned procedure of BTL and uterine ablation.      Current Medications:    Current Facility-Administered Medications: vancomycin (VANCOCIN) 1,500 mg in dextrose 5 % 250 mL IVPB, 1,500 mg, Intravenous, Q12H  baclofen (LIORESAL) tablet 20 mg, 20 mg, Oral, BID  DULoxetine (CYMBALTA) extended release capsule 60 mg, 60 mg, Oral, Daily  gabapentin (NEURONTIN) capsule 300 mg, 300 mg, Oral, TID  sodium chloride flush 0.9 % injection 10 mL, 10 mL, Intravenous, 2 times per day  sodium chloride flush 0.9 % injection 10 mL, 10 mL, Intravenous, PRN  magnesium hydroxide (MILK OF MAGNESIA) 400 MG/5ML suspension 30 mL, 30 mL, Oral, Daily PRN  ondansetron (ZOFRAN) injection 4 mg, 4 mg, Intravenous, Q6H PRN  enoxaparin (LOVENOX) injection 40 mg, 40 mg, Subcutaneous, Daily  0.9 % sodium chloride infusion, , Intravenous, Continuous  potassium chloride 10 mEq/100 mL IVPB (Peripheral Line), 10 mEq, Intravenous, PRN  magnesium sulfate 1 g in dextrose 5% 100 mL IVPB, 1 g, Intravenous, PRN  famotidine (PEPCID) tablet 20 mg, 20 mg, Oral, BID  acetaminophen (TYLENOL) tablet 650 mg, 650 mg, Oral, Q4H PRN  ipratropium-albuterol (DUONEB) nebulizer solution 1 ampule, 1 ampule, Inhalation, 4x daily  guaiFENesin (MUCINEX) extended release tablet 600 mg, 600 mg, Oral, BID  cefepime (MAXIPIME) 2 g IVPB minibag, 2 g, Intravenous, Q12H  ketorolac (TORADOL) injection 30 mg, 30 mg, Intravenous, Q6H PRN  HYDROmorphone HCl PF (DILAUDID) injection 0.5 mg, 0.5 mg, Intravenous, Q4H PRN    Allergies   Allergen Reactions    Food Hives     Raw spinach.  Spinach        Social History:    TOBACCO:   reports that she quit smoking about 17 years ago. Her smoking use included Cigarettes. She started smoking about 21 years ago. She has a 0.75 pack-year smoking history. She has never used smokeless tobacco.  ETOH:   reports that she does not drink alcohol. Patient currently lives independently    Family History:   Family History   Problem Relation Age of Onset    Diabetes Mother     Crohn's Disease Sister     Hypertension Maternal Grandmother     Heart Failure Maternal Grandfather     Stroke Maternal Grandfather     Diabetes Paternal Grandmother     Asthma Paternal Grandfather      REVIEW OF SYSTEMS:    CONSTITUTIONAL:  negative for fevers, chills, diaphoresis, activity change, appetite change, fatigue, night sweats and unexpected weight change.    EYES:  negative for blurred vision, eye discharge, visual disturbance and icterus  HEENT:  negative for hearing loss, tinnitus, ear drainage, sinus pressure, nasal congestion, epistaxis and snoring  RESPIRATORY:  See HPI  CARDIOVASCULAR:  negative for chest pain, palpitations, exertional chest pressure/discomfort, edema, syncope  GASTROINTESTINAL:  negative for nausea, vomiting, diarrhea, constipation, blood in stool and abdominal pain  GENITOURINARY:  negative for frequency, dysuria, urinary incontinence, decreased urine volume, and hematuria  HEMATOLOGIC/LYMPHATIC:  negative for easy bruising, bleeding and lymphadenopathy  ALLERGIC/IMMUNOLOGIC:  negative for recurrent infections, angioedema, anaphylaxis and drug reactions  ENDOCRINE:  negative for weight changes and diabetic symptoms including polyuria, polydipsia and polyphagia  MUSCULOSKELETAL:  negative for  pain, joint swelling, decreased range of motion and muscle weakness  NEUROLOGICAL:  negative for headaches, slurred speech, unilateral weakness  PSYCHIATRIC/BEHAVIORAL: negative for hallucinations, behavioral problems, confusion and agitation. Objective:   PHYSICAL EXAM:      VITALS:  BP (!) 100/55   Pulse 85   Temp 98.2 °F (36.8 °C) (Temporal)   Resp 15   Ht 5' 6\" (1.676 m)   Wt 197 lb (89.4 kg)   SpO2 100%   BMI 31.80 kg/m²      24HR INTAKE/OUTPUT:    Intake/Output Summary (Last 24 hours) at 03/01/19 0851  Last data filed at 03/01/19 0554   Gross per 24 hour   Intake             2979 ml   Output              400 ml   Net             2579 ml     CONSTITUTIONAL:  awake, alert, cooperative, no apparent distress, and appears stated age  NECK:  Supple, symmetrical, trachea midline, no adenopathy, thyroid symmetric, not enlarged and no tenderness, skin normal  LUNGS: clear to auscultation. No accessory muscle use  CARDIOVASCULAR: S1 and S2, no edema and no JVD  ABDOMEN:  normal bowel sounds, non-distended and no masses palpated, and no tenderness to palpation. No hepatospleenomegaly  LYMPHADENOPATHY:  no axillary or supraclavicular adenopathy.  No cervical adnenopathy  PSYCHIATRIC: Oriented to person place and time. No obvious depression or anxiety. MUSCULOSKELETAL: No obvious misalignment or effusion of the joints. No clubbing, cyanosis of the digits. SKIN:  normal skin color, texture, turgor and no redness, warmth, or swelling. No palpable nodules    DATA:    Old records have been reviewed    CBC:  Recent Labs      02/27/19   0546  02/28/19 1913 03/01/19   0506   WBC  7.8  18.0*  16.0*   RBC  3.45*  3.62*  3.08*   HGB  8.9*  9.2*  7.9*   HCT  27.8*  29.6*  24.9*   PLT  251  316  249   MCV  80.6  81.7  81.0   MCH  25.8*  25.2*  25.6*   MCHC  32.1  30.9*  31.5   RDW  20.5*  20.4*  20.5*   NRBC   --    --   2*   BANDSPCT  10*  18*  10*      BMP:  Recent Labs      02/27/19   0546  02/28/19 1913 03/01/19   0506   NA  139  138  142   K  3.8  3.7  3.4*   CL  101  97*  106   CO2  27  26  25   BUN  10  8  6*   CREATININE  <0.5*  0.6  <0.5*   CALCIUM  9.0  9.0  8.1*   GLUCOSE  127*  163*  142*      No results found for: BNP  Lab Results   Component Value Date    CKTOTAL 185 12/18/2014    TROPONINI 0.02 (H) 02/15/2019       Radiology Review:  All pertinent images / reports were reviewed as a part of this visit. Assessment:   1. Acute on chronic hypoxic respiratory failure  2. Metastatic cervical ca to lungs  3.  Febrile illness    Plan:   - I reviewed her imaging  - she does have more of nodular lung changes concerning for metastatic disease  - she does have evidence of UTI as well  - ID service was consulted and will discuss the need for bronchoscopy   - continue current abx  - order LE doppler  - continue O2, wean for sat> 90%  - OOB, IS  - OK to transfer out of ICU service

## 2019-03-01 NOTE — PROGRESS NOTES
Shift assessment completed. VSS. Denies needs at this time. Call light in reach. Independently ambulating to bathroom to change gown at this time.

## 2019-03-01 NOTE — ED NOTES
Pt is on a continuous pulse oximetry and telemetry monitoring. Pt continued on cycling blood pressure. Fall risk precautions in place, call light in reach, bed side table within reach,  will continue to monitor.            Peter Daniels RN  02/28/19 0259

## 2019-03-01 NOTE — CONSULTS
section; Cholecystectomy (2012); bronchoscopy (10/16/14); bronchoscopy (10/18/2014); Cystoscopy (Right, 2/17/2019); and laparoscopy (02/2019). Allergies: Allergies   Allergen Reactions    Food Hives     Raw spinach.  Spinach        Social History:  She reports that she quit smoking about 17 years ago. Her smoking use included Cigarettes. She started smoking about 21 years ago. She has a 0.75 pack-year smoking history. She has never used smokeless tobacco. She reports that she does not drink alcohol or use drugs. Family History:  family history includes Asthma in her paternal grandfather; Crohn's Disease in her sister; Diabetes in her mother and paternal grandmother; Heart Failure in her maternal grandfather; Hypertension in her maternal grandmother; Stroke in her maternal grandfather.     Medications:   Scheduled Meds:   vancomycin  1,500 mg Intravenous Q12H    meropenem  1 g Intravenous Q8H    acetaminophen  500 mg Oral Q6H    morphine  30 mg Oral 2 times per day    phenazopyridine  200 mg Oral TID WC    [START ON 3/2/2019] linaclotide  145 mcg Oral QAM AC    baclofen  20 mg Oral BID    DULoxetine  60 mg Oral Daily    gabapentin  300 mg Oral TID    sodium chloride flush  10 mL Intravenous 2 times per day    enoxaparin  40 mg Subcutaneous Daily    famotidine  20 mg Oral BID    ipratropium-albuterol  1 ampule Inhalation 4x daily    guaiFENesin  600 mg Oral BID     Continuous Infusions:   sodium chloride 125 mL/hr at 03/01/19 0802     PRN Meds:promethazine, HYDROmorphone, magic (miracle) mouthwash with nystatin, ketorolac, oxyCODONE, ibuprofen, ondansetron, promethazine, sodium chloride flush, magnesium hydroxide, ondansetron, potassium chloride, magnesium sulfate, acetaminophen    Review of Systems:  Constitutional: Negative for fever    Genitourinary: see HPI  Eyes: negative for sudden change in vision  EENT: no complaints  Cardiovascular: Negative for chest pain  Respiratory: Negative for

## 2019-03-01 NOTE — CONSULTS
Oncology Hematology Care   CONSULT NOTE    3/1/2019 3:59 PM    Patient Information: Marsha Pitt   Date of Admit:  2019  Primary Care Physician:  Keyon Byrd MD  Requesting Physician:  Gayle Jennings MD    Reason for consult:   Evaluation and recommendations for cervical cancer    Chief complaint:    Chief Complaint   Patient presents with    Fever     stage 4 cervical ca with mets to bladder and lungs- stent to kidney last week, with fever and pain today. Sees Dr. Henrry Mi. History of Present Illness:  Marsha Pitt is a 42 y.o. female on Gayle Jennings MD service who was admitted on 2019 for pneumonia and UTI. She had a fever of 101.5 upon admission. She was discharged from the hospital on 19. She is a patient of Dr. Jan Lucia with metastatic cervical cancer. She received C1 cisplatin + taxol with neulasta support on 19. She continues to have vaginal bleeding. She had shortness of breath on admission. CT chest, abdomen, and pelvis shows persistent pneumonia but does show some improvement. She reports right flank pain. She c/o burning with urination and hematuria. Past Medical History:     has a past medical history of Endometriosis; Fibromyalgia; GERD (gastroesophageal reflux disease); Hip fracture ([address] Hypoglycemia; Lupus; Neuropathy; Ovarian cyst; and Seizures ([address] Past Surgical History:    Past Surgical History:   Procedure Laterality Date    BRONCHOSCOPY  10/16/14    Urgent intubation, direct laryngoscopy, subglottic tracheoscopy    BRONCHOSCOPY  10/18/2014    WITH EXTUBATION IN OR AND LARYNGOSCOPY     SECTION      x 3, 2005, 2006, 2008    CHOLECYSTECTOMY  2012    CYSTOSCOPY Right 2019    CYSTOSCOPY performed by César Mireles MD at [address] [place] [address]  2019    aborted planned procedure of BTL and uterine ablation.         Current Medications:     vancomycin  1,500 mg Intravenous Q12H    meropenem  1 g Intravenous Q8H    acetaminophen  500 mg Oral Q6H    morphine  30 mg Oral 2 times per day    phenazopyridine  200 mg Oral TID WC    baclofen  20 mg Oral BID    DULoxetine  60 mg Oral Daily    gabapentin  300 mg Oral TID    sodium chloride flush  10 mL Intravenous 2 times per day    enoxaparin  40 mg Subcutaneous Daily    famotidine  20 mg Oral BID    ipratropium-albuterol  1 ampule Inhalation 4x daily    guaiFENesin  600 mg Oral BID       Allergies: Allergies   Allergen Reactions    Food Hives     Raw spinach.  Spinach         Social History:    reports that she quit smoking about 17 years ago. Her smoking use included Cigarettes. She started smoking about 21 years ago. She has a 0.75 pack-year smoking history. She has never used smokeless tobacco. She reports that she does not drink alcohol or use drugs. Family History:     family history includes Asthma in her paternal grandfather; Crohn's Disease in her sister; Diabetes in her mother and paternal grandmother; Heart Failure in her maternal grandfather; Hypertension in her maternal grandmother; Stroke in her maternal grandfather. ROS:      Per HPI; otherwise 10 point ROS is negative    PHYSICAL EXAM:    Vitals:  Vitals:    03/01/19 1533   BP:    Pulse:    Resp:    Temp:    SpO2: 95%        Intake/Output Summary (Last 24 hours) at 03/01/19 1559  Last data filed at 03/01/19 1425   Gross per 24 hour   Intake             5177 ml   Output             2200 ml   Net             2977 ml    Wt Readings from Last 3 Encounters:   03/01/19 197 lb (89.4 kg)   02/20/19 193 lb 3.2 oz (87.6 kg)   02/10/19 190 lb (86.2 kg)        General appearance: Appears comfortable. Well nourished  Eyes: Sclera clear, pupils equal  ENT: Moist mucus membranes, no thrush  Neck: Trachea midline, symmetrical  Cardiovascular: Regular rhythm, normal S1, S2. No murmur, gallop, rub. No edema in  lower extremities  Respiratory: Clear to auscultation bilaterally. No wheeze.  Good inspiratory effort  Gastrointestinal: Abdomen soft, not tender, not distended, normal bowel sounds  Musculoskeletal: No cyanosis in digits, warm extremities  Neurologic: Cranial nerves grossly intact, no motor or speech deficits. Psychiatric: Normal affect. Alert and oriented to time, place and person.   Skin: Warm, dry, normal turgor, no rash    DATA:  CBC: Lab Results   Component Value Date    WBC 16.0 03/01/2019    RBC 3.08 03/01/2019    HGB 7.9 03/01/2019    HCT 24.9 03/01/2019    MCV 81.0 03/01/2019    MCH 25.6 03/01/2019    MCHC 31.5 03/01/2019    RDW 20.5 03/01/2019     03/01/2019    MPV 8.8 03/01/2019     BMP:  Lab Results   Component Value Date     03/01/2019    K 3.4 03/01/2019     03/01/2019    CO2 25 03/01/2019    BUN 6 03/01/2019    CREATININE <0.5 03/01/2019    CALCIUM 8.1 03/01/2019    GFRAA >60 03/01/2019    GFRAA >60 05/22/2013    LABGLOM >60 03/01/2019    GLUCOSE 142 03/01/2019     Magnesium:   Lab Results   Component Value Date    MG 1.60 03/01/2019    MG 1.70 02/26/2019     LIVER PROFILE:   Recent Labs      02/28/19   1913   AST  27   ALT  31   BILITOT  <0.2   ALKPHOS  130*     PT/INR:    Lab Results   Component Value Date    PROTIME 14.3 02/28/2019    PROTIME 12.7 02/18/2019    PROTIME 12.3 01/18/2019    INR 1.25 02/28/2019    INR 1.11 02/18/2019    INR 1.08 01/18/2019       IMPRESSION/RECOMMENDATIONS:    Active Problems:    Fibromyalgia    GERD (gastroesophageal reflux disease)    Vaginal bleeding    History of juvenile rheumatoid arthritis    SLE (systemic lupus erythematosus) (HCC)    Lupus anticoagulant positive    Cervical cancer (HCC)    Lymphadenopathy    Sepsis (HCC)    Septic shock (HCC)    Septicemia (HCC)    History of cancer    Status post cystoscopy    Immunocompromised ([facility]    Complicated UTI (urinary tract infection)    Anemia    Seizure disorder (HCC)    Class 1 obesity due to excess calories with body mass index (BMI) of 31.0 to 31.9 in adult  Resolved Problems:    * No resolved hospital problems. 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 Received first dose of taxol Cisplatin on 2/20/19. Will add avastin when vaginal bleeding stops.      Pneumonia. On IV abx. Pulmonology consulted. UTI. IV abx. Pyridium for dysuria. Had R ureteral stent placed on 2/17/19. Urology consulted. Anemia secondary to blood loss. Iron studies are c/w iron deficiency and anemia of chronic disease. Continue PO iron.     Nausea. Secondary to chemotherapy. Continue antiemetics. Neoplasm related pain. Continue MS contin and oxycodone.      Lovenox for DVT prophylaxis. This plan was discussed with the patient and he/she verbalized understanding. Thank you for allowing us to participate in the care of this patient.      Les Allen [facility]  Oncology Hematology [facility] [place] [address]  [phone]    Patient interviewed and examined with nurse practitioner agree with note above

## 2019-03-01 NOTE — PROGRESS NOTES
Called  to room by patient's sister who took patient's temp,reported to be 101.4,and requested medication. Medicated with Tylenol PO. Discusses Percocet and Tylenol limits and enc'd patient to remove a few blankets and lower the temp in the room to help reduce fever. Patient's sister requesting transfer out of ICU so patient can be seen by her PCP. Dr. Vy Moore aware and wrote transfer order.

## 2019-03-01 NOTE — H&P
epistaxis, hoarseness, sore throat. Respiratory:   Negative for shortness of breath, wheezing  Cardiovascular:   Negative for  chest pain, palpitations   Gastrointestinal:  As above   Genitourinary: as above a   Hematologic/Lymphatic:   Negative for  bleeding tendency, easy bruising  Musculoskeletal:   Negative for myalgias and arthralgias  Neurologic:   Negative for  Confusion, dysarthria. Skin:   Negative for itching,rash  Psychiatric:  Negative for depression, anxiety, agitation. Endocrine:  Negative for polydipsia, polyuria,heat /cold intolerance. Past Medical History:         has a past medical history of Endometriosis; Fibromyalgia; GERD (gastroesophageal reflux disease); Hip fracture ([facility] [address] Hypoglycemia; Lupus; Neuropathy; Ovarian cyst; and Seizures ([facility] [address] Past Surgical History:         has a past surgical history that includes  section; Cholecystectomy (); bronchoscopy (10/16/14); bronchoscopy (10/18/2014); Cystoscopy (Right, 2019); and laparoscopy (2019). Medications:          Current Outpatient Prescriptions on File Prior to Encounter   Medication Sig Dispense Refill    morphine (MS CONTIN) 30 MG extended release tablet Take 1 tablet by mouth every 12 hours for 30 days. . 60 tablet 0    oxyCODONE (OXY-IR) 15 MG immediate release tablet Take 1 tablet by mouth every 3 hours as needed for Pain for up to 14 days. . 112 tablet 0    promethazine (PHENERGAN) 25 MG tablet Take 1 tablet by mouth every 6 hours as needed for Nausea 56 tablet 1    ondansetron (ZOFRAN ODT) 4 MG disintegrating tablet Take 1 tablet by mouth every 8 hours as needed for Nausea 42 tablet 1    linaclotide (LINZESS) 145 MCG capsule Take 1 capsule by mouth every morning (before breakfast) 30 capsule 1    promethazine (PHENERGAN) 25 MG tablet Take 25 mg by mouth every 6 hours as needed   3    ibuprofen (ADVIL;MOTRIN) 800 MG tablet Take 1 tablet by mouth every 8 hours as needed for Pain or Fever 20 tablet 0    norethindrone (AYGESTIN) 5 MG tablet Take 1 tablet by mouth every 12 hours for 21 days 42 tablet 0    DULoxetine (CYMBALTA) 60 MG extended release capsule Take 60 mg by mouth daily      baclofen (LIORESAL) 20 MG tablet Take 20 mg by mouth 2 times daily       gabapentin (NEURONTIN) 300 MG capsule Take 300 mg by mouth 3 times daily. .           Allergies: Allergies   Allergen Reactions    Food Hives     Raw spinach.  Spinach           Social History:   reports that she quit smoking about 17 years ago. Her smoking use included Cigarettes. She started smoking about 21 years ago. She has a 0.75 pack-year smoking history. She has never used smokeless tobacco. She reports that she does not drink alcohol or use drugs. Family History:        Family History   Problem Relation Age of Onset    Diabetes Mother     Crohn's Disease Sister     Hypertension Maternal Grandmother     Heart Failure Maternal Grandfather     Stroke Maternal Grandfather     Diabetes Paternal Grandmother     Asthma Paternal Grandfather            Physical Exam:  BP (!) 100/43   Pulse 114   Temp 101.5 °F (38.6 °C) (Infrared)   Resp 17   Ht 5' 6\" (1.676 m)   Wt 190 lb (86.2 kg)   SpO2 95%   BMI 30.67 kg/m²     General appearance: Appears  comfortable. Dehydrated    Eyes:  Sclera clear, pupils equal  ENT:  Dry  mucus membranes, Trachea midline. Cardiovascular: Regular rhythm, normal S1, S2. No murmur, gallop, rub. No edema in lower extremities   Respiratory:   Noted Crepts @ B/L Bases   Gastrointestinal:  Abdomen soft,  non-tender,  not distended,  normal bowel sounds   Musculoskeletal:  No cyanosis in digits, neck supple  Neurology:  Cranial nerves grossly intact. Alert and oriented in time, place and person. No speech or motor deficits   Psychiatry:  Appropriate affect.  Not agitated  Skin:  Warm, dry, normal turgor, no rash       Labs:     Recent Labs      02/26/19   0600  02/27/19   0546  02/28/19   1913   WBC 7.8  7.8  18.0*   HGB  9.4*  8.9*  9.2*   HCT  29.1*  27.8*  29.6*   PLT  286  251  316     Recent Labs      02/26/19   0600  02/27/19   0546  02/28/19 1913   NA  136  139  138   K  3.8  3.8  3.7   CL  99  101  97*   CO2  26  27  26   BUN  14  10  8   CREATININE  0.5*  <0.5*  0.6   CALCIUM  8.8  9.0  9.0     Recent Labs      02/28/19 1913   AST  27   ALT  31   BILITOT  <0.2   ALKPHOS  130*     Recent Labs      02/28/19 1913   INR  1.25*     No results for input(s): Veronica Henry in the last 72 hours. Urinalysis:      Lab Results   Component Value Date    NITRU POSITIVE 02/28/2019    WBCUA 19 02/28/2019    BACTERIA Rare 02/18/2019    RBCUA >900 02/28/2019    BLOODU LARGE 02/28/2019    SPECGRAV 1.019 02/28/2019    GLUCOSEU Negative 02/28/2019    GLUCOSEU NEGATIVE 04/17/2012         Radiology:     CXR:   I have reviewed the CXR  PNA B/L     EKG/Telemonitor :    I have reviewed the EKG  ST     IMAGING :     CT CHEST ABDOMEN PELVIS W CONTRAST   Final Result   Diffuse multifocal airspace disease is again noted with slight interval   improvement of more focal areas of consolidation at the left base. Findings   compatible with multifocal pneumonia. Malignancy, metastatic disease is   within the differential though considered less likely. Right ureteral stent is in place with persistent mild dilation of the renal   collecting system. Wall thickening of the bladder is noted which is improved compared to the   prior study. Mild stranding is noted in the perivesicular fat. The cervix is somewhat irregular in its appearance with wall thickening of   the vagina noted. Endometrial fluid noted on the prior study is no longer   identified. In a patient with a reported history of cervical cancer malignancy is within   the differential.  If the patient has had radiation therapy findings may also   represent associated inflammatory changes.       Nonspecific adenopathy within the pelvis may be reactive in nature or related   to underlying malignancy. Recommend continued follow-up. XR CHEST PORTABLE   Final Result   Patchy consolidation within the lungs, especially the right perihilar region,   mildly increased when compared to the previous exam.  Given the relatively   rapid increase, pneumonia would be primarily considered. ASSESSMENT / Rachel Nagel 169 Problems    Diagnosis Date Noted    Fibromyalgia [M79.7] 10/08/2014     Priority: Medium    Sepsis ([facility] [address] [A41.9] 02/28/2019    Lymphadenopathy [R59.1]     Cervical cancer ([facility] [address] [C53.9] 02/15/2019    SLE (systemic lupus erythematosus) ([facility] [address] [M32.9] 01/22/2019    Lupus anticoagulant positive [R76.0] 01/22/2019    History of juvenile rheumatoid arthritis [Z87.39] 01/08/2019    Vaginal bleeding [N93.9] 12/21/2018    GERD (gastroesophageal reflux disease) [K21.9] 10/20/2014         · Multifocal PNA , ? HCAP : Send BCx X 2. Culture respiratory to be sent. Encourage Cough and deep breathing. Provide Vibratory PT/Acapella. Encourage IS. => Started Vancomycin & cefepime on admission. Renally adjust dosages if necessary. Pulmonary and ID c/sed   · Suspected complicated UTI , with ureteral stent + : cefepime . F/u Cx   · Recent hydronephrosis ,with right ureteral stent +   · Cervical CA , with lung and Bladder mets ? : started Chemo last admission . Oncology c/sed in AM   · Anemia , d/t uterine Bleeding  + iron def + AOCD : PO iron tabs   · Pain Mgt : prn medications for now but cautiously d/t marginal BP . toradol prn + dilaudid prn   ·       · Home medications for Chronic medical problems reviewed. · Home medications Held/Resumed, and Pertinent changes made in home medications on admission, as needed, according to current medical status, as mentioned above.  Please see EPIC orders for detailed recommendations of plan and medications       · DVT Prophylaxis :  Lovenox  S/q + SCDs   · GI Prophylaxis :  H2RB as per orders · Diet : NPO , d/t labile Resp status       · Code status : Full   · PT/OT and ambulatory Eval Status: Bed est   · Probable LOS & future Disposition planned post discharge  - home in 3-4 days       Medical Decision Making : high     Total patient  Critical  Care time spent in evaluating the patient an discussing plan with appropriate staff/patient/family members is 64 min       Triverpaulo Zendejas MD    2/28/2019 10:34 PM

## 2019-03-01 NOTE — ED PROVIDER NOTES
peripheral pulses: intact and brisk, skin examination: improved no mottling    MDM  The patient appeared toxic and in distress on arrival. We started resuscitation immediately with IV fluids and IV antibiotics. The patient was in severe sepsis based on her elevated lactic acid. Fortunately, she remained hemodynamically stable and did not require pressors or a central line. Her chest x-ray appears to again show pneumonia, the likely cause of her sepsis. However, her urine also shows evidence of a possible UTI. Antibiotics chosen will cover for both. The total Critical Care time is 35 minutes which excludes separately billable procedures. FINAL IMPRESSION  1. Multifocal pneumonia    2. Septicemia ([facility] [address]    3. History of cancer        Blood pressure (!) 110/58, pulse 116, temperature 101.5 °F (38.6 °C), temperature source Infrared, resp. rate 18, height 5' 6\" (1.676 m), weight 190 lb (86.2 kg), SpO2 90 %.      For further details of Dipak Schafer emergency department encounter, please see documentation by advanced practice provider  MARITA Millard.        Ian Morton MD  03/07/19 [id number]

## 2019-03-01 NOTE — CONSULTS
GYNECOLOGIC ONCOLOGY CONSULTATION:     3/1/2019 1:11 PM    REASON FOR CONSULT: Pt and family request for continuity of care with known IVB cervical cancer dx'd 2019    REFERRING PROVIDER:  Patient Care Team:  Zina Raymond MD as PCP - General (Family Medicine)  Jared De La Cruz MD as Consulting Physician (Rheumatology)  PCP: Zina Raymond MD    CHIEF COMPLAINT:     Chief Complaint   Patient presents with    Fever     stage 4 cervical ca with mets to bladder and lungs- stent to kidney last week, with fever and pain today. Sees Dr. Hannah Owens. HISTORY OF PRESENT ILLNESS:     HPI: This is a 42 y.o. female that presents with fever, increasing back pain s/p cis/taxol on 19. States malodorous urine. Known baseline vaginal bleeding. +stool. Appetite per baseline. No history exists. PAST MEDICAL HISTORY:     Past Medical History:   Diagnosis Date    Endometriosis     Fibromyalgia     GERD (gastroesophageal reflux disease)     Hip fracture (HCC)     LEFT     Hypoglycemia     Lupus     Neuropathy     Ovarian cyst     Seizures (HCC)        ROS:   Review of Systems   Constitutional: Positive for fatigue and fever. HENT: Positive for sore throat. Respiratory: Positive for shortness of breath. Cardiovascular: Negative. Gastrointestinal: Negative. Endocrine: Negative. Genitourinary: Positive for difficulty urinating, dysuria, flank pain (right) and vaginal bleeding. Musculoskeletal: Positive for back pain. Skin: Negative. Hematological: Negative. Psychiatric/Behavioral: Negative.         PAST SURGICAL HISTORY:        Past Surgical History:   Procedure Laterality Date    BRONCHOSCOPY  10/16/14    Urgent intubation, direct laryngoscopy, subglottic tracheoscopy    BRONCHOSCOPY  10/18/2014    WITH EXTUBATION IN OR AND LARYNGOSCOPY     SECTION      x 3, 2005, 2006, 2008    CHOLECYSTECTOMY  2012    CYSTOSCOPY Right 2019    CYSTOSCOPY performed by Gilda Pereira MD at [address] [place] [address]  2019    aborted planned procedure of BTL and uterine ablation. OB/GYN HISTORY:   Menstrual History:  OB History      Para Term  AB Living    4 3     1 3    SAB TAB Ectopic Molar Multiple Live Births              3        No LMP recorded. Patient is not currently having periods (Reason: Irregular periods). SOCIAL HISTORY:     Social History     Social History    Marital status:      Spouse name: N/A    Number of children: N/A    Years of education: N/A     Occupational History          Real Estate     Social History Main Topics    Smoking status: Former Smoker     Packs/day: 0.25     Years: 3.00     Types: Cigarettes     Start date:      Quit date: 2001    Smokeless tobacco: Never Used    Alcohol use No    Drug use: No    Sexual activity: Yes     Partners: Male     Other Topics Concern    Not on file     Social History Narrative    No narrative on file     This patient is accompanied in the room by her sibling.     FAMILY HISTORY:     Family History   Problem Relation Age of Onset    Diabetes Mother     Crohn's Disease Sister     Hypertension Maternal Grandmother     Heart Failure Maternal Grandfather     Stroke Maternal Grandfather     Diabetes Paternal Grandmother     Asthma Paternal Grandfather      ALLERGIES:     Allergies as of 2019 - Review Complete 2019   Allergen Reaction Noted    Food Hives 2014    Spinach  10/08/2014       MEDICATIONS:     Current Facility-Administered Medications:     vancomycin (VANCOCIN) 1,500 mg in dextrose 5 % 250 mL IVPB, 1,500 mg, Intravenous, Q12H, Joceline Mccormick MD    promethazine (PHENERGAN) injection 6.25 mg, 6.25 mg, Intravenous, Q6H PRN, Jerel Quiñones MD, 6.25 mg at 19 1011    oxyCODONE-acetaminophen (PERCOCET)  MG per tablet 1 tablet, 1 tablet, Oral, Q4H PRN, Bernard Smith MD, 1 tablet at 19 0943    HYDROmorphone HCl PF (DILAUDID) injection 0.5 mg, 0.5 mg, Intravenous, Q3H PRN, Cielo Jackson MD    meropenem (MERREM) 1 g in sodium chloride 0.9 % 100 mL IVPB (mini-bag), 1 g, Intravenous, Q8H, Richard Montejo MD, Stopped at 03/01/19 1211    baclofen (LIORESAL) tablet 20 mg, 20 mg, Oral, BID, Stella Davison MD, 20 mg at 03/01/19 0801    DULoxetine (CYMBALTA) extended release capsule 60 mg, 60 mg, Oral, Daily, Stella Davison MD, 60 mg at 03/01/19 0801    gabapentin (NEURONTIN) capsule 300 mg, 300 mg, Oral, TID, Stella Davison MD, 300 mg at 03/01/19 0801    sodium chloride flush 0.9 % injection 10 mL, 10 mL, Intravenous, 2 times per day, Drea Man MD, 10 mL at 03/01/19 0802    sodium chloride flush 0.9 % injection 10 mL, 10 mL, Intravenous, PRN, Drea Man MD, 10 mL at 03/01/19 0434    magnesium hydroxide (MILK OF MAGNESIA) 400 MG/5ML suspension 30 mL, 30 mL, Oral, Daily PRN, Drea Man MD    ondansetron (ZOFRAN) injection 4 mg, 4 mg, Intravenous, Q6H PRN, Stella Davison MD, 4 mg at 03/01/19 0841    enoxaparin (LOVENOX) injection 40 mg, 40 mg, Subcutaneous, Daily, Stella Davison MD, 40 mg at 03/01/19 0918    0.9 % sodium chloride infusion, , Intravenous, Continuous, Stella Davison MD, Last Rate: 125 mL/hr at 03/01/19 0802    potassium chloride 10 mEq/100 mL IVPB (Peripheral Line), 10 mEq, Intravenous, PRN, Drea Man MD, Last Rate: 100 mL/hr at 03/01/19 1212, 10 mEq at 03/01/19 1212    magnesium sulfate 1 g in dextrose 5% 100 mL IVPB, 1 g, Intravenous, PRN, Drea Man MD, Stopped at 03/01/19 0939    famotidine (PEPCID) tablet 20 mg, 20 mg, Oral, BID, Stella Davison MD, 20 mg at 03/01/19 0801    acetaminophen (TYLENOL) tablet 650 mg, 650 mg, Oral, Q4H PRN, Stella Davison MD, 650 mg at 03/01/19 1101    ipratropium-albuterol (DUONEB) nebulizer solution 1 ampule, 1 ampule, Inhalation, 4x daily, Drea Man MD, 1 ampule at 03/01/19 0813   guaiFENesin (MUCINEX) extended release tablet 600 mg, 600 mg, Oral, BID, Stella Davison MD, 600 mg at 03/01/19 0801    ketorolac (TORADOL) injection 30 mg, 30 mg, Intravenous, Q6H PRN, Stella Davison MD    PHYSICAL EXAM:       BP (!) 107/55   Pulse 121   Temp 100.5 °F (38.1 °C) (Temporal)   Resp 19   Ht 5' 6\" (1.676 m)   Wt 197 lb (89.4 kg)   SpO2 93%   BMI 31.80 kg/m²   Physical Exam    LABS:      WBC   Date Value Ref Range Status   03/01/2019 16.0 (H) 4.0 - 11.0 K/uL Final     Hemoglobin   Date Value Ref Range Status   03/01/2019 7.9 (L) 12.0 - 16.0 g/dL Final     Hematocrit   Date Value Ref Range Status   03/01/2019 24.9 (L) 36.0 - 48.0 % Final     MCV   Date Value Ref Range Status   03/01/2019 81.0 80.0 - 100.0 fL Final     Platelets   Date Value Ref Range Status   03/01/2019 249 135 - 450 K/uL Final     Glucose   Date Value Ref Range Status   03/01/2019 142 (H) 70 - 99 mg/dL Final     BUN   Date Value Ref Range Status   03/01/2019 6 (L) 7 - 20 mg/dL Final     CREATININE   Date Value Ref Range Status   03/01/2019 <0.5 (L) 0.6 - 1.1 mg/dL Final     Potassium reflex Magnesium   Date Value Ref Range Status   03/01/2019 3.4 (L) 3.5 - 5.1 mmol/L Final     Phosphorus   Date Value Ref Range Status   03/01/2019 3.1 2.5 - 4.9 mg/dL Final     Alkaline Phosphatase   Date Value Ref Range Status   02/28/2019 130 (H) 40 - 129 U/L Final     AST   Date Value Ref Range Status   02/28/2019 27 15 - 37 U/L Final     Magnesium   Date Value Ref Range Status   03/01/2019 1.60 (L) 1.80 - 2.40 mg/dL Final     INR   Date Value Ref Range Status   02/28/2019 1.25 (H) 0.86 - 1.14 Final     Comment:     Effective 11/28/18 at 02:30pm EST    Normal: 0.94 - 1.06  Therapeutic: 2.0 - 3.0  Pros.  Valve: 2.5 - 3.5  AMI: 2.0 - 3.0       Iron   Date Value Ref Range Status   02/26/2019 18 (L) 37 - 145 ug/dL Final     TIBC   Date Value Ref Range Status   02/26/2019 202 (L) 260 - 445 ug/dL Final     Ferritin   Date Value Ref Range Status 02/26/2019 614.1 (H) 15.0 - 150.0 ng/mL Final       IMAGING:     Xr Chest Standard (2 Vw)    Result Date: 2/26/2019  EXAMINATION: TWO VIEWS OF THE CHEST 2/26/2019 11:55 am COMPARISON: Frontal view of the chest 02/24/2019, frontal and lateral views of the chest 02/19/2019, CT thorax 02/25/2019. HISTORY: ORDERING SYSTEM PROVIDED HISTORY: cough TECHNOLOGIST PROVIDED HISTORY: Reason for exam:->cough Ordering Physician Provided Reason for Exam: cough FINDINGS: Support devices: Right IJ chest port again noted with distal tip terminating in the proximal right atrium in good position. The cardiopericardial silhouette is stable in size and configuration. Bilateral patchy airspace opacities are redemonstrated most pronounced in the mid and lower lung zones, these were better detailed on the recent CT of the thorax from 02/25/2019, please refer to that report for additional information. The overall radiographic appearance has mildly increased from the prior radiograph 02/24/2019. There is no pneumothorax or pleural effusion. Surgical clips project in the upper abdomen. Mild interval increase in bilateral patchy airspace opacities better detailed on the CT of the thorax from 02/25/2019. Findings are again most suggestive of an infectious/inflammatory process.      Xr Chest Standard (2 Vw)    Result Date: 2/19/2019  EXAMINATION: TWO VIEWS OF THE CHEST 2/19/2019 4:26 pm COMPARISON: 02/16/2019 HISTORY: ORDERING SYSTEM PROVIDED HISTORY: cough TECHNOLOGIST PROVIDED HISTORY: Reason for exam:->cough Ordering Physician Provided Reason for Exam: Cervical Cancer (Pt was sent over from Dr Pinky Manzano office - states she was sent here from office - was told today that she has stage 3 cervical cancer and was sent here for \"scans and further testing\") Acuity: Unknown Type of Exam: Unknown FINDINGS: There is patchy bilateral airspace disease, which appears increased when compared to the previous exam.  As detected on recent CT PA, some of those have a nodular appearance. The heart mediastinal contours are unremarkable. No pneumothorax. No free air. No acute bony abnormalities. Chin catheter noted. Patchy bilateral airspace disease, concerning for pneumonia, which appears increased when compared to the previous exam.  Again, some of these areas of opacity have a nodular appearance and septic emboli should be considered, especially when given the correlation with the CT PA from 02/15/2019. Process should be followed to resolution. Xr Chest Standard (2 Vw)    Result Date: 2/16/2019  EXAMINATION: TWO VIEWS OF THE CHEST 2/16/2019 11:49 am COMPARISON: 10/16/2014 HISTORY: ORDERING SYSTEM PROVIDED HISTORY: pneumonia TECHNOLOGIST PROVIDED HISTORY: Reason for exam:->pneumonia Ordering Physician Provided Reason for Exam: PNA Acuity: Acute Type of Exam: Initial Relevant Medical/Surgical History: cervical ca FINDINGS: There is patchy bibasilar consolidation, right greater than left, superimposed on a background of diffuse interstitial prominence. Heart size, mediastinal contours and pulmonary vascularity are within normal limits. There is no pleural effusion. Multifocal bilateral pneumonia. Ct Chest W Contrast    Result Date: 2/25/2019  EXAMINATION: CT OF THE CHEST WITH CONTRAST 2/25/2019 1:47 pm TECHNIQUE: CT of the chest was performed with the administration of intravenous contrast. Multiplanar reformatted images are provided for review. Dose modulation, iterative reconstruction, and/or weight based adjustment of the mA/kV was utilized to reduce the radiation dose to as low as reasonably achievable. COMPARISON: CT of the chest February 15, 2019 HISTORY: ORDERING SYSTEM PROVIDED HISTORY: PNEUMONIA, UNRESOLVED TECHNOLOGIST PROVIDED HISTORY: Ordering Physician Provided Reason for Exam: Pneumonia, unresolved Acuity: Unknown Type of Exam: Unknown FINDINGS: Mediastinum: Unchanged mildly enlarged lymph nodes. No pericardial effusion. pelvis was performed with the administration of intravenous contrast. Multiplanar reformatted images are provided for review. Dose modulation, iterative reconstruction, and/or weight based adjustment of the mA/kV was utilized to reduce the radiation dose to as low as reasonably achievable. COMPARISON: 1/28/2019, 4/29/2011 HISTORY: ORDERING SYSTEM PROVIDED HISTORY: cervical ca - SOB - PE??? TECHNOLOGIST PROVIDED HISTORY: Ordering Physician Provided Reason for Exam: SOB Acuity: Unknown Type of Exam: Unknown; ORDERING SYSTEM PROVIDED HISTORY: abd disten - cervical ca? TECHNOLOGIST PROVIDED HISTORY: Additional Contrast?->None Ordering Physician Provided Reason for Exam: abd distention Acuity: Unknown Type of Exam: Unknown Patient with recently diagnosed cervical cancer. FINDINGS: Chest: Pulmonary arteries: The pulmonary arteries opacify normally. There is no evidence of filling defect to suggest a pulmonary embolism. Mediastinum: The thyroid is unremarkable. There is mild subcarinal and bilateral hilar lymphadenopathy, most likely benign and reactive in etiology given the patient's underlying lung findings. The thoracic aorta is unremarkable, without evidence of aneurysm or dissection. 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 No pericardial abnormality is identified. Lungs/pleura: There is mild mucous plugging within the bilateral lower lobes. The central airways are otherwise widely patent. There has been interval development of widespread ground-glass opacity throughout both lungs, with diffuse intralobular septal thickening evident, new from prior exam.  This most likely reflects a combination of interstitial pulmonary edema and superimposed multifocal pneumonia. Additionally, there has been interval progression and more consolidation of multiple scattered various sized pulmonary nodules throughout both lungs since the study of 1/28/2019. A few of these are cavitary. Reason for exam:->fever Ordering Physician Provided Reason for Exam: stage 4 cervical ca with mets to bladder and lungs- stent to kidney last week, with fever and pain today. Acuity: Acute Type of Exam: Initial FINDINGS: Mild patchy consolidation within the right lung is again identified, increased when compared to the previous exam, greatest in the right perihilar region. Nodular opacities within the lungs are also again found, similar. Heart mediastinal contours are unremarkable. Chin catheter is present, unchanged in position. No acute bony abnormalities. No pneumothorax. No free air. Patchy consolidation within the lungs, especially the right perihilar region, mildly increased when compared to the previous exam.  Given the relatively rapid increase, pneumonia would be primarily considered. Xr Chest Portable    Result Date: 2/24/2019  EXAMINATION: SINGLE XRAY VIEW OF THE CHEST 2/24/2019 7:00 am COMPARISON: Chest radiograph performed 02/19/2019. HISTORY: ORDERING SYSTEM PROVIDED HISTORY: elevated wbc and ongoing shortness of breath TECHNOLOGIST PROVIDED HISTORY: Reason for exam:->elevated wbc and ongoing shortness of breath Acuity: Unknown Type of Exam: Unknown FINDINGS: There is mild bilateral pulmonary congestion. There is similar left basilar infiltrate. There is no pneumothorax. The mediastinal structures are stable. The upper abdomen is unremarkable. The extrathoracic soft tissues are unremarkable. There is a right internal jugular port. Stable bilateral congestion and left basilar infiltrate. Ct Chest Pulmonary Embolism W Contrast    Result Date: 2/15/2019  EXAMINATION: CTA OF THE CHEST; CT OF THE ABDOMEN AND PELVIS WITH CONTRAST 2/15/2019 2:11 pm TECHNIQUE: CTA of the chest was performed after the administration of intravenous contrast.  Multiplanar reformatted images are provided for review. MIP images are provided for review.  Dose modulation, iterative reconstruction, and/or weight based adjustment of the mA/kV was utilized to reduce the radiation dose to as low as reasonably achievable.; CT of the abdomen and pelvis was performed with the administration of intravenous contrast. Multiplanar reformatted images are provided for review. Dose modulation, iterative reconstruction, and/or weight based adjustment of the mA/kV was utilized to reduce the radiation dose to as low as reasonably achievable. COMPARISON: 1/28/2019, 4/29/2011 HISTORY: ORDERING SYSTEM PROVIDED HISTORY: cervical ca - SOB - PE??? TECHNOLOGIST PROVIDED HISTORY: Ordering Physician Provided Reason for Exam: SOB Acuity: Unknown Type of Exam: Unknown; ORDERING SYSTEM PROVIDED HISTORY: abd disten - cervical ca? TECHNOLOGIST PROVIDED HISTORY: Additional Contrast?->None Ordering Physician Provided Reason for Exam: abd distention Acuity: Unknown Type of Exam: Unknown Patient with recently diagnosed cervical cancer. FINDINGS: Chest: Pulmonary arteries: The pulmonary arteries opacify normally. There is no evidence of filling defect to suggest a pulmonary embolism. Mediastinum: The thyroid is unremarkable. There is mild subcarinal and bilateral hilar lymphadenopathy, most likely benign and reactive in etiology given the patient's underlying lung findings. The thoracic aorta is unremarkable, without evidence of aneurysm or dissection. 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 No pericardial abnormality is identified. Lungs/pleura: There is mild mucous plugging within the bilateral lower lobes. The central airways are otherwise widely patent. There has been interval development of widespread ground-glass opacity throughout both lungs, with diffuse intralobular septal thickening evident, new from prior exam.  This most likely reflects a combination of interstitial pulmonary edema and superimposed multifocal pneumonia.   Additionally, there has been interval progression and more consolidation of multiple scattered various sized pulmonary nodules throughout both lungs since the study of 1/28/2019. A few of these are cavitary. The largest of these measures approximately 10 mm in short axis within the subpleural portion of the left lower lobe. These may be secondary to an atypical infectious or inflammatory process, to include septic emboli. Metastatic disease is considered less likely, though not excluded. There is no evidence of a pneumothorax or pleural effusion. Soft Tissues/Bones: No acute findings. Abdomen/Pelvis: Organs: The patient is status post cholecystectomy. The liver, spleen, and pancreas are normal.  The adrenal glands are unremarkable bilaterally. There has been interval development of nonspecific moderate right hydronephrosis since the prior exam, without definite demonstrable cause. Namely, no definite obstructing stone or mass is identified. The left kidney is stable and unremarkable. GI/Bowel: Evaluation of the hollow GI tract demonstrates no evidence of abnormal bowel wall thickening, dilatation, or obstruction. The appendix is normal. Pelvis: The urinary bladder is partially collapsed, though appears diffusely thick-walled and inflamed, with a mild amount of pericystic stranding noted. These findings are suggestive of an acute cystitis, progressed from prior exam.  Additionally, the cervix appears enlarged and irregular, and there is new abnormal thickening of the endometrium within the uterine fundus, which appears new in comparison with the study of 1/28/2019. The bilateral adnexa are unremarkable. There is a mild amount of free pelvic fluid, likely physiologic. There are stable mildly enlarged bilateral pelvic chain lymph nodes, the largest measuring approximately 2.6 x 1.6 cm along the right external iliac chain, similar to the study of 1/28/2019. Peritoneum/Retroperitoneum: No intraperitoneal free air or free fluid is identified.   No pathologic lymphadenopathy is seen. The abdominal aorta is unremarkable. No significant abdominal wall hernia is identified. Bones/Soft Tissues: There is mild bilateral sacroiliitis. The skeletal structures are otherwise unremarkable. 1. No CT evidence of a pulmonary embolism. 2. No acute abnormality of the thoracic aorta. 3. Interval development of widespread ground-glass opacity throughout both lungs with diffuse intralobular septal thickening. This most likely reflects a combination of interstitial pulmonary edema and multifocal pneumonia. 4. Interval progression of multiple nodular foci of consolidative opacity throughout both lungs, a few of which are cavitary in appearance. These nodules are most likely infectious or inflammatory in etiology, and septic emboli are a consideration. Given patient's history of cervical cancer, metastatic disease is on the differential, though considered less likely. 5. New nonspecific moderate right hydronephrosis, without demonstrable cause. However, there is underlying wall thickening and enhancement of the urinary bladder. This combination of findings could represent a cystitis in the setting of urinary tract infection with resultant pyelitis and hydronephrosis. However, given patient history of cervical cancer, locoregional spread of tumor with resultant obstruction of the distal right ureter may be a cause of the patient's right hydronephrosis. 6. Interval development of new irregularity of the cervix and nonspecific endometrial thickening in comparison with the prior study of 1/28/2019. This likely represents the patient's known underlying cervical cancer causing obstruction of the endometrial with resultant endometrial thickening. This could be further evaluated with a follow-up pelvic ultrasound. 7. Stable mild bilateral pelvic chain lymphadenopathy, right greater than left, possibly representing metastatic disease.      Ir Port Placement > 5 Years    Result Date: 2/19/2019  PROCEDURE: ULTRASOUND GUIDED VASCULAR ACCESS. FLUOROSCOPY GUIDED PLACEMENT OF A RIGHT IJ SUBCUTANEOUS PORT-A-CATH. MODERATE CONSCIOUS SEDATION 2/19/2019. HISTORY: ORDERING SYSTEM PROVIDED HISTORY: cervical cancer to get chemo TECHNOLOGIST PROVIDED HISTORY: Reason for exam:->cervical cancer to get chemo How many lumens are being requested?->1 What site is the preferred site? ->No preference What side should this line be placed? ->Either 16-year-old female with cervical cancer, presents for chest port placement. SEDATION: Moderate sedation was administered under my supervision by a qualified nurse. 4 mg of Versed was administered intravenously. Approximate intra procedural sedation time was 23 minutes. FLUOROSCOPY DOSE AND TYPE OR TIME AND EXPOSURES: 54 seconds of fluoroscopy with 2 exposures. TECHNIQUE: Informed consent was obtained after a detailed explanation of the procedure including risks, benefits, and alternatives. Universal protocol was observed. The right neck and chest were prepped and draped in sterile fashion using maximum sterile barrier technique. Local anesthesia was achieved with lidocaine. A micropuncture needle was used to access the right internal jugular vein using ultrasound guidance. An ultrasound image demonstrating patency of the vein with needle tip located within it. An image was obtained and stored in PACs. A 0.035 guidewire was used to place a peel-away sheath. A chest wall incision was made and a subcutaneous pocket was created. The port reservoir was placed within the pocket and the port tubing was attached and tunneled subcutaneously to the venotomy site. The port tubing was cut to an appropriate length and advanced through the peel-away sheath under fluoroscopic guidance to the cavo-atrial junction. The chest wall incision was closed using 3-0 and 4-0 Vicryl suture and tissue adhesive was applied to the venotomy site and chest wall incision.  The port flushed easily and week, with fever and pain today. Sees Dr. Lary Reyes. ) FINDINGS: Chest: Mediastinum: Right-sided IJ port again noted. Limited imaging of the base of the neck and axilla appear stable. Heart size is stable. The aorta and origin of the great vessels again demonstrate an aberrant right subclavian artery. Main pulmonary artery opacifies unremarkably. No significant change in hilar and mediastinal adenopathy. Esophagus tapers normally. Lungs/pleura: Central airways are patent. Peribronchial wall thickening again noted. Lung volumes are low. Perihilar and dependent ground-glass opacities and multifocal patchy opacities with some more focal consolidation posteriorly at the lung bases again noted. There is slight improved aeration at the left base from the most recent comparison. Mild paraseptal thickening noted. No significant pleural effusion noted. Soft Tissues/Bones:  No acute abnormality of the visualized osseous structures. Abdomen/Pelvis: Organs: Persistent mild dilation of the right renal collecting system is noted with placement of a right ureteral stent with proximal colon, renal pelvis and distal colon extending into the bladder. Periureteral stranding is noted. The left kidney, adrenal glands, pancreas and spleen are unremarkable in appearance. Liver is somewhat heterogeneous in its appearance with no focal lesion identified. Patient is status post cholecystectomy. GI/Bowel: The stomach is unremarkable in appearance. There is a duodenal diverticulum in the proximal portion of the sweep. Small bowel demonstrates no acute abnormality. Appendix is unremarkable. Pelvis: Bladder is incompletely distended. There is bladder wall thickening noted which is less prominent than the comparison. Periureteral stranding is noted.  Increased induration is noted along the fat along the right side of the pelvis with persistent adenopathy noted along the right iliac chain measuring up to 1.5 cm in short axis diameter without significant change from prior study. Small lymph nodes also noted along the left iliac chain and retroperitoneum. There is wall thickening noted of the vagina with stranding of the pelvic fat. Peritoneum/Retroperitoneum: Aorta is normal in caliber. Small retroperitoneal lymph nodes are noted which are likely reactive. Bones/Soft Tissues: No acute osseous abnormality noted. Diffuse multifocal airspace disease is again noted with slight interval improvement of more focal areas of consolidation at the left base. Findings compatible with multifocal pneumonia. Malignancy, metastatic disease is within the differential though considered less likely. Right ureteral stent is in place with persistent mild dilation of the renal collecting system. Wall thickening of the bladder is noted which is improved compared to the prior study. Mild stranding is noted in the perivesicular fat. The cervix is somewhat irregular in its appearance with wall thickening of the vagina noted. Endometrial fluid noted on the prior study is no longer identified. In a patient with a reported history of cervical cancer malignancy is within the differential.  If the patient has had radiation therapy findings may also represent associated inflammatory changes. Nonspecific adenopathy within the pelvis may be reactive in nature or related to underlying malignancy. Recommend continued follow-up. Ir Guided Ureteral Stent Place W Nephro Cath New Access    Result Date: 2/18/2019  PROCEDURE: IR ULTRASOUND AND FLUOROSCOPIC GUIDED RIGHT PERCUTANEOUS NEPHROSTOMY AND NEPHROSTOGRAM IR ANTEGRADE RIGHT URETERAL STENT. MODERATE CONSCIOUS SEDATION 2/18/2019 HISTORY: ORDERING SYSTEM PROVIDED HISTORY: needs R sided nephrostomy tube placed. maria guadalupe could not do via cysto. Adi said that IR would do on monday TECHNOLOGIST PROVIDED HISTORY: Reason for exam:->needs R sided nephrostomy tube placed. maria guadalupe could not do via cysto.  Adi said that IR would do on monday COMPARISON: Abdomen pelvic CT 02/15/2019. CONTRAST: 30 cc, nonvascular within collecting system only. . SEDATION: Intravenous sedation was administered with continuous monitoring throughout the procedure. In measured doses, a total of 6 mg intravenous Versed and 275 mcg intravenous fentanyl was administered. My total procedure time with sedation was 26 minutes. FLUOROSCOPY DOSE AND TYPE OR TIME AND EXPOSURES: 3.5 minutes, 7 digital imaging sequences. DESCRIPTION OF PROCEDURE: Informed consent was obtained after a detailed explanation of the procedure including risks, benefits, and alternatives. Universal protocol was observed. TECHNIQUE: Informed consent was previously obtained. In the semi prone position, an appropriate area posterior lateral aspect of the skin overlying the targeted collecting system was demarcated, sterilely prepared draped and anesthetized. Utilizing ultrasound, anatomy from prior CT scan and other imaging, the needle trajectory and depth was predetermined. The micro puncture needle was advanced using fluoroscopic and ultrasound guidance into the collecting system without difficulty. Once urine was aspirated, a small platinum tip wire was advanced into the collecting system and into the proximal ureter. The tract was dilated. With wire exchange, a 6 [place] sheath was placed. The collecting system is moderately dilated and the ureter is somewhat tortuous. A glide wire was easily advanced down the ureter. Because of this, a 5 [place] peel-away sheath was advanced into the proximal ureter over the wire. The glide wire was then advanced with a steerable catheter into the urinary bladder without difficulty and only mild discomfort. There is no contrast extravasation. Once the catheter was placed into the urinary bladder, contrast injection is seen diluted within the somewhat distended urinary bladder.   The catheter was used to place a Super Stiff wire for ureteral stent placement. A 26 cm 8 French ureteral stent was then advanced, such that the distal pigtail is well within the urinary bladder. The proximal pigtail was placed in the lower portion of the left renal pelvis. Again there is no contrast extravasation. No filling defect to indicate clot or bleeding was observed on early or later contrast imaging. Following this, dense contrast was injected showing normal expected filling of the ureteral stent and exiting from the distal pigtail directly into the urinary bladder. Following this, the internalized stent was disengaged. The proximal collecting system was then decompressed. The proximal nephrostomy access was then removed entirely, no external drainage necessary at this point. The patient tolerated the procedure well. A sterile bandage was placed over the small incision. From this point forward, the ureteral stent can be exchanged as needed from below. Successful right percutaneous nephrostomy with antegrade pyelogram. High-grade distal ureteral obstruction with severe hydronephrosis, successfully crossed as above. Successful 8 French internalized right ureteral stent placement as above. ASSESSMENT:     Cervical cancer with disseminated lung metastasis and bladder metastasis  Likely complicated UTI     PLAN:   1. Pain-adjusted pain meds to her home regimen. 2. Pulm-O2 requirement given lung disease. 3. ID-possible UTI given symptoms. F/b ID. Blood cultures pending. 4. ONC-s/p cis/taxol C1 on 2/20/19. May need carbo/taxol/avastin at next administration. 5. Social-pt has had many frustrations with care over last 3 months. Will continue to help decrease these frustrations and hopefully eventually transition back to home for a longer period.      Enid Perera MD  [address] [facility]  [phone])

## 2019-03-01 NOTE — PROGRESS NOTES
Clinical Pharmacy Note:    Pharmacy consulted by Dr. Jackie Granados to dose vancomycin for Sukhjinder Harkins. Age: 42 y.o. Height: 66 in  Weight: 86.2 kg  Estimated Creatinine Clearance: 142 mL/min (based on SCr of 0.6 mg/dL). Ordered vancomycin 1250 IV q8h. Trough ordered prior to the 4th dose ([email or web address]) with instructions to hold dose if trough greater than 20mcg/mL. Pharmacy will continue to follow.      Molina AldrichD

## 2019-03-01 NOTE — PROGRESS NOTES
03/01/19 0815   Incentive Spirometry Tx   Treatment Tolerance Well   Incentive Spirometry Goal (mL) 593 mL   Incentive Spirometry Achieved (mL) 700 mL

## 2019-03-01 NOTE — PROGRESS NOTES
Patient c/o nausea. Medicated with Zofran IV. Sister at bedside. Patient up to bedside commode;voided QS of blood tinged urine.

## 2019-03-01 NOTE — PROGRESS NOTES
Pt arrived to 5T in stable condition from ICU. VSS. Denies pain at this time. Oriented to room, environment and call light. Bed alarm in place. Fall precautions explained to patient/family. Welcome packet provided and menu explained. Admission and assessment complete, see flowsheets. Call light in reach. Dr Glendy Cruz in to see pt at this time. Pt c/o 8/10 on left abdomen/back. Dr. Glendy Cruz asked for pt to receive both po and IV meds at the same time. Dilaudid, Percocet and Toradol given at this time.

## 2019-03-01 NOTE — PROGRESS NOTES
[facility] PROGRESS NOTE    3/1/2019 9:40 AM        Name: Conrado Carrillo . Admitted: 2/28/2019  Primary Care Provider: Dre Engle MD (Tel: [phone])    Brief Course:        CC:    Subjective: Rani Isidro     Pt still with significant pain  No cp or sob  - pain is lower back  No weakness or numbness     Reviewed interval ancillary notes    Current Medications    vancomycin (VANCOCIN) 1,500 mg in dextrose 5 % 250 mL IVPB Q12H   promethazine (PHENERGAN) injection 6.25 mg Q6H PRN   oxyCODONE-acetaminophen (PERCOCET)  MG per tablet 1 tablet Q4H PRN   HYDROmorphone HCl PF (DILAUDID) injection 0.5 mg Q3H PRN   baclofen (LIORESAL) tablet 20 mg BID   DULoxetine (CYMBALTA) extended release capsule 60 mg Daily   gabapentin (NEURONTIN) capsule 300 mg TID   sodium chloride flush 0.9 % injection 10 mL 2 times per day   sodium chloride flush 0.9 % injection 10 mL PRN   magnesium hydroxide (MILK OF MAGNESIA) 400 MG/5ML suspension 30 mL Daily PRN   ondansetron (ZOFRAN) injection 4 mg Q6H PRN   enoxaparin (LOVENOX) injection 40 mg Daily   0.9 % sodium chloride infusion Continuous   potassium chloride 10 mEq/100 mL IVPB (Peripheral Line) PRN   magnesium sulfate 1 g in dextrose 5% 100 mL IVPB PRN   famotidine (PEPCID) tablet 20 mg BID   acetaminophen (TYLENOL) tablet 650 mg Q4H PRN   ipratropium-albuterol (DUONEB) nebulizer solution 1 ampule 4x daily   guaiFENesin (MUCINEX) extended release tablet 600 mg BID   cefepime (MAXIPIME) 2 g IVPB minibag Q12H   ketorolac (TORADOL) injection 30 mg Q6H PRN       Objective:  BP (!) 100/55   Pulse 85   Temp 98.2 °F (36.8 °C) (Temporal)   Resp 15   Ht 5' 6\" (1.676 m)   Wt 197 lb (89.4 kg)   SpO2 100%   BMI 31.80 kg/m²     Intake/Output Summary (Last 24 hours) at 03/01/19 0940  Last data filed at 03/01/19 0554   Gross per 24 hour   Intake             2979 ml   Output 400 ml   Net             2579 ml    Wt Readings from Last 3 Encounters:   03/01/19 197 lb (89.4 kg)   02/20/19 193 lb 3.2 oz (87.6 kg)   02/10/19 190 lb (86.2 kg)       General appearance:  Appears comfortable  Eyes: Sclera clear. Pupils equal.  ENT: Moist oral mucosa. Trachea midline, no adenopathy. Cardiovascular: Regular rhythm, normal S1, S2. No murmur. No edema in lower extremities  Respiratory: Not using accessory muscles. Good inspiratory effort. Clear to auscultation bilaterally, no wheeze or crackles. GI: Abdomen soft, no tenderness, not distended, normal bowel sounds  Musculoskeletal: No cyanosis in digits, neck supple  Neurology: CN 2-12 grossly intact. No speech or motor deficits  Psych: Normal affect. Alert and oriented in time, place and person  Skin: Warm, dry, normal turgor  Extremity exam shows brisk capillary refill. Peripheral pulses are palpable in lower extremities     Labs and Tests:  CBC:   Recent Labs      02/27/19   0546  02/28/19 1913 03/01/19   0506   WBC  7.8  18.0*  16.0*   HGB  8.9*  9.2*  7.9*   PLT  251  316  249     BMP:  Recent Labs      02/27/19   0546  02/28/19 1913 03/01/19   0506   NA  139  138  142   K  3.8  3.7  3.4*   CL  101  97*  106   CO2  27  26  25   BUN  10  8  6*   CREATININE  <0.5*  0.6  <0.5*   GLUCOSE  127*  163*  142*     Hepatic: Recent Labs      02/28/19 1913   AST  27   ALT  31   BILITOT  <0.2   ALKPHOS  130*     CT CHEST ABDOMEN PELVIS W CONTRAST   Final Result   Diffuse multifocal airspace disease is again noted with slight interval   improvement of more focal areas of consolidation at the left base. Findings   compatible with multifocal pneumonia. Malignancy, metastatic disease is   within the differential though considered less likely. Right ureteral stent is in place with persistent mild dilation of the renal   collecting system. Wall thickening of the bladder is noted which is improved compared to the   prior study.   Mild stranding is noted in the perivesicular fat. The cervix is somewhat irregular in its appearance with wall thickening of   the vagina noted. Endometrial fluid noted on the prior study is no longer   identified. In a patient with a reported history of cervical cancer malignancy is within   the differential.  If the patient has had radiation therapy findings may also   represent associated inflammatory changes. Nonspecific adenopathy within the pelvis may be reactive in nature or related   to underlying malignancy. Recommend continued follow-up. XR CHEST PORTABLE   Final Result   Patchy consolidation within the lungs, especially the right perihilar region,   mildly increased when compared to the previous exam.  Given the relatively   rapid increase, pneumonia would be primarily considered. VL Extremity Venous Bilateral    (Results Pending)       ]    Problem List  Active Problems:    Fibromyalgia    GERD (gastroesophageal reflux disease)    Vaginal bleeding    History of juvenile rheumatoid arthritis    SLE (systemic lupus erythematosus) (HCC)    Lupus anticoagulant positive    Cervical cancer (HCC)    Lymphadenopathy    Sepsis ([address]  Resolved Problems:    * No resolved hospital problems. *       Assessment & Plan:   Sepsis   - with complicated UTI and PNA  - continue abx for now  - BP is better  - appreciate pulm rcs  - id consulted  - IVF    2. Uncontrolled pain  - secondary to cancer  - add percocet. And continue iv diluadid for breakthrough pain  - continue to titrate as needed    Cervical cancer wirh mets   - per oncologloy    Anemia  - statvle hgb        IV Access:   Ortiz:  Diet: Dietary Nutrition Supplements: Standard High Calorie Oral Supplement  DIET GENERAL;  Code:Full Code  DVT PPX  Disposition       Pavan Hayden MD   3/1/2019 9:40 AM

## 2019-03-01 NOTE — ED NOTES
\"Throat, tongue, left abdominal pain. Pain at my  site. I have a port but no card. \"  This RN reviewed surgery report for port and unable to find if port was a power port. OK per pt to start an IV.        Jyoti Davila RN  19

## 2019-03-02 NOTE — PROGRESS NOTES
Shift assessment complete. VSS. Missed AM lab drawn and sent to lab. Scheduled medications given. Prn dilaudid administered. Pt toileted and back to bed. SOB upon exertion. Pt denies further needs at this time.

## 2019-03-02 NOTE — PROGRESS NOTES
Urology paged for patients pain with urination related to right side stent placement. Awaiting reply.

## 2019-03-02 NOTE — PROGRESS NOTES
[facility] FOLLOW-UP:     Primary Oncologist: Dr. Kelsy Farley  Date: 3/2/2019    PCP: Eloise Larios MD  Referring Provider: No ref. provider found    PROBLEM LIST:     Patient Active Problem List   Diagnosis    Stridor    Acute bronchitis    Fibromyalgia    Bradycardia    Chest pain    GERD (gastroesophageal reflux disease)    Vaginal bleeding    History of juvenile rheumatoid arthritis    SLE (systemic lupus erythematosus) (HCC)    Lupus anticoagulant positive    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, antepartum ([facility])    Cervical cancer ([address]    Multifocal pneumonia    Pulmonary nodule    Hydronephrosis    Ground glass opacity present on imaging of lung    Lung nodules    Lymphadenopathy    Constipation due to opioid therapy    Sepsis ([address]    Septic shock ([address]    Septicemia ([address]    History of cancer    Status post cystoscopy    Immunocompromised ([address]    Complicated UTI (urinary tract infection)    Anemia    Seizure disorder ([facility])    Class 1 obesity due to excess calories with body mass index (BMI) of 31.0 to 31.9 in adult       Specialty Problems     None          INTERVAL HISTORY     Feeling better this morning. Did not do well last night and she attributed to being off oxygen for couple hours. Currently denies any chest pain. She is on oxygen. She has some vaginal spotting. REVIEW OF SYSTEMS:     CONSTITUTIONAL: Denies fever, sweats, weight loss. Feeling weak. EYES: No visual changes or diplopia. No scleral icterus. ENT: No Headaches, hearing loss or vertigo. No mouth sores or sore throat. CARDIOVASCULAR: No chest pain, dyspnea on exertion, palpitations or loss of consciousness. RESPIRATORY: No cough or wheezing, no sputum production. No hemoptysis. GASTROINTESTINAL: No abdominal pain, appetite loss, blood in stools. No change in bowel habits. GENITOURINARY: No dysuria, trouble voiding, or hematuria. MUSCULOSKELETAL: no generalized weakness.  No joint 03/02/2019    GFRAA >60 03/02/2019    AGRATIO 1.2 02/28/2019    GLOB 3.0 02/28/2019       No results found for: PTINR      IMAGING:     Xr Chest Standard (2 Vw)    Result Date: 2/26/2019  EXAMINATION: TWO VIEWS OF THE CHEST 2/26/2019 11:55 am COMPARISON: Frontal view of the chest 02/24/2019, frontal and lateral views of the chest 02/19/2019, CT thorax 02/25/2019. HISTORY: ORDERING SYSTEM PROVIDED HISTORY: cough TECHNOLOGIST PROVIDED HISTORY: Reason for exam:->cough Ordering Physician Provided Reason for Exam: cough FINDINGS: Support devices: Right IJ chest port again noted with distal tip terminating in the proximal right atrium in good position. The cardiopericardial silhouette is stable in size and configuration. Bilateral patchy airspace opacities are redemonstrated most pronounced in the mid and lower lung zones, these were better detailed on the recent CT of the thorax from 02/25/2019, please refer to that report for additional information. The overall radiographic appearance has mildly increased from the prior radiograph 02/24/2019. There is no pneumothorax or pleural effusion. Surgical clips project in the upper abdomen. Mild interval increase in bilateral patchy airspace opacities better detailed on the CT of the thorax from 02/25/2019. Findings are again most suggestive of an infectious/inflammatory process.      Xr Chest Standard (2 Vw)    Result Date: 2/19/2019  EXAMINATION: TWO VIEWS OF THE CHEST 2/19/2019 4:26 pm COMPARISON: 02/16/2019 HISTORY: ORDERING SYSTEM PROVIDED HISTORY: cough TECHNOLOGIST PROVIDED HISTORY: Reason for exam:->cough Ordering Physician Provided Reason for Exam: Cervical Cancer (Pt was sent over from Dr Abilio Winter office - states she was sent here from office - was told today that she has stage 3 cervical cancer and was sent here for \"scans and further testing\") Acuity: Unknown Type of Exam: Unknown FINDINGS: There is patchy bilateral airspace disease, which appears increased when compared to the previous exam.  As detected on recent CT PA, some of those have a nodular appearance. The heart mediastinal contours are unremarkable. No pneumothorax. No free air. No acute bony abnormalities. Chin catheter noted. Patchy bilateral airspace disease, concerning for pneumonia, which appears increased when compared to the previous exam.  Again, some of these areas of opacity have a nodular appearance and septic emboli should be considered, especially when given the correlation with the CT PA from 02/15/2019. Process should be followed to resolution. Xr Chest Standard (2 Vw)    Result Date: 2/16/2019  EXAMINATION: TWO VIEWS OF THE CHEST 2/16/2019 11:49 am COMPARISON: 10/16/2014 HISTORY: ORDERING SYSTEM PROVIDED HISTORY: pneumonia TECHNOLOGIST PROVIDED HISTORY: Reason for exam:->pneumonia Ordering Physician Provided Reason for Exam: PNA Acuity: Acute Type of Exam: Initial Relevant Medical/Surgical History: cervical ca FINDINGS: There is patchy bibasilar consolidation, right greater than left, superimposed on a background of diffuse interstitial prominence. Heart size, mediastinal contours and pulmonary vascularity are within normal limits. There is no pleural effusion. Multifocal bilateral pneumonia. Ct Chest W Contrast    Result Date: 2/25/2019  EXAMINATION: CT OF THE CHEST WITH CONTRAST 2/25/2019 1:47 pm TECHNIQUE: CT of the chest was performed with the administration of intravenous contrast. Multiplanar reformatted images are provided for review. Dose modulation, iterative reconstruction, and/or weight based adjustment of the mA/kV was utilized to reduce the radiation dose to as low as reasonably achievable.  COMPARISON: CT of the chest February 15, 2019 HISTORY: ORDERING SYSTEM PROVIDED HISTORY: PNEUMONIA, UNRESOLVED TECHNOLOGIST PROVIDED HISTORY: Ordering Physician Provided Reason for Exam: Pneumonia, unresolved Acuity: Unknown Type of Exam: Unknown FINDINGS: Mediastinum: Unchanged mildly enlarged lymph nodes. No pericardial effusion. Lungs/pleura: There is increased severity of extensive bilateral pulmonary disease, with extensive foci of ground-glass opacity, in addition to small areas of consolidation. No pneumothorax or pleural effusion. Upper Abdomen: Negative, no acute findings. Soft Tissues/Bones: No acute osseous findings. Increased severity of extensive bilateral pulmonary disease, nonspecific but consistent with pneumonia. ARDS is also considered. Us Renal Complete    Result Date: 2/26/2019  EXAMINATION: RETROPERITONEAL ULTRASOUND OF THE KIDNEYS AND URINARY BLADDER 2/26/2019 COMPARISON: CT abdomen and pelvis 02/15/2019. HISTORY: ORDERING SYSTEM PROVIDED HISTORY: left sided pain, hydronephrosis TECHNOLOGIST PROVIDED HISTORY: Ordering Physician Provided Reason for Exam: hx of recent rt stent cervical ca Acuity: Unknown Type of Exam: Subsequent/Follow-up FINDINGS: Kidneys: The right kidney measures 11.6 cm in length and the left kidney measures 11.3 cm in length. Kidneys demonstrate normal cortical echogenicity. No evidence of hydronephrosis or intrarenal stones. Bladder: Urinary bladder is underdistended at the time of imaging limiting evaluation. No gross bladder wall abnormalities noted. No postvoid imaging performed. Kidneys are unremarkable. Limited evaluation of urinary bladder secondary to underdistention at the time of imaging without gross bladder wall abnormality noted. Ct Abdomen Pelvis W Iv Contrast Additional Contrast? None    Result Date: 2/15/2019  EXAMINATION: CTA OF THE CHEST; CT OF THE ABDOMEN AND PELVIS WITH CONTRAST 2/15/2019 2:11 pm TECHNIQUE: CTA of the chest was performed after the administration of intravenous contrast.  Multiplanar reformatted images are provided for review. MIP images are provided for review.  Dose modulation, iterative reconstruction, and/or weight based adjustment of the mA/kV was utilized to reduce the radiation dose to as low as reasonably achievable.; CT of the abdomen and pelvis was performed with the administration of intravenous contrast. Multiplanar reformatted images are provided for review. Dose modulation, iterative reconstruction, and/or weight based adjustment of the mA/kV was utilized to reduce the radiation dose to as low as reasonably achievable. COMPARISON: 1/28/2019, 4/29/2011 HISTORY: ORDERING SYSTEM PROVIDED HISTORY: cervical ca - SOB - PE??? TECHNOLOGIST PROVIDED HISTORY: Ordering Physician Provided Reason for Exam: SOB Acuity: Unknown Type of Exam: Unknown; ORDERING SYSTEM PROVIDED HISTORY: abd disten - cervical ca? TECHNOLOGIST PROVIDED HISTORY: Additional Contrast?->None Ordering Physician Provided Reason for Exam: abd distention Acuity: Unknown Type of Exam: Unknown Patient with recently diagnosed cervical cancer. FINDINGS: Chest: Pulmonary arteries: The pulmonary arteries opacify normally. There is no evidence of filling defect to suggest a pulmonary embolism. Mediastinum: The thyroid is unremarkable. There is mild subcarinal and bilateral hilar lymphadenopathy, most likely benign and reactive in etiology given the patient's underlying lung findings. The thoracic aorta is unremarkable, without evidence of aneurysm or dissection. 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 No pericardial abnormality is identified. Lungs/pleura: There is mild mucous plugging within the bilateral lower lobes. The central airways are otherwise widely patent. There has been interval development of widespread ground-glass opacity throughout both lungs, with diffuse intralobular septal thickening evident, new from prior exam.  This most likely reflects a combination of interstitial pulmonary edema and superimposed multifocal pneumonia.   Additionally, there has been interval progression and more consolidation of multiple scattered various sized pulmonary nodules COMPARISON: 02/26/2019 HISTORY: ORDERING SYSTEM PROVIDED HISTORY: fever TECHNOLOGIST PROVIDED HISTORY: Reason for exam:->fever Ordering Physician Provided Reason for Exam: stage 4 cervical ca with mets to bladder and lungs- stent to kidney last week, with fever and pain today. Acuity: Acute Type of Exam: Initial FINDINGS: Mild patchy consolidation within the right lung is again identified, increased when compared to the previous exam, greatest in the right perihilar region. Nodular opacities within the lungs are also again found, similar. Heart mediastinal contours are unremarkable. Chin catheter is present, unchanged in position. No acute bony abnormalities. No pneumothorax. No free air. Patchy consolidation within the lungs, especially the right perihilar region, mildly increased when compared to the previous exam.  Given the relatively rapid increase, pneumonia would be primarily considered. Xr Chest Portable    Result Date: 2/24/2019  EXAMINATION: SINGLE XRAY VIEW OF THE CHEST 2/24/2019 7:00 am COMPARISON: Chest radiograph performed 02/19/2019. HISTORY: ORDERING SYSTEM PROVIDED HISTORY: elevated wbc and ongoing shortness of breath TECHNOLOGIST PROVIDED HISTORY: Reason for exam:->elevated wbc and ongoing shortness of breath Acuity: Unknown Type of Exam: Unknown FINDINGS: There is mild bilateral pulmonary congestion. There is similar left basilar infiltrate. There is no pneumothorax. The mediastinal structures are stable. The upper abdomen is unremarkable. The extrathoracic soft tissues are unremarkable. There is a right internal jugular port. Stable bilateral congestion and left basilar infiltrate.      Ct Chest Pulmonary Embolism W Contrast    Result Date: 2/15/2019  EXAMINATION: CTA OF THE CHEST; CT OF THE ABDOMEN AND PELVIS WITH CONTRAST 2/15/2019 2:11 pm TECHNIQUE: CTA of the chest was performed after the administration of intravenous contrast.  Multiplanar reformatted images are provided for review. MIP images are provided for review. Dose modulation, iterative reconstruction, and/or weight based adjustment of the mA/kV was utilized to reduce the radiation dose to as low as reasonably achievable.; CT of the abdomen and pelvis was performed with the administration of intravenous contrast. Multiplanar reformatted images are provided for review. Dose modulation, iterative reconstruction, and/or weight based adjustment of the mA/kV was utilized to reduce the radiation dose to as low as reasonably achievable. COMPARISON: 1/28/2019, 4/29/2011 HISTORY: ORDERING SYSTEM PROVIDED HISTORY: cervical ca - SOB - PE??? TECHNOLOGIST PROVIDED HISTORY: Ordering Physician Provided Reason for Exam: SOB Acuity: Unknown Type of Exam: Unknown; ORDERING SYSTEM PROVIDED HISTORY: abd disten - cervical ca? TECHNOLOGIST PROVIDED HISTORY: Additional Contrast?->None Ordering Physician Provided Reason for Exam: abd distention Acuity: Unknown Type of Exam: Unknown Patient with recently diagnosed cervical cancer. FINDINGS: Chest: Pulmonary arteries: The pulmonary arteries opacify normally. There is no evidence of filling defect to suggest a pulmonary embolism. Mediastinum: The thyroid is unremarkable. There is mild subcarinal and bilateral hilar lymphadenopathy, most likely benign and reactive in etiology given the patient's underlying lung findings. The thoracic aorta is unremarkable, without evidence of aneurysm or dissection. 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 No pericardial abnormality is identified. Lungs/pleura: There is mild mucous plugging within the bilateral lower lobes. The central airways are otherwise widely patent.   There has been interval development of widespread ground-glass opacity throughout both lungs, with diffuse intralobular septal thickening evident, new from prior exam.  This most likely reflects a combination of interstitial pulmonary edema and superimposed multifocal pneumonia. Additionally, there has been interval progression and more consolidation of multiple scattered various sized pulmonary nodules throughout both lungs since the study of 1/28/2019. A few of these are cavitary. The largest of these measures approximately 10 mm in short axis within the subpleural portion of the left lower lobe. These may be secondary to an atypical infectious or inflammatory process, to include septic emboli. Metastatic disease is considered less likely, though not excluded. There is no evidence of a pneumothorax or pleural effusion. Soft Tissues/Bones: No acute findings. Abdomen/Pelvis: Organs: The patient is status post cholecystectomy. The liver, spleen, and pancreas are normal.  The adrenal glands are unremarkable bilaterally. There has been interval development of nonspecific moderate right hydronephrosis since the prior exam, without definite demonstrable cause. Namely, no definite obstructing stone or mass is identified. The left kidney is stable and unremarkable. GI/Bowel: Evaluation of the hollow GI tract demonstrates no evidence of abnormal bowel wall thickening, dilatation, or obstruction. The appendix is normal. Pelvis: The urinary bladder is partially collapsed, though appears diffusely thick-walled and inflamed, with a mild amount of pericystic stranding noted. These findings are suggestive of an acute cystitis, progressed from prior exam.  Additionally, the cervix appears enlarged and irregular, and there is new abnormal thickening of the endometrium within the uterine fundus, which appears new in comparison with the study of 1/28/2019. The bilateral adnexa are unremarkable. There is a mild amount of free pelvic fluid, likely physiologic. There are stable mildly enlarged bilateral pelvic chain lymph nodes, the largest measuring approximately 2.6 x 1.6 cm along the right external iliac chain, similar to the study of 1/28/2019. Peritoneum/Retroperitoneum: No intraperitoneal free air or free fluid is identified. No pathologic lymphadenopathy is seen. The abdominal aorta is unremarkable. No significant abdominal wall hernia is identified. Bones/Soft Tissues: There is mild bilateral sacroiliitis. The skeletal structures are otherwise unremarkable. 1. No CT evidence of a pulmonary embolism. 2. No acute abnormality of the thoracic aorta. 3. Interval development of widespread ground-glass opacity throughout both lungs with diffuse intralobular septal thickening. This most likely reflects a combination of interstitial pulmonary edema and multifocal pneumonia. 4. Interval progression of multiple nodular foci of consolidative opacity throughout both lungs, a few of which are cavitary in appearance. These nodules are most likely infectious or inflammatory in etiology, and septic emboli are a consideration. Given patient's history of cervical cancer, metastatic disease is on the differential, though considered less likely. 5. New nonspecific moderate right hydronephrosis, without demonstrable cause. However, there is underlying wall thickening and enhancement of the urinary bladder. This combination of findings could represent a cystitis in the setting of urinary tract infection with resultant pyelitis and hydronephrosis. However, given patient history of cervical cancer, locoregional spread of tumor with resultant obstruction of the distal right ureter may be a cause of the patient's right hydronephrosis. 6. Interval development of new irregularity of the cervix and nonspecific endometrial thickening in comparison with the prior study of 1/28/2019. This likely represents the patient's known underlying cervical cancer causing obstruction of the endometrial with resultant endometrial thickening. This could be further evaluated with a follow-up pelvic ultrasound.  7. Stable mild bilateral pelvic chain lymphadenopathy, right greater than adhesive was applied to the venotomy site and chest wall incision. The port flushed easily and there was a good blood return. The port was locked with heparinized saline. The patient tolerated the procedure well and there were no immediate complications. FINDINGS: Fluoroscopic image demonstrates the tip of the port in the right atrium. 1. Successful ultrasound and fluoroscopy guided Port-A-Cath placement via right IJ access, as described above. 2.  The port was left accessed for immediate use. Fl Retrograde Pyelogram Right    Result Date: 2/17/2019  EXAMINATION: SPOT FLUOROSCOPIC IMAGES 2/17/2019 6:52 am TECHNIQUE: Fluoroscopy was provided by the radiology department for procedure. Radiologist was not present during examination. FLUOROSCOPY DOSE AND TYPE OR TIME AND EXPOSURES: 3 seconds of fluoroscopy was utilized for purposes of intraoperative localization. 1 fluoroscopic spot image was obtained. COMPARISON: None HISTORY: Intraprocedural imaging. FINDINGS: 1 spot images of the abdomen was obtained. Intraprocedural fluoroscopic spot images as above. See separate procedure report for more information.      Vl Extremity Venous Bilateral    Result Date: 3/1/2019  Lower Extremities DVT Study  Demographics   Patient Name       Gisela Fernandez   Date of Study      03/01/2019       Gender              Female   Patient Number     [id number]       Date of Birth       1980   Visit Number       [id number]        Age                 42 year(s)   Accession Number   [id number]        Room Number         1234   Corporate ID       [id number]          Edith Chambers   Ordering Physician Ronit Bloom MD Interpreting        [facility]                                      Physician           Kota Robison MD,                                                          [facility]  Procedure Type of Study:   Veins:Lower Extremities DVT Study, VASC EXTREMITY VENOUS DUPLEX BILATERAL. Vascular Sonographer Report  Additional Indications:Swelling Impressions Right Impression No evidence of deep vein or superficial vein thrombosis involving the right lower extremity. Left Impression No evidence of deep vein or superficial vein thrombosis involving the left lower extremity. Conclusions   Summary   No evidence of deep vein or superficial vein thrombosis involving the  bilateral lower extremities. Signature   ------------------------------------------------------------------  Electronically signed by Karel Chowdhury MD, [facility] (Interpreting  physician) on 03/01/2019 at 02:22 PM  ------------------------------------------------------------------  Patient Status:Routine. [address] [place] [address] - Vascular Lab. Technical Quality:Adequate visualization. Velocities are measured in cm/s ; Diameters are measured in mm Right Lower Extremities DVT Study Measurements Right 2D Measurements +------------------------+----------+---------------+----------+ ! Location                ! Visualized! Compressibility! Thrombosis! +------------------------+----------+---------------+----------+ ! Sapheno Femoral Junction! Yes       ! Yes            ! None      ! +------------------------+----------+---------------+----------+ ! GSV Thigh               ! Yes       ! Yes            ! None      ! +------------------------+----------+---------------+----------+ ! Common Femoral          !Yes       ! Yes            ! None      ! +------------------------+----------+---------------+----------+ ! Prox Femoral            !Yes       ! Yes            ! None      ! +------------------------+----------+---------------+----------+ ! Mid Femoral             !Yes       ! Yes            ! None      ! +------------------------+----------+---------------+----------+ ! Dist Femoral            !Yes       ! Yes            ! None      ! +------------------------+----------+---------------+----------+ ! Deep Femoral            !Yes       ! Yes            ! None      ! +------------------------+----------+---------------+----------+ ! Popliteal               !Yes       ! Yes            ! None      ! +------------------------+----------+---------------+----------+ ! GSV Below Knee          ! Yes       ! Yes            ! None      ! +------------------------+----------+---------------+----------+ ! Gastroc                 ! Yes       ! Yes            ! None      ! +------------------------+----------+---------------+----------+ ! Soleal                  !Yes       ! Yes            ! None      ! +------------------------+----------+---------------+----------+ ! PTV                     ! Yes       ! Yes            ! None      ! +------------------------+----------+---------------+----------+ ! Peroneal                !Yes       ! Yes            ! None      ! +------------------------+----------+---------------+----------+ ! GSV Calf                ! Yes       ! Yes            ! None      ! +------------------------+----------+---------------+----------+ ! SSV                     ! Yes       ! Yes            ! None      ! +------------------------+----------+---------------+----------+ Right Doppler Measurements +------------------------+------+------+------------+ ! Location                ! Signal!Reflux! Reflux (sec)! +------------------------+------+------+------------+ ! Sapheno Femoral Junction! Phasic!      !            ! +------------------------+------+------+------------+ ! Common Femoral          !Phasic!      !            ! +------------------------+------+------+------------+ ! Prox Femoral            !Phasic!      !            ! +------------------------+------+------+------------+ ! Deep Femoral            !Phasic!      !            ! +------------------------+------+------+------------+ ! Popliteal               !Phasic!      !            ! +------------------------+------+------+------------+ Left Lower Extremities DVT Study Measurements Left 2D Measurements +------------------------+----------+---------------+----------+ ! Location                ! Visualized! Compressibility! Thrombosis! +------------------------+----------+---------------+----------+ ! Sapheno Femoral Junction! Yes       ! Yes            ! None      ! +------------------------+----------+---------------+----------+ ! GSV Thigh               ! Yes       ! Yes            ! None      ! +------------------------+----------+---------------+----------+ ! Common Femoral          !Yes       ! Yes            ! None      ! +------------------------+----------+---------------+----------+ ! Prox Femoral            !Yes       ! Yes            ! None      ! +------------------------+----------+---------------+----------+ ! Mid Femoral             !Yes       ! Yes            ! None      ! +------------------------+----------+---------------+----------+ ! Dist Femoral            !Yes       ! Yes            ! None      ! +------------------------+----------+---------------+----------+ ! Deep Femoral            !Yes       ! Yes            ! None      ! +------------------------+----------+---------------+----------+ ! Popliteal               !Yes       ! Yes            ! None      ! +------------------------+----------+---------------+----------+ ! GSV Below Knee          ! Yes       ! Yes            ! None      ! +------------------------+----------+---------------+----------+ ! Gastroc                 ! Yes       ! Yes            ! None      ! +------------------------+----------+---------------+----------+ ! Soleal                  !Yes       ! Yes            ! None      ! +------------------------+----------+---------------+----------+ ! PTV                     ! Yes       ! Yes            ! None      ! +------------------------+----------+---------------+----------+ ! Peroneal                !Yes       ! Yes            ! None      ! +------------------------+----------+---------------+----------+ ! GSV Calf                ! Yes       ! Yes !None      ! +------------------------+----------+---------------+----------+ ! SSV                     ! Yes       ! Yes            ! None      ! +------------------------+----------+---------------+----------+ Left Doppler Measurements +------------------------+------+------+------------+ ! Location                ! Signal!Reflux! Reflux (sec)! +------------------------+------+------+------------+ ! Sapheno Femoral Junction! Phasic!      !            ! +------------------------+------+------+------------+ ! Common Femoral          !Phasic!      !            ! +------------------------+------+------+------------+ ! Prox Femoral            !Phasic!      !            ! +------------------------+------+------+------------+ ! Deep Femoral            !Phasic!      !            ! +------------------------+------+------+------------+ ! Popliteal               !Phasic!      !            ! +------------------------+------+------+------------+    Ct Chest Abdomen Pelvis W Contrast    Result Date: 2/28/2019  EXAMINATION: CT OF THE CHEST, ABDOMEN, AND PELVIS WITH CONTRAST 2/28/2019 8:34 pm TECHNIQUE: CT of the chest, abdomen and pelvis was performed with the administration of intravenous contrast. Multiplanar reformatted images are provided for review. Dose modulation, iterative reconstruction, and/or weight based adjustment of the mA/kV was utilized to reduce the radiation dose to as low as reasonably achievable. COMPARISON: 02/25/2019 and prior. HISTORY: ORDERING SYSTEM PROVIDED HISTORY: fever, chest pain, abd pain, recent ureter stent TECHNOLOGIST PROVIDED HISTORY: Additional Contrast?->None Ordering Physician Provided Reason for Exam: fever, chest pain, abd pain, recent ureter stent Acuity: Acute Type of Exam: Initial Relevant Medical/Surgical History: Fever (stage 4 cervical ca with mets to bladder and lungs- stent to kidney last week, with fever and pain today.  Sees Dr. Tanya Ellington. ) FINDINGS: Chest: Mediastinum: Right-sided IJ port again noted. Limited imaging of the base of the neck and axilla appear stable. Heart size is stable. The aorta and origin of the great vessels again demonstrate an aberrant right subclavian artery. Main pulmonary artery opacifies unremarkably. No significant change in hilar and mediastinal adenopathy. Esophagus tapers normally. Lungs/pleura: Central airways are patent. Peribronchial wall thickening again noted. Lung volumes are low. Perihilar and dependent ground-glass opacities and multifocal patchy opacities with some more focal consolidation posteriorly at the lung bases again noted. There is slight improved aeration at the left base from the most recent comparison. Mild paraseptal thickening noted. No significant pleural effusion noted. Soft Tissues/Bones:  No acute abnormality of the visualized osseous structures. Abdomen/Pelvis: Organs: Persistent mild dilation of the right renal collecting system is noted with placement of a right ureteral stent with proximal colon, renal pelvis and distal colon extending into the bladder. Periureteral stranding is noted. The left kidney, adrenal glands, pancreas and spleen are unremarkable in appearance. Liver is somewhat heterogeneous in its appearance with no focal lesion identified. Patient is status post cholecystectomy. GI/Bowel: The stomach is unremarkable in appearance. There is a duodenal diverticulum in the proximal portion of the sweep. Small bowel demonstrates no acute abnormality. Appendix is unremarkable. Pelvis: Bladder is incompletely distended. There is bladder wall thickening noted which is less prominent than the comparison. Periureteral stranding is noted. Increased induration is noted along the fat along the right side of the pelvis with persistent adenopathy noted along the right iliac chain measuring up to 1.5 cm in short axis diameter without significant change from prior study.  Small lymph nodes also noted along the left iliac chain and collecting system only. . SEDATION: Intravenous sedation was administered with continuous monitoring throughout the procedure. In measured doses, a total of 6 mg intravenous Versed and 275 mcg intravenous fentanyl was administered. My total procedure time with sedation was 26 minutes. FLUOROSCOPY DOSE AND TYPE OR TIME AND EXPOSURES: 3.5 minutes, 7 digital imaging sequences. DESCRIPTION OF PROCEDURE: Informed consent was obtained after a detailed explanation of the procedure including risks, benefits, and alternatives. Universal protocol was observed. TECHNIQUE: Informed consent was previously obtained. In the semi prone position, an appropriate area posterior lateral aspect of the skin overlying the targeted collecting system was demarcated, sterilely prepared draped and anesthetized. Utilizing ultrasound, anatomy from prior CT scan and other imaging, the needle trajectory and depth was predetermined. The micro puncture needle was advanced using fluoroscopic and ultrasound guidance into the collecting system without difficulty. Once urine was aspirated, a small platinum tip wire was advanced into the collecting system and into the proximal ureter. The tract was dilated. With wire exchange, a 6 [place] sheath was placed. The collecting system is moderately dilated and the ureter is somewhat tortuous. A glide wire was easily advanced down the ureter. Because of this, a 5 [place] peel-away sheath was advanced into the proximal ureter over the wire. The glide wire was then advanced with a steerable catheter into the urinary bladder without difficulty and only mild discomfort. There is no contrast extravasation. Once the catheter was placed into the urinary bladder, contrast injection is seen diluted within the somewhat distended urinary bladder. The catheter was used to place a Super Stiff wire for ureteral stent placement.   A 26 cm 8 [place] ureteral stent was then advanced, such that the distal pigtail is well within the urinary bladder. The proximal pigtail was placed in the lower portion of the left renal pelvis. Again there is no contrast extravasation. No filling defect to indicate clot or bleeding was observed on early or later contrast imaging. Following this, dense contrast was injected showing normal expected filling of the ureteral stent and exiting from the distal pigtail directly into the urinary bladder. Following this, the internalized stent was disengaged. The proximal collecting system was then decompressed. The proximal nephrostomy access was then removed entirely, no external drainage necessary at this point. The patient tolerated the procedure well. A sterile bandage was placed over the small incision. From this point forward, the ureteral stent can be exchanged as needed from below. Successful right percutaneous nephrostomy with antegrade pyelogram. High-grade distal ureteral obstruction with severe hydronephrosis, successfully crossed as above. Successful 8 French internalized right ureteral stent placement as above. STAGING:     Cancer Staging  No matching staging information was found for the patient. ASSESSMENT AND PLAN:     UTI. Currently on vancomycin and meropenem. Metastatic cervical carcinoma with pulmonary metastases. Hypoxia. SLE. Mild dilatation of the right renal collecting system status post right ureteral stent. Severe anemia. This could be partially due to chemotherapy and blood loss. Vaginal bleeding has lessened. She was recently treated with first dose of chemotherapy with cisplatin and Taxol on 2/20/2019. We will continue supportive care. If her anemia gets worse we will consider red blood cell transfusion. Explained to the patient. Refugio Bahena M.D.; M.S. Medical Oncology/Hematology  Phone: [phone]  Fax: [phone]    [address], [address]   Evan Mcneil, [facility]

## 2019-03-02 NOTE — PROGRESS NOTES
Ailin Brown is a 42 y.o. female patient. 1. Multifocal pneumonia    2. Septicemia ([facility] [address]    3. History of cancer      Past Medical History:   Diagnosis Date    Endometriosis     Fibromyalgia     GERD (gastroesophageal reflux disease)     Hip fracture (HCC)     LEFT 2002    Hypoglycemia     Lupus     Neuropathy     Ovarian cyst     Seizures (HCC)      No past surgical history pertinent negatives on file. Scheduled Meds:   vancomycin  1,500 mg Intravenous Q12H    meropenem  1 g Intravenous Q8H    acetaminophen  500 mg Oral Q6H    morphine  30 mg Oral 2 times per day    phenazopyridine  200 mg Oral TID WC    linaclotide  145 mcg Oral QAM AC    ferrous sulfate  325 mg Oral BID WC    baclofen  20 mg Oral BID    DULoxetine  60 mg Oral Daily    gabapentin  300 mg Oral TID    sodium chloride flush  10 mL Intravenous 2 times per day    enoxaparin  40 mg Subcutaneous Daily    famotidine  20 mg Oral BID    ipratropium-albuterol  1 ampule Inhalation 4x daily    guaiFENesin  600 mg Oral BID     Continuous Infusions:   sodium chloride 125 mL/hr at 03/01/19 2228     PRN Meds:promethazine, HYDROmorphone, magic (miracle) mouthwash with nystatin, ketorolac, oxyCODONE, ibuprofen, ondansetron, promethazine, sodium chloride flush, magnesium hydroxide, ondansetron, potassium chloride, magnesium sulfate, acetaminophen    Allergies   Allergen Reactions    Food Hives     Raw spinach.     Spinach      Active Problems:    Fibromyalgia    GERD (gastroesophageal reflux disease)    Vaginal bleeding    History of juvenile rheumatoid arthritis    SLE (systemic lupus erythematosus) (HCC)    Lupus anticoagulant positive    Cervical cancer (HCC)    Lymphadenopathy    Sepsis ([facility] [address]    Septic shock (HCC)    Septicemia (HCC)    History of cancer    Status post cystoscopy    Immunocompromised ([facility] [address]    Complicated UTI (urinary tract infection)    Anemia    Seizure disorder (HCC)    Class 1 obesity due to excess calories with body mass index (BMI) of 31.0 to 31.9 in adult  Resolved Problems:    * No resolved hospital problems. *    Blood pressure 104/67, pulse 97, temperature 97 °F (36.1 °C), temperature source Temporal, resp. rate 22, height 5' 6\" (1.676 m), weight 192 lb 4.8 oz (87.2 kg), SpO2 94 %. Subjective:   Diet: Poor intake. Activity level: Returning to normal.    Pain control: Well controlled. Objective:  Vital signs (most recent): Blood pressure 104/67, pulse 97, temperature 97 °F (36.1 °C), temperature source Temporal, resp. rate 22, height 5' 6\" (1.676 m), weight 192 lb 4.8 oz (87.2 kg), SpO2 94 %. General appearance: Comfortable and ill-appearing. Lungs:  Normal effort. Extremities: There is normal range of motion. Neurological: The patient is alert. Assessment:   Condition: In stable condition.        cerv ca  Right stent  Poss uti  Fever    recc    Rev ct, stent good position, min dil  Cont iv antibx  Last cx neg      Nilay Cope MD  3/2/2019

## 2019-03-02 NOTE — PROGRESS NOTES
[facility]  F/U Note      Reason for Consult: pneumonia  Requesting Physician: Dr. Abelardo Briceno:   [address] [facility]:    Chief Complaint   Patient presents with    Fever     stage 4 cervical ca with mets to bladder and lungs- stent to kidney last week, with fever and pain today. Sees Dr. Nessa Mckeon.       She is still on 4L nc, no recurrent fever reported  No productive cough               Current Medications:    Current Facility-Administered Medications: vancomycin (VANCOCIN) 1,500 mg in dextrose 5 % 250 mL IVPB, 1,500 mg, Intravenous, Q12H  promethazine (PHENERGAN) injection 6.25 mg, 6.25 mg, Intravenous, Q6H PRN  HYDROmorphone HCl PF (DILAUDID) injection 0.5 mg, 0.5 mg, Intravenous, Q3H PRN  meropenem (MERREM) 1 g in sodium chloride 0.9 % 100 mL IVPB (mini-bag), 1 g, Intravenous, Q8H  magic (miracle) mouthwash with nystatin, 5 mL, Swish & Spit, 4x Daily PRN  acetaminophen (TYLENOL) tablet 500 mg, 500 mg, Oral, Q6H  ketorolac (TORADOL) injection 15 mg, 15 mg, Intravenous, Q6H PRN  morphine (MS CONTIN) extended release tablet 30 mg, 30 mg, Oral, 2 times per day  oxyCODONE (OXY-IR) immediate release tablet 15 mg, 15 mg, Oral, Q3H PRN  phenazopyridine (PYRIDIUM) tablet 200 mg, 200 mg, Oral, TID WC  ibuprofen (ADVIL;MOTRIN) tablet 800 mg, 800 mg, Oral, Q8H PRN  linaclotide (LINZESS) capsule 145 mcg, 145 mcg, Oral, QAM AC  ondansetron (ZOFRAN-ODT) disintegrating tablet 8 mg, 8 mg, Oral, Q8H PRN  promethazine (PHENERGAN) tablet 25 mg, 25 mg, Oral, Q6H PRN  ferrous sulfate tablet 325 mg, 325 mg, Oral, BID WC  baclofen (LIORESAL) tablet 20 mg, 20 mg, Oral, BID  DULoxetine (CYMBALTA) extended release capsule 60 mg, 60 mg, Oral, Daily  gabapentin (NEURONTIN) capsule 300 mg, 300 mg, Oral, TID  sodium chloride flush 0.9 % injection 10 mL, 10 mL, Intravenous, 2 times per day  sodium chloride flush 0.9 % injection 10 mL, 10 mL, Intravenous, PRN  magnesium hydroxide (MILK OF MAGNESIA) 400 MG/5ML agitation. Objective:   PHYSICAL EXAM:      VITALS:  /67   Pulse 97   Temp 97 °F (36.1 °C) (Temporal)   Resp 22   Ht 5' 6\" (1.676 m)   Wt 192 lb 4.8 oz (87.2 kg)   SpO2 94%   BMI 31.04 kg/m²      24HR INTAKE/OUTPUT:      Intake/Output Summary (Last 24 hours) at 3/2/2019 1020  Last data filed at 3/2/2019 0943  Gross per 24 hour   Intake 4752 ml   Output 1800 ml   Net 2952 ml     CONSTITUTIONAL:  awake, alert, cooperative, no apparent distress, and appears stated age  NECK:  Supple, symmetrical, trachea midline, no adenopathy, thyroid symmetric, not enlarged and no tenderness, skin normal  LUNGS: clear to auscultation. No accessory muscle use  CARDIOVASCULAR: S1 and S2, no edema and no JVD  ABDOMEN:  normal bowel sounds, non-distended and no masses palpated, and no tenderness to palpation. No hepatospleenomegaly  LYMPHADENOPATHY:  no axillary or supraclavicular adenopathy. No cervical adnenopathy  PSYCHIATRIC: Oriented to person place and time. No obvious depression or anxiety. MUSCULOSKELETAL: No obvious misalignment or effusion of the joints. No clubbing, cyanosis of the digits. SKIN:  normal skin color, texture, turgor and no redness, warmth, or swelling.  No palpable nodules    DATA:    Old records have been reviewed    CBC:  Recent Labs     02/28/19 1913 03/01/19 0506 03/02/19  0655   WBC 18.0* 16.0* 14.4*   RBC 3.62* 3.08* 2.74*   HGB 9.2* 7.9* 7.0*   HCT 29.6* 24.9* 22.2*    249 235   MCV 81.7 81.0 81.2   MCH 25.2* 25.6* 25.7*   MCHC 30.9* 31.5 31.7   RDW 20.4* 20.5* 20.9*   NRBC  --  2*  --    BANDSPCT 18* 10*  --       BMP:  Recent Labs     02/28/19 1913 03/01/19  0506 03/02/19  0655    142 137   K 3.7 3.4* 3.9   CL 97* 106 101   CO2 26 25 26   BUN 8 6* 6*   CREATININE 0.6 <0.5* <0.5*   CALCIUM 9.0 8.1* 8.0*   GLUCOSE 163* 142* 120*      No results found for: BNP  Lab Results   Component Value Date    CKTOTAL 185 12/18/2014    TROPONINI 0.02 (H) 02/15/2019       Radiology Review:  All pertinent images / reports were reviewed as a part of this visit. Assessment:   1. Acute on chronic hypoxic respiratory failure  2. Metastatic cervical ca to lungs  3.  Febrile illness    Plan:   - no recurrent fever and improved WBC  - she was seen by infectious disease service  - continue abx  - Jose J plans for bronchoscopy at this time  - negative LE doppler  - continue O2, wean for sat> 90%  - OOB, IS

## 2019-03-02 NOTE — PROGRESS NOTES
Infectious Diseases  Progress Note        Admission Date: 2/28/2019  Hospital Day: Hospital Day: 3  Attending: Max Zuluaga MD  Date of service: 3/1/19    Presenting complaint:   Chief Complaint   Patient presents with    Fever     stage 4 cervical ca with mets to bladder and lungs- stent to kidney last week, with fever and pain today. Sees Dr. Alfonso Rivera.         Reason for admission: Sepsis ([address] [A41.9]  Sepsis ([address] [A41.9]    Chief complaint/ Reason for consult:       · Septic shock with fever hypotension and lactic acidosis and leukocytosis - septic, hypotension improving  · Recently diagnosed cervical cancer with metastasis to lungs and urinary bladder  · History of cystoscopy and right ureteral stent placement for hydronephrosis 2 weeks ago  · Immunocompromised on chemotherapy with Taxol and cisplatin  · Complicated UTI  · Multifocal pneumonia versus lung metastasis    Problems addressed today:       Patient Active Problem List   Diagnosis Code    Stridor R06.1    Acute bronchitis J20.9    Fibromyalgia M79.7    Bradycardia R00.1    Chest pain R07.9    GERD (gastroesophageal reflux disease) K21.9    Vaginal bleeding N93.9    History of juvenile rheumatoid arthritis Z87.39    SLE (systemic lupus erythematosus) ([facility]) M32.9    Lupus anticoagulant positive R76.0    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, antepartum ([address] O99.019, D61.9    Cervical cancer (HCC) C53.9    Multifocal pneumonia J18.9    Pulmonary nodule R91.1    Hydronephrosis N13.30    Ground glass opacity present on imaging of lung R91.8    Lung nodules R91.8    Lymphadenopathy R59.1    Constipation due to opioid therapy K59.03, T40.2X5A    Sepsis ([address] A41.9    Septic shock (HCC) A41.9, R65.21    Septicemia ([address] A41.9    History of cancer Z85.9    Status post cystoscopy Z98.890    Immunocompromised ([address] W87.7    Complicated UTI (urinary tract infection) N39.0    Anemia D64.9    Seizure disorder ([address] G40.909    Class 1 obesity due to excess calories with body mass index (BMI) of 31.0 to 31.9 in adult E66.09, Z68.31             Microbiology:        I have reviewed allavailable micro lab data and cultures    · Blood culture (2/2) - collected on 2/28/2019: In process  · Urine culture  - collected on 2/28/2019: In process        Assessment:     The patient is a 42 y.o. old female who  has a past medical history of Endometriosis, Fibromyalgia, GERD (gastroesophageal reflux disease), Hip fracture ([facility] [address] Hypoglycemia, Lupus, Neuropathy, Ovarian cyst, and Seizures ([facility] [address] with following problems:    · Septic shock with fever hypotension and lactic acidosis and leukocytosis - hypotension resolved, still has low grade fever and leucocytosis  · Recently diagnosed cervical cancer with metastasis to lungs and urinary bladder  · History of cystoscopy and right ureteral stent placement for hydronephrosis 2 weeks ago  · Immunocompromised on chemotherapy with Taxol and cisplatin  · Complicated UTI - urine culture in process   · Multifocal pneumonia versus lung metastasis  · Anemia due to vaginal bleeding  · Lupus  · Seizure disorder  · GERD  · Obesity Class 1 due to excess calorie intake : Body mass index is 31.04 kg/m². Discussion:      The patient is on IV vancomycin. I'll start her on IV meropenem as well yesterday. Her WBC count is slightly better and is 14,400, however, she still had a fever of 100.1 today. Blood cultures and urine culture from admission are negative so far. Respiratory film array viral PCR panel came back negative. Urine Legionella and pneumococcal antigens are negative. The fungal serologies are pending. Serum creatinine is 0.5 today. Pro-calcitonin was only 0.09. Plan:     Diagnostic Workup:    · Continue to follow  fever curve, WBC count and blood cultures  · Follow up on liver and renal function    Antimicrobials:    · Her pro-calcitonin level is unremarkable.   Hence, chances of bacterial infection or low  · If cultures stay negative, may consider stopping antibiotics quickly  · Will continue IV vancomycin for now. Target vancomycin trough will be February 20  · Will continue IV meropenem 1 g every 8 hours for now  · Cough and deep breathing exercises  · Aspiration precautions  · DVT prophylaxis  · Discussed all above with patient and RN      Drug Monitoring:    · Continue monitoring for antibiotic toxicity as follows: Banker trough, CMP  · Continue to watch for following: new or worsening fever, new hypotension, hives, lip swelling and redness or purulence at vascular access sites. I/v access Management:    · Continue to monitor i.v access sites for erythema, induration, discharge or tenderness. · As always, continue efforts to minimize tubes/lines/drains as clinically appropriate to reduce chances of line associated infections. Patient education and counseling:        · The patient was educated in detail about the side-effects of various antibiotics and things to watch for like new rashes, lip swelling, severe reaction, worsening diarrhea, break through fever etc.  · Discussed patient's condition and what to expect. All of the patient's questions were addressed in a satisfactory manner and patient verbalized understanding all instructions. Weight loss counseling:    Extensive weight loss counseling was done. It is important to set a realistic weight loss goal. First goal should be to avoid gaining more weight and staying at current weight (or within 5 percent). People at high risk of developing diabetes who are able to lose 5 percent of their body weight and maintain this weight will reduce their risk of developing diabetes by about 50 percent and reduce their blood pressure. Losing more than 15 percent of  body weight and staying at this weight is an extremely good result, even if you never reach your \"dream\" or \"ideal\" weight.     Lifestyle changes including changing eating habits, substituting excess carbohydrates with proteins, stress reduction, using self-help programs like Weight Watchers®, Overeaters Anonymous®, and Take Off Pounds Sensibly (TOPS)© , following DASH diet and increasing exercise or walking briskly daily for half hour to and hour 5-7 days a week was suggested among other measures. Information was given about various weight loss education programs and their websites like www.cdc.gov/healthyweight, www.choosemyplate.gov and www.health.gov/dietaryguidelines/    TIME SPENT TODAY:     - Spent over  36  minutes on visit (including interval history, physical exam, review of data including labs, cultures, imaging, development and implementation of treatment plan and coordination of complex care). - Over 50% of time spent with patient face to face on counseling and education. Thank you for involving me in the care of your patient. I will continue to follow. If you have anyadditional questions, please do not hesitate to contact me. Subjective: Interval history:  The patient was seen and examined earlier today at bedside. She feels tired. She is tolerating antibiotics okay. She still has a low-grade fever. Still has coughing. Still has dysuria. Past Medical History: All past medical history reviewed today. Past Medical History:   Diagnosis Date    Endometriosis     Fibromyalgia     GERD (gastroesophageal reflux disease)     Hip fracture (HCC)     LEFT     Hypoglycemia     Lupus     Neuropathy     Ovarian cyst     Seizures (HCC)          Past Surgical History: All pastsurgical history was reviewed today.     Past Surgical History:   Procedure Laterality Date    BRONCHOSCOPY  10/16/14    Urgent intubation, direct laryngoscopy, subglottic tracheoscopy    BRONCHOSCOPY  10/18/2014    WITH EXTUBATION IN OR AND LARYNGOSCOPY     SECTION      x 3, 2005, 2006, 2008    CHOLECYSTECTOMY  2012    CYSTOSCOPY Right 2019 needed for Pain or Fever  norethindrone (AYGESTIN) 5 MG tablet, Take 1 tablet by mouth every 12 hours for 21 days  DULoxetine (CYMBALTA) 60 MG extended release capsule, Take 60 mg by mouth daily  baclofen (LIORESAL) 20 MG tablet, Take 20 mg by mouth 2 times daily   gabapentin (NEURONTIN) 300 MG capsule, Take 300 mg by mouth 3 times daily. Milagros Side vancomycin  1,500 mg Intravenous Q12H    meropenem  1 g Intravenous Q8H    acetaminophen  500 mg Oral Q6H    morphine  30 mg Oral 2 times per day    phenazopyridine  200 mg Oral TID WC    linaclotide  145 mcg Oral QAM AC    ferrous sulfate  325 mg Oral BID WC    baclofen  20 mg Oral BID    DULoxetine  60 mg Oral Daily    gabapentin  300 mg Oral TID    sodium chloride flush  10 mL Intravenous 2 times per day    enoxaparin  40 mg Subcutaneous Daily    famotidine  20 mg Oral BID    ipratropium-albuterol  1 ampule Inhalation 4x daily    guaiFENesin  600 mg Oral BID       Current antibiotics: All antibiotics and their doses were reviewed by me    Recent Abx Admin                   vancomycin (VANCOCIN) 1,500 mg in dextrose 5 % 250 mL IVPB (mg) 1,500 mg New Bag 03/02/19 0750     1,500 mg New Bag 03/01/19 1726    meropenem (MERREM) 1 g in sodium chloride 0.9 % 100 mL IVPB (mini-bag) (g) 1 g New Bag 03/02/19 0520     1 g New Bag 03/01/19 1946                   REVIEW OF SYSTEMS:       Review of Systems   Constitutional: Positive for fatigue and fever. Negative for chills, diaphoresis and unexpected weight change. HENT: Negative for congestion, ear discharge, ear pain, facial swelling, hearing loss, rhinorrhea and trouble swallowing. Eyes: Negative for photophobia, discharge, redness and visual disturbance. Respiratory: Positive for cough. Negative for apnea, choking, chest tightness, shortness of breath and stridor. Cardiovascular: Negative for chest pain and palpitations. Gastrointestinal: Negative for abdominal pain, blood in stool, diarrhea and nausea. oriented to person, place, and time. She exhibits normal muscle tone. Skin: Skin is warm and dry. No rash noted. She is not diaphoretic. No erythema. Psychiatric: She has a normal mood and affect. Judgment normal.   Nursing note and vitals reviewed. Lines: All vascular access sites are healthy with no local erythema, discharge or tenderness. Intake and output:     I/O last 3 completed shifts: In: 0217 [P.O.:1920; I.V.:2472]  Out: 1800 [Urine:1800]    Lab Data:   All available labs were reviewed by me today. CBC:   Recent Labs     02/28/19 1913 03/01/19  0506 03/02/19  0655   WBC 18.0* 16.0* 14.4*   RBC 3.62* 3.08* 2.74*   HGB 9.2* 7.9* 7.0*   HCT 29.6* 24.9* 22.2*    249 235   MCV 81.7 81.0 81.2   MCH 25.2* 25.6* 25.7*   MCHC 30.9* 31.5 31.7   RDW 20.4* 20.5* 20.9*   NRBC  --  2*  --    BANDSPCT 18* 10* 5        BMP:  Recent Labs     02/28/19 1913 03/01/19  0506 03/02/19  0655    142 137   K 3.7 3.4* 3.9   CL 97* 106 101   CO2 26 25 26   BUN 8 6* 6*   CREATININE 0.6 <0.5* <0.5*   CALCIUM 9.0 8.1* 8.0*   GLUCOSE 163* 142* 120*        Hepatic FunctionPanel:   Lab Results   Component Value Date    ALKPHOS 130 02/28/2019    ALT 31 02/28/2019    AST 27 02/28/2019    PROT 6.5 02/28/2019    PROT 6.3 04/18/2012    BILITOT <0.2 02/28/2019    BILIDIR <0.2 02/18/2019    IBILI see below 02/18/2019    LABALBU 3.5 02/28/2019       CPK:   Lab Results   Component Value Date    CKTOTAL 185 12/18/2014     ESR:   Lab Results   Component Value Date    SEDRATE 27 (H) 12/05/2018     CRP:   Lab Results   Component Value Date    CRP 4.7 12/05/2018         Imaging: All pertinent images and reports for the current visit were reviewed by meduring this visit. VL Extremity Venous Bilateral   Final Result      CT CHEST ABDOMEN PELVIS W CONTRAST   Final Result   Diffuse multifocal airspace disease is again noted with slight interval   improvement of more focal areas of consolidation at the left base. Findings   compatible with multifocal pneumonia. Malignancy, metastatic disease is   within the differential though considered less likely. Right ureteral stent is in place with persistent mild dilation of the renal   collecting system. Wall thickening of the bladder is noted which is improved compared to the   prior study. Mild stranding is noted in the perivesicular fat. The cervix is somewhat irregular in its appearance with wall thickening of   the vagina noted. Endometrial fluid noted on the prior study is no longer   identified. In a patient with a reported history of cervical cancer malignancy is within   the differential.  If the patient has had radiation therapy findings may also   represent associated inflammatory changes. Nonspecific adenopathy within the pelvis may be reactive in nature or related   to underlying malignancy. Recommend continued follow-up. XR CHEST PORTABLE   Final Result   Patchy consolidation within the lungs, especially the right perihilar region,   mildly increased when compared to the previous exam.  Given the relatively   rapid increase, pneumonia would be primarily considered. Outside records:    Labs, Microbiology, Radiology and pertinent results from Care everywhere, if available, were reviewed as a part ofthe consultation. Known drug Allergies: All allergies were reviewed and updated    Allergies   Allergen Reactions    Food Hives     Raw spinach.  Spinach          Please note that this chart was generated using Dragon dictation software. Although every effort was made to ensure the accuracy of this automated transcription, some errors in transcription may have occurred inadvertently. If you may need any clarification, please do not hesitate to contact me through EPIC or at the phone number provided below with my electronic signature.       Piter Acosta MD, MPH  3/2/2019, 12:33 PM  [facility] Infectious Disease

## 2019-03-02 NOTE — PLAN OF CARE
Problem: Pain:  Goal: Pain level will decrease  Description  Pain level will decrease   Outcome: Ongoing  Note:   PRN meds were administered (see MAR). Pt reports pain level to be \"decreasing\" pt seems less agitated. Will continue to monitor.

## 2019-03-02 NOTE — PROGRESS NOTES
Assessment complete. See flow sheet. Pain evaluation complete. Discussed POC for this shift. Pt had new onset of fever 100.2 F Tylenol administered before onset per MAR. Paged doctor Polly Strickland about the pt's status. Will continue to monitor.

## 2019-03-02 NOTE — PROGRESS NOTES
Dilaudid for c/o pain 8/10 after ambulating to bathroom. Pain not from ambulation, but from actually urinating.

## 2019-03-02 NOTE — PROGRESS NOTES
Progress Note - Dr. Maribeth Mo - Internal Medicine  PCP: Radha Cheung MD [address] [place] [address] Johny Martin 80 [phone]    Hospital Day: 2  Code Status: Full Code  Current Diet: Dietary Nutrition Supplements: Standard High Calorie Oral Supplement  DIET GENERAL;        CC: follow up on medical issues    Subjective:   Aura Rosen is a 42 y.o. female. She denies new problems    Doing ok  Feels much better  On medical floor now  Appreciate ID, GYn/Onc, Urology, Pulm eval    She denies chest pain, complains of shortness of breath, denies nausea,  denies emesis. 10 system Review of Systems is reviewed with patient, and pertinent positives are listed here: None . Otherwise, Review of systems is negative. I have reviewed the patient's medical and social history in detail and updated the computerized patient record. To recap: She  has a past medical history of Endometriosis, Fibromyalgia, GERD (gastroesophageal reflux disease), Hip fracture ([address] Hypoglycemia, Lupus, Neuropathy, Ovarian cyst, and Seizures ([address] . She  has a past surgical history that includes  section; Cholecystectomy (); bronchoscopy (10/16/14); bronchoscopy (10/18/2014); Cystoscopy (Right, 2019); and laparoscopy (2019). . She  reports that she quit smoking about 17 years ago. Her smoking use included cigarettes. She started smoking about 21 years ago. She has a 0.75 pack-year smoking history. She has never used smokeless tobacco. She reports that she does not drink alcohol or use drugs. .        Active Hospital Problems    Diagnosis Date Noted    Fibromyalgia [M79.7] 10/08/2014     Priority: Medium    Septic shock ([address] [A41.9, R65.21]     Septicemia ([address] [A41.9]     History of cancer [Z85.9]     Status post cystoscopy [Z98.890]     Immunocompromised ([address] [H66.5]     Complicated UTI (urinary tract infection) [N39.0]     Anemia [D64.9]     Seizure disorder ([address] [G40.909]     Class 1 obesity kg)   03/01/19 0443 197 lb (89.4 kg)   02/28/19 1755 190 lb (86.2 kg)           Intake/Output Summary (Last 24 hours) at 3/2/2019 1333  Last data filed at 3/2/2019 1256  Gross per 24 hour   Intake 5912 ml   Output --   Net 5912 ml         Physical Exam:   S1, S2 normal, no murmur, rub or gallop, regular rate and rhythm  Rales in bases  abdomen is soft without significant tenderness, masses, organomegaly or guarding  extremities normal, atraumatic, no cyanosis or edema    Labs:  Lab Results   Component Value Date    WBC 14.4 (H) 03/02/2019    HGB 7.0 (L) 03/02/2019    HCT 22.2 (L) 03/02/2019     03/02/2019    CHOL 188 10/15/2014    TRIG 132 10/15/2014    HDL 65 (H) 10/15/2014    ALT 31 02/28/2019    AST 27 02/28/2019     03/02/2019    K 3.9 03/02/2019     03/02/2019    CREATININE <0.5 (L) 03/02/2019    BUN 6 (L) 03/02/2019    CO2 26 03/02/2019    TSH 1.57 06/18/2014    INR 1.25 (H) 02/28/2019    LABMICR YES 02/28/2019     Lab Results   Component Value Date    CKTOTAL 185 12/18/2014    TROPONINI 0.02 (H) 02/15/2019       Recent Imaging Results are Reviewed:  Xr Chest Standard (2 Vw)    Result Date: 2/26/2019  EXAMINATION: TWO VIEWS OF THE CHEST 2/26/2019 11:55 am COMPARISON: Frontal view of the chest 02/24/2019, frontal and lateral views of the chest 02/19/2019, CT thorax 02/25/2019. HISTORY: ORDERING SYSTEM PROVIDED HISTORY: cough TECHNOLOGIST PROVIDED HISTORY: Reason for exam:->cough Ordering Physician Provided Reason for Exam: cough FINDINGS: Support devices: Right IJ chest port again noted with distal tip terminating in the proximal right atrium in good position. The cardiopericardial silhouette is stable in size and configuration. Bilateral patchy airspace opacities are redemonstrated most pronounced in the mid and lower lung zones, these were better detailed on the recent CT of the thorax from 02/25/2019, please refer to that report for additional information.   The overall radiographic appearance has mildly increased from the prior radiograph 02/24/2019. There is no pneumothorax or pleural effusion. Surgical clips project in the upper abdomen. Mild interval increase in bilateral patchy airspace opacities better detailed on the CT of the thorax from 02/25/2019. Findings are again most suggestive of an infectious/inflammatory process. Xr Chest Standard (2 Vw)    Result Date: 2/19/2019  EXAMINATION: TWO VIEWS OF THE CHEST 2/19/2019 4:26 pm COMPARISON: 02/16/2019 HISTORY: ORDERING SYSTEM PROVIDED HISTORY: cough TECHNOLOGIST PROVIDED HISTORY: Reason for exam:->cough Ordering Physician Provided Reason for Exam: Cervical Cancer (Pt was sent over from Dr Angela Garcia office - states she was sent here from office - was told today that she has stage 3 cervical cancer and was sent here for \"scans and further testing\") Acuity: Unknown Type of Exam: Unknown FINDINGS: There is patchy bilateral airspace disease, which appears increased when compared to the previous exam.  As detected on recent CT PA, some of those have a nodular appearance. The heart mediastinal contours are unremarkable. No pneumothorax. No free air. No acute bony abnormalities. Chin catheter noted. Patchy bilateral airspace disease, concerning for pneumonia, which appears increased when compared to the previous exam.  Again, some of these areas of opacity have a nodular appearance and septic emboli should be considered, especially when given the correlation with the CT PA from 02/15/2019. Process should be followed to resolution.      Xr Chest Standard (2 Vw)    Result Date: 2/16/2019  EXAMINATION: TWO VIEWS OF THE CHEST 2/16/2019 11:49 am COMPARISON: 10/16/2014 HISTORY: ORDERING SYSTEM PROVIDED HISTORY: pneumonia TECHNOLOGIST PROVIDED HISTORY: Reason for exam:->pneumonia Ordering Physician Provided Reason for Exam: PNA Acuity: Acute Type of Exam: Initial Relevant Medical/Surgical History: cervical ca FINDINGS: There is patchy bibasilar consolidation, right greater than left, superimposed on a background of diffuse interstitial prominence. Heart size, mediastinal contours and pulmonary vascularity are within normal limits. There is no pleural effusion. Multifocal bilateral pneumonia. Ct Chest W Contrast    Result Date: 2/25/2019  EXAMINATION: CT OF THE CHEST WITH CONTRAST 2/25/2019 1:47 pm TECHNIQUE: CT of the chest was performed with the administration of intravenous contrast. Multiplanar reformatted images are provided for review. Dose modulation, iterative reconstruction, and/or weight based adjustment of the mA/kV was utilized to reduce the radiation dose to as low as reasonably achievable. COMPARISON: CT of the chest February 15, 2019 HISTORY: ORDERING SYSTEM PROVIDED HISTORY: PNEUMONIA, UNRESOLVED TECHNOLOGIST PROVIDED HISTORY: Ordering Physician Provided Reason for Exam: Pneumonia, unresolved Acuity: Unknown Type of Exam: Unknown FINDINGS: Mediastinum: Unchanged mildly enlarged lymph nodes. No pericardial effusion. Lungs/pleura: There is increased severity of extensive bilateral pulmonary disease, with extensive foci of ground-glass opacity, in addition to small areas of consolidation. No pneumothorax or pleural effusion. Upper Abdomen: Negative, no acute findings. Soft Tissues/Bones: No acute osseous findings. Increased severity of extensive bilateral pulmonary disease, nonspecific but consistent with pneumonia. ARDS is also considered. Us Renal Complete    Result Date: 2/26/2019  EXAMINATION: RETROPERITONEAL ULTRASOUND OF THE KIDNEYS AND URINARY BLADDER 2/26/2019 COMPARISON: CT abdomen and pelvis 02/15/2019. HISTORY: ORDERING SYSTEM PROVIDED HISTORY: left sided pain, hydronephrosis TECHNOLOGIST PROVIDED HISTORY: Ordering Physician Provided Reason for Exam: hx of recent rt stent cervical ca Acuity: Unknown Type of Exam: Subsequent/Follow-up FINDINGS: Kidneys:  The right kidney measures 11.6 cm in length and the left kidney measures 11.3 cm in length. Kidneys demonstrate normal cortical echogenicity. No evidence of hydronephrosis or intrarenal stones. Bladder: Urinary bladder is underdistended at the time of imaging limiting evaluation. No gross bladder wall abnormalities noted. No postvoid imaging performed. Kidneys are unremarkable. Limited evaluation of urinary bladder secondary to underdistention at the time of imaging without gross bladder wall abnormality noted. Ct Abdomen Pelvis W Iv Contrast Additional Contrast? None    Result Date: 2/15/2019  EXAMINATION: CTA OF THE CHEST; CT OF THE ABDOMEN AND PELVIS WITH CONTRAST 2/15/2019 2:11 pm TECHNIQUE: CTA of the chest was performed after the administration of intravenous contrast.  Multiplanar reformatted images are provided for review. MIP images are provided for review. Dose modulation, iterative reconstruction, and/or weight based adjustment of the mA/kV was utilized to reduce the radiation dose to as low as reasonably achievable.; CT of the abdomen and pelvis was performed with the administration of intravenous contrast. Multiplanar reformatted images are provided for review. Dose modulation, iterative reconstruction, and/or weight based adjustment of the mA/kV was utilized to reduce the radiation dose to as low as reasonably achievable. COMPARISON: 1/28/2019, 4/29/2011 HISTORY: ORDERING SYSTEM PROVIDED HISTORY: cervical ca - SOB - PE??? TECHNOLOGIST PROVIDED HISTORY: Ordering Physician Provided Reason for Exam: SOB Acuity: Unknown Type of Exam: Unknown; ORDERING SYSTEM PROVIDED HISTORY: abd disten - cervical ca? TECHNOLOGIST PROVIDED HISTORY: Additional Contrast?->None Ordering Physician Provided Reason for Exam: abd distention Acuity: Unknown Type of Exam: Unknown Patient with recently diagnosed cervical cancer. FINDINGS: Chest: Pulmonary arteries: The pulmonary arteries opacify normally.   There is no evidence of filling defect to suggest a pulmonary embolism. Mediastinum: The thyroid is unremarkable. There is mild subcarinal and bilateral hilar lymphadenopathy, most likely benign and reactive in etiology given the patient's underlying lung findings. The thoracic aorta is unremarkable, without evidence of aneurysm or dissection. 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 No pericardial abnormality is identified. Lungs/pleura: There is mild mucous plugging within the bilateral lower lobes. The central airways are otherwise widely patent. There has been interval development of widespread ground-glass opacity throughout both lungs, with diffuse intralobular septal thickening evident, new from prior exam.  This most likely reflects a combination of interstitial pulmonary edema and superimposed multifocal pneumonia. Additionally, there has been interval progression and more consolidation of multiple scattered various sized pulmonary nodules throughout both lungs since the study of 1/28/2019. A few of these are cavitary. The largest of these measures approximately 10 mm in short axis within the subpleural portion of the left lower lobe. These may be secondary to an atypical infectious or inflammatory process, to include septic emboli. Metastatic disease is considered less likely, though not excluded. There is no evidence of a pneumothorax or pleural effusion. Soft Tissues/Bones: No acute findings. Abdomen/Pelvis: Organs: The patient is status post cholecystectomy. The liver, spleen, and pancreas are normal.  The adrenal glands are unremarkable bilaterally. There has been interval development of nonspecific moderate right hydronephrosis since the prior exam, without definite demonstrable cause. Namely, no definite obstructing stone or mass is identified. The left kidney is stable and unremarkable.  GI/Bowel: Evaluation of the hollow GI tract demonstrates no evidence of abnormal bowel wall thickening, dilatation, or obstruction. The appendix is normal. Pelvis: The urinary bladder is partially collapsed, though appears diffusely thick-walled and inflamed, with a mild amount of pericystic stranding noted. These findings are suggestive of an acute cystitis, progressed from prior exam.  Additionally, the cervix appears enlarged and irregular, and there is new abnormal thickening of the endometrium within the uterine fundus, which appears new in comparison with the study of 1/28/2019. The bilateral adnexa are unremarkable. There is a mild amount of free pelvic fluid, likely physiologic. There are stable mildly enlarged bilateral pelvic chain lymph nodes, the largest measuring approximately 2.6 x 1.6 cm along the right external iliac chain, similar to the study of 1/28/2019. Peritoneum/Retroperitoneum: No intraperitoneal free air or free fluid is identified. No pathologic lymphadenopathy is seen. The abdominal aorta is unremarkable. No significant abdominal wall hernia is identified. Bones/Soft Tissues: There is mild bilateral sacroiliitis. The skeletal structures are otherwise unremarkable. 1. No CT evidence of a pulmonary embolism. 2. No acute abnormality of the thoracic aorta. 3. Interval development of widespread ground-glass opacity throughout both lungs with diffuse intralobular septal thickening. This most likely reflects a combination of interstitial pulmonary edema and multifocal pneumonia. 4. Interval progression of multiple nodular foci of consolidative opacity throughout both lungs, a few of which are cavitary in appearance. These nodules are most likely infectious or inflammatory in etiology, and septic emboli are a consideration. Given patient's history of cervical cancer, metastatic disease is on the differential, though considered less likely. 5. New nonspecific moderate right hydronephrosis, without demonstrable cause. However, there is underlying wall thickening and enhancement of the urinary bladder. This combination of findings could represent a cystitis in the setting of urinary tract infection with resultant pyelitis and hydronephrosis. However, given patient history of cervical cancer, locoregional spread of tumor with resultant obstruction of the distal right ureter may be a cause of the patient's right hydronephrosis. 6. Interval development of new irregularity of the cervix and nonspecific endometrial thickening in comparison with the prior study of 1/28/2019. This likely represents the patient's known underlying cervical cancer causing obstruction of the endometrial with resultant endometrial thickening. This could be further evaluated with a follow-up pelvic ultrasound. 7. Stable mild bilateral pelvic chain lymphadenopathy, right greater than left, possibly representing metastatic disease. Xr Chest Portable    Result Date: 2/28/2019  EXAMINATION: SINGLE XRAY VIEW OF THE CHEST 2/28/2019 3:48 pm COMPARISON: 02/26/2019 HISTORY: ORDERING SYSTEM PROVIDED HISTORY: fever TECHNOLOGIST PROVIDED HISTORY: Reason for exam:->fever Ordering Physician Provided Reason for Exam: stage 4 cervical ca with mets to bladder and lungs- stent to kidney last week, with fever and pain today. Acuity: Acute Type of Exam: Initial FINDINGS: Mild patchy consolidation within the right lung is again identified, increased when compared to the previous exam, greatest in the right perihilar region. Nodular opacities within the lungs are also again found, similar. Heart mediastinal contours are unremarkable. Chin catheter is present, unchanged in position. No acute bony abnormalities. No pneumothorax. No free air. Patchy consolidation within the lungs, especially the right perihilar region, mildly increased when compared to the previous exam.  Given the relatively rapid increase, pneumonia would be primarily considered.      Xr Chest Portable    Result Date: 2/24/2019  EXAMINATION: SINGLE XRAY VIEW OF THE CHEST 2/24/2019 7:00 am COMPARISON: Chest radiograph performed 02/19/2019. HISTORY: ORDERING SYSTEM PROVIDED HISTORY: elevated wbc and ongoing shortness of breath TECHNOLOGIST PROVIDED HISTORY: Reason for exam:->elevated wbc and ongoing shortness of breath Acuity: Unknown Type of Exam: Unknown FINDINGS: There is mild bilateral pulmonary congestion. There is similar left basilar infiltrate. There is no pneumothorax. The mediastinal structures are stable. The upper abdomen is unremarkable. The extrathoracic soft tissues are unremarkable. There is a right internal jugular port. Stable bilateral congestion and left basilar infiltrate. Ct Chest Pulmonary Embolism W Contrast    Result Date: 2/15/2019  EXAMINATION: CTA OF THE CHEST; CT OF THE ABDOMEN AND PELVIS WITH CONTRAST 2/15/2019 2:11 pm TECHNIQUE: CTA of the chest was performed after the administration of intravenous contrast.  Multiplanar reformatted images are provided for review. MIP images are provided for review. Dose modulation, iterative reconstruction, and/or weight based adjustment of the mA/kV was utilized to reduce the radiation dose to as low as reasonably achievable.; CT of the abdomen and pelvis was performed with the administration of intravenous contrast. Multiplanar reformatted images are provided for review. Dose modulation, iterative reconstruction, and/or weight based adjustment of the mA/kV was utilized to reduce the radiation dose to as low as reasonably achievable. COMPARISON: 1/28/2019, 4/29/2011 HISTORY: ORDERING SYSTEM PROVIDED HISTORY: cervical ca - SOB - PE??? TECHNOLOGIST PROVIDED HISTORY: Ordering Physician Provided Reason for Exam: SOB Acuity: Unknown Type of Exam: Unknown; ORDERING SYSTEM PROVIDED HISTORY: abd disten - cervical ca? TECHNOLOGIST PROVIDED HISTORY: Additional Contrast?->None Ordering Physician Provided Reason for Exam: abd distention Acuity: Unknown Type of Exam: Unknown Patient with recently diagnosed cervical cancer. stable and unremarkable. GI/Bowel: Evaluation of the hollow GI tract demonstrates no evidence of abnormal bowel wall thickening, dilatation, or obstruction. The appendix is normal. Pelvis: The urinary bladder is partially collapsed, though appears diffusely thick-walled and inflamed, with a mild amount of pericystic stranding noted. These findings are suggestive of an acute cystitis, progressed from prior exam.  Additionally, the cervix appears enlarged and irregular, and there is new abnormal thickening of the endometrium within the uterine fundus, which appears new in comparison with the study of 1/28/2019. The bilateral adnexa are unremarkable. There is a mild amount of free pelvic fluid, likely physiologic. There are stable mildly enlarged bilateral pelvic chain lymph nodes, the largest measuring approximately 2.6 x 1.6 cm along the right external iliac chain, similar to the study of 1/28/2019. Peritoneum/Retroperitoneum: No intraperitoneal free air or free fluid is identified. No pathologic lymphadenopathy is seen. The abdominal aorta is unremarkable. No significant abdominal wall hernia is identified. Bones/Soft Tissues: There is mild bilateral sacroiliitis. The skeletal structures are otherwise unremarkable. 1. No CT evidence of a pulmonary embolism. 2. No acute abnormality of the thoracic aorta. 3. Interval development of widespread ground-glass opacity throughout both lungs with diffuse intralobular septal thickening. This most likely reflects a combination of interstitial pulmonary edema and multifocal pneumonia. 4. Interval progression of multiple nodular foci of consolidative opacity throughout both lungs, a few of which are cavitary in appearance. These nodules are most likely infectious or inflammatory in etiology, and septic emboli are a consideration. Given patient's history of cervical cancer, metastatic disease is on the differential, though considered less likely.  5. New nonspecific alternatives. Universal protocol was observed. The right neck and chest were prepped and draped in sterile fashion using maximum sterile barrier technique. Local anesthesia was achieved with lidocaine. A micropuncture needle was used to access the right internal jugular vein using ultrasound guidance. An ultrasound image demonstrating patency of the vein with needle tip located within it. An image was obtained and stored in PACs. A 0.035 guidewire was used to place a peel-away sheath. A chest wall incision was made and a subcutaneous pocket was created. The port reservoir was placed within the pocket and the port tubing was attached and tunneled subcutaneously to the venotomy site. The port tubing was cut to an appropriate length and advanced through the peel-away sheath under fluoroscopic guidance to the cavo-atrial junction. The chest wall incision was closed using 3-0 and 4-0 Vicryl suture and tissue adhesive was applied to the venotomy site and chest wall incision. The port flushed easily and there was a good blood return. The port was locked with heparinized saline. The patient tolerated the procedure well and there were no immediate complications. FINDINGS: Fluoroscopic image demonstrates the tip of the port in the right atrium. 1. Successful ultrasound and fluoroscopy guided Port-A-Cath placement via right IJ access, as described above. 2.  The port was left accessed for immediate use. Fl Retrograde Pyelogram Right    Result Date: 2/17/2019  EXAMINATION: SPOT FLUOROSCOPIC IMAGES 2/17/2019 6:52 am TECHNIQUE: Fluoroscopy was provided by the radiology department for procedure. Radiologist was not present during examination. FLUOROSCOPY DOSE AND TYPE OR TIME AND EXPOSURES: 3 seconds of fluoroscopy was utilized for purposes of intraoperative localization. 1 fluoroscopic spot image was obtained. COMPARISON: None HISTORY: Intraprocedural imaging. FINDINGS: 1 spot images of the abdomen was obtained. +------------------------+----------+---------------+----------+ ! Location                ! Visualized! Compressibility! Thrombosis! +------------------------+----------+---------------+----------+ ! Sapheno Femoral Junction! Yes       ! Yes            ! None      ! +------------------------+----------+---------------+----------+ ! GSV Thigh               ! Yes       ! Yes            ! None      ! +------------------------+----------+---------------+----------+ ! Common Femoral          !Yes       ! Yes            ! None      ! +------------------------+----------+---------------+----------+ ! Prox Femoral            !Yes       ! Yes            ! None      ! +------------------------+----------+---------------+----------+ ! Mid Femoral             !Yes       ! Yes            ! None      ! +------------------------+----------+---------------+----------+ ! Dist Femoral            !Yes       ! Yes            ! None      ! +------------------------+----------+---------------+----------+ ! Deep Femoral            !Yes       ! Yes            ! None      ! +------------------------+----------+---------------+----------+ ! Popliteal               !Yes       ! Yes            ! None      ! +------------------------+----------+---------------+----------+ ! GSV Below Knee          ! Yes       ! Yes            ! None      ! +------------------------+----------+---------------+----------+ ! Gastroc                 ! Yes       ! Yes            ! None      ! +------------------------+----------+---------------+----------+ ! Soleal                  !Yes       ! Yes            ! None      ! +------------------------+----------+---------------+----------+ ! PTV                     ! Yes       ! Yes            ! None      ! +------------------------+----------+---------------+----------+ ! Peroneal                !Yes       ! Yes            ! None      ! +------------------------+----------+---------------+----------+ ! GSV Calf                ! Yes       ! Yes            ! None      ! +------------------------+----------+---------------+----------+ ! SSV                     ! Yes       ! Yes            ! None      ! +------------------------+----------+---------------+----------+ Right Doppler Measurements +------------------------+------+------+------------+ ! Location                ! Signal!Reflux! Reflux (sec)! +------------------------+------+------+------------+ ! Sapheno Femoral Junction! Phasic!      !            ! +------------------------+------+------+------------+ ! Common Femoral          !Phasic!      !            ! +------------------------+------+------+------------+ ! Prox Femoral            !Phasic!      !            ! +------------------------+------+------+------------+ ! Deep Femoral            !Phasic!      !            ! +------------------------+------+------+------------+ ! Popliteal               !Phasic!      !            ! +------------------------+------+------+------------+ Left Lower Extremities DVT Study Measurements Left 2D Measurements +------------------------+----------+---------------+----------+ ! Location                ! Visualized! Compressibility! Thrombosis! +------------------------+----------+---------------+----------+ ! Sapheno Femoral Junction! Yes       ! Yes            ! None      ! +------------------------+----------+---------------+----------+ ! GSV Thigh               ! Yes       ! Yes            ! None      ! +------------------------+----------+---------------+----------+ ! Common Femoral          !Yes       ! Yes            ! None      ! +------------------------+----------+---------------+----------+ ! Prox Femoral            !Yes       ! Yes            ! None      ! +------------------------+----------+---------------+----------+ ! Mid Femoral             !Yes       ! Yes            ! None      ! +------------------------+----------+---------------+----------+ ! Dist Femoral            !Yes       ! Yes            ! None      ! +------------------------+----------+---------------+----------+ ! Deep Femoral            !Yes       ! Yes            ! None      ! +------------------------+----------+---------------+----------+ ! Popliteal               !Yes       ! Yes            ! None      ! +------------------------+----------+---------------+----------+ ! GSV Below Knee          ! Yes       ! Yes            ! None      ! +------------------------+----------+---------------+----------+ ! Gastroc                 ! Yes       ! Yes            ! None      ! +------------------------+----------+---------------+----------+ ! Soleal                  !Yes       ! Yes            ! None      ! +------------------------+----------+---------------+----------+ ! PTV                     ! Yes       ! Yes            ! None      ! +------------------------+----------+---------------+----------+ ! Peroneal                !Yes       ! Yes            ! None      ! +------------------------+----------+---------------+----------+ ! GSV Calf                ! Yes       ! Yes            ! None      ! +------------------------+----------+---------------+----------+ ! SSV                     ! Yes       ! Yes            ! None      ! +------------------------+----------+---------------+----------+ Left Doppler Measurements +------------------------+------+------+------------+ ! Location                ! Signal!Reflux! Reflux (sec)! +------------------------+------+------+------------+ ! Sapheno Femoral Junction! Phasic!      !            ! +------------------------+------+------+------------+ ! Common Femoral          !Phasic!      !            ! +------------------------+------+------+------------+ ! Prox Femoral            !Phasic!      !            ! +------------------------+------+------+------------+ ! Deep Femoral            !Phasic!      !            ! +------------------------+------+------+------------+ ! Popliteal               !Phasic!      !            ! +------------------------+------+------+------------+    Ct Chest Abdomen Pelvis W Contrast    Result Date: 2/28/2019  EXAMINATION: CT OF THE CHEST, ABDOMEN, AND PELVIS WITH CONTRAST 2/28/2019 8:34 pm TECHNIQUE: CT of the chest, abdomen and pelvis was performed with the administration of intravenous contrast. Multiplanar reformatted images are provided for review. Dose modulation, iterative reconstruction, and/or weight based adjustment of the mA/kV was utilized to reduce the radiation dose to as low as reasonably achievable. COMPARISON: 02/25/2019 and prior. HISTORY: ORDERING SYSTEM PROVIDED HISTORY: fever, chest pain, abd pain, recent ureter stent TECHNOLOGIST PROVIDED HISTORY: Additional Contrast?->None Ordering Physician Provided Reason for Exam: fever, chest pain, abd pain, recent ureter stent Acuity: Acute Type of Exam: Initial Relevant Medical/Surgical History: Fever (stage 4 cervical ca with mets to bladder and lungs- stent to kidney last week, with fever and pain today. Sees Dr. Starr Peabody. ) FINDINGS: Chest: Mediastinum: Right-sided IJ port again noted. Limited imaging of the base of the neck and axilla appear stable. Heart size is stable. The aorta and origin of the great vessels again demonstrate an aberrant right subclavian artery. Main pulmonary artery opacifies unremarkably. No significant change in hilar and mediastinal adenopathy. Esophagus tapers normally. Lungs/pleura: Central airways are patent. Peribronchial wall thickening again noted. Lung volumes are low. Perihilar and dependent ground-glass opacities and multifocal patchy opacities with some more focal consolidation posteriorly at the lung bases again noted. There is slight improved aeration at the left base from the most recent comparison. Mild paraseptal thickening noted. No significant pleural effusion noted. Soft Tissues/Bones:  No acute abnormality of the visualized osseous structures.  Abdomen/Pelvis: Organs: Persistent mild dilation of the right renal collecting system is noted with placement of a right ureteral stent with proximal colon, renal pelvis and distal colon extending into the bladder. Periureteral stranding is noted. The left kidney, adrenal glands, pancreas and spleen are unremarkable in appearance. Liver is somewhat heterogeneous in its appearance with no focal lesion identified. Patient is status post cholecystectomy. GI/Bowel: The stomach is unremarkable in appearance. There is a duodenal diverticulum in the proximal portion of the sweep. Small bowel demonstrates no acute abnormality. Appendix is unremarkable. Pelvis: Bladder is incompletely distended. There is bladder wall thickening noted which is less prominent than the comparison. Periureteral stranding is noted. Increased induration is noted along the fat along the right side of the pelvis with persistent adenopathy noted along the right iliac chain measuring up to 1.5 cm in short axis diameter without significant change from prior study. Small lymph nodes also noted along the left iliac chain and retroperitoneum. There is wall thickening noted of the vagina with stranding of the pelvic fat. Peritoneum/Retroperitoneum: Aorta is normal in caliber. Small retroperitoneal lymph nodes are noted which are likely reactive. Bones/Soft Tissues: No acute osseous abnormality noted. Diffuse multifocal airspace disease is again noted with slight interval improvement of more focal areas of consolidation at the left base. Findings compatible with multifocal pneumonia. Malignancy, metastatic disease is within the differential though considered less likely. Right ureteral stent is in place with persistent mild dilation of the renal collecting system. Wall thickening of the bladder is noted which is improved compared to the prior study. Mild stranding is noted in the perivesicular fat. The cervix is somewhat irregular in its appearance with wall thickening of the vagina noted. Endometrial fluid noted on the prior study is no longer identified. In a patient with a reported history of cervical cancer malignancy is within the differential.  If the patient has had radiation therapy findings may also represent associated inflammatory changes. Nonspecific adenopathy within the pelvis may be reactive in nature or related to underlying malignancy. Recommend continued follow-up. Ir Guided Ureteral Stent Place W Nephro Cath New Access    Result Date: 2/18/2019  PROCEDURE: IR ULTRASOUND AND FLUOROSCOPIC GUIDED RIGHT PERCUTANEOUS NEPHROSTOMY AND NEPHROSTOGRAM IR ANTEGRADE RIGHT URETERAL STENT. MODERATE CONSCIOUS SEDATION 2/18/2019 HISTORY: ORDERING SYSTEM PROVIDED HISTORY: needs R sided nephrostomy tube placed. lerma could not do via cysto. Adi said that IR would do on monday TECHNOLOGIST PROVIDED HISTORY: Reason for exam:->needs R sided nephrostomy tube placed. maria guadalupe could not do via cysto. Adi said that IR would do on monday COMPARISON: Abdomen pelvic CT 02/15/2019. CONTRAST: 30 cc, nonvascular within collecting system only. . SEDATION: Intravenous sedation was administered with continuous monitoring throughout the procedure. In measured doses, a total of 6 mg intravenous Versed and 275 mcg intravenous fentanyl was administered. My total procedure time with sedation was 26 minutes. FLUOROSCOPY DOSE AND TYPE OR TIME AND EXPOSURES: 3.5 minutes, 7 digital imaging sequences. DESCRIPTION OF PROCEDURE: Informed consent was obtained after a detailed explanation of the procedure including risks, benefits, and alternatives. Universal protocol was observed. TECHNIQUE: Informed consent was previously obtained. In the semi prone position, an appropriate area posterior lateral aspect of the skin overlying the targeted collecting system was demarcated, sterilely prepared draped and anesthetized.  Utilizing ultrasound, anatomy from prior CT scan and other imaging, the needle trajectory High-grade distal ureteral obstruction with severe hydronephrosis, successfully crossed as above. Successful 8 [place]ian internalized right ureteral stent placement as above. Assessment and Plan:  Patient Active Hospital Problem List:   Fibromyalgia (10/8/2014)    Assessment: Established problem. Stable. Plan: sx stable. GERD (gastroesophageal reflux disease) (10/20/2014)    Assessment: Established problem. Stable. No reflux    Plan: Continue present orders/plan. Vaginal bleeding (12/21/2018)    Assessment: Established problem, now resolved. Plan: Continue present orders/plan. Cervical cancer ([facility] [address] (2/15/2019)    Assessment: Established problem. Stable. Stage IV    Plan: per dr Tien Persaud   Septic shock [facility]) ()    Assessment: Established problem, now resolved. Plan: cont to monitor fever, WBC   Complicated UTI (urinary tract infection) ()    Assessment: Established problem. Stable. Plan: on merrem, vanc. Awaiting cultures. Per SILVIA malloy   Anemia ()    Assessment: Established problem. Stable. Hgb=7.0    Plan: cont to trend.  If drops lower, will transfuse   Seizure disorder (HCC) ()   Class 1 obesity due to excess calories with body mass index (BMI) of 31.0 to 31.9 in adult ()              Bessy Carroll  3/2/2019

## 2019-03-03 NOTE — PROGRESS NOTES
Occupational Therapy  Orders received for OT Evaluation. Pt supine in bed on arrival and politely declined services stating that she completed a shower level ADL earlier this date Independently and ambulated in room using no AD. Pt with no questions nor concerns regarding ability to Independently perform ADL's, transfers, ambulation and IADL's. Pt with no further needs at this time. Pt agreeable for DC from acute care OT caseload. Please re-order should change in functional status occur. Thank you, Nandini West, [address], OTR/L, [id number].

## 2019-03-03 NOTE — PROGRESS NOTES
Physical Therapy  Treatment Attempt    Attempted to see Corwin Sanchez, the physical therapy evaluation held at this time due to pt receiving blood transfusion for low Hgb levels (6.8). Will attempt session again as schedule allows.     Dede Gipson, PT, DPT   [phone]

## 2019-03-03 NOTE — PROGRESS NOTES
tablet 8 mg, 8 mg, Oral, Q8H PRN  promethazine (PHENERGAN) tablet 25 mg, 25 mg, Oral, Q6H PRN  ferrous sulfate tablet 325 mg, 325 mg, Oral, BID WC  baclofen (LIORESAL) tablet 20 mg, 20 mg, Oral, BID  DULoxetine (CYMBALTA) extended release capsule 60 mg, 60 mg, Oral, Daily  gabapentin (NEURONTIN) capsule 300 mg, 300 mg, Oral, TID  sodium chloride flush 0.9 % injection 10 mL, 10 mL, Intravenous, 2 times per day  sodium chloride flush 0.9 % injection 10 mL, 10 mL, Intravenous, PRN  magnesium hydroxide (MILK OF MAGNESIA) 400 MG/5ML suspension 30 mL, 30 mL, Oral, Daily PRN  ondansetron (ZOFRAN) injection 4 mg, 4 mg, Intravenous, Q6H PRN  enoxaparin (LOVENOX) injection 40 mg, 40 mg, Subcutaneous, Daily  0.9 % sodium chloride infusion, , Intravenous, Continuous  magnesium sulfate 1 g in dextrose 5% 100 mL IVPB, 1 g, Intravenous, PRN  famotidine (PEPCID) tablet 20 mg, 20 mg, Oral, BID  acetaminophen (TYLENOL) tablet 650 mg, 650 mg, Oral, Q4H PRN  ipratropium-albuterol (DUONEB) nebulizer solution 1 ampule, 1 ampule, Inhalation, 4x daily  guaiFENesin (MUCINEX) extended release tablet 600 mg, 600 mg, Oral, BID         Objective:  BP (!) 92/56   Pulse 98   Temp 97.1 °F (36.2 °C) (Oral)   Resp 16   Ht 5' 6\" (1.676 m)   Wt 193 lb 12.8 oz (87.9 kg)   SpO2 93%   BMI 31.28 kg/m²      Patient Vitals for the past 24 hrs:   BP Temp Temp src Pulse Resp SpO2 Weight   03/03/19 0918 (!) 92/56 97.1 °F (36.2 °C) Oral 98 16 93 % --   03/03/19 0813 -- -- -- -- -- 96 % --   03/03/19 0558 -- -- -- -- -- -- 193 lb 12.8 oz (87.9 kg)   03/03/19 0415 (!) 80/50 98.3 °F (36.8 °C) Oral 103 16 97 % --   03/03/19 0233 -- -- -- -- -- 95 % --   03/03/19 0052 101/61 100.5 °F (38.1 °C) Oral 131 18 (!) 86 % --   03/02/19 2113 -- -- -- -- 18 99 % --   03/02/19 2101 99/63 99.3 °F (37.4 °C) Temporal 115 18 99 % --   03/02/19 1617 97/65 97.1 °F (36.2 °C) Oral 94 20 -- --   03/02/19 1542 -- -- -- -- -- 91 % --   03/02/19 1256 -- -- -- -- -- 94 % -- 03/02/19 1232 95/65 96.9 °F (36.1 °C) Temporal 84 20 97 % --     Patient Vitals for the past 96 hrs (Last 3 readings):   Weight   03/03/19 0558 193 lb 12.8 oz (87.9 kg)   03/02/19 0415 192 lb 4.8 oz (87.2 kg)   03/01/19 0443 197 lb (89.4 kg)           Intake/Output Summary (Last 24 hours) at 3/3/2019 0925  Last data filed at 3/2/2019 1459  Gross per 24 hour   Intake 2240 ml   Output --   Net 2240 ml         Physical Exam:   S1, S2 normal, no murmur, rub or gallop, regular rate and rhythm  clear to auscultation bilaterally  abdomen is soft without significant tenderness, masses, organomegaly or guarding  extremities normal, atraumatic, no cyanosis or edema    Labs:  Lab Results   Component Value Date    WBC 15.5 (H) 03/03/2019    HGB 6.8 (LL) 03/03/2019    HCT 21.4 (L) 03/03/2019     03/03/2019    CHOL 188 10/15/2014    TRIG 132 10/15/2014    HDL 65 (H) 10/15/2014    ALT 31 02/28/2019    AST 27 02/28/2019     03/03/2019    K 4.5 03/03/2019     03/03/2019    CREATININE <0.5 (L) 03/03/2019    BUN 8 03/03/2019    CO2 28 03/03/2019    TSH 1.57 06/18/2014    INR 1.25 (H) 02/28/2019    LABMICR YES 02/28/2019     Lab Results   Component Value Date    CKTOTAL 185 12/18/2014    TROPONINI 0.02 (H) 02/15/2019       Recent Imaging Results are Reviewed:  Xr Chest Standard (2 Vw)    Result Date: 2/26/2019  EXAMINATION: TWO VIEWS OF THE CHEST 2/26/2019 11:55 am COMPARISON: Frontal view of the chest 02/24/2019, frontal and lateral views of the chest 02/19/2019, CT thorax 02/25/2019. HISTORY: ORDERING SYSTEM PROVIDED HISTORY: cough TECHNOLOGIST PROVIDED HISTORY: Reason for exam:->cough Ordering Physician Provided Reason for Exam: cough FINDINGS: Support devices: Right IJ chest port again noted with distal tip terminating in the proximal right atrium in good position. The cardiopericardial silhouette is stable in size and configuration.  Bilateral patchy airspace opacities are redemonstrated most pronounced in the mid and lower lung zones, these were better detailed on the recent CT of the thorax from 02/25/2019, please refer to that report for additional information. The overall radiographic appearance has mildly increased from the prior radiograph 02/24/2019. There is no pneumothorax or pleural effusion. Surgical clips project in the upper abdomen. Mild interval increase in bilateral patchy airspace opacities better detailed on the CT of the thorax from 02/25/2019. Findings are again most suggestive of an infectious/inflammatory process. Xr Chest Standard (2 Vw)    Result Date: 2/19/2019  EXAMINATION: TWO VIEWS OF THE CHEST 2/19/2019 4:26 pm COMPARISON: 02/16/2019 HISTORY: ORDERING SYSTEM PROVIDED HISTORY: cough TECHNOLOGIST PROVIDED HISTORY: Reason for exam:->cough Ordering Physician Provided Reason for Exam: Cervical Cancer (Pt was sent over from Dr Obdulia Varghese office - states she was sent here from office - was told today that she has stage 3 cervical cancer and was sent here for \"scans and further testing\") Acuity: Unknown Type of Exam: Unknown FINDINGS: There is patchy bilateral airspace disease, which appears increased when compared to the previous exam.  As detected on recent CT PA, some of those have a nodular appearance. The heart mediastinal contours are unremarkable. No pneumothorax. No free air. No acute bony abnormalities. Chin catheter noted. Patchy bilateral airspace disease, concerning for pneumonia, which appears increased when compared to the previous exam.  Again, some of these areas of opacity have a nodular appearance and septic emboli should be considered, especially when given the correlation with the CT PA from 02/15/2019. Process should be followed to resolution.      Xr Chest Standard (2 Vw)    Result Date: 2/16/2019  EXAMINATION: TWO VIEWS OF THE CHEST 2/16/2019 11:49 am COMPARISON: 10/16/2014 HISTORY: ORDERING SYSTEM PROVIDED HISTORY: pneumonia TECHNOLOGIST PROVIDED HISTORY: Reason for exam:->pneumonia Ordering Physician Provided Reason for Exam: PNA Acuity: Acute Type of Exam: Initial Relevant Medical/Surgical History: cervical ca FINDINGS: There is patchy bibasilar consolidation, right greater than left, superimposed on a background of diffuse interstitial prominence. Heart size, mediastinal contours and pulmonary vascularity are within normal limits. There is no pleural effusion. Multifocal bilateral pneumonia. Ct Chest W Contrast    Result Date: 2/25/2019  EXAMINATION: CT OF THE CHEST WITH CONTRAST 2/25/2019 1:47 pm TECHNIQUE: CT of the chest was performed with the administration of intravenous contrast. Multiplanar reformatted images are provided for review. Dose modulation, iterative reconstruction, and/or weight based adjustment of the mA/kV was utilized to reduce the radiation dose to as low as reasonably achievable. COMPARISON: CT of the chest February 15, 2019 HISTORY: ORDERING SYSTEM PROVIDED HISTORY: PNEUMONIA, UNRESOLVED TECHNOLOGIST PROVIDED HISTORY: Ordering Physician Provided Reason for Exam: Pneumonia, unresolved Acuity: Unknown Type of Exam: Unknown FINDINGS: Mediastinum: Unchanged mildly enlarged lymph nodes. No pericardial effusion. Lungs/pleura: There is increased severity of extensive bilateral pulmonary disease, with extensive foci of ground-glass opacity, in addition to small areas of consolidation. No pneumothorax or pleural effusion. Upper Abdomen: Negative, no acute findings. Soft Tissues/Bones: No acute osseous findings. Increased severity of extensive bilateral pulmonary disease, nonspecific but consistent with pneumonia. ARDS is also considered. Us Renal Complete    Result Date: 2/26/2019  EXAMINATION: RETROPERITONEAL ULTRASOUND OF THE KIDNEYS AND URINARY BLADDER 2/26/2019 COMPARISON: CT abdomen and pelvis 02/15/2019.  HISTORY: ORDERING SYSTEM PROVIDED HISTORY: left sided pain, hydronephrosis TECHNOLOGIST PROVIDED HISTORY: Ordering Physician recently diagnosed cervical cancer. FINDINGS: Chest: Pulmonary arteries: The pulmonary arteries opacify normally. There is no evidence of filling defect to suggest a pulmonary embolism. Mediastinum: The thyroid is unremarkable. There is mild subcarinal and bilateral hilar lymphadenopathy, most likely benign and reactive in etiology given the patient's underlying lung findings. The thoracic aorta is unremarkable, without evidence of aneurysm or dissection. 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 No pericardial abnormality is identified. Lungs/pleura: There is mild mucous plugging within the bilateral lower lobes. The central airways are otherwise widely patent. There has been interval development of widespread ground-glass opacity throughout both lungs, with diffuse intralobular septal thickening evident, new from prior exam.  This most likely reflects a combination of interstitial pulmonary edema and superimposed multifocal pneumonia. Additionally, there has been interval progression and more consolidation of multiple scattered various sized pulmonary nodules throughout both lungs since the study of 1/28/2019. A few of these are cavitary. The largest of these measures approximately 10 mm in short axis within the subpleural portion of the left lower lobe. These may be secondary to an atypical infectious or inflammatory process, to include septic emboli. Metastatic disease is considered less likely, though not excluded. There is no evidence of a pneumothorax or pleural effusion. Soft Tissues/Bones: No acute findings. Abdomen/Pelvis: Organs: The patient is status post cholecystectomy. The liver, spleen, and pancreas are normal.  The adrenal glands are unremarkable bilaterally. There has been interval development of nonspecific moderate right hydronephrosis since the prior exam, without definite demonstrable cause.   Namely, no definite obstructing stone or mass is identified. The left kidney is stable and unremarkable. GI/Bowel: Evaluation of the hollow GI tract demonstrates no evidence of abnormal bowel wall thickening, dilatation, or obstruction. The appendix is normal. Pelvis: The urinary bladder is partially collapsed, though appears diffusely thick-walled and inflamed, with a mild amount of pericystic stranding noted. These findings are suggestive of an acute cystitis, progressed from prior exam.  Additionally, the cervix appears enlarged and irregular, and there is new abnormal thickening of the endometrium within the uterine fundus, which appears new in comparison with the study of 1/28/2019. The bilateral adnexa are unremarkable. There is a mild amount of free pelvic fluid, likely physiologic. There are stable mildly enlarged bilateral pelvic chain lymph nodes, the largest measuring approximately 2.6 x 1.6 cm along the right external iliac chain, similar to the study of 1/28/2019. Peritoneum/Retroperitoneum: No intraperitoneal free air or free fluid is identified. No pathologic lymphadenopathy is seen. The abdominal aorta is unremarkable. No significant abdominal wall hernia is identified. Bones/Soft Tissues: There is mild bilateral sacroiliitis. The skeletal structures are otherwise unremarkable. 1. No CT evidence of a pulmonary embolism. 2. No acute abnormality of the thoracic aorta. 3. Interval development of widespread ground-glass opacity throughout both lungs with diffuse intralobular septal thickening. This most likely reflects a combination of interstitial pulmonary edema and multifocal pneumonia. 4. Interval progression of multiple nodular foci of consolidative opacity throughout both lungs, a few of which are cavitary in appearance. These nodules are most likely infectious or inflammatory in etiology, and septic emboli are a consideration.   Given patient's history of cervical cancer, metastatic disease is on the differential, though considered the relatively rapid increase, pneumonia would be primarily considered. Xr Chest Portable    Result Date: 2/24/2019  EXAMINATION: SINGLE XRAY VIEW OF THE CHEST 2/24/2019 7:00 am COMPARISON: Chest radiograph performed 02/19/2019. HISTORY: ORDERING SYSTEM PROVIDED HISTORY: elevated wbc and ongoing shortness of breath TECHNOLOGIST PROVIDED HISTORY: Reason for exam:->elevated wbc and ongoing shortness of breath Acuity: Unknown Type of Exam: Unknown FINDINGS: There is mild bilateral pulmonary congestion. There is similar left basilar infiltrate. There is no pneumothorax. The mediastinal structures are stable. The upper abdomen is unremarkable. The extrathoracic soft tissues are unremarkable. There is a right internal jugular port. Stable bilateral congestion and left basilar infiltrate. Ct Chest Pulmonary Embolism W Contrast    Result Date: 2/15/2019  EXAMINATION: CTA OF THE CHEST; CT OF THE ABDOMEN AND PELVIS WITH CONTRAST 2/15/2019 2:11 pm TECHNIQUE: CTA of the chest was performed after the administration of intravenous contrast.  Multiplanar reformatted images are provided for review. MIP images are provided for review. Dose modulation, iterative reconstruction, and/or weight based adjustment of the mA/kV was utilized to reduce the radiation dose to as low as reasonably achievable.; CT of the abdomen and pelvis was performed with the administration of intravenous contrast. Multiplanar reformatted images are provided for review. Dose modulation, iterative reconstruction, and/or weight based adjustment of the mA/kV was utilized to reduce the radiation dose to as low as reasonably achievable. COMPARISON: 1/28/2019, 4/29/2011 HISTORY: ORDERING SYSTEM PROVIDED HISTORY: cervical ca - SOB - PE??? TECHNOLOGIST PROVIDED HISTORY: Ordering Physician Provided Reason for Exam: SOB Acuity: Unknown Type of Exam: Unknown; ORDERING SYSTEM PROVIDED HISTORY: abd disten - cervical ca?  TECHNOLOGIST PROVIDED HISTORY: Additional Contrast?->None Ordering Physician Provided Reason for Exam: abd distention Acuity: Unknown Type of Exam: Unknown Patient with recently diagnosed cervical cancer. FINDINGS: Chest: Pulmonary arteries: The pulmonary arteries opacify normally. There is no evidence of filling defect to suggest a pulmonary embolism. Mediastinum: The thyroid is unremarkable. There is mild subcarinal and bilateral hilar lymphadenopathy, most likely benign and reactive in etiology given the patient's underlying lung findings. The thoracic aorta is unremarkable, without evidence of aneurysm or dissection. 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 No pericardial abnormality is identified. Lungs/pleura: There is mild mucous plugging within the bilateral lower lobes. The central airways are otherwise widely patent. There has been interval development of widespread ground-glass opacity throughout both lungs, with diffuse intralobular septal thickening evident, new from prior exam.  This most likely reflects a combination of interstitial pulmonary edema and superimposed multifocal pneumonia. Additionally, there has been interval progression and more consolidation of multiple scattered various sized pulmonary nodules throughout both lungs since the study of 1/28/2019. A few of these are cavitary. The largest of these measures approximately 10 mm in short axis within the subpleural portion of the left lower lobe. These may be secondary to an atypical infectious or inflammatory process, to include septic emboli. Metastatic disease is considered less likely, though not excluded. There is no evidence of a pneumothorax or pleural effusion. Soft Tissues/Bones: No acute findings. Abdomen/Pelvis: Organs: The patient is status post cholecystectomy. The liver, spleen, and pancreas are normal.  The adrenal glands are unremarkable bilaterally.   There has been interval development of nonspecific moderate right hydronephrosis since the prior exam, without definite demonstrable cause. Namely, no definite obstructing stone or mass is identified. The left kidney is stable and unremarkable. GI/Bowel: Evaluation of the hollow GI tract demonstrates no evidence of abnormal bowel wall thickening, dilatation, or obstruction. The appendix is normal. Pelvis: The urinary bladder is partially collapsed, though appears diffusely thick-walled and inflamed, with a mild amount of pericystic stranding noted. These findings are suggestive of an acute cystitis, progressed from prior exam.  Additionally, the cervix appears enlarged and irregular, and there is new abnormal thickening of the endometrium within the uterine fundus, which appears new in comparison with the study of 1/28/2019. The bilateral adnexa are unremarkable. There is a mild amount of free pelvic fluid, likely physiologic. There are stable mildly enlarged bilateral pelvic chain lymph nodes, the largest measuring approximately 2.6 x 1.6 cm along the right external iliac chain, similar to the study of 1/28/2019. Peritoneum/Retroperitoneum: No intraperitoneal free air or free fluid is identified. No pathologic lymphadenopathy is seen. The abdominal aorta is unremarkable. No significant abdominal wall hernia is identified. Bones/Soft Tissues: There is mild bilateral sacroiliitis. The skeletal structures are otherwise unremarkable. 1. No CT evidence of a pulmonary embolism. 2. No acute abnormality of the thoracic aorta. 3. Interval development of widespread ground-glass opacity throughout both lungs with diffuse intralobular septal thickening. This most likely reflects a combination of interstitial pulmonary edema and multifocal pneumonia. 4. Interval progression of multiple nodular foci of consolidative opacity throughout both lungs, a few of which are cavitary in appearance.   These nodules are most likely infectious or inflammatory in etiology, and septic emboli are a consideration. Given patient's history of cervical cancer, metastatic disease is on the differential, though considered less likely. 5. New nonspecific moderate right hydronephrosis, without demonstrable cause. However, there is underlying wall thickening and enhancement of the urinary bladder. This combination of findings could represent a cystitis in the setting of urinary tract infection with resultant pyelitis and hydronephrosis. However, given patient history of cervical cancer, locoregional spread of tumor with resultant obstruction of the distal right ureter may be a cause of the patient's right hydronephrosis. 6. Interval development of new irregularity of the cervix and nonspecific endometrial thickening in comparison with the prior study of 1/28/2019. This likely represents the patient's known underlying cervical cancer causing obstruction of the endometrial with resultant endometrial thickening. This could be further evaluated with a follow-up pelvic ultrasound. 7. Stable mild bilateral pelvic chain lymphadenopathy, right greater than left, possibly representing metastatic disease. Ir Port Placement > 5 Years    Result Date: 2/19/2019  PROCEDURE: ULTRASOUND GUIDED VASCULAR ACCESS. FLUOROSCOPY GUIDED PLACEMENT OF A RIGHT IJ SUBCUTANEOUS PORT-A-CATH. MODERATE CONSCIOUS SEDATION 2/19/2019. HISTORY: ORDERING SYSTEM PROVIDED HISTORY: cervical cancer to get chemo TECHNOLOGIST PROVIDED HISTORY: Reason for exam:->cervical cancer to get chemo How many lumens are being requested?->1 What site is the preferred site? ->No preference What side should this line be placed? ->Either 82-year-old female with cervical cancer, presents for chest port placement. SEDATION: Moderate sedation was administered under my supervision by a qualified nurse. 4 mg of Versed was administered intravenously. Approximate intra procedural sedation time was 23 minutes.  FLUOROSCOPY DOSE AND TYPE OR TIME AND EXPOSURES: 54 seconds of fluoroscopy with 2 exposures. TECHNIQUE: Informed consent was obtained after a detailed explanation of the procedure including risks, benefits, and alternatives. Universal protocol was observed. The right neck and chest were prepped and draped in sterile fashion using maximum sterile barrier technique. Local anesthesia was achieved with lidocaine. A micropuncture needle was used to access the right internal jugular vein using ultrasound guidance. An ultrasound image demonstrating patency of the vein with needle tip located within it. An image was obtained and stored in PACs. A 0.035 guidewire was used to place a peel-away sheath. A chest wall incision was made and a subcutaneous pocket was created. The port reservoir was placed within the pocket and the port tubing was attached and tunneled subcutaneously to the venotomy site. The port tubing was cut to an appropriate length and advanced through the peel-away sheath under fluoroscopic guidance to the cavo-atrial junction. The chest wall incision was closed using 3-0 and 4-0 Vicryl suture and tissue adhesive was applied to the venotomy site and chest wall incision. The port flushed easily and there was a good blood return. The port was locked with heparinized saline. The patient tolerated the procedure well and there were no immediate complications. FINDINGS: Fluoroscopic image demonstrates the tip of the port in the right atrium. 1. Successful ultrasound and fluoroscopy guided Port-A-Cath placement via right IJ access, as described above. 2.  The port was left accessed for immediate use. Fl Retrograde Pyelogram Right    Result Date: 2/17/2019  EXAMINATION: SPOT FLUOROSCOPIC IMAGES 2/17/2019 6:52 am TECHNIQUE: Fluoroscopy was provided by the radiology department for procedure. Radiologist was not present during examination.  FLUOROSCOPY DOSE AND TYPE OR TIME AND EXPOSURES: 3 seconds of fluoroscopy was utilized for purposes of +------------------------+----------+---------------+----------+ ! GSV Calf                ! Yes       ! Yes            ! None      ! +------------------------+----------+---------------+----------+ ! SSV                     ! Yes       ! Yes            ! None      ! +------------------------+----------+---------------+----------+ Right Doppler Measurements +------------------------+------+------+------------+ ! Location                ! Signal!Reflux! Reflux (sec)! +------------------------+------+------+------------+ ! Sapheno Femoral Junction! Phasic!      !            ! +------------------------+------+------+------------+ ! Common Femoral          !Phasic!      !            ! +------------------------+------+------+------------+ ! Prox Femoral            !Phasic!      !            ! +------------------------+------+------+------------+ ! Deep Femoral            !Phasic!      !            ! +------------------------+------+------+------------+ ! Popliteal               !Phasic!      !            ! +------------------------+------+------+------------+ Left Lower Extremities DVT Study Measurements Left 2D Measurements +------------------------+----------+---------------+----------+ ! Location                ! Visualized! Compressibility! Thrombosis! +------------------------+----------+---------------+----------+ ! Sapheno Femoral Junction! Yes       ! Yes            ! None      ! +------------------------+----------+---------------+----------+ ! GSV Thigh               ! Yes       ! Yes            ! None      ! +------------------------+----------+---------------+----------+ ! Common Femoral          !Yes       ! Yes            ! None      ! +------------------------+----------+---------------+----------+ ! Prox Femoral            !Yes       ! Yes            ! None      ! +------------------------+----------+---------------+----------+ ! Mid Femoral             !Yes       ! Yes            ! None      ! +------------------------+----------+---------------+----------+ ! Dist Femoral            !Yes       ! Yes            ! None      ! +------------------------+----------+---------------+----------+ ! Deep Femoral            !Yes       ! Yes            ! None      ! +------------------------+----------+---------------+----------+ ! Popliteal               !Yes       ! Yes            ! None      ! +------------------------+----------+---------------+----------+ ! GSV Below Knee          ! Yes       ! Yes            ! None      ! +------------------------+----------+---------------+----------+ ! Gastroc                 ! Yes       ! Yes            ! None      ! +------------------------+----------+---------------+----------+ ! Soleal                  !Yes       ! Yes            ! None      ! +------------------------+----------+---------------+----------+ ! PTV                     ! Yes       ! Yes            ! None      ! +------------------------+----------+---------------+----------+ ! Peroneal                !Yes       ! Yes            ! None      ! +------------------------+----------+---------------+----------+ ! GSV Calf                ! Yes       ! Yes            ! None      ! +------------------------+----------+---------------+----------+ ! SSV                     ! Yes       ! Yes            ! None      ! +------------------------+----------+---------------+----------+ Left Doppler Measurements +------------------------+------+------+------------+ ! Location                ! Signal!Reflux! Reflux (sec)! +------------------------+------+------+------------+ ! Sapheno Femoral Junction! Phasic!      !            ! +------------------------+------+------+------------+ ! Common Femoral          !Phasic!      !            ! +------------------------+------+------+------------+ ! Prox Femoral            !Phasic!      !            ! +------------------------+------+------+------------+ ! Deep Femoral            !Phasic!      !            ! +------------------------+------+------+------------+ ! Popliteal               !Phasic!      !            ! +------------------------+------+------+------------+    Ct Chest Abdomen Pelvis W Contrast    Result Date: 2/28/2019  EXAMINATION: CT OF THE CHEST, ABDOMEN, AND PELVIS WITH CONTRAST 2/28/2019 8:34 pm TECHNIQUE: CT of the chest, abdomen and pelvis was performed with the administration of intravenous contrast. Multiplanar reformatted images are provided for review. Dose modulation, iterative reconstruction, and/or weight based adjustment of the mA/kV was utilized to reduce the radiation dose to as low as reasonably achievable. COMPARISON: 02/25/2019 and prior. HISTORY: ORDERING SYSTEM PROVIDED HISTORY: fever, chest pain, abd pain, recent ureter stent TECHNOLOGIST PROVIDED HISTORY: Additional Contrast?->None Ordering Physician Provided Reason for Exam: fever, chest pain, abd pain, recent ureter stent Acuity: Acute Type of Exam: Initial Relevant Medical/Surgical History: Fever (stage 4 cervical ca with mets to bladder and lungs- stent to kidney last week, with fever and pain today. Sees Dr. Henrry Mi. ) FINDINGS: Chest: Mediastinum: Right-sided IJ port again noted. Limited imaging of the base of the neck and axilla appear stable. Heart size is stable. The aorta and origin of the great vessels again demonstrate an aberrant right subclavian artery. Main pulmonary artery opacifies unremarkably. No significant change in hilar and mediastinal adenopathy. Esophagus tapers normally. Lungs/pleura: Central airways are patent. Peribronchial wall thickening again noted. Lung volumes are low. Perihilar and dependent ground-glass opacities and multifocal patchy opacities with some more focal consolidation posteriorly at the lung bases again noted. There is slight improved aeration at the left base from the most recent comparison. Mild paraseptal thickening noted. No significant pleural effusion noted.  Soft Tissues/Bones: No acute abnormality of the visualized osseous structures. Abdomen/Pelvis: Organs: Persistent mild dilation of the right renal collecting system is noted with placement of a right ureteral stent with proximal colon, renal pelvis and distal colon extending into the bladder. Periureteral stranding is noted. The left kidney, adrenal glands, pancreas and spleen are unremarkable in appearance. Liver is somewhat heterogeneous in its appearance with no focal lesion identified. Patient is status post cholecystectomy. GI/Bowel: The stomach is unremarkable in appearance. There is a duodenal diverticulum in the proximal portion of the sweep. Small bowel demonstrates no acute abnormality. Appendix is unremarkable. Pelvis: Bladder is incompletely distended. There is bladder wall thickening noted which is less prominent than the comparison. Periureteral stranding is noted. Increased induration is noted along the fat along the right side of the pelvis with persistent adenopathy noted along the right iliac chain measuring up to 1.5 cm in short axis diameter without significant change from prior study. Small lymph nodes also noted along the left iliac chain and retroperitoneum. There is wall thickening noted of the vagina with stranding of the pelvic fat. Peritoneum/Retroperitoneum: Aorta is normal in caliber. Small retroperitoneal lymph nodes are noted which are likely reactive. Bones/Soft Tissues: No acute osseous abnormality noted. Diffuse multifocal airspace disease is again noted with slight interval improvement of more focal areas of consolidation at the left base. Findings compatible with multifocal pneumonia. Malignancy, metastatic disease is within the differential though considered less likely. Right ureteral stent is in place with persistent mild dilation of the renal collecting system. Wall thickening of the bladder is noted which is improved compared to the prior study.   Mild stranding is noted in the perivesicular fat. The cervix is somewhat irregular in its appearance with wall thickening of the vagina noted. Endometrial fluid noted on the prior study is no longer identified. In a patient with a reported history of cervical cancer malignancy is within the differential.  If the patient has had radiation therapy findings may also represent associated inflammatory changes. Nonspecific adenopathy within the pelvis may be reactive in nature or related to underlying malignancy. Recommend continued follow-up. Ir Guided Ureteral Stent Place W Nephro Cath New Access    Result Date: 2/18/2019  PROCEDURE: IR ULTRASOUND AND FLUOROSCOPIC GUIDED RIGHT PERCUTANEOUS NEPHROSTOMY AND NEPHROSTOGRAM IR ANTEGRADE RIGHT URETERAL STENT. MODERATE CONSCIOUS SEDATION 2/18/2019 HISTORY: ORDERING SYSTEM PROVIDED HISTORY: needs R sided nephrostomy tube placed. maria guadalupe could not do via cysto. Adi said that IR would do on monday TECHNOLOGIST PROVIDED HISTORY: Reason for exam:->needs R sided nephrostomy tube placed. maria guadalupe could not do via cysto. Adi said that IR would do on monday COMPARISON: Abdomen pelvic CT 02/15/2019. CONTRAST: 30 cc, nonvascular within collecting system only. . SEDATION: Intravenous sedation was administered with continuous monitoring throughout the procedure. In measured doses, a total of 6 mg intravenous Versed and 275 mcg intravenous fentanyl was administered. My total procedure time with sedation was 26 minutes. FLUOROSCOPY DOSE AND TYPE OR TIME AND EXPOSURES: 3.5 minutes, 7 digital imaging sequences. DESCRIPTION OF PROCEDURE: Informed consent was obtained after a detailed explanation of the procedure including risks, benefits, and alternatives. Universal protocol was observed. TECHNIQUE: Informed consent was previously obtained.   In the semi prone position, an appropriate area posterior lateral aspect of the skin overlying the targeted collecting system was demarcated, sterilely prepared draped and anesthetized. Utilizing ultrasound, anatomy from prior CT scan and other imaging, the needle trajectory and depth was predetermined. The micro puncture needle was advanced using fluoroscopic and ultrasound guidance into the collecting system without difficulty. Once urine was aspirated, a small platinum tip wire was advanced into the collecting system and into the proximal ureter. The tract was dilated. With wire exchange, a 6 Macedonian sheath was placed. The collecting system is moderately dilated and the ureter is somewhat tortuous. A glide wire was easily advanced down the ureter. Because of this, a 5 Macedonian peel-away sheath was advanced into the proximal ureter over the wire. The glide wire was then advanced with a steerable catheter into the urinary bladder without difficulty and only mild discomfort. There is no contrast extravasation. Once the catheter was placed into the urinary bladder, contrast injection is seen diluted within the somewhat distended urinary bladder. The catheter was used to place a Super Stiff wire for ureteral stent placement. A 26 cm 8 Macedonian ureteral stent was then advanced, such that the distal pigtail is well within the urinary bladder. The proximal pigtail was placed in the lower portion of the left renal pelvis. Again there is no contrast extravasation. No filling defect to indicate clot or bleeding was observed on early or later contrast imaging. Following this, dense contrast was injected showing normal expected filling of the ureteral stent and exiting from the distal pigtail directly into the urinary bladder. Following this, the internalized stent was disengaged. The proximal collecting system was then decompressed. The proximal nephrostomy access was then removed entirely, no external drainage necessary at this point. The patient tolerated the procedure well. A sterile bandage was placed over the small incision.   From this point forward, the ureteral stent can be exchanged as needed from below. Successful right percutaneous nephrostomy with antegrade pyelogram. High-grade distal ureteral obstruction with severe hydronephrosis, successfully crossed as above. Successful 8 French internalized right ureteral stent placement as above. Assessment and Plan:  Patient Active Hospital Problem List:   Fibromyalgia (10/8/2014)    Assessment: Established problem. Stable. Plan: sx stable. GERD (gastroesophageal reflux disease) (10/20/2014)    Assessment: Established problem. Stable. No reflux    Plan: Continue present orders/plan. Vaginal bleeding (12/21/2018)    Assessment: Established problem, now resolved. Plan: Continue present orders/plan. Cervical cancer ([facility] [address] (2/15/2019)    Assessment: Established problem. Stable. Stage IV    Plan: per dr Awais Sofia   Septic shock [facility]) ()    Assessment: Established problem, now resolved. Plan: cont to monitor fever, WBC   Complicated UTI (urinary tract infection) ()    Assessment: Established problem. Stable. Plan: on merrem, vanc. Awaiting cultures. Per SILVIA malloy   Anemia ()    Assessment: Established problem. Stable. Hgb=6.8    Plan: Transfuse  prbc today.    Seizure disorder (HCC) ()   Class 1 obesity due to excess calories with body mass index (BMI) of 31.0 to 31.9 in adult ()                Emma Nash  3/3/2019

## 2019-03-03 NOTE — PROGRESS NOTES
[facility] FOLLOW-UP:     Primary Oncologist: Dr. Mary Steele  Date: 3/3/2019    PCP: Tricia Mary MD  Referring Provider: No ref. provider found    PROBLEM LIST:     Patient Active Problem List   Diagnosis    Stridor    Acute bronchitis    Fibromyalgia    Bradycardia    Chest pain    GERD (gastroesophageal reflux disease)    Vaginal bleeding    History of juvenile rheumatoid arthritis    SLE (systemic lupus erythematosus) (HCC)    Lupus anticoagulant positive    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, antepartum (HCC)    Cervical cancer ([address]    Multifocal pneumonia    Pulmonary nodule    Hydronephrosis    Ground glass opacity present on imaging of lung    Lung nodules    Lymphadenopathy    Constipation due to opioid therapy    Sepsis ([address]    Septic shock ([address]    Septicemia ([address]    History of cancer    Status post cystoscopy    Immunocompromised ([address]    Complicated UTI (urinary tract infection)    Anemia    Seizure disorder (formerly [facility])    Class 1 obesity due to excess calories with body mass index (BMI) of 31.0 to 31.9 in adult    Weight loss counseling, encounter for       Specialty Problems     None          INTERVAL HISTORY     Feeling better. Less right flank pain. Just finished one unit of RBC transfusion. REVIEW OF SYSTEMS:     CONSTITUTIONAL: Denies fever, sweats, weight loss. EYES: No visual changes or diplopia. No scleral icterus. ENT: No Headaches, hearing loss or vertigo. No mouth sores or sore throat. CARDIOVASCULAR: No chest pain, dyspnea on exertion, palpitations or loss of consciousness. RESPIRATORY: No cough or wheezing, no sputum production. No hemoptysis. GASTROINTESTINAL: No abdominal pain, appetite loss, blood in stools. No change in bowel habits. GENITOURINARY: No dysuria, trouble voiding, or hematuria. MUSCULOSKELETAL: no generalized weakness. No joint complaints. INTEGUMENTARY: No rash or pruritus. NEUROLOGICAL: No headache, diplopia.  No change in gait, balance, or coordination. No paresthesia. ENDOCRINE: No temperature intolerance. No excessive thirst, fluid intake, or urination. HEMATOLOGIC/LYMPHATIC: No abnormal bruising or ecchymosis, blood clots or swollen lymph nodes. ALLERGIC/IMMUNOLOGIC: No nasal congestion or hives. PHYSICAL EXAM:     BP 93/61   Pulse 90   Temp 97.1 °F (36.2 °C) (Temporal)   Resp 18   Ht 5' 6\" (1.676 m)   Wt 193 lb 12.8 oz (87.9 kg)   SpO2 (!) 87%   BMI 31.28 kg/m²     WEIGHT:   Wt Readings from Last 3 Encounters:   03/03/19 193 lb 12.8 oz (87.9 kg)   02/20/19 193 lb 3.2 oz (87.6 kg)   02/10/19 190 lb (86.2 kg)     GENERAL APPEARANCE: alert and cooperative. She looks better.    HEAD: Normocephalic, without obvious abnormality, atraumatic  NECK: No palpable lymphadenopathy in supraclavicular, axillary or cervical chains  LUNGS: Clear to auscultation bilaterally, no audible rales, wheezes or crackles  HEART: Regular rate and rhythm, S1, S2 normal  ABDOMEN: Soft, non-tender; bowel sounds normal; no masses, no organomegaly  EXTREMITIES: without cyanosis, clubbing, edema or asymmetry  SKIN: No jaundice, purpura or petechiae    LABS:     CBC:  Lab Results   Component Value Date    WBC 15.5 (H) 03/03/2019    HGB 6.8 (LL) 03/03/2019    HCT 21.4 (L) 03/03/2019    MCV 81.1 03/03/2019     03/03/2019    LYMPHOPCT 4.0 03/03/2019    RBC 2.64 (L) 03/03/2019    MCH 25.9 (L) 03/03/2019    MCHC 31.9 03/03/2019    RDW 21.4 (H) 03/03/2019       Lab Results   Component Value Date     03/03/2019    K 4.5 03/03/2019    K 4.5 03/03/2019     03/03/2019    CO2 28 03/03/2019    BUN 8 03/03/2019    CREATININE <0.5 (L) 03/03/2019    GLUCOSE 137 (H) 03/03/2019    CALCIUM 8.1 (L) 03/03/2019    PROT 6.5 02/28/2019    LABALBU 3.5 02/28/2019    BILITOT <0.2 02/28/2019    ALKPHOS 130 (H) 02/28/2019    AST 27 02/28/2019    ALT 31 02/28/2019    LABGLOM >60 03/03/2019    GFRAA >60 03/03/2019    AGRATIO 1.2 02/28/2019    GLOB 3.0 02/28/2019       No results found for: PTINR      IMAGING:     Xr Chest Standard (2 Vw)    Result Date: 2/26/2019  EXAMINATION: TWO VIEWS OF THE CHEST 2/26/2019 11:55 am COMPARISON: Frontal view of the chest 02/24/2019, frontal and lateral views of the chest 02/19/2019, CT thorax 02/25/2019. HISTORY: ORDERING SYSTEM PROVIDED HISTORY: cough TECHNOLOGIST PROVIDED HISTORY: Reason for exam:->cough Ordering Physician Provided Reason for Exam: cough FINDINGS: Support devices: Right IJ chest port again noted with distal tip terminating in the proximal right atrium in good position. The cardiopericardial silhouette is stable in size and configuration. Bilateral patchy airspace opacities are redemonstrated most pronounced in the mid and lower lung zones, these were better detailed on the recent CT of the thorax from 02/25/2019, please refer to that report for additional information. The overall radiographic appearance has mildly increased from the prior radiograph 02/24/2019. There is no pneumothorax or pleural effusion. Surgical clips project in the upper abdomen. Mild interval increase in bilateral patchy airspace opacities better detailed on the CT of the thorax from 02/25/2019. Findings are again most suggestive of an infectious/inflammatory process.      Xr Chest Standard (2 Vw)    Result Date: 2/19/2019  EXAMINATION: TWO VIEWS OF THE CHEST 2/19/2019 4:26 pm COMPARISON: 02/16/2019 HISTORY: ORDERING SYSTEM PROVIDED HISTORY: cough TECHNOLOGIST PROVIDED HISTORY: Reason for exam:->cough Ordering Physician Provided Reason for Exam: Cervical Cancer (Pt was sent over from Dr Tere Lizarraga office - states she was sent here from office - was told today that she has stage 3 cervical cancer and was sent here for \"scans and further testing\") Acuity: Unknown Type of Exam: Unknown FINDINGS: There is patchy bilateral airspace disease, which appears increased when compared to the previous exam.  As detected on recent CT PA, some of those have a nodular appearance. The heart mediastinal contours are unremarkable. No pneumothorax. No free air. No acute bony abnormalities. Chin catheter noted. Patchy bilateral airspace disease, concerning for pneumonia, which appears increased when compared to the previous exam.  Again, some of these areas of opacity have a nodular appearance and septic emboli should be considered, especially when given the correlation with the CT PA from 02/15/2019. Process should be followed to resolution. Xr Chest Standard (2 Vw)    Result Date: 2/16/2019  EXAMINATION: TWO VIEWS OF THE CHEST 2/16/2019 11:49 am COMPARISON: 10/16/2014 HISTORY: ORDERING SYSTEM PROVIDED HISTORY: pneumonia TECHNOLOGIST PROVIDED HISTORY: Reason for exam:->pneumonia Ordering Physician Provided Reason for Exam: PNA Acuity: Acute Type of Exam: Initial Relevant Medical/Surgical History: cervical ca FINDINGS: There is patchy bibasilar consolidation, right greater than left, superimposed on a background of diffuse interstitial prominence. Heart size, mediastinal contours and pulmonary vascularity are within normal limits. There is no pleural effusion. Multifocal bilateral pneumonia. Ct Chest W Contrast    Result Date: 2/25/2019  EXAMINATION: CT OF THE CHEST WITH CONTRAST 2/25/2019 1:47 pm TECHNIQUE: CT of the chest was performed with the administration of intravenous contrast. Multiplanar reformatted images are provided for review. Dose modulation, iterative reconstruction, and/or weight based adjustment of the mA/kV was utilized to reduce the radiation dose to as low as reasonably achievable. COMPARISON: CT of the chest February 15, 2019 HISTORY: ORDERING SYSTEM PROVIDED HISTORY: PNEUMONIA, UNRESOLVED TECHNOLOGIST PROVIDED HISTORY: Ordering Physician Provided Reason for Exam: Pneumonia, unresolved Acuity: Unknown Type of Exam: Unknown FINDINGS: Mediastinum: Unchanged mildly enlarged lymph nodes. No pericardial effusion.  Lungs/pleura: performed with the administration of intravenous contrast. Multiplanar reformatted images are provided for review. Dose modulation, iterative reconstruction, and/or weight based adjustment of the mA/kV was utilized to reduce the radiation dose to as low as reasonably achievable. COMPARISON: 1/28/2019, 4/29/2011 HISTORY: ORDERING SYSTEM PROVIDED HISTORY: cervical ca - SOB - PE??? TECHNOLOGIST PROVIDED HISTORY: Ordering Physician Provided Reason for Exam: SOB Acuity: Unknown Type of Exam: Unknown; ORDERING SYSTEM PROVIDED HISTORY: abd disten - cervical ca? TECHNOLOGIST PROVIDED HISTORY: Additional Contrast?->None Ordering Physician Provided Reason for Exam: abd distention Acuity: Unknown Type of Exam: Unknown Patient with recently diagnosed cervical cancer. FINDINGS: Chest: Pulmonary arteries: The pulmonary arteries opacify normally. There is no evidence of filling defect to suggest a pulmonary embolism. Mediastinum: The thyroid is unremarkable. There is mild subcarinal and bilateral hilar lymphadenopathy, most likely benign and reactive in etiology given the patient's underlying lung findings. The thoracic aorta is unremarkable, without evidence of aneurysm or dissection. 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 No pericardial abnormality is identified. Lungs/pleura: There is mild mucous plugging within the bilateral lower lobes. The central airways are otherwise widely patent. There has been interval development of widespread ground-glass opacity throughout both lungs, with diffuse intralobular septal thickening evident, new from prior exam.  This most likely reflects a combination of interstitial pulmonary edema and superimposed multifocal pneumonia. Additionally, there has been interval progression and more consolidation of multiple scattered various sized pulmonary nodules throughout both lungs since the study of 1/28/2019. A few of these are cavitary.   The Reason for exam:->fever Ordering Physician Provided Reason for Exam: stage 4 cervical ca with mets to bladder and lungs- stent to kidney last week, with fever and pain today. Acuity: Acute Type of Exam: Initial FINDINGS: Mild patchy consolidation within the right lung is again identified, increased when compared to the previous exam, greatest in the right perihilar region. Nodular opacities within the lungs are also again found, similar. Heart mediastinal contours are unremarkable. Chin catheter is present, unchanged in position. No acute bony abnormalities. No pneumothorax. No free air. Patchy consolidation within the lungs, especially the right perihilar region, mildly increased when compared to the previous exam.  Given the relatively rapid increase, pneumonia would be primarily considered. Xr Chest Portable    Result Date: 2/24/2019  EXAMINATION: SINGLE XRAY VIEW OF THE CHEST 2/24/2019 7:00 am COMPARISON: Chest radiograph performed 02/19/2019. HISTORY: ORDERING SYSTEM PROVIDED HISTORY: elevated wbc and ongoing shortness of breath TECHNOLOGIST PROVIDED HISTORY: Reason for exam:->elevated wbc and ongoing shortness of breath Acuity: Unknown Type of Exam: Unknown FINDINGS: There is mild bilateral pulmonary congestion. There is similar left basilar infiltrate. There is no pneumothorax. The mediastinal structures are stable. The upper abdomen is unremarkable. The extrathoracic soft tissues are unremarkable. There is a right internal jugular port. Stable bilateral congestion and left basilar infiltrate. Ct Chest Pulmonary Embolism W Contrast    Result Date: 2/15/2019  EXAMINATION: CTA OF THE CHEST; CT OF THE ABDOMEN AND PELVIS WITH CONTRAST 2/15/2019 2:11 pm TECHNIQUE: CTA of the chest was performed after the administration of intravenous contrast.  Multiplanar reformatted images are provided for review. MIP images are provided for review.  Dose modulation, iterative reconstruction, and/or weight based adjustment of the mA/kV was utilized to reduce the radiation dose to as low as reasonably achievable.; CT of the abdomen and pelvis was performed with the administration of intravenous contrast. Multiplanar reformatted images are provided for review. Dose modulation, iterative reconstruction, and/or weight based adjustment of the mA/kV was utilized to reduce the radiation dose to as low as reasonably achievable. COMPARISON: 1/28/2019, 4/29/2011 HISTORY: ORDERING SYSTEM PROVIDED HISTORY: cervical ca - SOB - PE??? TECHNOLOGIST PROVIDED HISTORY: Ordering Physician Provided Reason for Exam: SOB Acuity: Unknown Type of Exam: Unknown; ORDERING SYSTEM PROVIDED HISTORY: abd disten - cervical ca? TECHNOLOGIST PROVIDED HISTORY: Additional Contrast?->None Ordering Physician Provided Reason for Exam: abd distention Acuity: Unknown Type of Exam: Unknown Patient with recently diagnosed cervical cancer. FINDINGS: Chest: Pulmonary arteries: The pulmonary arteries opacify normally. There is no evidence of filling defect to suggest a pulmonary embolism. Mediastinum: The thyroid is unremarkable. There is mild subcarinal and bilateral hilar lymphadenopathy, most likely benign and reactive in etiology given the patient's underlying lung findings. The thoracic aorta is unremarkable, without evidence of aneurysm or dissection. 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 No pericardial abnormality is identified. Lungs/pleura: There is mild mucous plugging within the bilateral lower lobes. The central airways are otherwise widely patent. There has been interval development of widespread ground-glass opacity throughout both lungs, with diffuse intralobular septal thickening evident, new from prior exam.  This most likely reflects a combination of interstitial pulmonary edema and superimposed multifocal pneumonia.   Additionally, there has been interval progression and more consolidation of multiple scattered various sized pulmonary nodules throughout both lungs since the study of 1/28/2019. A few of these are cavitary. The largest of these measures approximately 10 mm in short axis within the subpleural portion of the left lower lobe. These may be secondary to an atypical infectious or inflammatory process, to include septic emboli. Metastatic disease is considered less likely, though not excluded. There is no evidence of a pneumothorax or pleural effusion. Soft Tissues/Bones: No acute findings. Abdomen/Pelvis: Organs: The patient is status post cholecystectomy. The liver, spleen, and pancreas are normal.  The adrenal glands are unremarkable bilaterally. There has been interval development of nonspecific moderate right hydronephrosis since the prior exam, without definite demonstrable cause. Namely, no definite obstructing stone or mass is identified. The left kidney is stable and unremarkable. GI/Bowel: Evaluation of the hollow GI tract demonstrates no evidence of abnormal bowel wall thickening, dilatation, or obstruction. The appendix is normal. Pelvis: The urinary bladder is partially collapsed, though appears diffusely thick-walled and inflamed, with a mild amount of pericystic stranding noted. These findings are suggestive of an acute cystitis, progressed from prior exam.  Additionally, the cervix appears enlarged and irregular, and there is new abnormal thickening of the endometrium within the uterine fundus, which appears new in comparison with the study of 1/28/2019. The bilateral adnexa are unremarkable. There is a mild amount of free pelvic fluid, likely physiologic. There are stable mildly enlarged bilateral pelvic chain lymph nodes, the largest measuring approximately 2.6 x 1.6 cm along the right external iliac chain, similar to the study of 1/28/2019. Peritoneum/Retroperitoneum: No intraperitoneal free air or free fluid is identified.   No pathologic lymphadenopathy is seen. The abdominal aorta is unremarkable. No significant abdominal wall hernia is identified. Bones/Soft Tissues: There is mild bilateral sacroiliitis. The skeletal structures are otherwise unremarkable. 1. No CT evidence of a pulmonary embolism. 2. No acute abnormality of the thoracic aorta. 3. Interval development of widespread ground-glass opacity throughout both lungs with diffuse intralobular septal thickening. This most likely reflects a combination of interstitial pulmonary edema and multifocal pneumonia. 4. Interval progression of multiple nodular foci of consolidative opacity throughout both lungs, a few of which are cavitary in appearance. These nodules are most likely infectious or inflammatory in etiology, and septic emboli are a consideration. Given patient's history of cervical cancer, metastatic disease is on the differential, though considered less likely. 5. New nonspecific moderate right hydronephrosis, without demonstrable cause. However, there is underlying wall thickening and enhancement of the urinary bladder. This combination of findings could represent a cystitis in the setting of urinary tract infection with resultant pyelitis and hydronephrosis. However, given patient history of cervical cancer, locoregional spread of tumor with resultant obstruction of the distal right ureter may be a cause of the patient's right hydronephrosis. 6. Interval development of new irregularity of the cervix and nonspecific endometrial thickening in comparison with the prior study of 1/28/2019. This likely represents the patient's known underlying cervical cancer causing obstruction of the endometrial with resultant endometrial thickening. This could be further evaluated with a follow-up pelvic ultrasound. 7. Stable mild bilateral pelvic chain lymphadenopathy, right greater than left, possibly representing metastatic disease.      Ir Port Placement > 5 Years    Result Date: 2/19/2019  PROCEDURE: ULTRASOUND GUIDED VASCULAR ACCESS. FLUOROSCOPY GUIDED PLACEMENT OF A RIGHT IJ SUBCUTANEOUS PORT-A-CATH. MODERATE CONSCIOUS SEDATION 2/19/2019. HISTORY: ORDERING SYSTEM PROVIDED HISTORY: cervical cancer to get chemo TECHNOLOGIST PROVIDED HISTORY: Reason for exam:->cervical cancer to get chemo How many lumens are being requested?->1 What site is the preferred site? ->No preference What side should this line be placed? ->Either 61-year-old female with cervical cancer, presents for chest port placement. SEDATION: Moderate sedation was administered under my supervision by a qualified nurse. 4 mg of Versed was administered intravenously. Approximate intra procedural sedation time was 23 minutes. FLUOROSCOPY DOSE AND TYPE OR TIME AND EXPOSURES: 54 seconds of fluoroscopy with 2 exposures. TECHNIQUE: Informed consent was obtained after a detailed explanation of the procedure including risks, benefits, and alternatives. Universal protocol was observed. The right neck and chest were prepped and draped in sterile fashion using maximum sterile barrier technique. Local anesthesia was achieved with lidocaine. A micropuncture needle was used to access the right internal jugular vein using ultrasound guidance. An ultrasound image demonstrating patency of the vein with needle tip located within it. An image was obtained and stored in PACs. A 0.035 guidewire was used to place a peel-away sheath. A chest wall incision was made and a subcutaneous pocket was created. The port reservoir was placed within the pocket and the port tubing was attached and tunneled subcutaneously to the venotomy site. The port tubing was cut to an appropriate length and advanced through the peel-away sheath under fluoroscopic guidance to the cavo-atrial junction. The chest wall incision was closed using 3-0 and 4-0 Vicryl suture and tissue adhesive was applied to the venotomy site and chest wall incision.  The port flushed easily and there was a good blood return. The port was locked with heparinized saline. The patient tolerated the procedure well and there were no immediate complications. FINDINGS: Fluoroscopic image demonstrates the tip of the port in the right atrium. 1. Successful ultrasound and fluoroscopy guided Port-A-Cath placement via right IJ access, as described above. 2.  The port was left accessed for immediate use. Fl Retrograde Pyelogram Right    Result Date: 2/17/2019  EXAMINATION: SPOT FLUOROSCOPIC IMAGES 2/17/2019 6:52 am TECHNIQUE: Fluoroscopy was provided by the radiology department for procedure. Radiologist was not present during examination. FLUOROSCOPY DOSE AND TYPE OR TIME AND EXPOSURES: 3 seconds of fluoroscopy was utilized for purposes of intraoperative localization. 1 fluoroscopic spot image was obtained. COMPARISON: None HISTORY: Intraprocedural imaging. FINDINGS: 1 spot images of the abdomen was obtained. Intraprocedural fluoroscopic spot images as above. See separate procedure report for more information. Vl Extremity Venous Bilateral    Result Date: 3/1/2019  Lower Extremities DVT Study  Demographics   Patient Name       [facility]   Date of Study      03/01/2019       Gender              Female   Patient Number     [id number]       Date of Birth       1980   Visit Number       [id number]        Age                 42 year(s)   Accession Number   [id number]        Room Number         5182   Corporate ID       [id number]          Edith Castle   Ordering Physician Maaglie Huizar MD Interpreting        [facility]                                      Physician           Shirlene Mcdonald MD,                                                          [facility]  Procedure Type of Study:   Veins:Lower Extremities DVT Study, VASC EXTREMITY VENOUS DUPLEX BILATERAL.    Vascular Sonographer Report  Additional Indications:Swelling Impressions Right Impression No evidence of deep vein or superficial vein thrombosis involving the right lower extremity. Left Impression No evidence of deep vein or superficial vein thrombosis involving the left lower extremity. Conclusions   Summary   No evidence of deep vein or superficial vein thrombosis involving the  bilateral lower extremities. Signature   ------------------------------------------------------------------  Electronically signed by Vladimir Avina MD, [facility] (Interpreting  physician) on 03/01/2019 at 02:22 PM  ------------------------------------------------------------------  Patient Status:Routine. [address] [place] [address] - Vascular Lab. Technical Quality:Adequate visualization. Velocities are measured in cm/s ; Diameters are measured in mm Right Lower Extremities DVT Study Measurements Right 2D Measurements +------------------------+----------+---------------+----------+ ! Location                ! Visualized! Compressibility! Thrombosis! +------------------------+----------+---------------+----------+ ! Sapheno Femoral Junction! Yes       ! Yes            ! None      ! +------------------------+----------+---------------+----------+ ! GSV Thigh               ! Yes       ! Yes            ! None      ! +------------------------+----------+---------------+----------+ ! Common Femoral          !Yes       ! Yes            ! None      ! +------------------------+----------+---------------+----------+ ! Prox Femoral            !Yes       ! Yes            ! None      ! +------------------------+----------+---------------+----------+ ! Mid Femoral             !Yes       ! Yes            ! None      ! +------------------------+----------+---------------+----------+ ! Dist Femoral            !Yes       ! Yes            ! None      ! +------------------------+----------+---------------+----------+ ! Deep Femoral            !Yes       ! Yes            ! None      ! +------------------------+----------+---------------+----------+ ! Popliteal               !Yes       ! Yes            ! None      ! +------------------------+----------+---------------+----------+ ! GSV Below Knee          ! Yes       ! Yes            ! None      ! +------------------------+----------+---------------+----------+ ! Gastroc                 ! Yes       ! Yes            ! None      ! +------------------------+----------+---------------+----------+ ! Soleal                  !Yes       ! Yes            ! None      ! +------------------------+----------+---------------+----------+ ! PTV                     ! Yes       ! Yes            ! None      ! +------------------------+----------+---------------+----------+ ! Peroneal                !Yes       ! Yes            ! None      ! +------------------------+----------+---------------+----------+ ! GSV Calf                ! Yes       ! Yes            ! None      ! +------------------------+----------+---------------+----------+ ! SSV                     ! Yes       ! Yes            ! None      ! +------------------------+----------+---------------+----------+ Right Doppler Measurements +------------------------+------+------+------------+ ! Location                ! Signal!Reflux! Reflux (sec)! +------------------------+------+------+------------+ ! Sapheno Femoral Junction! Phasic!      !            ! +------------------------+------+------+------------+ ! Common Femoral          !Phasic!      !            ! +------------------------+------+------+------------+ ! Prox Femoral            !Phasic!      !            ! +------------------------+------+------+------------+ ! Deep Femoral            !Phasic!      !            ! +------------------------+------+------+------------+ ! Popliteal               !Phasic!      !            ! +------------------------+------+------+------------+ Left Lower Extremities DVT Study Measurements Left 2D Measurements +------------------------+----------+---------------+----------+ ! Location                ! Visualized! Compressibility! Thrombosis! +------------------------+----------+---------------+----------+ ! Sapheno Femoral Junction! Yes       ! Yes            ! None      ! +------------------------+----------+---------------+----------+ ! GSV Thigh               ! Yes       ! Yes            ! None      ! +------------------------+----------+---------------+----------+ ! Common Femoral          !Yes       ! Yes            ! None      ! +------------------------+----------+---------------+----------+ ! Prox Femoral            !Yes       ! Yes            ! None      ! +------------------------+----------+---------------+----------+ ! Mid Femoral             !Yes       ! Yes            ! None      ! +------------------------+----------+---------------+----------+ ! Dist Femoral            !Yes       ! Yes            ! None      ! +------------------------+----------+---------------+----------+ ! Deep Femoral            !Yes       ! Yes            ! None      ! +------------------------+----------+---------------+----------+ ! Popliteal               !Yes       ! Yes            ! None      ! +------------------------+----------+---------------+----------+ ! GSV Below Knee          ! Yes       ! Yes            ! None      ! +------------------------+----------+---------------+----------+ ! Gastroc                 ! Yes       ! Yes            ! None      ! +------------------------+----------+---------------+----------+ ! Soleal                  !Yes       ! Yes            ! None      ! +------------------------+----------+---------------+----------+ ! PTV                     ! Yes       ! Yes            ! None      ! +------------------------+----------+---------------+----------+ ! Peroneal                !Yes       ! Yes            ! None      ! +------------------------+----------+---------------+----------+ ! GSV Calf                ! Yes       ! Yes            ! None      ! +------------------------+----------+---------------+----------+ ! SSV                     ! Yes       ! Yes            ! None      ! +------------------------+----------+---------------+----------+ Left Doppler Measurements +------------------------+------+------+------------+ ! Location                ! Signal!Reflux! Reflux (sec)! +------------------------+------+------+------------+ ! Sapheno Femoral Junction! Phasic!      !            ! +------------------------+------+------+------------+ ! Common Femoral          !Phasic!      !            ! +------------------------+------+------+------------+ ! Prox Femoral            !Phasic!      !            ! +------------------------+------+------+------------+ ! Deep Femoral            !Phasic!      !            ! +------------------------+------+------+------------+ ! Popliteal               !Phasic!      !            ! +------------------------+------+------+------------+    Ct Chest Abdomen Pelvis W Contrast    Result Date: 2/28/2019  EXAMINATION: CT OF THE CHEST, ABDOMEN, AND PELVIS WITH CONTRAST 2/28/2019 8:34 pm TECHNIQUE: CT of the chest, abdomen and pelvis was performed with the administration of intravenous contrast. Multiplanar reformatted images are provided for review. Dose modulation, iterative reconstruction, and/or weight based adjustment of the mA/kV was utilized to reduce the radiation dose to as low as reasonably achievable. COMPARISON: 02/25/2019 and prior. HISTORY: ORDERING SYSTEM PROVIDED HISTORY: fever, chest pain, abd pain, recent ureter stent TECHNOLOGIST PROVIDED HISTORY: Additional Contrast?->None Ordering Physician Provided Reason for Exam: fever, chest pain, abd pain, recent ureter stent Acuity: Acute Type of Exam: Initial Relevant Medical/Surgical History: Fever (stage 4 cervical ca with mets to bladder and lungs- stent to kidney last week, with fever and pain today. Sees Dr. Lisha Calderón. ) FINDINGS: Chest: Mediastinum: Right-sided IJ port again noted. Limited imaging of the base of the neck and axilla appear stable. Heart size is stable. The aorta and origin of the great vessels again demonstrate an aberrant right subclavian artery. Main pulmonary artery opacifies unremarkably. No significant change in hilar and mediastinal adenopathy. Esophagus tapers normally. Lungs/pleura: Central airways are patent. Peribronchial wall thickening again noted. Lung volumes are low. Perihilar and dependent ground-glass opacities and multifocal patchy opacities with some more focal consolidation posteriorly at the lung bases again noted. There is slight improved aeration at the left base from the most recent comparison. Mild paraseptal thickening noted. No significant pleural effusion noted. Soft Tissues/Bones:  No acute abnormality of the visualized osseous structures. Abdomen/Pelvis: Organs: Persistent mild dilation of the right renal collecting system is noted with placement of a right ureteral stent with proximal colon, renal pelvis and distal colon extending into the bladder. Periureteral stranding is noted. The left kidney, adrenal glands, pancreas and spleen are unremarkable in appearance. Liver is somewhat heterogeneous in its appearance with no focal lesion identified. Patient is status post cholecystectomy. GI/Bowel: The stomach is unremarkable in appearance. There is a duodenal diverticulum in the proximal portion of the sweep. Small bowel demonstrates no acute abnormality. Appendix is unremarkable. Pelvis: Bladder is incompletely distended. There is bladder wall thickening noted which is less prominent than the comparison. Periureteral stranding is noted. Increased induration is noted along the fat along the right side of the pelvis with persistent adenopathy noted along the right iliac chain measuring up to 1.5 cm in short axis diameter without significant change from prior study. Small lymph nodes also noted along the left iliac chain and retroperitoneum.

## 2019-03-03 NOTE — PROGRESS NOTES
[facility]  Gynecologic Oncology   Progress Note    Stage IVB [facility]  Anemia   S/p cis/taxol 6/33  Possible Complicated UTI  Hypoxia d/t lung mets    SUBJECTIVE:  vaginal spotting    OBJECTIVE:           Physical Exam  VITALS:  BP (!) 88/59   Pulse 96   Temp 97.4 °F (36.3 °C) (Temporal)   Resp 18   Ht 5' 6\" (1.676 m)   Wt 193 lb 12.8 oz (87.9 kg)   SpO2 93%   BMI 31.28 kg/m²   24HR INTAKE/OUTPUT:      Intake/Output Summary (Last 24 hours) at 3/3/2019 1027  Last data filed at 3/3/2019 1976  Gross per 24 hour   Intake 4351 ml   Output --   Net 4351 ml     CONSTITUTIONAL:  awake, alert, cooperative, no apparent distress, and appears stated age  ABDOMEN:  No scars, normal bowel sounds, soft, non-distended, non-tender, no masses palpated, no hepatosplenomegally  MUSCULOSKELETAL:  There is no redness, warmth, or swelling of the joints. Full range of motion noted. Motor strength is 5 out of 5 all extremities bilaterally.   Tone is normal.    DATA:  CBC with Differential:    Lab Results   Component Value Date    WBC 15.5 03/03/2019    RBC 2.64 03/03/2019    HGB 6.8 03/03/2019    HCT 21.4 03/03/2019     03/03/2019    MCV 81.1 03/03/2019    MCH 25.9 03/03/2019    MCHC 31.9 03/03/2019    RDW 21.4 03/03/2019    NRBC 2 03/01/2019    SEGSPCT 70.3 02/05/2019    BANDSPCT 3 03/03/2019    METASPCT 2 03/03/2019    LYMPHOPCT 4.0 03/03/2019    MONOPCT 1.0 03/03/2019    MYELOPCT 1 03/03/2019    EOSPCT 3.6 04/17/2012    BASOPCT 1.0 03/03/2019    MONOSABS 0.2 03/03/2019    LYMPHSABS 0.6 03/03/2019    EOSABS 0.5 03/03/2019    BASOSABS 0.2 03/03/2019    DIFFTYPE Auto 05/22/2013     BMP:    Lab Results   Component Value Date     03/03/2019    K 4.5 03/03/2019     03/03/2019    CO2 28 03/03/2019    BUN 8 03/03/2019    LABALBU 3.5 02/28/2019    CREATININE <0.5 03/03/2019    CALCIUM 8.1 03/03/2019    GFRAA >60 03/03/2019    GFRAA >60 05/22/2013    LABGLOM >60 03/03/2019    GLUCOSE 137 03/03/2019     Magnesium:    Lab Results Component Value Date    MG 1.90 03/03/2019     Phosphorus:    Lab Results   Component Value Date    PHOS 3.4 03/03/2019       ASSESSMENT AND PLAN:    1. ID-f/b Dr. Lucila Wells. Urine culture is negative however UA appeared to have signs of UTI. Could also be from cervical tumor. Respiratory pathogens panel negative. On Vanc and Merrem. 2. Pulm-f/b pulmonology. Pt requires O2 d/t metastatic cancer to lungs. 3. Renal-R NU stent in place. Cr is wnl (has never been abnl). Pyridium for discomfort  4. Heme-Hgb lower today post hydration. 1 unit of PRBC today. Vaginal bleeding is mild and will further decrease with chemotherapy. Decrease in hgb also d/t recent chemo. 5. F/E/N-eating moderately. 6. SW-good support system however pt and family overwhelmed with diagnosis and extent of disease. Multiple questions today regarding how her diagnosis was missed. 7. Appreciate care by primary team and all consultants. Cecy Shaver, [address] [place] [address] Oncology  [phone])

## 2019-03-03 NOTE — PROGRESS NOTES
Pt reporting rash on back. Red blanchable rash, mildly itching, spreading across whole back. MD paged and IV benadryl ordered.

## 2019-03-03 NOTE — PROGRESS NOTES
Shift assessment complete. VSS. Scheduled medications given. Blood administration started. Pt tolerating well at this time. Will continue to closely monitor.

## 2019-03-03 NOTE — PROGRESS NOTES
[facility]  F/U Note      Reason for Consult: pneumonia  Requesting Physician: Dr. Victor M Calhoun:   [address] [facility]:    Chief Complaint   Patient presents with    Fever     stage 4 cervical ca with mets to bladder and lungs- stent to kidney last week, with fever and pain today. Sees Dr. Dave Torrez.       She is on 3-4L nc  No productive cough but low grade fever reported               Current Medications:    Current Facility-Administered Medications: 0.9 % sodium chloride bolus, 250 mL, Intravenous, Once  vancomycin (VANCOCIN) 1,250 mg in dextrose 5 % 250 mL IVPB, 1,250 mg, Intravenous, Q8H  potassium chloride (KLOR-CON M) extended release tablet 40 mEq, 40 mEq, Oral, PRN **OR** potassium bicarb-citric acid (EFFER-K) effervescent tablet 40 mEq, 40 mEq, Oral, PRN **OR** potassium chloride 10 mEq/100 mL IVPB (Peripheral Line), 10 mEq, Intravenous, PRN  hyoscyamine (LEVSIN/SL) sublingual tablet 125 mcg, 125 mcg, Sublingual, Q8H **OR** [DISCONTINUED] hyoscyamine (LEVSIN/SL) sublingual tablet 125 mcg, 125 mcg, Oral, Q8H  promethazine (PHENERGAN) injection 6.25 mg, 6.25 mg, Intravenous, Q6H PRN  HYDROmorphone HCl PF (DILAUDID) injection 0.5 mg, 0.5 mg, Intravenous, Q3H PRN  meropenem (MERREM) 1 g in sodium chloride 0.9 % 100 mL IVPB (mini-bag), 1 g, Intravenous, Q8H  magic (miracle) mouthwash with nystatin, 5 mL, Swish & Spit, 4x Daily PRN  acetaminophen (TYLENOL) tablet 500 mg, 500 mg, Oral, Q6H  ketorolac (TORADOL) injection 15 mg, 15 mg, Intravenous, Q6H PRN  morphine (MS CONTIN) extended release tablet 30 mg, 30 mg, Oral, 2 times per day  oxyCODONE (OXY-IR) immediate release tablet 15 mg, 15 mg, Oral, Q3H PRN  phenazopyridine (PYRIDIUM) tablet 200 mg, 200 mg, Oral, TID WC  ibuprofen (ADVIL;MOTRIN) tablet 800 mg, 800 mg, Oral, Q8H PRN  linaclotide (LINZESS) capsule 145 mcg, 145 mcg, Oral, QAM AC  ondansetron (ZOFRAN-ODT) disintegrating tablet 8 mg, 8 mg, Oral, Q8H PRN  promethazine (PHENERGAN) tablet 25 mg, 25 mg, Oral, Q6H PRN  ferrous sulfate tablet 325 mg, 325 mg, Oral, BID WC  baclofen (LIORESAL) tablet 20 mg, 20 mg, Oral, BID  DULoxetine (CYMBALTA) extended release capsule 60 mg, 60 mg, Oral, Daily  gabapentin (NEURONTIN) capsule 300 mg, 300 mg, Oral, TID  sodium chloride flush 0.9 % injection 10 mL, 10 mL, Intravenous, 2 times per day  sodium chloride flush 0.9 % injection 10 mL, 10 mL, Intravenous, PRN  magnesium hydroxide (MILK OF MAGNESIA) 400 MG/5ML suspension 30 mL, 30 mL, Oral, Daily PRN  ondansetron (ZOFRAN) injection 4 mg, 4 mg, Intravenous, Q6H PRN  enoxaparin (LOVENOX) injection 40 mg, 40 mg, Subcutaneous, Daily  magnesium sulfate 1 g in dextrose 5% 100 mL IVPB, 1 g, Intravenous, PRN  famotidine (PEPCID) tablet 20 mg, 20 mg, Oral, BID  acetaminophen (TYLENOL) tablet 650 mg, 650 mg, Oral, Q4H PRN  ipratropium-albuterol (DUONEB) nebulizer solution 1 ampule, 1 ampule, Inhalation, 4x daily  guaiFENesin (MUCINEX) extended release tablet 600 mg, 600 mg, Oral, BID    REVIEW OF SYSTEMS:    CONSTITUTIONAL:  negative for fevers, chills, diaphoresis, activity change, appetite change, fatigue, night sweats and unexpected weight change.    EYES:  negative for blurred vision, eye discharge, visual disturbance and icterus  HEENT:  negative for hearing loss, tinnitus, ear drainage, sinus pressure, nasal congestion, epistaxis and snoring  RESPIRATORY:  See HPI  CARDIOVASCULAR:  negative for chest pain, palpitations, exertional chest pressure/discomfort, edema, syncope  GASTROINTESTINAL:  negative for nausea, vomiting, diarrhea, constipation, blood in stool and abdominal pain  GENITOURINARY:  negative for frequency, dysuria, urinary incontinence, decreased urine volume, and hematuria  HEMATOLOGIC/LYMPHATIC:  negative for easy bruising, bleeding and lymphadenopathy  ALLERGIC/IMMUNOLOGIC:  negative for recurrent infections, angioedema, anaphylaxis and drug reactions  ENDOCRINE:  negative for weight changes and diabetic symptoms including polyuria, polydipsia and polyphagia  MUSCULOSKELETAL:  negative for  pain, joint swelling, decreased range of motion and muscle weakness  NEUROLOGICAL:  negative for headaches, slurred speech, unilateral weakness  PSYCHIATRIC/BEHAVIORAL: negative for hallucinations, behavioral problems, confusion and agitation. Objective:   PHYSICAL EXAM:      VITALS:  BP (!) 88/59   Pulse 96   Temp 97.4 °F (36.3 °C) (Temporal)   Resp 18   Ht 5' 6\" (1.676 m)   Wt 193 lb 12.8 oz (87.9 kg)   SpO2 93%   BMI 31.28 kg/m²      24HR INTAKE/OUTPUT:      Intake/Output Summary (Last 24 hours) at 3/3/2019 1148  Last data filed at 3/3/2019 4445  Gross per 24 hour   Intake 4351 ml   Output --   Net 4351 ml     CONSTITUTIONAL:  awake, alert, cooperative, no apparent distress, and appears stated age  NECK:  Supple, symmetrical, trachea midline, no adenopathy, thyroid symmetric, not enlarged and no tenderness, skin normal  LUNGS: clear to auscultation. No accessory muscle use  CARDIOVASCULAR: S1 and S2, no edema and no JVD  ABDOMEN:  normal bowel sounds, non-distended and no masses palpated, and no tenderness to palpation. No hepatospleenomegaly  LYMPHADENOPATHY:  no axillary or supraclavicular adenopathy. No cervical adnenopathy  PSYCHIATRIC: Oriented to person place and time. No obvious depression or anxiety. MUSCULOSKELETAL: No obvious misalignment or effusion of the joints. No clubbing, cyanosis of the digits. SKIN:  normal skin color, texture, turgor and no redness, warmth, or swelling.  No palpable nodules    DATA:    Old records have been reviewed    CBC:  Recent Labs     03/01/19  0506 03/02/19  0655 03/02/19  1507 03/03/19  0610   WBC 16.0* 14.4*  --  15.5*   RBC 3.08* 2.74*  --  2.64*   HGB 7.9* 7.0* 7.0* 6.8*   HCT 24.9* 22.2* 22.2* 21.4*    235  --  240   MCV 81.0 81.2  --  81.1   MCH 25.6* 25.7*  --  25.9*   MCHC 31.5 31.7  --  31.9   RDW 20.5* 20.9*  --  21.4*   NRBC 2*  -- --   --    BANDSPCT 10* 5  --  3      BMP:  Recent Labs     03/01/19  0506 03/02/19  0655 03/03/19  0610    137 138   K 3.4* 3.9 4.5  4.5    101 101   CO2 25 26 28   BUN 6* 6* 8   CREATININE <0.5* <0.5* <0.5*   CALCIUM 8.1* 8.0* 8.1*   GLUCOSE 142* 120* 137*      No results found for: BNP  Lab Results   Component Value Date    CKTOTAL 185 12/18/2014    TROPONINI 0.02 (H) 02/15/2019       Radiology Review:  All pertinent images / reports were reviewed as a part of this visit. Assessment:   1. Acute on chronic hypoxic respiratory failure  2. Metastatic cervical ca to lungs  3.  Febrile illness    Plan:   - she is receiving blood transfusion   - I recommended OOB, IS  - continue abx per ID service  - no plans for bronchoscopy at this time  - negative LE doppler  - wean O2 for sat> 90%  - will sign off, call with questions

## 2019-03-03 NOTE — PLAN OF CARE
Problem: Pain:  Goal: Pain level will decrease  Description  Pain level will decrease   Outcome: Ongoing  Note:   Pt states pain has been more controlled overnight. Will continue to monitor.   Goal: Control of acute pain  Description  Control of acute pain   Outcome: Ongoing  Goal: Control of chronic pain  Description  Control of chronic pain   Outcome: Ongoing

## 2019-03-03 NOTE — PROGRESS NOTES
Physical Therapy    Evaluation Declined    Attempted to see Carlitos Sandoval this afternoon for physical therapy evaluation. Pt stating she has just completed showering independently and stood for entire duration. She also reports that she has been up walking around her room independently without issues. Discussed role and purpose of physical therapy, with the patient declining intervention at this time stating she feels at her baseline for mobility. The patient was encouraged to ask her healthcare team for new PT referral should any changes occur regarding her functional mobility. Please order new PT Evaluation if any changes occur with functional mobility levels.     Jd Mcgill, PT, DPT   [phone]

## 2019-03-03 NOTE — PROGRESS NOTES
PM assessment complete and documented. Meds given per MAR. Slight temp of 99.3 noted. Medicated with scheduled tylenol. NC secure. Tachycardiac. No needs or concerns expressed. Will continue to monitor.

## 2019-03-04 NOTE — PROGRESS NOTES
VSS; respirations even, easy, and unlabored. Shift assessment complete, pt A&OX4. Crackles heard bilaterally in lungs. Scheduled medications and prn oxy-IR administered for pain. Prn phenergan administered for nausea. Cathflo administered per orders to port for sluggish blood return. Pt resting in bed. Pt encouraged to ambulate in hallway. Pt to let RN know when she is ready. No further needs expressed. Call light within reach. Will continue to monitor.

## 2019-03-04 NOTE — PROGRESS NOTES
(gastroesophageal reflux disease)    Vaginal bleeding    History of juvenile rheumatoid arthritis    SLE (systemic lupus erythematosus) (HCC)    Lupus anticoagulant positive    Cervical cancer (HCC)    Lymphadenopathy    Sepsis ([address]    Septic shock (HCC)    Septicemia (HCC)    History of cancer    Status post cystoscopy    Immunocompromised ([address]    Complicated UTI (urinary tract infection)    Anemia    Seizure disorder (HCC)    Class 1 obesity due to excess calories with body mass index (BMI) of 31.0 to 31.9 in adult    Weight loss counseling, encounter for  Resolved Problems:    * No resolved hospital problems.  *      Metastatic cervical carcinoma   - s/p Taxol/cisplatin on 2/20/19  - s/p Neulasta 2/22/19  - vaginal bleeding, improved    UTI  - on vancomycin and meropenem  - has right renal stent  - urology following    Anemia  - d/t chemo and blood loss  - Hgb 7.6, s/p transfusion on 3/3/19    Chronic SOB  - d/t lung mets  - on O2 per ROSALBA Cote, [facility]  [facility]

## 2019-03-04 NOTE — PROGRESS NOTES
Port flushed before second administration of cathflo, brisk blood return noted. No need for second dose of cathflo. MD notified. Vanc trough drawn and sent to lab. IV abx infusing per orders. Visitor at bedside. No further needs expressed. Call light within reach. Will continue to monitor.

## 2019-03-04 NOTE — PROGRESS NOTES
PM assessment complete and documented. Meds given per MAR. Pt sats were 90 on 4.5 L/min. Resp here for treatment. Placed flow to 8 L/min with sats increasing. Decreased in base. Will co ntinue to monitor.

## 2019-03-04 NOTE — PROGRESS NOTES
Urology Progress Note  [facility]    Provider: Coleman Mortimer MD Patient ID:  Admission Date: 2019 Name: Beti Deng  OR Date: 2019 MRN: [id number]   Patient Location: [id number] : 1980  Attending: Carlos Chambers MD Date of Service: 3/4/2019  PCP: Becki Moore MD     Diagnoses:  right ureteral stent  Right colic  Anemia  cervical ca      Assessment/Plan:  43 yo F with cervical CA and indwelling right ureteral stent with continued stent discomfort. Manageable on pyridium currently    - continue pyridium  - can consider urogesic blue as outpt if azo/pyridium not working well  - continue hydration    The patient had a chance to ask questions which were answered. she understands the above plan. Subjective:   Beti Deng is a 42 y.o. female. She was seen and examined this morning. Today we discussed improved stent pain      Objective:   Vitals:  Vitals:    19 1214   BP:    Pulse:    Resp: 18   Temp:    SpO2: 94%       Intake/Output Summary (Last 24 hours) at 3/4/2019 1226  Last data filed at 3/3/2019 1456  Gross per 24 hour   Intake 300 ml   Output --   Net 300 ml     Physical Exam:  Gen: Alert and oriented x3, no acute distress  CV: Regular rate   Resp: unlabored respirations  Abd: Soft, non-distended, non-tender, no masses  Ext: no peripheral edema noted, moves upper and lower extremities spontaneously  Skin: warm and well perfused, no rashes noted on the face, or arms.      Labs:  Lab Results   Component Value Date    WBC 13.5 (H) 2019    HGB 7.6 (L) 2019    HCT 24.3 (L) 2019    MCV 81.5 2019     2019     Lab Results   Component Value Date    CREATININE <0.5 (L) 2019    BUN 7 2019     2019    K 4.2 2019     2019    CO2 29 2019       Coleman Mortimer MD  3/4/2019

## 2019-03-04 NOTE — PROGRESS NOTES
Scheduled medications administered. Prn zofran and oxy-IR administered for nausea and pain. Visitors at bedside. No further needs expressed. Call light within reach. Will continue to monitor.

## 2019-03-04 NOTE — PROGRESS NOTES
Pt given prn pain medication per request for pain 8/10. Primary RN aware.   .Electronically signed by Lela Rivas RN on 3/3/2019 at 11:30 PM

## 2019-03-04 NOTE — PROGRESS NOTES
[facility]  Gynecologic Oncology   Progress Note    Stage IVB [facility]  Anemia   S/p cis/taxol 5/50  Possible Complicated UTI  Hypoxia d/t lung mets    SUBJECTIVE:  vaginal spotting    OBJECTIVE:           Physical Exam  VITALS:  BP 97/62   Pulse 94   Temp 97.8 °F (36.6 °C) (Oral)   Resp 16   Ht 5' 6\" (1.676 m)   Wt 193 lb 12.8 oz (87.9 kg)   SpO2 96%   BMI 31.28 kg/m²   24HR INTAKE/OUTPUT:      Intake/Output Summary (Last 24 hours) at 3/4/2019 0943  Last data filed at 3/3/2019 1456  Gross per 24 hour   Intake 656.25 ml   Output --   Net 656.25 ml     CONSTITUTIONAL:  awake, alert, cooperative, no apparent distress, and appears stated age  ABDOMEN:  No scars, normal bowel sounds, soft, non-distended, non-tender, no masses palpated, no hepatosplenomegally  MUSCULOSKELETAL:  There is no redness, warmth, or swelling of the joints. Full range of motion noted. Motor strength is 5 out of 5 all extremities bilaterally.   Tone is normal.    DATA:  CBC with Differential:    Lab Results   Component Value Date    WBC 13.5 03/04/2019    RBC 2.99 03/04/2019    HGB 7.6 03/04/2019    HCT 24.3 03/04/2019     03/04/2019    MCV 81.5 03/04/2019    MCH 25.5 03/04/2019    MCHC 31.3 03/04/2019    RDW 20.4 03/04/2019    NRBC 2 03/01/2019    SEGSPCT 70.3 02/05/2019    BANDSPCT 3 03/03/2019    METASPCT 2 03/03/2019    LYMPHOPCT 10.2 03/04/2019    MONOPCT 3.8 03/04/2019    MYELOPCT 1 03/03/2019    EOSPCT 3.6 04/17/2012    BASOPCT 0.6 03/04/2019    MONOSABS 0.5 03/04/2019    LYMPHSABS 1.4 03/04/2019    EOSABS 0.1 03/04/2019    BASOSABS 0.1 03/04/2019    DIFFTYPE Auto 05/22/2013     BMP:    Lab Results   Component Value Date     03/04/2019    K 4.2 03/04/2019     03/04/2019    CO2 29 03/04/2019    BUN 7 03/04/2019    LABALBU 3.5 02/28/2019    CREATININE <0.5 03/04/2019    CALCIUM 8.6 03/04/2019    GFRAA >60 03/04/2019    GFRAA >60 05/22/2013    LABGLOM >60 03/04/2019    GLUCOSE 121 03/04/2019     Magnesium:    Lab Results Component Value Date    MG 1.90 03/04/2019     Phosphorus:    Lab Results   Component Value Date    PHOS 4.3 03/04/2019       ASSESSMENT AND PLAN:    1. ID-f/b Dr. Gianni Deras. Urine culture is negative however UA appeared to have signs of UTI. Could also be from cervical tumor. Respiratory pathogens panel negative. On Vanc and Merrem. 2. Pulm-f/b pulmonology. Pt requires O2 d/t metastatic cancer to lungs. May need home respiratory treatments. 3. Renal-R NU stent in place. Cr is wnl (has never been abnl). Pyridium for discomfort  4. Heme-Hgb l@ 7.6 s/p 1 unit of PRBC yesterday. Vaginal bleeding is mild and will further decrease with chemotherapy. 5. F/E/N-eating moderately. 6. SW-good support system however pt and family overwhelmed with diagnosis and extent of disease. Multiple questions yesterday regarding how her diagnosis was missed. 7. Appreciate care by primary team and all consultants. Cecy Arita, [address] [place] [address] Oncology  [phone] ([phone])

## 2019-03-04 NOTE — PROGRESS NOTES
Infectious Diseases  Progress Note        Admission Date: 2/28/2019  Hospital Day: Hospital Day: 5  Attending: Elgin Gilbert MD  Date of service: 3/1/19    Presenting complaint:   Chief Complaint   Patient presents with    Fever     stage 4 cervical ca with mets to bladder and lungs- stent to kidney last week, with fever and pain today. Sees  [facility].         Reason for admission: Sepsis ([address] [A41.9]  Sepsis ([address] [A41.9]    Chief complaint/ Reason for consult:       · Septic shock with fever hypotension and lactic acidosis and leukocytosis - septic, hypotension improving  · Recently diagnosed cervical cancer with metastasis to lungs and urinary bladder  · History of cystoscopy and right ureteral stent placement for hydronephrosis 2 weeks ago  · Immunocompromised on chemotherapy with Taxol and cisplatin  · Complicated UTI  · Multifocal pneumonia versus lung metastasis    Problems addressed today:       Patient Active Problem List   Diagnosis Code    Stridor R06.1    Acute bronchitis J20.9    Fibromyalgia M79.7    Bradycardia R00.1    Chest pain R07.9    GERD (gastroesophageal reflux disease) K21.9    Vaginal bleeding N93.9    History of juvenile rheumatoid arthritis Z87.39    SLE (systemic lupus erythematosus) ([facility]) M32.9    Lupus anticoagulant positive R76.0    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, antepartum ([address] O99.019, D61.9    Cervical cancer (HCC) C53.9    Multifocal pneumonia J18.9    Pulmonary nodule R91.1    Hydronephrosis N13.30    Ground glass opacity present on imaging of lung R91.8    Lung nodules R91.8    Lymphadenopathy R59.1    Constipation due to opioid therapy K59.03, T40.2X5A    Sepsis ([address] A41.9    Septic shock (HCC) A41.9, R65.21    Septicemia ([address] A41.9    History of cancer Z85.9    Status post cystoscopy Z98.890    Immunocompromised ([address] Z60.6    Complicated UTI (urinary tract infection) N39.0    Anemia D64.9    Seizure disorder ([address] G40.909    Class 1 obesity due to excess calories with body mass index (BMI) of 31.0 to 31.9 in adult E66.09, Z68.31    Weight loss counseling, encounter for Z71.3             Microbiology:        I have reviewed allavailable micro lab data and cultures    · Blood culture (2/2) - collected on 2/28/2019: In process  · Urine culture  - collected on 2/28/2019: In process        Assessment:     The patient is a 42 y.o. old female who  has a past medical history of Endometriosis, Fibromyalgia, GERD (gastroesophageal reflux disease), Hip fracture ([facility] Hypoglycemia, Lupus, Neuropathy, Ovarian cyst, and Seizures ([facility] with following problems:    · Septic shock with fever hypotension and lactic acidosis and leukocytosis - fever has come down, still has leukocytosis  · Worsening shortness of breath - has bilateral coarse crackles today  · Recently diagnosed cervical cancer with metastasis to lungs and urinary bladder  · History of cystoscopy and right ureteral stent placement for hydronephrosis 2 weeks ago  · Immunocompromised on chemotherapy with Taxol and cisplatin  · Complicated UTI -dysuria improving  · Multifocal pneumonia versus lung metastasis  · Anemia due to vaginal bleeding  · Lupus  · Seizure disorder  · GERD  · Obesity Class 1 due to excess calorie intake : Body mass index is 31.28 kg/m². Discussion:      The patient is on IV vancomycin and IV meropenem empirically. Fever curve is coming down. WBC count was 13,500 today. Pro-calcitonin was low, however, fever seems to have responded to antibiotics. Serum cryptococcal antigen negative. No evidence of MRSA. Urine culture from 2/20/19 as stated negative    Plan:     Diagnostic Workup:    · Check pro-calcitonin level again  · Continue to follow  fever curve, WBC count and blood cultures  · Follow up on liver and renal function  · Has ongoing shortness of breath and b/l crackles,.  Get CT chest wo contrast    Antimicrobials:    · Will stop IV vancomycin  · Will continue IV meropenem for now  · Cough and deep breathing exercises  · Continue oxygen support to maintain oxygen saturation above 92%  · Aspiration precautions  · Continue to monitor her vitals closely  · DVT prophylaxis  · Discussed all above with patient and RN      Drug Monitoring:    · Continue monitoring for antibiotic toxicity as follows: CMP  · Continue to watch for following: new or worsening fever, new hypotension, hives, lip swelling and redness or purulence at vascular access sites. I/v access Management:    · Continue to monitor i.v access sites for erythema, induration, discharge or tenderness. · As always, continue efforts to minimize tubes/lines/drains as clinically appropriate to reduce chances of line associated infections. Patient education and counseling:      · The patient was educated in detail about the side-effects of various antibiotics and things to watch for like new rashes, lip swelling, severe reaction, worsening diarrhea, break through fever etc.  · Discussed patient's condition and what to expect. All of the patient's questions were addressed in a satisfactory manner and patient verbalized understanding all instructions. Risk of Complications/Morbidity: High     · Illness(es)/ Infection present that pose threat to bodily function. · There is potential for severe exacerbation of infection/side effects of treatment. · Therapy requires intensive monitoring for antimicrobial agent toxicity. Thank you for involving me in the care of your patient. I will continue to follow. If you have anyadditional questions, please do not hesitate to contact me. Subjective: Interval history:  The patient was seen and examined earlier today at bedside. She is requiring supplemental oxygen. She has ongoing cough. She become short of breath easily. Fever curve has come down. Feels tired.                    Past Medical History: All past medical history tablet by mouth every 12 hours for 30 days. Remedios Abraham oxyCODONE (OXY-IR) 15 MG immediate release tablet, Take 1 tablet by mouth every 3 hours as needed for Pain for up to 14 days. .  promethazine (PHENERGAN) 25 MG tablet, Take 1 tablet by mouth every 6 hours as needed for Nausea  ondansetron (ZOFRAN ODT) 4 MG disintegrating tablet, Take 1 tablet by mouth every 8 hours as needed for Nausea  linaclotide (LINZESS) 145 MCG capsule, Take 1 capsule by mouth every morning (before breakfast)  [DISCONTINUED] promethazine (PHENERGAN) 25 MG tablet, Take 25 mg by mouth every 6 hours as needed   ibuprofen (ADVIL;MOTRIN) 800 MG tablet, Take 1 tablet by mouth every 8 hours as needed for Pain or Fever  norethindrone (AYGESTIN) 5 MG tablet, Take 1 tablet by mouth every 12 hours for 21 days  DULoxetine (CYMBALTA) 60 MG extended release capsule, Take 60 mg by mouth daily  baclofen (LIORESAL) 20 MG tablet, Take 20 mg by mouth 2 times daily   gabapentin (NEURONTIN) 300 MG capsule, Take 300 mg by mouth 3 times daily.  Israel Solares vancomycin  1,250 mg Intravenous Q8H    senna  1 tablet Oral Nightly    hyoscyamine  125 mcg Sublingual Q8H    meropenem  1 g Intravenous Q8H    acetaminophen  500 mg Oral Q6H    morphine  30 mg Oral 2 times per day    phenazopyridine  200 mg Oral TID WC    linaclotide  145 mcg Oral QAM AC    ferrous sulfate  325 mg Oral BID WC    baclofen  20 mg Oral BID    DULoxetine  60 mg Oral Daily    gabapentin  300 mg Oral TID    sodium chloride flush  10 mL Intravenous 2 times per day    enoxaparin  40 mg Subcutaneous Daily    famotidine  20 mg Oral BID    ipratropium-albuterol  1 ampule Inhalation 4x daily    guaiFENesin  600 mg Oral BID       Current antibiotics: All antibiotics and their doses were reviewed by me    Recent Abx Admin                   meropenem (MERREM) 1 g in sodium chloride 0.9 % 100 mL IVPB (mini-bag) (g) 1 g New Bag 03/04/19 1159     1 g New Bag  8323     1 g New Bag 03/03/19 4210    vancomycin (VANCOCIN) 1,250 mg in dextrose 5 % 250 mL IVPB (mg) 1,250 mg New Bag 03/04/19 0523     1,250 mg New Bag 03/03/19 2124     1,250 mg New Bag  1445                   REVIEW OF SYSTEMS:       Review of Systems   Constitutional: Positive for fatigue. Negative for chills, diaphoresis and unexpected weight change. HENT: Negative for congestion, ear discharge, ear pain, facial swelling, hearing loss, rhinorrhea and trouble swallowing. Eyes: Negative for photophobia, discharge, redness and visual disturbance. Respiratory: Positive for cough and shortness of breath. Negative for apnea, choking, chest tightness and stridor. Cardiovascular: Negative for chest pain and palpitations. Gastrointestinal: Negative for abdominal pain, blood in stool, diarrhea and nausea. Endocrine: Negative for polydipsia, polyphagia and polyuria. Genitourinary: Negative for difficulty urinating, dysuria, frequency, hematuria, menstrual problem and vaginal discharge. Musculoskeletal: Negative for arthralgias, joint swelling, myalgias and neck stiffness. Skin: Negative for color change and rash. Allergic/Immunologic: Negative for immunocompromised state. Neurological: Negative for dizziness, seizures, speech difficulty, light-headedness and headaches. Hematological: Negative for adenopathy. Psychiatric/Behavioral: Negative for agitation, hallucinations and suicidal ideas. Objective:       PHYSICAL EXAM:      Vitals:   Vitals:    03/04/19 0825 03/04/19 0931 03/04/19 1149 03/04/19 1214   BP:  97/62 (!) 91/58    Pulse:  94 94    Resp:  16 16 18   Temp:  97.8 °F (36.6 °C) 98.3 °F (36.8 °C)    TempSrc:  Oral Oral    SpO2: 94% 96% 96% 94%   Weight:       Height:           Physical Exam   Constitutional: She is oriented to person, place, and time. She appears well-developed. No distress. HENT:   Head: Normocephalic.    Right Ear: External ear normal.   Left Ear: External ear normal.   Nose: Nose normal.   Mouth/Throat: Oropharynx is clear and moist.   Eyes: Pupils are equal, round, and reactive to light. Conjunctivae are normal. Right eye exhibits no discharge. Left eye exhibits no discharge. No scleral icterus. Neck: Normal range of motion. Neck supple. Cardiovascular: Normal rate and regular rhythm. Exam reveals no friction rub. No murmur heard. Pulmonary/Chest: Effort normal. No stridor. She has no wheezes. She has rales (new bilateral coarse crackles today). She exhibits no tenderness. She is on supplemental oxygen   Abdominal: Soft. She exhibits no mass. There is no tenderness. There is no rebound and no guarding. Musculoskeletal: She exhibits no edema or tenderness. Lymphadenopathy:     She has no cervical adenopathy. Neurological: She is alert and oriented to person, place, and time. She exhibits normal muscle tone. Skin: Skin is warm and dry. No rash noted. She is not diaphoretic. No erythema. Psychiatric: She has a normal mood and affect. Judgment normal.   Nursing note and vitals reviewed. Lines: All vascular access sites are healthy with no local erythema, discharge or tenderness. Intake and output:     I/O last 3 completed shifts: In: 3127.3 [I.V.:2771; Blood:356.3]  Out: -     Lab Data:   All available labs were reviewed by me today. CBC:   Recent Labs     03/02/19  0655 03/03/19  0610 03/03/19  1742 03/04/19  0435   WBC 14.4*  --  15.5*  --  13.5*   RBC 2.74*  --  2.64*  --  2.99*   HGB 7.0*   < > 6.8* 7.6* 7.6*   HCT 22.2*   < > 21.4* 24.1* 24.3*     --  240  --  240   MCV 81.2  --  81.1  --  81.5   MCH 25.7*  --  25.9*  --  25.5*   MCHC 31.7  --  31.9  --  31.3   RDW 20.9*  --  21.4*  --  20.4*   BANDSPCT 5  --  3  --   --     < > = values in this interval not displayed.         BMP:  Recent Labs     03/02/19  0655 03/03/19  0610 03/04/19  0435    138 141   K 3.9 4.5  4.5 4.2    101 102   CO2 26 28 29   BUN 6* 8 7   CREATININE <0.5* <0.5* <0.5*   CALCIUM 8.0* 8. 1* 8.6   GLUCOSE 120* 137* 121*        Hepatic FunctionPanel:   Lab Results   Component Value Date    ALKPHOS 130 02/28/2019    ALT 31 02/28/2019    AST 27 02/28/2019    PROT 6.5 02/28/2019    PROT 6.3 04/18/2012    BILITOT <0.2 02/28/2019    BILIDIR <0.2 02/18/2019    IBILI see below 02/18/2019    LABALBU 3.5 02/28/2019       CPK:   Lab Results   Component Value Date    CKTOTAL 185 12/18/2014     ESR:   Lab Results   Component Value Date    SEDRATE 27 (H) 12/05/2018     CRP:   Lab Results   Component Value Date    CRP 4.7 12/05/2018         Imaging: All pertinent images and reports for the current visit were reviewed by meduring this visit. VL Extremity Venous Bilateral   Final Result      CT CHEST ABDOMEN PELVIS W CONTRAST   Final Result   Diffuse multifocal airspace disease is again noted with slight interval   improvement of more focal areas of consolidation at the left base. Findings   compatible with multifocal pneumonia. Malignancy, metastatic disease is   within the differential though considered less likely. Right ureteral stent is in place with persistent mild dilation of the renal   collecting system. Wall thickening of the bladder is noted which is improved compared to the   prior study. Mild stranding is noted in the perivesicular fat. The cervix is somewhat irregular in its appearance with wall thickening of   the vagina noted. Endometrial fluid noted on the prior study is no longer   identified. In a patient with a reported history of cervical cancer malignancy is within   the differential.  If the patient has had radiation therapy findings may also   represent associated inflammatory changes. Nonspecific adenopathy within the pelvis may be reactive in nature or related   to underlying malignancy. Recommend continued follow-up.          XR CHEST PORTABLE   Final Result   Patchy consolidation within the lungs, especially the right perihilar region,   mildly increased when compared to the previous exam.  Given the relatively   rapid increase, pneumonia would be primarily considered. Outside records:    Labs, Microbiology, Radiology and pertinent results from Care everywhere, if available, were reviewed as a part ofthe consultation. Known drug Allergies: All allergies were reviewed and updated    Allergies   Allergen Reactions    Food Hives     Raw spinach.  Spinach          Please note that this chart was generated using Dragon dictation software. Although every effort was made to ensure the accuracy of this automated transcription, some errors in transcription may have occurred inadvertently. If you may need any clarification, please do not hesitate to contact me through EPIC or at the phone number provided below with my electronic signature.       Elvi Engle MD, MPH  3/4/2019, 1:20 PM  [facility] Infectious Disease   Office: [phone]  Fax: [phone]  Tuesday AM clinic:   [address] [address]  Thursday AM clinic: [address] [facility] [facility]

## 2019-03-04 NOTE — PROGRESS NOTES
Routine VSS. scheduled medications administered. Prn tylenol administered for headache. Information on pink ribbon girls given to patient per request. Emotional support provided to patient. Family visiting at bedside. No further needs expressed. Call light within reach. Will continue to monitor.

## 2019-03-04 NOTE — PROGRESS NOTES
Progress Note - Dr. Katiuska Steen - Internal Medicine  PCP: Maira Ontiveros  [address] [place] [address] [facility]  [phone]    Hospital Day: 4  Code Status: Full Code  Current Diet: Dietary Nutrition Supplements: Standard High Calorie Oral Supplement  DIET GENERAL;        CC: follow up on medical issues    Subjective:   Corwin Sanchez is a 42 y.o. female. Doing ok  Pain comes and goes to flank  Feels better otherwise    She denies chest pain, denies shortness of breath, denies nausea,  denies emesis. 10 system Review of Systems is reviewed with patient, and pertinent positives are listed here: None . Otherwise, Review of systems is negative. I have reviewed the patient's medical and social history in detail and updated the computerized patient record. To recap: She  has a past medical history of Endometriosis, Fibromyalgia, GERD (gastroesophageal reflux disease), Hip fracture ([address] Hypoglycemia, Lupus, Neuropathy, Ovarian cyst, and Seizures ([address] . She  has a past surgical history that includes  section; Cholecystectomy (); bronchoscopy (10/16/14); bronchoscopy (10/18/2014); Cystoscopy (Right, 2019); and laparoscopy (2019). . She  reports that she quit smoking about 17 years ago. Her smoking use included cigarettes. She started smoking about 21 years ago. She has a 0.75 pack-year smoking history. She has never used smokeless tobacco. She reports that she does not drink alcohol or use drugs. .        Active Hospital Problems    Diagnosis Date Noted    Fibromyalgia [M79.7] 10/08/2014     Priority: Medium    Weight loss counseling, encounter for [Z71.3]     Septic shock ([address] [A41.9, R65.21]     Septicemia ([address] [A41.9]     History of cancer [Z85.9]     Status post cystoscopy [Z98.890]     Immunocompromised ([address] [I46.0]     Complicated UTI (urinary tract infection) [N39.0]     Anemia [D64.9]     Seizure disorder ([address] [G40.909]     Class 1 obesity due to excess calories with body mass index (BMI) of 31.0 to 31.9 in adult [E66.09, Z68.31]     Sepsis ([facility] [A41.9] 02/28/2019    Lymphadenopathy [R59.1]     Cervical cancer (HCC) [C53.9] 02/15/2019    SLE (systemic lupus erythematosus) ([facility] [M32.9] 01/22/2019    Lupus anticoagulant positive [R76.0] 01/22/2019    History of juvenile rheumatoid arthritis [Z87.39] 01/08/2019    Vaginal bleeding [N93.9] 12/21/2018    GERD (gastroesophageal reflux disease) [K21.9] 10/20/2014       Current Facility-Administered Medications: alteplase (CATHFLO) injection 1 mg, 1 mg, Intracatheter, Once  vancomycin (VANCOCIN) 1,250 mg in dextrose 5 % 250 mL IVPB, 1,250 mg, Intravenous, Q8H  diphenhydrAMINE (BENADRYL) injection 25 mg, 25 mg, Intravenous, Q6H PRN  senna (SENOKOT) tablet 8.6 mg, 1 tablet, Oral, Nightly  potassium chloride (KLOR-CON M) extended release tablet 40 mEq, 40 mEq, Oral, PRN **OR** potassium bicarb-citric acid (EFFER-K) effervescent tablet 40 mEq, 40 mEq, Oral, PRN **OR** potassium chloride 10 mEq/100 mL IVPB (Peripheral Line), 10 mEq, Intravenous, PRN  hyoscyamine (LEVSIN/SL) sublingual tablet 125 mcg, 125 mcg, Sublingual, Q8H **OR** [DISCONTINUED] hyoscyamine (LEVSIN/SL) sublingual tablet 125 mcg, 125 mcg, Oral, Q8H  promethazine (PHENERGAN) injection 6.25 mg, 6.25 mg, Intravenous, Q6H PRN  HYDROmorphone HCl PF (DILAUDID) injection 0.5 mg, 0.5 mg, Intravenous, Q3H PRN  meropenem (MERREM) 1 g in sodium chloride 0.9 % 100 mL IVPB (mini-bag), 1 g, Intravenous, Q8H  magic (miracle) mouthwash with nystatin, 5 mL, Swish & Spit, 4x Daily PRN  acetaminophen (TYLENOL) tablet 500 mg, 500 mg, Oral, Q6H  ketorolac (TORADOL) injection 15 mg, 15 mg, Intravenous, Q6H PRN  morphine (MS CONTIN) extended release tablet 30 mg, 30 mg, Oral, 2 times per day  oxyCODONE (OXY-IR) immediate release tablet 15 mg, 15 mg, Oral, Q3H PRN  phenazopyridine (PYRIDIUM) tablet 200 mg, 200 mg, Oral, TID WC  ibuprofen (ADVIL;MOTRIN) tablet 800 mg, 800 mg, Oral, Q8H PRN  linaclotide (LINZESS) capsule 145 mcg, 145 mcg, Oral, QAM AC  ondansetron (ZOFRAN-ODT) disintegrating tablet 8 mg, 8 mg, Oral, Q8H PRN  promethazine (PHENERGAN) tablet 25 mg, 25 mg, Oral, Q6H PRN  ferrous sulfate tablet 325 mg, 325 mg, Oral, BID WC  baclofen (LIORESAL) tablet 20 mg, 20 mg, Oral, BID  DULoxetine (CYMBALTA) extended release capsule 60 mg, 60 mg, Oral, Daily  gabapentin (NEURONTIN) capsule 300 mg, 300 mg, Oral, TID  sodium chloride flush 0.9 % injection 10 mL, 10 mL, Intravenous, 2 times per day  sodium chloride flush 0.9 % injection 10 mL, 10 mL, Intravenous, PRN  magnesium hydroxide (MILK OF MAGNESIA) 400 MG/5ML suspension 30 mL, 30 mL, Oral, Daily PRN  ondansetron (ZOFRAN) injection 4 mg, 4 mg, Intravenous, Q6H PRN  enoxaparin (LOVENOX) injection 40 mg, 40 mg, Subcutaneous, Daily  magnesium sulfate 1 g in dextrose 5% 100 mL IVPB, 1 g, Intravenous, PRN  famotidine (PEPCID) tablet 20 mg, 20 mg, Oral, BID  acetaminophen (TYLENOL) tablet 650 mg, 650 mg, Oral, Q4H PRN  ipratropium-albuterol (DUONEB) nebulizer solution 1 ampule, 1 ampule, Inhalation, 4x daily  guaiFENesin (MUCINEX) extended release tablet 600 mg, 600 mg, Oral, BID         Objective:  BP 95/62   Pulse 89   Temp 98.5 °F (36.9 °C) (Oral)   Resp 18   Ht 5' 6\" (1.676 m)   Wt 193 lb 12.8 oz (87.9 kg)   SpO2 94%   BMI 31.28 kg/m²      Patient Vitals for the past 24 hrs:   BP Temp Temp src Pulse Resp SpO2   03/04/19 0825 -- -- -- -- -- 94 %   03/04/19 0408 95/62 98.5 °F (36.9 °C) Oral 89 18 95 %   03/04/19 0004 101/66 98.4 °F (36.9 °C) Oral 103 16 93 %   03/03/19 2200 120/73 -- -- -- -- --   03/03/19 2123 -- -- -- -- -- (!) 85 %   03/03/19 2115 110/71 98.7 °F (37.1 °C) Oral 103 16 90 %   03/03/19 1739 93/61 98.4 °F (36.9 °C) Oral 104 18 95 %   03/03/19 1553 -- -- -- -- -- 92 %   03/03/19 1310 94/60 97.2 °F (36.2 °C) Temporal 97 18 95 %   03/03/19 1214 -- -- -- -- -- (!) 87 %   03/03/19 1210 93/61 97.1 °F (36.2 °C) Bilateral patchy airspace opacities are redemonstrated most pronounced in the mid and lower lung zones, these were better detailed on the recent CT of the thorax from 02/25/2019, please refer to that report for additional information. The overall radiographic appearance has mildly increased from the prior radiograph 02/24/2019. There is no pneumothorax or pleural effusion. Surgical clips project in the upper abdomen. Mild interval increase in bilateral patchy airspace opacities better detailed on the CT of the thorax from 02/25/2019. Findings are again most suggestive of an infectious/inflammatory process. Xr Chest Standard (2 Vw)    Result Date: 2/19/2019  EXAMINATION: TWO VIEWS OF THE CHEST 2/19/2019 4:26 pm COMPARISON: 02/16/2019 HISTORY: ORDERING SYSTEM PROVIDED HISTORY: cough TECHNOLOGIST PROVIDED HISTORY: Reason for exam:->cough Ordering Physician Provided Reason for Exam: Cervical Cancer (Pt was sent over from Dr Nigel Pickett office - states she was sent here from office - was told today that she has stage 3 cervical cancer and was sent here for \"scans and further testing\") Acuity: Unknown Type of Exam: Unknown FINDINGS: There is patchy bilateral airspace disease, which appears increased when compared to the previous exam.  As detected on recent CT PA, some of those have a nodular appearance. The heart mediastinal contours are unremarkable. No pneumothorax. No free air. No acute bony abnormalities. Chin catheter noted. Patchy bilateral airspace disease, concerning for pneumonia, which appears increased when compared to the previous exam.  Again, some of these areas of opacity have a nodular appearance and septic emboli should be considered, especially when given the correlation with the CT PA from 02/15/2019. Process should be followed to resolution.      Xr Chest Standard (2 Vw)    Result Date: 2/16/2019  EXAMINATION: TWO VIEWS OF THE CHEST 2/16/2019 11:49 am COMPARISON: 10/16/2014 HISTORY: left sided pain, hydronephrosis TECHNOLOGIST PROVIDED HISTORY: Ordering Physician Provided Reason for Exam: hx of recent rt stent cervical ca Acuity: Unknown Type of Exam: Subsequent/Follow-up FINDINGS: Kidneys: The right kidney measures 11.6 cm in length and the left kidney measures 11.3 cm in length. Kidneys demonstrate normal cortical echogenicity. No evidence of hydronephrosis or intrarenal stones. Bladder: Urinary bladder is underdistended at the time of imaging limiting evaluation. No gross bladder wall abnormalities noted. No postvoid imaging performed. Kidneys are unremarkable. Limited evaluation of urinary bladder secondary to underdistention at the time of imaging without gross bladder wall abnormality noted. Ct Abdomen Pelvis W Iv Contrast Additional Contrast? None    Result Date: 2/15/2019  EXAMINATION: CTA OF THE CHEST; CT OF THE ABDOMEN AND PELVIS WITH CONTRAST 2/15/2019 2:11 pm TECHNIQUE: CTA of the chest was performed after the administration of intravenous contrast.  Multiplanar reformatted images are provided for review. MIP images are provided for review. Dose modulation, iterative reconstruction, and/or weight based adjustment of the mA/kV was utilized to reduce the radiation dose to as low as reasonably achievable.; CT of the abdomen and pelvis was performed with the administration of intravenous contrast. Multiplanar reformatted images are provided for review. Dose modulation, iterative reconstruction, and/or weight based adjustment of the mA/kV was utilized to reduce the radiation dose to as low as reasonably achievable. COMPARISON: 1/28/2019, 4/29/2011 HISTORY: ORDERING SYSTEM PROVIDED HISTORY: cervical ca - SOB - PE??? TECHNOLOGIST PROVIDED HISTORY: Ordering Physician Provided Reason for Exam: SOB Acuity: Unknown Type of Exam: Unknown; ORDERING SYSTEM PROVIDED HISTORY: abd disten - cervical ca?  TECHNOLOGIST PROVIDED HISTORY: Additional Contrast?->None Ordering Physician Provided Reason for Exam: abd distention Acuity: Unknown Type of Exam: Unknown Patient with recently diagnosed cervical cancer. FINDINGS: Chest: Pulmonary arteries: The pulmonary arteries opacify normally. There is no evidence of filling defect to suggest a pulmonary embolism. Mediastinum: The thyroid is unremarkable. There is mild subcarinal and bilateral hilar lymphadenopathy, most likely benign and reactive in etiology given the patient's underlying lung findings. The thoracic aorta is unremarkable, without evidence of aneurysm or dissection. 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 No pericardial abnormality is identified. Lungs/pleura: There is mild mucous plugging within the bilateral lower lobes. The central airways are otherwise widely patent. There has been interval development of widespread ground-glass opacity throughout both lungs, with diffuse intralobular septal thickening evident, new from prior exam.  This most likely reflects a combination of interstitial pulmonary edema and superimposed multifocal pneumonia. Additionally, there has been interval progression and more consolidation of multiple scattered various sized pulmonary nodules throughout both lungs since the study of 1/28/2019. A few of these are cavitary. The largest of these measures approximately 10 mm in short axis within the subpleural portion of the left lower lobe. These may be secondary to an atypical infectious or inflammatory process, to include septic emboli. Metastatic disease is considered less likely, though not excluded. There is no evidence of a pneumothorax or pleural effusion. Soft Tissues/Bones: No acute findings. Abdomen/Pelvis: Organs: The patient is status post cholecystectomy. The liver, spleen, and pancreas are normal.  The adrenal glands are unremarkable bilaterally.   There has been interval development of nonspecific moderate right hydronephrosis since the prior exam, especially the right perihilar region, mildly increased when compared to the previous exam.  Given the relatively rapid increase, pneumonia would be primarily considered. Xr Chest Portable    Result Date: 2/24/2019  EXAMINATION: SINGLE XRAY VIEW OF THE CHEST 2/24/2019 7:00 am COMPARISON: Chest radiograph performed 02/19/2019. HISTORY: ORDERING SYSTEM PROVIDED HISTORY: elevated wbc and ongoing shortness of breath TECHNOLOGIST PROVIDED HISTORY: Reason for exam:->elevated wbc and ongoing shortness of breath Acuity: Unknown Type of Exam: Unknown FINDINGS: There is mild bilateral pulmonary congestion. There is similar left basilar infiltrate. There is no pneumothorax. The mediastinal structures are stable. The upper abdomen is unremarkable. The extrathoracic soft tissues are unremarkable. There is a right internal jugular port. Stable bilateral congestion and left basilar infiltrate. Ct Chest Pulmonary Embolism W Contrast    Result Date: 2/15/2019  EXAMINATION: CTA OF THE CHEST; CT OF THE ABDOMEN AND PELVIS WITH CONTRAST 2/15/2019 2:11 pm TECHNIQUE: CTA of the chest was performed after the administration of intravenous contrast.  Multiplanar reformatted images are provided for review. MIP images are provided for review. Dose modulation, iterative reconstruction, and/or weight based adjustment of the mA/kV was utilized to reduce the radiation dose to as low as reasonably achievable.; CT of the abdomen and pelvis was performed with the administration of intravenous contrast. Multiplanar reformatted images are provided for review. Dose modulation, iterative reconstruction, and/or weight based adjustment of the mA/kV was utilized to reduce the radiation dose to as low as reasonably achievable.  COMPARISON: 1/28/2019, 4/29/2011 HISTORY: ORDERING SYSTEM PROVIDED HISTORY: cervical ca - SOB - PE??? TECHNOLOGIST PROVIDED HISTORY: Ordering Physician Provided Reason for Exam: SOB Acuity: Unknown Type of Exam: Unknown; ORDERING SYSTEM PROVIDED HISTORY: abd disten - cervical ca? TECHNOLOGIST PROVIDED HISTORY: Additional Contrast?->None Ordering Physician Provided Reason for Exam: abd distention Acuity: Unknown Type of Exam: Unknown Patient with recently diagnosed cervical cancer. FINDINGS: Chest: Pulmonary arteries: The pulmonary arteries opacify normally. There is no evidence of filling defect to suggest a pulmonary embolism. Mediastinum: The thyroid is unremarkable. There is mild subcarinal and bilateral hilar lymphadenopathy, most likely benign and reactive in etiology given the patient's underlying lung findings. The thoracic aorta is unremarkable, without evidence of aneurysm or dissection. 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 No pericardial abnormality is identified. Lungs/pleura: There is mild mucous plugging within the bilateral lower lobes. The central airways are otherwise widely patent. There has been interval development of widespread ground-glass opacity throughout both lungs, with diffuse intralobular septal thickening evident, new from prior exam.  This most likely reflects a combination of interstitial pulmonary edema and superimposed multifocal pneumonia. Additionally, there has been interval progression and more consolidation of multiple scattered various sized pulmonary nodules throughout both lungs since the study of 1/28/2019. A few of these are cavitary. The largest of these measures approximately 10 mm in short axis within the subpleural portion of the left lower lobe. These may be secondary to an atypical infectious or inflammatory process, to include septic emboli. Metastatic disease is considered less likely, though not excluded. There is no evidence of a pneumothorax or pleural effusion. Soft Tissues/Bones: No acute findings. Abdomen/Pelvis: Organs: The patient is status post cholecystectomy.   The liver, spleen, and pancreas are normal. cavitary in appearance. These nodules are most likely infectious or inflammatory in etiology, and septic emboli are a consideration. Given patient's history of cervical cancer, metastatic disease is on the differential, though considered less likely. 5. New nonspecific moderate right hydronephrosis, without demonstrable cause. However, there is underlying wall thickening and enhancement of the urinary bladder. This combination of findings could represent a cystitis in the setting of urinary tract infection with resultant pyelitis and hydronephrosis. However, given patient history of cervical cancer, locoregional spread of tumor with resultant obstruction of the distal right ureter may be a cause of the patient's right hydronephrosis. 6. Interval development of new irregularity of the cervix and nonspecific endometrial thickening in comparison with the prior study of 1/28/2019. This likely represents the patient's known underlying cervical cancer causing obstruction of the endometrial with resultant endometrial thickening. This could be further evaluated with a follow-up pelvic ultrasound. 7. Stable mild bilateral pelvic chain lymphadenopathy, right greater than left, possibly representing metastatic disease. Ir Port Placement > 5 Years    Result Date: 2/19/2019  PROCEDURE: ULTRASOUND GUIDED VASCULAR ACCESS. FLUOROSCOPY GUIDED PLACEMENT OF A RIGHT IJ SUBCUTANEOUS PORT-A-CATH. MODERATE CONSCIOUS SEDATION 2/19/2019. HISTORY: ORDERING SYSTEM PROVIDED HISTORY: cervical cancer to get chemo TECHNOLOGIST PROVIDED HISTORY: Reason for exam:->cervical cancer to get chemo How many lumens are being requested?->1 What site is the preferred site? ->No preference What side should this line be placed? ->Either 32-year-old female with cervical cancer, presents for chest port placement. SEDATION: Moderate sedation was administered under my supervision by a qualified nurse. 4 mg of Versed was administered intravenously. Approximate intra procedural sedation time was 23 minutes. FLUOROSCOPY DOSE AND TYPE OR TIME AND EXPOSURES: 54 seconds of fluoroscopy with 2 exposures. TECHNIQUE: Informed consent was obtained after a detailed explanation of the procedure including risks, benefits, and alternatives. Universal protocol was observed. The right neck and chest were prepped and draped in sterile fashion using maximum sterile barrier technique. Local anesthesia was achieved with lidocaine. A micropuncture needle was used to access the right internal jugular vein using ultrasound guidance. An ultrasound image demonstrating patency of the vein with needle tip located within it. An image was obtained and stored in PACs. A 0.035 guidewire was used to place a peel-away sheath. A chest wall incision was made and a subcutaneous pocket was created. The port reservoir was placed within the pocket and the port tubing was attached and tunneled subcutaneously to the venotomy site. The port tubing was cut to an appropriate length and advanced through the peel-away sheath under fluoroscopic guidance to the cavo-atrial junction. The chest wall incision was closed using 3-0 and 4-0 Vicryl suture and tissue adhesive was applied to the venotomy site and chest wall incision. The port flushed easily and there was a good blood return. The port was locked with heparinized saline. The patient tolerated the procedure well and there were no immediate complications. FINDINGS: Fluoroscopic image demonstrates the tip of the port in the right atrium. 1. Successful ultrasound and fluoroscopy guided Port-A-Cath placement via right IJ access, as described above. 2.  The port was left accessed for immediate use. Fl Retrograde Pyelogram Right    Result Date: 2/17/2019  EXAMINATION: SPOT FLUOROSCOPIC IMAGES 2/17/2019 6:52 am TECHNIQUE: Fluoroscopy was provided by the radiology department for procedure. Radiologist was not present during examination. FLUOROSCOPY DOSE AND TYPE OR TIME AND EXPOSURES: 3 seconds of fluoroscopy was utilized for purposes of intraoperative localization. 1 fluoroscopic spot image was obtained. COMPARISON: None HISTORY: Intraprocedural imaging. FINDINGS: 1 spot images of the abdomen was obtained. Intraprocedural fluoroscopic spot images as above. See separate procedure report for more information. Vl Extremity Venous Bilateral    Result Date: 3/1/2019  Lower Extremities DVT Study  Demographics   Patient Name       Anival Forde   Date of Study      03/01/2019       Gender              Female   Patient Number     [id number]       Date of Birth       1980   Visit Number       [id number]        Age                 42 year(s)   Accession Number   [id number]        Room Number         9026   Corporate ID       [id number]          [place], [place]   Ordering Physician David Thompson MD Interpreting        [facility]                                      Physician           Gilbert Donato MD,                                                          [facility] [place]  Procedure Type of Study:   Veins:Lower Extremities DVT Study, VASC EXTREMITY VENOUS DUPLEX BILATERAL. Vascular Sonographer Report  Additional Indications:Swelling Impressions Right Impression No evidence of deep vein or superficial vein thrombosis involving the right lower extremity. Left Impression No evidence of deep vein or superficial vein thrombosis involving the left lower extremity. Conclusions   Summary   No evidence of deep vein or superficial vein thrombosis involving the  bilateral lower extremities. Signature   ------------------------------------------------------------------  Electronically signed by Gilbert Donato MD, [facility] [place] (Interpreting  physician) on 03/01/2019 at 02:22 PM  ------------------------------------------------------------------  Patient Status:Routine.  Study [facility]. Technical Quality:Adequate visualization. Velocities are measured in cm/s ; Diameters are measured in mm Right Lower Extremities DVT Study Measurements Right 2D Measurements +------------------------+----------+---------------+----------+ ! Location                ! Visualized! Compressibility! Thrombosis! +------------------------+----------+---------------+----------+ ! Sapheno Femoral Junction! Yes       ! Yes            ! None      ! +------------------------+----------+---------------+----------+ ! GSV Thigh               ! Yes       ! Yes            ! None      ! +------------------------+----------+---------------+----------+ ! Common Femoral          !Yes       ! Yes            ! None      ! +------------------------+----------+---------------+----------+ ! Prox Femoral            !Yes       ! Yes            ! None      ! +------------------------+----------+---------------+----------+ ! Mid Femoral             !Yes       ! Yes            ! None      ! +------------------------+----------+---------------+----------+ ! Dist Femoral            !Yes       ! Yes            ! None      ! +------------------------+----------+---------------+----------+ ! Deep Femoral            !Yes       ! Yes            ! None      ! +------------------------+----------+---------------+----------+ ! Popliteal               !Yes       ! Yes            ! None      ! +------------------------+----------+---------------+----------+ ! GSV Below Knee          ! Yes       ! Yes            ! None      ! +------------------------+----------+---------------+----------+ ! Gastroc                 ! Yes       ! Yes            ! None      ! +------------------------+----------+---------------+----------+ ! Soleal                  !Yes       ! Yes            ! None      ! +------------------------+----------+---------------+----------+ ! PTV                     ! Yes       ! Yes            ! None      ! +------------------------+----------+---------------+----------+ ! Peroneal                !Yes       ! Yes            ! None      ! +------------------------+----------+---------------+----------+ ! GSV Calf                ! Yes       ! Yes            ! None      ! +------------------------+----------+---------------+----------+ ! SSV                     ! Yes       ! Yes            ! None      ! +------------------------+----------+---------------+----------+ Right Doppler Measurements +------------------------+------+------+------------+ ! Location                ! Signal!Reflux! Reflux (sec)! +------------------------+------+------+------------+ ! Sapheno Femoral Junction! Phasic!      !            ! +------------------------+------+------+------------+ ! Common Femoral          !Phasic!      !            ! +------------------------+------+------+------------+ ! Prox Femoral            !Phasic!      !            ! +------------------------+------+------+------------+ ! Deep Femoral            !Phasic!      !            ! +------------------------+------+------+------------+ ! Popliteal               !Phasic!      !            ! +------------------------+------+------+------------+ Left Lower Extremities DVT Study Measurements Left 2D Measurements +------------------------+----------+---------------+----------+ ! Location                ! Visualized! Compressibility! Thrombosis! +------------------------+----------+---------------+----------+ ! Sapheno Femoral Junction! Yes       ! Yes            ! None      ! +------------------------+----------+---------------+----------+ ! GSV Thigh               ! Yes       ! Yes            ! None      ! +------------------------+----------+---------------+----------+ ! Common Femoral          !Yes       ! Yes            ! None      ! +------------------------+----------+---------------+----------+ ! Prox Femoral            !Yes       ! Yes            ! None      ! ! +------------------------+------+------+------------+ ! Deep Femoral            !Phasic!      !            ! +------------------------+------+------+------------+ ! Popliteal               !Phasic!      !            ! +------------------------+------+------+------------+    Ct Chest Abdomen Pelvis W Contrast    Result Date: 2/28/2019  EXAMINATION: CT OF THE CHEST, ABDOMEN, AND PELVIS WITH CONTRAST 2/28/2019 8:34 pm TECHNIQUE: CT of the chest, abdomen and pelvis was performed with the administration of intravenous contrast. Multiplanar reformatted images are provided for review. Dose modulation, iterative reconstruction, and/or weight based adjustment of the mA/kV was utilized to reduce the radiation dose to as low as reasonably achievable. COMPARISON: 02/25/2019 and prior. HISTORY: ORDERING SYSTEM PROVIDED HISTORY: fever, chest pain, abd pain, recent ureter stent TECHNOLOGIST PROVIDED HISTORY: Additional Contrast?->None Ordering Physician Provided Reason for Exam: fever, chest pain, abd pain, recent ureter stent Acuity: Acute Type of Exam: Initial Relevant Medical/Surgical History: Fever (stage 4 cervical ca with mets to bladder and lungs- stent to kidney last week, with fever and pain today. Sees Dr. Alfonso Rivera. ) FINDINGS: Chest: Mediastinum: Right-sided IJ port again noted. Limited imaging of the base of the neck and axilla appear stable. Heart size is stable. The aorta and origin of the great vessels again demonstrate an aberrant right subclavian artery. Main pulmonary artery opacifies unremarkably. No significant change in hilar and mediastinal adenopathy. Esophagus tapers normally. Lungs/pleura: Central airways are patent. Peribronchial wall thickening again noted. Lung volumes are low. Perihilar and dependent ground-glass opacities and multifocal patchy opacities with some more focal consolidation posteriorly at the lung bases again noted.   There is slight improved aeration at the left base from the most recent comparison. Mild paraseptal thickening noted. No significant pleural effusion noted. Soft Tissues/Bones:  No acute abnormality of the visualized osseous structures. Abdomen/Pelvis: Organs: Persistent mild dilation of the right renal collecting system is noted with placement of a right ureteral stent with proximal colon, renal pelvis and distal colon extending into the bladder. Periureteral stranding is noted. The left kidney, adrenal glands, pancreas and spleen are unremarkable in appearance. Liver is somewhat heterogeneous in its appearance with no focal lesion identified. Patient is status post cholecystectomy. GI/Bowel: The stomach is unremarkable in appearance. There is a duodenal diverticulum in the proximal portion of the sweep. Small bowel demonstrates no acute abnormality. Appendix is unremarkable. Pelvis: Bladder is incompletely distended. There is bladder wall thickening noted which is less prominent than the comparison. Periureteral stranding is noted. Increased induration is noted along the fat along the right side of the pelvis with persistent adenopathy noted along the right iliac chain measuring up to 1.5 cm in short axis diameter without significant change from prior study. Small lymph nodes also noted along the left iliac chain and retroperitoneum. There is wall thickening noted of the vagina with stranding of the pelvic fat. Peritoneum/Retroperitoneum: Aorta is normal in caliber. Small retroperitoneal lymph nodes are noted which are likely reactive. Bones/Soft Tissues: No acute osseous abnormality noted. Diffuse multifocal airspace disease is again noted with slight interval improvement of more focal areas of consolidation at the left base. Findings compatible with multifocal pneumonia. Malignancy, metastatic disease is within the differential though considered less likely. Right ureteral stent is in place with persistent mild dilation of the renal collecting system.  [place] thickening of the bladder is noted which is improved compared to the prior study. Mild stranding is noted in the perivesicular fat. The cervix is somewhat irregular in its appearance with wall thickening of the vagina noted. Endometrial fluid noted on the prior study is no longer identified. In a patient with a reported history of cervical cancer malignancy is within the differential.  If the patient has had radiation therapy findings may also represent associated inflammatory changes. Nonspecific adenopathy within the pelvis may be reactive in nature or related to underlying malignancy. Recommend continued follow-up. Ir Guided Ureteral Stent Place W Nephro Cath New Access    Result Date: 2/18/2019  PROCEDURE: IR ULTRASOUND AND FLUOROSCOPIC GUIDED RIGHT PERCUTANEOUS NEPHROSTOMY AND NEPHROSTOGRAM IR ANTEGRADE RIGHT URETERAL STENT. MODERATE CONSCIOUS SEDATION 2/18/2019 HISTORY: ORDERING SYSTEM PROVIDED HISTORY: needs R sided nephrostomy tube placed. maria guadalupe could not do via cysto. Adi said that IR would do on monday TECHNOLOGIST PROVIDED HISTORY: Reason for exam:->needs R sided nephrostomy tube placed. maria guadalupe could not do via cysto. Adi said that IR would do on monday COMPARISON: Abdomen pelvic CT 02/15/2019. CONTRAST: 30 cc, nonvascular within collecting system only. . SEDATION: Intravenous sedation was administered with continuous monitoring throughout the procedure. In measured doses, a total of 6 mg intravenous Versed and 275 mcg intravenous fentanyl was administered. My total procedure time with sedation was 26 minutes. FLUOROSCOPY DOSE AND TYPE OR TIME AND EXPOSURES: 3.5 minutes, 7 digital imaging sequences. DESCRIPTION OF PROCEDURE: Informed consent was obtained after a detailed explanation of the procedure including risks, benefits, and alternatives. Universal protocol was observed. TECHNIQUE: Informed consent was previously obtained.   In the semi prone position, an appropriate area posterior lateral aspect of the skin overlying the targeted collecting system was demarcated, sterilely prepared draped and anesthetized. Utilizing ultrasound, anatomy from prior CT scan and other imaging, the needle trajectory and depth was predetermined. The micro puncture needle was advanced using fluoroscopic and ultrasound guidance into the collecting system without difficulty. Once urine was aspirated, a small platinum tip wire was advanced into the collecting system and into the proximal ureter. The tract was dilated. With wire exchange, a 6 [place] sheath was placed. The collecting system is moderately dilated and the ureter is somewhat tortuous. A glide wire was easily advanced down the ureter. Because of this, a 5 [place] peel-away sheath was advanced into the proximal ureter over the wire. The glide wire was then advanced with a steerable catheter into the urinary bladder without difficulty and only mild discomfort. There is no contrast extravasation. Once the catheter was placed into the urinary bladder, contrast injection is seen diluted within the somewhat distended urinary bladder. The catheter was used to place a Super Stiff wire for ureteral stent placement. A 26 cm 8 [place] ureteral stent was then advanced, such that the distal pigtail is well within the urinary bladder. The proximal pigtail was placed in the lower portion of the left renal pelvis. Again there is no contrast extravasation. No filling defect to indicate clot or bleeding was observed on early or later contrast imaging. Following this, dense contrast was injected showing normal expected filling of the ureteral stent and exiting from the distal pigtail directly into the urinary bladder. Following this, the internalized stent was disengaged. The proximal collecting system was then decompressed. The proximal nephrostomy access was then removed entirely, no external drainage necessary at this point.  The patient tolerated the procedure well. A sterile bandage was placed over the small incision. From this point forward, the ureteral stent can be exchanged as needed from below. Successful right percutaneous nephrostomy with antegrade pyelogram. High-grade distal ureteral obstruction with severe hydronephrosis, successfully crossed as above. Successful 8 French internalized right ureteral stent placement as above. Assessment and Plan:  Patient Active Hospital Problem List:   Anemia ()    Assessment: Established problem. Better after transfusion 3/3. Hgb 7.6 this am    Plan: No indication for transfusion. Cont to monitor h/h to assess progression of anemia. Recommend ferrous sulfate or MVI as outpatient.    Fibromyalgia (10/8/2014)    Assessment: Established problem. Stable.      Plan: sx stable.    GERD (gastroesophageal reflux disease) (10/20/2014)    Assessment: Established problem. Stable. No reflux    Plan: Continue present orders/plan.    Vaginal bleeding (12/21/2018)    Assessment: Established problem, now resolved.     Plan: Continue present orders/plan.    Cervical cancer ([facility] [address] (2/15/2019)    Assessment: Established problem. Stable.  Stage IV    Plan: per dr Clotilde Maya   Septic shock [facility]) ()    Assessment: Established problem, now resolved.    Plan: cont to monitor fever, WBC   Complicated UTI (urinary tract infection) ()    Assessment: Established problem. Stable.      Plan: on merrem, vanc. Awaiting cultures. Per ID recs        Disp - hopeful for d/c in next 1-2d if cont to improve.  Await ID recs on po abx for d/c    Larisa Gaines  3/4/2019

## 2019-03-05 NOTE — ONCOLOGY
Oncology and Hematology Care   Progress Note    3/5/2019    SUBJECTIVE: patient remains dyspneic with any exertion. Chest ct shows persistent extensive infiltrate and nodules Pulmonary has signed off. She remains on oxygen. Wants to go home to be with children    OBJECTIVE:    Physical Assessment:  Vitals:  BP (!) 93/59   Pulse 102   Temp 98.7 °F (37.1 °C) (Oral)   Resp 20   Ht 5' 6\" (1.676 m)   Wt 193 lb 12.8 oz (87.9 kg)   SpO2 90%   BMI 31.28 kg/m²    24HR INTAKE/OUTPUT:  No intake or output data in the 24 hours ending 03/05/19 0733    CONSTITUTIONAL:  Awake, alert & oriented x3  HEENT: PERRL, Neck: soft, supple, no cervical, supraclavicular or axillary adenopathy  RESPIRATORY:  No increased work of breathing, breath sounds decreased. CARDIOVASCULAR:  Cardiac S1/S2, RRR  GASTROINTESTINAL:  abdomen soft +BS x4, no hepatosplenomegaly  SKIN:  negative for rash and skin lesion(s)  MUSCULOSKELETAL:  negative for pain and muscle weakness  EXTREMITIES: Negative for Lower extremity edema    Labs Results:    CBC:   Recent Labs     03/03/19  0610 03/03/19  1742 03/04/19  0435 03/05/19  0639   WBC 15.5*  --  13.5* 8.5   HGB 6.8* 7.6* 7.6* 10.1*   HCT 21.4* 24.1* 24.3* 32.0*   MCV 81.1  --  81.5 81.8     --  240 228     BMP:   Recent Labs     03/03/19  0610 03/04/19  0435 03/05/19  0639    141 139   K 4.5  4.5 4.2 4.3    102 97*   CO2 28 29 32   PHOS 3.4 4.3 4.2   BUN 8 7 9   CREATININE <0.5* <0.5* <0.5*     LIVER PROFILE: No results for input(s): AST, ALT, LIPASE, BILIDIR, BILITOT, ALKPHOS in the last 72 hours. Invalid input(s):   AMYLASE,  ALB  PT/INR:    Lab Results   Component Value Date    PROTIME 14.3 02/28/2019    PROTIME 12.7 02/18/2019    PROTIME 12.3 01/18/2019    INR 1.25 02/28/2019    INR 1.11 02/18/2019    INR 1.08 01/18/2019     PTT:    Lab Results   Component Value Date    APTT 33.2 05/16/2014     UA:No results for input(s): NITRITE, COLORU, PHUR, LABCAST, WBCUA, RBCUA, MUCUS, TRICHOMONAS, YEAST, BACTERIA, CLARITYU, SPECGRAV, LEUKOCYTESUR, UROBILINOGEN, BILIRUBINUR, BLOODU, GLUCOSEU, AMORPHOUS in the last 72 hours. Invalid input(s): KETONESU  Magnesium:   Lab Results   Component Value Date    MG 1.90 03/05/2019    MG 1.90 03/04/2019    MG 1.90 03/03/2019     VAIHSNAVI:    Lab Results   Component Value Date    VAISHNAVI Negative 12/05/2018       RADIOLOGY:    Xr Chest Standard (2 Vw)    Result Date: 2/26/2019  EXAMINATION: TWO VIEWS OF THE CHEST 2/26/2019 11:55 am COMPARISON: Frontal view of the chest 02/24/2019, frontal and lateral views of the chest 02/19/2019, CT thorax 02/25/2019. HISTORY: ORDERING SYSTEM PROVIDED HISTORY: cough TECHNOLOGIST PROVIDED HISTORY: Reason for exam:->cough Ordering Physician Provided Reason for Exam: cough FINDINGS: Support devices: Right IJ chest port again noted with distal tip terminating in the proximal right atrium in good position. The cardiopericardial silhouette is stable in size and configuration. Bilateral patchy airspace opacities are redemonstrated most pronounced in the mid and lower lung zones, these were better detailed on the recent CT of the thorax from 02/25/2019, please refer to that report for additional information. The overall radiographic appearance has mildly increased from the prior radiograph 02/24/2019. There is no pneumothorax or pleural effusion. Surgical clips project in the upper abdomen. Mild interval increase in bilateral patchy airspace opacities better detailed on the CT of the thorax from 02/25/2019. Findings are again most suggestive of an infectious/inflammatory process.      Xr Chest Standard (2 Vw)    Result Date: 2/19/2019  EXAMINATION: TWO VIEWS OF THE CHEST 2/19/2019 4:26 pm COMPARISON: 02/16/2019 HISTORY: ORDERING SYSTEM PROVIDED HISTORY: cough TECHNOLOGIST PROVIDED HISTORY: Reason for exam:->cough Ordering Physician Provided Reason for Exam: Cervical Cancer (Pt was sent over from Dr Nigel Pickett office - states she was sent here from office - was told today that she has stage 3 cervical cancer and was sent here for \"scans and further testing\") Acuity: Unknown Type of Exam: Unknown FINDINGS: There is patchy bilateral airspace disease, which appears increased when compared to the previous exam.  As detected on recent CT PA, some of those have a nodular appearance. The heart mediastinal contours are unremarkable. No pneumothorax. No free air. No acute bony abnormalities. Chin catheter noted. Patchy bilateral airspace disease, concerning for pneumonia, which appears increased when compared to the previous exam.  Again, some of these areas of opacity have a nodular appearance and septic emboli should be considered, especially when given the correlation with the CT PA from 02/15/2019. Process should be followed to resolution. Xr Chest Standard (2 Vw)    Result Date: 2/16/2019  EXAMINATION: TWO VIEWS OF THE CHEST 2/16/2019 11:49 am COMPARISON: 10/16/2014 HISTORY: ORDERING SYSTEM PROVIDED HISTORY: pneumonia TECHNOLOGIST PROVIDED HISTORY: Reason for exam:->pneumonia Ordering Physician Provided Reason for Exam: PNA Acuity: Acute Type of Exam: Initial Relevant Medical/Surgical History: cervical ca FINDINGS: There is patchy bibasilar consolidation, right greater than left, superimposed on a background of diffuse interstitial prominence. Heart size, mediastinal contours and pulmonary vascularity are within normal limits. There is no pleural effusion. Multifocal bilateral pneumonia. Ct Chest Wo Contrast    Result Date: 3/4/2019  EXAMINATION: CT OF THE CHEST WITHOUT CONTRAST 3/4/2019 4:23 pm TECHNIQUE: CT of the chest was performed without the administration of intravenous contrast. Multiplanar reformatted images are provided for review. Dose modulation, iterative reconstruction, and/or weight based adjustment of the mA/kV was utilized to reduce the radiation dose to as low as reasonably achievable.  COMPARISON: 02/28/2019 HISTORY: ORDERING SYSTEM PROVIDED HISTORY: pneumonia TECHNOLOGIST PROVIDED HISTORY: Ordering Physician Provided Reason for Exam: pneumonia Acuity: Acute Type of Exam: Initial Relevant Medical/Surgical History:  pneumonia FINDINGS: Mediastinum: The unenhanced heart is unremarkable. The right port terminates in the right atrium. No change in the scattered mediastinal lymph nodes, likely reactive. Lungs/pleura: The tracheobronchial tree is patent. Mild bronchial wall thickening involves the bilateral lower lobes due to bronchitis. There is no pneumothorax. There trace bilateral pleural effusions with atelectasis. However, there is worsening bilateral patchy nodular airspace disease concerning for multifocal pneumonia rather than pulmonary edema. There is also mild interstitial thickening involving the bilateral bases due to interstitial edema. Upper Abdomen: Images of the upper abdomen are unremarkable. Soft Tissues/Bones: The osseous structures are unremarkable. 1. Worsening bilateral patchy nodular airspace disease favoring multifocal pneumonia. Recommend chest radiograph in 8 weeks to confirm resolution. 2. Trace bilateral pleural effusions and mild interstitial edema. Ct Chest W Contrast    Result Date: 2/25/2019  EXAMINATION: CT OF THE CHEST WITH CONTRAST 2/25/2019 1:47 pm TECHNIQUE: CT of the chest was performed with the administration of intravenous contrast. Multiplanar reformatted images are provided for review. Dose modulation, iterative reconstruction, and/or weight based adjustment of the mA/kV was utilized to reduce the radiation dose to as low as reasonably achievable. COMPARISON: CT of the chest February 15, 2019 HISTORY: ORDERING SYSTEM PROVIDED HISTORY: PNEUMONIA, UNRESOLVED TECHNOLOGIST PROVIDED HISTORY: Ordering Physician Provided Reason for Exam: Pneumonia, unresolved Acuity: Unknown Type of Exam: Unknown FINDINGS: Mediastinum: Unchanged mildly enlarged lymph nodes.   No pericardial effusion. Lungs/pleura: There is increased severity of extensive bilateral pulmonary disease, with extensive foci of ground-glass opacity, in addition to small areas of consolidation. No pneumothorax or pleural effusion. Upper Abdomen: Negative, no acute findings. Soft Tissues/Bones: No acute osseous findings. Increased severity of extensive bilateral pulmonary disease, nonspecific but consistent with pneumonia. ARDS is also considered. Us Renal Complete    Result Date: 2/26/2019  EXAMINATION: RETROPERITONEAL ULTRASOUND OF THE KIDNEYS AND URINARY BLADDER 2/26/2019 COMPARISON: CT abdomen and pelvis 02/15/2019. HISTORY: ORDERING SYSTEM PROVIDED HISTORY: left sided pain, hydronephrosis TECHNOLOGIST PROVIDED HISTORY: Ordering Physician Provided Reason for Exam: hx of recent rt stent cervical ca Acuity: Unknown Type of Exam: Subsequent/Follow-up FINDINGS: Kidneys: The right kidney measures 11.6 cm in length and the left kidney measures 11.3 cm in length. Kidneys demonstrate normal cortical echogenicity. No evidence of hydronephrosis or intrarenal stones. Bladder: Urinary bladder is underdistended at the time of imaging limiting evaluation. No gross bladder wall abnormalities noted. No postvoid imaging performed. Kidneys are unremarkable. Limited evaluation of urinary bladder secondary to underdistention at the time of imaging without gross bladder wall abnormality noted. Ct Abdomen Pelvis W Iv Contrast Additional Contrast? None    Result Date: 2/15/2019  EXAMINATION: CTA OF THE CHEST; CT OF THE ABDOMEN AND PELVIS WITH CONTRAST 2/15/2019 2:11 pm TECHNIQUE: CTA of the chest was performed after the administration of intravenous contrast.  Multiplanar reformatted images are provided for review. MIP images are provided for review.  Dose modulation, iterative reconstruction, and/or weight based adjustment of the mA/kV was utilized to reduce the radiation dose to as low as reasonably achievable.; CT of the abdomen and pelvis was performed with the administration of intravenous contrast. Multiplanar reformatted images are provided for review. Dose modulation, iterative reconstruction, and/or weight based adjustment of the mA/kV was utilized to reduce the radiation dose to as low as reasonably achievable. COMPARISON: 1/28/2019, 4/29/2011 HISTORY: ORDERING SYSTEM PROVIDED HISTORY: cervical ca - SOB - PE??? TECHNOLOGIST PROVIDED HISTORY: Ordering Physician Provided Reason for Exam: SOB Acuity: Unknown Type of Exam: Unknown; ORDERING SYSTEM PROVIDED HISTORY: abd disten - cervical ca? TECHNOLOGIST PROVIDED HISTORY: Additional Contrast?->None Ordering Physician Provided Reason for Exam: abd distention Acuity: Unknown Type of Exam: Unknown Patient with recently diagnosed cervical cancer. FINDINGS: Chest: Pulmonary arteries: The pulmonary arteries opacify normally. There is no evidence of filling defect to suggest a pulmonary embolism. Mediastinum: The thyroid is unremarkable. There is mild subcarinal and bilateral hilar lymphadenopathy, most likely benign and reactive in etiology given the patient's underlying lung findings. The thoracic aorta is unremarkable, without evidence of aneurysm or dissection. 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 No pericardial abnormality is identified. Lungs/pleura: There is mild mucous plugging within the bilateral lower lobes. The central airways are otherwise widely patent. There has been interval development of widespread ground-glass opacity throughout both lungs, with diffuse intralobular septal thickening evident, new from prior exam.  This most likely reflects a combination of interstitial pulmonary edema and superimposed multifocal pneumonia. Additionally, there has been interval progression and more consolidation of multiple scattered various sized pulmonary nodules throughout both lungs since the study of 1/28/2019.   A few of these are cavitary. The largest of these measures approximately 10 mm in short axis within the subpleural portion of the left lower lobe. These may be secondary to an atypical infectious or inflammatory process, to include septic emboli. Metastatic disease is considered less likely, though not excluded. There is no evidence of a pneumothorax or pleural effusion. Soft Tissues/Bones: No acute findings. Abdomen/Pelvis: Organs: The patient is status post cholecystectomy. The liver, spleen, and pancreas are normal.  The adrenal glands are unremarkable bilaterally. There has been interval development of nonspecific moderate right hydronephrosis since the prior exam, without definite demonstrable cause. Namely, no definite obstructing stone or mass is identified. The left kidney is stable and unremarkable. GI/Bowel: Evaluation of the hollow GI tract demonstrates no evidence of abnormal bowel wall thickening, dilatation, or obstruction. The appendix is normal. Pelvis: The urinary bladder is partially collapsed, though appears diffusely thick-walled and inflamed, with a mild amount of pericystic stranding noted. These findings are suggestive of an acute cystitis, progressed from prior exam.  Additionally, the cervix appears enlarged and irregular, and there is new abnormal thickening of the endometrium within the uterine fundus, which appears new in comparison with the study of 1/28/2019. The bilateral adnexa are unremarkable. There is a mild amount of free pelvic fluid, likely physiologic. There are stable mildly enlarged bilateral pelvic chain lymph nodes, the largest measuring approximately 2.6 x 1.6 cm along the right external iliac chain, similar to the study of 1/28/2019. Peritoneum/Retroperitoneum: No intraperitoneal free air or free fluid is identified. No pathologic lymphadenopathy is seen. The abdominal aorta is unremarkable. No significant abdominal wall hernia is identified. Bones/Soft Tissues:  There is mild bilateral sacroiliitis. The skeletal structures are otherwise unremarkable. 1. No CT evidence of a pulmonary embolism. 2. No acute abnormality of the thoracic aorta. 3. Interval development of widespread ground-glass opacity throughout both lungs with diffuse intralobular septal thickening. This most likely reflects a combination of interstitial pulmonary edema and multifocal pneumonia. 4. Interval progression of multiple nodular foci of consolidative opacity throughout both lungs, a few of which are cavitary in appearance. These nodules are most likely infectious or inflammatory in etiology, and septic emboli are a consideration. Given patient's history of cervical cancer, metastatic disease is on the differential, though considered less likely. 5. New nonspecific moderate right hydronephrosis, without demonstrable cause. However, there is underlying wall thickening and enhancement of the urinary bladder. This combination of findings could represent a cystitis in the setting of urinary tract infection with resultant pyelitis and hydronephrosis. However, given patient history of cervical cancer, locoregional spread of tumor with resultant obstruction of the distal right ureter may be a cause of the patient's right hydronephrosis. 6. Interval development of new irregularity of the cervix and nonspecific endometrial thickening in comparison with the prior study of 1/28/2019. This likely represents the patient's known underlying cervical cancer causing obstruction of the endometrial with resultant endometrial thickening. This could be further evaluated with a follow-up pelvic ultrasound. 7. Stable mild bilateral pelvic chain lymphadenopathy, right greater than left, possibly representing metastatic disease.      Xr Chest Portable    Result Date: 2/28/2019  EXAMINATION: SINGLE XRAY VIEW OF THE CHEST 2/28/2019 3:48 pm COMPARISON: 02/26/2019 HISTORY: ORDERING SYSTEM PROVIDED HISTORY: fever TECHNOLOGIST PROVIDED HISTORY: Reason for exam:->fever Ordering Physician Provided Reason for Exam: stage 4 cervical ca with mets to bladder and lungs- stent to kidney last week, with fever and pain today. Acuity: Acute Type of Exam: Initial FINDINGS: Mild patchy consolidation within the right lung is again identified, increased when compared to the previous exam, greatest in the right perihilar region. Nodular opacities within the lungs are also again found, similar. Heart mediastinal contours are unremarkable. Chin catheter is present, unchanged in position. No acute bony abnormalities. No pneumothorax. No free air. Patchy consolidation within the lungs, especially the right perihilar region, mildly increased when compared to the previous exam.  Given the relatively rapid increase, pneumonia would be primarily considered. Xr Chest Portable    Result Date: 2/24/2019  EXAMINATION: SINGLE XRAY VIEW OF THE CHEST 2/24/2019 7:00 am COMPARISON: Chest radiograph performed 02/19/2019. HISTORY: ORDERING SYSTEM PROVIDED HISTORY: elevated wbc and ongoing shortness of breath TECHNOLOGIST PROVIDED HISTORY: Reason for exam:->elevated wbc and ongoing shortness of breath Acuity: Unknown Type of Exam: Unknown FINDINGS: There is mild bilateral pulmonary congestion. There is similar left basilar infiltrate. There is no pneumothorax. The mediastinal structures are stable. The upper abdomen is unremarkable. The extrathoracic soft tissues are unremarkable. There is a right internal jugular port. Stable bilateral congestion and left basilar infiltrate. Ct Chest Pulmonary Embolism W Contrast    Result Date: 2/15/2019  EXAMINATION: CTA OF THE CHEST; CT OF THE ABDOMEN AND PELVIS WITH CONTRAST 2/15/2019 2:11 pm TECHNIQUE: CTA of the chest was performed after the administration of intravenous contrast.  Multiplanar reformatted images are provided for review. MIP images are provided for review.  Dose modulation, iterative progression and more consolidation of multiple scattered various sized pulmonary nodules throughout both lungs since the study of 1/28/2019. A few of these are cavitary. The largest of these measures approximately 10 mm in short axis within the subpleural portion of the left lower lobe. These may be secondary to an atypical infectious or inflammatory process, to include septic emboli. Metastatic disease is considered less likely, though not excluded. There is no evidence of a pneumothorax or pleural effusion. Soft Tissues/Bones: No acute findings. Abdomen/Pelvis: Organs: The patient is status post cholecystectomy. The liver, spleen, and pancreas are normal.  The adrenal glands are unremarkable bilaterally. There has been interval development of nonspecific moderate right hydronephrosis since the prior exam, without definite demonstrable cause. Namely, no definite obstructing stone or mass is identified. The left kidney is stable and unremarkable. GI/Bowel: Evaluation of the hollow GI tract demonstrates no evidence of abnormal bowel wall thickening, dilatation, or obstruction. The appendix is normal. Pelvis: The urinary bladder is partially collapsed, though appears diffusely thick-walled and inflamed, with a mild amount of pericystic stranding noted. These findings are suggestive of an acute cystitis, progressed from prior exam.  Additionally, the cervix appears enlarged and irregular, and there is new abnormal thickening of the endometrium within the uterine fundus, which appears new in comparison with the study of 1/28/2019. The bilateral adnexa are unremarkable. There is a mild amount of free pelvic fluid, likely physiologic. There are stable mildly enlarged bilateral pelvic chain lymph nodes, the largest measuring approximately 2.6 x 1.6 cm along the right external iliac chain, similar to the study of 1/28/2019. Peritoneum/Retroperitoneum: No intraperitoneal free air or free fluid is identified.   No pathologic lymphadenopathy is seen. The abdominal aorta is unremarkable. No significant abdominal wall hernia is identified. Bones/Soft Tissues: There is mild bilateral sacroiliitis. The skeletal structures are otherwise unremarkable. 1. No CT evidence of a pulmonary embolism. 2. No acute abnormality of the thoracic aorta. 3. Interval development of widespread ground-glass opacity throughout both lungs with diffuse intralobular septal thickening. This most likely reflects a combination of interstitial pulmonary edema and multifocal pneumonia. 4. Interval progression of multiple nodular foci of consolidative opacity throughout both lungs, a few of which are cavitary in appearance. These nodules are most likely infectious or inflammatory in etiology, and septic emboli are a consideration. Given patient's history of cervical cancer, metastatic disease is on the differential, though considered less likely. 5. New nonspecific moderate right hydronephrosis, without demonstrable cause. However, there is underlying wall thickening and enhancement of the urinary bladder. This combination of findings could represent a cystitis in the setting of urinary tract infection with resultant pyelitis and hydronephrosis. However, given patient history of cervical cancer, locoregional spread of tumor with resultant obstruction of the distal right ureter may be a cause of the patient's right hydronephrosis. 6. Interval development of new irregularity of the cervix and nonspecific endometrial thickening in comparison with the prior study of 1/28/2019. This likely represents the patient's known underlying cervical cancer causing obstruction of the endometrial with resultant endometrial thickening. This could be further evaluated with a follow-up pelvic ultrasound. 7. Stable mild bilateral pelvic chain lymphadenopathy, right greater than left, possibly representing metastatic disease.      Ir Port Placement > 5 Years    Result and there was a good blood return. The port was locked with heparinized saline. The patient tolerated the procedure well and there were no immediate complications. FINDINGS: Fluoroscopic image demonstrates the tip of the port in the right atrium. 1. Successful ultrasound and fluoroscopy guided Port-A-Cath placement via right IJ access, as described above. 2.  The port was left accessed for immediate use. Fl Retrograde Pyelogram Right    Result Date: 2/17/2019  EXAMINATION: SPOT FLUOROSCOPIC IMAGES 2/17/2019 6:52 am TECHNIQUE: Fluoroscopy was provided by the radiology department for procedure. Radiologist was not present during examination. FLUOROSCOPY DOSE AND TYPE OR TIME AND EXPOSURES: 3 seconds of fluoroscopy was utilized for purposes of intraoperative localization. 1 fluoroscopic spot image was obtained. COMPARISON: None HISTORY: Intraprocedural imaging. FINDINGS: 1 spot images of the abdomen was obtained. Intraprocedural fluoroscopic spot images as above. See separate procedure report for more information. Vl Extremity Venous Bilateral    Result Date: 3/1/2019  Lower Extremities DVT Study  Demographics   Patient Name       Alex Jovel   Date of Study      03/01/2019       Gender              Female   Patient Number     [id number]       Date of Birth       1980   Visit Number       [id number]        Age                 42 year(s)   Accession Number   [id number]        Room Number         0960   Corporate ID       [id number]          Gabriel Ellison                                                          [place]   Ordering Physician Ken Preciado MD Interpreting        [facility]                                      Physician           Giana Campbell MD,                                                          [facility]  Procedure Type of Study:   Veins:Lower Extremities DVT Study, VASC EXTREMITY VENOUS DUPLEX BILATERAL.    Vascular Sonographer Report  Additional Indications:Swelling Impressions Right Impression No evidence of deep vein or superficial vein thrombosis involving the right lower extremity. Left Impression No evidence of deep vein or superficial vein thrombosis involving the left lower extremity. Conclusions   Summary   No evidence of deep vein or superficial vein thrombosis involving the  bilateral lower extremities. Signature   ------------------------------------------------------------------  Electronically signed by Carson Benton MD, [facility] (Interpreting  physician) on 03/01/2019 at 02:22 PM  ------------------------------------------------------------------  Patient Status:Routine. [address] [place] [address] - Vascular Lab. Technical Quality:Adequate visualization. Velocities are measured in cm/s ; Diameters are measured in mm Right Lower Extremities DVT Study Measurements Right 2D Measurements +------------------------+----------+---------------+----------+ ! Location                ! Visualized! Compressibility! Thrombosis! +------------------------+----------+---------------+----------+ ! Sapheno Femoral Junction! Yes       ! Yes            ! None      ! +------------------------+----------+---------------+----------+ ! GSV Thigh               ! Yes       ! Yes            ! None      ! +------------------------+----------+---------------+----------+ ! Common Femoral          !Yes       ! Yes            ! None      ! +------------------------+----------+---------------+----------+ ! Prox Femoral            !Yes       ! Yes            ! None      ! +------------------------+----------+---------------+----------+ ! Mid Femoral             !Yes       ! Yes            ! None      ! +------------------------+----------+---------------+----------+ ! Dist Femoral            !Yes       ! Yes            ! None      ! +------------------------+----------+---------------+----------+ ! Deep Femoral            !Yes       ! Yes            ! None      ! +------------------------+----------+---------------+----------+ ! Popliteal               !Yes       ! Yes            ! None      ! +------------------------+----------+---------------+----------+ ! GSV Below Knee          ! Yes       ! Yes            ! None      ! +------------------------+----------+---------------+----------+ ! Gastroc                 ! Yes       ! Yes            ! None      ! +------------------------+----------+---------------+----------+ ! Soleal                  !Yes       ! Yes            ! None      ! +------------------------+----------+---------------+----------+ ! PTV                     ! Yes       ! Yes            ! None      ! +------------------------+----------+---------------+----------+ ! Peroneal                !Yes       ! Yes            ! None      ! +------------------------+----------+---------------+----------+ ! GSV Calf                ! Yes       ! Yes            ! None      ! +------------------------+----------+---------------+----------+ ! SSV                     ! Yes       ! Yes            ! None      ! +------------------------+----------+---------------+----------+ Right Doppler Measurements +------------------------+------+------+------------+ ! Location                ! Signal!Reflux! Reflux (sec)! +------------------------+------+------+------------+ ! Sapheno Femoral Junction! Phasic!      !            ! +------------------------+------+------+------------+ ! Common Femoral          !Phasic!      !            ! +------------------------+------+------+------------+ ! Prox Femoral            !Phasic!      !            ! +------------------------+------+------+------------+ ! Deep Femoral            !Phasic!      !            ! +------------------------+------+------+------------+ ! Popliteal               !Phasic!      !            ! +------------------------+------+------+------------+ Left Lower Extremities DVT Study Measurements Left 2D Measurements +------------------------+----------+---------------+----------+ ! Location                ! Visualized! Compressibility! Thrombosis! +------------------------+----------+---------------+----------+ ! Sapheno Femoral Junction! Yes       ! Yes            ! None      ! +------------------------+----------+---------------+----------+ ! GSV Thigh               ! Yes       ! Yes            ! None      ! +------------------------+----------+---------------+----------+ ! Common Femoral          !Yes       ! Yes            ! None      ! +------------------------+----------+---------------+----------+ ! Prox Femoral            !Yes       ! Yes            ! None      ! +------------------------+----------+---------------+----------+ ! Mid Femoral             !Yes       ! Yes            ! None      ! +------------------------+----------+---------------+----------+ ! Dist Femoral            !Yes       ! Yes            ! None      ! +------------------------+----------+---------------+----------+ ! Deep Femoral            !Yes       ! Yes            ! None      ! +------------------------+----------+---------------+----------+ ! Popliteal               !Yes       ! Yes            ! None      ! +------------------------+----------+---------------+----------+ ! GSV Below Knee          ! Yes       ! Yes            ! None      ! +------------------------+----------+---------------+----------+ ! Gastroc                 ! Yes       ! Yes            ! None      ! +------------------------+----------+---------------+----------+ ! Soleal                  !Yes       ! Yes            ! None      ! +------------------------+----------+---------------+----------+ ! PTV                     ! Yes       ! Yes            ! None      ! +------------------------+----------+---------------+----------+ ! Peroneal                !Yes       ! Yes            ! None      ! +------------------------+----------+---------------+----------+ ! GSV Calf                ! Yes       ! Yes            ! None      ! There is wall thickening noted of the vagina with stranding of the pelvic fat. Peritoneum/Retroperitoneum: Aorta is normal in caliber. Small retroperitoneal lymph nodes are noted which are likely reactive. Bones/Soft Tissues: No acute osseous abnormality noted. Diffuse multifocal airspace disease is again noted with slight interval improvement of more focal areas of consolidation at the left base. Findings compatible with multifocal pneumonia. Malignancy, metastatic disease is within the differential though considered less likely. Right ureteral stent is in place with persistent mild dilation of the renal collecting system. Wall thickening of the bladder is noted which is improved compared to the prior study. Mild stranding is noted in the perivesicular fat. The cervix is somewhat irregular in its appearance with wall thickening of the vagina noted. Endometrial fluid noted on the prior study is no longer identified. In a patient with a reported history of cervical cancer malignancy is within the differential.  If the patient has had radiation therapy findings may also represent associated inflammatory changes. Nonspecific adenopathy within the pelvis may be reactive in nature or related to underlying malignancy. Recommend continued follow-up. Ir Guided Ureteral Stent Place W Nephro Cath New Access    Result Date: 2/18/2019  PROCEDURE: IR ULTRASOUND AND FLUOROSCOPIC GUIDED RIGHT PERCUTANEOUS NEPHROSTOMY AND NEPHROSTOGRAM IR ANTEGRADE RIGHT URETERAL STENT. MODERATE CONSCIOUS SEDATION 2/18/2019 HISTORY: ORDERING SYSTEM PROVIDED HISTORY: needs R sided nephrostomy tube placed. maria guadalupe could not do via cysto. Adi said that IR would do on monday TECHNOLOGIST PROVIDED HISTORY: Reason for exam:->needs R sided nephrostomy tube placed. maria guadalupe could not do via cysto. Adi said that IR would do on monday COMPARISON: Abdomen pelvic CT 02/15/2019.  CONTRAST: 30 cc, nonvascular within collecting system leslye. [place]i [place] SEDATION: Intravenous sedation was administered with continuous monitoring throughout the procedure. In measured doses, a total of 6 mg intravenous Versed and 275 mcg intravenous fentanyl was administered. My total procedure time with sedation was 26 minutes. FLUOROSCOPY DOSE AND TYPE OR TIME AND EXPOSURES: 3.5 minutes, 7 digital imaging sequences. DESCRIPTION OF PROCEDURE: Informed consent was obtained after a detailed explanation of the procedure including risks, benefits, and alternatives. Universal protocol was observed. TECHNIQUE: Informed consent was previously obtained. In the semi prone position, an appropriate area posterior lateral aspect of the skin overlying the targeted collecting system was demarcated, sterilely prepared draped and anesthetized. Utilizing ultrasound, anatomy from prior CT scan and other imaging, the needle trajectory and depth was predetermined. The micro puncture needle was advanced using fluoroscopic and ultrasound guidance into the collecting system without difficulty. Once urine was aspirated, a small platinum tip wire was advanced into the collecting system and into the proximal ureter. The tract was dilated. With wire exchange, a 6 [place] sheath was placed. The collecting system is moderately dilated and the ureter is somewhat tortuous. A glide wire was easily advanced down the ureter. Because of this, a 5 [place] peel-away sheath was advanced into the proximal ureter over the wire. The glide wire was then advanced with a steerable catheter into the urinary bladder without difficulty and only mild discomfort. There is no contrast extravasation. Once the catheter was placed into the urinary bladder, contrast injection is seen diluted within the somewhat distended urinary bladder. The catheter was used to place a Super Stiff wire for ureteral stent placement.   A 26 cm 8 [place] ureteral stent was then advanced, such that the distal pigtail is well within the urinary bladder. The proximal pigtail was placed in the lower portion of the left renal pelvis. Again there is no contrast extravasation. No filling defect to indicate clot or bleeding was observed on early or later contrast imaging. Following this, dense contrast was injected showing normal expected filling of the ureteral stent and exiting from the distal pigtail directly into the urinary bladder. Following this, the internalized stent was disengaged. The proximal collecting system was then decompressed. The proximal nephrostomy access was then removed entirely, no external drainage necessary at this point. The patient tolerated the procedure well. A sterile bandage was placed over the small incision. From this point forward, the ureteral stent can be exchanged as needed from below. Successful right percutaneous nephrostomy with antegrade pyelogram. High-grade distal ureteral obstruction with severe hydronephrosis, successfully crossed as above. Successful 8 French internalized right ureteral stent placement as above. Current Medications   hyoscyamine  125 mcg Sublingual Q8H    senna  1 tablet Oral Nightly    meropenem  1 g Intravenous Q8H    acetaminophen  500 mg Oral Q6H    morphine  30 mg Oral 2 times per day    phenazopyridine  200 mg Oral TID WC    linaclotide  145 mcg Oral QAM AC    ferrous sulfate  325 mg Oral BID WC    baclofen  20 mg Oral BID    DULoxetine  60 mg Oral Daily    gabapentin  300 mg Oral TID    sodium chloride flush  10 mL Intravenous 2 times per day    enoxaparin  40 mg Subcutaneous Daily    famotidine  20 mg Oral BID    ipratropium-albuterol  1 ampule Inhalation 4x daily    guaiFENesin  600 mg Oral BID        ASSESSMENT & PLAN:  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  Extensive nodular and interstitial changes on chest ct Pulmonary has signed off  She remains on meropenem fever appears improved but she is on round the clock tylenol   She states she has had some mild rectal bleeding in last day. Has had fissures in past   She will be due for second chemotherapy next week        I have discussed the above stated plan with the patient and they verbalized understanding and agreed with the plan. Thank you for allowing us to participate in this patients care.     Yoselyn Jaramillo MD, 3/5/2019, 7:33 AM

## 2019-03-05 NOTE — PROGRESS NOTES
Patient has been discharged per MD orders. Patient verbalizes understanding of all instructions, medications, and follow up. port has been deaccessed, patent tolerated well. All belongings have been gathered and packed. Sister here to take patient home. Home oxygen delivered to room. Pt hooked up and this RN transported patient out for discharge. Patient leaving hospital in stable condition.

## 2019-03-05 NOTE — DISCHARGE INSTR - COC
(systemic lupus erythematosus) ([facility]) M32.9    Lupus anticoagulant positive R76.0    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, antepartum ([facility]) O99.019, D61.9    Cervical cancer ([facility]) C53.9    Multifocal pneumonia J18.9    Pulmonary nodule R91.1    Hydronephrosis N13.30    Ground glass opacity present on imaging of lung R91.8    Lung nodules R91.8    Lymphadenopathy R59.1    Constipation due to opioid therapy K59.03, T40.2X5A    Sepsis ([facility]) A41.9    Septic shock ([facility]) A41.9, R65.21    Septicemia ([facility] [address] A41.9    History of cancer Z85.9    Status post cystoscopy Z98.890    Immunocompromised ([facility]) I03.3    Complicated UTI (urinary tract infection) N39.0    Anemia D64.9    Seizure disorder ([facility]) G40.909    Class 1 obesity due to excess calories with body mass index (BMI) of 31.0 to 31.9 in adult E66.09, Z68.31    Weight loss counseling, encounter for Z71.3       Isolation/Infection:   Isolation          No Isolation            Nurse Assessment:  Last Vital Signs: BP (!) 109/56   Pulse 102   Temp 98.5 °F (36.9 °C) (Oral)   Resp 18   Ht 5' 6\" (1.676 m)   Wt 193 lb 12.8 oz (87.9 kg)   SpO2 96%   BMI 31.28 kg/m²     Last documented pain score (0-10 scale): Pain Level: 7  Last Weight:   Wt Readings from Last 1 Encounters:   03/03/19 193 lb 12.8 oz (87.9 kg)     Mental Status:  oriented and alert    IV Access:  - Port, R chest- NOT accessed on discharge. Nursing Mobility/ADLs:  Walking   Independent  Transfer  Independent  Bathing  Independent  Dressing  Independent  Toileting  Independent  Feeding  Independent  Med [address]  Med Delivery   whole    Wound Care Documentation and Therapy:        Elimination:  Continence:   · Bowel: Yes  · Bladder: Yes  Urinary Catheter: None   Colostomy/Ileostomy/Ileal Conduit: No       Date of Last BM: 03/04/2019  No intake or output data in the 24 hours ending 03/05/19 1153  No intake/output data recorded.     Safety Concerns: None    Impairments/Disabilities:      None    Nutrition Therapy:  Current Nutrition Therapy:   - Oral Diet:  General    Routes of Feeding: Oral  Liquids: No Restrictions  Daily Fluid Restriction: no  Last Modified Barium Swallow with Video (Video Swallowing Test): not done    Treatments at the Time of Hospital Discharge:   Respiratory Treatments: Supplemental Oxygen   Oxygen Therapy:  is on oxygen at 8 L/min per nasal cannula. Ventilator:    - No ventilator support    Rehab Therapies: n/a  Weight Bearing Status/Restrictions: No weight bearing restirctions  Other Medical Equipment (for information only, NOT a DME order):  n/a  Other Treatments: n/a    Patient's personal belongings (please select all that are sent with patient):  please see belongings list    RN SIGNATURE:  Electronically signed by Ian Macias RN on 3/5/19 at 12:10 PM    CASE MANAGEMENT/SOCIAL WORK SECTION    Inpatient Status Date: ***    Readmission Risk Assessment Score:  Readmission Risk              Risk of Unplanned Readmission:        25           Discharging to Facility/ Agency   · Name:   · Address:  · Phone:  · Fax:    Dialysis Facility (if applicable)   · Name:  · Address:  · Dialysis Schedule:  · Phone:  · Fax:    / signature: {Esignature:[id number]}    PHYSICIAN SECTION    Prognosis: Fair    Condition at Discharge: Stable    Rehab Potential (if transferring to Rehab): Good    Recommended Labs or Other Treatments After Discharge: Oxygen therapy. Physician Certification: I certify the above information and transfer of Carlitos Sandoval  is necessary for the continuing treatment of the diagnosis listed and that she requires Home Care for greater 30 days.      Update Admission H&P: No change in H&P    PHYSICIAN SIGNATURE:  Electronically signed by April Khan MD on 3/5/19 at 11:54 AM

## 2019-03-05 NOTE — PROGRESS NOTES
Urology Progress Note  [facility]    Provider: Jorge Barnett MD Patient ID:  Admission Date: 2019 Name: Lilibeth Tomas Date: 2019 MRN: [id number]   Patient Location: [id number] : 1980  Attending: Love Valladares MD Date of Service: 3/5/2019  PCP: Jcarlos Andujar MD     Diagnoses:  right ureteral stent  Right colic  Anemia  cervical ca      Assessment/Plan:  41 yo F with cervical CA and indwelling right ureteral stent with continued stent discomfort. Manageable on pyridium currently    - continue pyridium  - can consider urogesic blue as outpt if azo/pyridium not working well  - continue hydration  - outpatient f/u requested    The patient had a chance to ask questions which were answered. she understands the above plan. Subjective:   Stephanie Thomas is a 42 y.o. female. She was seen and examined this morning. Today we discussed improved stent pain      Objective:   Vitals:  Vitals:    19 0913   BP:    Pulse:    Resp: 18   Temp:    SpO2: 96%     No intake or output data in the 24 hours ending 19 1218  Physical Exam:  Gen: Alert and oriented x3, no acute distress  CV: Regular rate   Resp: unlabored respirations  Abd: Soft, non-distended, non-tender, no masses  Ext: no peripheral edema noted, moves upper and lower extremities spontaneously  Skin: warm and well perfused, no rashes noted on the face, or arms.      Labs:  Lab Results   Component Value Date    WBC 8.5 2019    HGB 10.1 (L) 2019    HCT 32.0 (L) 2019    MCV 81.8 2019     2019     Lab Results   Component Value Date    CREATININE <0.5 (L) 2019    BUN 9 2019     2019    K 4.3 2019    CL 97 (L) 2019    CO2 32 2019       Jorge Barnett MD  3/5/2019

## 2019-03-05 NOTE — PROGRESS NOTES
Infectious Diseases  Progress Note        Admission Date: 2/28/2019  Hospital Day: Hospital Day: 6  Attending: Reena Ngo MD  Date of service: 3/1/19    Presenting complaint:   Chief Complaint   Patient presents with    Fever     stage 4 cervical ca with mets to bladder and lungs- stent to kidney last week, with fever and pain today. Sees Dr. Garcia.         Reason for admission: Sepsis ([address] [A41.9]  Sepsis ([address] [A41.9]    Chief complaint/ Reason for consult:       · Septic shock with fever hypotension and lactic acidosis and leukocytosis - septic, hypotension improving  · Recently diagnosed cervical cancer with metastasis to lungs and urinary bladder  · History of cystoscopy and right ureteral stent placement for hydronephrosis 2 weeks ago  · Immunocompromised on chemotherapy with Taxol and cisplatin  · Complicated UTI  · Multifocal pneumonia versus lung metastasis    Problems addressed today:       Patient Active Problem List   Diagnosis Code    Stridor R06.1    Acute bronchitis J20.9    Fibromyalgia M79.7    Bradycardia R00.1    Chest pain R07.9    GERD (gastroesophageal reflux disease) K21.9    Vaginal bleeding N93.9    History of juvenile rheumatoid arthritis Z87.39    SLE (systemic lupus erythematosus) (HCC) M32.9    Lupus anticoagulant positive R76.0    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, antepartum ([address] O99.019, D61.9    Cervical cancer (HCC) C53.9    Multifocal pneumonia J18.9    Pulmonary nodule R91.1    Hydronephrosis N13.30    Ground glass opacity present on imaging of lung R91.8    Lung nodules R91.8    Lymphadenopathy R59.1    Constipation due to opioid therapy K59.03, T40.2X5A    Sepsis ([address] A41.9    Septic shock (HCC) A41.9, R65.21    Septicemia ([address] A41.9    History of cancer Z85.9    Status post cystoscopy Z98.890    Immunocompromised ([address] C11.7    Complicated UTI (urinary tract infection) N39.0    Anemia D64.9    Seizure disorder ([address] G40.909    Class 1 obesity due to excess calories with body mass index (BMI) of 31.0 to 31.9 in adult E66.09, Z68.31    Weight loss counseling, encounter for Z71.3             Microbiology:        I have reviewed allavailable micro lab data and cultures    · Blood culture (2/2) - collected on 2/28/2019: In process  · Urine culture  - collected on 2/28/2019: In process        Assessment:     The patient is a 42 y.o. old female who  has a past medical history of Endometriosis, Fibromyalgia, GERD (gastroesophageal reflux disease), Hip fracture ([facility] Hypoglycemia, Lupus, Neuropathy, Ovarian cyst, and Seizures ([facility] with following problems:    · Septic shock with fever hypotension and lactic acidosis and leukocytosis -  resolved  · Worsening shortness of breath  improving  · Recently diagnosed cervical cancer with metastasis to lungs and urinary bladder  · History of cystoscopy and right ureteral stent placement for hydronephrosis 2 weeks ago  · Immunocompromised on chemotherapy with Taxol and cisplatin  · Complicated UTI - -  Urine culture reviewed  · Multifocal pneumonia versus lung metastasis  · Anemia due to vaginal bleeding  · Lupus  · Seizure disorder  · GERD  · Obesity Class 1 due to excess calorie intake : Body mass index is 31.28 kg/m². Discussion:      The patient is afebrile. Her WBC count has normalized and is 8500 today. Her serum pro-calcitonin is 0.29. Serum creatinine 0.5 today. CT scan of the chest reviewed. Shows bilateral patchy airspace disease. Discussed with Dr. Amy Zendejas. Fluid overload suspected.        urine histoplasma antigen also negative    Plan:     Diagnostic Workup:    · Continue to follow  fever curve, WBC count and blood cultures  · Follow up on liver and renal function    Antimicrobials:    · Will stop IV meropenem for discharge  · Will order by mouth Levaquin 750 milligrams every 24 hours  · Will order oral Flagyl 500 mg every 8 hours  · Recommended Levaquin and Flagyl for 1 week  · Recommend close follow-up with Dr. Katiuska Steen as an outpatient  · Aspiration precautions  · DVT prophylaxis  · Discussed all above with patient and RN  · Discussed with Dr. Katiuska Steen    Drug Monitoring:    · Continue monitoring for antibiotic toxicity as follows: CMP, QTc interval  · Continue to watch for following: new or worsening fever, new hypotension, hives, lip swelling and redness or purulence at vascular access sites. I/v access Management:    · Continue to monitor i.v access sites for erythema, induration, discharge or tenderness. · As always, continue efforts to minimize tubes/lines/drains as clinically appropriate to reduce chances of line associated infections. Patient education and counseling:        · The patient was educated in detail about the side-effects of various antibiotics and things to watch for like new rashes, lip swelling, severe reaction, worsening diarrhea, break through fever etc.  · Discussed patient's condition and what to expect. All of the patient's questions were addressed in a satisfactory manner and patient verbalized understanding all instructions. ·   Weight loss counseling:    Extensive weight loss counseling was done. It is important to set a realistic weight loss goal. First goal should be to avoid gaining more weight and staying at current weight (or within 5 percent). People at high risk of developing diabetes who are able to lose 5 percent of their body weight and maintain this weight will reduce their risk of developing diabetes by about 50 percent and reduce their blood pressure. Losing more than 15 percent of  body weight and staying at this weight is an extremely good result, even if you never reach your \"dream\" or \"ideal\" weight.     Lifestyle changes including changing eating habits, substituting excess carbohydrates with proteins, stress reduction, using self-help programs like Weight Watchers®, Overeaters Anonymous®, and Take Off Pounds Sensibly (TOPS)© , Right 2/17/2019    CYSTOSCOPY performed by Kris Aldana MD at [address] [place] [address]  02/2019    aborted planned procedure of BTL and uterine ablation. Social History:  All social andepidemiologic history was reviewed and updated by me today as needed. · Tobacco use:   reports that she quit smoking about 17 years ago. Her smoking use included cigarettes. She started smoking about 21 years ago. She has a 0.75 pack-year smoking history. She has never used smokeless tobacco.  · Alcohol use:   reports that she does not drink alcohol. · Currently lives in: [place]  ·  reports that she does not use drugs. Immunization History: All immunization history was reviewed by me today. Immunization History   Administered Date(s) Administered    Influenza Virus Vaccine 10/12/2014       Family History: All family history was reviewed today. Problem Relation Age of Onset    Diabetes Mother     Crohn's Disease Sister     Hypertension Maternal Grandmother     Heart Failure Maternal Grandfather     Stroke Maternal Grandfather     Diabetes Paternal Grandmother     Asthma Paternal Grandfather          Medications: All current and past medications were reviewed. Medications Prior to Admission: morphine (MS CONTIN) 30 MG extended release tablet, Take 1 tablet by mouth every 12 hours for 30 days. Collette Rose oxyCODONE (OXY-IR) 15 MG immediate release tablet, Take 1 tablet by mouth every 3 hours as needed for Pain for up to 14 days. .  promethazine (PHENERGAN) 25 MG tablet, Take 1 tablet by mouth every 6 hours as needed for Nausea  ondansetron (ZOFRAN ODT) 4 MG disintegrating tablet, Take 1 tablet by mouth every 8 hours as needed for Nausea  linaclotide (LINZESS) 145 MCG capsule, Take 1 capsule by mouth every morning (before breakfast)  [DISCONTINUED] promethazine (PHENERGAN) 25 MG tablet, Take 25 mg by mouth every 6 hours as needed   ibuprofen (ADVIL;MOTRIN) 800 MG tablet, Take 1 tablet by mouth every 8 hours as needed for Pain or Fever  norethindrone (AYGESTIN) 5 MG tablet, Take 1 tablet by mouth every 12 hours for 21 days  DULoxetine (CYMBALTA) 60 MG extended release capsule, Take 60 mg by mouth daily  baclofen (LIORESAL) 20 MG tablet, Take 20 mg by mouth 2 times daily   gabapentin (NEURONTIN) 300 MG capsule, Take 300 mg by mouth 3 times daily. Margareary Early hyoscyamine  125 mcg Sublingual Q8H    linaclotide  145 mcg Oral QAM AC    senna  1 tablet Oral Nightly    meropenem  1 g Intravenous Q8H    acetaminophen  500 mg Oral Q6H    morphine  30 mg Oral 2 times per day    phenazopyridine  200 mg Oral TID WC    ferrous sulfate  325 mg Oral BID WC    baclofen  20 mg Oral BID    DULoxetine  60 mg Oral Daily    gabapentin  300 mg Oral TID    sodium chloride flush  10 mL Intravenous 2 times per day    enoxaparin  40 mg Subcutaneous Daily    famotidine  20 mg Oral BID    ipratropium-albuterol  1 ampule Inhalation 4x daily    guaiFENesin  600 mg Oral BID       Current antibiotics: All antibiotics and their doses were reviewed by me    Recent Abx Admin                   meropenem (MERREM) 1 g in sodium chloride 0.9 % 100 mL IVPB (mini-bag) (g) 1 g New Bag 03/05/19 1106     1 g New Bag  0351     1 g New Bag 03/04/19 1822                   REVIEW OF SYSTEMS:       Review of Systems   Constitutional: Positive for fatigue. Negative for chills, diaphoresis and unexpected weight change. HENT: Negative for congestion, ear discharge, ear pain, facial swelling, hearing loss, rhinorrhea and trouble swallowing. Eyes: Negative for photophobia, discharge, redness and visual disturbance. Respiratory: Negative for apnea, cough, choking, chest tightness, shortness of breath and stridor. Cardiovascular: Negative for chest pain and palpitations. Gastrointestinal: Negative for abdominal pain, blood in stool, diarrhea and nausea. Endocrine: Negative for polydipsia, polyphagia and polyuria.    Genitourinary: diaphoretic. No erythema. Psychiatric: She has a normal mood and affect. Judgment normal.   Nursing note and vitals reviewed. Lines: All vascular access sites are healthy with no local erythema, discharge or tenderness. Intake and output:     No intake/output data recorded. Lab Data:   All available labs were reviewed by me today. CBC:   Recent Labs     03/03/19  0610 03/03/19  1742 03/04/19  0435 03/05/19  0639   WBC 15.5*  --  13.5* 8.5   RBC 2.64*  --  2.99* 3.91*   HGB 6.8* 7.6* 7.6* 10.1*   HCT 21.4* 24.1* 24.3* 32.0*     --  240 228   MCV 81.1  --  81.5 81.8   MCH 25.9*  --  25.5* 25.9*   MCHC 31.9  --  31.3 31.6   RDW 21.4*  --  20.4* 20.5*   BANDSPCT 3  --   --   --         BMP:  Recent Labs     03/03/19  0610 03/04/19  0435 03/05/19  0639    141 139   K 4.5  4.5 4.2 4.3    102 97*   CO2 28 29 32   BUN 8 7 9   CREATININE <0.5* <0.5* <0.5*   CALCIUM 8.1* 8.6 9.1   GLUCOSE 137* 121* 95        Hepatic FunctionPanel:   Lab Results   Component Value Date    ALKPHOS 130 02/28/2019    ALT 31 02/28/2019    AST 27 02/28/2019    PROT 6.5 02/28/2019    PROT 6.3 04/18/2012    BILITOT <0.2 02/28/2019    BILIDIR <0.2 02/18/2019    IBILI see below 02/18/2019    LABALBU 3.5 02/28/2019       CPK:   Lab Results   Component Value Date    CKTOTAL 185 12/18/2014     ESR:   Lab Results   Component Value Date    SEDRATE 27 (H) 12/05/2018     CRP:   Lab Results   Component Value Date    CRP 4.7 12/05/2018         Imaging: All pertinent images and reports for the current visit were reviewed by meduring this visit. CT CHEST WO CONTRAST   Final Result   1. Worsening bilateral patchy nodular airspace disease favoring multifocal   pneumonia. Recommend chest radiograph in 8 weeks to confirm resolution. 2. Trace bilateral pleural effusions and mild interstitial edema.          VL Extremity Venous Bilateral   Final Result      CT CHEST ABDOMEN PELVIS W CONTRAST   Final Result   Diffuse multifocal airspace disease is again noted with slight interval   improvement of more focal areas of consolidation at the left base. Findings   compatible with multifocal pneumonia. Malignancy, metastatic disease is   within the differential though considered less likely. Right ureteral stent is in place with persistent mild dilation of the renal   collecting system. Wall thickening of the bladder is noted which is improved compared to the   prior study. Mild stranding is noted in the perivesicular fat. The cervix is somewhat irregular in its appearance with wall thickening of   the vagina noted. Endometrial fluid noted on the prior study is no longer   identified. In a patient with a reported history of cervical cancer malignancy is within   the differential.  If the patient has had radiation therapy findings may also   represent associated inflammatory changes. Nonspecific adenopathy within the pelvis may be reactive in nature or related   to underlying malignancy. Recommend continued follow-up. XR CHEST PORTABLE   Final Result   Patchy consolidation within the lungs, especially the right perihilar region,   mildly increased when compared to the previous exam.  Given the relatively   rapid increase, pneumonia would be primarily considered. Outside records:    Labs, Microbiology, Radiology and pertinent results from Care everywhere, if available, were reviewed as a part ofthe consultation. Known drug Allergies: All allergies were reviewed and updated    Allergies   Allergen Reactions    Food Hives     Raw spinach.  Spinach          Please note that this chart was generated using Dragon dictation software. Although every effort was made to ensure the accuracy of this automated transcription, some errors in transcription may have occurred inadvertently.  If you may need any clarification, please do not hesitate to contact me through [facility] or at the phone

## 2019-03-05 NOTE — PROGRESS NOTES
VSS; respirations even, easy, and unlabored. Shift assessment complete, A&OX4. Scheduled medications administered. Prn benadryl administered for itching of rash on lower back. Prn ibuprofen administered for headache. MOM given to promote more regular bowel regimen per pt request. Pt bumped up to 8L of oxygen in order to maintain O2 saturation >90%. Pt up in chair. Port flushed, brisk blood return noted. Capped. NS locked. RT at bedside. No further needs expressed. Call light within reach. Will continue to monitor.

## 2019-03-05 NOTE — PROGRESS NOTES
due to excess calories with body mass index (BMI) of 31.0 to 31.9 in adult [E66.09, Z68.31]     Sepsis ([facility] [A41.9] 02/28/2019    Lymphadenopathy [R59.1]     Cervical cancer (HCC) [C53.9] 02/15/2019    SLE (systemic lupus erythematosus) ([facility] [M32.9] 01/22/2019    Lupus anticoagulant positive [R76.0] 01/22/2019    History of juvenile rheumatoid arthritis [Z87.39] 01/08/2019    Vaginal bleeding [N93.9] 12/21/2018    GERD (gastroesophageal reflux disease) [K21.9] 10/20/2014       Current Facility-Administered Medications: hyoscyamine (LEVSIN/SL) sublingual tablet 125 mcg, 125 mcg, Sublingual, Q8H  lip balm petroleum free (PHYTOPLEX) stick, , Topical, PRN  zolpidem (AMBIEN) tablet 5 mg, 5 mg, Oral, Nightly PRN  diphenhydrAMINE (BENADRYL) injection 25 mg, 25 mg, Intravenous, Q6H PRN  senna (SENOKOT) tablet 8.6 mg, 1 tablet, Oral, Nightly  potassium chloride (KLOR-CON M) extended release tablet 40 mEq, 40 mEq, Oral, PRN **OR** potassium bicarb-citric acid (EFFER-K) effervescent tablet 40 mEq, 40 mEq, Oral, PRN **OR** potassium chloride 10 mEq/100 mL IVPB (Peripheral Line), 10 mEq, Intravenous, PRN  promethazine (PHENERGAN) injection 6.25 mg, 6.25 mg, Intravenous, Q6H PRN  HYDROmorphone HCl PF (DILAUDID) injection 0.5 mg, 0.5 mg, Intravenous, Q3H PRN  meropenem (MERREM) 1 g in sodium chloride 0.9 % 100 mL IVPB (mini-bag), 1 g, Intravenous, Q8H  magic (miracle) mouthwash with nystatin, 5 mL, Swish & Spit, 4x Daily PRN  acetaminophen (TYLENOL) tablet 500 mg, 500 mg, Oral, Q6H  ketorolac (TORADOL) injection 15 mg, 15 mg, Intravenous, Q6H PRN  morphine (MS CONTIN) extended release tablet 30 mg, 30 mg, Oral, 2 times per day  oxyCODONE (OXY-IR) immediate release tablet 15 mg, 15 mg, Oral, Q3H PRN  phenazopyridine (PYRIDIUM) tablet 200 mg, 200 mg, Oral, TID WC  ibuprofen (ADVIL;MOTRIN) tablet 800 mg, 800 mg, Oral, Q8H PRN  linaclotide (LINZESS) capsule 145 mcg, 145 mcg, Oral, QAM AC  ondansetron (ZOFRAN-ODT) oz (87.9 kg)   03/02/19 0415 192 lb 4.8 oz (87.2 kg)         No intake or output data in the 24 hours ending 03/05/19 0809      Physical Exam:   S1, S2 normal, no murmur, rub or gallop, regular rate and rhythm  Rales bilat  abdomen is soft without significant tenderness, masses, organomegaly or guarding  extremities normal, atraumatic, no cyanosis or edema    Labs:  Lab Results   Component Value Date    WBC 8.5 03/05/2019    HGB 10.1 (L) 03/05/2019    HCT 32.0 (L) 03/05/2019     03/05/2019    CHOL 188 10/15/2014    TRIG 132 10/15/2014    HDL 65 (H) 10/15/2014    ALT 31 02/28/2019    AST 27 02/28/2019     03/05/2019    K 4.3 03/05/2019    CL 97 (L) 03/05/2019    CREATININE <0.5 (L) 03/05/2019    BUN 9 03/05/2019    CO2 32 03/05/2019    TSH 1.57 06/18/2014    INR 1.25 (H) 02/28/2019    LABMICR YES 02/28/2019     Lab Results   Component Value Date    CKTOTAL 185 12/18/2014    TROPONINI 0.02 (H) 02/15/2019       Recent Imaging Results are Reviewed:  Xr Chest Standard (2 Vw)    Result Date: 2/26/2019  EXAMINATION: TWO VIEWS OF THE CHEST 2/26/2019 11:55 am COMPARISON: Frontal view of the chest 02/24/2019, frontal and lateral views of the chest 02/19/2019, CT thorax 02/25/2019. HISTORY: ORDERING SYSTEM PROVIDED HISTORY: cough TECHNOLOGIST PROVIDED HISTORY: Reason for exam:->cough Ordering Physician Provided Reason for Exam: cough FINDINGS: Support devices: Right IJ chest port again noted with distal tip terminating in the proximal right atrium in good position. The cardiopericardial silhouette is stable in size and configuration. Bilateral patchy airspace opacities are redemonstrated most pronounced in the mid and lower lung zones, these were better detailed on the recent CT of the thorax from 02/25/2019, please refer to that report for additional information. The overall radiographic appearance has mildly increased from the prior radiograph 02/24/2019. There is no pneumothorax or pleural effusion.   Surgical clips project in the upper abdomen. Mild interval increase in bilateral patchy airspace opacities better detailed on the CT of the thorax from 02/25/2019. Findings are again most suggestive of an infectious/inflammatory process. Xr Chest Standard (2 Vw)    Result Date: 2/19/2019  EXAMINATION: TWO VIEWS OF THE CHEST 2/19/2019 4:26 pm COMPARISON: 02/16/2019 HISTORY: ORDERING SYSTEM PROVIDED HISTORY: cough TECHNOLOGIST PROVIDED HISTORY: Reason for exam:->cough Ordering Physician Provided Reason for Exam: Cervical Cancer (Pt was sent over from Dr Cynthia Marcial office - states she was sent here from office - was told today that she has stage 3 cervical cancer and was sent here for \"scans and further testing\") Acuity: Unknown Type of Exam: Unknown FINDINGS: There is patchy bilateral airspace disease, which appears increased when compared to the previous exam.  As detected on recent CT PA, some of those have a nodular appearance. The heart mediastinal contours are unremarkable. No pneumothorax. No free air. No acute bony abnormalities. Chin catheter noted. Patchy bilateral airspace disease, concerning for pneumonia, which appears increased when compared to the previous exam.  Again, some of these areas of opacity have a nodular appearance and septic emboli should be considered, especially when given the correlation with the CT PA from 02/15/2019. Process should be followed to resolution. Xr Chest Standard (2 Vw)    Result Date: 2/16/2019  EXAMINATION: TWO VIEWS OF THE CHEST 2/16/2019 11:49 am COMPARISON: 10/16/2014 HISTORY: ORDERING SYSTEM PROVIDED HISTORY: pneumonia TECHNOLOGIST PROVIDED HISTORY: Reason for exam:->pneumonia Ordering Physician Provided Reason for Exam: PNA Acuity: Acute Type of Exam: Initial Relevant Medical/Surgical History: cervical ca FINDINGS: There is patchy bibasilar consolidation, right greater than left, superimposed on a background of diffuse interstitial prominence.   Heart size, mediastinal contours and pulmonary vascularity are within normal limits. There is no pleural effusion. Multifocal bilateral pneumonia. Ct Chest Wo Contrast    Result Date: 3/4/2019  EXAMINATION: CT OF THE CHEST WITHOUT CONTRAST 3/4/2019 4:23 pm TECHNIQUE: CT of the chest was performed without the administration of intravenous contrast. Multiplanar reformatted images are provided for review. Dose modulation, iterative reconstruction, and/or weight based adjustment of the mA/kV was utilized to reduce the radiation dose to as low as reasonably achievable. COMPARISON: 02/28/2019 HISTORY: ORDERING SYSTEM PROVIDED HISTORY: pneumonia TECHNOLOGIST PROVIDED HISTORY: Ordering Physician Provided Reason for Exam: pneumonia Acuity: Acute Type of Exam: Initial Relevant Medical/Surgical History:  pneumonia FINDINGS: Mediastinum: The unenhanced heart is unremarkable. The right port terminates in the right atrium. No change in the scattered mediastinal lymph nodes, likely reactive. Lungs/pleura: The tracheobronchial tree is patent. Mild bronchial wall thickening involves the bilateral lower lobes due to bronchitis. There is no pneumothorax. There trace bilateral pleural effusions with atelectasis. However, there is worsening bilateral patchy nodular airspace disease concerning for multifocal pneumonia rather than pulmonary edema. There is also mild interstitial thickening involving the bilateral bases due to interstitial edema. Upper Abdomen: Images of the upper abdomen are unremarkable. Soft Tissues/Bones: The osseous structures are unremarkable. 1. Worsening bilateral patchy nodular airspace disease favoring multifocal pneumonia. Recommend chest radiograph in 8 weeks to confirm resolution. 2. Trace bilateral pleural effusions and mild interstitial edema.      Ct Chest W Contrast    Result Date: 2/25/2019  EXAMINATION: CT OF THE CHEST WITH CONTRAST 2/25/2019 1:47 pm TECHNIQUE: CT of the chest was performed with without gross bladder wall abnormality noted. Ct Abdomen Pelvis W Iv Contrast Additional Contrast? None    Result Date: 2/15/2019  EXAMINATION: CTA OF THE CHEST; CT OF THE ABDOMEN AND PELVIS WITH CONTRAST 2/15/2019 2:11 pm TECHNIQUE: CTA of the chest was performed after the administration of intravenous contrast.  Multiplanar reformatted images are provided for review. MIP images are provided for review. Dose modulation, iterative reconstruction, and/or weight based adjustment of the mA/kV was utilized to reduce the radiation dose to as low as reasonably achievable.; CT of the abdomen and pelvis was performed with the administration of intravenous contrast. Multiplanar reformatted images are provided for review. Dose modulation, iterative reconstruction, and/or weight based adjustment of the mA/kV was utilized to reduce the radiation dose to as low as reasonably achievable. COMPARISON: 1/28/2019, 4/29/2011 HISTORY: ORDERING SYSTEM PROVIDED HISTORY: cervical ca - SOB - PE??? TECHNOLOGIST PROVIDED HISTORY: Ordering Physician Provided Reason for Exam: SOB Acuity: Unknown Type of Exam: Unknown; ORDERING SYSTEM PROVIDED HISTORY: abd disten - cervical ca? TECHNOLOGIST PROVIDED HISTORY: Additional Contrast?->None Ordering Physician Provided Reason for Exam: abd distention Acuity: Unknown Type of Exam: Unknown Patient with recently diagnosed cervical cancer. FINDINGS: Chest: Pulmonary arteries: The pulmonary arteries opacify normally. There is no evidence of filling defect to suggest a pulmonary embolism. Mediastinum: The thyroid is unremarkable. There is mild subcarinal and bilateral hilar lymphadenopathy, most likely benign and reactive in etiology given the patient's underlying lung findings. The thoracic aorta is unremarkable, without evidence of aneurysm or dissection. 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 No pericardial abnormality is identified.  Lungs/pleura: new in comparison with the study of 1/28/2019. The bilateral adnexa are unremarkable. There is a mild amount of free pelvic fluid, likely physiologic. There are stable mildly enlarged bilateral pelvic chain lymph nodes, the largest measuring approximately 2.6 x 1.6 cm along the right external iliac chain, similar to the study of 1/28/2019. Peritoneum/Retroperitoneum: No intraperitoneal free air or free fluid is identified. No pathologic lymphadenopathy is seen. The abdominal aorta is unremarkable. No significant abdominal wall hernia is identified. Bones/Soft Tissues: There is mild bilateral sacroiliitis. The skeletal structures are otherwise unremarkable. 1. No CT evidence of a pulmonary embolism. 2. No acute abnormality of the thoracic aorta. 3. Interval development of widespread ground-glass opacity throughout both lungs with diffuse intralobular septal thickening. This most likely reflects a combination of interstitial pulmonary edema and multifocal pneumonia. 4. Interval progression of multiple nodular foci of consolidative opacity throughout both lungs, a few of which are cavitary in appearance. These nodules are most likely infectious or inflammatory in etiology, and septic emboli are a consideration. Given patient's history of cervical cancer, metastatic disease is on the differential, though considered less likely. 5. New nonspecific moderate right hydronephrosis, without demonstrable cause. However, there is underlying wall thickening and enhancement of the urinary bladder. This combination of findings could represent a cystitis in the setting of urinary tract infection with resultant pyelitis and hydronephrosis. However, given patient history of cervical cancer, locoregional spread of tumor with resultant obstruction of the distal right ureter may be a cause of the patient's right hydronephrosis.  6. Interval development of new irregularity of the cervix and nonspecific endometrial thickening in comparison with the prior study of 1/28/2019. This likely represents the patient's known underlying cervical cancer causing obstruction of the endometrial with resultant endometrial thickening. This could be further evaluated with a follow-up pelvic ultrasound. 7. Stable mild bilateral pelvic chain lymphadenopathy, right greater than left, possibly representing metastatic disease. Xr Chest Portable    Result Date: 2/28/2019  EXAMINATION: SINGLE XRAY VIEW OF THE CHEST 2/28/2019 3:48 pm COMPARISON: 02/26/2019 HISTORY: ORDERING SYSTEM PROVIDED HISTORY: fever TECHNOLOGIST PROVIDED HISTORY: Reason for exam:->fever Ordering Physician Provided Reason for Exam: stage 4 cervical ca with mets to bladder and lungs- stent to kidney last week, with fever and pain today. Acuity: Acute Type of Exam: Initial FINDINGS: Mild patchy consolidation within the right lung is again identified, increased when compared to the previous exam, greatest in the right perihilar region. Nodular opacities within the lungs are also again found, similar. Heart mediastinal contours are unremarkable. Chin catheter is present, unchanged in position. No acute bony abnormalities. No pneumothorax. No free air. Patchy consolidation within the lungs, especially the right perihilar region, mildly increased when compared to the previous exam.  Given the relatively rapid increase, pneumonia would be primarily considered. Xr Chest Portable    Result Date: 2/24/2019  EXAMINATION: SINGLE XRAY VIEW OF THE CHEST 2/24/2019 7:00 am COMPARISON: Chest radiograph performed 02/19/2019. HISTORY: ORDERING SYSTEM PROVIDED HISTORY: elevated wbc and ongoing shortness of breath TECHNOLOGIST PROVIDED HISTORY: Reason for exam:->elevated wbc and ongoing shortness of breath Acuity: Unknown Type of Exam: Unknown FINDINGS: There is mild bilateral pulmonary congestion. There is similar left basilar infiltrate. There is no pneumothorax.   The mediastinal structures are stable. The upper abdomen is unremarkable. The extrathoracic soft tissues are unremarkable. There is a right internal jugular port. Stable bilateral congestion and left basilar infiltrate. Ct Chest Pulmonary Embolism W Contrast    Result Date: 2/15/2019  EXAMINATION: CTA OF THE CHEST; CT OF THE ABDOMEN AND PELVIS WITH CONTRAST 2/15/2019 2:11 pm TECHNIQUE: CTA of the chest was performed after the administration of intravenous contrast.  Multiplanar reformatted images are provided for review. MIP images are provided for review. Dose modulation, iterative reconstruction, and/or weight based adjustment of the mA/kV was utilized to reduce the radiation dose to as low as reasonably achievable.; CT of the abdomen and pelvis was performed with the administration of intravenous contrast. Multiplanar reformatted images are provided for review. Dose modulation, iterative reconstruction, and/or weight based adjustment of the mA/kV was utilized to reduce the radiation dose to as low as reasonably achievable. COMPARISON: 1/28/2019, 4/29/2011 HISTORY: ORDERING SYSTEM PROVIDED HISTORY: cervical ca - SOB - PE??? TECHNOLOGIST PROVIDED HISTORY: Ordering Physician Provided Reason for Exam: SOB Acuity: Unknown Type of Exam: Unknown; ORDERING SYSTEM PROVIDED HISTORY: abd disten - cervical ca? TECHNOLOGIST PROVIDED HISTORY: Additional Contrast?->None Ordering Physician Provided Reason for Exam: abd distention Acuity: Unknown Type of Exam: Unknown Patient with recently diagnosed cervical cancer. FINDINGS: Chest: Pulmonary arteries: The pulmonary arteries opacify normally. There is no evidence of filling defect to suggest a pulmonary embolism. Mediastinum: The thyroid is unremarkable. There is mild subcarinal and bilateral hilar lymphadenopathy, most likely benign and reactive in etiology given the patient's underlying lung findings.   The thoracic aorta is unremarkable, without evidence of aneurysm Additionally, the cervix appears enlarged and irregular, and there is new abnormal thickening of the endometrium within the uterine fundus, which appears new in comparison with the study of 1/28/2019. The bilateral adnexa are unremarkable. There is a mild amount of free pelvic fluid, likely physiologic. There are stable mildly enlarged bilateral pelvic chain lymph nodes, the largest measuring approximately 2.6 x 1.6 cm along the right external iliac chain, similar to the study of 1/28/2019. Peritoneum/Retroperitoneum: No intraperitoneal free air or free fluid is identified. No pathologic lymphadenopathy is seen. The abdominal aorta is unremarkable. No significant abdominal wall hernia is identified. Bones/Soft Tissues: There is mild bilateral sacroiliitis. The skeletal structures are otherwise unremarkable. 1. No CT evidence of a pulmonary embolism. 2. No acute abnormality of the thoracic aorta. 3. Interval development of widespread ground-glass opacity throughout both lungs with diffuse intralobular septal thickening. This most likely reflects a combination of interstitial pulmonary edema and multifocal pneumonia. 4. Interval progression of multiple nodular foci of consolidative opacity throughout both lungs, a few of which are cavitary in appearance. These nodules are most likely infectious or inflammatory in etiology, and septic emboli are a consideration. Given patient's history of cervical cancer, metastatic disease is on the differential, though considered less likely. 5. New nonspecific moderate right hydronephrosis, without demonstrable cause. However, there is underlying wall thickening and enhancement of the urinary bladder. This combination of findings could represent a cystitis in the setting of urinary tract infection with resultant pyelitis and hydronephrosis.   However, given patient history of cervical cancer, locoregional spread of tumor with resultant obstruction of the distal right ureter may be a cause of the patient's right hydronephrosis. 6. Interval development of new irregularity of the cervix and nonspecific endometrial thickening in comparison with the prior study of 1/28/2019. This likely represents the patient's known underlying cervical cancer causing obstruction of the endometrial with resultant endometrial thickening. This could be further evaluated with a follow-up pelvic ultrasound. 7. Stable mild bilateral pelvic chain lymphadenopathy, right greater than left, possibly representing metastatic disease. Ir Port Placement > 5 Years    Result Date: 2/19/2019  PROCEDURE: ULTRASOUND GUIDED VASCULAR ACCESS. FLUOROSCOPY GUIDED PLACEMENT OF A RIGHT IJ SUBCUTANEOUS PORT-A-CATH. MODERATE CONSCIOUS SEDATION 2/19/2019. HISTORY: ORDERING SYSTEM PROVIDED HISTORY: cervical cancer to get chemo TECHNOLOGIST PROVIDED HISTORY: Reason for exam:->cervical cancer to get chemo How many lumens are being requested?->1 What site is the preferred site? ->No preference What side should this line be placed? ->Either 61-year-old female with cervical cancer, presents for chest port placement. SEDATION: Moderate sedation was administered under my supervision by a qualified nurse. 4 mg of Versed was administered intravenously. Approximate intra procedural sedation time was 23 minutes. FLUOROSCOPY DOSE AND TYPE OR TIME AND EXPOSURES: 54 seconds of fluoroscopy with 2 exposures. TECHNIQUE: Informed consent was obtained after a detailed explanation of the procedure including risks, benefits, and alternatives. Universal protocol was observed. The right neck and chest were prepped and draped in sterile fashion using maximum sterile barrier technique. Local anesthesia was achieved with lidocaine. A micropuncture needle was used to access the right internal jugular vein using ultrasound guidance. An ultrasound image demonstrating patency of the vein with needle tip located within it.  An image was obtained and stored in PACs. A 0.035 guidewire was used to place a peel-away sheath. A chest wall incision was made and a subcutaneous pocket was created. The port reservoir was placed within the pocket and the port tubing was attached and tunneled subcutaneously to the venotomy site. The port tubing was cut to an appropriate length and advanced through the peel-away sheath under fluoroscopic guidance to the cavo-atrial junction. The chest wall incision was closed using 3-0 and 4-0 Vicryl suture and tissue adhesive was applied to the venotomy site and chest wall incision. The port flushed easily and there was a good blood return. The port was locked with heparinized saline. The patient tolerated the procedure well and there were no immediate complications. FINDINGS: Fluoroscopic image demonstrates the tip of the port in the right atrium. 1. Successful ultrasound and fluoroscopy guided Port-A-Cath placement via right IJ access, as described above. 2.  The port was left accessed for immediate use. Fl Retrograde Pyelogram Right    Result Date: 2/17/2019  EXAMINATION: SPOT FLUOROSCOPIC IMAGES 2/17/2019 6:52 am TECHNIQUE: Fluoroscopy was provided by the radiology department for procedure. Radiologist was not present during examination. FLUOROSCOPY DOSE AND TYPE OR TIME AND EXPOSURES: 3 seconds of fluoroscopy was utilized for purposes of intraoperative localization. 1 fluoroscopic spot image was obtained. COMPARISON: None HISTORY: Intraprocedural imaging. FINDINGS: 1 spot images of the abdomen was obtained. Intraprocedural fluoroscopic spot images as above. See separate procedure report for more information.      Vl Extremity Venous Bilateral    Result Date: 3/1/2019  Lower Extremities DVT Study  Demographics   Patient Name       Edil Scott   Date of Study      03/01/2019       Gender              Female   Patient Number     [id number]       Date of Birth       1980   Visit Number       [id number] !Yes       !Yes            ! None      ! +------------------------+----------+---------------+----------+ ! Prox Femoral            !Yes       ! Yes            ! None      ! +------------------------+----------+---------------+----------+ ! Mid Femoral             !Yes       ! Yes            ! None      ! +------------------------+----------+---------------+----------+ ! Dist Femoral            !Yes       ! Yes            ! None      ! +------------------------+----------+---------------+----------+ ! Deep Femoral            !Yes       ! Yes            ! None      ! +------------------------+----------+---------------+----------+ ! Popliteal               !Yes       ! Yes            ! None      ! +------------------------+----------+---------------+----------+ ! GSV Below Knee          ! Yes       ! Yes            ! None      ! +------------------------+----------+---------------+----------+ ! Gastroc                 ! Yes       ! Yes            ! None      ! +------------------------+----------+---------------+----------+ ! Soleal                  !Yes       ! Yes            ! None      ! +------------------------+----------+---------------+----------+ ! PTV                     ! Yes       ! Yes            ! None      ! +------------------------+----------+---------------+----------+ ! Peroneal                !Yes       ! Yes            ! None      ! +------------------------+----------+---------------+----------+ ! GSV Calf                ! Yes       ! Yes            ! None      ! +------------------------+----------+---------------+----------+ ! SSV                     ! Yes       ! Yes            ! None      ! +------------------------+----------+---------------+----------+ Right Doppler Measurements +------------------------+------+------+------------+ ! Location                ! Signal!Reflux! Reflux (sec)! +------------------------+------+------+------------+ ! Sapheno Femoral Junction! Phasic!      !            ! +------------------------+------+------+------------+ ! Common Femoral          !Phasic!      !            ! +------------------------+------+------+------------+ ! Prox Femoral            !Phasic!      !            ! +------------------------+------+------+------------+ ! Deep Femoral            !Phasic!      !            ! +------------------------+------+------+------------+ ! Popliteal               !Phasic!      !            ! +------------------------+------+------+------------+ Left Lower Extremities DVT Study Measurements Left 2D Measurements +------------------------+----------+---------------+----------+ ! Location                ! Visualized! Compressibility! Thrombosis! +------------------------+----------+---------------+----------+ ! Sapheno Femoral Junction! Yes       ! Yes            ! None      ! +------------------------+----------+---------------+----------+ ! GSV Thigh               ! Yes       ! Yes            ! None      ! +------------------------+----------+---------------+----------+ ! Common Femoral          !Yes       ! Yes            ! None      ! +------------------------+----------+---------------+----------+ ! Prox Femoral            !Yes       ! Yes            ! None      ! +------------------------+----------+---------------+----------+ ! Mid Femoral             !Yes       ! Yes            ! None      ! +------------------------+----------+---------------+----------+ ! Dist Femoral            !Yes       ! Yes            ! None      ! +------------------------+----------+---------------+----------+ ! Deep Femoral            !Yes       ! Yes            ! None      ! +------------------------+----------+---------------+----------+ ! Popliteal               !Yes       ! Yes            ! None      ! +------------------------+----------+---------------+----------+ ! GSV Below Knee          ! Yes       ! Yes            ! None      ! +------------------------+----------+---------------+----------+ ! Gastroc                 ! Yes !Yes            !None      ! +------------------------+----------+---------------+----------+ ! Soleal                  !Yes       ! Yes            ! None      ! +------------------------+----------+---------------+----------+ ! PTV                     ! Yes       ! Yes            ! None      ! +------------------------+----------+---------------+----------+ ! Peroneal                !Yes       ! Yes            ! None      ! +------------------------+----------+---------------+----------+ ! GSV Calf                ! Yes       ! Yes            ! None      ! +------------------------+----------+---------------+----------+ ! SSV                     ! Yes       ! Yes            ! None      ! +------------------------+----------+---------------+----------+ Left Doppler Measurements +------------------------+------+------+------------+ ! Location                ! Signal!Reflux! Reflux (sec)! +------------------------+------+------+------------+ ! Sapheno Femoral Junction! Phasic!      !            ! +------------------------+------+------+------------+ ! Common Femoral          !Phasic!      !            ! +------------------------+------+------+------------+ ! Prox Femoral            !Phasic!      !            ! +------------------------+------+------+------------+ ! Deep Femoral            !Phasic!      !            ! +------------------------+------+------+------------+ ! Popliteal               !Phasic!      !            ! +------------------------+------+------+------------+    Ct Chest Abdomen Pelvis W Contrast    Result Date: 2/28/2019  EXAMINATION: CT OF THE CHEST, ABDOMEN, AND PELVIS WITH CONTRAST 2/28/2019 8:34 pm TECHNIQUE: CT of the chest, abdomen and pelvis was performed with the administration of intravenous contrast. Multiplanar reformatted images are provided for review.  Dose modulation, iterative reconstruction, and/or weight based adjustment of the mA/kV was utilized to reduce the radiation dose to as low as reasonably FLUOROSCOPIC GUIDED RIGHT PERCUTANEOUS NEPHROSTOMY AND NEPHROSTOGRAM IR ANTEGRADE RIGHT URETERAL STENT. MODERATE CONSCIOUS SEDATION 2/18/2019 HISTORY: ORDERING SYSTEM PROVIDED HISTORY: needs R sided nephrostomy tube placed. maria guadalupe could not do via cysto. Adi said that IR would do on monday TECHNOLOGIST PROVIDED HISTORY: Reason for exam:->needs R sided nephrostomy tube placed. maria guadalupe could not do via cysto. Adi said that IR would do on monday COMPARISON: Abdomen pelvic CT 02/15/2019. CONTRAST: 30 cc, nonvascular within collecting system only. . SEDATION: Intravenous sedation was administered with continuous monitoring throughout the procedure. In measured doses, a total of 6 mg intravenous Versed and 275 mcg intravenous fentanyl was administered. My total procedure time with sedation was 26 minutes. FLUOROSCOPY DOSE AND TYPE OR TIME AND EXPOSURES: 3.5 minutes, 7 digital imaging sequences. DESCRIPTION OF PROCEDURE: Informed consent was obtained after a detailed explanation of the procedure including risks, benefits, and alternatives. Universal protocol was observed. TECHNIQUE: Informed consent was previously obtained. In the semi prone position, an appropriate area posterior lateral aspect of the skin overlying the targeted collecting system was demarcated, sterilely prepared draped and anesthetized. Utilizing ultrasound, anatomy from prior CT scan and other imaging, the needle trajectory and depth was predetermined. The micro puncture needle was advanced using fluoroscopic and ultrasound guidance into the collecting system without difficulty. Once urine was aspirated, a small platinum tip wire was advanced into the collecting system and into the proximal ureter. The tract was dilated. With wire exchange, a 6 Wallisian sheath was placed. The collecting system is moderately dilated and the ureter is somewhat tortuous. A glide wire was easily advanced down the ureter.   Because of this, a 5 [facility] peel-away sheath was advanced into the proximal ureter over the wire. The glide wire was then advanced with a steerable catheter into the urinary bladder without difficulty and only mild discomfort. There is no contrast extravasation. Once the catheter was placed into the urinary bladder, contrast injection is seen diluted within the somewhat distended urinary bladder. The catheter was used to place a Super Stiff wire for ureteral stent placement. A 26 cm 8 French ureteral stent was then advanced, such that the distal pigtail is well within the urinary bladder. The proximal pigtail was placed in the lower portion of the left renal pelvis. Again there is no contrast extravasation. No filling defect to indicate clot or bleeding was observed on early or later contrast imaging. Following this, dense contrast was injected showing normal expected filling of the ureteral stent and exiting from the distal pigtail directly into the urinary bladder. Following this, the internalized stent was disengaged. The proximal collecting system was then decompressed. The proximal nephrostomy access was then removed entirely, no external drainage necessary at this point. The patient tolerated the procedure well. A sterile bandage was placed over the small incision. From this point forward, the ureteral stent can be exchanged as needed from below. Successful right percutaneous nephrostomy with antegrade pyelogram. High-grade distal ureteral obstruction with severe hydronephrosis, successfully crossed as above. Successful 8 French internalized right ureteral stent placement as above. Assessment and Plan:   Anemia ()    Assessment: Established problem. Better after transfusion 3/3. Hgb 10.1 after transfusion    Plan: No indication for transfusion. Cont to monitor h/h to assess progression of anemia. Recommend ferrous sulfate or MVI as outpatient.    Fibromyalgia (10/8/2014)    Assessment: Established problem. Stable.      Plan: sx stable.    Vaginal bleeding (12/21/2018)    Assessment: Established problem, now resolved.     Plan: Continue present orders/plan.    GERD (gastroesophageal reflux disease) (10/20/2014)    Assessment: Established problem. Stable. No reflux    Plan: Continue present orders/plan.    Cervical cancer ([facility] [address] (2/15/2019)    Assessment: Established problem. Stable.  Stage IV    Plan: per dr Mary Paul   Septic shock [facility]) ()    Assessment: Established problem, now resolved.    Plan: cont to monitor fever, WBC   Complicated UTI (urinary tract infection) ()    Assessment: Established problem. Stable.      Plan: on merrem, Awaiting cultures. Per ID recs        Disp - hopeful for d/c today/tomorrow. Await ID recs on po abx for d/c.  Discharge when OK with Dr Tiffanie Martinez  3/5/2019

## 2019-03-05 NOTE — PROGRESS NOTES
Home Oxygen Evaluation  Option 1        Patient on 4 lpm at rest 87% 8 lpm at rest 93%. 4 lpm = SpO2  87%                     7 lpm at rest 90%.

## 2019-03-05 NOTE — PROGRESS NOTES
Shift assessment completed at 2132. Pt resting, krystin light within reach. Will continue to monitor.

## 2019-03-22 NOTE — DISCHARGE SUMMARY
[facility] Emergency Department -- Physician Discharge Summary     [facility]  1980  MRN: [id number]    Admit Date: 2/28/2019  Discharge Date: 3/5/2019  2:49 PM    Attending MD: No att. providers found  Discharging MD: Cici Osborn MD  [facility] [phone]    Admission Diagnosis: Sepsis ([facility] [address] [A41.9]  Sepsis ([facility] [address] [A41.9]  DISCHARGE DIAGNOSIS: sepsis    Full Hospital Problem List:  Active Hospital Problems    Diagnosis Date Noted    Fibromyalgia [M79.7] 10/08/2014     Priority: Medium    Weight loss counseling, encounter for [Z71.3]     Septic shock ([facility] [address] [A41.9, R65.21]     Septicemia ([facility] [address] [A41.9]     History of cancer [Z85.9]     Status post cystoscopy [Z98.890]     Immunocompromised ([facility] [address] [U79.5]     Complicated UTI (urinary tract infection) [N39.0]     Anemia [D64.9]     Seizure disorder ([facility] [address] [G40.909]     Class 1 obesity due to excess calories with body mass index (BMI) of 31.0 to 31.9 in adult [E66.09, Z68.31]     Sepsis ([facility] [address] [A41.9] 02/28/2019    Lymphadenopathy [R59.1]     Cervical cancer ([facility] [address] [C53.9] 02/15/2019    SLE (systemic lupus erythematosus) ([facility] [address] [M32.9] 01/22/2019    Lupus anticoagulant positive [R76.0] 01/22/2019    History of juvenile rheumatoid arthritis [Z87.39] 01/08/2019    Vaginal bleeding [N93.9] 12/21/2018    GERD (gastroesophageal reflux disease) [K21.9] 10/20/2014           Hospital Course:  Kumar Llanos was admitted on 2/28/2019 for pneumonia and UTI. She had a fever of 101.5 upon admission. She was discharged from the hospital on 2/27/19. She is a patient of Dr. Becki Zafar with metastatic cervical cancer. She received C1 cisplatin + taxol with neulasta support on 2/20/19. She continues to have vaginal bleeding. She had shortness of breath on admission. CT chest, abdomen, and pelvis shows persistent pneumonia but does show some improvement.  She reports right flank pain. She c/o burning with urination and hematuria. She is admitted for iv abx and fluid - found to be in septic shock on admit    Seen by ID - pt placed on iv vanc, merrem. Sepsis seems to resolve with fluids and antibiotics. She requests to be discharged home as soon as possible on po abx so that she can spend as much time as possible with her family. Dr Shalonda Dowd from ID changes her to po Levaquin/Flagyl and she is approved for discharge          Consults made during Hospitalization:  IP CONSULT TO HOSPITALIST  IP CONSULT TO PULMONOLOGY  IP CONSULT TO INFECTIOUS DISEASES  IP CONSULT TO PULMONOLOGY  IP CONSULT TO UROLOGY  IP CONSULT TO ONCOLOGY  IP CONSULT TO GYNECOLOGIC ONCOLOGY    Treatment team at time of Discharge: Treatment Team: Consulting Physician: Victor M De Guzman MD; Consulting Physician: Namrata Woods MD; Consulting Physician: Trisha Reynolds MD; Consulting Physician: Liliam Sloan MD; Consulting Physician: Paula Estrada MD; Respiratory Therapist (Day): Polina Mccabe RCP    Imaging Results:  Xr Chest Standard (2 Vw)    Result Date: 2/26/2019  EXAMINATION: TWO VIEWS OF THE CHEST 2/26/2019 11:55 am COMPARISON: Frontal view of the chest 02/24/2019, frontal and lateral views of the chest 02/19/2019, CT thorax 02/25/2019. HISTORY: ORDERING SYSTEM PROVIDED HISTORY: cough TECHNOLOGIST PROVIDED HISTORY: Reason for exam:->cough Ordering Physician Provided Reason for Exam: cough FINDINGS: Support devices: Right IJ chest port again noted with distal tip terminating in the proximal right atrium in good position. The cardiopericardial silhouette is stable in size and configuration. Bilateral patchy airspace opacities are redemonstrated most pronounced in the mid and lower lung zones, these were better detailed on the recent CT of the thorax from 02/25/2019, please refer to that report for additional information.   The overall radiographic appearance has mildly increased from the prior radiograph 02/24/2019. There is no pneumothorax or pleural effusion. Surgical clips project in the upper abdomen. Mild interval increase in bilateral patchy airspace opacities better detailed on the CT of the thorax from 02/25/2019. Findings are again most suggestive of an infectious/inflammatory process. Ct Chest Wo Contrast    Result Date: 3/4/2019  EXAMINATION: CT OF THE CHEST WITHOUT CONTRAST 3/4/2019 4:23 pm TECHNIQUE: CT of the chest was performed without the administration of intravenous contrast. Multiplanar reformatted images are provided for review. Dose modulation, iterative reconstruction, and/or weight based adjustment of the mA/kV was utilized to reduce the radiation dose to as low as reasonably achievable. COMPARISON: 02/28/2019 HISTORY: ORDERING SYSTEM PROVIDED HISTORY: pneumonia TECHNOLOGIST PROVIDED HISTORY: Ordering Physician Provided Reason for Exam: pneumonia Acuity: Acute Type of Exam: Initial Relevant Medical/Surgical History:  pneumonia FINDINGS: Mediastinum: The unenhanced heart is unremarkable. The right port terminates in the right atrium. No change in the scattered mediastinal lymph nodes, likely reactive. Lungs/pleura: The tracheobronchial tree is patent. Mild bronchial wall thickening involves the bilateral lower lobes due to bronchitis. There is no pneumothorax. There trace bilateral pleural effusions with atelectasis. However, there is worsening bilateral patchy nodular airspace disease concerning for multifocal pneumonia rather than pulmonary edema. There is also mild interstitial thickening involving the bilateral bases due to interstitial edema. Upper Abdomen: Images of the upper abdomen are unremarkable. Soft Tissues/Bones: The osseous structures are unremarkable. 1. Worsening bilateral patchy nodular airspace disease favoring multifocal pneumonia. Recommend chest radiograph in 8 weeks to confirm resolution.  2. Trace bilateral pleural effusions and mild interstitial congestion and left basilar infiltrate. Vl Extremity Venous Bilateral    Result Date: 3/1/2019  Lower Extremities DVT Study  Demographics   Patient Name       Anival Forde   Date of Study      03/01/2019       Gender              Female   Patient Number     [id number]       Date of Birth       1980   Visit Number       [id number]        Age                 42 year(s)   Accession Number   [id number]        Room Number         1756   Corporate ID       [id number]          [place], [place]   Ordering Physician David Thompson MD Interpreting        [facility]                                      Physician           Gilbert Donato MD,                                                          [facility]  Procedure Type of Study:   Veins:Lower Extremities DVT Study, VASC EXTREMITY VENOUS DUPLEX BILATERAL. Vascular Sonographer Report  Additional Indications:Swelling Impressions Right Impression No evidence of deep vein or superficial vein thrombosis involving the right lower extremity. Left Impression No evidence of deep vein or superficial vein thrombosis involving the left lower extremity. Conclusions   Summary   No evidence of deep vein or superficial vein thrombosis involving the  bilateral lower extremities. Signature   ------------------------------------------------------------------  Electronically signed by Gilbert Donato MD, [facility] (Interpreting  physician) on 03/01/2019 at 02:22 PM  ------------------------------------------------------------------  Patient Status:Routine. [address] [place] [address] Vascular Lab. Technical Quality:Adequate visualization. Velocities are measured in cm/s ; Diameters are measured in mm Right Lower Extremities DVT Study Measurements Right 2D Measurements +------------------------+----------+---------------+----------+ ! Location                ! Visualized! Compressibility! Thrombosis! +------------------------+----------+---------------+----------+ ! Sapheno Femoral Junction! Yes       ! Yes            ! None      ! +------------------------+----------+---------------+----------+ ! GSV Thigh               ! Yes       ! Yes            ! None      ! +------------------------+----------+---------------+----------+ ! Common Femoral          !Yes       ! Yes            ! None      ! +------------------------+----------+---------------+----------+ ! Prox Femoral            !Yes       ! Yes            ! None      ! +------------------------+----------+---------------+----------+ ! Mid Femoral             !Yes       ! Yes            ! None      ! +------------------------+----------+---------------+----------+ ! Dist Femoral            !Yes       ! Yes            ! None      ! +------------------------+----------+---------------+----------+ ! Deep Femoral            !Yes       ! Yes            ! None      ! +------------------------+----------+---------------+----------+ ! Popliteal               !Yes       ! Yes            ! None      ! +------------------------+----------+---------------+----------+ ! GSV Below Knee          ! Yes       ! Yes            ! None      ! +------------------------+----------+---------------+----------+ ! Gastroc                 ! Yes       ! Yes            ! None      ! +------------------------+----------+---------------+----------+ ! Soleal                  !Yes       ! Yes            ! None      ! +------------------------+----------+---------------+----------+ ! PTV                     ! Yes       ! Yes            ! None      ! +------------------------+----------+---------------+----------+ ! Peroneal                !Yes       ! Yes            ! None      ! +------------------------+----------+---------------+----------+ ! GSV Calf                ! Yes       ! Yes            ! None      ! +------------------------+----------+---------------+----------+ ! SSV                     ! Yes       ! Yes            ! None      ! +------------------------+----------+---------------+----------+ Right Doppler Measurements +------------------------+------+------+------------+ ! Location                ! Signal!Reflux! Reflux (sec)! +------------------------+------+------+------------+ ! Sapheno Femoral Junction! Phasic!      !            ! +------------------------+------+------+------------+ ! Common Femoral          !Phasic!      !            ! +------------------------+------+------+------------+ ! Prox Femoral            !Phasic!      !            ! +------------------------+------+------+------------+ ! Deep Femoral            !Phasic!      !            ! +------------------------+------+------+------------+ ! Popliteal               !Phasic!      !            ! +------------------------+------+------+------------+ Left Lower Extremities DVT Study Measurements Left 2D Measurements +------------------------+----------+---------------+----------+ ! Location                ! Visualized! Compressibility! Thrombosis! +------------------------+----------+---------------+----------+ ! Sapheno Femoral Junction! Yes       ! Yes            ! None      ! +------------------------+----------+---------------+----------+ ! GSV Thigh               ! Yes       ! Yes            ! None      ! +------------------------+----------+---------------+----------+ ! Common Femoral          !Yes       ! Yes            ! None      ! +------------------------+----------+---------------+----------+ ! Prox Femoral            !Yes       ! Yes            ! None      ! +------------------------+----------+---------------+----------+ ! Mid Femoral             !Yes       ! Yes            ! None      ! +------------------------+----------+---------------+----------+ ! Dist Femoral            !Yes       ! Yes            ! None      ! +------------------------+----------+---------------+----------+ ! Deep Femoral            !Yes       ! Yes            ! None      ! Periureteral stranding is noted. The left kidney, adrenal glands, pancreas and spleen are unremarkable in appearance. Liver is somewhat heterogeneous in its appearance with no focal lesion identified. Patient is status post cholecystectomy. GI/Bowel: The stomach is unremarkable in appearance. There is a duodenal diverticulum in the proximal portion of the sweep. Small bowel demonstrates no acute abnormality. Appendix is unremarkable. Pelvis: Bladder is incompletely distended. There is bladder wall thickening noted which is less prominent than the comparison. Periureteral stranding is noted. Increased induration is noted along the fat along the right side of the pelvis with persistent adenopathy noted along the right iliac chain measuring up to 1.5 cm in short axis diameter without significant change from prior study. Small lymph nodes also noted along the left iliac chain and retroperitoneum. There is wall thickening noted of the vagina with stranding of the pelvic fat. Peritoneum/Retroperitoneum: Aorta is normal in caliber. Small retroperitoneal lymph nodes are noted which are likely reactive. Bones/Soft Tissues: No acute osseous abnormality noted. Diffuse multifocal airspace disease is again noted with slight interval improvement of more focal areas of consolidation at the left base. Findings compatible with multifocal pneumonia. Malignancy, metastatic disease is within the differential though considered less likely. Right ureteral stent is in place with persistent mild dilation of the renal collecting system. Wall thickening of the bladder is noted which is improved compared to the prior study. Mild stranding is noted in the perivesicular fat. The cervix is somewhat irregular in its appearance with wall thickening of the vagina noted. Endometrial fluid noted on the prior study is no longer identified.  In a patient with a reported history of cervical cancer malignancy is within the differential.  If discharge including face-to-face visit, examination, documentation, counseling, preparation of discharge plans and followup, and discharge medicine reconciliation and presciptions is 33 minutes    Signed:  Del Bernal MD  3/21/2019

## 2019-04-17 PROBLEM — N39.0 UTI (URINARY TRACT INFECTION): Status: ACTIVE | Noted: 2019-01-01

## 2019-04-17 PROBLEM — E83.42 HYPOMAGNESEMIA: Status: ACTIVE | Noted: 2019-01-01

## 2019-04-17 NOTE — ED PROVIDER NOTES
Panel   Result Value Ref Range    Sodium 137 136 - 145 mmol/L    Potassium 4.3 3.5 - 5.1 mmol/L    Chloride 101 99 - 110 mmol/L    CO2 27 21 - 32 mmol/L    Anion Gap 9 3 - 16    Glucose 91 70 - 99 mg/dL    BUN 17 7 - 20 mg/dL    CREATININE 0.7 0.6 - 1.1 mg/dL    GFR Non-African American >60 >60    GFR African American >60 >60    Calcium 9.1 8.3 - 10.6 mg/dL    Total Protein 6.3 (L) 6.4 - 8.2 g/dL    Alb 3.8 3.4 - 5.0 g/dL    Albumin/Globulin Ratio 1.5 1.1 - 2.2    Total Bilirubin 0.5 0.0 - 1.0 mg/dL    Alkaline Phosphatase 100 40 - 129 U/L    ALT 22 10 - 40 U/L    AST 27 15 - 37 U/L    Globulin 2.5 g/dL   Troponin   Result Value Ref Range    Troponin 0.06 (H) <0.01 ng/mL   APTT   Result Value Ref Range    aPTT 35.6 26.0 - 36.0 sec   Protime-INR   Result Value Ref Range    Protime 13.8 (H) 9.8 - 13.0 sec    INR 1.21 (H) 0.86 - 1.14   Brain Natriuretic Peptide   Result Value Ref Range    Pro-BNP 90 0 - 124 pg/mL   Urinalysis Reflex to Culture   Result Value Ref Range    Color, UA ORANGE (A) Straw/Yellow    Clarity, UA Clear Clear    Glucose, Ur Color Interfer (A) Negative mg/dL    Bilirubin Urine Color Interfer (A) Negative    Ketones, Urine Color Interfer (A) Negative mg/dL    Specific Gravity, UA <=1.005 1.005 - 1.030    Blood, Urine Color Interfer (A) Negative    pH, UA Color Interfer (A) 5.0 - 8.0    Protein, UA Color Interfer (A) Negative mg/dL    Urobilinogen, Urine Color Interfer (A) <2.0 E.U./dL    Nitrite, Urine Color Interfer (A) Negative    Leukocyte Esterase, Urine Color Interfer (A) Negative    Microscopic Examination YES     Urine Reflex to Culture Yes     Urine Type Not Specified    hCG, serum, qualitative   Result Value Ref Range    hCG Qual Negative Detects HCG level >10 MIU/mL   Magnesium   Result Value Ref Range    Magnesium 1.50 (L) 1.80 - 2.40 mg/dL   Microscopic Urinalysis   Result Value Ref Range    RBC, UA 5-10 (A) 0 - 2 /HPF    Amorphous, UA 2+ (A) /HPF    Hyaline Casts, UA 2 0 - 8 /LPF

## 2019-04-17 NOTE — ED PROVIDER NOTES
CAPSULE    Take 300 mg by mouth 3 times daily. .    IBUPROFEN (ADVIL;MOTRIN) 800 MG TABLET    Take 1 tablet by mouth every 8 hours as needed for Pain or Fever    LORAZEPAM (ATIVAN) 1 MG TABLET    Take 1 mg by mouth every 8 hours as needed for Anxiety. MORPHINE (MS CONTIN) 30 MG EXTENDED RELEASE TABLET    Take 30 mg by mouth 2 times daily. ONDANSETRON (ZOFRAN) 4 MG TABLET    Take 4 mg by mouth every 8 hours as needed for Nausea or Vomiting    OXYCODONE (OXY-IR) 15 MG IMMEDIATE RELEASE TABLET    Take 15 mg by mouth every 3 hours as needed for Pain.      PHENAZOPYRIDINE (PYRIDIUM) 100 MG TABLET    Take 100 mg by mouth 3 times daily as needed for Pain    PROCHLORPERAZINE (COMPAZINE) 10 MG TABLET    Take 10 mg by mouth every 6 hours as needed    PROMETHAZINE (PHENERGAN) 25 MG TABLET    Take 25 mg by mouth every 6 hours as needed for Nausea    SENNA (SENOKOT) 8.6 MG TABLET    Take 1 tablet by mouth 3 times daily          ALLERGIES     Food and Spinach    FAMILYHISTORY       Family History   Problem Relation Age of Onset    Diabetes Mother     Crohn's Disease Sister     Hypertension Maternal Grandmother     Heart Failure Maternal Grandfather     Stroke Maternal Grandfather     Diabetes Paternal Grandmother     Asthma Paternal Grandfather           SOCIAL HISTORY       Social History     Socioeconomic History    Marital status:      Spouse name: None    Number of children: None    Years of education: None    Highest education level: None   Occupational History     Comment: Real Estate   Social Needs    Financial resource strain: None    Food insecurity:     Worry: None     Inability: None    Transportation needs:     Medical: None     Non-medical: None   Tobacco Use    Smoking status: Former Smoker     Packs/day: 0.25     Years: 3.00     Pack years: 0.75     Types: Cigarettes     Start date:      Last attempt to quit: 2001     Years since quittin.9    Smokeless tobacco: Never Used at:  [facility] [address], [facility], [address]   Phone [phone]   URINE RT REFLEX TO CULTURE - Abnormal; Notable for the following components:    Color, UA ORANGE (*)     Glucose, Ur Color Interfer (*)     Bilirubin Urine Color Interfer (*)     Ketones, Urine Color Interfer (*)     Blood, Urine Color Interfer (*)     pH, UA Color Interfer (*)     Protein, UA Color Interfer (*)     Urobilinogen, Urine Color Interfer (*)     Nitrite, Urine Color Interfer (*)     Leukocyte Esterase, Urine Color Interfer (*)     All other components within normal limits    Narrative:     Performed at:  [facility] [address], [facility], [address]   Phone [phone]   MAGNESIUM - Abnormal; Notable for the following components:    Magnesium 1.50 (*)     All other components within normal limits    Narrative:     Performed at:  [facility] [address], [facility], [address]   Phone [phone]   MICROSCOPIC URINALYSIS - Abnormal; Notable for the following components:    RBC, UA 5-10 (*)     Amorphous, UA 2+ (*)     WBC, UA 23 (*)     All other components within normal limits    Narrative:     Performed at:  [facility] [address], [facility], [address]   Phone [phone]   CULTURE BLOOD #2   CULTURE BLOOD #1   URINE CULTURE   LACTIC ACID, PLASMA    Narrative:     Performed at:  [facility] [address], [facility], [address]   Phone [phone]   APTT    Narrative:     Performed at:  [facility] [address], [facility], [address]   Phone [phone]   BRAIN NATRIURETIC PEPTIDE    Narrative:     Performed at:  [facility] [address], [facility], [address]   Phone [phone]   HCG, SERUM, QUALITATIVE    Narrative:     Performed at:  [facility]  [address], [address]   Phone [phone]   LACTIC ACID, PLASMA   TYPE AND SCREEN    Narrative:     Performed at:  [facility]  [address], [address]   Phone [phone]   PREPARE RBC (CROSSMATCH)       All other labs were within normal range or not returned as of this dictation. EKG: All EKG's are interpreted by the Emergency Department Physician who either signs orCo-signs this chart in the absence of a cardiologist.  Please see their note for interpretation of EKG. RADIOLOGY:   Non-plain film images such as CT, Ultrasound and MRI are read by the radiologist. Plain radiographic images are visualized andpreliminarily interpreted by the  ED Provider with the below findings:        Interpretation perthe Radiologist below, if available at the time of this note:    CT ABDOMEN PELVIS W IV CONTRAST Additional Contrast? None   Preliminary Result   1. Stable position of the right ureteric stent with no obvious complications. 2. Diffuse fatty infiltration of liver. 3. Normal appendix. Constipation with stool in the large bowel. CT Chest Pulmonary Embolism W Contrast   Preliminary Result   No evidence of acute pulmonary embolism or acute aortic disease. Mild   cardiomegaly but no evidence of pericardial disease. Lung parenchyma   demonstrates congestion and lower lobe atelectatic changes. XR CHEST PORTABLE   Preliminary Result   No evidence of acute cardiopulmonary disease.            Xr Chest Portable    Result Date: 4/17/2019  EXAMINATION: SINGLE X-RAY VIEW OF THE CHEST, 4/17/2019 1:47 pm COMPARISON: 02/28/2019 HISTORY: ORDERING SYSTEM PROVIDED HISTORY: SOB TECHNOLOGIST PROVIDED HISTORY: Reason for exam:->SOB Ordering Physician Provided Reason for Exam: Fever (pt. reports fever this morning of 101.5, took tylenol at home prior to arrival. Pt. reports some SOB and generalized body aches. ) Acuity: Unknown Type of Exam: Unknown FINDINGS: The heart size is within normal limits. Right IJ Port-A-Cath is in place. There is no pneumothorax, edema or focal consolidation. The bony thorax is grossly intact. No evidence of acute cardiopulmonary disease. PROCEDURES   Unless otherwise noted below, none     Procedures    CRITICAL CARE TIME   Critical Care  There was a high probability of life-threatening clinical deterioration in the patient's condition requiring my urgent intervention. Total critical care time with the patient was 42 minutes excluding separately reportable procedures. Critical care required due to patients  Urosepsis, anemia, hypomagnesemia prompting medical management, consultation and admission.      SEP-1 CORE MEASURE DATA    Classification: sepsis    Amount of fluids ordered: at least 30mL/kg based on entered actual weight at time of triage    Time at which sepsis was identified: 1500    Broad-spectrum antibiotics chosen: cefepime based on sepsis order-set for a suspected source of: UTI    Repeat lactate level: not indicated    On reassessment after fluid resuscitation:   pending, to be completed by inpatient team      CONSULTS:  Julio Shaw will admit ([id number])    EMERGENCY DEPARTMENT COURSE and DIFFERENTIALDIAGNOSIS/MDM:   Vitals:    Vitals:    04/17/19 1236 04/17/19 1400   BP: 136/80 120/68   Pulse: 141 111   Resp: 18 15   Temp: 99.6 °F (37.6 °C)    TempSrc: Oral    SpO2: 95% 95%   Weight: 170 lb (77.1 kg)    Height: 5' 7\" (1.702 m)        Patient was given thefollowing medications:  Medications   0.9 % sodium chloride bolus (has no administration in time range)   cefepime (MAXIPIME) 1 g IVPB minibag (1 g Intravenous New Bag 4/17/19 1606)   magnesium sulfate 1 g in dextrose 5% 100 mL IVPB (has no administration in time range)   cefepime (MAXIPIME) 1 g IVPB minibag (has no administration in time range)   0.9 % sodium chloride bolus (1,000 mLs Intravenous New Bag

## 2019-04-17 NOTE — ED NOTES
RN notified Charge RN of pt. Needing room d/t immunocompromised and pt. On supplemental oxygen. Pt. Veronique Saunders in quiet room for pt. Safety.         Tyson Spain RN  04/17/19 7799
(LIORESAL) 20 MG tablet Take 20 mg by mouth 2 times daily       gabapentin (NEURONTIN) 300 MG capsule Take 300 mg by mouth 3 times daily.  .      [DISCONTINUED] linaclotide (LINZESS) 145 MCG capsule Take 1 capsule by mouth every morning (before breakfast) 30 capsule 1    [DISCONTINUED] norethindrone (AYGESTIN) 5 MG tablet Take 1 tablet by mouth every 12 hours for 21 days 42 tablet 0

## 2019-04-18 NOTE — ONCOLOGY
02/18/2019     PTT:    Lab Results   Component Value Date    APTT 35.6 04/17/2019    APTT 33.2 05/16/2014     UA:  Recent Labs     04/17/19  1428   COLORU ORANGE*   PHUR Color Interfer*   WBCUA 23*   RBCUA 5-10*   CLARITYU Clear   SPECGRAV <=1.005   LEUKOCYTESUR Color Interfer*   UROBILINOGEN Color Interfer*   BILIRUBINUR Color Interfer*   BLOODU Color Interfer*   GLUCOSEU Color Interfer*   AMORPHOUS 2+*     Magnesium:   Lab Results   Component Value Date    MG 1.50 04/17/2019    MG 1.90 03/05/2019    MG 1.90 03/04/2019     VAISHNAVI:    Lab Results   Component Value Date    VAISHNAVI Negative 12/05/2018       RADIOLOGY:    Ct Abdomen Pelvis W Iv Contrast Additional Contrast? None    Result Date: 4/17/2019  EXAMINATION: CT OF THE ABDOMEN AND PELVIS WITH CONTRAST 4/17/2019 3:32 pm TECHNIQUE: CT of the abdomen and pelvis was performed with the administration of intravenous contrast. Multiplanar reformatted images are provided for review. Dose modulation, iterative reconstruction, and/or weight based adjustment of the mA/kV was utilized to reduce the radiation dose to as low as reasonably achievable. COMPARISON: February 28, 2019 HISTORY: ORDERING SYSTEM PROVIDED HISTORY: LUQ abd pain TECHNOLOGIST PROVIDED HISTORY: Additional Contrast?->None Ordering Physician Provided Reason for Exam: LUQ abd pain Acuity: Unknown Type of Exam: Unknown FINDINGS: Lower Chest: Lung bases demonstrate mild atelectatic changes in both lower lobes. Organs: The liver demonstrates diffuse fatty infiltration but no focal disease. Status post cholecystectomy. Pancreas and spleen, adrenals, left kidney, aorta and IVC appear stable. The right kidney demonstrates a ureteric stent extending from the renal pelvis into the urinary bladder. GI/Bowel: No evidence of bowel obstruction, perforation or thickening. Normal appendix. Evidence of constipation. No evidence of acute diverticulitis. Pelvis:  The uterus, adnexa and urinary bladder appear normal.

## 2019-04-19 NOTE — PROGRESS NOTES
Oncology [facility] Hematology Care   Progress Note    4/19/2019    SUBJECTIVE: She continues to have pain from renal stent, she was hoping to have it replaced today. Otherwise she is feeling ok. Remains afebrile. OBJECTIVE:    Physical Assessment:  Vitals:  /81   Pulse 85   Temp 97.2 °F (36.2 °C) (Temporal)   Resp 18   Ht 5' 7\" (1.702 m)   Wt 191 lb (86.6 kg)   SpO2 97%   BMI 29.91 kg/m²    24HR INTAKE/OUTPUT:    No intake or output data in the 24 hours ending 04/19/19 1243    CONSTITUTIONAL:  Awake, alert & oriented x3 pale   HEENT: PERRL, Neck: soft, supple, no cervical, supraclavicular or axillary adenopathy  RESPIRATORY:  No increased work of breathing, breath sounds decreased.   CARDIOVASCULAR:  Cardiac S1/S2, RRR  GASTROINTESTINAL:  abdomen soft +BS x4, no hepatosplenomegaly  SKIN:  negative for rash and skin lesion(s)  MUSCULOSKELETAL:  negative for pain and muscle weakness  EXTREMITIES: Negative for Lower extremity edema    Labs Results:    CBC:   Recent Labs     04/17/19  1427 04/17/19  2316 04/18/19  0507 04/19/19  0526   WBC 14.7*  --  4.7 4.1   HGB 6.2* 7.0* 8.4* 8.9*   HCT 19.8* 22.2* 26.0* 26.6*   .4*  --  101.9* 102.6*     --  212 216     BMP:   Recent Labs     04/17/19  1427 04/18/19  0507 04/19/19  0526    142 142   K 4.3 5.0 4.0    105 103   CO2 27 27 29   BUN 17 11 8   CREATININE 0.7 0.5* 0.6     LIVER PROFILE:   Recent Labs     04/17/19  1427 04/18/19  0507   AST 27 35   ALT 22 24   BILITOT 0.5 0.6   ALKPHOS 100 93     PT/INR:    Lab Results   Component Value Date    PROTIME 13.8 04/17/2019    PROTIME 14.3 02/28/2019    PROTIME 12.7 02/18/2019    INR 1.21 04/17/2019    INR 1.25 02/28/2019    INR 1.11 02/18/2019     PTT:    Lab Results   Component Value Date    APTT 35.6 04/17/2019    APTT 33.2 05/16/2014     UA:  Recent Labs     04/17/19  1428   COLORU ORANGE*   PHUR Color Interfer*   WBCUA 23*   RBCUA 5-10*   CLARITYU Clear   SPECGRAV <=1.005   LEUKOCYTESUR Color Interfer*   UROBILINOGEN Color Interfer*   BILIRUBINUR Color Interfer*   BLOODU Color Interfer*   GLUCOSEU Color Interfer*   AMORPHOUS 2+*     Magnesium:   Lab Results   Component Value Date    MG 1.70 04/19/2019    MG 1.50 04/17/2019    MG 1.90 03/05/2019     VAISHNAVI:    Lab Results   Component Value Date    VAISHNAVI Negative 12/05/2018       RADIOLOGY:    Ct Abdomen Pelvis W Iv Contrast Additional Contrast? None    Result Date: 4/17/2019  EXAMINATION: CT OF THE ABDOMEN AND PELVIS WITH CONTRAST 4/17/2019 3:32 pm TECHNIQUE: CT of the abdomen and pelvis was performed with the administration of intravenous contrast. Multiplanar reformatted images are provided for review. Dose modulation, iterative reconstruction, and/or weight based adjustment of the mA/kV was utilized to reduce the radiation dose to as low as reasonably achievable. COMPARISON: February 28, 2019 HISTORY: ORDERING SYSTEM PROVIDED HISTORY: LUQ abd pain TECHNOLOGIST PROVIDED HISTORY: Additional Contrast?->None Ordering Physician Provided Reason for Exam: LUQ abd pain Acuity: Unknown Type of Exam: Unknown FINDINGS: Lower Chest: Lung bases demonstrate mild atelectatic changes in both lower lobes. Organs: The liver demonstrates diffuse fatty infiltration but no focal disease. Status post cholecystectomy. Pancreas and spleen, adrenals, left kidney, aorta and IVC appear stable. The right kidney demonstrates a ureteric stent extending from the renal pelvis into the urinary bladder. GI/Bowel: No evidence of bowel obstruction, perforation or thickening. Normal appendix. Evidence of constipation. No evidence of acute diverticulitis. Pelvis: The uterus, adnexa and urinary bladder appear normal. Peritoneum/Retroperitoneum: No evidence of lymphadenopathy. Bones/Soft Tissues: No acute abnormality. 1. Stable position of the right ureteric stent with no obvious complications. 2. Diffuse fatty infiltration of liver. 3. Normal appendix.   Constipation with stool in

## 2019-04-19 NOTE — PROGRESS NOTES
Urology Progress Note  [facility]    Provider: Chloe Gomez MD Patient ID:  Admission Date: 2019 Name: Chavez Bruce Date: 2019 MRN: [id number]   Patient Location: [id number] : 1980  Attending: Dane Patrick MD Date of Service: 2019  PCP: Josi Monte MD     Diagnoses:  1. Sepsis, due to unspecified organism ([address]    2. Acute cystitis with hematuria    3. Sinus tachycardia    4. Hypomagnesemia    5. Anemia, unspecified type    6. Elevated troponin       Assessment/Plan:  Stage IV cervical ca with lung mets  Right hydronephrosis, likely 2/2 malignancy - antegrade stent placed 2019   Severe dysuria with stent related discomfort  Admission with anemia, possible   - Continue pyridium, add flomax 0.4mg and B&O suppositories (ordered)  - hold on anticholinergics d/t pt concern for side effects  - we do not have a metal whoplusyou Resonance stent in her size in the hospital. I am trying to coordinate a routine stent change today based on her request. She did eat breakfast at 0630.   - F/u UCx, tx accordingly    The patient had a chance to ask questions which were answered. she understands the above plan. Subjective:   Angie Rubio is a 42 y.o. female. She was seen and examined this morning. Today we discussed POSSIBLE stent change today. I told her to have nothing more to eat or drink. We discussed routine stent change vs metal stent. There is no guarantee that a stent exchange will help with her pain complaints.       Objective:   Vitals:  Vitals:    19 0756   BP: 123/83   Pulse: 88   Resp: 18   Temp: 97.3 °F (36.3 °C)   SpO2: 96%     No intake or output data in the 24 hours ending 19 1014  Physical Exam:  Gen: Alert and oriented x3, no acute distress  CV: Regular rate   Resp: unlabored respirations  Abd: Soft, non-distended, non-tender, no masses  Ext: no peripheral edema noted, moves upper and lower extremities spontaneously  Skin:

## 2019-04-19 NOTE — PROGRESS NOTES
Brief Urology Note    D/w pt, R stent exchange today likely metal stent. BRENDA GARSIA. NPO since 7am.  FU 6 weeks in urology clinic after (requested), can cont pyridium prn.

## 2019-04-19 NOTE — ANESTHESIA POSTPROCEDURE EVALUATION
Department of Anesthesiology  Postprocedure Note    Patient: Tsering Terry  MRN: [id number]  YOB: 1980  Date of evaluation: 4/19/2019  Time:  4:17 PM     Procedure Summary     Date:  04/19/19 Room / Location:  [facility] OR 03 / [facility] OR    Anesthesia Start:  1516 Anesthesia Stop:  2552    Procedure:  CYSTOSCOPY WITH RETROGRADE PYELOGRAM RIGHT STENT REMOVAL WITH PLACEMENT OF METAL STENT (Right ) Diagnosis:  (Acute cystitis with hematuria)    Surgeon:  Mary Ann Lopez MD Responsible Provider:  Debra Ron MD    Anesthesia Type:  general ASA Status:  3          Anesthesia Type: general    Abram Phase I: Abram Score: 8    Abram Phase II:      Last vitals: Reviewed and per EMR flowsheets.        Anesthesia Post Evaluation    Patient location during evaluation: PACU  Patient participation: complete - patient participated  Level of consciousness: awake and alert  Pain score: 2  Airway patency: patent  Nausea & Vomiting: no vomiting  Complications: no  Cardiovascular status: blood pressure returned to baseline  Respiratory status: acceptable  Hydration status: euvolemic

## 2019-04-20 NOTE — PROGRESS NOTES
- 34.0 pg    MCHC 31.4 31.0 - 36.0 g/dL    RDW 19.4 (H) 12.4 - 15.4 %    Platelets 152 214 - 106 K/uL    MPV 8.7 5.0 - 10.5 fL    Neutrophils % 85.6 %    Lymphocytes % 9.1 %    Monocytes % 3.1 %    Eosinophils % 1.3 %    Basophils % 0.9 %    Neutrophils # 12.6 (H) 1.7 - 7.7 K/uL    Lymphocytes # 1.3 1.0 - 5.1 K/uL    Monocytes # 0.5 0.0 - 1.3 K/uL    Eosinophils # 0.2 0.0 - 0.6 K/uL    Basophils # 0.1 0.0 - 0.2 K/uL   Comprehensive Metabolic Panel    Collection Time: 04/17/19  2:27 PM   Result Value Ref Range    Sodium 137 136 - 145 mmol/L    Potassium 4.3 3.5 - 5.1 mmol/L    Chloride 101 99 - 110 mmol/L    CO2 27 21 - 32 mmol/L    Anion Gap 9 3 - 16    Glucose 91 70 - 99 mg/dL    BUN 17 7 - 20 mg/dL    CREATININE 0.7 0.6 - 1.1 mg/dL    GFR Non-African American >60 >60    GFR African American >60 >60    Calcium 9.1 8.3 - 10.6 mg/dL    Total Protein 6.3 (L) 6.4 - 8.2 g/dL    Alb 3.8 3.4 - 5.0 g/dL    Albumin/Globulin Ratio 1.5 1.1 - 2.2    Total Bilirubin 0.5 0.0 - 1.0 mg/dL    Alkaline Phosphatase 100 40 - 129 U/L    ALT 22 10 - 40 U/L    AST 27 15 - 37 U/L    Globulin 2.5 g/dL   Troponin    Collection Time: 04/17/19  2:27 PM   Result Value Ref Range    Troponin 0.06 (H) <0.01 ng/mL   APTT    Collection Time: 04/17/19  2:27 PM   Result Value Ref Range    aPTT 35.6 26.0 - 36.0 sec   Protime-INR    Collection Time: 04/17/19  2:27 PM   Result Value Ref Range    Protime 13.8 (H) 9.8 - 13.0 sec    INR 1.21 (H) 0.86 - 1.14   Brain Natriuretic Peptide    Collection Time: 04/17/19  2:27 PM   Result Value Ref Range    Pro-BNP 90 0 - 124 pg/mL   hCG, serum, qualitative    Collection Time: 04/17/19  2:27 PM   Result Value Ref Range    hCG Qual Negative Detects HCG level >10 MIU/mL   Magnesium    Collection Time: 04/17/19  2:27 PM   Result Value Ref Range    Magnesium 1.50 (L) 1.80 - 2.40 mg/dL   Culture Blood #2    Collection Time: 04/17/19  2:28 PM   Result Value Ref Range    Culture, Blood 2       No Growth to date.   Any change in status will be called. Culture Blood #1    Collection Time: 04/17/19  2:28 PM   Result Value Ref Range    Blood Culture, Routine       No Growth to date. Any change in status will be called.    Urinalysis Reflex to Culture    Collection Time: 04/17/19  2:28 PM   Result Value Ref Range    Color, UA ORANGE (A) Straw/Yellow    Clarity, UA Clear Clear    Glucose, Ur Color Interfer (A) Negative mg/dL    Bilirubin Urine Color Interfer (A) Negative    Ketones, Urine Color Interfer (A) Negative mg/dL    Specific Gravity, UA <=1.005 1.005 - 1.030    Blood, Urine Color Interfer (A) Negative    pH, UA Color Interfer (A) 5.0 - 8.0    Protein, UA Color Interfer (A) Negative mg/dL    Urobilinogen, Urine Color Interfer (A) <2.0 E.U./dL    Nitrite, Urine Color Interfer (A) Negative    Leukocyte Esterase, Urine Color Interfer (A) Negative    Microscopic Examination YES     Urine Reflex to Culture Yes     Urine Type Not Specified    Microscopic Urinalysis    Collection Time: 04/17/19  2:28 PM   Result Value Ref Range    RBC, UA 5-10 (A) 0 - 2 /HPF    Amorphous, UA 2+ (A) /HPF    Hyaline Casts, UA 2 0 - 8 /LPF    WBC, UA 23 (H) 0 - 5 /HPF    Epi Cells 2 0 - 5 /HPF   TYPE AND SCREEN    Collection Time: 04/17/19  3:09 PM   Result Value Ref Range    ABO/Rh AB POS     Antibody Screen NEG    PREPARE RBC (CROSSMATCH), 1 Units    Collection Time: 04/17/19  3:09 PM   Result Value Ref Range    Product Code Blood Bank [id number]     Description Blood Bank Red Blood Cells, Leuko-reduced     Unit Number [id number]     Dispense Status Blood Bank transfused     Product Code Blood Bank [id number]     Description Blood Bank Red Blood Cells, Leuko-reduced     Unit Number [id number]     Dispense Status Blood Bank selected    PREPARE RBC (CROSSMATCH), 1 Units    Collection Time: 04/17/19  3:09 PM   Result Value Ref Range    Product Code Blood Bank [id number]     Description Blood Bank Red Blood Cells, Leuko-reduced     Unit Number

## 2019-05-06 NOTE — PROGRESS NOTES
Adolfo Barr    YOB: 1980     Date of Service:  2019     Chief Complaint   Patient presents with    Shortness of Breath         HPI patient states that she is doing quite well. Currently undergoing chemotherapy for stage IV cervical carcinoma. Currently using up to 4 L O2 particularly with activity. Has issues with anemia, requiring iron infusions as per patient's report. No significant cough or phlegm. Overall, she feels that she has had an improved response with chemotherapy. Status post right ureteral stent placement in April for obstructive uropathy. Allergies   Allergen Reactions    Food Hives     Raw spinach.  Spinach      No outpatient medications have been marked as taking for the 19 encounter (Office Visit) with Javier Frost MD.       Immunization History   Administered Date(s) Administered    Influenza Virus Vaccine 10/12/2014       Past Medical History:   Diagnosis Date    Cervical cancer ([address]     Endometriosis     Fibromyalgia     GERD (gastroesophageal reflux disease)     Hip fracture ([address]     LEFT     Hypoglycemia     Lupus     Neuropathy     Ovarian cyst     Seizures ([address]      Past Surgical History:   Procedure Laterality Date    BRONCHOSCOPY  10/16/14    Urgent intubation, direct laryngoscopy, subglottic tracheoscopy    BRONCHOSCOPY  10/18/2014    WITH EXTUBATION IN OR AND LARYNGOSCOPY     SECTION      x 3, 2005, 2006, 2008    CHOLECYSTECTOMY  2012    CYSTOSCOPY Right 2019    CYSTOSCOPY performed by Tommy Lopez MD at [facility] Right 2019    CYSTOSCOPY WITH RETROGRADE PYELOGRAM RIGHT STENT REMOVAL WITH PLACEMENT OF METAL STENT performed by Cary Malik MD at [address] [place] [address]  2019    aborted planned procedure of BTL and uterine ablation.      Family History   Problem Relation Age of Onset    Diabetes Mother     Crohn's Disease Sister     Hypertension Maternal Grandmother  Heart Failure Maternal Grandfather     Stroke Maternal Grandfather     Diabetes Paternal Grandmother     Asthma Paternal Grandfather        Review of Systems:  Review of Systems   Constitutional: Positive for fatigue. Negative for activity change, appetite change and fever. HENT: Negative for congestion, ear discharge, ear pain, postnasal drip, rhinorrhea, sinus pressure, sneezing, sore throat, tinnitus and voice change. Respiratory: Positive for shortness of breath. Negative for apnea, cough, choking, chest tightness, wheezing and stridor. Cardiovascular: Negative for chest pain, palpitations and leg swelling. Gastrointestinal: Negative for abdominal distention, abdominal pain, anal bleeding, blood in stool, constipation and diarrhea. Musculoskeletal: Negative for arthralgias, back pain and gait problem. Skin: Negative for pallor and rash. Allergic/Immunologic: Negative for environmental allergies. Neurological: Negative for dizziness, tremors, seizures, syncope, speech difficulty, weakness, light-headedness, numbness and headaches. Hematological: Negative for adenopathy. Does not bruise/bleed easily. Psychiatric/Behavioral: Negative for sleep disturbance. Vitals:    05/06/19 1510   BP: 122/88   Pulse: 105   Resp: 18   SpO2: 99%   Weight: 184 lb (83.5 kg)     Body mass index is 28.82 kg/m². Wt Readings from Last 3 Encounters:   05/06/19 184 lb (83.5 kg)   04/20/19 194 lb 6.4 oz (88.2 kg)   04/01/19 193 lb (87.5 kg)     BP Readings from Last 3 Encounters:   05/06/19 122/88   04/20/19 123/88   04/19/19 130/77         Physical Exam   Constitutional: She is oriented to person, place, and time. She appears well-developed and well-nourished. No distress. HENT:   Mouth/Throat: Oropharynx is clear and moist. No oropharyngeal exudate. Cardiovascular: Normal rate, regular rhythm, normal heart sounds and intact distal pulses. No murmur heard.   Pulmonary/Chest: No respiratory distress. She has no wheezes. She has rales. She exhibits no tenderness. Abdominal: She exhibits no distension and no mass. There is no tenderness. There is no rebound and no guarding. Musculoskeletal: She exhibits no edema or deformity. Neurological: She is alert and oriented to person, place, and time. She displays normal reflexes. No cranial nerve deficit. She exhibits normal muscle tone. Coordination normal.   Skin: No rash noted. She is not diaphoretic. No erythema. No pallor. Health Maintenance   Topic Date Due    Pneumococcal 0-64 years Vaccine (1 of 3 - PCV13) 02/21/1988    Varicella Vaccine (1 of 2 - 13+ 2-dose series) 02/21/1995    HIV screen  02/21/1997    DTaP/Tdap/Td vaccine (1 - Tdap) 02/21/2001    Flu vaccine (Season Ended) 09/01/2019    Cervical cancer screen  04/17/2024          Assessment/Plan:    CT imaging from April 17, showed no significant lung nodules that were previously noted in February. This may mean that she could have had a favorable response to chemotherapy, though it is still very early. O2 requirements are stable. Has nebulizer available at home, not needed to use this. We will organize for portable oxygen concentrator, to help with her mobility. Will defer decision about repeat CT imaging to Dr. Dimas Cloud who is treating her advanced cervical malignancy. Return in about 3 months (around 8/6/2019).

## 2019-05-18 PROBLEM — N39.0 UTI (URINARY TRACT INFECTION): Status: RESOLVED | Noted: 2019-01-01 | Resolved: 2019-01-01

## 2019-05-19 NOTE — DISCHARGE SUMMARY
[address]                     [facility], [address]                               DISCHARGE SUMMARY    PATIENT NAME: Andrade David                 :        1980  MED REC NO:   [id number]                          ROOM:       1216  ACCOUNT NO:   [de-identified]                           ADMIT DATE: 2019  PROVIDER:     Bernard Litten, MD                   DISCHARGE DATE:  2019    FINAL DIAGNOSES:  1. Urinary tract infection. 2.  Gastroesophageal reflux disease. 3.  Cervical cancer with advanced metastasis. 4.  Hypomagnesemia. DISCHARGE MEDICATIONS:  1. Phenergan 25 every 6 hours p.r.n.  2.  Pyridium 100 mg p.o. t.i.d.  3.  Tamsulosin 0.4 mg q.h.s.  4.  Opium Belladonna suppository every 8 hours. 5.  Magnesium oxide 400 mg daily. 6.  Ativan 1 mg every 8 hours as needed for anxiety. 7.  Senokot 8.6 one tablet three times a day. 8.  Oxy IR 15 mg every 3 hours p.r.n.  9.  Tylenol 650 q. 4 hours p.r.n. 10.  MS Contin 30 mg p.o. b.i.d. 11.  Zofran 4 mg every 8 hours p.r.n. 12.  Advil 800 mg every 8 hours. 13.  Duloxetine 60 mg daily. 14.  Lioresal 20 mg p.o. b.i.d.  15.  Gabapentin 300 mg p.o. t.i.d.  16.  Compazine 10 mg every 6 hours as needed. HOSPITAL COURSE:  This unfortunate relatively young woman with fairly  advanced cervical cancer was admitted by Dr. Rinku Morrow for urinary tract  infection and sepsis. I only happened to follow her for the last two  days of her admission. The patient was admitted with fever and chills  and had significant urinary tract infection. Blood pressure was 112/74,  respiration 16. White blood cell count was 14.7, hemoglobin and  hematocrit 6.2 and 19.8. Troponin 0.06.  PT, INR was 13.8 and 1.21. Cultures were obtained. The patient had beta-hemolytic strep group in  the urine.       Nayana Navarrete MD    D: 2019 20:45:15       T: 2019 14:23:58

## 2019-06-24 NOTE — PATIENT INSTRUCTIONS
Please call with any questions or concerns:   [facility]  @ [phone]. LAB RESULTS:  Please obtain any labs or diagnostic tests as discussed today. You should call the office to check the results. Please allow  3 to 7 days for us to get these results. MEDICATION LIST:  Please bring an accurate list of your medications to every visit. APPOINTMENT CONFIRMATION:  We will call you the day before your scheduled appointment to confirm. If we are unable to reach you, you MUST call back by the end of the day to confirm the appointment or we may be forced to cancel.    Topiramate 25 mg

## 2019-06-24 NOTE — PROGRESS NOTES
NEUROLOGY CONSULTATION     Chief Complaint   Patient presents with    Headache     Patient is here today to establish care. Patient has stage 4 cervical cancer. Patient is going through chemo. HISTORY OF PRESENT ILLNESS :    Gertrude Pedro is a 42 y.o. female who is referred by Dr. Amanda Lin   History was obtained from patient  Patient was referred for evaluation of chronic headaches as well as a possibly abnormal MRI brain. Patient states that she has had headaches for a number of years. They are throbbing in nature and sometimes would proceed to become a migraine type headache. Patient states that the headaches are throbbing in nature. Bright light and loud noise would make the headaches worse. She also gets quite nauseous with these headaches. 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 This diagnosis occurred in February 2019 and since then patient has been on chemotherapy. Patient states that the headaches have been worse recently but she attributes this to chemotherapy. Patient also has had spells which were described as possible seizures. She has been seen at [facility]. Patient had a work-up but was only put on gabapentin which could be for the fibromyalgia. Patient states that she would have a strange sensation and then stare into space and have shaking all over. These spells are infrequent. The last episode happened about 7 months ago. Patient was tingling and numbness in feet. No other neurological symptoms.       REVIEW OF SYSTEMS    Constitutional:  []   Chills   []  Fatigue   []  Fevers   []  Malaise   []  Weight loss     [x] Denies all of the above    Eyes:  []  Double vision   [x]  Blurry vision     [] Denies all of the above    Ears, nose, mouth, throat, and face:   [] Hearing loss    []   Hoarseness      []  Snoring Worry: None     Inability: None    Transportation needs:     Medical: None     Non-medical: None   Tobacco Use    Smoking status: Former Smoker     Packs/day: 0.25     Years: 3.00     Pack years: 0.75     Types: Cigarettes     Start date:      Last attempt to quit: 2001     Years since quittin.1    Smokeless tobacco: Never Used   Substance and Sexual Activity    Alcohol use: No    Drug use: No    Sexual activity: Yes     Partners: Male   Lifestyle    Physical activity:     Days per week: None     Minutes per session: None    Stress: None   Relationships    Social connections:     Talks on phone: None     Gets together: None     Attends Baptism service: None     Active member of club or organization: None     Attends meetings of clubs or organizations: None     Relationship status: None    Intimate partner violence:     Fear of current or ex partner: None     Emotionally abused: None     Physically abused: None     Forced sexual activity: None   Other Topics Concern    None   Social History Narrative    None       PHYSICAL EXAMINATION:  /85   Pulse 80   Ht 5' 7\" (1.702 m)   Wt 191 lb (86.6 kg)   BMI 29.91 kg/m²   Appearance: Well appearing, well nourished and in no distress  Mental Status Exam: Patient is alert, oriented to person, place and time. Recent and remote memory is normal  Fund of Knowledge is normal  Attention/concentration is normal.   Speech : No dysarthria  Language : No aphasia  Funduscopic Exam: sharp disc margins  Cranial Nerves:   II: Visual fields:  Full to confrontation  III: Pupils:  equal, round, reactive to light  III,IV,VI: Extra Ocular Movements are intact.  No nystagmus  V: Facial sensation is intact to pin prick and light touch  VII: Facial strength and movements: intact and symmetric smile,cheek puffing and eyebrow elevation  VIII: Hearing:  Intact to finger rub bilaterally  IX: Palate  elevation is symmetric  XI: Shoulder shrug is intact  XII: Tongue movements are normal  Motor:  Muscle tone and bulk are normal.   Strength is symmetrical 5/5 in all four extremities. Sensory: Decreased to light touch and  pin prick in bilateral distal lower extremities  Coordination:  Normal  Finger to Nose and Heel to Shin bilaterally    . Reflexes:  DTR 1 in the upper extremities and barely elicitable in the lower extremities   Plantar response: Flexor bilaterally  Gait: Gait and station is normal. Patient can toe/ heel and tandem walk without difficulty  Romberg: negative  Vascular: No carotid bruit bilaterally        DATA:  LABS:  General Labs:    CBC:   Lab Results   Component Value Date    WBC 4.1 04/19/2019    RBC 2.59 04/19/2019    HGB 8.9 04/19/2019    HCT 26.6 04/19/2019    .6 04/19/2019    MCH 34.3 04/19/2019    MCHC 33.4 04/19/2019    RDW 22.4 04/19/2019     04/19/2019    MPV 8.6 04/19/2019     BMP:    Lab Results   Component Value Date     04/19/2019    K 4.0 04/19/2019    K 5.0 04/18/2019     04/19/2019    CO2 29 04/19/2019    BUN 8 04/19/2019    LABALBU 3.5 04/18/2019    CREATININE 0.6 04/19/2019    CALCIUM 8.7 04/19/2019    GFRAA >60 04/19/2019    GFRAA >60 05/22/2013    LABGLOM >60 04/19/2019    GLUCOSE 117 04/19/2019     RADIOLOGY REVIEW:  I have reviewed radiology image(s) and reports(s) of: MRI brain    IMPRESSION :  MRI brain images reviewed with the patient. There was a punctate enhancing lesion in the right frontal lobe which is new from 2015. Etiology is not clear. There is no edema around it. Metastatic disease should be obviously considered with patient's known history of squamous cell cancer of the cervix but no other lesions were seen. Ischemic stroke so to be highly unlikely. Chronic intractable migraine headaches    History of seizure disorder, video EEG monitoring at [facility] was done and this was felt to be nonepileptic events. Patient has not had a spell in more than 8 months.     Peripheral neuropathy, mild, probably due to chemotherapy      RECOMMENDATIONS :  Discussed with patient  Reviewed MRI brain images with her  Recommended repeat MRI brain with and without contrast in 3 months time for follow-up and compared to the current study  I will start her on topiramate low-dose for the migraine headaches and slowly increase the dose. Patient had been on high-dose topiramate in the past without any side effects. If the peripheral neuropathy symptoms get worse I will consider getting an EMG nerve conduction study in the future. I will see her back in 2 months for follow-up. Thank you for this consultation        Please note a portion of this chart was generated using dragon dictation software. Although every effort was made to ensure the accuracy of this automated transcription, some errors in transcription may have occurred.

## 2019-06-24 NOTE — LETTER
[facility]  [address] [facility] [address]  Phone: [phone]  Fax: [phone]    Rubia Odonnell *        June 24, 2019       Patient: Aura Rosen   MR Number: [id number]   YOB: 1980   Date of Visit: 6/24/2019       Dear Dr. Madhavi Barroso: Thank you for the request for consultation for Willy Prater to me for the evaluation of abnormal MRI brain scan as well as chronic headaches and history of seizure disorder below are the relevant portions of my assessment and plan of care. NEUROLOGY CONSULTATION     Chief Complaint   Patient presents with    Headache     Patient is here today to establish care. Patient has stage 4 cervical cancer. Patient is going through chemo. HISTORY OF PRESENT ILLNESS :    Willy Prater is a 42 y.o. female who is referred by Dr. Madhavi Barroso   History was obtained from patient  Patient was referred for evaluation of chronic headaches as well as a possibly abnormal MRI brain. Patient states that she has had headaches for a number of years. They are throbbing in nature and sometimes would proceed to become a migraine type headache. Patient states that the headaches are throbbing in nature. Bright light and loud noise would make the headaches worse. She also gets quite nauseous with these headaches. 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 This diagnosis occurred in February 2019 and since then patient has been on chemotherapy. Patient states that the headaches have been worse recently but she attributes this to chemotherapy. Patient also has had spells which were described as possible seizures. She has been seen at [facility]. Patient had a work-up but was only put on gabapentin which could be for the fibromyalgia.   Patient states that she would have a strange sensation and then stare into space and have shaking all over. These spells are infrequent. The last episode happened about 7 months ago. Patient was tingling and numbness in feet. No other neurological symptoms.       REVIEW OF SYSTEMS    Constitutional:  []   Chills   []  Fatigue   []  Fevers   []  Malaise   []  Weight loss     [x] Denies all of the above    Eyes:  []  Double vision   [x]  Blurry vision     [] Denies all of the above    Ears, nose, mouth, throat, and face:   [] Hearing loss    []   Hoarseness      []  Snoring    []  Tinnitus       [x] Denies all of the above     Respiratory:   []  Cough    []  Shortness of breath         [x] Denies all of the above     Cardiovascular:   []  Chest pain    []  Exertional chest pressure/discomfort           [] Palpitations    []  Syncope     [x] Denies all of the above    Gastrointestinal:   []  Abdominal pain   [x]  Constipation    [x]  Diarrhea    []   Dysphagia                      [] Denies all of the above    Genitourinary:      []  Frequency   []  Hematuria     []  Urinary incontinence           [x] Denies all of the above     Hematologic/lymphatic:  []  Bleeding    []  Easy bruising   []  Anemia  [x] Denies all of the above     Musculoskeletal:   [] Back pain       []  Myalgias    []  Neck pain           [x] Denies all of the above    Neurological: As noted in HPI    Behavioral/Psych:   [x] Anxiety    []  Depression     []  Mood swings     [] Denies all of the above     Endocrine:   []  Temperature intolerance     [x] Fatigue      [] Denies all of the above     Allergic/Immunologic:   [] Hay fever    [x] Denies all of the above     Past Medical History:   Diagnosis Date    [facility]     Endometriosis     Fibromyalgia     GERD (gastroesophageal reflux disease)     Hip fracture ([facility]     LEFT 2002    Hypoglycemia     Lupus ([facility]     Neuropathy     Ovarian cyst     Seizures ([facility]      Family History Problem Relation Age of Onset    Diabetes Mother     Crohn's Disease Sister     Hypertension Maternal Grandmother     Heart Failure Maternal Grandfather     Stroke Maternal Grandfather     Diabetes Paternal Grandmother     Asthma Paternal Grandfather      Social History     Socioeconomic History    Marital status:      Spouse name: None    Number of children: None    Years of education: None    Highest education level: None   Occupational History     Comment: Real Estate   Social Needs    Financial resource strain: None    Food insecurity:     Worry: None     Inability: None    Transportation needs:     Medical: None     Non-medical: None   Tobacco Use    Smoking status: Former Smoker     Packs/day: 0.25     Years: 3.00     Pack years: 0.75     Types: Cigarettes     Start date:      Last attempt to quit: 2001     Years since quittin.1    Smokeless tobacco: Never Used   Substance and Sexual Activity    Alcohol use: No    Drug use: No    Sexual activity: Yes     Partners: Male   Lifestyle    Physical activity:     Days per week: None     Minutes per session: None    Stress: None   Relationships    Social connections:     Talks on phone: None     Gets together: None     Attends Christianity service: None     Active member of club or organization: None     Attends meetings of clubs or organizations: None     Relationship status: None    Intimate partner violence:     Fear of current or ex partner: None     Emotionally abused: None     Physically abused: None     Forced sexual activity: None   Other Topics Concern    None   Social History Narrative    None       PHYSICAL EXAMINATION:  /85   Pulse 80   Ht 5' 7\" (1.702 m)   Wt 191 lb (86.6 kg)   BMI 29.91 kg/m²    Appearance: Well appearing, well nourished and in no distress  Mental Status Exam: Patient is alert, oriented to person, place and time.    Recent and remote memory is normal  Fund of Knowledge is normal Attention/concentration is normal.   Speech : No dysarthria  Language : No aphasia  Funduscopic Exam: sharp disc margins  Cranial Nerves:   II: Visual fields:  Full to confrontation  III: Pupils:  equal, round, reactive to light  III,IV,VI: Extra Ocular Movements are intact. No nystagmus  V: Facial sensation is intact to pin prick and light touch  VII: Facial strength and movements: intact and symmetric smile,cheek puffing and eyebrow elevation  VIII: Hearing:  Intact to finger rub bilaterally  IX: Palate  elevation is symmetric  XI: Shoulder shrug is intact  XII: Tongue movements are normal  Motor:  Muscle tone and bulk are normal.   Strength is symmetrical 5/5 in all four extremities. Sensory: Decreased to light touch and  pin prick in bilateral distal lower extremities  Coordination:  Normal  Finger to Nose and Heel to Shin bilaterally    . Reflexes:  DTR 1 in the upper extremities and barely elicitable in the lower extremities   Plantar response: Flexor bilaterally  Gait: Gait and station is normal. Patient can toe/ heel and tandem walk without difficulty  Romberg: negative  Vascular: No carotid bruit bilaterally        DATA:  LABS:  General Labs:    CBC:   Lab Results   Component Value Date    WBC 4.1 04/19/2019    RBC 2.59 04/19/2019    HGB 8.9 04/19/2019    HCT 26.6 04/19/2019    .6 04/19/2019    MCH 34.3 04/19/2019    MCHC 33.4 04/19/2019    RDW 22.4 04/19/2019     04/19/2019    MPV 8.6 04/19/2019     BMP:    Lab Results   Component Value Date     04/19/2019    K 4.0 04/19/2019    K 5.0 04/18/2019     04/19/2019    CO2 29 04/19/2019    BUN 8 04/19/2019    LABALBU 3.5 04/18/2019    CREATININE 0.6 04/19/2019    CALCIUM 8.7 04/19/2019    GFRAA >60 04/19/2019    GFRAA >60 05/22/2013    LABGLOM >60 04/19/2019    GLUCOSE 117 04/19/2019     RADIOLOGY REVIEW:  I have reviewed radiology image(s) and reports(s) of: MRI brain    IMPRESSION : MRI brain images reviewed with the patient. There was a punctate enhancing lesion in the right frontal lobe which is new from 2015. Etiology is not clear. There is no edema around it. Metastatic disease should be obviously considered with patient's known history of squamous cell cancer of the cervix but no other lesions were seen. Ischemic stroke so to be highly unlikely. Chronic intractable migraine headaches    History of seizure disorder, video EEG monitoring at [facility] was done and this was felt to be nonepileptic events. Patient has not had a spell in more than 8 months. Peripheral neuropathy, mild, probably due to chemotherapy      RECOMMENDATIONS :  Discussed with patient  Reviewed MRI brain images with her  Recommended repeat MRI brain with and without contrast in 3 months time for follow-up and compared to the current study  I will start her on topiramate low-dose for the migraine headaches and slowly increase the dose. Patient had been on high-dose topiramate in the past without any side effects. If the peripheral neuropathy symptoms get worse I will consider getting an EMG nerve conduction study in the future. I will see her back in 2 months for follow-up. Thank you for this consultation        Please note a portion of this chart was generated using dragon dictation software. Although every effort was made to ensure the accuracy of this automated transcription, some errors in transcription may have occurred. If you have questions, please do not hesitate to call me. I look forward to following [facility] along with you.     Sincerely,        Freeman Robertson MD    CC providers:  Radha Cheung MD  [address] [address] [employer] [place]  [address] [facility] [phone]  V[address] [facility] [phone]

## 2019-07-03 PROBLEM — R41.82 ALTERED MENTAL STATUS: Status: ACTIVE | Noted: 2019-01-01

## 2019-07-03 NOTE — ED PROVIDER NOTES
123 18 (!) 85 % -- 191 lb (86.6 kg)       Physical Exam   Constitutional: She is oriented to person, place, and time. She appears well-developed and well-nourished. No distress. HENT:   Head: Normocephalic and atraumatic. Right Ear: External ear normal.   Left Ear: External ear normal.   Eyes: Pupils are equal, round, and reactive to light. Conjunctivae and EOM are normal. Right eye exhibits no discharge. Left eye exhibits no discharge. Scleral icterus is present. Neck: Normal range of motion. Neck supple. No JVD present. Cardiovascular: Regular rhythm, normal heart sounds and intact distal pulses. Exam reveals no gallop and no friction rub. No murmur heard. Tachycardia noted. 2+ radial pulses bilaterally. No pedal edema. No calf tenderness. No JVD. Pulmonary/Chest: Effort normal and breath sounds normal. No stridor. No respiratory distress. She has no wheezes. She has no rales. She exhibits no tenderness. Patient noted to be hypoxic on room air. Patient states she was on oxygen due to mets to her lungs but states she has not been on it for the past week because she was doing better. No wheezing, rales or rhonchi noted throughout. Abdominal: Soft. Bowel sounds are normal. She exhibits no distension and no mass. There is no tenderness. There is no rebound and no guarding. No hernia. Musculoskeletal: Normal range of motion. Lymphadenopathy:     She has no cervical adenopathy. Neurological: She is alert and oriented to person, place, and time. She has normal strength. No sensory deficit. GCS eye subscore is 4. GCS verbal subscore is 5. GCS motor subscore is 6. Gait deferred. Moves all 4 extremities without difficulty. Does have slight tremor noted to the left lower extremity with comparison to the right. No drift or drop. Sensation intact light touch to the upper and lower extremities.   Patient does have what appears to be some slight flattening of the nasolabial fold on the left with

## 2019-07-03 NOTE — DISCHARGE INSTR - COC
Continuity of Care Form    Patient Name: Huong rGeen   :  1980  MRN:  [id number]    Admit date:  7/3/2019  Discharge date:  ***    Code Status Order: Prior   Advance Directives:     Admitting Physician:  No admitting provider for patient encounter. PCP: Beth Conway MD    Discharging Nurse: [facility] Unit/Room#: ED-0002/02  Discharging Unit Phone Number: ***    Emergency Contact:   Extended Emergency Contact Information  Primary Emergency Contact: Migel Lal [address] Phone: [phone]  Relation: Spouse  Secondary Emergency Contact: [address] [facility] Phone: [phone]  Work Phone: [phone]  Relation: Parent    Past Surgical History:  Past Surgical History:   Procedure Laterality Date    BRONCHOSCOPY  10/16/14    Urgent intubation, direct laryngoscopy, subglottic tracheoscopy    BRONCHOSCOPY  10/18/2014    WITH EXTUBATION IN OR AND LARYNGOSCOPY     SECTION      x 3, 2005, 2006, 2008    CHOLECYSTECTOMY  2012    CYSTOSCOPY Right 2019    CYSTOSCOPY performed by Linda Dawkins MD at [facility] Right 2019    CYSTOSCOPY WITH RETROGRADE PYELOGRAM RIGHT STENT REMOVAL WITH PLACEMENT OF METAL STENT performed by Dong Marcial MD at [address] [place] [address]  2019    aborted planned procedure of BTL and uterine ablation.        Immunization History:   Immunization History   Administered Date(s) Administered    Influenza Virus Vaccine 10/12/2014       Active Problems:  Patient Active Problem List   Diagnosis Code    Stridor R06.1    Acute bronchitis J20.9    Fibromyalgia M79.7    Bradycardia R00.1    Chest pain R07.9    GERD (gastroesophageal reflux disease) K21.9    Vaginal bleeding N93.9    History of juvenile rheumatoid arthritis Z87.39    SLE (systemic lupus erythematosus) ([facility]) M32.9    Lupus anticoagulant positive R76.0    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, antepartum ([facility] [address] O99.019, hour   Intake 1000 ml   Output --   Net 1000 ml     No intake/output data recorded.     Safety Concerns:     [address] [facility] Safety Concerns:389675663}    Impairments/Disabilities:      [address] [facility] Impairments/Disabilities:982879382}    Nutrition Therapy:  Current Nutrition Therapy:   [address] [facility] Diet List:092994617}    Routes of Feeding: {CHP DME Other Feedings:247542786}  Liquids: {Slp liquid thickness:10156}  Daily Fluid Restriction: {CHP DME Yes amt example:095661283}  Last Modified Barium Swallow with Video (Video Swallowing Test): {Done Not Done XNRX:985358272}    Treatments at the Time of Hospital Discharge:   Respiratory Treatments: ***  Oxygen Therapy:  {Therapy; copd oxygen:34067}  Ventilator:    { CC Vent QMQN:312069908}    Rehab Therapies: {THERAPEUTIC INTERVENTION:2668087138}  Weight Bearing Status/Restrictions: Edenilson Blair  Weight Bearin}  Other Medical Equipment (for information only, NOT a DME order):  {EQUIPMENT:230860709}  Other Treatments: ***    Patient's personal belongings (please select all that are sent with patient):  {CHP DME Belongings:189050210}    RN SIGNATURE:  {Esignature:146798444}    CASE MANAGEMENT/SOCIAL WORK SECTION    Inpatient Status Date: ***    Readmission Risk Assessment Score:  Readmission Risk              Risk of Unplanned Readmission:        0           Discharging to Facility/ Agency   · Name:   · Address:  · Phone:  · Fax:    Dialysis Facility (if applicable)   · Name:  · Address:  · Dialysis Schedule:  · Phone:  · Fax:    / signature: {Esignature:702454649}    PHYSICIAN SECTION    Prognosis: {Prognosis:3551899233}    Condition at Discharge: [address] Blair Patient Condition:621896417}    Rehab Potential (if transferring to Rehab): {Prognosis:7388048145}    Recommended Labs or Other Treatments After Discharge: ***    Physician Certification: I certify the above information and transfer of Frankey Harada  is necessary for the continuing treatment of the diagnosis listed and that she requires {Admit to Appropriate Level of Care:33930} for {GREATER/LESS:538777642} 30 days.      Update Admission H&P: {CHP DME Changes in HXSQE:010089748}    PHYSICIAN SIGNATURE:  {Esignature:702292712}

## 2019-07-03 NOTE — PROGRESS NOTES
Pharmacy Note  Vancomycin Consult    Lucas Presley is a 42 y.o. female started on Vancomycin for Sepsis/PNA; consult received from Dr. Cynthia Roche to manage therapy. Also receiving the following antibiotics: cefepime. Patient Active Problem List   Diagnosis    Stridor    Acute bronchitis    Fibromyalgia    Bradycardia    Chest pain    GERD (gastroesophageal reflux disease)    Vaginal bleeding    History of juvenile rheumatoid arthritis    SLE (systemic lupus erythematosus) (HCC)    Lupus anticoagulant positive    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, antepartum ([facility])    Cervical cancer ([facility])    Multifocal pneumonia    Pulmonary nodule    Hydronephrosis    Ground glass opacity present on imaging of lung    Lung nodules    Lymphadenopathy    Constipation due to opioid therapy    Sepsis ([facility] [address]    Septic shock ([facility])    Septicemia ([facility] [address]    History of cancer    Status post cystoscopy    Immunocompromised ([facility] [address]    Complicated UTI (urinary tract infection)    Anemia    Seizure disorder ([facility])    Class 1 obesity due to excess calories with body mass index (BMI) of 31.0 to 31.9 in adult    Weight loss counseling, encounter for    Hypomagnesemia    Altered mental status       Allergies:  Food and Spinach     Temp max: afebrile    Recent Labs     07/03/19  1210   BUN 36*       Recent Labs     07/03/19  1210   CREATININE 4.5*       Recent Labs     07/03/19  1210   WBC 6.9         Intake/Output Summary (Last 24 hours) at 7/3/2019 1733  Last data filed at 7/3/2019 1626  Gross per 24 hour   Intake 1050 ml   Output --   Net 1050 ml       Culture Date      Source                       Results  7/3/19                  blood x2                      pending  7/3/19                  urine                            pending    Ht Readings from Last 1 Encounters:   07/03/19 5' 6.93\" (1.7 m)        Wt Readings from Last 1 Encounters:   07/03/19 191 lb (86.6 kg)         Body mass index is 29.98 kg/m².     Estimated Creatinine Clearance: 19 mL/min (A) (based on SCr of 4.5 mg/dL (H)). Goal Trough Level: 15-20 mcg/mL    Assessment/Plan:  Patient received first 1250 mg vancomycin dose in ED at approximately 1630. Given degree of current MIHAI, recommend pulse dosing. Will order a random vancomcyin level with morning labs to assess next dose/dosing interval, with anticipation that renal function will likely begin to improve. Timing of trough level will be determined based on culture results, renal function, and clinical response. Thank you for the consult. Will continue to follow.      Bradford Arroyo, PharmD  Clinical Pharmacist [id number]  7/3/2019

## 2019-07-03 NOTE — CONSULTS
Spouse name: Not on file    Number of children: Not on file    Years of education: Not on file    Highest education level: Not on file   Occupational History     Comment: Real Estate   Social Needs    Financial resource strain: Not on file    Food insecurity:     Worry: Not on file     Inability: Not on file    Transportation needs:     Medical: Not on file     Non-medical: Not on file   Tobacco Use    Smoking status: Former Smoker     Packs/day: 0.25     Years: 3.00     Pack years: 0.75     Types: Cigarettes     Start date:      Last attempt to quit: 2001     Years since quittin.1    Smokeless tobacco: Never Used   Substance and Sexual Activity    Alcohol use: No    Drug use: No    Sexual activity: Yes     Partners: Male   Lifestyle    Physical activity:     Days per week: Not on file     Minutes per session: Not on file    Stress: Not on file   Relationships    Social connections:     Talks on phone: Not on file     Gets together: Not on file     Attends Taoism service: Not on file     Active member of club or organization: Not on file     Attends meetings of clubs or organizations: Not on file     Relationship status: Not on file    Intimate partner violence:     Fear of current or ex partner: Not on file     Emotionally abused: Not on file     Physically abused: Not on file     Forced sexual activity: Not on file   Other Topics Concern    Not on file   Social History Narrative    Not on file       Family History:       Problem Relation Age of Onset    Diabetes Mother     Crohn's Disease Sister     Hypertension Maternal Grandmother     Heart Failure Maternal Grandfather     Stroke Maternal Grandfather     Diabetes Paternal Grandmother     Asthma Paternal Grandfather          REVIEW OF SYSTEMS:    Unable to obtain due to patient factors    PHYSICAL EXAM:    Vitals:    /71   Pulse 111   Temp 97.6 °F (36.4 °C) (Infrared)   Resp 20   Ht 5' 6.93\" (1.7 m)

## 2019-07-03 NOTE — ED NOTES
Dr. Fidelia Oliver bedside to update pt/ pt family member on plan of care.       Hoda Roland RN  07/03/19 7845

## 2019-07-04 NOTE — PROGRESS NOTES
Pt arrives to ICU lethargic, responsive only to sternal rub. Does stay awake and answer questions, aware of place and person, disoriented to everything else and hallucinating. To start Narcan gtt, will call critical care first and update. Family at bedside and updated. Hemodynamically stable with 4L O2. Ortiz in place with orange urine- does take pyridium at home.  Labs drawn from port R chest.

## 2019-07-04 NOTE — CONSULTS
[facility] Pulmonary and Critical Care   Consult Note      Reason for Consult: Altered mental status and MIHAI  Requesting Physician: Maral Musa    Subjective:   CHIEF COMPLAINT: Altered mental status     HPI: Patient was brought in by the family yesterday on account of worsening mentation and concerns for aspiration. She was noted to have MIHAI with creatinine of 4.5. Patient also lately has been on multiple narcotics-MS Contin, oxycodone and gabapentin. Has also been using NSAIDs as needed for pain management. Family also noticed that she had a cough whenever she eats lately. Today patient is able to have a conversation with me, though still has some slurred speech with difficulty in finding words. Patient has been transferred to ICU for close monitoring. Patient has history of advanced stage cervical cancer with pulmonary/brain mets. Currently undergoing chemotherapy. Follows with me in office-on home oxygen. History of right hydronephrosis status post stent.        The patient is a 42 y.o. female with significant past medical history of:      Diagnosis Date    Cervical cancer ([address]     Endometriosis     Fibromyalgia     GERD (gastroesophageal reflux disease)     Hip fracture ([address]     LEFT     Hypoglycemia     Lupus ([address]     Neuropathy     Ovarian cyst     Seizures ([address]         Past Surgical History:        Procedure Laterality Date    BRONCHOSCOPY  10/16/14    Urgent intubation, direct laryngoscopy, subglottic tracheoscopy    BRONCHOSCOPY  10/18/2014    WITH EXTUBATION IN OR AND LARYNGOSCOPY     SECTION      x 3, 2005, 2006, 2008    CHOLECYSTECTOMY  2012    CYSTOSCOPY Right 2019    CYSTOSCOPY performed by Linda Dawkins MD at [facility] Right 2019    CYSTOSCOPY WITH RETROGRADE PYELOGRAM RIGHT STENT REMOVAL WITH PLACEMENT OF METAL STENT performed by Dong Marcial MD at [address] [place] [address]  2019    aborted planned procedure of BTL and uterine

## 2019-07-04 NOTE — CONSULTS
Procedure Laterality Date    BRONCHOSCOPY  10/16/14    Urgent intubation, direct laryngoscopy, subglottic tracheoscopy    BRONCHOSCOPY  10/18/2014    WITH EXTUBATION IN OR AND LARYNGOSCOPY     SECTION      x 3, 2005, 2006, 2008    CHOLECYSTECTOMY  2012    CYSTOSCOPY Right 2019    CYSTOSCOPY performed by Jenifer Ware MD at [facility] Right 2019    CYSTOSCOPY WITH RETROGRADE PYELOGRAM RIGHT STENT REMOVAL WITH PLACEMENT OF METAL STENT performed by Katelyn Villar MD at [address] [place] [address]  2019    aborted planned procedure of BTL and uterine ablation. OB/GYN HISTORY:   Menstrual History:  OB History        4    Para   3    Term                AB   1    Living   3       SAB        TAB        Ectopic        Molar        Multiple        Live Births   3              No LMP recorded. (Menstrual status: Irregular periods).          SOCIAL HISTORY:     Social History     Socioeconomic History    Marital status:      Spouse name: Not on file    Number of children: Not on file    Years of education: Not on file    Highest education level: Not on file   Occupational History     Comment: Real Estate   Social Needs    Financial resource strain: Not on file    Food insecurity:     Worry: Not on file     Inability: Not on file    Transportation needs:     Medical: Not on file     Non-medical: Not on file   Tobacco Use    Smoking status: Former Smoker     Packs/day: 0.25     Years: 3.00     Pack years: 0.75     Types: Cigarettes     Start date:      Last attempt to quit: 2001     Years since quittin.2    Smokeless tobacco: Never Used   Substance and Sexual Activity    Alcohol use: No    Drug use: No    Sexual activity: Yes     Partners: Male   Lifestyle    Physical activity:     Days per week: Not on file     Minutes per session: Not on file    Stress: Not on file   Relationships    Social connections:     Talks on phone: Not on file     Gets together: Not on file     Attends Pentecostalism service: Not on file     Active member of club or organization: Not on file     Attends meetings of clubs or organizations: Not on file     Relationship status: Not on file    Intimate partner violence:     Fear of current or ex partner: Not on file     Emotionally abused: Not on file     Physically abused: Not on file     Forced sexual activity: Not on file   Other Topics Concern    Not on file   Social History Narrative    Not on file     This patient is accompanied in the hospital by her sibling. FAMILY HISTORY:     Family History   Problem Relation Age of Onset    Diabetes Mother     Crohn's Disease Sister     Hypertension Maternal Grandmother     Heart Failure Maternal Grandfather     Stroke Maternal Grandfather     Diabetes Paternal Grandmother     Asthma Paternal Grandfather      HEALTH MAINTENANCE:   · Mammogram - Diagnostic (unilateral) on 02/08/2017, right breast US- Normal fibroglandular tissue is visualized sonographically, which corresponds to the patient's palpable finding. Clinical follow-up recommended. ASSESSMENT: BI-RADS CATEGORY (2) Benign.    · Pap Smear on 06/2012, HIGH-GRADE SQUAMOUS INTRAEPITHELIAL LESION (HSIL)-encompassing moderate and severe dysplasia   · Pap Smear on 02/11/2015, NIL, Negative HPV   · Pap Smear on 07/2013, NIL   · Pap Smear on 07/17/2018, NIL, + HPV 16  · Pap Smear on 10/2012, LOW-GRADE SQUAMOUS INTRAEPITHELIAL LESION (LSIL)encompassing HPV/mild dysplasia/CIN1    ALLERGIES:     Allergies as of 07/03/2019 - Review Complete 07/03/2019   Allergen Reaction Noted    Food Hives 02/26/2014    Spinach  10/08/2014       MEDICATIONS:     Current Facility-Administered Medications:     benzocaine-menthol (CEPACOL SORE THROAT) lozenge 1 lozenge, 1 lozenge, Oral, Q2H PRN, Mary Juan MD    HYDROmorphone HCl PF (DILAUDID) injection 0.5 mg, 0.5 mg, Intravenous, Q4H PRN, Marija Grimes MD, 0.5 mg at liver, compatible with fatty infiltration. There are clips from prior cholecystectomy. No peripancreatic inflammatory change GI/Bowel: Large stool load is seen in the colon. No bowel obstruction. No significant pericolonic inflammatory change. Appendix is normal in caliber Pelvis: Trace free fluid seen within the pelvis. Bladder is distended. Peritoneum/Retroperitoneum: No retroperitoneal adenopathy. No aneurysm Bones/Soft Tissues: Tiny periumbilical hernia containing fat is seen. .  No acute bony abnormality     Increased de pendant opacity is seen at the lung bases, most likely atelectasis in the absence of clinical signs of pneumonia Mild right-sided hydronephrosis, similar to prior. Right ureteral stent is unchanged Fatty liver     Mri Brain W Wo Contrast    Result Date: 6/17/2019  EXAMINATION: MRI OF THE BRAIN WITHOUT AND WITH CONTRAST  6/17/2019 11:25 am TECHNIQUE: Multiplanar multisequence MRI of the head/brain was performed without and with the administration of intravenous contrast. COMPARISON: MRI brain without and with contrast 03/06/2015. HISTORY: ORDERING SYSTEM PROVIDED HISTORY: Intractable headache, unspecified chronicity pattern, unspecified headache type TECHNOLOGIST PROVIDED HISTORY: Ordering Physician Provided Reason for Exam: Pt states cervical CA, since chemo has had headaches dizziness and numbness. Acuity: Acute Type of Exam: Initial FINDINGS: INTRACRANIAL STRUCTURES/VENTRICLES:  There is no acute infarct. No evidence of intracranial hemorrhage. Punctate focus of enhancement in posterior right insular cortex (series 10, image 16). Additional small foci of enhancement are present in the superior right frontal lobe somewhat linear in distribution. No FLAIR hyperintensity adjacent to suggest vasogenic edema. The ventricles are normal in size. Normal intracranial arterial flow voids are preserved. Sellar and suprasellar regions are unremarkable.   The cerebellar tonsils are normal in

## 2019-07-04 NOTE — PROGRESS NOTES
ALT 42*   BILITOT 0.5   ALKPHOS 113     Recent Labs     07/03/19  1210   INR 1.13     Recent Labs     07/03/19  1210 07/03/19  1745 07/03/19  2303   CKTOTAL 771*  --   --    TROPONINI 0.10* 0.10* 0.10*       Assessment/Plan:    Active Hospital Problems    Diagnosis Date Noted    Acute renal failure ([facility] [address] [N17.9]     Altered mental status [R41.82] 07/03/2019    Acute encephalopathy [G93.40]      Acute metabolic encephalopathy likely 2/2 uremia from MIHAI with decreased secretion of opiates, baclofen, gabapentin, ativan  - s/p narcan GTT  - held opiates, baclofen, gabapentin, ativan  - IVF hydration  - monitor Cr and lytes  - treatment as per below  - consider MRI to re-eval possible brain meds  - neuro on board     SIRS. Possibly 2/2 above. Now resolved. - started on IV cefepime and vanco on admission; now d/arianna  - follow up cultures  - respiratory care  - CC management     MIHAI. Possibly prerenal vs ATN vs med induced  - IVF hydration  - sargent placed  - monitor cr and lytes  - nephro consult     Indeterminate troponin elevation; likely 2/2 above with overlying MIHAI. - serial troponins stable  - monitor tele     Stage IV cervical cancer with mets. Concerns for new onset mets to brain. Concurrently on chemo.     - oncology evaluation     PMH of lupus, seizures, neuropathy      DVT Prophylaxis: heparin      Diet: Diet NPO Effective Now  Code Status: Full Code    Dispo - Inpt, ICU    Janak Faith MD

## 2019-07-04 NOTE — CONSULTS
Fibromyalgia     GERD (gastroesophageal reflux disease)     Hip fracture (HCC)     LEFT 2002    Hypoglycemia     Lupus (HCC)     Neuropathy     Ovarian cyst     Seizures (HCC)      Family History   Problem Relation Age of Onset    Diabetes Mother     Crohn's Disease Sister     Hypertension Maternal Grandmother     Heart Failure Maternal Grandfather     Stroke Maternal Grandfather     Diabetes Paternal Grandmother     Asthma Paternal Grandfather      Social History     Socioeconomic History    Marital status:      Spouse name: None    Number of children: None    Years of education: None    Highest education level: None   Occupational History     Comment: Real Estate   Social Needs    Financial resource strain: None    Food insecurity:     Worry: None     Inability: None    Transportation needs:     Medical: None     Non-medical: None   Tobacco Use    Smoking status: Former Smoker     Packs/day: 0.25     Years: 3.00     Pack years: 0.75     Types: Cigarettes     Start date:      Last attempt to quit: 2001     Years since quittin.2    Smokeless tobacco: Never Used   Substance and Sexual Activity    Alcohol use: No    Drug use: No    Sexual activity: Yes     Partners: Male   Lifestyle    Physical activity:     Days per week: None     Minutes per session: None    Stress: None   Relationships    Social connections:     Talks on phone: None     Gets together: None     Attends Baptism service: None     Active member of club or organization: None     Attends meetings of clubs or organizations: None     Relationship status: None    Intimate partner violence:     Fear of current or ex partner: None     Emotionally abused: None     Physically abused: None     Forced sexual activity: None   Other Topics Concern    None   Social History Narrative    None     Current Facility-Administered Medications   Medication Dose Route Frequency Provider Last Rate Last Dose    transcription, some errors in transcription may have occurred.

## 2019-07-04 NOTE — PROGRESS NOTES
1930-Dr. Fu at bedside during shift handoff. Pt able to respond to verbal stimuli and answer questions appropriately. Will plan to hold off on HD for now. 2015- Pt assessment complete, see flowsheets. VSS. Pt awakens to verbal stimuli, follows commands, is able to answer simple questions and identify family members in room. Ortiz in place and patent. Family concerned for \"swollen abdomen. \" Abdomen soft, nontender, hypoactive bowel sounds. Order obtained for PRN suppository. POC discussed with pt and family at bedside, including: monitor mental status, IV fluids, Narcan PRN, safety and comfort. No acute needs at this time. Will monitor closely.  Eneida Luque

## 2019-07-05 NOTE — PLAN OF CARE
Problem: Mental Status - Impaired:  Goal: Mental status will improve  Description  Mental status will improve  7/5/2019 0015 by Jerman Rice RN  Outcome: Met This Shift     Problem: Falls - Risk of:  Goal: Will remain free from falls  Description  Will remain free from falls  7/5/2019 0015 by Jerman Rice RN  Outcome: Ongoing     Problem: Falls - Risk of:  Goal: Absence of physical injury  Description  Absence of physical injury  7/5/2019 0015 by Jerman iRce RN  Outcome: Ongoing     Problem: Pain:  Goal: Pain level will decrease  Description  Pain level will decrease  7/5/2019 0015 by Jerman Rice RN  Outcome: Ongoing     Problem: Pain:  Goal: Control of chronic pain  Description  Control of chronic pain  7/5/2019 0015 by Jerman Rice RN  Outcome: Ongoing

## 2019-07-05 NOTE — ONCOLOGY
for Exam: Pt states cervical CA, since chemo has had headaches dizziness and numbness. Acuity: Acute Type of Exam: Initial FINDINGS: INTRACRANIAL STRUCTURES/VENTRICLES:  There is no acute infarct. No evidence of intracranial hemorrhage. Punctate focus of enhancement in posterior right insular cortex (series 10, image 16). Additional small foci of enhancement are present in the superior right frontal lobe somewhat linear in distribution. No FLAIR hyperintensity adjacent to suggest vasogenic edema. The ventricles are normal in size. Normal intracranial arterial flow voids are preserved. Sellar and suprasellar regions are unremarkable. The cerebellar tonsils are normal in position. ORBITS: The visualized portion of the orbits demonstrate no acute abnormality. SINUSES: The visualized paranasal sinuses and mastoid air cells are well aerated. BONES/SOFT TISSUES: The bone marrow signal intensity appears normal. The soft tissues demonstrate no acute abnormality. Punctate foci of enhancement in the right frontal lobe, new from 2015, concerning for metastatic disease.        Current Medications   sodium chloride  250 mL Intravenous Once    gabapentin  300 mg Oral Nightly    morphine  30 mg Oral BID    baclofen  10 mg Oral BID    DULoxetine  20 mg Oral Daily    senna  1 tablet Oral TID    tamsulosin  0.4 mg Oral Daily    magnesium oxide  400 mg Oral Daily    topiramate  25 mg Oral BID    sodium chloride flush  10 mL Intravenous 2 times per day    heparin (porcine)  5,000 Units Subcutaneous 3 times per day        ASSESSMENT & PLAN  Acute renal failure uncertain etiology improved with hydration    Constipation will try relistor continue sennokot and increase dose    Metastatic cervical cancer most recent treatment avastin only    Changes in mental status will repeat mri at appropriate interval          I have discussed the above stated plan with the patient and they verbalized understanding and agreed with

## 2019-07-05 NOTE — PROGRESS NOTES
Hospitalist Progress Note      PCP: Fermin Gagnon MD    Date of Admission: 7/3/2019    Chief Complaint: AMS      Subjective:   Pt alert and oriented in am.  AMS and hallucinations improved. Otherwise denies acute complaints. Cr trending down. Medications:  Reviewed    Infusion Medications    sodium chloride Stopped (07/05/19 1123)    sodium chloride Stopped (07/05/19 1123)     Scheduled Medications    sodium chloride  250 mL Intravenous Once    gabapentin  300 mg Oral Nightly    methylnaltrexone  12 mg Subcutaneous Once    polycarbophil  625 mg Oral Daily    senna  2 tablet Oral BID    morphine  30 mg Oral BID    baclofen  10 mg Oral BID    DULoxetine  20 mg Oral Daily    tamsulosin  0.4 mg Oral Daily    magnesium oxide  400 mg Oral Daily    topiramate  25 mg Oral BID    sodium chloride flush  10 mL Intravenous 2 times per day    heparin (porcine)  5,000 Units Subcutaneous 3 times per day     PRN Meds: ondansetron, promethazine, benzocaine-menthol, HYDROmorphone, oxyCODONE, LORazepam, opium-belladonna, sodium chloride flush, acetaminophen, naloxone, bisacodyl      Intake/Output Summary (Last 24 hours) at 7/5/2019 1318  Last data filed at 7/5/2019 0559  Gross per 24 hour   Intake 2955 ml   Output 5150 ml   Net -2195 ml       Exam:    /79   Pulse 83   Temp 97.9 °F (36.6 °C)   Resp 14   Ht 5' 6\" (1.676 m)   Wt 195 lb 12.8 oz (88.8 kg)   SpO2 96%   BMI 31.60 kg/m²     Gen/overall appearance: Not in acute distress. Alert. Head: Normocephalic, atraumatic  Eyes: EOMI, no scleral icterus  CVS: regular rate and rhythm, Normal S1S2  Pulm: Clear to auscultation bilaterally. No crackles/wheezes  Gastrointestinal: Soft, nontender, nondistended, no guarding or rebound  Extremities: No edema.  No erythema or warmth  Neuro: No gross focal deficits noted  Skin: Warm, dry    Labs:   Recent Labs     07/03/19  1210 07/04/19  0550 07/05/19  0526   WBC 6.9 4.0 3.6*   HGB 8.1* 7.4* 7.0*   HCT

## 2019-07-05 NOTE — PROGRESS NOTES
Nephrology Follow up Note  [phone]   [email or web address]        Reason for Consult:  MIHAI    HPI: 41 yo female with metastatic cervical cancer presented with AMS and MIHAI. Subjective:  -pt seen and examined  -PMSHx and meds reviewed  -No family at bedside        No acute events ON-Scr better, UO is good  -lytes are stable  -alert and answering questions appropriately      ROS: No chest pain/shortness of breath/fever/nausea/vomiting  PSFH: No visitor    Scheduled Meds:   sodium chloride  250 mL Intravenous Once    gabapentin  300 mg Oral Nightly    morphine  30 mg Oral BID    baclofen  10 mg Oral BID    DULoxetine  20 mg Oral Daily    senna  1 tablet Oral TID    tamsulosin  0.4 mg Oral Daily    magnesium oxide  400 mg Oral Daily    topiramate  25 mg Oral BID    sodium chloride flush  10 mL Intravenous 2 times per day    heparin (porcine)  5,000 Units Subcutaneous 3 times per day     Continuous Infusions:   sodium chloride 100 mL/hr at 07/03/19 1636    sodium chloride 100 mL/hr at 07/04/19 1044     PRN Meds:.ondansetron, promethazine, benzocaine-menthol, HYDROmorphone, oxyCODONE, LORazepam, opium-belladonna, sodium chloride flush, acetaminophen, naloxone, bisacodyl      HISTORY OF PRESENT ILLNESS:                This is a patient with significant past medical history of stage IV cervical CA with mets to lungs, brain, on chemotherapy, h/o lupus, sizures, neuropathy who presented to ER with AMS. She was noted to have MIHAI with Scr 4.5, baseline is normal.  H/o obstructive uropathy s/p right stent. Patient is unable to provide history due to confusio. Per usband, symptoms started 1-2 days prior. Last night patient was noted to be restless. This am sister brought patient to ER. In ER she was confused but responsive initially but responsive but later became unresponsive, after narcan she became agitated but then became unresponsive.   BY the time she was transferred to ICU, she would awaken only to sternal rub. She is on gabapentin 300mg tid, opiates, benzos, baclofen. Sister denies h/o opiates but listed in EMR. _Scr was noted to be 4.5 at adm, baseline is 0.5-0.7,  Improved to 3.9 after IVF. UO is good  -pt easily awakens and confused but responsive. Lytes are stable    PHYSICAL EXAM:    Vitals:    /69   Pulse 87   Temp 97.6 °F (36.4 °C) (Temporal)   Resp 11   Ht 5' 6\" (1.676 m)   Wt 195 lb 12.8 oz (88.8 kg)   SpO2 95%   BMI 31.60 kg/m²   I/O last 3 completed shifts: In: 3005 [P. O.:650; I.V.:2355]  Out: 5150 [Urine:5150]  No intake/output data recorded. Physical Exam:  Gen: NAD  HEENT: dry OM, + pallor, no   CV: RRR no m/r/g. No S3.  Lungs: no resp distress, clear air entry  Abd: S/NT +BS soft  Ext: No edema, no cyanosis  Skin: Warm. No rashes appreciated. : No TTP over bladder, nondistended. Neuro: more alert and oriented times 2-3. Joints: No erythema noted over joints. DATA:    CBC:   Lab Results   Component Value Date    WBC 3.6 07/05/2019    RBC 2.01 07/05/2019    HGB 7.0 07/05/2019    HCT 21.2 07/05/2019    .4 07/05/2019    MCH 34.6 07/05/2019    MCHC 32.8 07/05/2019    RDW 17.8 07/05/2019     07/05/2019    MPV 8.3 07/05/2019     BMP:    Lab Results   Component Value Date     07/05/2019    K 4.2 07/05/2019     07/05/2019    CO2 22 07/05/2019    BUN 16 07/05/2019    LABALBU 3.7 07/03/2019    CREATININE 1.5 07/05/2019    CALCIUM 8.5 07/05/2019    GFRAA 47 07/05/2019    GFRAA >60 05/22/2013    LABGLOM 39 07/05/2019    GLUCOSE 98 07/05/2019       IMPRESSION/RECOMMENDATIONS:      MIHAI: likely pre-renal with progression to ATN, NSAIDs listed but not taking it. H/o obstructive uropathy, s/p right stent, CT shows good stent placement with mild HN. UA is positive for blood/pyuria-culture is pending. She does have a h/o lupus but unlikely LN given.   She is on cisplatin, avastin and taxol-both cisplatin (tubular injury) and avastin

## 2019-07-05 NOTE — PROGRESS NOTES
anxiety, behavior problems, depression, fatigue and sleep disturbance  Endocrine: negative for diabetic symptoms including none, neuropathy, polyphagia, polyuria, polydipsia, vomiting and diarrhea and temperature intolerance  Allergic/Immunologic: negative for anaphylaxis, angioedema, hay fever and urticaria    Objective:     Patient Vitals for the past 8 hrs:   BP Temp Temp src Pulse Resp SpO2 Weight   07/05/19 1130 130/83 98 °F (36.7 °C) Temporal 90 16 97 % --   07/05/19 0800 114/69 97.6 °F (36.4 °C) Temporal 87 11 95 % --   07/05/19 0600 124/74 -- -- 92 13 93 % --   07/05/19 0500 -- -- -- -- -- -- 195 lb 12.8 oz (88.8 kg)   07/05/19 0400 122/64 97.8 °F (36.6 °C) Temporal 92 12 93 % --     I/O last 3 completed shifts: In: 3005 [P. O.:650; I.V.:2355]  Out: 5150 [Urine:5150]  No intake/output data recorded.     General Appearance: alert and oriented to person, place and time, well developed and well- nourished, in no acute distress  Skin: warm and dry, no rash or erythema  Head: normocephalic and atraumatic  Eyes: pupils equal, round, and reactive to light, extraocular eye movements intact, conjunctivae normal  ENT: external ear and ear canal normal bilaterally, nose without deformity, nasal mucosa and turbinates normal  Neck: supple and non-tender without mass, no cervical lymphadenopathy  Pulmonary/Chest: clear to auscultation bilaterally- no wheezes, rales or rhonchi, normal air movement, no respiratory distress  Cardiovascular: normal rate, regular rhythm,  no murmurs, rubs, distal pulses intact, no carotid bruits  Abdomen: soft, non-tender, non-distended, normal bowel sounds, no masses or organomegaly  Lymph Nodes: Cervical, supraclavicular normal  Extremities: no cyanosis, clubbing or edema  Musculoskeletal: normal range of motion, no joint swelling, deformity or tenderness  Neurologic: alert, no focal neurologic deficits    Data Review:  CBC:   Lab Results   Component Value Date    WBC 3.6 07/05/2019    RBC hydronephrosis.  Right-sided ureteral stent is   seen.  Proximal portion is seen in the region of the right renal pelvis. Distal portion is seen within the bladder.       Heterogeneous low attenuation seen throughout the liver, compatible with   fatty infiltration.  There are clips from prior cholecystectomy.       No peripancreatic inflammatory change       GI/Bowel: Large stool load is seen in the colon.  No bowel obstruction.  No   significant pericolonic inflammatory change.  Appendix is normal in caliber       Pelvis: Trace free fluid seen within the pelvis.  Bladder is distended.       Peritoneum/Retroperitoneum: No retroperitoneal adenopathy.  No aneurysm       Bones/Soft Tissues: Tiny periumbilical hernia containing fat is seen. Aureliano Caprice   acute bony abnormality           Impression   Increased de pendant opacity is seen at the lung bases, most likely   atelectasis in the absence of clinical signs of pneumonia       Mild right-sided hydronephrosis, similar to prior.  Right ureteral stent is   unchanged       Fatty liver       Problem List:     Altered mental status related to opioid/gabapentin toxicity  MIHAI  Right hydronephrosis with ureteral stenting  Stage IV metastatic cervical cancer    Assessment/Plan:     Patient's mental status has improved remarkably full states now that she is starting to feel some pain in the lower abdomen related to malignancy. Will reinstitute MS Contin-I would prefer to reduce the dose at least slightly to 25 mg twice daily, continue with oxycodone 10 mg as needed. Doubt that patient clearly has an indication for gabapentin use-states that she uses it for fibromyalgia and lupus which is very questionable. Would prefer to wean and discontinue gabapentin. Patient would benefit from referral to pain management service as outpatient. States that currently her pain management is by Dr. Hawa Sen. Okay for transfer out of ICU today.     Saba Epperson

## 2019-07-06 NOTE — PROGRESS NOTES
VSS see flow sheet. Assess complete. See flow sheet. A/O. Moves extremities x 4. steady gait. Lungs clear. Bowel sounds active. Abdomen soft. C/o nausea. Discussed POC for this shift. HS medications administered per order as well as PRN phenergan. See emar. Patient up to BR to void and have bowel movement. Denies any other needs at this time. Spouse in room. Bedside table and call light within reach.

## 2019-07-06 NOTE — PROGRESS NOTES
Administered prn compazine per order/request. See emar. Denies any other needs at this time. Will monitor. Bedside table and call light within reach.

## 2019-07-06 NOTE — PROGRESS NOTES
07/03/2019    ALT 42 (H) 07/03/2019    LABGLOM 56 (A) 07/06/2019    GFRAA >60 07/06/2019    AGRATIO 1.3 07/03/2019    GLOB 2.9 07/03/2019       No results found for: PTINR      IMAGING:       Ct Head Wo Contrast    Result Date: 7/4/2019  EXAMINATION: CT OF THE HEAD WITHOUT CONTRAST  7/3/2019 12:23 pm TECHNIQUE: CT of the head was performed without the administration of intravenous contrast. Dose modulation, iterative reconstruction, and/or weight based adjustment of the mA/kV was utilized to reduce the radiation dose to as low as reasonably achievable. COMPARISON: Brain MRI 06/17/2019, head CT 05/25/2012 HISTORY: ORDERING SYSTEM PROVIDED HISTORY: ams TECHNOLOGIST PROVIDED HISTORY: Has a \"code stroke\" or \"stroke alert\" been called? ->No Reason for Exam: AMS Acuity: Unknown Type of Exam: Initial History of cervical cancer, recent brain MRI revealing punctate foci of right frontal metastatic disease. FINDINGS: BRAIN/VENTRICLES: There is no acute intracranial hemorrhage, mass effect or midline shift. No abnormal extra-axial fluid collection. The gray-white differentiation is maintained without evidence of an acute infarct. There is no evidence of hydrocephalus. No focus of acute abnormal brain attenuation is identified. The previously identified and described punctate foci of intracranial metastatic disease as seen within the right frontal lobe on prior MRI are not identified on noncontrast CT imaging. ORBITS: The visualized portion of the orbits demonstrate no acute abnormality. SINUSES: The visualized paranasal sinuses and mastoid air cells demonstrate no acute abnormality. SOFT TISSUES/SKULL:  No acute abnormality of the visualized skull or soft tissues. 1. No acute intracranial abnormality. 2. The previously identified and described punctate foci of right frontal intracranial metastatic disease are not identified on noncontrast CT imaging.      Xr Chest Portable    Result Date: 7/3/2019  EXAMINATION: ONE XRAY VIEW OF THE CHEST 7/3/2019 12:52 pm COMPARISON: CT chest, 06/17/2019 HISTORY: ORDERING SYSTEM PROVIDED HISTORY: sob - ams TECHNOLOGIST PROVIDED HISTORY: Reason for exam:->sob - ams Reason for Exam: Aphasia (LKW Monday morning. Pt. with slurred speech and AMS. Pt. reports left sided feeling different/ more weak than others. Cervical ca. hx with mets. ) ap port uprt @ 1257hrs Acuity: Chronic Type of Exam: Ongoing FINDINGS: The right infusion port catheter terminates over the right atrium. The cardiac silhouette is globally prominent. Pulmonary vessels are congested. Right hemidiaphragm is chronically elevated. No airspace consolidation or pleural effusion is evident. Osseous structures are unremarkable. Vascular congestion is noted. No acute pulmonary disease is otherwise appreciated. Stable cardiomegaly. Ct Chest Abdomen Pelvis W Contrast    Result Date: 6/17/2019  EXAMINATION: CT OF THE CHEST, ABDOMEN, AND PELVIS WITH CONTRAST 6/17/2019 10:08 am TECHNIQUE: CT of the chest, abdomen and pelvis was performed with the administration of intravenous contrast. Multiplanar reformatted images are provided for review. Dose modulation, iterative reconstruction, and/or weight based adjustment of the mA/kV was utilized to reduce the radiation dose to as low as reasonably achievable. COMPARISON: 04/17/2019 HISTORY: ORDERING SYSTEM PROVIDED HISTORY: Malignant neoplasm of cervix, unspecified site [facility] TECHNOLOGIST PROVIDED HISTORY: Additional Contrast?->Oral Ordering Physician Provided Reason for Exam: Malignant neoplasm of cervix, unspecified site, Malignant neoplasm metastatic to lung, unspecified laterality Acuity: Unknown Type of Exam: Unknown FINDINGS: Chest: Mediastinum: The central airways are patent. There is no hilar or mediastinal adenopathy. Note is made of an aberrant right subclavian artery. Lungs/pleura: There is no lobar consolidation or suspicious parenchymal mass. The pleural spaces are clear.  Soft Tissues/Bones: There is no fracture or aggressive osseous lesion. Abdomen/Pelvis: Organs: The liver exhibits diffuse fatty infiltration. There are no focal lesions. Postsurgical changes of cholecystectomy are present. Right-sided double-J ureteral stent remains in satisfactory position. GI/Bowel: There is no bowel dilatation, wall thickening or obstruction. Pelvis: The pelvic organs and urinary bladder are unremarkable. Peritoneum/Retroperitoneum: There is no free air, free fluid or intraperitoneal inflammatory change. There is no adenopathy. Bones/Soft Tissues: There is no fracture or aggressive osseous lesion     Stable CT of the chest, abdomen and pelvis. Negative for metastatic disease. Ct Chest Abdomen Pelvis Wo Contrast    Result Date: 7/3/2019  EXAMINATION: CT OF THE CHEST, ABDOMEN, AND PELVIS WITHOUT CONTRAST 7/3/2019 2:01 pm TECHNIQUE: CT of the chest, abdomen and pelvis was performed without the administration of intravenous contrast. Multiplanar reformatted images are provided for review. Dose modulation, iterative reconstruction, and/or weight based adjustment of the mA/kV was utilized to reduce the radiation dose to as low as reasonably achievable. COMPARISON: 06/17/2019 HISTORY: ORDERING SYSTEM PROVIDED HISTORY: edil - hypoxia TECHNOLOGIST PROVIDED HISTORY: Reason for exam:->edil - hypoxia Additional Contrast?->None Reason for Exam: edil - hypoxia Acuity: Unknown Type of Exam: Unknown FINDINGS: Chest: Mediastinum: Thyroid gland is normal.  Tip of MediPort seen in the right atrium. Trace pericardial fluid is seen. Small mediastinal nodes are noted. Aberrant right subclavian artery is seen Lungs/pleura: Respiratory motion artifact limits evaluation of fine pulmonary parenchymal change. De pendant opacity is seen at the lung bases bilaterally. No significant pleural fluid. No pneumothorax Soft Tissues/Bones: No acute bony abnormality Abdomen/Pelvis: Organs: Spleen is mildly enlarged.   No perisplenic fluid. Adrenal glands appear normal.  There is mild pelvicaliectasis on the left. No stones on the left. There is mild right-sided hydronephrosis. Right-sided ureteral stent is seen. Proximal portion is seen in the region of the right renal pelvis. Distal portion is seen within the bladder. Heterogeneous low attenuation seen throughout the liver, compatible with fatty infiltration. There are clips from prior cholecystectomy. No peripancreatic inflammatory change GI/Bowel: Large stool load is seen in the colon. No bowel obstruction. No significant pericolonic inflammatory change. Appendix is normal in caliber Pelvis: Trace free fluid seen within the pelvis. Bladder is distended. Peritoneum/Retroperitoneum: No retroperitoneal adenopathy. No aneurysm Bones/Soft Tissues: Tiny periumbilical hernia containing fat is seen. .  No acute bony abnormality     Increased de pendant opacity is seen at the lung bases, most likely atelectasis in the absence of clinical signs of pneumonia Mild right-sided hydronephrosis, similar to prior. Right ureteral stent is unchanged Fatty liver     Mri Brain W Wo Contrast    Result Date: 6/17/2019  EXAMINATION: MRI OF THE BRAIN WITHOUT AND WITH CONTRAST  6/17/2019 11:25 am TECHNIQUE: Multiplanar multisequence MRI of the head/brain was performed without and with the administration of intravenous contrast. COMPARISON: MRI brain without and with contrast 03/06/2015. HISTORY: ORDERING SYSTEM PROVIDED HISTORY: Intractable headache, unspecified chronicity pattern, unspecified headache type TECHNOLOGIST PROVIDED HISTORY: Ordering Physician Provided Reason for Exam: Pt states cervical CA, since chemo has had headaches dizziness and numbness. Acuity: Acute Type of Exam: Initial FINDINGS: INTRACRANIAL STRUCTURES/VENTRICLES:  There is no acute infarct. No evidence of intracranial hemorrhage. Punctate focus of enhancement in posterior right insular cortex (series 10, image 16).

## 2019-07-06 NOTE — PROGRESS NOTES
Hospitalist Progress Note      PCP: Kenton Hoyt MD    Date of Admission: 7/3/2019    Chief Complaint: AMS      Subjective:   Pt alert and oriented in am.  AMS and hallucinations now resolved. Complains of persistent headache. Multiple BMs with relistor. Cr back to normal.    Medications:  Reviewed    Infusion Medications     Scheduled Medications    [START ON 7/7/2019] DULoxetine  20 mg Oral Daily    gabapentin  300 mg Oral Nightly    polycarbophil  625 mg Oral Daily    senna  2 tablet Oral BID    morphine  30 mg Oral BID    baclofen  10 mg Oral BID    tamsulosin  0.4 mg Oral Daily    magnesium oxide  400 mg Oral Daily    topiramate  25 mg Oral BID    sodium chloride flush  10 mL Intravenous 2 times per day    heparin (porcine)  5,000 Units Subcutaneous 3 times per day     PRN Meds: hydrALAZINE, ondansetron, promethazine, prochlorperazine, benzocaine-menthol, HYDROmorphone, oxyCODONE, LORazepam, opium-belladonna, sodium chloride flush, acetaminophen, naloxone, bisacodyl      Intake/Output Summary (Last 24 hours) at 7/6/2019 1354  Last data filed at 7/5/2019 1453  Gross per 24 hour   Intake 850 ml   Output 2300 ml   Net -1450 ml       Exam:    BP (!) 156/94   Pulse 84   Temp 97.6 °F (36.4 °C) (Temporal)   Resp 16   Ht 5' 6\" (1.676 m)   Wt 196 lb 6.4 oz (89.1 kg)   SpO2 94%   BMI 31.70 kg/m²     Gen/overall appearance: Not in acute distress. Alert. Head: Normocephalic, atraumatic  Eyes: EOMI, no scleral icterus  CVS: regular rate and rhythm, Normal S1S2  Pulm: Clear to auscultation bilaterally. No crackles/wheezes  Gastrointestinal: Soft, nontender, nondistended, no guarding or rebound  Extremities: No edema.  No erythema or warmth  Neuro: No gross focal deficits noted  Skin: Warm, dry    Labs:   Recent Labs     07/04/19  0550 07/05/19  0526 07/06/19  0459   WBC 4.0 3.6* 4.6   HGB 7.4* 7.0* 9.8*   HCT 22.7* 21.2* 28.6*    161 161     Recent Labs     07/04/19  0550 07/05/19  0526 07/06/19  0459    139 141   K 4.2 4.2 4.0   * 109 107   CO2 22 22 21   BUN 31* 16 16   CREATININE 3.1* 1.5* 1.1   CALCIUM 8.9 8.5 8.8   PHOS  --  3.5  --      No results for input(s): AST, ALT, BILIDIR, BILITOT, ALKPHOS in the last 72 hours. No results for input(s): INR in the last 72 hours. Recent Labs     07/03/19  1745 07/03/19  2303   TROPONINI 0.10* 0.10*       Assessment/Plan:    Active Hospital Problems    Diagnosis Date Noted    MIHAI (acute kidney injury) ([facility] [N17.9]     Opioid overdose ([facility] [T40.2X1A]     Acute renal failure ([facility] [N17.9]     Altered mental status [R41.82] 07/03/2019    Acute encephalopathy [G93.40]      Acute metabolic encephalopathy likely 2/2 uremia from MIHAI with decreased secretion of opiates, baclofen, gabapentin, ativan. Improved. - s/p narcan GTT on admission; now off  - held opiates, baclofen, gabapentin, ativan - slowly reinitiated  - s/p IVF hydration; now d/arianna  - monitor Cr and lytes  - treatment as per below  - neuro on board     SIRS. Possibly 2/2 above. Now resolved. - started on IV cefepime and vanco on admission; now d/arianna  - respiratory care     MIHAI. Possibly prerenal vs ATN vs med induced; now resolved. - s/p IVF hydration  - monitor cr and lytes  - nephro following     Indeterminate troponin elevation; likely 2/2 above with overlying MIHAI. - serial troponins stable  - monitor tele     Stage IV cervical cancer with mets. Concerns for new onset mets to brain. Concurrently on chemo.     - oncology on board    Headache  - trial of toradol    Uncontrolled hypertension  - prn hydralazine   - IVFs d/arianna     PMH of lupus, seizures, neuropathy      DVT Prophylaxis: heparin  Diet: DIET GENERAL;  Code Status: Full Code    Dispo - Inpt,    Gerald Pepper MD

## 2019-07-06 NOTE — PLAN OF CARE
Problem: Falls - Risk of:  Goal: Will remain free from falls  Description  Will remain free from falls  Outcome: Ongoing  Goal: Absence of physical injury  Description  Absence of physical injury  Outcome: Ongoing     Problem: Risk for Impaired Skin Integrity  Goal: Tissue integrity - skin and mucous membranes  Description  Structural intactness and normal physiological function of skin and  mucous membranes.   Outcome: Ongoing     Problem: Mental Status - Impaired:  Goal: Mental status will improve  Description  Mental status will improve  Outcome: Ongoing     Problem: Tissue Perfusion - Renal, Altered:  Goal: Urine creatinine clearance will be within specified parameters  Description  Urine creatinine clearance will be within specified parameters  Outcome: Ongoing  Goal: Serum creatinine will be within specified parameters  Description  Serum creatinine will be within specified parameters  Outcome: Ongoing  Goal: Ability to achieve a balanced intake and output will improve  Description  Ability to achieve a balanced intake and output will improve  Outcome: Ongoing     Problem: Pain:  Goal: Pain level will decrease  Description  Pain level will decrease  Outcome: Ongoing  Goal: Control of acute pain  Description  Control of acute pain  Outcome: Ongoing  Goal: Control of chronic pain  Description  Control of chronic pain  Outcome: Ongoing

## 2019-07-06 NOTE — PROGRESS NOTES
07/06/2019    CALCIUM 8.8 07/06/2019     ABG:   Lab Results   Component Value Date    ZHD2YOU 28.8 10/17/2014    BEART 4.3 10/17/2014    F8VVPLLP 99.2 10/17/2014    PHART 7.449 10/17/2014    OLU3TAO 42.5 10/17/2014    PO2ART 157.8 10/17/2014    UIZ5QYV 30.1 10/17/2014       Radiology: All pertinent images / reports were reviewed as a part of this visit. Narrative   EXAMINATION:   CT OF THE CHEST, ABDOMEN, AND PELVIS WITHOUT CONTRAST 7/3/2019 2:01 pm       TECHNIQUE:   CT of the chest, abdomen and pelvis was performed without the administration   of intravenous contrast. Multiplanar reformatted images are provided for   review. Dose modulation, iterative reconstruction, and/or weight based   adjustment of the mA/kV was utilized to reduce the radiation dose to as low   as reasonably achievable.       COMPARISON:   06/17/2019       HISTORY:   ORDERING SYSTEM PROVIDED HISTORY: edil - hypoxia   TECHNOLOGIST PROVIDED HISTORY:   Reason for exam:->edil - hypoxia   Additional Contrast?->None   Reason for Exam: edil - hypoxia   Acuity: Unknown   Type of Exam: Unknown       FINDINGS:       Chest:       Mediastinum: Thyroid gland is normal.  Tip of MediPort seen in the right   atrium.  Trace pericardial fluid is seen.  Small mediastinal nodes are noted. Aberrant right subclavian artery is seen       Lungs/pleura: Respiratory motion artifact limits evaluation of fine pulmonary   parenchymal change.  De pendant opacity is seen at the lung bases   bilaterally.  No significant pleural fluid.  No pneumothorax       Soft Tissues/Bones: No acute bony abnormality           Abdomen/Pelvis:       Organs: Spleen is mildly enlarged.  No perisplenic fluid.       Adrenal glands appear normal.  There is mild pelvicaliectasis on the left. No stones on the left.       There is mild right-sided hydronephrosis.  Right-sided ureteral stent is   seen.  Proximal portion is seen in the region of the right renal pelvis.    Distal portion is seen

## 2019-07-07 NOTE — DISCHARGE SUMMARY
Head: Normocephalic, atraumatic  Eyes: EOMI, no scleral icterus  CVS: regular rate and rhythm, Normal S1S2  Pulm: Clear to auscultation bilaterally. No crackles/wheezes  Gastrointestinal: Soft, nontender, nondistended, no guarding or rebound  Extremities: No edema. No erythema or warmth  Neuro: No gross focal deficits noted  Skin: Warm, dry        Consults:     IP CONSULT TO HOSPITALIST  IP CONSULT TO NEPHROLOGY  IP CONSULT TO NEPHROLOGY  IP CONSULT TO CRITICAL CARE  PHARMACY CONSULT FOR RENAL DOSING  IP CONSULT TO INTERNAL MEDICINE  IP CONSULT TO ONCOLOGY  IP CONSULT TO NEUROLOGY  IP CONSULT TO SOCIAL WORK  IP CONSULT TO PAIN MANAGEMENT    Disposition:  home     Condition:  Stable    Discharge Instructions/Follow-up:   Pt to follow up with PCP within 1 week  Consultants as scheduled    Code Status:  Prior    Activity: activity as tolerated    Diet: regular diet    Labs: For convenience and continuity at follow-up the following most recent labs are provided:      CBC:    Lab Results   Component Value Date    WBC 3.9 07/07/2019    HGB 9.1 07/07/2019    HCT 27.9 07/07/2019     07/07/2019       Renal:    Lab Results   Component Value Date     07/07/2019    K 4.0 07/07/2019     07/07/2019    CO2 25 07/07/2019    BUN 18 07/07/2019    CREATININE 0.9 07/07/2019    CALCIUM 8.7 07/07/2019    PHOS 3.5 07/05/2019       Discharge Medications:     Discharge Medication List as of 7/7/2019  1:15 PM           Details   oxyCODONE (OXY-IR) 15 MG immediate release tablet Take 0.5 tablets by mouth every 4 hours as needed for Pain for up to 3 days. , Disp-15 tablet, R-0NO PRINT      gabapentin (NEURONTIN) 300 MG capsule Take 1 capsule by mouth nightly for 30 days. , Disp-30 capsule, R-0NO PRINT      baclofen (LIORESAL) 20 MG tablet Take 0.5 tablets by mouth 2 times daily, Disp-60 tablet, R-0Normal              Details   topiramate (TOPAMAX) 25 MG tablet Take 1 tablet daily for 1 week, 1 tablet twice daily for 1 week

## 2019-07-07 NOTE — PROGRESS NOTES
Continues sleeping quietly in bed. Respirations easy & even. Will monitor. Bedside table, phone and call light within reach.

## 2019-07-07 NOTE — PROGRESS NOTES
See flow sheet for VS. Assess complete. See flow sheet. A/O. C/O H/A. HS medications administered per order/request as well as PRN dilaudid and phenergan. See emar and flow sheet. Patient had shower per self. Discussed POC for this shift. Ice pack given for nonpharmacologic pain relief. Denies any other needs at this time. Will monitor. Mother at bedside. Bedside table, phone and call light within reach.

## 2019-07-07 NOTE — PROGRESS NOTES
07/03/2019    ALT 42 (H) 07/03/2019    LABGLOM >60 07/07/2019    GFRAA >60 07/07/2019    AGRATIO 1.3 07/03/2019    GLOB 2.9 07/03/2019       No results found for: PTINR      IMAGING:       Ct Head Wo Contrast    Result Date: 7/4/2019  EXAMINATION: CT OF THE HEAD WITHOUT CONTRAST  7/3/2019 12:23 pm TECHNIQUE: CT of the head was performed without the administration of intravenous contrast. Dose modulation, iterative reconstruction, and/or weight based adjustment of the mA/kV was utilized to reduce the radiation dose to as low as reasonably achievable. COMPARISON: Brain MRI 06/17/2019, head CT 05/25/2012 HISTORY: ORDERING SYSTEM PROVIDED HISTORY: ams TECHNOLOGIST PROVIDED HISTORY: Has a \"code stroke\" or \"stroke alert\" been called? ->No Reason for Exam: AMS Acuity: Unknown Type of Exam: Initial History of cervical cancer, recent brain MRI revealing punctate foci of right frontal metastatic disease. FINDINGS: BRAIN/VENTRICLES: There is no acute intracranial hemorrhage, mass effect or midline shift. No abnormal extra-axial fluid collection. The gray-white differentiation is maintained without evidence of an acute infarct. There is no evidence of hydrocephalus. No focus of acute abnormal brain attenuation is identified. The previously identified and described punctate foci of intracranial metastatic disease as seen within the right frontal lobe on prior MRI are not identified on noncontrast CT imaging. ORBITS: The visualized portion of the orbits demonstrate no acute abnormality. SINUSES: The visualized paranasal sinuses and mastoid air cells demonstrate no acute abnormality. SOFT TISSUES/SKULL:  No acute abnormality of the visualized skull or soft tissues. 1. No acute intracranial abnormality. 2. The previously identified and described punctate foci of right frontal intracranial metastatic disease are not identified on noncontrast CT imaging.      Xr Chest Portable    Result Date: 7/3/2019  EXAMINATION: ONE XRAY VIEW OF THE CHEST 7/3/2019 12:52 pm COMPARISON: CT chest, 06/17/2019 HISTORY: ORDERING SYSTEM PROVIDED HISTORY: sob - ams TECHNOLOGIST PROVIDED HISTORY: Reason for exam:->sob - ams Reason for Exam: Aphasia (LKW Monday morning. Pt. with slurred speech and AMS. Pt. reports left sided feeling different/ more weak than others. Cervical ca. hx with mets. ) ap port uprt @ 1257hrs Acuity: Chronic Type of Exam: Ongoing FINDINGS: The right infusion port catheter terminates over the right atrium. The cardiac silhouette is globally prominent. Pulmonary vessels are congested. Right hemidiaphragm is chronically elevated. No airspace consolidation or pleural effusion is evident. Osseous structures are unremarkable. Vascular congestion is noted. No acute pulmonary disease is otherwise appreciated. Stable cardiomegaly. Ct Chest Abdomen Pelvis W Contrast    Result Date: 6/17/2019  EXAMINATION: CT OF THE CHEST, ABDOMEN, AND PELVIS WITH CONTRAST 6/17/2019 10:08 am TECHNIQUE: CT of the chest, abdomen and pelvis was performed with the administration of intravenous contrast. Multiplanar reformatted images are provided for review. Dose modulation, iterative reconstruction, and/or weight based adjustment of the mA/kV was utilized to reduce the radiation dose to as low as reasonably achievable. COMPARISON: 04/17/2019 HISTORY: ORDERING SYSTEM PROVIDED HISTORY: Malignant neoplasm of cervix, unspecified site [facility] TECHNOLOGIST PROVIDED HISTORY: Additional Contrast?->Oral Ordering Physician Provided Reason for Exam: Malignant neoplasm of cervix, unspecified site, Malignant neoplasm metastatic to lung, unspecified laterality Acuity: Unknown Type of Exam: Unknown FINDINGS: Chest: Mediastinum: The central airways are patent. There is no hilar or mediastinal adenopathy. Note is made of an aberrant right subclavian artery. Lungs/pleura: There is no lobar consolidation or suspicious parenchymal mass. The pleural spaces are clear.  Soft

## 2019-07-09 NOTE — CONSULTS
Inpatient consult to Pain Management  Consult performed by: Joe Gonzalez MD  Consult ordered by: Christian Samuels MD      Thank you for the consult   The patient is a 42years old female who was Dx with Cervical cancer with Mets  The patient after her recent chemo therapy sustained an injury to her kidney which resulted in ARF, during that time that the patient was taking long acting morphine and Oxycodone (she takes it once or twice a day)   The patient was oversedated and was transported to the hospital, Narcan did help her  I had a long discussion with the patient, to me she was NOT OD, I do not believe at this time that she OD, or took medications more than prescribed.   She is comfortable on the current dose but she would like to discuss a better pain relief for the breakthrough pain  Since she will be D/C tomorrow from the hospital.   The patient requested to come to the office as an out patient     Yoni Diaz MD  [phone]

## 2019-07-16 NOTE — ED NOTES
Upon arrival to ER, pt requested to be placed in quiet room, registration made this RN aware     Stephie Grey RN  07/16/19 8741

## 2019-07-17 PROBLEM — R52 INTRACTABLE PAIN: Status: ACTIVE | Noted: 2019-01-01

## 2019-07-17 NOTE — ED NOTES
Bed: 09  Expected date:   Expected time:   Means of arrival: Walk In  Comments:     aKrin Kaur RN  07/16/19 2101

## 2019-07-17 NOTE — ED PROVIDER NOTES
2:01 pm TECHNIQUE: CT of the chest, abdomen and pelvis was performed without the administration of intravenous contrast. Multiplanar reformatted images are provided for review. Dose modulation, iterative reconstruction, and/or weight based adjustment of the mA/kV was utilized to reduce the radiation dose to as low as reasonably achievable. COMPARISON: 06/17/2019 HISTORY: ORDERING SYSTEM PROVIDED HISTORY: edil - hypoxia TECHNOLOGIST PROVIDED HISTORY: Reason for exam:->edil - hypoxia Additional Contrast?->None Reason for Exam: edil - hypoxia Acuity: Unknown Type of Exam: Unknown FINDINGS: Chest: Mediastinum: Thyroid gland is normal.  Tip of MediPort seen in the right atrium. Trace pericardial fluid is seen. Small mediastinal nodes are noted. Aberrant right subclavian artery is seen Lungs/pleura: Respiratory motion artifact limits evaluation of fine pulmonary parenchymal change. De pendant opacity is seen at the lung bases bilaterally. No significant pleural fluid. No pneumothorax Soft Tissues/Bones: No acute bony abnormality Abdomen/Pelvis: Organs: Spleen is mildly enlarged. No perisplenic fluid. Adrenal glands appear normal.  There is mild pelvicaliectasis on the left. No stones on the left. There is mild right-sided hydronephrosis. Right-sided ureteral stent is seen. Proximal portion is seen in the region of the right renal pelvis. Distal portion is seen within the bladder. Heterogeneous low attenuation seen throughout the liver, compatible with fatty infiltration. There are clips from prior cholecystectomy. No peripancreatic inflammatory change GI/Bowel: Large stool load is seen in the colon. No bowel obstruction. No significant pericolonic inflammatory change. Appendix is normal in caliber Pelvis: Trace free fluid seen within the pelvis. Bladder is distended. Peritoneum/Retroperitoneum: No retroperitoneal adenopathy. No aneurysm Bones/Soft Tissues: Tiny periumbilical hernia containing fat is seen. [place]   No acute bony abnormality     Increased de pendant opacity is seen at the lung bases, most likely atelectasis in the absence of clinical signs of pneumonia Mild right-sided hydronephrosis, similar to prior. Right ureteral stent is unchanged Fatty liver       MDM:  Patient is medicated with IV fentanyl and Zofran with some relief. However she requested additional medicine at approximately 1440 and will be given another dose of fentanyl. Urine with large blood and positive nitrites. Culture ordered and given a dose of Rocephin. Given IV fluids. I did not repeat her scan is that has been done in the last 24 hours. Other laboratory studies are fairly unremarkable. I discussed the case with Dr. Sybil Lutz who is agreed to admission. I have paged urology as well. Patient is admitted in stable condition. Further orders per hospitalist.    Clinical Impression:  1. Right flank pain    2. Gross hematuria    3. Bilateral hydronephrosis      Disposition referral (if applicable):  No follow-up provider specified. Disposition medications (if applicable):  New Prescriptions    No medications on file       Comment: Please note this report has been produced using speech recognition software and may contain errors related to that system including errors in grammar, punctuation, and spelling, as well as words and phrases that may be inappropriate. If there are any questions or concerns please feel free to contact the dictating provider for clarification.         Isaiah Love MD  07/17/19 5178

## 2019-07-17 NOTE — PROGRESS NOTES
Pt picked up from ED. Witnessed pt ambulated from the wheelchair in hallway to bed in room. Admission and assessment complete. Personal belongings placed in closet in pts room. Vital signs taken. Pt resting comfortably in bed. Personal care items given to pt. Pt oriented to room and call light. Pt denies having any questions or further needs at this time. Call light is within reach.

## 2019-07-18 NOTE — CONSULTS
mg BID   baclofen (LIORESAL) tablet 10 mg BID   phenazopyridine (PYRIDIUM) tablet 100 mg TID PRN   oxyCODONE-acetaminophen (PERCOCET) 5-325 MG per tablet 1 tablet Q4H PRN   amitriptyline (ELAVIL) tablet 25 mg Nightly   0.9 % sodium chloride infusion Continuous   sodium chloride flush 0.9 % injection 10 mL 2 times per day   sodium chloride flush 0.9 % injection 10 mL PRN   magnesium hydroxide (MILK OF MAGNESIA) 400 MG/5ML suspension 30 mL Daily PRN   ondansetron (ZOFRAN) injection 4 mg Q6H PRN   enoxaparin (LOVENOX) injection 40 mg Nightly   cefTRIAXone (ROCEPHIN) 1 g in sterile water 10 mL IV syringe Q24H   phenazopyridine (PYRIDIUM) tablet 200 mg TID WC   potassium chloride (KLOR-CON M) extended release tablet 40 mEq PRN   Or    potassium bicarb-citric acid (EFFER-K) effervescent tablet 40 mEq PRN   Or    potassium chloride 10 mEq/100 mL IVPB (Peripheral Line) PRN   0.9 % sodium chloride bolus PRN   hydrALAZINE (APRESOLINE) injection 10 mg Q6H PRN   promethazine (PHENERGAN) injection 12.5 mg Q6H PRN   ketorolac (TORADOL) injection 15 mg Q6H PRN   magnesium sulfate 1 g in dextrose 5% 100 mL IVPB PRN       Objective:  BP (!) 144/91   Pulse 89   Temp 97.5 °F (36.4 °C) (Oral)   Resp 16   Ht 5' 7\" (1.702 m)   Wt 180 lb (81.6 kg)   SpO2 92%   BMI 28.19 kg/m²     Intake/Output Summary (Last 24 hours) at 7/18/2019 1426  Last data filed at 7/18/2019 0809  Gross per 24 hour   Intake 2087.54 ml   Output 400 ml   Net 1687.54 ml    Wt Readings from Last 3 Encounters:   07/17/19 180 lb (81.6 kg)   07/16/19 175 lb (79.4 kg)   07/07/19 192 lb 11.2 oz (87.4 kg)       General appearance:  Appears comfortable  Eyes: Sclera clear. Pupils equal.  ENT: Moist oral mucosa. Trachea midline, no adenopathy. Cardiovascular: Regular rhythm, normal S1, S2. No murmur. No edema in lower extremities  Respiratory: Not using accessory muscles. Good inspiratory effort. Clear to auscultation bilaterally, no wheeze or crackles.    GI: Abdomen soft, no tenderness, not distended  Musculoskeletal: No cyanosis in digits, neck supple  Neurology: CN 2-12 grossly intact. No speech or motor deficits  Psych: Normal affect. Alert and oriented in time, place and person  Skin: Warm, dry, normal turgor    Labs and Tests:  CBC:   Recent Labs     07/16/19 2001 07/17/19  1348 07/18/19  0516   WBC 6.6 5.5 4.5   HGB 11.1* 10.2* 10.2*    150 127*     BMP:  Recent Labs     07/16/19 2001 07/17/19  1348 07/18/19  0516    143 143   K 4.1 3.2* 3.9    107 107   CO2 24 25 25   BUN 13 13 10   CREATININE 0.7 0.6 0.6   GLUCOSE 191* 173* 97     Hepatic: Recent Labs     07/16/19 2001 07/17/19  1348 07/18/19  0516   AST 93* 96* 97*   * 105* 94*   BILITOT 0.8 0.4 0.5   ALKPHOS 233* 189* 187*       ASSESSMENT AND PLAN    Principal Problem:    Hydronephrosis  Active Problems:    GERD (gastroesophageal reflux disease)    Cervical cancer (HCC)    Anemia    Intractable pain  Resolved Problems:    * No resolved hospital problems. *      Metastatic cervical cancer  Received C6 cis/taxol + avastin 6/4/19  Planning to continue single agent Avastin  Patient refused treatment 7/16/19      Neoplasm related pain  - Will continue fentanyl 25 mcg/hr Q72H and oxycodone 15 mg Q6H PRN as prescribed by Dr. Fatuma Marsh. - Pain management consulted. Bilateral hydronephrosis  - Has right side stent. - Now with left hydronephrosis. - Urology following and planning for right stent exchange and left stent placement. Opioid induced constipation  - Received Relistor.  - Tap water enema. - Will start Movantik.         [place], [place]  Oncology Hematology [address] [facility].  [phone]  Patient interviewed and examined this evening agree with note above

## 2019-07-18 NOTE — CARE COORDINATION
Discharge Planning Assessment  RN discharge planner met with patient to discuss reason for admission, current living situation, and potential needs at the time of discharge     Demographics/Insurance verified Yes  [address]     Current type of dwelling: tri-level home, no steps to enter. Pt denies difficulty getting around her home     Patient from ECF/SW confirmed with: N/A     Living arrangements: w/ , 3 kids, dogs     Level of function/Support: independent w/ ADL     PCP: Dr Vandana Rowe [phone]     DME: Pt has a motorized scooter which she no longer uses and Oxygen from [facility] that is used PRN     Active with any community resources/agencies/skilled home care: not indicated     Medication compliance issues:no issues reported     Financial issues that could impact healthcare: not noted     Transportation at the time of discharge: Family will transport home     Tentative discharge plan: home without needs.     Electronically signed by Kristan Cowden, MSW on 7/18/2019 at 3:47 PM

## 2019-07-18 NOTE — PROGRESS NOTES
Toradol given for 10/10 pain. Ativan given for anxiety. Zofran given for nausea. Will continue to monitor.

## 2019-07-18 NOTE — H&P
Pt has fentanyl patch on abd, but this is not on her med list.  Trying to confirm with oncology   Severity: rates it 9/10 (but looks comfortable in bed)  Quality: throbbing  Modifying Factors: worse with movement. Worse since not having BM  Associated Signs or Symptoms: +constipation. +abd pain. Problem list of hospitalization thus far: Active Hospital Problems    Diagnosis    Intractable pain [R52]    Anemia [D64.9]    Hydronephrosis [N13.30]    Cervical cancer (HCC) [C53.9]    GERD (gastroesophageal reflux disease) [K21.9]         Review of Systems: (1 system for EPF, 2-9 for detailed, 10+ for comprehensive)  Review of Systems   Constitutional: Negative for chills and fever. HENT: Negative for sinus pressure and sinus pain. Eyes: Negative for pain and redness. Respiratory: Negative for cough and shortness of breath. Cardiovascular: Negative for chest pain and leg swelling. Gastrointestinal: Positive for abdominal pain and constipation. Endocrine: Negative for cold intolerance and heat intolerance. Genitourinary: Negative for frequency and urgency.            Past Medical History:   Past Medical History:   Diagnosis Date    Cervical cancer ([facility] [address]     Endometriosis     Fibromyalgia     GERD (gastroesophageal reflux disease)     Hip fracture ([facility] [address]     LEFT     Hypoglycemia     Lupus (HCC)     Neuropathy     Ovarian cyst     Seizures (HCC)        Past Surgical History:   Past Surgical History:   Procedure Laterality Date    BRONCHOSCOPY  10/16/14    Urgent intubation, direct laryngoscopy, subglottic tracheoscopy    BRONCHOSCOPY  10/18/2014    WITH EXTUBATION IN OR AND LARYNGOSCOPY     SECTION      x 3, 2005, 2006, 2008    CHOLECYSTECTOMY  2012    CYSTOSCOPY Right 2019    CYSTOSCOPY performed by Na Putnam MD at [facility] Right 2019    CYSTOSCOPY WITH RETROGRADE PYELOGRAM RIGHT STENT REMOVAL WITH PLACEMENT OF METAL STENT performed by Performed at:  [facility] [address] [facility], [facility] [place]   Phone [phone]   URINALYSIS - Abnormal; Notable for the following components:    Blood, Urine LARGE (*)     Protein,  (*)     Nitrite, Urine POSITIVE (*)     All other components within normal limits    Narrative:     Performed at:  [facility] [address], [place], [facility] [place]   Phone [phone]   COMPREHENSIVE METABOLIC PANEL W/ REFLEX TO MG FOR LOW K - Abnormal; Notable for the following components:    Alb 3.3 (*)     Alkaline Phosphatase 187 (*)     ALT 94 (*)     AST 97 (*)     All other components within normal limits    Narrative:     Collection has been rescheduled by Milena Cabezas at 07/18/2019 05:17. Reason:   Nurse to Collect  Performed at:  [facility] [address], [place], [facility] [place]   Phone [phone]   CBC WITH AUTO DIFFERENTIAL - Abnormal; Notable for the following components:    RBC 3.00 (*)     Hemoglobin 10.2 (*)     Hematocrit 31.2 (*)     .0 (*)     RDW 18.2 (*)     Platelets 664 (*)     All other components within normal limits    Narrative:     Collection has been rescheduled by Milena Cabezas at 07/18/2019 05:17.  Reason:   Nurse to Collect  Performed at:  [facility] [address], [place], [facility] [place]   Phone [phone]   MICROSCOPIC URINALYSIS - Abnormal; Notable for the following components:    RBC, UA >900 (*)     All other components within normal limits    Narrative:     Performed at:  [facility] [address] [facility], [facility] [place]   Phone [phone]   URINE CULTURE   CULTURE BLOOD #1   CULTURE BLOOD #2   URINE CULTURE   LACTIC ACID, PLASMA    Narrative:     Performed at:  [facility] [address], [place], [facility] [place]   Phone [phone]         IMAGING:  Imaging results from the ER have EKG: from ER, sinus tach at 105. No acute st elevation seen. No TWI noted. Comparison made to old ekg in chart dated 7/3/19 which is similar in rate and morphology          MEDICAL DECISION MAKING:    Patient Active Hospital Problem List:   Hydronephrosis (2/15/2019)    Assessment: Established problem. Uncontrolled. Recurrent issue. Plan: urology asked to see. Pt complains of severe pain but appears comfortable. Also wearing fentanyl patch that I don't see on her med list.     GERD (gastroesophageal reflux disease) (10/20/2014)    Assessment: Established problem. Stable. Plan: Continue present orders/plan. Cervical cancer ([facility] [address] (2/15/2019)    Assessment: Established problem. Stable. Pt with continued pain    Plan: will consult heme/onc. Trying to confirm home meds   Anemia ()    Assessment: Established problem. Stable. Plan: Continue present orders/plan. Intractable pain (7/17/2019)    Assessment: Established problem. Uncontrolled per pt report, but again she looks uncomfortable. Pt with constipation as side effect. Old chart reviewed and pt has recent admit for overdose, thought to be unintentional, but now she has fentanyl patch on. Case d/w dr Alber Puente, he states it will take 19-20 hours for fentanyl to take effect. He recommends to not give extra pain meds as we approach that timing threshold as she would then get too much meds at that time    Plan: d/c iv dilaudid as fentanyl should be taking effect          Diagnoses as listed above, designated as new or established and plan outlined for each. Data Reviewed:   (1) Lab tests were reviewed or ordered. (1) Radiology tests were reviewed or ordered. (1) Medical test (Echo, EKG, PFT/dena) were ordered. (1)History was obtained from someone other than patient  (1) Old records  were reviewed - see HPI/MDM for pertinent details if review done.   (2) Case wasdiscussed with another health care provider: Dr Michele Healy  (2) Imaging was viewed by myself. Ct abd  (2) EKG  was viewed by myself. The patient isbeing placed in inpatient status with the expectation of requiring a hospital stay spanning at least two midnights for care and treatment of the problems noted in the problem list.  This determination is also based on thepatients comorbidities and past medical history, the severity and timing of the signs and symptoms upon presentation. Electronically signed by: Brooklynn Stephenson 7/18/2019        Addendum - case d/w Dr Selvin Nguyễn as well as Dr Cristy Suárez. Dr Cristy Suárez confirms that he changed pain regimen yesterday including the new Fentanyl patch which was just started yesterday. With Asha MEADOWS in the room, the patient is informed that we are changing her inpatient pain meds back to the regimen recommended by her pain mgmt MD.  All iv pain meds are stopped. Case has also been discussed with nurse manager, Angel Prieto. Further modifications, if any, to pain meds to be done per Dr Cristy Suárez.   At this time, despite her verbal complaints of 10/10 pain, pt appears comfortable when watched from hallway and during interview/exam.  Additionally, I am very concerned about overuse/abuse given her admission for overdose last month (regardless of cause whether intentional or not)

## 2019-07-19 NOTE — ANESTHESIA PRE PROCEDURE
intubation, direct laryngoscopy, subglottic tracheoscopy    BRONCHOSCOPY  10/18/2014    WITH EXTUBATION IN OR AND LARYNGOSCOPY     SECTION      x 3, 2005, 2006, 2008    CHOLECYSTECTOMY  2012    CYSTOSCOPY Right 2019    CYSTOSCOPY performed by Khushboo Diaz MD at [facility] Right 2019    CYSTOSCOPY WITH RETROGRADE PYELOGRAM RIGHT STENT REMOVAL WITH PLACEMENT OF METAL STENT performed by Pee Berry MD at [address] [place] [address]  2019    aborted planned procedure of BTL and uterine ablation. Social History     Tobacco Use    Smoking status: Former Smoker     Packs/day: 0.25     Years: 3.00     Pack years: 0.75     Types: Cigarettes     Start date:      Last attempt to quit: 2001     Years since quittin.2    Smokeless tobacco: Never Used   Substance Use Topics    Alcohol use: No    Drug use: No     Medications  No current facility-administered medications on file prior to encounter. Current Outpatient Medications on File Prior to Encounter   Medication Sig Dispense Refill    zolpidem (AMBIEN) 5 MG tablet Take 5 mg by mouth nightly as needed for Sleep.  promethazine (PHENERGAN) 12.5 MG tablet Take 12.5 mg by mouth every 6 hours as needed for Nausea      phenazopyridine (PYRIDIUM) 100 MG tablet Take 1 tablet by mouth 3 times daily as needed for Pain (dysuria) 30 tablet 0    amitriptyline (ELAVIL) 25 MG tablet Take 1 tablet by mouth nightly 30 tablet 5    baclofen (LIORESAL) 20 MG tablet Take 0.5 tablets by mouth 2 times daily 60 tablet 0    topiramate (TOPAMAX) 25 MG tablet Take 1 tablet daily for 1 week, 1 tablet twice daily for 1 week and then 1 tablet in the morning and 2 tablets at night. 90 tablet 1    tamsulosin (FLOMAX) 0.4 MG capsule Take 1 capsule by mouth daily 30 capsule 3    LORazepam (ATIVAN) 1 MG tablet Take 1 mg by mouth every 8 hours as needed for Anxiety.       senna (SENOKOT) 8.6 MG tablet Take 1 tablet by mouth 3 times at 07/19/19 0804    magnesium oxide (MAG-OX) tablet 400 mg  400 mg Oral Daily Alex Lemus MD   400 mg at 07/19/19 0804    topiramate (TOPAMAX) tablet 25 mg  25 mg Oral BID Alex Lemus MD   25 mg at 07/19/19 0804    baclofen (LIORESAL) tablet 10 mg  10 mg Oral BID Alex Lemus MD   10 mg at 07/19/19 0519    phenazopyridine (PYRIDIUM) tablet 100 mg  100 mg Oral TID PRN Alex Lemus MD   100 mg at 07/18/19 0009    amitriptyline (ELAVIL) tablet 25 mg  25 mg Oral Nightly Alex Lemus MD   25 mg at 07/18/19 2240    0.9 % sodium chloride infusion   Intravenous Continuous Alex Lemus MD   Stopped at 07/19/19 1244    sodium chloride flush 0.9 % injection 10 mL  10 mL Intravenous 2 times per day Alex Lemus MD   10 mL at 07/19/19 0807    sodium chloride flush 0.9 % injection 10 mL  10 mL Intravenous PRN Alex Lemus MD   10 mL at 07/18/19 2200    magnesium hydroxide (MILK OF MAGNESIA) 400 MG/5ML suspension 30 mL  30 mL Oral Daily PRN Alex Lemus MD        ondansetron [facility]) injection 4 mg  4 mg Intravenous Q6H PRN Alex Lemus MD   4 mg at 07/18/19 2004    enoxaparin (LOVENOX) injection 40 mg  40 mg Subcutaneous Nightly Alex Lemus MD   40 mg at 07/18/19 2240    cefTRIAXone (ROCEPHIN) 1 g in sterile water 10 mL IV syringe  1 g Intravenous Q24H Alex Lemus MD   1 g at 07/18/19 1444    phenazopyridine (PYRIDIUM) tablet 200 mg  200 mg Oral TID WC Alex Lemus MD   200 mg at 07/19/19 0804    potassium chloride (KLOR-CON M) extended release tablet 40 mEq  40 mEq Oral PRN Alex Lemus MD        Or    potassium bicarb-citric acid (EFFER-K) effervescent tablet 40 mEq  40 mEq Oral PRN Alex Lemus MD        Or    potassium chloride 10 mEq/100 mL IVPB (Peripheral Line)  10 mEq Intravenous PRN Alex Lemus  mL/hr at 07/18/19 0701 10 mEq at 07/18/19 0701    0.9 % sodium chloride bolus  500 mL Intravenous PRN Alex Lemus MD       [facility] hydrALAZINE (APRESOLINE) injection 10 mg  10 mg Intravenous Q6H PRN Dalia Whitmore MD   10 mg at 19 0005    promethazine (PHENERGAN) injection 12.5 mg  12.5 mg Intravenous Q6H PRN Shelli Ocasio MD   12.5 mg at 19 0423    magnesium sulfate 1 g in dextrose 5% 100 mL IVPB  1 g Intravenous PRN Dalia Whitmore MD         Vital Signs (Current)   Vitals:    19 1243   BP: (!) 165/92   Pulse: 108   Resp: 16   Temp:    SpO2: 92%     Vital Signs Statistics (for past 48 hrs)     Temp  Av.1 °F (36.7 °C)  Min: 97.5 °F (36.4 °C)   Min taken time: 19 0811  Max: 98.7 °F (37.1 °C)   Max taken time: 19 1730  Pulse  Av.7  Min: 68   Min taken time: 19 1907  Max: 110   Max taken time: 19 0802  Resp  Av.7  Min: 13   Min taken time: 19 1134  Max: 24   Max taken time: 19 1400  BP  Min: 113/80   Min taken time: 19 1134  Max: 189/98   Max taken time: 19 0015  SpO2  Av %  Min: 92 %   Min taken time: 19 1243  Max: 99 %   Max taken time: 19 0510    BP Readings from Last 3 Encounters:   19 (!) 165/92   19 (!) 143/85   19 (!) 158/95     BMI  Body mass index is 28.19 kg/m². Estimated body mass index is 28.19 kg/m² as calculated from the following:    Height as of this encounter: 5' 7\" (1.702 m). Weight as of this encounter: 180 lb (81.6 kg).     CBC   Lab Results   Component Value Date    WBC 4.5 2019    RBC 3.00 2019    HGB 10.2 2019    HCT 31.2 2019    .0 2019    RDW 18.2 2019     2019     CMP    Lab Results   Component Value Date     2019    K 3.9 2019     2019    CO2 25 2019    BUN 10 2019    CREATININE 0.6 2019    GFRAA >60 2019    GFRAA >60 2013    AGRATIO 1.1 2019    LABGLOM >60 2019    GLUCOSE 97 2019    PROT 6.4 2019    PROT 6.3 2012    CALCIUM 9.0 2019

## 2019-07-19 NOTE — PROGRESS NOTES
Patient c/o 8/10 pain, requesting PCA to be reconnected. Connected at this time. Educated patient on importance of calling for assistance when wanting to get oob, verbalizes understanding. Denies additional needs.

## 2019-07-19 NOTE — PROGRESS NOTES
Patient received to PACU in stable condition. VSS. Medicated for pain on arrival. Will continue to monitor.

## 2019-07-19 NOTE — PROGRESS NOTES
Rx'd PRN hydralazine given for /111. Other VSS. Pt. Does not c/o pounding heartbeat, new onset headache, or new onset change in vision; PERRLA intact. Neuro ax performed by this RN; no unilateral weakness or loss of sensation observed. Pt. Remains alert and oriented. Remaining w/ pt; see VS via flowsheets. Will continue to monitor.

## 2019-07-19 NOTE — ONCOLOGY
study. Have regular enhancing soft tissue attenuation adjacent to the left superior lateral aspect of the cervix is worrisome for neoplastic implants, possibly causing the aforementioned left hydronephrosis. Urinary bladder wall thickening is likely accentuated by lack of distention of viscus. Given moderate perivesical fat stranding. Cystitis could be considered to include radiation cystitis. Fatty infiltration of the liver. Xr Chest Portable    Result Date: 7/17/2019  EXAMINATION: ONE XRAY VIEW OF THE CHEST 7/17/2019 1:18 pm COMPARISON: 07/16/2019 HISTORY: ORDERING SYSTEM PROVIDED HISTORY: Abdominal pain Reason for Exam: Flank Pain (Pt with R flank pain, was seen here last night for the same thing, denies dysuira, emesis yesterday) FINDINGS: Right-sided Port-A-Cath remains in place. The lungs are without acute focal process. No effusion or pneumothorax. The cardiomediastinal silhouette is normal.  The osseous structures are intact without acute process. Partially visualized right ureteral stent. Negative chest.     Xr Chest Portable    Result Date: 7/3/2019  EXAMINATION: ONE XRAY VIEW OF THE CHEST 7/3/2019 12:52 pm COMPARISON: CT chest, 06/17/2019 HISTORY: ORDERING SYSTEM PROVIDED HISTORY: sob - ams TECHNOLOGIST PROVIDED HISTORY: Reason for exam:->sob - ams Reason for Exam: Aphasia (LKW Monday morning. Pt. with slurred speech and AMS. Pt. reports left sided feeling different/ more weak than others. Cervical ca. hx with mets. ) ap port uprt @ 1257hrs Acuity: Chronic Type of Exam: Ongoing FINDINGS: The right infusion port catheter terminates over the right atrium. The cardiac silhouette is globally prominent. Pulmonary vessels are congested. Right hemidiaphragm is chronically elevated. No airspace consolidation or pleural effusion is evident. Osseous structures are unremarkable. Vascular congestion is noted. No acute pulmonary disease is otherwise appreciated. Stable cardiomegaly.      Ct Chest Abdomen Pelvis Wo Contrast    Result Date: 7/3/2019  EXAMINATION: CT OF THE CHEST, ABDOMEN, AND PELVIS WITHOUT CONTRAST 7/3/2019 2:01 pm TECHNIQUE: CT of the chest, abdomen and pelvis was performed without the administration of intravenous contrast. Multiplanar reformatted images are provided for review. Dose modulation, iterative reconstruction, and/or weight based adjustment of the mA/kV was utilized to reduce the radiation dose to as low as reasonably achievable. COMPARISON: 06/17/2019 HISTORY: ORDERING SYSTEM PROVIDED HISTORY: edil - hypoxia TECHNOLOGIST PROVIDED HISTORY: Reason for exam:->edil - hypoxia Additional Contrast?->None Reason for Exam: edil - hypoxia Acuity: Unknown Type of Exam: Unknown FINDINGS: Chest: Mediastinum: Thyroid gland is normal.  Tip of MediPort seen in the right atrium. Trace pericardial fluid is seen. Small mediastinal nodes are noted. Aberrant right subclavian artery is seen Lungs/pleura: Respiratory motion artifact limits evaluation of fine pulmonary parenchymal change. De pendant opacity is seen at the lung bases bilaterally. No significant pleural fluid. No pneumothorax Soft Tissues/Bones: No acute bony abnormality Abdomen/Pelvis: Organs: Spleen is mildly enlarged. No perisplenic fluid. Adrenal glands appear normal.  There is mild pelvicaliectasis on the left. No stones on the left. There is mild right-sided hydronephrosis. Right-sided ureteral stent is seen. Proximal portion is seen in the region of the right renal pelvis. Distal portion is seen within the bladder. Heterogeneous low attenuation seen throughout the liver, compatible with fatty infiltration. There are clips from prior cholecystectomy. No peripancreatic inflammatory change GI/Bowel: Large stool load is seen in the colon. No bowel obstruction. No significant pericolonic inflammatory change. Appendix is normal in caliber Pelvis: Trace free fluid seen within the pelvis. Bladder is distended.

## 2019-07-19 NOTE — CONSULTS
Patient is well known to me, was admitted last night because of increased pain   The patient CTscan and Ultrasound showed that she has worsening hydronephrosis which may explain the sudden increased in her pain  As well the CTscan shows new possible neoplastic lesions     At this time I would recommend:  1- Start Dilaudid PCA at 0.5 mg every 20 min with 4 mg 4 hours lock out   2- continuous at 1 mg an hour   3- Continue Fentanyl patch 25 mcg/72 hours  4- Stop the Oxycodone   5- start Senakot 2 tab BID   6- Start Mirrlax PRN    It look like the urology service will place a new stent, this may very much help decerase her pain and will convert back to [address] MD Aimee  [phone]

## 2019-07-19 NOTE — OP NOTE
irritation bleeding at the urethra/bladder neck/ and trigone related to the dilation and the scope. The scope was removed and a 20 fr  3 way Ortiz catheter was placed. At the end of the procedure all counts were correct. The patient tolerated the procedure well and was transported to the PACU in stable condition. Findings: unable to identify normal bladder anatomy. We could not find the left ureteral orifice. On comparison to Dr. Kya Garcia notes I believe there has been a change in status at the level of the urethra and trigone. Specifically he noted induration of the urethra and was able to pass the scope easily. Also he noted the left UO was visible. A conversation was had with Levi Scott and Enrrique rios about the findings today    Estimated Blood Loss: 5mL                  Drains: 20 Fr 3 way Ortiz catheter          Specimens: none    Complications: none apparent           Disposition:  PACU - hemodynamically stable.             Shereen Buchanan MD  7/19/2019

## 2019-07-19 NOTE — PROGRESS NOTES
Pt. Neuro ax performed d/t high BP; pt does not c/o sudden headache; pounding heartbeat; SOB; or sudden weakness or change in vision. PERRLA intact; pt remains alert and is requesting available PRN pain medication and nausea medication; no unilateral weakness noted on assessment. ;systolic BP not >765 and is not within scheduled timeframe of administration. Will continue to monitor.

## 2019-07-19 NOTE — PROGRESS NOTES
(!) 175/114   Pulse 110   Temp 97.8 °F (36.6 °C) (Oral)   Resp 18   Ht 5' 7\" (1.702 m)   Wt 180 lb (81.6 kg)   SpO2 97%   BMI 28.19 kg/m²     Intake/Output Summary (Last 24 hours) at 7/19/2019 0935  Last data filed at 7/19/2019 0447  Gross per 24 hour   Intake 1334.75 ml   Output --   Net 1334.75 ml    Wt Readings from Last 3 Encounters:   07/17/19 180 lb (81.6 kg)   07/16/19 175 lb (79.4 kg)   07/07/19 192 lb 11.2 oz (87.4 kg)       General appearance:  Appears comfortable  Eyes: Sclera clear. Pupils equal.  ENT: Moist oral mucosa. Trachea midline, no adenopathy. Cardiovascular: Regular rhythm, normal S1, S2. No murmur. No edema in lower extremities  Respiratory: Not using accessory muscles. Good inspiratory effort. Clear to auscultation bilaterally, no wheeze or crackles. GI: Abdomen soft, no tenderness, not distended  Musculoskeletal: No cyanosis in digits, neck supple  Neurology: CN 2-12 grossly intact. No speech or motor deficits  Psych: Normal affect. Alert and oriented in time, place and person  Skin: Warm, dry, normal turgor    Labs and Tests:  CBC:   Recent Labs     07/16/19 2001 07/17/19 1348 07/18/19  0516   WBC 6.6 5.5 4.5   HGB 11.1* 10.2* 10.2*    150 127*     BMP:  Recent Labs     07/16/19 2001 07/17/19  1348 07/18/19  0516    143 143   K 4.1 3.2* 3.9    107 107   CO2 24 25 25   BUN 13 13 10   CREATININE 0.7 0.6 0.6   GLUCOSE 191* 173* 97     Hepatic: Recent Labs     07/16/19 2001 07/17/19  1348 07/18/19  0516   AST 93* 96* 97*   * 105* 94*   BILITOT 0.8 0.4 0.5   ALKPHOS 233* 189* 187*       ASSESSMENT AND PLAN    Principal Problem:    Hydronephrosis  Active Problems:    GERD (gastroesophageal reflux disease)    Cervical cancer (HCC)    Anemia    Intractable pain  Resolved Problems:    * No resolved hospital problems.  *      Metastatic cervical cancer  Received C6 cis/taxol + avastin 6/4/19  Planning to continue single agent Avastin  Patient refused treatment

## 2019-07-19 NOTE — CONSULTS
laryngoscopy, subglottic tracheoscopy    BRONCHOSCOPY  10/18/2014    WITH EXTUBATION IN OR AND LARYNGOSCOPY     SECTION      x 3, 2005, 2006, 2008    CHOLECYSTECTOMY  2012    CYSTOSCOPY Right 2019    CYSTOSCOPY performed by Lenny Leyva MD at [facility] Right 2019    CYSTOSCOPY WITH RETROGRADE PYELOGRAM RIGHT STENT REMOVAL WITH PLACEMENT OF METAL STENT performed by Kulwinder Reed MD at [address] [place] [address]  2019    aborted planned procedure of BTL and uterine ablation. OB/GYN HISTORY:   Menstrual History:  OB History        4    Para   3    Term                AB   1    Living   3       SAB        TAB        Ectopic        Molar        Multiple        Live Births   3              No LMP recorded. (Menstrual status: Irregular periods).          SOCIAL HISTORY:     Social History     Socioeconomic History    Marital status:      Spouse name: Not on file    Number of children: Not on file    Years of education: Not on file    Highest education level: Not on file   Occupational History     Comment: Real Estate   Social Needs    Financial resource strain: Not on file    Food insecurity:     Worry: Not on file     Inability: Not on file    Transportation needs:     Medical: Not on file     Non-medical: Not on file   Tobacco Use    Smoking status: Former Smoker     Packs/day: 0.25     Years: 3.00     Pack years: 0.75     Types: Cigarettes     Start date:      Last attempt to quit: 2001     Years since quittin.2    Smokeless tobacco: Never Used   Substance and Sexual Activity    Alcohol use: No    Drug use: No    Sexual activity: Yes     Partners: Male   Lifestyle    Physical activity:     Days per week: Not on file     Minutes per session: Not on file    Stress: Not on file   Relationships    Social connections:     Talks on phone: Not on file     Gets together: Not on file     Attends Restorationism service: Not on file of fine pulmonary parenchymal change. De pendant opacity is seen at the lung bases bilaterally. No significant pleural fluid. No pneumothorax Soft Tissues/Bones: No acute bony abnormality Abdomen/Pelvis: Organs: Spleen is mildly enlarged. No perisplenic fluid. Adrenal glands appear normal.  There is mild pelvicaliectasis on the left. No stones on the left. There is mild right-sided hydronephrosis. Right-sided ureteral stent is seen. Proximal portion is seen in the region of the right renal pelvis. Distal portion is seen within the bladder. Heterogeneous low attenuation seen throughout the liver, compatible with fatty infiltration. There are clips from prior cholecystectomy. No peripancreatic inflammatory change GI/Bowel: Large stool load is seen in the colon. No bowel obstruction. No significant pericolonic inflammatory change. Appendix is normal in caliber Pelvis: Trace free fluid seen within the pelvis. Bladder is distended. Peritoneum/Retroperitoneum: No retroperitoneal adenopathy. No aneurysm Bones/Soft Tissues: Tiny periumbilical hernia containing fat is seen. .  No acute bony abnormality     Increased de pendant opacity is seen at the lung bases, most likely atelectasis in the absence of clinical signs of pneumonia Mild right-sided hydronephrosis, similar to prior. Right ureteral stent is unchanged Fatty liver           ASSESSMENT:   stge IV cervical ca  Problem List Items Addressed This Visit     Intractable pain    Relevant Medications    fentaNYL (DURAGESIC) 25 MCG/HR (Start on 7/20/2019)    oxyCODONE (OXY-IR) 15 MG immediate release tablet      Other Visit Diagnoses     Right flank pain    -  Primary    Gross hematuria        Bilateral hydronephrosis                    PLAN:   Pt well known to me. There is confusion regarding her pain medication. Referred to Dr. Jaycob Concepcion and pt states now that she is willing to continue seeing him for mgmt. Pt has had a recent admission for AMS change.  She

## 2019-07-19 NOTE — PROGRESS NOTES
Summary (Last 24 hours) at 7/19/2019 0750  Last data filed at 7/19/2019 0447  Gross per 24 hour   Intake 2377.29 ml   Output --   Net 2377.29 ml         Physical Exam:   S1, S2 normal, no murmur, rub or gallop, regular rate and rhythm  clear to auscultation bilaterally  abdomen exam cannot be done, pt voluntarily guarding/flinching before I even palpate, and she asks not to be touched on abd  extremities normal, atraumatic, no cyanosis or edema    Labs:  Lab Results   Component Value Date    WBC 4.5 07/18/2019    HGB 10.2 (L) 07/18/2019    HCT 31.2 (L) 07/18/2019     (L) 07/18/2019    CHOL 188 10/15/2014    TRIG 132 10/15/2014    HDL 65 (H) 10/15/2014    ALT 94 (H) 07/18/2019    AST 97 (H) 07/18/2019     07/18/2019    K 3.9 07/18/2019     07/18/2019    CREATININE 0.6 07/18/2019    BUN 10 07/18/2019    CO2 25 07/18/2019    TSH 1.57 06/18/2014    INR 1.13 07/03/2019    LABMICR YES 07/17/2019     Lab Results   Component Value Date    CKTOTAL 771 (H) 07/03/2019    TROPONINI 0.10 (H) 07/03/2019       Recent Imaging Results are Reviewed:  Xr Chest Standard (2 Vw)    Result Date: 7/16/2019  EXAMINATION: TWO XRAY VIEWS OF THE CHEST 7/16/2019 10:39 pm COMPARISON: CT abdomen performed today, CT chest 07/03/2019, chest x-ray 07/03/2019 HISTORY: ORDERING SYSTEM PROVIDED HISTORY: ? PNA TECHNOLOGIST PROVIDED HISTORY: Reason for exam:->? PNA Reason for Exam: WEAKNESS, FATIGUE AND LETHARGY. PATIENT STATES SHE FEELS LIKE SHE'S IN A FOG. DIAGNOSED WITH PNA AT THE BEGINNING OF July. PATIENT CURRENTLY DOING CHEMO TREATMENTS FOR UTERINE/CERVICAL CANCER. FORMER SMOKER. HISTORY OF ENDOMETRIOSIS, GERD, LUPUS AND SEIZURES. Acuity: Acute Type of Exam: Initial FINDINGS: Lung volumes are low on lateral projection. A right-sided central venous catheter is unchanged. The heart size and mediastinal contours are stable. The lungs are clear without lobar consolidation, overt edema or effusion identified. Stable study.

## 2019-07-20 NOTE — PROGRESS NOTES
-- -- 90 14 95 % -- --   07/19/19 1530 (!) 185/115 -- -- 93 19 95 % -- --   07/19/19 1515 (!) 177/106 -- -- 96 13 95 % -- --   07/19/19 1500 (!) 187/123 -- -- 110 15 95 % -- --   07/19/19 1445 (!) 170/103 -- -- 108 17 94 % -- --   07/19/19 1435 (!) 172/110 -- -- 111 17 93 % -- --   07/19/19 1430 (!) 177/103 -- -- 110 18 93 % -- --   07/19/19 1425 (!) 145/104 -- -- 118 15 98 % -- --   07/19/19 1423 (!) 145/104 97.5 °F (36.4 °C) Temporal 110 18 95 % -- --   07/19/19 1332 (!) 174/112 97.8 °F (36.6 °C) Temporal 93 16 -- 5' 7\" (1.702 m) 180 lb (81.6 kg)   07/19/19 1243 (!) 165/92 -- -- 108 16 92 % -- --     Patient Vitals for the past 96 hrs (Last 3 readings):   Weight   07/19/19 1332 180 lb (81.6 kg)   07/17/19 1236 180 lb (81.6 kg)           Intake/Output Summary (Last 24 hours) at 7/20/2019 1222  Last data filed at 7/20/2019 0944  Gross per 24 hour   Intake 1548 ml   Output 3225 ml   Net -1677 ml         Physical Exam:   S1, S2 normal, no murmur, rub or gallop, regular rate and rhythm  clear to auscultation bilaterally  abdomen is soft. moderate tenderness, masses, organomegaly or guarding  extremities normal, atraumatic, no cyanosis or edema    Labs:  Lab Results   Component Value Date    WBC 4.5 07/18/2019    HGB 10.2 (L) 07/18/2019    HCT 31.2 (L) 07/18/2019     (L) 07/18/2019    CHOL 188 10/15/2014    TRIG 132 10/15/2014    HDL 65 (H) 10/15/2014    ALT 94 (H) 07/18/2019    AST 97 (H) 07/18/2019     07/18/2019    K 3.9 07/18/2019     07/18/2019    CREATININE 0.6 07/18/2019    BUN 10 07/18/2019    CO2 25 07/18/2019    TSH 1.57 06/18/2014    INR 1.13 07/03/2019    LABMICR YES 07/17/2019     Lab Results   Component Value Date    CKTOTAL 771 (H) 07/03/2019    TROPONINI 0.10 (H) 07/03/2019       Recent Imaging Results are Reviewed:  Xr Chest Standard (2 Vw)    Result Date: 7/16/2019  EXAMINATION: TWO XRAY VIEWS OF THE CHEST 7/16/2019 10:39 pm COMPARISON: CT abdomen performed today, CT chest

## 2019-07-21 NOTE — ED PROVIDER NOTES
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 female that presents with stage IV cervical cancer complaining of pelvic pain. Patient and  state that they are waiting for insurance clearance in order [place] of oxycodone and she presents today with worsening pain and nausea. Pain is gotten bad enough that when she takes a deep breath she catches her breath. No history of DVT or PE. No dyspnea on exertion. No fevers chills. Urinating normally. No diarrhea constipation. No trauma. No neck stiffness.     ROS:  General:  No fevers, no chills, no weakness  Eyes:  No recent vison changes, no discharge  ENT:  No sore throat, no nasal congestion, no hearing changes  Cardiovascular: As above  Respiratory:  No shortness of breath, no cough, no wheezing  Gastrointestinal: As above  Musculoskeletal:  No muscle pain, no joint pain  Skin:  No rash, no pruritis, no easy bruising  Neurologic:  No speech problems, no headache, no extremity numbness, no extremity tingling, no extremity weakness  Psychiatric:  No anxiety, no hallucinations or delusions, no suicidal or homicidal ideation  Genitourinary:  No dysuria, no hematuria  Endocrine:  No unexpected weight gain, no unexpected weight loss  Extremities:  no edema, no pain    Past Medical History:   Diagnosis Date    [facility]erv[facility] ([facility]     Endometriosis     Fibromyalgia     GERD (gastroesophageal reflux disease)     Hip fracture (HCC)     LEFT 2002    Hypoglycemia     Lupus (HCC)     Neuropathy     Ovarian cyst     Seizures ([facility]      Past Surgical History:   Procedure Laterality Date    BRONCHOSCOPY  10/16/14    Urgent intubation, direct laryngoscopy, subglottic tracheoscopy    BRONCHOSCOPY  10/18/2014    WITH EXTUBATION IN OR QAM Lila Mixon MD   25 mg at 07/20/19 0534    HYDROmorphone HCl PF (DILAUDID) injection 1 mg  1 mg Intravenous Q3H PRN Lila Mixon MD   1 mg at 07/19/19 0907    DULoxetine (CYMBALTA) extended release capsule 60 mg  60 mg Oral Daily Lila Mixon MD   60 mg at 07/20/19 0851    LORazepam (ATIVAN) tablet 1 mg  1 mg Oral Q8H PRN Lila Mixon MD   1 mg at 07/19/19 0908    acetaminophen (TYLENOL) tablet 650 mg  650 mg Oral Q6H PRN Lila Mixon MD        ondansetron (ZOFRAN-ODT) disintegrating tablet 4 mg  4 mg Oral Q8H PRN Lila Mixon MD   4 mg at 07/20/19 1853    tamsulosin (FLOMAX) capsule 0.4 mg  0.4 mg Oral Daily Lila Mixon MD   0.4 mg at 07/20/19 0850    magnesium oxide (MAG-OX) tablet 400 mg  400 mg Oral Daily Lila Mixon MD   400 mg at 07/20/19 5849    topiramate (TOPAMAX) tablet 25 mg  25 mg Oral BID Lila Mixon MD   25 mg at 07/20/19 2030    baclofen (LIORESAL) tablet 10 mg  10 mg Oral BID Lila Mixon MD   10 mg at 07/20/19 2029    phenazopyridine (PYRIDIUM) tablet 100 mg  100 mg Oral TID PRN Lila Mixon MD   100 mg at 07/18/19 0009    amitriptyline (ELAVIL) tablet 25 mg  25 mg Oral Nightly Lila Mixon MD   25 mg at 07/20/19 2029    0.9 % sodium chloride infusion   Intravenous Continuous Lila Mixon MD 75 mL/hr at 07/21/19 0233      sodium chloride flush 0.9 % injection 10 mL  10 mL Intravenous 2 times per day Lila Mixon MD   10 mL at 07/20/19 2030    sodium chloride flush 0.9 % injection 10 mL  10 mL Intravenous PRN Lila Mixon MD   10 mL at 07/20/19 1545    magnesium hydroxide (MILK OF MAGNESIA) 400 MG/5ML suspension 30 mL  30 mL Oral Daily PRN Lila Mixon MD        ondansetron [facility]) injection 4 mg  4 mg Intravenous Q6H PRN Lila Mixon MD   4 mg at 07/20/19 1545    enoxaparin (LOVENOX) injection 40 mg  40 mg Subcutaneous Nightly David Mcghee No cervical lymphadenopathy  Extremity:  No swelling. Normal ROM. No calf pain or asymmetric swelling. No lower extremity edema  Heart: Initially tachycardic normal S1 & S2, no extra heart sounds. Perfusion:  Intact, capillary refill less than 2 seconds  Respiratory:  Lungs clear to auscultation bilaterally. Respirations nonlabored. Abdominal:  Normal bowel sounds. Soft. No peritoneal signs. No hepatosplenomegaly. Back:  No CVA tenderness to palpation. No bruising. No CTL tenderness to palpation or step-off  Neurological:  Alert and oriented times 3. No focal neuro deficits. Cranial nerves II through XII are grossly intact. Psychiatric:  Appropriate    I have reviewed and interpreted all of the currently available lab results from this visit (if applicable):  Results for orders placed or performed during the hospital encounter of 07/16/19   Culture blood #1   Result Value Ref Range    Blood Culture, Routine       No Growth to date. Any change in status will be called. Culture blood #2   Result Value Ref Range    Culture, Blood 2       No Growth to date. Any change in status will be called.    Urinalysis   Result Value Ref Range    Color, UA Yellow Straw/Yellow    Clarity, UA CLOUDY (A) Clear    Glucose, Ur Negative Negative mg/dL    Bilirubin Urine Negative Negative    Ketones, Urine Negative Negative mg/dL    Specific Gravity, UA 1.015 1.005 - 1.030    Blood, Urine LARGE (A) Negative    pH, UA 5.5 5.0 - 8.0    Protein,  (A) Negative mg/dL    Urobilinogen, Urine 1.0 <2.0 E.U./dL    Nitrite, Urine Negative Negative    Leukocyte Esterase, Urine TRACE (A) Negative    Microscopic Examination YES     Urine Type Not Specified    Pregnancy, Urine   Result Value Ref Range    HCG(Urine) Pregnancy Test Negative Detects HCG level >20 MIU/mL   CBC Auto Differential   Result Value Ref Range    WBC 6.6 4.0 - 11.0 K/uL    RBC 3.29 (L) 4.00 - 5.20 M/uL    Hemoglobin 11.1 (L) 12.0 - 16.0 g/dL Hematocrit 34.5 (L) 36.0 - 48.0 %    .9 (H) 80.0 - 100.0 fL    MCH 33.8 26.0 - 34.0 pg    MCHC 32.2 31.0 - 36.0 g/dL    RDW 17.7 (H) 12.4 - 15.4 %    Platelets 964 091 - 866 K/uL    MPV 9.7 5.0 - 10.5 fL    Neutrophils % 83.2 %    Lymphocytes % 12.2 %    Monocytes % 4.3 %    Eosinophils % 0.0 %    Basophils % 0.3 %    Neutrophils # 5.5 1.7 - 7.7 K/uL    Lymphocytes # 0.8 (L) 1.0 - 5.1 K/uL    Monocytes # 0.3 0.0 - 1.3 K/uL    Eosinophils # 0.0 0.0 - 0.6 K/uL    Basophils # 0.0 0.0 - 0.2 K/uL   Comprehensive Metabolic Panel   Result Value Ref Range    Sodium 139 136 - 145 mmol/L    Potassium 4.1 3.5 - 5.1 mmol/L    Chloride 100 99 - 110 mmol/L    CO2 24 21 - 32 mmol/L    Anion Gap 15 3 - 16    Glucose 191 (H) 70 - 99 mg/dL    BUN 13 7 - 20 mg/dL    CREATININE 0.7 0.6 - 1.1 mg/dL    GFR Non-African American >60 >60    GFR African American >60 >60    Calcium 10.2 8.3 - 10.6 mg/dL    Total Protein 7.5 6.4 - 8.2 g/dL    Alb 4.0 3.4 - 5.0 g/dL    Albumin/Globulin Ratio 1.1 1.1 - 2.2    Total Bilirubin 0.8 0.0 - 1.0 mg/dL    Alkaline Phosphatase 233 (H) 40 - 129 U/L     (H) 10 - 40 U/L    AST 93 (H) 15 - 37 U/L    Globulin 3.5 g/dL   Microscopic Urinalysis   Result Value Ref Range    Hyaline Casts, UA 5 0 - 8 /LPF    WBC, UA 13 (H) 0 - 5 /HPF    RBC, UA >900 (H) 0 - 4 /HPF    Epi Cells 2 0 - 5 /HPF   Lactic Acid, Plasma   Result Value Ref Range    Lactic Acid 2.3 (H) 0.4 - 2.0 mmol/L   EKG 12 Lead   Result Value Ref Range    Ventricular Rate 105 BPM    Atrial Rate 105 BPM    P-R Interval 132 ms    QRS Duration 86 ms    Q-T Interval 362 ms    QTc Calculation (Bazett) 478 ms    P Axis 30 degrees    R Axis 48 degrees    T Axis 27 degrees    Diagnosis       Sinus tachycardiaOtherwise normal ECGConfirmed by MASTER CURRY MD ([id number]) on 7/17/2019 1:09:28 PM      Radiographs (if obtained):  [] The following radiograph was interpreted by myself in the absence of a radiologist:   [] Radiologist's Report Reviewed:  XR OF THE KIDNEYS AND URINARY BLADDER 7/19/2019 COMPARISON: 02/26/2019 HISTORY: ORDERING SYSTEM PROVIDED HISTORY: bilateral hydro. specifically looking to see if the left hydro is better since constipation is resolving. thanks. TECHNOLOGIST PROVIDED HISTORY: Reason for Exam: f/u ct  stent placement   hx of hydro Acuity: Unknown Type of Exam: Subsequent/Follow-up FINDINGS: Kidneys: The right kidney measures 11.4 cm in length and the left kidney measures 13.1 cm in length. Moderate bilateral hydronephrosis. No perinephric collection. Liver demonstrates heterogeneous echotexture. Bladder: Echogenic focus is seen within the bladder, likely reflecting known stent. Moderate bilateral hydronephrosis. Partial visualization of right nephroureteral stent. Fatty liver. Ct Abdomen Pelvis W Iv Contrast    Result Date: 7/16/2019  EXAMINATION: CT OF THE ABDOMEN AND PELVIS WITH CONTRAST 7/16/2019 9:30 pm TECHNIQUE: CT of the abdomen and pelvis was performed with the administration of intravenous contrast. Multiplanar reformatted images are provided for review. Dose modulation, iterative reconstruction, and/or weight based adjustment of the mA/kV was utilized to reduce the radiation dose to as low as reasonably achievable. COMPARISON: Prior studies including most recent 07/03/2009 noncontrast CT abdomen and pelvis HISTORY: ORDERING SYSTEM PROVIDED HISTORY: ovarian cancer, stent right side, stone vs infection vs worse hydro TECHNOLOGIST PROVIDED HISTORY: IV contrast only. Thank you. 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 procedures. I estimate there is LOW risk for (including but not limited to) ACUTE APPENDICITIS, BOWEL OBSTRUCTION, ACUTE CHOLECYSTITIS, RUPTURED DIVERTICULITIS, INCARCERATED or STRANGULATED HERNIA, HEMMORHAGIC PANCREATITIS, PERFORATED BOWEL/ULCER, ECTOPIC PREGNANCY, OVARIAN TORSION or TUBO-OVARIAN ABSCESS thus I consider the discharge disposition reasonable. Kaley Mckinney (or their surrogate) and I have discussed the diagnosis and risks, and we agree with discharging home with close follow-up. We also discussed returning to the Emergency Department immediately if new or worsening symptoms occur. We have discussed the symptoms which are most concerning that necessitate immediate return. Clinical Impression:  1. Flank pain    2. [facility] of [facility]      Disposition referral (if applicable):  [place], [address] [address]  [phone]    Schedule an appointment as soon as possible for a visit       Disposition medications (if applicable):  Discharge Medication List as of 7/17/2019 12:20 AM      START taking these medications    Details   amitriptyline (ELAVIL) 25 MG tablet Take 1 tablet by mouth nightly, Disp-30 tablet, R-[id number]      oxyCODONE-acetaminophen (PERCOCET) 5-325 MG per tablet Take 1 tablet by mouth every 4 hours as needed for Pain for up to 3 days. , Disp-12 tablet, R-0Print             Comment: Please note this report has been produced using speech recognition software and may contain errors related to that system including errors in grammar, punctuation, and spelling, as well as words and phrases that may be inappropriate. If there are any questions or concerns please feel free to contact the dictating provider for clarification.       Remigio Ruiz MD  07/21/19 2659

## 2019-07-21 NOTE — PROGRESS NOTES
Routine VSS. New PCA syringe started, verified by Arabella Arriaga RN. Patient resting in bed. No further needs expressed. Call light within reach. Will continue to monitor.

## 2019-07-21 NOTE — PROGRESS NOTES
PCA settings updated per order. Continuous dose increased to 1.3 mg/hr per orders. Patient educated on dose change. Patient resting in bed, awake and alert. No s/s of distress noted. No further needs expressed. Call light within reach. Will continue to monitor.

## 2019-07-21 NOTE — PROGRESS NOTES
Prn phenergan administered for nausea. No further needs expressed. Call light within reach. Will continue to monitor.

## 2019-07-21 NOTE — PROGRESS NOTES
Line) PRN   0.9 % sodium chloride bolus PRN   hydrALAZINE (APRESOLINE) injection 10 mg Q6H PRN   promethazine (PHENERGAN) injection 12.5 mg Q6H PRN   magnesium sulfate 1 g in dextrose 5% 100 mL IVPB PRN       Objective:  BP (!) 148/99   Pulse 110   Temp 98.8 °F (37.1 °C) (Oral)   Resp 13   Ht 5' 7\" (1.702 m)   Wt 180 lb (81.6 kg)   SpO2 94%   BMI 28.19 kg/m²     Intake/Output Summary (Last 24 hours) at 7/21/2019 1356  Last data filed at 7/21/2019 0851  Gross per 24 hour   Intake 1695.51 ml   Output 2400 ml   Net -704.49 ml      Wt Readings from Last 3 Encounters:   07/19/19 180 lb (81.6 kg)   07/16/19 175 lb (79.4 kg)   07/07/19 192 lb 11.2 oz (87.4 kg)       Patient sleeping. Wakes up on calling. Oriented. Respiratory efforts are normal.  Abdomen is soft not distended. Labs and Tests:  CBC:   Recent Labs     07/21/19  0555   WBC 3.9*   HGB 9.8*   PLT 85*     BMP:    Recent Labs     07/21/19  0555      K 3.4*      CO2 25   BUN 6*   CREATININE 0.6   GLUCOSE 136*     Hepatic: No results for input(s): AST, ALT, ALB, BILITOT, ALKPHOS in the last 72 hours. ASSESSMENT AND PLAN        Metastatic cervical cancer  Received C6 cis/taxol + avastin 6/4/19  Planning to continue single agent Avastin  Patient refused treatment 7/16/19  Gyn onc following     Neoplasm related pain  - pain medications adjusted per Dr. Carissa Herron, Dilaudid PCA ordered  - At patient's and nurses request, Dilaudid continuous infusion dose was increased to 1.3 mg/h (30% increase)     Bilateral hydronephrosis  - Has right side stent. - Now with left hydronephrosis. - Urology following and planning for right stent exchange and possible left stent placement.      Opioid induced constipation  - Received Relistor.  - Tap water enema.   - Will start Movantik.  - has been started on senokot BID     Mild cytopenias:    -Karen Rosa MD

## 2019-07-21 NOTE — PROGRESS NOTES
Progress Note - Dr. Jayden Roca - Internal Medicine  PCP: Corwin العراقي  [address] [place] [address] Vicky Martin 80 [phone]    Hospital Day: 4  Code Status: Full Code  Current Diet: DIET GENERAL;        CC: follow up on medical issues    Subjective:   Slava Leos is a 42 y.o. female. She denies problems    Resting comfortably  Offers no new complaints  Per Urology - awaiting until tomorrow for voiding trial    She denies chest pain, denies shortness of breath, denies nausea,  denies emesis. 10 system Review of Systems is reviewed with patient, and pertinent positives are listed here: None . Otherwise, Review of systems is negative. I have reviewed the patient's medical and social history in detail and updated the computerized patient record. To recap: She  has a past medical history of Cervical cancer ([address] Endometriosis, Fibromyalgia, GERD (gastroesophageal reflux disease), Hip fracture ([address] Hypoglycemia, Lupus ([address] Neuropathy, Ovarian cyst, and Seizures ([address] . She  has a past surgical history that includes  section; Cholecystectomy (); bronchoscopy (10/16/14); bronchoscopy (10/18/2014); Cystoscopy (Right, 2019); laparoscopy (2019); Cystoscopy (Right, 2019); and Cystoscopy (Bilateral, 2019). . She  reports that she quit smoking about 18 years ago. Her smoking use included cigarettes. She started smoking about 21 years ago. She has a 0.75 pack-year smoking history. She has never used smokeless tobacco. She reports that she does not drink alcohol or use drugs. .        Active Hospital Problems    Diagnosis Date Noted    Intractable pain [R52] 2019    Anemia [D64.9]     Hydronephrosis [N13.30] 02/15/2019    Cervical cancer ([address] [C53.9] 02/15/2019    GERD (gastroesophageal reflux disease) [K21.9] 10/20/2014       Current Facility-Administered Medications: pill splitter, , ,   opium-belladonna (B&O SUPPRETTES) 16.2-30 MG suppository 30 mg, 30 collection. Liver demonstrates heterogeneous echotexture. Bladder: Echogenic focus is seen within the bladder, likely reflecting known stent. Moderate bilateral hydronephrosis. Partial visualization of right nephroureteral stent. Fatty liver. Ct Abdomen Pelvis W Iv Contrast    Result Date: 7/16/2019  EXAMINATION: CT OF THE ABDOMEN AND PELVIS WITH CONTRAST 7/16/2019 9:30 pm TECHNIQUE: CT of the abdomen and pelvis was performed with the administration of intravenous contrast. Multiplanar reformatted images are provided for review. Dose modulation, iterative reconstruction, and/or weight based adjustment of the mA/kV was utilized to reduce the radiation dose to as low as reasonably achievable. COMPARISON: Prior studies including most recent 07/03/2009 noncontrast CT abdomen and pelvis HISTORY: ORDERING SYSTEM PROVIDED HISTORY: ovarian cancer, stent right side, stone vs infection vs worse hydro TECHNOLOGIST PROVIDED HISTORY: IV contrast only. Thank you. 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 Ground-glass opacity within the lung bases is noted, possibly related atelectasis. No lobar consolidation or effusion. Organs: The attenuation of the liver is diffusely decreased compatible fatty infiltration. The spleen, pancreas, adrenal glands show no acute abnormality. The gallbladder is surgically absent. A right ureteral stent is in place, unchanged in position. Bilateral hydronephrosis and hydroureter is identified, increased on the left since the prior study. .  There is mild right perinephric and periureteral Assessment: Established problem. Stable. Plan: Continue present orders/plan. Anemia ()    Assessment: Established problem. Stable. Hgb=9.8 at last check    Plan: No indication for transfusion. Cont to monitor h/h to assess progression of anemia. Recommend ferrous sulfate or MVI as outpatient. Intractable pain (7/17/2019)    Assessment: Established problem. Stable. On PCA now per Varinder Andrews    Plan: continue meds per pain mgmt.   On movantik to prevent OIC            Delmy Christopher  7/21/2019

## 2019-07-21 NOTE — PROGRESS NOTES
Urology Progress Note  [facility]    Provider: Ricky Clement MD Patient ID:  Admission Date: 2019 Name: Lashonda Life Date: 2019 MRN: [id number]   Patient Location: [id number] : 1980  Attending: Brooklynn Stephenson MD Date of Service: 2019  PCP: Olinda Olivarez MD     Diagnoses:  1. Right flank pain    2. Gross hematuria    3. Bilateral hydronephrosis    4. Intractable pain       Assessment/Plan:  Cervical ca with bilateral hydro, locally advanced cervical ca  - s/p cysto with inability to place stent due to distortion and likely tumor involvement of trigone; with prev metal stent placement on RIGHT  - cont sargent catheter given urethral dilation, hematuria for 2-3 days, then voiding trial markos () if clear   - follow renal function  - can consider nephrostomy tube for worsening flank pain/renal function - pt denies sig left flank pain this am - would hold on additional intervention at this point  - bladder spasms - B&O suppositories ordered, avoid oxybutynin if possible due to constipation issues    Operative findings per Dr. Chio Skinner:  Alex Needles to identify normal bladder anatomy. We could not find the left ureteral orifice. On comparison to Dr. Eloina Garcia notes I believe there has been a change in status at the level of the urethra and trigone. Specifically he noted induration of the urethra and was able to pass the scope easily. Also he noted the left UO was visible. A conversation was had with Levi Nguyễn and Enrrique rios about the findings today\"    The patient had a chance to ask questions which were answered. she understands the above plan. Subjective:   Jori Aase is a 42 y.o. female. She was seen and examined this morning. Denies worsening flank pain. Catheter draining well though with some blood.   Reports some bladder spasms    Objective:   Vitals:  Vitals:    19 0838   BP:    Pulse:    Resp: 14   Temp:    SpO2:

## 2019-07-21 NOTE — PROGRESS NOTES
Pt asking for additional pain medicine. Suppository offered for pain relief. Will continue to monitor.

## 2019-07-21 NOTE — PROGRESS NOTES
Scheduled medications administered. Routine VSS. Patient awake in bed.  at bedside. Patient reports pain at a 6/10, which is improved from this morning. No further needs expressed. Call light within reach. Will continue to monitor.

## 2019-07-22 NOTE — PROGRESS NOTES
Received Relistor.  - Tap water enema. - Will start Movantik.  - has been started on senokot BID     Mild cytopenias:    -Monitor    Ramin Blackmon, RAOUL  [facility]      Patient was seen this morning. Pain seems to be less. Appears more comfortable. Pancytopenia and blood counts stable. Bilateral hydronephrosis. Urology following. Hopefully discharge soon. Will hold systemic therapy for now. Jerrod Whittington.  Isidoro Blake MD, [place] [address]  Hematology and Oncology  [facility]  [phone]

## 2019-07-22 NOTE — PROGRESS NOTES
[facility]  Gynecologic Oncology   Progress Note    IVB SCC of the cervix  Bilateral hydronephrosis  pain    SUBJECTIVE:  C/o pain    OBJECTIVE:           Physical Exam  VITALS:  BP (!) 154/94   Pulse 92   Temp 98.2 °F (36.8 °C) (Oral)   Resp 18   Ht 5' 7\" (1.702 m)   Wt 180 lb (81.6 kg)   SpO2 93%   BMI 28.19 kg/m²   24HR INTAKE/OUTPUT:    Intake/Output Summary (Last 24 hours) at 7/22/2019 1014  Last data filed at 7/22/2019 0917  Gross per 24 hour   Intake 3155.52 ml   Output 3850 ml   Net -694.48 ml     CONSTITUTIONAL:  awake, alert, cooperative, no apparent distress, and appears stated age  ABDOMEN:  Soft NT  MUSCULOSKELETAL:  Trace BLE edema  Right eye with lateral erythema    DATA:  CBC with Differential:    Lab Results   Component Value Date    WBC 3.7 07/22/2019    RBC 2.93 07/22/2019    HGB 9.8 07/22/2019    HCT 30.0 07/22/2019    PLT 87 07/22/2019    .4 07/22/2019    MCH 33.4 07/22/2019    MCHC 32.6 07/22/2019    RDW 17.1 07/22/2019    NRBC 2 03/01/2019    SEGSPCT 70.3 02/05/2019    BANDSPCT 3 03/03/2019    METASPCT 2 03/03/2019    LYMPHOPCT 21.8 07/22/2019    MONOPCT 8.6 07/22/2019    MYELOPCT 1 03/03/2019    EOSPCT 3.6 04/17/2012    BASOPCT 0.9 07/22/2019    MONOSABS 0.3 07/22/2019    LYMPHSABS 0.8 07/22/2019    EOSABS 0.1 07/22/2019    BASOSABS 0.0 07/22/2019    DIFFTYPE Auto 05/22/2013     Lab Results   Component Value Date     07/22/2019    K 4.0 07/22/2019    K 3.9 07/18/2019     07/22/2019    CO2 27 07/22/2019    BUN 3 07/22/2019    CREATININE 0.6 07/22/2019    GLUCOSE 120 07/22/2019    CALCIUM 9.0 07/22/2019     ASSESSMENT AND PLAN:  1. Pain-Dr. Krystal Johnson following. Fentanyl patch 25, PCA, cymbalta. Pain may also have a neuropathic component. Can consider the addition of neurontin. Hopefully, Patch can be increased as we see what her PCA requirement is. I did give pt one dose of toradol today. 2. Eye-right eye with lateral erythema. Giving eye drops    3.  Cancer-pt is not a

## 2019-07-22 NOTE — PROGRESS NOTES
Component Value Date    WBC 3.7 (L) 07/22/2019    HGB 9.8 (L) 07/22/2019    HCT 30.0 (L) 07/22/2019    .4 (H) 07/22/2019    PLT 87 (L) 07/22/2019     Lab Results   Component Value Date    CREATININE 0.6 07/22/2019    BUN 3 (L) 07/22/2019     07/22/2019    K 4.0 07/22/2019     07/22/2019    CO2 27 07/22/2019       Mary Yanes MD  7/22/2019

## 2019-07-22 NOTE — PROGRESS NOTES
Progress Note - Dr. Luisa Dubose - Internal Medicine  PCP: Michelle Mckeon  [address] [place] [address] Johny IreneNov[facility] Centerveronica  [phone]    Hospital Day: 5  Code Status: Full Code  Current Diet: DIET GENERAL;        CC: follow up on medical issues    Subjective:   Aviva Mansfield is a 42 y.o. female. She denies problems    Pt continues to ask me to increase PCA pump  I again have told her that all pain medication changes to go thru single source (pain mgmt) - in any event, she is resting comfortably in bed when she asks, though BP is elevated this am  Awaiting urology re-eval. Unclear is stent replacement to be tried again  Case d/w leola davenport/onc NP    She denies chest pain, denies shortness of breath, denies nausea,  denies emesis. 10 system Review of Systems is reviewed with patient, and pertinent positives are listed here: None . Otherwise, Review of systems is negative. I have reviewed the patient's medical and social history in detail and updated the computerized patient record. To recap: She  has a past medical history of Cervical cancer ([address] Endometriosis, Fibromyalgia, GERD (gastroesophageal reflux disease), Hip fracture ([address] Hypoglycemia, Lupus ([address] Neuropathy, Ovarian cyst, and Seizures ([address] . She  has a past surgical history that includes  section; Cholecystectomy (); bronchoscopy (10/16/14); bronchoscopy (10/18/2014); Cystoscopy (Right, 2019); laparoscopy (2019); Cystoscopy (Right, 2019); and Cystoscopy (Bilateral, 2019). . She  reports that she quit smoking about 18 years ago. Her smoking use included cigarettes. She started smoking about 21 years ago. She has a 0.75 pack-year smoking history. She has never used smokeless tobacco. She reports that she does not drink alcohol or use drugs. .        Active Hospital Problems    Diagnosis Date Noted    Intractable pain [R52] 2019    Anemia [D64.9]     Hydronephrosis [N13.30] 02/15/2019    Cervical HISTORY: edil - hypoxia TECHNOLOGIST PROVIDED HISTORY: Reason for exam:->edil - hypoxia Additional Contrast?->None Reason for Exam: edil - hypoxia Acuity: Unknown Type of Exam: Unknown FINDINGS: Chest: Mediastinum: Thyroid gland is normal.  Tip of MediPort seen in the right atrium. Trace pericardial fluid is seen. Small mediastinal nodes are noted. Aberrant right subclavian artery is seen Lungs/pleura: Respiratory motion artifact limits evaluation of fine pulmonary parenchymal change. De pendant opacity is seen at the lung bases bilaterally. No significant pleural fluid. No pneumothorax Soft Tissues/Bones: No acute bony abnormality Abdomen/Pelvis: Organs: Spleen is mildly enlarged. No perisplenic fluid. Adrenal glands appear normal.  There is mild pelvicaliectasis on the left. No stones on the left. There is mild right-sided hydronephrosis. Right-sided ureteral stent is seen. Proximal portion is seen in the region of the right renal pelvis. Distal portion is seen within the bladder. Heterogeneous low attenuation seen throughout the liver, compatible with fatty infiltration. There are clips from prior cholecystectomy. No peripancreatic inflammatory change GI/Bowel: Large stool load is seen in the colon. No bowel obstruction. No significant pericolonic inflammatory change. Appendix is normal in caliber Pelvis: Trace free fluid seen within the pelvis. Bladder is distended. Peritoneum/Retroperitoneum: No retroperitoneal adenopathy. No aneurysm Bones/Soft Tissues: Tiny periumbilical hernia containing fat is seen. .  No acute bony abnormality     Increased de pendant opacity is seen at the lung bases, most likely atelectasis in the absence of clinical signs of pneumonia Mild right-sided hydronephrosis, similar to prior. Right ureteral stent is unchanged Fatty liver       Assessment and Plan:  Patient Active Hospital Problem List:   Hydronephrosis (2/15/2019)    Assessment: Established problem.  Stable.  Stent

## 2019-07-23 NOTE — PROGRESS NOTES
study..  There is mild right perinephric and periureteral stranding, nonspecific. No calcified urinary collecting system stones are identified. GI/Bowel: The bowel shows normal caliber and course without suspected wall thickening. Normal appendix. There is at least a moderate amount of stool throughout most of the colon. Pelvis: The urinary bladder is nondistended. Urinary bladder wall thickening is likely accentuated by lack of distention of the viscus. Perivesical fat stranding is evident. The uterus shows no acute abnormality. Peritoneum/Retroperitoneum: The aorta is normal in caliber and course. Bones/Soft Tissues: No acute bony abnormality. Small amount of free pelvic fluid. There is an enhancing soft tissue density within the left hemipelvis measuring 2.2 x 1.0 cm inferior to the ovary, adjacent to the right superolateral aspect of the cervix in area of transition of caliber of the left ureter. The density is new since the prior study. Bibasilar ground-glass opacity, possibly related atelectasis. Correlation for infection or edema is recommended. Right ureteral stent now in place. There is bilateral hydronephrosis, increased on the left since the prior study. Have regular enhancing soft tissue attenuation adjacent to the left superior lateral aspect of the cervix is worrisome for neoplastic implants, possibly causing the aforementioned left hydronephrosis. Urinary bladder wall thickening is likely accentuated by lack of distention of viscus. Given moderate perivesical fat stranding. Cystitis could be considered to include radiation cystitis. Fatty infiltration of the liver.      Xr Chest Portable    Result Date: 7/17/2019  EXAMINATION: ONE XRAY VIEW OF THE CHEST 7/17/2019 1:18 pm COMPARISON: 07/16/2019 HISTORY: ORDERING SYSTEM PROVIDED HISTORY: Abdominal pain Reason for Exam: Flank Pain (Pt with R flank pain, was seen here last night for the same thing, denies dysuira, emesis yesterday) FINDINGS: normal.  Tip of MediPort seen in the right atrium. Trace pericardial fluid is seen. Small mediastinal nodes are noted. Aberrant right subclavian artery is seen Lungs/pleura: Respiratory motion artifact limits evaluation of fine pulmonary parenchymal change. De pendant opacity is seen at the lung bases bilaterally. No significant pleural fluid. No pneumothorax Soft Tissues/Bones: No acute bony abnormality Abdomen/Pelvis: Organs: Spleen is mildly enlarged. No perisplenic fluid. Adrenal glands appear normal.  There is mild pelvicaliectasis on the left. No stones on the left. There is mild right-sided hydronephrosis. Right-sided ureteral stent is seen. Proximal portion is seen in the region of the right renal pelvis. Distal portion is seen within the bladder. Heterogeneous low attenuation seen throughout the liver, compatible with fatty infiltration. There are clips from prior cholecystectomy. No peripancreatic inflammatory change GI/Bowel: Large stool load is seen in the colon. No bowel obstruction. No significant pericolonic inflammatory change. Appendix is normal in caliber Pelvis: Trace free fluid seen within the pelvis. Bladder is distended. Peritoneum/Retroperitoneum: No retroperitoneal adenopathy. No aneurysm Bones/Soft Tissues: Tiny periumbilical hernia containing fat is seen. .  No acute bony abnormality     Increased de pendant opacity is seen at the lung bases, most likely atelectasis in the absence of clinical signs of pneumonia Mild right-sided hydronephrosis, similar to prior. Right ureteral stent is unchanged Fatty liver       Assessment and Plan:  Patient Active Hospital Problem List:   Hydronephrosis (2/15/2019)    Assessment: Established problem. Stable.  Stent could not be placed 7/19 due to tumor burden    Plan: Continue present orders/plan. Per urology- possible bilat pCN tube?   [address]ical cancer [address] (2/15/2019)    Assessment: Established problem. Stage IV    Plan: per gyn onc.

## 2019-07-23 NOTE — PROGRESS NOTES
Pt returned from radiation marking. PCA pump restarted with vitals. Pt in 10/10 pain. Attempting to control pain at this time.

## 2019-07-23 NOTE — PROGRESS NOTES
PCA pump adjusted per Dr. Krystal Johnson. Dr. Krystal Johnson ok with pt receiving another 4mg bolus on top of the PCA dosing. Pt continues to be in 10/10 pain.

## 2019-07-24 PROBLEM — A41.9 SEPSIS (HCC): Status: RESOLVED | Noted: 2019-01-01 | Resolved: 2019-01-01

## 2019-07-24 PROBLEM — N93.9 VAGINAL BLEEDING: Status: RESOLVED | Noted: 2018-01-01 | Resolved: 2019-01-01

## 2019-07-24 PROBLEM — J18.9 MULTIFOCAL PNEUMONIA: Status: RESOLVED | Noted: 2019-01-01 | Resolved: 2019-01-01

## 2019-07-24 PROBLEM — E83.42 HYPOMAGNESEMIA: Status: RESOLVED | Noted: 2019-01-01 | Resolved: 2019-01-01

## 2019-07-24 PROBLEM — R41.82 ALTERED MENTAL STATUS: Status: RESOLVED | Noted: 2019-01-01 | Resolved: 2019-01-01

## 2019-07-24 NOTE — PROGRESS NOTES
Oncology and Hematology Care   Progress Note      7/24/2019 9:27 AM        Name: Slava Leos . Admitted: 7/17/2019    SUBJECTIVE:  Patient doing about the same. Still with pain. Denies vaginal bleeding. States she's having hematuria. Will have nephrostomy tubes placed today.     Reviewed interval ancillary notes    Current Medications    baclofen (LIORESAL) tablet 10 mg TID   HYDROmorphone (DILAUDID) 10 mg/50 mL PCA Continuous   HYDROmorphone HCl PF (DILAUDID) injection 0.5 mg Q3H PRN   naphazoline-glycerin (CLEAR EYES REDNESS RELIEF) 0.012-0.2 % ophthalmic solution 1 drop TID   opium-belladonna (B&O SUPPRETTES) 16.2-30 MG suppository 30 mg Q8H PRN   zolpidem (AMBIEN) tablet 5 mg Nightly PRN   senna (SENOKOT) tablet 17.2 mg BID   polyethylene glycol (GLYCOLAX) packet 17 g Daily PRN   naloxone (NARCAN) injection 0.4 mg PRN   cloNIDine (CATAPRES) tablet 0.2 mg Q6H PRN   fentaNYL (DURAGESIC) 25 MCG/HR 1 patch Q72H   naloxegol (MOVANTIK) tablet 25 mg QAM   DULoxetine (CYMBALTA) extended release capsule 60 mg Daily   LORazepam (ATIVAN) tablet 1 mg Q8H PRN   acetaminophen (TYLENOL) tablet 650 mg Q6H PRN   ondansetron (ZOFRAN-ODT) disintegrating tablet 4 mg Q8H PRN   tamsulosin (FLOMAX) capsule 0.4 mg Daily   magnesium oxide (MAG-OX) tablet 400 mg Daily   topiramate (TOPAMAX) tablet 25 mg BID   phenazopyridine (PYRIDIUM) tablet 100 mg TID PRN   amitriptyline (ELAVIL) tablet 25 mg Nightly   0.9 % sodium chloride infusion Continuous   sodium chloride flush 0.9 % injection 10 mL 2 times per day   sodium chloride flush 0.9 % injection 10 mL PRN   magnesium hydroxide (MILK OF MAGNESIA) 400 MG/5ML suspension 30 mL Daily PRN   ondansetron (ZOFRAN) injection 4 mg Q6H PRN   enoxaparin (LOVENOX) injection 40 mg Nightly   cefTRIAXone (ROCEPHIN) 1 g in sterile water 10 mL IV syringe Q24H   phenazopyridine (PYRIDIUM) tablet 200 mg TID WC   potassium chloride (KLOR-CON M) extended release tablet 40 mEq PRN   Or potassium bicarb-citric acid (EFFER-K) effervescent tablet 40 mEq PRN   Or    potassium chloride 10 mEq/100 mL IVPB (Peripheral Line) PRN   0.9 % sodium chloride bolus PRN   hydrALAZINE (APRESOLINE) injection 10 mg Q6H PRN   promethazine (PHENERGAN) injection 12.5 mg Q6H PRN   magnesium sulfate 1 g in dextrose 5% 100 mL IVPB PRN       Objective:  BP (!) 161/100   Pulse 110   Temp 98.6 °F (37 °C) (Oral)   Resp 14   Ht 5' 7\" (1.702 m)   Wt 180 lb (81.6 kg)   SpO2 93%   BMI 28.19 kg/m²     Intake/Output Summary (Last 24 hours) at 7/24/2019 0927  Last data filed at 7/24/2019 0408  Gross per 24 hour   Intake 1505.88 ml   Output 4925 ml   Net -3419.12 ml    Wt Readings from Last 3 Encounters:   07/19/19 180 lb (81.6 kg)   07/16/19 175 lb (79.4 kg)   07/07/19 192 lb 11.2 oz (87.4 kg)       General appearance:  Appears comfortable  Eyes: Sclera clear. Pupils equal.  ENT: Moist oral mucosa. Trachea midline, no adenopathy. Cardiovascular: Regular rhythm, normal S1, S2. No murmur. No edema in lower extremities  Respiratory: Not using accessory muscles. Good inspiratory effort. Clear to auscultation bilaterally, no wheeze or crackles. GI: Abdomen soft, no tenderness, not distended  Musculoskeletal: No cyanosis in digits, neck supple  Neurology: CN 2-12 grossly intact. No speech or motor deficits  Psych: Normal affect. Alert and oriented in time, place and person  Skin: Warm, dry, normal turgor    Labs and Tests:  CBC:   Recent Labs     07/22/19  0558 07/24/19  0420   WBC 3.7* 4.1   HGB 9.8* 9.9*   PLT 87* 91*     BMP:  Recent Labs     07/22/19  0558 07/24/19  0420    146*   K 4.0 3.6    107   CO2 27 27   BUN 3* 4*   CREATININE 0.6 0.6   GLUCOSE 120* 119*     Hepatic: No results for input(s): AST, ALT, ALB, BILITOT, ALKPHOS in the last 72 hours.     ASSESSMENT AND PLAN    Principal Problem:    Hydronephrosis  Active Problems:    GERD (gastroesophageal reflux disease)    Cervical cancer (HCC)    Anemia Intractable pain  Resolved Problems:    * No resolved hospital problems. *      Metastatic cervical cancer  - Received C6 cis/taxol + avastin 6/4/19  - Patient refused Avastin 7/16/19  - Simulated for pelvic radiation 7/23, to start radiation 7/29.  - Gyn onc following     Neoplasm related pain  - pain medications adjusted per Dr. Patrizia Mi, Dilaudid PCA ordered  - At patient's and nurses request, Dilaudid continuous infusion dose was increased to 1.3 mg/h (30% increase) 7/21/19  - will ask Dr. Patrizia Mi to see patient regarding pain  - Increased baclofen to TID     Bilateral hydronephrosis  - Has right side stent. - Now with left hydronephrosis. - Will have bilateral nephrostomy tube placed today     Opioid induced constipation  - Received Relistor.  - Continue Movantik daily.   - has been started on senokot BID      Mild cytopenias:  -Monitor    [place], [place]  Oncology Hematology [address] [facility].  [phone]

## 2019-07-24 NOTE — PROGRESS NOTES
Shift assessment complete. VVS, patient  complain of pain in her pelvis, pain medication administered per MAR order,call light within reach, pt went to surgery around 1115. no needs reported at this time.

## 2019-07-24 NOTE — PROGRESS NOTES
The patient had the nephrostomy tubes and hopefully that will decrease pain   She is scheduled to have 3 mg of Dilaudid an hour, but the patient has not chica utilizing the PCA as directed, I went through the instructions again with her  The fentanyl was increased to 50 mcg, I would rather wait to change any long acting until she utilizes the PCA approprietly and see the results of the nephrostomy tubes.   After that will calculate the daily requirements and convert to long acting     Will follow   Eneida Santiago -[phone]

## 2019-07-24 NOTE — PROGRESS NOTES
Department of Internal Medicine  General Internal Medicine   Progress Note      SUBJECTIVE: moderate pain , denies fever or chills nursing staff reporting higher BP    History obtained from chart review and the patient  General ROS: positive for  - fatigue, malaise and weight loss  negative for - chills, fever or night sweats  Psychological ROS: negative  Ophthalmic ROS: negative  Respiratory ROS: no cough, shortness of breath, or wheezing  Cardiovascular ROS: no chest pain or dyspnea on exertion  Gastrointestinal ROS: positive for - abdominal pain and appetite loss  negative for - blood in stools, hematemesis, melena or nausea/vomiting  Genito-Urinary ROS: positive for - dysuria and pelvic pain  negative for - hematuria or urinary frequency/urgency  Musculoskeletal ROS: negative  Neurological ROS: no TIA or stroke symptoms  Dermatological ROS: negative    OBJECTIVE      Medications      Current Facility-Administered Medications: baclofen (LIORESAL) tablet 10 mg, 10 mg, Oral, TID  0.9 % sodium chloride infusion, , Intravenous, Continuous  acetaminophen (TYLENOL) tablet 650 mg, 650 mg, Oral, Q4H PRN  fentaNYL (DURAGESIC) 50 MCG/HR 1 patch, 1 patch, Transdermal, Q72H  HYDROmorphone (DILAUDID) 10 mg/50 mL PCA, , Intravenous, Continuous  HYDROmorphone HCl PF (DILAUDID) injection 0.5 mg, 0.5 mg, Intravenous, Q3H PRN  naphazoline-glycerin (CLEAR EYES REDNESS RELIEF) 0.012-0.2 % ophthalmic solution 1 drop, 1 drop, Right Eye, TID  opium-belladonna (B&O SUPPRETTES) 16.2-30 MG suppository 30 mg, 30 mg, Rectal, Q8H PRN  zolpidem (AMBIEN) tablet 5 mg, 5 mg, Oral, Nightly PRN  senna (SENOKOT) tablet 17.2 mg, 2 tablet, Oral, BID  polyethylene glycol (GLYCOLAX) packet 17 g, 17 g, Oral, Daily PRN  naloxone (NARCAN) injection 0.4 mg, 0.4 mg, Intravenous, PRN  cloNIDine (CATAPRES) tablet 0.2 mg, 0.2 mg, Oral, Q6H PRN  naloxegol (MOVANTIK) tablet 25 mg, 25 mg, Oral, QAM  DULoxetine (CYMBALTA) extended release capsule 60 mg, 60 mg, 1  Max: 20  Pulse Range:  Pulse  Av.2  Min: 99  Max: 115  Blood pressure range:  Systolic (99KDE), TYX:946 , Min:88 , OLX:011   , Diastolic (64XMS), NVF:46, Min:7, Max:107    SpO2  Av.2 %  Min: 78 %  Max: 100 %    Intake/Output Summary (Last 24 hours) at 2019 1728  Last data filed at 2019 1644  Gross per 24 hour   Intake 2752.02 ml   Output 5825 ml   Net -3072.98 ml       Vent settings:  Pulse  Av  Min: 67  Max: 123  Resp  Av.6  Min: 0  Max: 25  SpO2  Av.1 %  Min: 78 %  Max: 100 %  End Tidal CO2  Av.9 (%)  Min: 28 (%)  Max: 48 (%)    CONSTITUTIONAL:  fatigued, alert, cooperative, mild distress and appears stated age  EYES:  Unremarkable   ENT:  normocepalic, without obvious abnormality  NECK:  No JVD  and supple, symmetrical, trachea midline  BACK:  symmetric  LUNGS:  No increased work of breathing, good air exchange, clear to auscultation bilaterally, no crackles or wheezing  CARDIOVASCULAR:  normal apical pulses, regular rate and rhythm, normal S1 and S2, no S3 and no S4  ABDOMEN:  Soft BS + non tender   MUSCULOSKELETAL:  Trace edema   NEUROLOGIC:  No acute focal deficit   SKIN:  Warm , dry and pale  and no bruising or bleeding    Data      Recent Results (from the past 96 hour(s))   Basic Metabolic Panel    Collection Time: 19  5:55 AM   Result Value Ref Range    Sodium 141 136 - 145 mmol/L    Potassium 3.4 (L) 3.5 - 5.1 mmol/L    Chloride 104 99 - 110 mmol/L    CO2 25 21 - 32 mmol/L    Anion Gap 12 3 - 16    Glucose 136 (H) 70 - 99 mg/dL    BUN 6 (L) 7 - 20 mg/dL    CREATININE 0.6 0.6 - 1.1 mg/dL    GFR Non-African American >60 >60    GFR African American >60 >60    Calcium 8.7 8.3 - 10.6 mg/dL   CBC Auto Differential    Collection Time: 19  5:55 AM   Result Value Ref Range    WBC 3.9 (L) 4.0 - 11.0 K/uL    RBC 2.95 (L) 4.00 - 5.20 M/uL    Hemoglobin 9.8 (L) 12.0 - 16.0 g/dL    Hematocrit 30.5 (L) 36.0 - 48.0 %    .5 (H) 80.0 - 100.0 fL    MCH 33.3 26.0

## 2019-07-24 NOTE — ANESTHESIA PRE PROCEDURE
[facility] Department of Anesthesiology  Pre-Anesthesia Evaluation/Consultation       Name:  Augustina Muse  : 1980  Age:  42 y.o. MRN:  [id number]  Date: 2019           Surgeon: Surgeon(s):  Reilly Yang MD    Procedure: Procedure(s):  CYSTOSCOPY, RIGHT URETERAL STENT EXCHANGE, PLACEMENT OF LEFT URETERAL STENT     Allergies   Allergen Reactions    Food Hives     Raw spinach.     Spinach      Patient Active Problem List   Diagnosis    Stridor    Acute bronchitis    Fibromyalgia    Bradycardia    Chest pain    GERD (gastroesophageal reflux disease)    Vaginal bleeding    History of juvenile rheumatoid arthritis    SLE (systemic lupus erythematosus) (HCC)    Lupus anticoagulant positive    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, antepartum (HCC)    Cervical cancer (HCC)    Multifocal pneumonia    Pulmonary nodule    Hydronephrosis    Ground glass opacity present on imaging of lung    Lung nodules    Lymphadenopathy    Constipation due to opioid therapy    Sepsis ([address]    Septic shock ([address]    Septicemia ([address]    History of cancer    Status post cystoscopy    Immunocompromised ([address]    Complicated UTI (urinary tract infection)    Anemia    Acute encephalopathy    Class 1 obesity due to excess calories with body mass index (BMI) of 31.0 to 31.9 in adult    Weight loss counseling, encounter for    Hypomagnesemia    Altered mental status    Acute renal failure ([address]    MIHAI (acute kidney injury) ([address]    Opioid overdose ([address]    Intractable pain     Past Medical History:   Diagnosis Date    Cervical cancer ([address]     Endometriosis     Fibromyalgia     GERD (gastroesophageal reflux disease)     Hip fracture (HCC)     LEFT     Hypoglycemia     Lupus (HCC)     Neuropathy     Ovarian cyst     Seizures (HCC)      Past Surgical History:   Procedure Laterality Date    BRONCHOSCOPY  10/16/14    Urgent chloride infusion   Intravenous Continuous Elsi Johnson MD 75 mL/hr at 07/24/19 0310      sodium chloride flush 0.9 % injection 10 mL  10 mL Intravenous 2 times per day Elsi Johnson MD   10 mL at 07/23/19 2019    sodium chloride flush 0.9 % injection 10 mL  10 mL Intravenous PRN Elsi Johnson MD   10 mL at 07/24/19 0540    magnesium hydroxide (MILK OF MAGNESIA) 400 MG/5ML suspension 30 mL  30 mL Oral Daily PRN Elsi Johnson MD   30 mL at 07/21/19 0822    ondansetron (ZOFRAN) injection 4 mg  4 mg Intravenous Q6H PRN Elsi Johnson MD   4 mg at 07/24/19 0242    enoxaparin (LOVENOX) injection 40 mg  40 mg Subcutaneous Nightly Elsi Johnson MD   Stopped at 07/23/19 0053    cefTRIAXone (ROCEPHIN) 1 g in sterile water 10 mL IV syringe  1 g Intravenous Q24H Elsi Johnson MD   1 g at 07/23/19 1538    phenazopyridine (PYRIDIUM) tablet 200 mg  200 mg Oral TID WC Elsi Johnson MD   200 mg at 07/23/19 1722    potassium chloride (KLOR-CON M) extended release tablet 40 mEq  40 mEq Oral PRN Elsi Johnson MD   40 mEq at 07/21/19 0566    Or    potassium bicarb-citric acid (EFFER-K) effervescent tablet 40 mEq  40 mEq Oral PRN Elsi Johnson MD        Or    potassium chloride 10 mEq/100 mL IVPB (Peripheral Line)  10 mEq Intravenous PRN Elsi Johnson  mL/hr at 07/18/19 0701 10 mEq at 07/18/19 0701    0.9 % sodium chloride bolus  500 mL Intravenous PRN Elsi Johnson MD        hydrALAZINE (APRESOLINE) injection 10 mg  10 mg Intravenous Q6H PRN Elsi Johnson MD   10 mg at 07/24/19 0549    promethazine (PHENERGAN) injection 12.5 mg  12.5 mg Intravenous Q6H PRN Elsi Johnson MD   12.5 mg at 07/24/19 0539    magnesium sulfate 1 g in dextrose 5% 100 mL IVPB  1 g Intravenous PRN Elsi Johnson MD         Vital Signs (Current)   There were no vitals filed for this visit.   Vital Signs Statistics (for past 48 hrs)     Temp

## 2019-07-24 NOTE — PROGRESS NOTES
tamsulosin (FLOMAX) capsule 0.4 mg  0.4 mg Oral Daily Rowan Hendricks MD   0.4 mg at 07/23/19 0915    magnesium oxide (MAG-OX) tablet 400 mg  400 mg Oral Daily Rowan Hendricks MD   400 mg at 07/23/19 0915    topiramate (TOPAMAX) tablet 25 mg  25 mg Oral BID Rowan Hendricks MD   25 mg at 07/23/19 2018    phenazopyridine (PYRIDIUM) tablet 100 mg  100 mg Oral TID PRN Rowan Hendricks MD   100 mg at 07/21/19 2233    amitriptyline (ELAVIL) tablet 25 mg  25 mg Oral Nightly Rowan Hendricks MD   25 mg at 07/23/19 2019    0.9 % sodium chloride infusion   Intravenous Continuous Rowan Hendricks MD 75 mL/hr at 07/24/19 0310      sodium chloride flush 0.9 % injection 10 mL  10 mL Intravenous 2 times per day Rowan Hendricks MD   10 mL at 07/23/19 2019    sodium chloride flush 0.9 % injection 10 mL  10 mL Intravenous PRN Rowan Hendricks MD   10 mL at 07/24/19 0540    magnesium hydroxide (MILK OF MAGNESIA) 400 MG/5ML suspension 30 mL  30 mL Oral Daily PRN Rowan Hendricks MD   30 mL at 07/21/19 0822    ondansetron (ZOFRAN) injection 4 mg  4 mg Intravenous Q6H PRN Rowan Hendricks MD   4 mg at 07/24/19 0242    enoxaparin (LOVENOX) injection 40 mg  40 mg Subcutaneous Nightly Rowan Hendricks MD   Stopped at 07/23/19 0053    cefTRIAXone (ROCEPHIN) 1 g in sterile water 10 mL IV syringe  1 g Intravenous Q24H Rowan Hendricks MD   1 g at 07/23/19 1538    phenazopyridine (PYRIDIUM) tablet 200 mg  200 mg Oral TID WC Rowan Hendricks MD   200 mg at 07/23/19 1722    potassium chloride (KLOR-CON M) extended release tablet 40 mEq  40 mEq Oral PRN Rowan Hendricks MD   40 mEq at 07/21/19 2168    Or    potassium bicarb-citric acid (EFFER-K) effervescent tablet 40 mEq  40 mEq Oral PRN Rowan Hendricks MD        Or    potassium chloride 10 mEq/100 mL IVPB (Peripheral Line)  10 mEq Intravenous PRN Rowan Hendricks  mL/hr at 07/18/19 0701 10 mEq at 07/18/19 0701    0.9 % sodium chloride bolus  500 mL Intravenous PRN Nancy Morel MD        hydrALAZINE (APRESOLINE) injection 10 mg  10 mg Intravenous Q6H PRN Nancy Morel MD   10 mg at 07/24/19 0549    promethazine (PHENERGAN) injection 12.5 mg  12.5 mg Intravenous Q6H PRN Nancy Morel MD   12.5 mg at 07/24/19 0539    magnesium sulfate 1 g in dextrose 5% 100 mL IVPB  1 g Intravenous PRN Nancy Morel MD         Allergies   Allergen Reactions    Food Hives     Raw spinach.  Spinach      Principal Problem:    Hydronephrosis  Active Problems:    GERD (gastroesophageal reflux disease)    Cervical cancer (HCC)    Anemia    Intractable pain  Resolved Problems:    * No resolved hospital problems. *    Blood pressure (!) 159/105, pulse 109, temperature 98.7 °F (37.1 °C), temperature source Oral, resp. rate 11, height 5' 7\" (1.702 m), weight 180 lb (81.6 kg), SpO2 95 %. Subjective:  Symptoms:  Stable. Diet:  Adequate intake. Activity level: Impaired due to pain. Objective:  General Appearance:  Comfortable and ill-appearing. Vital signs: (most recent): Blood pressure (!) 159/105, pulse 109, temperature 98.7 °F (37.1 °C), temperature source Oral, resp. rate 11, height 5' 7\" (1.702 m), weight 180 lb (81.6 kg), SpO2 95 %.       Assessment & Plan      Bilateral hydro  Adv ca  Pelvic pain    Plan:  Cysto R stent removal  B neph tubes    Giuseppe Song MD  7/24/2019

## 2019-07-25 PROBLEM — E44.1 MILD MALNUTRITION (HCC): Chronic | Status: ACTIVE | Noted: 2019-01-01

## 2019-07-25 NOTE — PROGRESS NOTES
Vital signs assessed per protocol at this time pt complains of pain 8/10.  Pt remains playing on phone during assessment VSS respirations even and easy

## 2019-07-26 NOTE — PROGRESS NOTES
Ortiz removed per order pt tolerated well. Pt pain is not controlled at this time. All PRN given warm blankets emotional support environmental adjustments provided.

## 2019-07-26 NOTE — PROGRESS NOTES
PLAN    Principal Problem:    Hydronephrosis  Active Problems:    GERD (gastroesophageal reflux disease)    SLE (systemic lupus erythematosus) (HCC)    Cervical cancer (HCC)    Lymphadenopathy    Constipation due to opioid therapy    Status post cystoscopy    Immunocompromised ([address]    Complicated UTI (urinary tract infection)    Anemia    Intractable pain    Mild malnutrition (HCC)  Resolved Problems:    * No resolved hospital problems. *      Metastatic cervical cancer  - Received C6 cis/taxol + avastin 6/4/19  - Patient refused Avastin 7/16/19  - Simulated for pelvic radiation 7/23, to start radiation 7/29.  - Gyn onc following  - Will plan to start radiaion on Monday with weekly cisplatin    Neoplasm related pain  - pain medications per Dr. Fatuma Marsh  - At patient's and nurses request, Dilaudid continuous infusion dose was increased to 1.3 mg/h (30% increase) 7/21/19    Bilateral hydronephrosis  - Has right side stent. - Now with left hydronephrosis. - bilateral nephrostomy tube placed 7/24/19     Opioid induced constipation  - Received Relistor.  - Continue Movantik daily. - has been started on senokot BID      Mild cytopenias:  -Monitor    Martins Real, [address]  [phone]      Patient was seen this morning. Pain about the same. Status post bilateral nephrostomies with bloody urine, worse on the right side. Significant anemia but no transfusion indicated at present. Watch H/H. Mild thrombocytopenia. Radiation planned on Monday.       Anne-Marie Pritchard MD, MS

## 2019-07-27 NOTE — PROGRESS NOTES
AND PLAN    Principal Problem:    Hydronephrosis  Active Problems:    GERD (gastroesophageal reflux disease)    SLE (systemic lupus erythematosus) (HCC)    Cervical cancer (HCC)    Lymphadenopathy    Constipation due to opioid therapy    Status post cystoscopy    Immunocompromised ([address]    Complicated UTI (urinary tract infection)    Anemia    Intractable pain    Mild malnutrition (HCC)  Resolved Problems:    * No resolved hospital problems. *      Metastatic cervical cancer  - Received C6 cis/taxol + avastin 6/4/19  - Patient refused Avastin 7/16/19  - Simulated for pelvic radiation 7/23, to start radiation 7/29.  - Gyn onc following  - Will plan to start radiaion on Monday with weekly cisplatin    Neoplasm related pain  - pain medications per Dr. Cate Lee  - Would recommend increase    Bilateral hydronephrosis  - Has right side stent. - Now with left hydronephrosis. - bilateral nephrostomy tube placed 7/24/19     Opioid induced constipation  - Received Relistor.  - Continue Movantik daily. - has been started on senokot BID      Mild cytopenias:  -Monitor        Lilia Jensen. Kulwinder Spencer M.D., MPH  Chair, Department of  Clinical [facility], [address] [place] [address]  Office [phone]  Cell [phone]  [email or web address]       \"Participating in a clinical trial is the first step to fighting cancer; not the last.\"    7/27/2019 9:10 AM

## 2019-07-27 NOTE — PROGRESS NOTES
Spoke with pt and sister regarding more pain control. Educated on POC to not increase pain medication d/t risk of OD and risk of respiratory failure, both verbalized understanding. Will monitor and keep up on pain without over-sedation. Pt agreed with plan.

## 2019-07-27 NOTE — PROGRESS NOTES
Mohan Dang is a 42 y.o. female patient. 1. Right flank pain    2. Gross hematuria    3. Bilateral hydronephrosis    4. Intractable pain    5. Ureteral obstruction, left      Past Medical History:   Diagnosis Date    Cervical cancer ([address]     Endometriosis     Fibromyalgia     GERD (gastroesophageal reflux disease)     Hip fracture (HCC)     LEFT 2002    Hypoglycemia     Lupus (HCC)     Neuropathy     Ovarian cyst     Seizures (HCC)      No past surgical history pertinent negatives on file. Scheduled Meds:   baclofen  10 mg Oral TID    fentaNYL  1 patch Transdermal Q72H    naphazoline-glycerin  1 drop Right Eye TID    senna  2 tablet Oral BID    naloxegol  25 mg Oral QAM    DULoxetine  60 mg Oral Daily    tamsulosin  0.4 mg Oral Daily    magnesium oxide  400 mg Oral Daily    topiramate  25 mg Oral BID    amitriptyline  25 mg Oral Nightly    sodium chloride flush  10 mL Intravenous 2 times per day    enoxaparin  40 mg Subcutaneous Nightly    cefTRIAXone (ROCEPHIN) IV  1 g Intravenous Q24H    phenazopyridine  200 mg Oral TID WC     Continuous Infusions:   sodium chloride 100 mL/hr at 07/27/19 0857    HYDROmorphone 10 mg (07/27/19 0011)    sodium chloride 75 mL/hr at 07/24/19 0310     PRN Meds:HYDROmorphone, cyclobenzaprine, acetaminophen, HYDROmorphone, opium-belladonna, zolpidem, polyethylene glycol, naloxone, cloNIDine, LORazepam, acetaminophen, ondansetron, phenazopyridine, sodium chloride flush, magnesium hydroxide, ondansetron, potassium chloride **OR** potassium alternative oral replacement **OR** potassium chloride, sodium chloride, hydrALAZINE, promethazine, magnesium sulfate    Allergies   Allergen Reactions    Food Hives     Raw spinach.     Spinach      Principal Problem:    Hydronephrosis  Active Problems:    GERD (gastroesophageal reflux disease)    SLE (systemic lupus erythematosus) (HCC)    Cervical cancer (HCC)    Lymphadenopathy    Constipation due to opioid therapy    Status post cystoscopy    Immunocompromised ([address]    Complicated UTI (urinary tract infection)    Anemia    Intractable pain    Mild malnutrition (HCC)  Resolved Problems:    * No resolved hospital problems. *    Blood pressure (!) 154/97, pulse 109, temperature 97.9 °F (36.6 °C), temperature source Oral, resp. rate 15, height 5' 7\" (1.702 m), weight 180 lb (81.6 kg), SpO2 96 %. Subjective:   Diet: Poor intake. Activity level: Returning to normal.    Pain control: Well controlled. Objective:  Vital signs (most recent): Blood pressure (!) 154/97, pulse 109, temperature 97.9 °F (36.6 °C), temperature source Oral, resp. rate 15, height 5' 7\" (1.702 m), weight 180 lb (81.6 kg), SpO2 96 %. General appearance: Comfortable. Abdomen: Abdomen is soft. Wound:  Clean. Extremities: There is normal range of motion. Neurological: The patient is alert. Assessment:   Condition: In stable condition.        kirsty hydro  Adv cerv ca  Hematuria    recc    Feels better  Tubes draining well, clear  Cont kirsty pcn, need to be changed 3-4months    Akua Pearce MD  7/27/2019

## 2019-07-27 NOTE — PROGRESS NOTES
Shift assessment complete. BP elevated, pt complaining of pain 7/10. Pt still using the continuous PCA, PCA button was also hit x1 and receiving PRN dilaudid Q3h (approximately). Both nephrostomy bag emptied. Pt alert but very drowsy. Pt is having difficulty remembering things that happened with other nursing or what we were talking about. She is tearful and blood tinged urine on pad on bed seen. Will continue to monitor.

## 2019-07-28 NOTE — PROGRESS NOTES
Walked into room to find that Pt had turned off the PCA pump channel. Pt stated that the beeping was bothering her.

## 2019-07-28 NOTE — PROGRESS NOTES
Cait Singh is a 42 y.o. female patient. 1. Right flank pain    2. Gross hematuria    3. Bilateral hydronephrosis    4. Intractable pain    5. Ureteral obstruction, left      Past Medical History:   Diagnosis Date    [facility]     Endometriosis     Fibromyalgia     GERD (gastroesophageal reflux disease)     Hip fracture (HCC)     LEFT 2002    Hypoglycemia     Lupus (HCC)     Neuropathy     Ovarian cyst     Seizures (HCC)      No past surgical history pertinent negatives on file. Scheduled Meds:   baclofen  10 mg Oral TID    fentaNYL  1 patch Transdermal Q72H    naphazoline-glycerin  1 drop Right Eye TID    senna  2 tablet Oral BID    naloxegol  25 mg Oral QAM    DULoxetine  60 mg Oral Daily    tamsulosin  0.4 mg Oral Daily    magnesium oxide  400 mg Oral Daily    topiramate  25 mg Oral BID    amitriptyline  25 mg Oral Nightly    sodium chloride flush  10 mL Intravenous 2 times per day    enoxaparin  40 mg Subcutaneous Nightly    cefTRIAXone (ROCEPHIN) IV  1 g Intravenous Q24H    phenazopyridine  200 mg Oral TID WC     Continuous Infusions:   sodium chloride 100 mL/hr at 07/28/19 0530    HYDROmorphone 10 mg (07/27/19 0011)    sodium chloride 75 mL/hr at 07/24/19 0310     PRN Meds:calcium carbonate, famotidine, HYDROmorphone, cyclobenzaprine, acetaminophen, HYDROmorphone, opium-belladonna, zolpidem, polyethylene glycol, naloxone, cloNIDine, LORazepam, acetaminophen, ondansetron, phenazopyridine, sodium chloride flush, magnesium hydroxide, ondansetron, potassium chloride **OR** potassium alternative oral replacement **OR** potassium chloride, sodium chloride, hydrALAZINE, promethazine, magnesium sulfate    Allergies   Allergen Reactions    Food Hives     Raw spinach.     Spinach      Principal Problem:    Hydronephrosis  Active Problems:    GERD (gastroesophageal reflux disease)    SLE (systemic lupus erythematosus) (HCC)    Cervical cancer (HCC)    Lymphadenopathy

## 2019-07-28 NOTE — PROGRESS NOTES
VSS, assessment completed, and meds given. Pt appears calm with no signs of distress. Breathing is regular and unlabored, above 12/min. Ambien and pepcid provided. High output from nephro tubes. Call light is within reach. No further needs at this time.

## 2019-07-29 NOTE — PROGRESS NOTES
Lying in bed resting, pt is alert and oriented and rates her pain 7/10. Pt has PCA button in reach and uses it when needed. cathflo removed at this time and good blood return noted. Call light in reach and bed alarm in place, will continue to monitor.

## 2019-07-30 PROBLEM — J96.01 ACUTE RESPIRATORY FAILURE WITH HYPOXIA (HCC): Status: ACTIVE | Noted: 2019-01-01

## 2019-07-30 NOTE — PROGRESS NOTES
intraluminal position. The right retrograde ureteral catheter was withdrawn. The external portion of the percutaneous nephrostomy tube was sutured to skin with 2 Ethilon. It was connected to a gravity bag. Left nephrostomy tube. The left kidney was identified by ultrasound. A left posterolateral, subcostal approach was chosen. The skin entry site was infiltrated with 1% lidocaine. An echogenic 21 gauge needle was advanced to the lower pole, entering a dilated calyx. Nephrostogram was performed. The needle entry was too steep for wire access to the renal pelvis. Therefore tandem needle technique was utilized. A 2nd 21 gauge needle was utilized to enter a lower pole liana. A 6 Tu[place] catheter was advanced into the renal pelvis and upper moiety, followed by an 035 angled Glidewire. An 8 [place] pigtail catheter was placed over the wire and locked in position. There was moderate to severe left hydronephrosis. The catheter was sutured in position with 2 0 Ethilon and connected to a gravity bag. FINDINGS: Please see above. Bilateral hydroureteronephrosis, secondary to known cervical mass. Successful bilateral percutaneous nephrostomy tube placements. Assessment and Plan:  Patient Active Hospital Problem List:   Hydronephrosis (2/15/2019)    Assessment: Established problem. Stable. S/p bilat nephrostomy tubes    Plan: Continue present orders/plan. GERD (gastroesophageal reflux disease) (10/20/2014)    Assessment: Established problem. Stable. Plan: Continue present orders/plan. SLE (systemic lupus erythematosus) ([facility] (1/22/2019)    Assessment: Established problem. Stable. Plan: Continue present orders/plan. Cervical cancer ([facility] (2/15/2019)    Assessment: Established problem. Stable. Radiation starting this week. Plan: per onc   Lymphadenopathy ()    Assessment: Established problem. Stable. Plan: Continue present orders/plan.     Constipation due to opioid therapy

## 2019-07-30 NOTE — PROGRESS NOTES
Department of Internal Medicine  General Internal Medicine   Progress Note      SUBJECTIVE : severe pelvic pain  But nursing staff does report supression of spontaneous respiration rate while under the influence of opiates       History obtained from chart review and the patient  General ROS: positive for  - fatigue, malaise and weight loss  negative for - chills, fever or night sweats  Psychological ROS: negative  Ophthalmic ROS: negative  Respiratory ROS: no cough, shortness of breath, or wheezing  Cardiovascular ROS: no chest pain or dyspnea on exertion  Gastrointestinal ROS: positive for - abdominal pain and appetite loss  negative for - blood in stools, hematemesis, melena or nausea/vomiting  Genito-Urinary ROS: positive for - dysuria and pelvic pain  negative for - hematuria or urinary frequency/urgency  Musculoskeletal ROS: negative  Neurological ROS: no TIA or stroke symptoms  Dermatological ROS: negative    OBJECTIVE      Medications      Current Facility-Administered Medications: promethazine (PHENERGAN) injection 25 mg, 25 mg, Intravenous, Q4H PRN  calcium carbonate (TUMS) chewable tablet 1,000 mg, 1,000 mg, Oral, 4x Daily PRN  famotidine (PEPCID) tablet 20 mg, 20 mg, Oral, BID PRN  cyclobenzaprine (FLEXERIL) tablet 10 mg, 10 mg, Oral, TID PRN  baclofen (LIORESAL) tablet 10 mg, 10 mg, Oral, TID  0.9 % sodium chloride infusion, , Intravenous, Continuous  acetaminophen (TYLENOL) tablet 650 mg, 650 mg, Oral, Q4H PRN  fentaNYL (DURAGESIC) 25 MCG/HR 1 patch, 1 patch, Transdermal, Q72H  HYDROmorphone (DILAUDID) 10 mg/50 mL PCA, , Intravenous, Continuous  naphazoline-glycerin (CLEAR EYES REDNESS RELIEF) 0.012-0.2 % ophthalmic solution 1 drop, 1 drop, Right Eye, TID  opium-belladonna (B&O SUPPRETTES) 16.2-30 MG suppository 30 mg, 30 mg, Rectal, Q8H PRN  zolpidem (AMBIEN) tablet 5 mg, 5 mg, Oral, Nightly PRN  senna (SENOKOT) tablet 17.2 mg, 2 tablet, Oral, BID  polyethylene glycol (GLYCOLAX) packet 17 g, 17 g, [facility])  Resolved Problems:    * No resolved hospital problems. *      Nephrostomy drainage  Appears adequate    explained patient and her sister  Risks involved in heavier opiate use  Although in principle at this stage we have nothing to loose even if opiate use is  Generous , long term prognosis poor regardless .

## 2019-07-30 NOTE — CONSULTS
is bilateral hydronephrosis,   increased on the left since the prior study.       Have regular enhancing soft tissue attenuation adjacent to the left superior   lateral aspect of the cervix is worrisome for neoplastic implants, possibly   causing the aforementioned left hydronephrosis.       Urinary bladder wall thickening is likely accentuated by lack of distention   of viscus.  Given moderate perivesical fat stranding.  Cystitis could be   considered to include radiation cystitis.       Fatty infiltration of the liver. Past Medical History:   has a past medical history of Cervical cancer ([address] Endometriosis, Fibromyalgia, GERD (gastroesophageal reflux disease), Hip fracture ([address] Hypoglycemia, Lupus ([address] Neuropathy, Ovarian cyst, and Seizures ([address] Past Surgical History:   has a past surgical history that includes  section; Cholecystectomy (); bronchoscopy (10/16/14); bronchoscopy (10/18/2014); Cystoscopy (Right, 2019); laparoscopy (2019); Cystoscopy (Right, 2019); Cystoscopy (Bilateral, 2019); and Cystoscopy (Right, 2019). Advance Directives:      Code status is full    Problem Severity: Pain/Other Symptoms:   Patient reports lower back and hip pain. She has been seen by Dr Ramon Mitchell. She was started on Hydromorphone 2 mg q 2 h prn. She is also on a Fentanyl transdermal patch 25 mg. Has Flexeril available for muscle spasms. Reports neuropathic pain burning like bilaterally from knees to hips. Was on Baclofen at home and is on scheduled Cymbalta. Bed Mobility/Toileting/Transfer:    Spouse assists with homemaking and  when needed      Performance Status:      Ambulates without an assistive device    Symptom Assessment: Appetite/Nausea/Bowels/Fatigue:      Lost 12 pounds this month. Reports constipation secondary to opioids. Patient is on Movantik. Has Phenergan available for nausea as needed.     Social History:   reports that she quit smoking about

## 2019-07-30 NOTE — PROGRESS NOTES
I saw the patient this morning and had a long talk with the patient's sister and then with the patient herself  The patient was awake, alert and coherent with me, she said that she does not feel like eating or drinking,  Her sister said that lt night she wake up disoriented   She continues to complain of lower back and hip pain, but she has not been out of bed for the last few days except for the bathroom    At this time I recommend:  1- Continue Fentanyl Patch at 25 mcg/72 hours  2- D/C Ambien  3- I would not give Phenergan as frequent as the patient ask for  4- D/C the PCA since when the patient is disoriented she starts pulling on her PCA line  5- Start IV Dilaudid push 2 mg q 2 hours PRN pain   6- the patient sister will discuss with the patient  the idea of changing her DNR/DNI status   7- I suggest Rozerem or Sonata for sleep       Edward Frost MD  [phone]

## 2019-07-30 NOTE — PROGRESS NOTES
Pt complaining of a headache, heartburn, and nausea. PRN tums, tylenol, and phenergan given. Will continue to monitor.

## 2019-07-30 NOTE — PROGRESS NOTES
Department of Internal Medicine  General Internal Medicine   Progress Note      SUBJECTIVE : pain and spasms are under control        History obtained from chart review and the patient  General ROS: positive for  - fatigue, malaise and weight loss  negative for - chills, fever or night sweats  Psychological ROS: negative  Ophthalmic ROS: negative  Respiratory ROS: no cough, shortness of breath, or wheezing  Cardiovascular ROS: no chest pain or dyspnea on exertion  Gastrointestinal ROS: positive for - abdominal pain and appetite loss  negative for - blood in stools, hematemesis, melena or nausea/vomiting  Genito-Urinary ROS: positive for - dysuria and pelvic pain  negative for - hematuria or urinary frequency/urgency  Musculoskeletal ROS: negative  Neurological ROS: no TIA or stroke symptoms  Dermatological ROS: negative    OBJECTIVE      Medications      Current Facility-Administered Medications: promethazine (PHENERGAN) injection 25 mg, 25 mg, Intravenous, Q4H PRN  calcium carbonate (TUMS) chewable tablet 1,000 mg, 1,000 mg, Oral, 4x Daily PRN  famotidine (PEPCID) tablet 20 mg, 20 mg, Oral, BID PRN  cyclobenzaprine (FLEXERIL) tablet 10 mg, 10 mg, Oral, TID PRN  baclofen (LIORESAL) tablet 10 mg, 10 mg, Oral, TID  0.9 % sodium chloride infusion, , Intravenous, Continuous  acetaminophen (TYLENOL) tablet 650 mg, 650 mg, Oral, Q4H PRN  fentaNYL (DURAGESIC) 25 MCG/HR 1 patch, 1 patch, Transdermal, Q72H  HYDROmorphone (DILAUDID) 10 mg/50 mL PCA, , Intravenous, Continuous  naphazoline-glycerin (CLEAR EYES REDNESS RELIEF) 0.012-0.2 % ophthalmic solution 1 drop, 1 drop, Right Eye, TID  opium-belladonna (B&O SUPPRETTES) 16.2-30 MG suppository 30 mg, 30 mg, Rectal, Q8H PRN  zolpidem (AMBIEN) tablet 5 mg, 5 mg, Oral, Nightly PRN  senna (SENOKOT) tablet 17.2 mg, 2 tablet, Oral, BID  polyethylene glycol (GLYCOLAX) packet 17 g, 17 g, Oral, Daily PRN  naloxone (NARCAN) injection 0.4 mg, 0.4 mg, Intravenous, PRN  cloNIDine range:  Resp  Av.3  Min: 14  Max: 22  Pulse Range:  Pulse  Av.4  Min: 94  Max: 108  Blood pressure range:  Systolic (53GBE), AKC:887 , Min:130 , DOQ:626   , Diastolic (03FZV), SS, Min:78, Max:102    SpO2  Av.5 %  Min: 91 %  Max: 94 %    Intake/Output Summary (Last 24 hours) at 2019 0755  Last data filed at 2019 0556  Gross per 24 hour   Intake 2537.05 ml   Output 2425 ml   Net 112.05 ml       Vent settings:  Pulse  Av.6  Min: 18  Max: 123  Resp  Av.9  Min: 0  Max: 25  SpO2  Av.8 %  Min: 78 %  Max: 100 %  End Tidal CO2  Av.7 (%)  Min: 18 (%)  Max: 48 (%)    CONSTITUTIONAL:  fatigued, alert, cooperative, mild distress and appears stated age  EYES:  Unremarkable   ENT:  normocepalic, without obvious abnormality  NECK:  No JVD  and supple, symmetrical, trachea midline  BACK:  symmetric  LUNGS:  No increased work of breathing, good air exchange, clear to auscultation bilaterally, no crackles or wheezing  CARDIOVASCULAR:  normal apical pulses, regular rate and rhythm, normal S1 and S2, no S3 and no S4  ABDOMEN:  Soft BS + non tender   MUSCULOSKELETAL:  Trace edema   NEUROLOGIC:  No acute focal deficit   SKIN:  Warm , dry and pale  and no bruising or bleeding    Data      Recent Results (from the past 96 hour(s))   Hemoglobin and Hematocrit, Blood    Collection Time: 19  2:35 PM   Result Value Ref Range    Hemoglobin 8.2 (L) 12.0 - 16.0 g/dL    Hematocrit 25.2 (L) 36.0 - 48.0 %       ASSESSMENT AND PLAN     Principal Problem:    Hydronephrosis  Active Problems:    GERD (gastroesophageal reflux disease)    SLE (systemic lupus erythematosus) (HCC)    Cervical cancer (HCC)    Lymphadenopathy    Constipation due to opioid therapy    Status post cystoscopy    Immunocompromised (HCC)    Complicated UTI (urinary tract infection)    Anemia    Intractable pain    Mild malnutrition (HCC)  Resolved Problems:    * No resolved hospital problems.  *      Ct present post op care ,

## 2019-07-31 NOTE — CARE COORDINATION
Chart reviewed for discharge planning. Barrier(s) to discharge- Patient transferred to the ICU last night 7/30 after worsening hypoxia and 02 sats. Tentative discharge plan- Home with family. Tentative discharge date- Unclear at this time. *Case management will continue to follow progress and update discharge plan as needed.     Will continue to follow for support and discharge planning.    -Xiomara Triana, MSW, LSW

## 2019-07-31 NOTE — PROGRESS NOTES
07/31/2019     07/31/2019    K 3.8 07/31/2019     07/31/2019    CO2 24 07/31/2019     Mago Ward MD  7/31/2019

## 2019-07-31 NOTE — PROGRESS NOTES
Bed alarm sounded. Patient found getting OOB. Up to [facility] with contact assist. Unable to have BM. Assisted back to bed. Patient had removed pulse ox, BP cuff, monitor etc. O2 sats were 79% when patient was placed back on pulse ox. Reiterated to patient the importance of calling for assistance with ambulation due to weakness to prevent falls and/or injury. Instructed patient not to remove oxygen or any monitoring devices. Patient states verbal understanding. Will monitor. Bedside table, phone and call light within reach. Bed alarm engaged.

## 2019-07-31 NOTE — PROGRESS NOTES
Pt pulse ox 75% on R/A Pt has refused O2  MD in and updated. Pt placed on O2 @ 5 /l min sats 85%  MD talked to family at length and decision made to transfer to ICU.

## 2019-07-31 NOTE — CONSULTS
clinically appropriate to reduce chances of line associated infections. Patient education and counseling:      · The patient was educated in detailabout the side-effects of various antibiotics and things to watch for like new rashes, lip swelling, severe reaction, worsening diarrhea, break through fever etc.  · Discussed patient'scondition and what to expect. All of the patient's questions were addressed in a satisfactory manner and patient verbalized understanding all instructions. Risk of Complications/Morbidity: High     · Illness(es)/ Infection present that pose threat to bodily function. · There is potential for severe exacerbation of infection/side effects of treatment. · Therapy requires intensive monitoring for antimicrobial agent toxicity. Thank you for involving me in the care of your patient. I will continue to follow. If you have any additional questions, please do not hesitate to contact me. Subjective:       HPI: Oksana Briones is a 42 y.o. female patient, who was seen at the request of Dr. Bogdan Marinelli MD.    History was obtained from chart review and the patient. The patient was admitted on 7/17/2019. I have been consulted to see the patient for above mentioned reason(s). The patient has multiple medical comorbidities, and presented to the ER originally for right flank pain. She had CT scan that showed lymphangitic spread to the lungs and was found to have transaminitis. The patient became increasingly confused and became short of breath on the morning of 7/30/2019 in the setting of Phenergan and Dilaudid use. They were cut down and patient became more awake but then developed acute hypoxia with saturation in 70s and was transferred to the medical ICU    The patient was apparently being given IV ceftriaxone by primary team for a presumed UTI for 2 weeks until yesterday.   In the ER, he was found to have bilateral nodular groundglass opacities in the lungs    The today as needed. · Tobacco use:   reports that she quit smoking about 18 years ago. Her smoking use included cigarettes. She started smoking about 21 years ago. She has a 0.75 pack-year smoking history. She has never used smokeless tobacco.  · Alcohol use:   reports that she does not drink alcohol. · Currently lives in: [address] [place] [address]  ·  reports that she does not use drugs. Family History: All family history was reviewed today. Problem Relation Age of Onset    Diabetes Mother     Crohn's Disease Sister     Hypertension Maternal Grandmother     Heart Failure Maternal Grandfather     Stroke Maternal Grandfather     Diabetes Paternal Grandmother     Asthma Paternal Grandfather          Medications: All current and past medications were reviewed. Medications Prior to Admission: zolpidem (AMBIEN) 5 MG tablet, Take 5 mg by mouth nightly as needed for Sleep.  promethazine (PHENERGAN) 12.5 MG tablet, Take 12.5 mg by mouth every 6 hours as needed for Nausea  [] phenazopyridine (PYRIDIUM) 100 MG tablet, Take 1 tablet by mouth 3 times daily as needed for Pain (dysuria)  amitriptyline (ELAVIL) 25 MG tablet, Take 1 tablet by mouth nightly  baclofen (LIORESAL) 20 MG tablet, Take 0.5 tablets by mouth 2 times daily  topiramate (TOPAMAX) 25 MG tablet, Take 1 tablet daily for 1 week, 1 tablet twice daily for 1 week and then 1 tablet in the morning and 2 tablets at night. tamsulosin (FLOMAX) 0.4 MG capsule, Take 1 capsule by mouth daily  LORazepam (ATIVAN) 1 MG tablet, Take 1 mg by mouth every 8 hours as needed for Anxiety. senna (SENOKOT) 8.6 MG tablet, Take 1 tablet by mouth 3 times daily   DULoxetine (CYMBALTA) 60 MG extended release capsule, Take 60 mg by mouth daily  [DISCONTINUED] oxyCODONE-acetaminophen (PERCOCET) 5-325 MG per tablet, Take 1 tablet by mouth every 4 hours as needed for Pain for up to 3 days.   opium-belladonna (B&O SUPPRETTES) 16.2-30 MG suppository, Place 1 suppository rectally every 8 hours as needed for Pain for up to 3 days. magnesium oxide (MAG-OX) 400 MG tablet, Take 1 tablet by mouth daily  acetaminophen (TYLENOL) 325 MG tablet, Take 650 mg by mouth every 6 hours as needed for Pain  ondansetron (ZOFRAN) 4 MG tablet, Take 4 mg by mouth every 8 hours as needed for Nausea or Vomiting  [DISCONTINUED] morphine (MS CONTIN) 30 MG extended release tablet, Take 30 mg by mouth 2 times daily.  baclofen  10 mg Oral BID    And    [START ON 8/2/2019] baclofen  10 mg Oral Daily    traZODone  50 mg Oral Nightly    fentaNYL  1 patch Transdermal Q72H    naphazoline-glycerin  1 drop Right Eye TID    senna  2 tablet Oral BID    naloxegol  25 mg Oral QAM    DULoxetine  60 mg Oral Daily    magnesium oxide  400 mg Oral Daily    topiramate  25 mg Oral BID    sodium chloride flush  10 mL Intravenous 2 times per day    enoxaparin  40 mg Subcutaneous Nightly          REVIEW OF SYSTEMS:       Review of Systems   Constitutional: Positive for fatigue. Negative for chills, diaphoresis and unexpected weight change. HENT: Negative for congestion, ear discharge, ear pain, facial swelling, hearing loss, rhinorrhea and trouble swallowing. Eyes: Negative for photophobia, discharge, redness and visual disturbance. Respiratory: Positive for cough and shortness of breath. Negative for apnea, choking, chest tightness and stridor. Brown sputum production   Cardiovascular: Negative for chest pain and palpitations. Gastrointestinal: Negative for abdominal pain, blood in stool, diarrhea and nausea. Endocrine: Negative for polydipsia, polyphagia and polyuria. Genitourinary: Negative for difficulty urinating, dysuria, frequency, hematuria, menstrual problem and vaginal discharge. Musculoskeletal: Negative for arthralgias, joint swelling, myalgias and neck stiffness. Skin: Negative for color change and rash. Allergic/Immunologic: Negative for immunocompromised state. or tenderness. Intake and output:     I/O last 3 completed shifts: In: 1081.9 [I.V.:1081.9]  Out: 2500 [Urine:2250; Drains:250]    Lab Data:   All available labs were reviewed by me today. CBC:   Recent Labs     07/31/19  0618   WBC 6.9   RBC 2.85*   HGB 9.2*   HCT 28.8*      .1*   MCH 32.2   MCHC 31.8   RDW 18.4*        BMP:  Recent Labs     07/30/19  1023 07/31/19  0618    139   K 3.4* 3.8    105   CO2 22 24   BUN 10 10   CREATININE <0.5* <0.5*   CALCIUM 9.1 8.9   GLUCOSE 119* 135*        Hepatic FunctionPanel:   Lab Results   Component Value Date    ALKPHOS 561 07/31/2019     07/31/2019     07/31/2019    PROT 6.1 07/31/2019    PROT 6.3 04/18/2012    BILITOT 2.8 07/31/2019    BILIDIR 2.3 07/31/2019    IBILI 0.5 07/31/2019    LABALBU 2.8 07/31/2019       CPK:   Lab Results   Component Value Date    CKTOTAL 771 (H) 07/03/2019     ESR:   Lab Results   Component Value Date    SEDRATE 27 (H) 12/05/2018     CRP:   Lab Results   Component Value Date    CRP 4.7 12/05/2018         Imaging: All pertinent images and reports for the current visit were reviewed by meduring this visit. CT ABDOMEN PELVIS W IV CONTRAST Additional Contrast? None   Preliminary Result   Asymmetric mesenteric edema right greater than left possibly due to an   infectious process. Small subcapsular high attenuating collection associated with the right   kidney possibly postprocedural hematoma. No evidence of compression of the   right kidney. Heterogeneous bladder mass likely blood products within the urinary bladder. Nodularity in the deep pelvis possibly due to the patient's known malignancy. CT CHEST PULMONARY EMBOLISM W CONTRAST   Preliminary Result   Extensive airspace disease possibly a combination of pneumonia and edema. Underlying metastatic disease not excluded. Prominent paratracheal lymph nodes most likely reactive but neoplasm not   excluded.   Attention at

## 2019-07-31 NOTE — PROGRESS NOTES
DR Agustín Ahuja in to assess patient after speaking to DR Barnes. See new orders. Patient and family updated. ABG collected from left radial artery per order/protocol. Patient tolerated well. Patient transferred in bed to CT on monitor with transport kit.  Remains in [address] [place] [address]

## 2019-07-31 NOTE — CONSULTS
P Pulmonary and Critical Care   Consult Note      Reason for Consult: Acute hypoxemic respiratory failure, metastatic cervical cancer  Requesting Physician: Dr Alexandria Robledor:   [facility]:                The patient is a 42 y.o. female with significant past medical history of:      Diagnosis Date    Cervical cancer ([address]     Endometriosis     Fibromyalgia     GERD (gastroesophageal reflux disease)     Hip fracture ([address]     LEFT     Hypoglycemia     Lupus ([address]     Neuropathy     Ovarian cyst     Seizures ([address]      The patient was transferred from the floor to the ICU last night with worsening hypoxemic respiratory failure. Since arrival in ICU she has required 4 L nasal cannula oxygen support. CT imaging was performed last night and did not show evidence of pulmonary embolism. However, she does have diffuse nodular groundglass opacities which have been persistent. These have been thought to represent metastatic cervical cancer. The patient has been also having some difficulty with nausea and vomiting. She is also had progressive liver disease. GI is following.         Past Surgical History:        Procedure Laterality Date    BRONCHOSCOPY  10/16/14    Urgent intubation, direct laryngoscopy, subglottic tracheoscopy    BRONCHOSCOPY  10/18/2014    WITH EXTUBATION IN OR AND LARYNGOSCOPY     SECTION      x 3, 2005, 2006, 2008    CHOLECYSTECTOMY  2012    CYSTOSCOPY Right 2019    CYSTOSCOPY performed by Linda Dawkins MD at [facility] Right 2019    CYSTOSCOPY WITH RETROGRADE PYELOGRAM RIGHT STENT REMOVAL WITH PLACEMENT OF METAL STENT performed by Dong Marcial MD at [facility] Bilateral 2019    DIAGNOSTIC CYSTOSCOPY, DURANT CATHETER PLACEMENT, URETHERAL DILATION performed by Cindy Rowell MD at [facility] Right 2019    CYSTOSCOPY, REMOVE RIGHT URETERAL CATHETER, PLACE RIGHT URETERO-PELVIC JUNCTION tablet 40 mEq, 40 mEq, Oral, PRN **OR** potassium bicarb-citric acid (EFFER-K) effervescent tablet 40 mEq, 40 mEq, Oral, PRN **OR** potassium chloride 10 mEq/100 mL IVPB (Peripheral Line), 10 mEq, Intravenous, PRN  0.9 % sodium chloride bolus, 500 mL, Intravenous, PRN  hydrALAZINE (APRESOLINE) injection 10 mg, 10 mg, Intravenous, Q6H PRN  magnesium sulfate 1 g in dextrose 5% 100 mL IVPB, 1 g, Intravenous, PRN    Allergies   Allergen Reactions    Food Hives     Raw spinach.  Spinach        Social History:    TOBACCO:   reports that she quit smoking about 18 years ago. Her smoking use included cigarettes. She started smoking about 21 years ago. She has a 0.75 pack-year smoking history. She has never used smokeless tobacco.  ETOH:   reports that she does not drink alcohol. Patient currently lives independently  Environmental/chemical exposure: None known    Family History:       Problem Relation Age of Onset    Diabetes Mother     Crohn's Disease Sister     Hypertension Maternal Grandmother     Heart Failure Maternal Grandfather     Stroke Maternal Grandfather     Diabetes Paternal Grandmother     Asthma Paternal Grandfather      REVIEW OF SYSTEMS:    CONSTITUTIONAL:  negative for fevers, chills, diaphoresis, activity change, appetite change, fatigue, night sweats and unexpected weight change.    EYES:  negative for blurred vision, eye discharge, visual disturbance and icterus  HEENT:  negative for hearing loss, tinnitus, ear drainage, sinus pressure, nasal congestion, epistaxis and snoring  RESPIRATORY:  See HPI  CARDIOVASCULAR:  negative for chest pain, palpitations, exertional chest pressure/discomfort, edema, syncope  GASTROINTESTINAL:  negative for nausea, vomiting, diarrhea, constipation, blood in stool and abdominal pain  GENITOURINARY:  negative for frequency, dysuria, urinary incontinence, decreased urine volume, and hematuria  HEMATOLOGIC/LYMPHATIC:  negative for easy bruising, bleeding and

## 2019-07-31 NOTE — CONSULTS
Hospital Medicine  Consult History & Physical        Chief Complaint:    Chief Complaint   Patient presents with    Flank Pain     Pt with R flank pain, was seen here last night for the same thing, denies dysuira, emesis yesterday        Date of Service: Pt seen/examined in consultation on 7/30/2019     History Of Present Illness:      42 y.o. female who we are asked to see/evaluate by Carol Mcrae MD for medical management of Acute hypoxic respiratory failure, SOB. She has a background history of advanced cervical cancer status post chemo with extensive pelvic disease complicated by ureteral obstruction requiring bilateral nephrostomy tube placement. She was admitted with concern for progressing disease with worsening pelvic pain for 2 weeks. Repeat CT chest was concerning for probable lymphangitic spread to the lungs. Laboratory work-up noted for elevated LFTs as well as alkaline phosphatase concerning for metastatic liver disease. On the morning of 7/30/2019 she was found to be lethargic with confusion in the setting of Phenergan and Dilaudid PCA use. These were cut down she became more awake in the evening. However she developed acute hypoxia with saturations in 70s and was transferred to ICU by oncology for further management. She denies any cough or production. She reports shortness of breath at rest with some orthopnea. No leg pain. No fevers or chills. Of note she was on antibiotics for UTI-ceftriaxone for the last 6 days. Last dose tomorrow and will consider stopping antibiotics then.     Past Medical History:        Diagnosis Date    Cervical cancer ([facility] [address]     Endometriosis     Fibromyalgia     GERD (gastroesophageal reflux disease)     Hip fracture (HCC)     LEFT 2002    Hypoglycemia     Lupus (HCC)     Neuropathy     Ovarian cyst     Seizures (HCC)        Past Surgical History:        Procedure Laterality Date    BRONCHOSCOPY  10/16/14    Urgent intubation, direct

## 2019-07-31 NOTE — PROGRESS NOTES
acid (EFFER-K) effervescent tablet 40 mEq PRN   Or    potassium chloride 10 mEq/100 mL IVPB (Peripheral Line) PRN   0.9 % sodium chloride bolus PRN   hydrALAZINE (APRESOLINE) injection 10 mg Q6H PRN   magnesium sulfate 1 g in dextrose 5% 100 mL IVPB PRN       Objective:  /80   Pulse 114   Temp 97.9 °F (36.6 °C) (Temporal)   Resp 18   Ht 5' 7\" (1.702 m)   Wt 183 lb 10.3 oz (83.3 kg)   SpO2 (!) 75%   BMI 28.76 kg/m²     Intake/Output Summary (Last 24 hours) at 7/31/2019 0841  Last data filed at 7/31/2019 0600  Gross per 24 hour   Intake 1081.9 ml   Output 2500 ml   Net -1418.1 ml    Wt Readings from Last 3 Encounters:   07/31/19 183 lb 10.3 oz (83.3 kg)   07/16/19 175 lb (79.4 kg)   07/07/19 192 lb 11.2 oz (87.4 kg)       General appearance:  Appears slightly uncomfortable,  Eyes: Sclera clear. Pupils equal.  ENT: Moist oral mucosa. Trachea midline, no adenopathy. O2 by NC  Cardiovascular: Regular rhythm, normal S1, S2. No murmur. No edema in lower extremities  Respiratory: Not using accessory muscles. Good inspiratory effort. Clear to auscultation bilaterally, no wheeze or crackles. GI: Abdomen soft, no tenderness, not distended  Musculoskeletal: No cyanosis in digits, neck supple  Neurology: No speech or motor deficits  Psych: Normal affect.  Alert and oriented in time, place and person  Skin: Warm, dry, normal turgor    Labs and Tests:  CBC:   Recent Labs     07/31/19 0618   WBC 6.9   HGB 9.2*        BMP:  Recent Labs     07/30/19  1023 07/31/19  0618    139   K 3.4* 3.8    105   CO2 22 24   BUN 10 10   CREATININE <0.5* <0.5*   GLUCOSE 119* 135*     Hepatic: Recent Labs     07/30/19  1023 07/31/19  0618   * 220*   * 115*   BILITOT 2.1* 2.8*   ALKPHOS 563* 561*       ASSESSMENT AND PLAN    Principal Problem:    Acute respiratory failure with hypoxia (HCC)  Active Problems:    GERD (gastroesophageal reflux disease)    SLE (systemic lupus erythematosus) (HCC)    Cervical

## 2019-08-01 PROBLEM — E44.0 MODERATE MALNUTRITION (HCC): Status: ACTIVE | Noted: 2019-01-01

## 2019-08-01 NOTE — PROGRESS NOTES
Pt assessment complete, see flowsheets. Pt c/o pain and nausea, see MAR for medications. Remains alert/oriented x4. Currently on 3L NC, tolerating well. Bilateral nephrostomy tubes in place and patent, dressings C/D/I. Bed alarm on, instructed to call RN before getting out of bed. POC discussed with pt and  at bedside, all agreeable.  Janna Friend

## 2019-08-01 NOTE — PROGRESS NOTES
Oral, Daily  HYDROmorphone (DILAUDID) injection 2 mg, 2 mg, Intravenous, Q2H PRN  traZODone (DESYREL) tablet 50 mg, 50 mg, Oral, Nightly  calcium carbonate (TUMS) chewable tablet 1,000 mg, 1,000 mg, Oral, 4x Daily PRN  famotidine (PEPCID) tablet 20 mg, 20 mg, Oral, BID PRN  acetaminophen (TYLENOL) tablet 650 mg, 650 mg, Oral, Q4H PRN  fentaNYL (DURAGESIC) 25 MCG/HR 1 patch, 1 patch, Transdermal, Q72H  naphazoline-glycerin (CLEAR EYES REDNESS RELIEF) 0.012-0.2 % ophthalmic solution 1 drop, 1 drop, Right Eye, TID  senna (SENOKOT) tablet 17.2 mg, 2 tablet, Oral, BID  polyethylene glycol (GLYCOLAX) packet 17 g, 17 g, Oral, Daily PRN  naloxone (NARCAN) injection 0.4 mg, 0.4 mg, Intravenous, PRN  cloNIDine (CATAPRES) tablet 0.2 mg, 0.2 mg, Oral, Q6H PRN  naloxegol (MOVANTIK) tablet 25 mg, 25 mg, Oral, QAM  DULoxetine (CYMBALTA) extended release capsule 60 mg, 60 mg, Oral, Daily  acetaminophen (TYLENOL) tablet 650 mg, 650 mg, Oral, Q6H PRN  magnesium oxide (MAG-OX) tablet 400 mg, 400 mg, Oral, Daily  topiramate (TOPAMAX) tablet 25 mg, 25 mg, Oral, BID  sodium chloride flush 0.9 % injection 10 mL, 10 mL, Intravenous, 2 times per day  sodium chloride flush 0.9 % injection 10 mL, 10 mL, Intravenous, PRN  magnesium hydroxide (MILK OF MAGNESIA) 400 MG/5ML suspension 30 mL, 30 mL, Oral, Daily PRN  enoxaparin (LOVENOX) injection 40 mg, 40 mg, Subcutaneous, Nightly  potassium chloride (KLOR-CON M) extended release tablet 40 mEq, 40 mEq, Oral, PRN **OR** potassium bicarb-citric acid (EFFER-K) effervescent tablet 40 mEq, 40 mEq, Oral, PRN **OR** potassium chloride 10 mEq/100 mL IVPB (Peripheral Line), 10 mEq, Intravenous, PRN  0.9 % sodium chloride bolus, 500 mL, Intravenous, PRN  hydrALAZINE (APRESOLINE) injection 10 mg, 10 mg, Intravenous, Q6H PRN  magnesium sulfate 1 g in dextrose 5% 100 mL IVPB, 1 g, Intravenous, PRN    Allergies   Allergen Reactions    Food Hives     Raw spinach.     Spinach        Social History:    TOBACCO: Review:  All pertinent images / reports were reviewed as a part of this visit. Assessment:     1. Acute hypoxemic respiratory failure  2. Diffuse groundglass opacities   3. Metastatic cervical cancer  4.   Transaminitis    Afebrile  Appreciate ID consult  Procalcitonin is elevated at 0.54  We will defer to ID on antibiotics but please note she is not receiving any antibiotic therapy presently  Her oxygen requirement has decreased slightly, now 3 L/min  Transaminitis slightly worse

## 2019-08-01 NOTE — PROGRESS NOTES
hypertension. MRI BRAIN W WO CONTRAST   Final Result   Focus of enhancement in superior right frontal lobe similar to slightly   increased in size. Stable punctate focus of enhancement the right insular   cortex. No convincing new metastatic lesions are identified. No acute intracranial abnormality or mass effect. RECOMMENDATIONS:   Future imaging of the brain performed with cube/isotropic postcontrast   sequence. CT CHEST ABDOMEN PELVIS WO CONTRAST   Final Result   Findings in the lungs favored to represent pneumonia. New metastatic disease   is considered unlikely, though follow-up to resolution is recommended. Bilateral nephrostomy tubes in place. Findings in the pelvis again consistent with given history of cervical   malignancy. Slight interval increase in borderline enlarged pelvic nodes. Recommend attention on follow-up imaging. Hyperdense material within the bladder likely due to blood products. Hepatic steatosis. IR PERC NEPHROSTOMY PROC   Final Result   Bilateral hydroureteronephrosis, secondary to known cervical mass. Successful bilateral percutaneous nephrostomy tube placements. IR NEPHROSTOMY COMPLETE   Final Result   Bilateral hydroureteronephrosis, secondary to known cervical mass. Successful bilateral percutaneous nephrostomy tube placements. FL RETROGRADE PYELOGRAM RIGHT   Final Result   Intraprocedural fluoroscopic spot images as above. See separate procedure   report for more information. US RENAL COMPLETE   Final Result   Moderate bilateral hydronephrosis. Partial visualization of right nephroureteral stent. Fatty liver. XR CHEST PORTABLE   Final Result   Negative chest.             Medications: All current and past medications were reviewed.      [START ON 8/2/2019] baclofen  10 mg Oral Daily    traZODone  50 mg Oral Nightly    fentaNYL  1 patch Transdermal Q72H    C54.1    Overweight E66.3    Transaminitis R74.0    Cholestatic jaundice R17       Please note that this chart was generated using Dragon dictation software. Although every effort was made to ensure the accuracy of this automated transcription, some errors in transcription may have occurred inadvertently. If you may need any clarification, please do not hesitate to contact me through EPIC or at the phone number provided below with my electronic signature.     Angela Miller MD, MPH  8/1/2019, 8:57 PM  [facility] Infectious Disease   Office: [phone]  Fax: [phone]  Tuesday AM clinic:   [address] [facility] [address]  Thursday AM ERBPIR:[id number] [address]

## 2019-08-01 NOTE — PROGRESS NOTES
Transfer report received from [facility], [address] [facility]

## 2019-08-01 NOTE — PROGRESS NOTES
Oncology and Hematology Care   Progress Note      8/1/2019 11:12 AM        Name: Kiana Schreiber . Admitted: 7/17/2019    SUBJECTIVE:  More alert, feeling better, no new complaints. Breathing better.   She says pain is better    Reviewed interval ancillary notes    Current Medications    promethazine (PHENERGAN) injection 12.5 mg Q4H PRN   ondansetron (ZOFRAN) injection 8 mg Q8H PRN   0.9 % sodium chloride infusion Continuous   [START ON 8/2/2019] baclofen (LIORESAL) tablet 10 mg Daily   HYDROmorphone (DILAUDID) injection 2 mg Q2H PRN   traZODone (DESYREL) tablet 50 mg Nightly   calcium carbonate (TUMS) chewable tablet 1,000 mg 4x Daily PRN   famotidine (PEPCID) tablet 20 mg BID PRN   acetaminophen (TYLENOL) tablet 650 mg Q4H PRN   fentaNYL (DURAGESIC) 25 MCG/HR 1 patch Q72H   naphazoline-glycerin (CLEAR EYES REDNESS RELIEF) 0.012-0.2 % ophthalmic solution 1 drop TID   senna (SENOKOT) tablet 17.2 mg BID   polyethylene glycol (GLYCOLAX) packet 17 g Daily PRN   naloxone (NARCAN) injection 0.4 mg PRN   cloNIDine (CATAPRES) tablet 0.2 mg Q6H PRN   naloxegol (MOVANTIK) tablet 25 mg QAM   DULoxetine (CYMBALTA) extended release capsule 60 mg Daily   acetaminophen (TYLENOL) tablet 650 mg Q6H PRN   magnesium oxide (MAG-OX) tablet 400 mg Daily   topiramate (TOPAMAX) tablet 25 mg BID   sodium chloride flush 0.9 % injection 10 mL 2 times per day   sodium chloride flush 0.9 % injection 10 mL PRN   magnesium hydroxide (MILK OF MAGNESIA) 400 MG/5ML suspension 30 mL Daily PRN   enoxaparin (LOVENOX) injection 40 mg Nightly   potassium chloride (KLOR-CON M) extended release tablet 40 mEq PRN   Or    potassium bicarb-citric acid (EFFER-K) effervescent tablet 40 mEq PRN   Or    potassium chloride 10 mEq/100 mL IVPB (Peripheral Line) PRN   0.9 % sodium chloride bolus PRN   hydrALAZINE (APRESOLINE) injection 10 mg Q6H PRN   magnesium sulfate 1 g in dextrose 5% 100 mL IVPB PRN       Objective:  BP (!) 140/90   Pulse 108

## 2019-08-02 NOTE — PROGRESS NOTES
[facility] PROGRESS NOTE    8/2/2019 4:21 PM        Name: Luca Al . Admitted: 7/17/2019  Primary Care Provider: Triston Lorenzo MD (Tel: [phone])                        Hospital course:  42 y.o. female who we are asked to see/evaluate by Lawyer Avtar MD for medical management of Acute hypoxic respiratory failure, SOB. She has a background history of advanced cervical cancer status post chemo with extensive pelvic disease complicated by ureteral obstruction requiring bilateral nephrostomy tube placement. She was admitted with concern for progressing disease with worsening pelvic pain for 2 weeks. Repeat CT chest was concerning for probable lymphangitic spread to the lungs. Laboratory work-up noted for elevated LFTs as well as alkaline phosphatase concerning for metastatic liver disease. On the morning of 7/30/2019 she was found to be lethargic with confusion in the setting of Phenergan and Dilaudid PCA use. These were cut down she became more awake in the evening. However she developed acute hypoxia with saturations in 70s and was transferred to ICU by oncology for further management. Subjective:   No acute events overnight. Resting well. Pain control. Diet ok. Labs reviewed  Denies any chest pain sob.    Wants baclofen more often than daily    Reviewed interval ancillary notes    Current Medications    baclofen (LIORESAL) tablet 10 mg BID   oxyCODONE-acetaminophen (PERCOCET) 7.5-325 MG per tablet 1 tablet Q4H PRN   perflutren lipid microspheres (DEFINITY) injection 1.65 mg ONCE PRN   promethazine (PHENERGAN) injection 12.5 mg Q4H PRN   ondansetron (ZOFRAN) injection 8 mg Q8H PRN   0.9 % sodium chloride infusion Continuous   HYDROmorphone (DILAUDID) injection 2 mg Q2H PRN   traZODone (DESYREL) tablet 50 mg Nightly   calcium carbonate hospital problems. *       Assessment & Plan:      1. Acute respiratory failure currently requiring 3.5 liters of oxygen through nasal cannula, CT of chest revealed nodular opacities, less likely being infectious in origin, pulmonology service consulted. Felt less likely to be infectious and more likely to be related to her mets  Off of abx, ID is following, monitor closely off abx    2. Cervical cancer metastatic to lungs, oncology service following, currently chemotherapy on hold palliative care consulted. Heme/onc and gyn onc following. 3.  Bilateral hydronephrosis, urology on board, bilateral nephrostomy tube placed on 7/24/2019, drainage clear irvin-colored urine bilaterally    4. Constipation is a problem for her she is not having any urge for bowel movement for at least 4 days, continue Relistor and Movantik daily    5. Elevated transaminases, GI following, acute hepatitis panel ordered   -appreciate GI recs, agree with echo for possible passive congestion    6.  Pain  - patient having a lot of pain, is on dilaudid and oral medications  - continue to titrate pain meds    Diet: DIET GENERAL;  Dietary Nutrition Supplements: Standard High Calorie Oral Supplement  Code:Full Code  DVT PPX lovenox       Junior Cuellar MD   8/2/2019 4:21 PM

## 2019-08-02 NOTE — PROGRESS NOTES
[address] [facility] PROGRESS NOTE    8/1/2019 8:38 PM        Name: Farhana Pendleton . Admitted: 7/17/2019  Primary Care Provider: Pia Chauhan MD (Tel: [phone])                        Hospital course:  42 y.o. female who we are asked to see/evaluate by Natacha Canales MD for medical management of Acute hypoxic respiratory failure, SOB. She has a background history of advanced cervical cancer status post chemo with extensive pelvic disease complicated by ureteral obstruction requiring bilateral nephrostomy tube placement. She was admitted with concern for progressing disease with worsening pelvic pain for 2 weeks. Repeat CT chest was concerning for probable lymphangitic spread to the lungs. Laboratory work-up noted for elevated LFTs as well as alkaline phosphatase concerning for metastatic liver disease. On the morning of 7/30/2019 she was found to be lethargic with confusion in the setting of Phenergan and Dilaudid PCA use. These were cut down she became more awake in the evening. However she developed acute hypoxia with saturations in 70s and was transferred to ICU by oncology for further management. Subjective:  at the bedside  No acute events overnight. Resting well. Pain control. Diet ok. Labs reviewed  Denies any chest pain sob.      Reviewed interval ancillary notes    Current Medications    oxyCODONE-acetaminophen (PERCOCET) 7.5-325 MG per tablet 1 tablet Q4H PRN   perflutren lipid microspheres (DEFINITY) injection 1.65 mg ONCE PRN   promethazine (PHENERGAN) injection 12.5 mg Q4H PRN   ondansetron (ZOFRAN) injection 8 mg Q8H PRN   0.9 % sodium chloride infusion Continuous   [START ON 8/2/2019] baclofen (LIORESAL) tablet 10 mg Daily   HYDROmorphone (DILAUDID) injection 2 mg Q2H PRN   traZODone (DESYREL) tablet 50 mg Nightly   calcium oxygen through nasal cannula, CT of chest revealed nodular opacities, less likely being infectious in origin, pulmonology service consulted. Felt less likely to be infectious and more likely to be related to her mets  Off of abx, ID is following, monitor closely off abx  2. Cervical cancer metastatic to lungs, oncology service following, currently chemotherapy on hold palliative care consulted. Heme/onc and gyn onc following. 3.  Bilateral hydronephrosis, urology on board, bilateral nephrostomy tube placed on 7/24/2019, drainage clear irvin-colored urine bilaterally  4. Constipation is a problem for her she is not having any urge for bowel movement for at least 4 days, continue Relistor and Movantik daily  5. Elevated transaminases, GI following, acute hepatitis panel ordered   6.  Pain  - patient having a lot of pain, is on dilaudid and oral medications  - continue to titrate pain meds    Diet: DIET GENERAL;  Code:Full Code  DVT PPX lovenox       Henry Carbajal MD   8/1/2019 8:38 PM

## 2019-08-02 NOTE — PROGRESS NOTES
chloride bolus, 500 mL, Intravenous, PRN  hydrALAZINE (APRESOLINE) injection 10 mg, 10 mg, Intravenous, Q6H PRN  magnesium sulfate 1 g in dextrose 5% 100 mL IVPB, 1 g, Intravenous, PRN    Allergies   Allergen Reactions    Food Hives     Raw spinach.  Spinach        Social History:    TOBACCO:   reports that she quit smoking about 18 years ago. Her smoking use included cigarettes. She started smoking about 21 years ago. She has a 0.75 pack-year smoking history. She has never used smokeless tobacco.  ETOH:   reports that she does not drink alcohol. Patient currently lives independently  Environmental/chemical exposure: None known    Family History:       Problem Relation Age of Onset    Diabetes Mother     Crohn's Disease Sister     Hypertension Maternal Grandmother     Heart Failure Maternal Grandfather     Stroke Maternal Grandfather     Diabetes Paternal Grandmother     Asthma Paternal Grandfather      REVIEW OF SYSTEMS:    CONSTITUTIONAL:  negative for fevers, chills, diaphoresis, activity change, appetite change, fatigue, night sweats and unexpected weight change.    EYES:  negative for blurred vision, eye discharge, visual disturbance and icterus  HEENT:  negative for hearing loss, tinnitus, ear drainage, sinus pressure, nasal congestion, epistaxis and snoring  RESPIRATORY:  See HPI  CARDIOVASCULAR:  negative for chest pain, palpitations, exertional chest pressure/discomfort, edema, syncope  GASTROINTESTINAL:  negative for nausea, vomiting, diarrhea, constipation, blood in stool and abdominal pain  GENITOURINARY:  negative for frequency, dysuria, urinary incontinence, decreased urine volume, and hematuria  HEMATOLOGIC/LYMPHATIC:  negative for easy bruising, bleeding and lymphadenopathy  ALLERGIC/IMMUNOLOGIC:  negative for recurrent infections, angioedema, anaphylaxis and drug reactions  ENDOCRINE:  negative for weight changes and diabetic symptoms including polyuria, polydipsia and  101 98*   CO2 24 25 25   BUN 10 11 12   CREATININE <0.5* <0.5* <0.5*   CALCIUM 8.9 8.8 8.8   GLUCOSE 135* 126* 104*      ABG:  Recent Labs     07/31/19  0015   PHART 7.393   WLJ0AHB 37.4   PO2ART 104.0   DMA6JKL 22.8   B1XRBCPE 95.8   BEART -1.9       No results found for: BNP  Lab Results   Component Value Date    CKTOTAL 771 (H) 07/03/2019    TROPONINI 0.10 (H) 07/03/2019       Cultures:     Abx:    Radiology Review:  All pertinent images / reports were reviewed as a part of this visit. Assessment:     1. Acute hypoxemic respiratory failure  2. Diffuse groundglass opacities   3. Metastatic cervical cancer  4. Transaminitis    Multiple bilateral lung nodules/groundglass opacities noted on CT. Could be related to disseminated malignancy versus pneumonia-previously treated with antibiotics. ID holding off on further antibiotics for now. Other issues include obstructive uropathy status post bilateral nephrostomy. Will need to see Dr. Mike Elias for pain management-complex case with recent admission for narcotic overdose. O2 needs are currently at 4 L-continue to monitor. Already has home oxygen. Pulmonary will follow.

## 2019-08-02 NOTE — PROGRESS NOTES
Infectious Diseases   Progress Note      Admission Date: 7/17/2019  Hospital Day: Hospital Day: 17  Attending: Maribel Mendoza MD  Date of service: 8/2/2019    Chief complaint/ Reason for consult: The patient was seen today for the following:    · Acute respiratory failure with hypoxia  · Bilateral reticulonodular lung lesions  · Cervical cancer with metastasis to lungs and urinary bladder  · History of cystoscopy and right ureteral stent placement for hydronephrosis    Microbiology:        I have reviewed all available micro lab data and cultures    · Blood culture (1/2) - collected on 7/17/2019: Diphtheroids      Antibiotics and immunizations:       Current antibiotics: All antibiotics and their doses were reviewed by me    Recent Abx Admin      No antibiotic orders with administrations found. Immunization History: All immunization history was reviewed by me today. Immunization History   Administered Date(s) Administered    Influenza Virus Vaccine 10/12/2014       Known drug allergies: All allergies were reviewed and updated    Allergies   Allergen Reactions    Food Hives     Raw spinach.  Spinach          Assessment:     The patient is a 42 y.o. old female who  has a past medical history of Cervical cancer ([address] Endometriosis, Fibromyalgia, GERD (gastroesophageal reflux disease), Hip fracture ([address] Hypoglycemia, Lupus ([address] Neuropathy, Ovarian cyst, and Seizures ([address]  with following problems:    · Acute respiratory failure with hypoxia- resolved  · Bilateral reticulonodular lung lesions-seem noninfectious  · Cervical cancer with metastasis to lungs and urinary bladder  · History of cystoscopy and right ureteral stent placement for hydronephrosis  · Immunocompromised on chemotherapy with Taxol and cisplatin  · Multifocal pneumonia versus lung metastasis  · Transaminitis-ongoing  · Lupus  · Seizure disorder-this is stable  · GERD  · Overweight due to excess calorie intake : a satisfactory manner and patient verbalized understanding all instructions. Risk of Complications/Morbidity: High     TIME SPENT TODAY:     - Spent over  25  minutes on visit (including interval history, physical exam, review of data including labs, cultures, imaging, development and implementation of treatment plan and coordination of complex care). - Over 50% of time spent with patient face to face on counseling and education. Thank you for involving me in the care of your patient. I will continue to follow. If you have anyadditional questions, please do not hesitate to contact me. Subjective: Interval history: Patient was seen and examined at bedside. Interval history was obtained. Patient feels tired. She is tolerating antibiotics okay. She is afebrile     REVIEW OF SYSTEMS:      Review of Systems   Constitutional: Positive for fatigue. Negative for chills, diaphoresis and unexpected weight change. HENT: Negative for congestion, ear discharge, ear pain, facial swelling, hearing loss, rhinorrhea and trouble swallowing. Eyes: Negative for photophobia, discharge, redness and visual disturbance. Respiratory: Negative for apnea, cough, choking, chest tightness, shortness of breath and stridor. Cardiovascular: Negative for chest pain and palpitations. Gastrointestinal: Negative for abdominal pain, blood in stool, diarrhea and nausea. Endocrine: Negative for polydipsia, polyphagia and polyuria. Genitourinary: Negative for difficulty urinating, dysuria, frequency, hematuria, menstrual problem and vaginal discharge. Musculoskeletal: Negative for arthralgias, joint swelling, myalgias and neck stiffness. Skin: Negative for color change and rash. Allergic/Immunologic: Negative for immunocompromised state. Neurological: Negative for dizziness, seizures, speech difficulty, light-headedness and headaches. Hematological: Negative for adenopathy.    Psychiatric/Behavioral: CONTRAST   Final Result   Focus of enhancement in superior right frontal lobe similar to slightly   increased in size. Stable punctate focus of enhancement the right insular   cortex. No convincing new metastatic lesions are identified. No acute intracranial abnormality or mass effect. RECOMMENDATIONS:   Future imaging of the brain performed with cube/isotropic postcontrast   sequence. CT CHEST ABDOMEN PELVIS WO CONTRAST   Final Result   Findings in the lungs favored to represent pneumonia. New metastatic disease   is considered unlikely, though follow-up to resolution is recommended. Bilateral nephrostomy tubes in place. Findings in the pelvis again consistent with given history of cervical   malignancy. Slight interval increase in borderline enlarged pelvic nodes. Recommend attention on follow-up imaging. Hyperdense material within the bladder likely due to blood products. Hepatic steatosis. IR PERC NEPHROSTOMY PROC   Final Result   Bilateral hydroureteronephrosis, secondary to known cervical mass. Successful bilateral percutaneous nephrostomy tube placements. IR NEPHROSTOMY COMPLETE   Final Result   Bilateral hydroureteronephrosis, secondary to known cervical mass. Successful bilateral percutaneous nephrostomy tube placements. FL RETROGRADE PYELOGRAM RIGHT   Final Result   Intraprocedural fluoroscopic spot images as above. See separate procedure   report for more information. US RENAL COMPLETE   Final Result   Moderate bilateral hydronephrosis. Partial visualization of right nephroureteral stent. Fatty liver. XR CHEST PORTABLE   Final Result   Negative chest.             Medications: All current and past medications were reviewed.      baclofen  10 mg Oral Daily    traZODone  50 mg Oral Nightly    fentaNYL  1 patch Transdermal Q72H    naphazoline-glycerin  1 drop Right Eye TID    senna  2 tablet Oral BID

## 2019-08-02 NOTE — PROGRESS NOTES
sepsis. Patient contemplating this option.     Paddy Lipps, MD [facility] [address]  8/2/2019

## 2019-08-02 NOTE — PROGRESS NOTES
injection 12.5 mg  12.5 mg Intravenous Q4H PRN Mario Baires MD   12.5 mg at 08/01/19 1849    ondansetron (ZOFRAN) injection 8 mg  8 mg Intravenous Q8H PRN Mario Baires MD   8 mg at 08/02/19 1123    0.9 % sodium chloride infusion   Intravenous Continuous Waqas Tobias MD 30 mL/hr at 08/01/19 0655      baclofen (LIORESAL) tablet 10 mg  10 mg Oral Daily Waqas Tobias MD   10 mg at 08/02/19 1005    HYDROmorphone (DILAUDID) injection 2 mg  2 mg Intravenous Q2H PRN Waqas Tobias MD   2 mg at 08/02/19 1015    traZODone (DESYREL) tablet 50 mg  50 mg Oral Nightly Kehinde Menard MD   50 mg at 08/01/19 2112    calcium carbonate (TUMS) chewable tablet 1,000 mg  1,000 mg Oral 4x Daily PRN Patrick Leahy MD   1,000 mg at 07/30/19 0145    famotidine (PEPCID) tablet 20 mg  20 mg Oral BID PRN Patrick Leahy MD   20 mg at 07/27/19 2030    acetaminophen (TYLENOL) tablet 650 mg  650 mg Oral Q4H PRN Tomasa Barnhart MD   650 mg at 07/30/19 0145    fentaNYL (DURAGESIC) 25 MCG/HR 1 patch  1 patch Transdermal Q72H Patrick Leahy MD   1 patch at 08/01/19 0936    naphazoline-glycerin (CLEAR EYES REDNESS RELIEF) 0.012-0.2 % ophthalmic solution 1 drop  1 drop Right Eye TID Kip Larsen Phalen, MD   1 drop at 08/01/19 2111    senna (SENOKOT) tablet 17.2 mg  2 tablet Oral BID Kip Larsen Phalen, MD   17.2 mg at 08/02/19 1006    polyethylene glycol (GLYCOLAX) packet 17 g  17 g Oral Daily PRN Kip Larsen Phalen, MD   17 g at 07/31/19 1543    naloxone (NARCAN) injection 0.4 mg  0.4 mg Intravenous PRN Tomasa Barnhart MD        cloNIDine (CATAPRES) tablet 0.2 mg  0.2 mg Oral Q6H PRN Patrick Leahy MD   0.2 mg at 07/29/19 0137    naloxegol (MOVANTIK) tablet 25 mg  25 mg Oral QAM Kip Larsen Phalen, MD   25 mg at 08/02/19 0850    DULoxetine (CYMBALTA) extended release capsule 60 mg  60 mg Oral Daily Kip Larsen Phalen, MD   60 mg at 08/02/19 1006    acetaminophen (TYLENOL) tablet 650 mg  650 mg Oral

## 2019-08-03 NOTE — ONCOLOGY
focus is seen within the bladder, likely reflecting known stent. Moderate bilateral hydronephrosis. Partial visualization of right nephroureteral stent. Fatty liver. Ct Abdomen Pelvis W Iv Contrast Additional Contrast? None    Result Date: 8/1/2019  EXAMINATION: CT OF THE ABDOMEN AND PELVIS WITH CONTRAST 7/31/2019 12:41 am TECHNIQUE: CT of the abdomen and pelvis was performed with the administration of intravenous contrast. Multiplanar reformatted images are provided for review. Dose modulation, iterative reconstruction, and/or weight based adjustment of the mA/kV was utilized to reduce the radiation dose to as low as reasonably achievable. COMPARISON: 07/16/2019 HISTORY: ORDERING SYSTEM PROVIDED HISTORY: ABDOMINAL PAIN TECHNOLOGIST PROVIDED HISTORY: Additional Contrast?->None Reason for Exam: abdominal pain Acuity: Unknown Type of Exam: Unknown Additional signs and symptoms: Abdominal pain; worsening lft, mets Relevant Medical/Surgical History: (Pt with R flank pain, was seen here last night for the same thing, denies dysuira, emesis yesterday) FINDINGS: Lower Chest: Multifocal patchy opacities with smooth interlobular septal thickening may represent edema or multifocal pneumonia. Organs: Fatty liver. Cholecystectomy. Splenomegaly without focal lesion. Adrenal glands are unremarkable. Bilateral nephrostomy tubes. Perinephric edema. Small subcapsular collection associated with the right kidney possibly hematoma related to nephrostomy tube placement. GI/Bowel: Stool throughout the colon. No dilated bowel. Pelvis: Heterogeneous filling defect in the urinary bladder most likely blood products from recent procedure; collection/mass measures 6.2 x 4.5 cm. Vague low-attenuation in the lower uterine segment possibly due to the patient's known cervical malignancy. Peritoneum/Retroperitoneum: Edema in the mesentery, predominantly within the right retroperitoneum and peritoneum.   Prominent but non the liver. Ir Nephrostomy Complete    Result Date: 7/24/2019  PROCEDURE: PERCUTANEOUS ANTEGRADE PYELOGRAM BILATERAL PERCUTANEOUS NEPHROSTOMY TUBE PLACEMENTS ULTRASOUND GUIDANCE 7/24/2019 HISTORY: ORDERING SYSTEM PROVIDED HISTORY: hydronephrosis TECHNOLOGIST PROVIDED HISTORY: Bilateral perc nephr tube placement, attention dr Cindy Dejesus,    Ureteral catheter to be placed wed am by gu Reason for exam:->hydronephrosis SEDATION: The patient was under unconscious sedation, monitored by the anesthesia service. Right ureteral stent removal was performed in the operating room by the urologist, prior to the IR procedure. The patient was subsequently transferred to the interventional radiology suite for bilateral nephrostomy tubes. She was maintained under unconscious sedation and monitored by the anesthesia service throughout the procedure. CONTRAST: 25 cc of Isovue 370 FLUOROSCOPY DOSE AND TYPE OR TIME AND EXPOSURES: 9.8 minute, 3 images, cumulative air Kerma 212 mGy for right-side. 5.3 minute, 4 images, cumulative air Kerma 142 mGy for left side. TECHNIQUE Informed consent was obtained following detailed description of the procedure including risks, benefits, and alternatives. Universal protocol was followed. Right nephrostomy tube. The patient was in prone position. The mid back was sterilely prepared and draped bilaterally. A temporary right ureteral catheter remained position, placed by the urologist, following removal of the stent in the operating room. This was utilized to perform nephrostogram.  A lower pole calyx was targeted. A subcostal approach was chosen to an inferior to lower calyx. Additionally, ultrasound was utilized to identify the hydronephrotic kidney and  needle depth. The entry site was infiltrated with 1% lidocaine and a small incision was made with an 11 gauge scalpel.   After aspirating urine via the 21 gauge needle, a 018 wire was placed, followed by 6 [place] dilator and 035 Pulmonary Embolism W Contrast    Result Date: 8/1/2019  EXAMINATION: CTA OF THE CHEST 7/31/2019 12:40 am TECHNIQUE: CTA of the chest was performed after the administration of intravenous contrast.  Multiplanar reformatted images are provided for review. MIP images are provided for review. Dose modulation, iterative reconstruction, and/or weight based adjustment of the mA/kV was utilized to reduce the radiation dose to as low as reasonably achievable. COMPARISON: 07/30/2019 HISTORY: ORDERING SYSTEM PROVIDED HISTORY: Acute SOB and hypoxia with advanced pelvic cancer r/o PE TECHNOLOGIST PROVIDED HISTORY: Reason for Exam: shortness of breath Acuity: Unknown Type of Exam: Unknown Additional signs and symptoms: Acute SOB and hypoxia with advanced pelvic cancer r/o PE Relevant Medical/Surgical History: (Pt with R flank pain, was seen here last night for the same thing, denies dysuira, emesis yesterday) FINDINGS: Pulmonary Arteries: Enlarged main pulmonary artery suggesting pulmonary hypertension. Distal branches are suboptimally evaluated due to respiratory motion. Within limitations, no evidence of pulmonary embolism. Mediastinum: Cardiomegaly. No pericardial effusion. Prominent left paratracheal lymph nodes may be reactive but neoplasm is not excluded. Lungs/pleura: Extensive patchy ground-glass opacities and patchy nodular opacities throughout the lungs. Interlobular septal thickening. Multifocal pneumonia is favored. Edema not excluded. Multifocal metastatic disease is possible but less likely. Attention at follow-up is advised. No pneumothorax. No pleural effusion. Upper Abdomen: Fatty liver. No acute upper abdominal process Soft Tissues/Bones: No axillary lymphadenopathy. No supraclavicular mass. Right chemotherapy port. No acute osseous abnormality. No aggressive osseous lesions. Extensive airspace disease possibly a combination of pneumonia and edema. Underlying metastatic disease not excluded. infiltrated with 1% lidocaine. An echogenic 21 gauge needle was advanced to the lower pole, entering a dilated calyx. Nephrostogram was performed. The needle entry was too steep for wire access to the renal pelvis. Therefore tandem needle technique was utilized. A 2nd 21 gauge needle was utilized to enter a lower pole liana. A 6 Kitt[place] catheter was advanced into the renal pelvis and upper moiety, followed by an 035 angled Glidewire. An 8 Kitt[place] pigtail catheter was placed over the wire and locked in position. There was moderate to severe left hydronephrosis. The catheter was sutured in position with 2 0 Ethilon and connected to a gravity bag. FINDINGS: Please see above. Bilateral hydroureteronephrosis, secondary to known cervical mass. Successful bilateral percutaneous nephrostomy tube placements. Current Medications   baclofen  10 mg Oral BID    traZODone  50 mg Oral Nightly    fentaNYL  1 patch Transdermal Q72H    naphazoline-glycerin  1 drop Right Eye TID    senna  2 tablet Oral BID    naloxegol  25 mg Oral QAM    DULoxetine  60 mg Oral Daily    magnesium oxide  400 mg Oral Daily    topiramate  25 mg Oral BID    sodium chloride flush  10 mL Intravenous 2 times per day    enoxaparin  40 mg Subcutaneous Nightly        ASSESSMENT & PLAN:  Carcinoma cervix rapidly progressive will plan to start keydruda on Monday  Pain due to neoplasm remains on iv dilaudid fentanyl  Depression anxiety on cymbalta  dvt prophylaxis  Abnormal liver enzymes continue work up with gi echocardiogram to rule out right heart failure passive congestion tricuspid regurgitation  And some increased pulmonary artery pressure  Multiple bilateral pulmonary nodules ground glass opacities probably metastatic disease currently off antibiotic         I have discussed the above stated plan with the patient and they verbalized understanding and agreed with the plan.  Thank you for allowing us to participate in this patients

## 2019-08-03 NOTE — PROGRESS NOTES
[facility] GI  Gastroenterology Progress Note  Latasha Xiong is a 42 y.o. female patient. 1. Right flank pain    2. Gross hematuria    3. Bilateral hydronephrosis    4. Intractable pain    5. Ureteral obstruction, left        SUBJECTIVE:  Denies signif abd pain. Tolerating po. Not much appetite. Physical    VITALS:  /82   Pulse 110   Temp 97.8 °F (36.6 °C) (Oral)   Resp 18   Ht 5' 7\" (1.702 m)   Wt 177 lb 14.6 oz (80.7 kg)   SpO2 96%   BMI 27.86 kg/m²   TEMPERATURE:  Current - Temp: 97.8 °F (36.6 °C); Max - Temp  Av.9 °F (36.6 °C)  Min: 97.7 °F (36.5 °C)  Max: 98.2 °F (36.8 °C)    Abdomen soft, ND, no signif/focal tender, Bowel sounds present. Cv: reg  L: clear    Data      Recent Labs     19  0809 19  0500 19  0500   WBC 6.7 7.8 9.1   HGB 9.3* 10.3* 10.3*   HCT 29.0* 31.4* 31.7*   .1* 99.8 99.6    147 178     Recent Labs     19  0654 19  0500 19  0500    135* 138   K 3.8 4.2 4.1    98* 100   CO2 25 25 25   BUN 11 12 15   CREATININE <0.5* <0.5* <0.5*     Recent Labs     19  0654 19  0654   * 294*   * 136*   BILIDIR  --  3.5*   BILITOT 3.4* 3.9*   ALKPHOS 622* 677*     No results for input(s): LIPASE, AMYLASE in the last 72 hours.               2019  AlkPhos  93                   233                  189                  563  AST      35                    93                    96                    208  ALT       24                    119                  105                  111  Bili        0.6                   0.8                   0.4                   2.1    Echo 2019:     Summary   -Normal left ventricle size and systolic function with an estimated ejection   fraction of 60-65%.  -No regional wall motion abnormalities are seen.   -There is mild concentric left ventricular hypertrophy.   -Normal diastolic function. E/e\"=6.3.  GLS My  8/3/2019

## 2019-08-03 NOTE — PROGRESS NOTES
Shift assessment completed. VSS. C/o nausea and pain. Patient believes nausea is due to pain. PRN dilaudid and zofran given with scheduled medications. Assisted to restroom and back to bed. Dyspnea with exertion. Lungs diminished bilaterally. Bowel sounds hypoactive. Patient reports having small amount of loose stool this am. Denies additional needs. Bed alarm engaged, call light in reach. Will monitor.

## 2019-08-04 NOTE — PROGRESS NOTES
P Pulmonary and Critical Care  Progress note      Reason for Consult: Acute hypoxemic respiratory failure, metastatic cervical cancer  Requesting Physician: Dr Steve Begum:   [address] [facility]:                The patient is a 42 y.o. female with significant past medical history of:      Diagnosis Date    [facility]     Endometriosis     Fibromyalgia     GERD (gastroesophageal reflux disease)     Hip fracture ([facility] [address] [facility]     LEFT     Hypoglycemia     Lupus ([facility]     Neuropathy     Ovarian cyst     Seizures ([facility] [address] [facility]      She has worsened over the last several hours. More short of breath and more hypoxemic. Now requiring 7 L nasal cannula oxygen supplement. Past Surgical History:        Procedure Laterality Date    BRONCHOSCOPY  10/16/14    Urgent intubation, direct laryngoscopy, subglottic tracheoscopy    BRONCHOSCOPY  10/18/2014    WITH EXTUBATION IN OR AND LARYNGOSCOPY     SECTION      x 3, 2005, 2006, 2008    CHOLECYSTECTOMY  2012    CYSTOSCOPY Right 2019    CYSTOSCOPY performed by Shana Perez MD at [address] [place] [address] Right 2019    CYSTOSCOPY WITH RETROGRADE PYELOGRAM RIGHT STENT REMOVAL WITH PLACEMENT OF METAL STENT performed by Lei Bee MD at [address] [place] [address] Bilateral 2019    DIAGNOSTIC CYSTOSCOPY, DURANT CATHETER PLACEMENT, URETHERAL DILATION performed by Shelli Ocasio MD at [address] [place] [address] Right 2019    CYSTOSCOPY, REMOVE RIGHT URETERAL CATHETER, PLACE RIGHT URETERO-PELVIC JUNCTION CATHETER performed by Lei Bee MD at [address] [place] [address]  2019    aborted planned procedure of BTL and uterine ablation.      Current Medications:    Current Facility-Administered Medications: fentaNYL (DURAGESIC) 50 MCG/HR 1 patch, 1 patch, Transdermal, Q72H  HYDROmorphone (DILAUDID) injection 2 mg, 2 mg, Intravenous, Q3H PRN  [COMPLETED] baclofen (LIORESAL) tablet 10 mg, 10 mg, Oral, BID **AND** baclofen (LIORESAL) tablet 10 mg, 10 mg, Oral, BID  oxyCODONE-acetaminophen (PERCOCET) 7.5-325 MG per tablet 1 tablet, 1 tablet, Oral, Q4H PRN  perflutren lipid microspheres (DEFINITY) injection 1.65 mg, 1.5 mL, Intravenous, ONCE PRN  promethazine (PHENERGAN) injection 12.5 mg, 12.5 mg, Intravenous, Q4H PRN  ondansetron (ZOFRAN) injection 8 mg, 8 mg, Intravenous, Q8H PRN  traZODone (DESYREL) tablet 50 mg, 50 mg, Oral, Nightly  calcium carbonate (TUMS) chewable tablet 1,000 mg, 1,000 mg, Oral, 4x Daily PRN  famotidine (PEPCID) tablet 20 mg, 20 mg, Oral, BID PRN  acetaminophen (TYLENOL) tablet 650 mg, 650 mg, Oral, Q4H PRN  naphazoline-glycerin (CLEAR EYES REDNESS RELIEF) 0.012-0.2 % ophthalmic solution 1 drop, 1 drop, Right Eye, TID  senna (SENOKOT) tablet 17.2 mg, 2 tablet, Oral, BID  polyethylene glycol (GLYCOLAX) packet 17 g, 17 g, Oral, Daily PRN  naloxone (NARCAN) injection 0.4 mg, 0.4 mg, Intravenous, PRN  cloNIDine (CATAPRES) tablet 0.2 mg, 0.2 mg, Oral, Q6H PRN  naloxegol (MOVANTIK) tablet 25 mg, 25 mg, Oral, QAM  DULoxetine (CYMBALTA) extended release capsule 60 mg, 60 mg, Oral, Daily  acetaminophen (TYLENOL) tablet 650 mg, 650 mg, Oral, Q6H PRN  magnesium oxide (MAG-OX) tablet 400 mg, 400 mg, Oral, Daily  topiramate (TOPAMAX) tablet 25 mg, 25 mg, Oral, BID  sodium chloride flush 0.9 % injection 10 mL, 10 mL, Intravenous, 2 times per day  sodium chloride flush 0.9 % injection 10 mL, 10 mL, Intravenous, PRN  magnesium hydroxide (MILK OF MAGNESIA) 400 MG/5ML suspension 30 mL, 30 mL, Oral, Daily PRN  enoxaparin (LOVENOX) injection 40 mg, 40 mg, Subcutaneous, Nightly  potassium chloride (KLOR-CON M) extended release tablet 40 mEq, 40 mEq, Oral, PRN **OR** potassium bicarb-citric acid (EFFER-K) effervescent tablet 40 mEq, 40 mEq, Oral, PRN **OR** potassium chloride 10 mEq/100 mL IVPB (Peripheral Line), 10 mEq, Intravenous, PRN  0.9 % sodium chloride bolus, 500 mL, Intravenous, PRN  hydrALAZINE (APRESOLINE) injection 10 muscle weakness  NEUROLOGICAL:  negative for headaches, slurred speech, unilateral weakness  PSYCHIATRIC/BEHAVIORAL: negative for hallucinations, behavioral problems, confusion and agitation. Objective:   PHYSICAL EXAM:      VITALS:  /80   Pulse 117   Temp 97.9 °F (36.6 °C) (Oral)   Resp 20   Ht 5' 7\" (1.702 m)   Wt 177 lb 14.6 oz (80.7 kg)   SpO2 92%   BMI 27.86 kg/m²      24HR INTAKE/OUTPUT:      Intake/Output Summary (Last 24 hours) at 8/4/2019 1048  Last data filed at 8/4/2019 1022  Gross per 24 hour   Intake 934.6 ml   Output 825 ml   Net 109.6 ml     CONSTITUTIONAL:  awake, alert, cooperative, no apparent distress, and appears stated age  NECK:  Supple, symmetrical, trachea midline, no adenopathy, thyroid symmetric, not enlarged and no tenderness, skin normal  LUNGS:  no increased work of breathing and clear to auscultation. No accessory muscle use  CARDIOVASCULAR: S1 and S2, no edema and no JVD  ABDOMEN:  normal bowel sounds, non-distended and no masses palpated, and no tenderness to palpation. No hepatospleenomegaly  LYMPHADENOPATHY:  no axillary or supraclavicular adenopathy. No cervical adnenopathy  PSYCHIATRIC: Oriented to person place and time. No obvious depression or anxiety. MUSCULOSKELETAL: No obvious misalignment or effusion of the joints. No clubbing, cyanosis of the digits. SKIN:  normal skin color, texture, turgor and no redness, warmth, or swelling.  No palpable nodules    DATA:    Old records have been reviewed    CBC:  Recent Labs     08/02/19  0500 08/03/19  0500 08/04/19  0505   WBC 7.8 9.1 9.4   RBC 3.15* 3.18* 3.22*   HGB 10.3* 10.3* 10.3*   HCT 31.4* 31.7* 32.6*    178 163   MCV 99.8 99.6 101.2*   MCH 32.6 32.3 32.1   MCHC 32.7 32.4 31.7   RDW 18.5* 18.3* 18.9*      BMP:  Recent Labs     08/03/19  0500 08/03/19  1347 08/04/19  0505    139 136   K 4.1 4.2 4.0    101 99   CO2 25 26 24   BUN 15 16 18   CREATININE <0.5* <0.5* <0.5*   CALCIUM 8.9 9.0 8.9

## 2019-08-04 NOTE — PROGRESS NOTES
(TUMS) chewable tablet 1,000 mg 4x Daily PRN   famotidine (PEPCID) tablet 20 mg BID PRN   acetaminophen (TYLENOL) tablet 650 mg Q4H PRN   naphazoline-glycerin (CLEAR EYES REDNESS RELIEF) 0.012-0.2 % ophthalmic solution 1 drop TID   senna (SENOKOT) tablet 17.2 mg BID   polyethylene glycol (GLYCOLAX) packet 17 g Daily PRN   naloxone (NARCAN) injection 0.4 mg PRN   cloNIDine (CATAPRES) tablet 0.2 mg Q6H PRN   naloxegol (MOVANTIK) tablet 25 mg QAM   DULoxetine (CYMBALTA) extended release capsule 60 mg Daily   acetaminophen (TYLENOL) tablet 650 mg Q6H PRN   magnesium oxide (MAG-OX) tablet 400 mg Daily   topiramate (TOPAMAX) tablet 25 mg BID   sodium chloride flush 0.9 % injection 10 mL 2 times per day   sodium chloride flush 0.9 % injection 10 mL PRN   magnesium hydroxide (MILK OF MAGNESIA) 400 MG/5ML suspension 30 mL Daily PRN   enoxaparin (LOVENOX) injection 40 mg Nightly   potassium chloride (KLOR-CON M) extended release tablet 40 mEq PRN   Or    potassium bicarb-citric acid (EFFER-K) effervescent tablet 40 mEq PRN   Or    potassium chloride 10 mEq/100 mL IVPB (Peripheral Line) PRN   0.9 % sodium chloride bolus PRN   hydrALAZINE (APRESOLINE) injection 10 mg Q6H PRN   magnesium sulfate 1 g in dextrose 5% 100 mL IVPB PRN       Objective:  /77   Pulse 114   Temp 97.8 °F (36.6 °C) (Oral)   Resp 20   Ht 5' 7\" (1.702 m)   Wt 177 lb 14.6 oz (80.7 kg)   SpO2 93%   BMI 27.86 kg/m²     Intake/Output Summary (Last 24 hours) at 8/4/2019 1714  Last data filed at 8/4/2019 1607  Gross per 24 hour   Intake 642.6 ml   Output 1425 ml   Net -782.4 ml      Wt Readings from Last 3 Encounters:   08/01/19 177 lb 14.6 oz (80.7 kg)   07/16/19 175 lb (79.4 kg)   07/07/19 192 lb 11.2 oz (87.4 kg)       General appearance:  young lady, appears comfortable  Eyes: Sclera clear. Pupils equal.  ENT: Moist oral mucosa. Trachea midline, no adenopathy.   Cardiovascular: Regular rhythm, sinus tachycardia around 110/min, normal S1, respiratory failure currently requiring 3.5 liters of oxygen through nasal cannula, CT of chest revealed nodular opacities, less likely being infectious in origin, pulmonology service consulted. Felt less likely to be infectious and more likely to be related to her mets  Off of abx, ID is following, monitor closely off abx  Worse today and had to go on high flow, was looking like Pulelenita wilde  Responded to lasix but still on 8 liters high flow. 2.  Cervical cancer metastatic to lungs, oncology service following, currently chemotherapy on hold palliative care consulted. Heme/onc and gyn onc following.  -starting Keytruda tomorrow    3. Bilateral hydronephrosis, urology on board, bilateral nephrostomy tube placed on 7/24/2019, drainage clear irvin-colored urine bilaterally    4. Constipation is a problem for her she is not having any urge for bowel movement for at least 4 days, continue Relistor and Movantik daily    5. Elevated transaminases, GI following, acute hepatitis panel ordered   -appreciate GI recs, agree with echo for possible passive congestion    6.  Pain  - patient having a lot of pain, is on dilaudid and oral medications  - continue to titrate pain meds  -Try to back off on the IV medicines that she will not have this at the time of discharge  -I have also written to increase her fentanyl patch for tomorrow    Diet: DIET GENERAL;  Dietary Nutrition Supplements: Standard High Calorie Oral Supplement  Code:Full Code  DVT PPX lovenox       Nicky Cueto MD   8/4/2019 5:14 PM

## 2019-08-04 NOTE — PROGRESS NOTES
Called by pt for pain medicine, specifically PRN IV Dilaudid. Upon entering pt's room, pt was tearful, c/o 9/10 pain in lower back. Pt requested to stand at bedside, this RN and pt's  assisted pt to stand at bedside. PRN Dilaudid administered at this time. Questions/concerns addressed at this time, will continue to monitor.

## 2019-08-04 NOTE — ONCOLOGY
predominantly within the right retroperitoneum and peritoneum. Prominent but non pathologically enlarged retroperitoneal nodes are likely reactive in nature. Subtle nodularity of the deep pelvic fat possibly neoplastic. Bones/Soft Tissues: No acute osseous abnormality. Fat containing umbilical hernia. Asymmetric mesenteric edema right greater than left possibly due to an infectious process. Small subcapsular high attenuating collection associated with the right kidney possibly postprocedural hematoma. No evidence of compression of the right kidney. Heterogeneous bladder mass likely blood products within the urinary bladder. Nodularity in the deep pelvis possibly due to the patient's known malignancy. Ct Abdomen Pelvis W Iv Contrast    Result Date: 7/16/2019  EXAMINATION: CT OF THE ABDOMEN AND PELVIS WITH CONTRAST 7/16/2019 9:30 pm TECHNIQUE: CT of the abdomen and pelvis was performed with the administration of intravenous contrast. Multiplanar reformatted images are provided for review. Dose modulation, iterative reconstruction, and/or weight based adjustment of the mA/kV was utilized to reduce the radiation dose to as low as reasonably achievable. COMPARISON: Prior studies including most recent 07/03/2009 noncontrast CT abdomen and pelvis HISTORY: ORDERING SYSTEM PROVIDED HISTORY: ovarian cancer, stent right side, stone vs infection vs worse hydro TECHNOLOGIST PROVIDED HISTORY: IV contrast only. Thank you. 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   Ground-glass opacity within the lung bases is noted, Dose modulation, iterative reconstruction, and/or weight based adjustment of the mA/kV was utilized to reduce the radiation dose to as low as reasonably achievable. COMPARISON: Abdomen 07/16/2019, chest 07/03/2019 HISTORY: ORDERING SYSTEM PROVIDED HISTORY: confusion cough ca cervix TECHNOLOGIST PROVIDED HISTORY: Reason for exam:->confusion cough ca cervix Additional Contrast?->None Reason for Exam: confusion cough ca cervix Acuity: Unknown Type of Exam: Unknown FINDINGS: Chest: Mediastinum: Port tip terminates in the lower SVC. No mediastinal or axillary lymphadenopathy. Heart and great vessels are unremarkable. Lungs/pleura: Central airways are patent. New scattered ground-glass and consolidative opacities throughout the lungs bilaterally. Soft Tissues/Bones: No suspicious osseous lesions. Abdomen/Pelvis: Organs: Prior cholecystectomy. Hepatic steatosis. Adrenals, spleen and pancreas are normal.  Splenules are present. Bilateral nephrostomy tubes in place. Retained contrast in the collecting systems. Trace perinephric hematoma on the right. Mild residual hydroureter on the right. GI/Bowel: Normal appendix. No evidence of bowel obstruction. Moderate stool burden throughout the colon. Pelvis: Fat stranding surrounding the cervix, similar to the prior study. Uterus and ovaries are otherwise unremarkable. Hyperdense material within the bladder, favored to represent blood products. Peritoneum/Retroperitoneum: No evidence of AAA. Mild mesenteric edema is noted. Increased size of several pelvic lymph nodes including a 12 x 8 mm node in the left external iliac chain on series 7, image 154. Bones/Soft Tissues: No suspicious osseous lesions. Findings in the lungs favored to represent pneumonia. New metastatic disease is considered unlikely, though follow-up to resolution is recommended. Bilateral nephrostomy tubes in place. Findings in the pelvis again consistent with given history of cervical malignancy. Slight interval increase in borderline enlarged pelvic nodes. Recommend attention on follow-up imaging. Hyperdense material within the bladder likely due to blood products. Hepatic steatosis. Mri Brain W Wo Contrast    Result Date: 7/30/2019  EXAMINATION: MRI OF THE BRAIN WITHOUT AND WITH CONTRAST  7/30/2019 12:08 pm TECHNIQUE: Multiplanar multisequence MRI of the head/brain was performed without and with the administration of intravenous contrast. COMPARISON: CT head without contrast 07/03/2019. MRI brain without and with contrast 06/07/2019 HISTORY: ORDERING SYSTEM PROVIDED HISTORY: CONFUSION/DELIRIUM, ALTERED LOC, UNEXPLAINED TECHNOLOGIST PROVIDED HISTORY: Reason for Exam: Cervical cancer mets to lungs, kidney, bladder. Confusion few weeks. Gfr>60. prohance 15ml Acuity: Acute Type of Exam: Subsequent/Follow-up FINDINGS: INTRACRANIAL STRUCTURES/VENTRICLES:  There is no acute infarct. No evidence of intracranial hemorrhage. Focus of enhancement in the superior right frontal lobe, similar to slightly increased in size from 06/17/2019, measuring up to 6 mm in craniocaudal dimension. Punctate focus of enhancement in the right insular cortex region measures approximately 2 mm, likely not substantially changed given differences in slice selection. Mild motion artifact. The ventricles and cisterns are normal in size and configuration. Normal intracranial arterial flow voids are preserved. Sellar and suprasellar regions are unremarkable. The cerebellar tonsils are normal in position. No mass effect or midline shift. ORBITS: The visualized portion of the orbits demonstrate no acute abnormality. SINUSES: The visualized paranasal sinuses and mastoid air cells are well aerated. BONES/SOFT TISSUES: The bone marrow signal intensity appears normal. The soft tissues demonstrate no acute abnormality. Focus of enhancement in superior right frontal lobe similar to slightly increased in size.   Stable punctate focus of enhancement the right insular cortex. No convincing new metastatic lesions are identified. No acute intracranial abnormality or mass effect. RECOMMENDATIONS: Future imaging of the brain performed with cube/isotropic postcontrast sequence. Ir Perc Nephrostomy Proc    Result Date: 7/24/2019  PROCEDURE: PERCUTANEOUS ANTEGRADE PYELOGRAM BILATERAL PERCUTANEOUS NEPHROSTOMY TUBE PLACEMENTS ULTRASOUND GUIDANCE 7/24/2019 HISTORY: ORDERING SYSTEM PROVIDED HISTORY: hydronephrosis TECHNOLOGIST PROVIDED HISTORY: Bilateral perc nephr tube placement, attention dr Laura Flores,    Ureteral catheter to be placed wed am by gu Reason for exam:->hydronephrosis SEDATION: The patient was under unconscious sedation, monitored by the anesthesia service. Right ureteral stent removal was performed in the operating room by the urologist, prior to the IR procedure. The patient was subsequently transferred to the interventional radiology suite for bilateral nephrostomy tubes. She was maintained under unconscious sedation and monitored by the anesthesia service throughout the procedure. CONTRAST: 25 cc of Isovue 370 FLUOROSCOPY DOSE AND TYPE OR TIME AND EXPOSURES: 9.8 minute, 3 images, cumulative air Kerma 212 mGy for right-side. 5.3 minute, 4 images, cumulative air Kerma 142 mGy for left side. TECHNIQUE Informed consent was obtained following detailed description of the procedure including risks, benefits, and alternatives. Universal protocol was followed. Right nephrostomy tube. The patient was in prone position. The mid back was sterilely prepared and draped bilaterally. A temporary right ureteral catheter remained position, placed by the urologist, following removal of the stent in the operating room. This was utilized to perform nephrostogram.  A lower pole calyx was targeted. A subcostal approach was chosen to an inferior to lower calyx.   Additionally, ultrasound was utilized to identify the hydronephrotic kidney and  needle

## 2019-08-04 NOTE — PROGRESS NOTES
Patient had small formed, hard stool. Oxygen saturation 98% on 9L high flow, weaned to 8L at this time. 92%. Will continue to monitor.

## 2019-08-04 NOTE — PLAN OF CARE
Nutrition Problem: Inadequate oral intake  Intervention: Food and/or Nutrient Delivery: Continue current diet, Start ONS  Nutritional Goals: po intake at least 50% of meals & supplements
Problem: Pain:  Goal: Control of acute pain  Description  Control of acute pain  Outcome: Met This Shift     Pt still requiring PRN IV and PO pain meds. Pt was experiencing intesne pain at the beginning of the shift but was managed from IV Dilaudid. Pt observed to be resting comfortably with eyes closed throughout the night in-between pain medication administrations. Problem: Falls - Risk of:  Goal: Will remain free from falls  Description  Will remain free from falls  Outcome: Met This Shift     Pt has remained free from falls and is able to ambulate as a SBA.
Problem: Pain:  Goal: Pain level will decrease  Description  Pain level will decrease  Outcome: Met This Shift     Pt has required PRN IV and PO pain meds throughout the night to manage her pain. In-between administrations, pt was observed to be resting comfortably with eyes closed. Generally, pt's pain has decreased over the night.
Problem: Pain:  Goal: Pain level will decrease  Description  Pain level will decrease  Outcome: Ongoing  Goal: Control of acute pain  Description  Control of acute pain  Outcome: Ongoing  Goal: Control of chronic pain  Description  Control of chronic pain  Outcome: Ongoing     Problem: Risk for Impaired Skin Integrity  Goal: Tissue integrity - skin and mucous membranes  Description  Structural intactness and normal physiological function of skin and  mucous membranes. Outcome: Ongoing     Problem: Falls - Risk of:  Goal: Will remain free from falls  Description  Will remain free from falls  Outcome: Ongoing  Goal: Absence of physical injury  Description  Absence of physical injury  Outcome: Ongoing     Problem: Respiratory:  Goal: Ability to maintain a clear airway will improve  Description  Ability to maintain a clear airway will improve  Outcome: Ongoing  Goal: Ability to maintain adequate ventilation will improve  Description  Ability to maintain adequate ventilation will improve  Outcome: Ongoing  Goal: Complications related to the disease process, condition or treatment will be avoided or minimized  Description  Complications related to the disease process, condition or treatment will be avoided or minimized  Outcome: Ongoing     Problem:  Activity:  Goal: Ability to tolerate increased activity will improve  Description  Ability to tolerate increased activity will improve  Outcome: Ongoing  Goal: Ability to implement measures to reduce episodes of fatigue will improve  Description  Ability to implement measures to reduce episodes of fatigue will improve  Outcome: Ongoing     Problem: Cardiac:  Goal: Complications related to the disease process, condition or treatment will be avoided or minimized  Description  Complications related to the disease process, condition or treatment will be avoided or minimized  Outcome: Ongoing  Goal: Hemodynamic stability will improve  Description  Hemodynamic stability will
1153 by Hamlet Boogie RN  Outcome: Ongoing  8/1/2019 0126 by Marylene Cartwright, RN  Outcome: Ongoing     Problem: Physical Regulation:  Goal: Complications related to the disease process, condition or treatment will be avoided or minimized  Description  Complications related to the disease process, condition or treatment will be avoided or minimized  8/1/2019 1153 by Hamlet Boogie RN  Outcome: Ongoing  8/1/2019 0126 by Marylene Cartwright, RN  Outcome: Ongoing     Problem: Safety:  Goal: Ability to remain free from injury will improve  Description  Ability to remain free from injury will improve  Outcome: Ongoing     Problem: Sensory:  Goal: Ability to develop a pain control plan will improve  Description  Ability to develop a pain control plan will improve  Outcome: Ongoing     Problem: Nutrition  Goal: Optimal nutrition therapy  8/1/2019 1153 by Hamlet Boogie RN  Outcome: Ongoing  8/1/2019 1038 by Raphael Leung RD, LD  Outcome: Ongoing

## 2019-08-05 NOTE — PROGRESS NOTES
mg Daily   acetaminophen (TYLENOL) tablet 650 mg Q6H PRN   magnesium oxide (MAG-OX) tablet 400 mg Daily   topiramate (TOPAMAX) tablet 25 mg BID   sodium chloride flush 0.9 % injection 10 mL 2 times per day   sodium chloride flush 0.9 % injection 10 mL PRN   magnesium hydroxide (MILK OF MAGNESIA) 400 MG/5ML suspension 30 mL Daily PRN   enoxaparin (LOVENOX) injection 40 mg Nightly   potassium chloride (KLOR-CON M) extended release tablet 40 mEq PRN   Or    potassium bicarb-citric acid (EFFER-K) effervescent tablet 40 mEq PRN   Or    potassium chloride 10 mEq/100 mL IVPB (Peripheral Line) PRN   0.9 % sodium chloride bolus PRN   hydrALAZINE (APRESOLINE) injection 10 mg Q6H PRN   magnesium sulfate 1 g in dextrose 5% 100 mL IVPB PRN       Objective:  /72   Pulse 112   Temp 98.6 °F (37 °C) (Oral)   Resp 20   Ht 5' 7\" (1.702 m)   Wt 177 lb 14.6 oz (80.7 kg)   SpO2 98%   BMI 27.86 kg/m²     Intake/Output Summary (Last 24 hours) at 8/5/2019 0903  Last data filed at 8/5/2019 0439  Gross per 24 hour   Intake 334 ml   Output 1700 ml   Net -1366 ml    Wt Readings from Last 3 Encounters:   08/01/19 177 lb 14.6 oz (80.7 kg)   07/16/19 175 lb (79.4 kg)   07/07/19 192 lb 11.2 oz (87.4 kg)       General appearance:  Obese, jaundiced, tachypneic  Eyes: Sclera clear. Pupils equal.  ENT: Moist oral mucosa. Trachea midline, no adenopathy. Cardiovascular: Regular rhythm, normal S1, S2. No murmur. No edema in lower extremities  Respiratory: Coarse BS BL. No wheezing  GI: Abdomen soft, obese, no tenderness, distended, normal bowel sounds  Musculoskeletal: BL PCNT  Neurology: Grossly intact. No speech or motor deficits  Psych: Depressed affect. Alert and oriented in time, place and person  Skin: Jaundiced  Extremity exam shows brisk capillary refill.   Peripheral pulses are palpable in lower extremities     Labs and Tests:  CBC:   Recent Labs     08/03/19  0500 08/04/19  0505 08/05/19  0545   WBC 9.1 9.4 14.1*   HGB 10.3* 10.3* 11.6*    163 217     BMP:  Recent Labs     08/03/19  1347 08/04/19  0505 08/05/19  0545    136 142   K 4.2 4.0 4.2    99 99   CO2 26 24 25   BUN 16 18 29*   CREATININE <0.5* <0.5* 0.7   GLUCOSE 102* 82 111*     Hepatic: Recent Labs     08/03/19  1347 08/04/19  0506   * 337*   * 134*   BILITOT 4.6* 4.7*   ALKPHOS 665* 657*     XR CHEST PORTABLE   Final Result   Persistent mild bilateral airspace opacities, most pronounced in the left   lower lung. Findings concerning for multifocal pneumonia and/or edema. Follow-up to resolution recommended given the patient's history. XR CHEST PORTABLE   Final Result   Findings are suggestive of pulmonary edema. Pneumonia is not excluded in the   appropriate clinical setting. CT ABDOMEN PELVIS W IV CONTRAST Additional Contrast? None   Final Result   Asymmetric mesenteric edema right greater than left possibly due to an   infectious process. Small subcapsular high attenuating collection associated with the right   kidney possibly postprocedural hematoma. No evidence of compression of the   right kidney. Heterogeneous bladder mass likely blood products within the urinary bladder. Nodularity in the deep pelvis possibly due to the patient's known malignancy. CT CHEST PULMONARY EMBOLISM W CONTRAST   Final Result   Extensive airspace disease possibly a combination of pneumonia and edema. Underlying metastatic disease not excluded. Prominent paratracheal lymph nodes most likely reactive but neoplasm not   excluded. Attention at follow-up is advised. No central pulmonary embolism. Evidence of pulmonary hypertension. MRI BRAIN W WO CONTRAST   Final Result   Focus of enhancement in superior right frontal lobe similar to slightly   increased in size. Stable punctate focus of enhancement the right insular   cortex. No convincing new metastatic lesions are identified.       No acute intracranial abnormality or mass effect. RECOMMENDATIONS:   Future imaging of the brain performed with cube/isotropic postcontrast   sequence. CT CHEST ABDOMEN PELVIS WO CONTRAST   Final Result   Findings in the lungs favored to represent pneumonia. New metastatic disease   is considered unlikely, though follow-up to resolution is recommended. Bilateral nephrostomy tubes in place. Findings in the pelvis again consistent with given history of cervical   malignancy. Slight interval increase in borderline enlarged pelvic nodes. Recommend attention on follow-up imaging. Hyperdense material within the bladder likely due to blood products. Hepatic steatosis. IR PERC NEPHROSTOMY PROC   Final Result   Bilateral hydroureteronephrosis, secondary to known cervical mass. Successful bilateral percutaneous nephrostomy tube placements. IR NEPHROSTOMY COMPLETE   Final Result   Bilateral hydroureteronephrosis, secondary to known cervical mass. Successful bilateral percutaneous nephrostomy tube placements. FL RETROGRADE PYELOGRAM RIGHT   Final Result   Intraprocedural fluoroscopic spot images as above. See separate procedure   report for more information. US RENAL COMPLETE   Final Result   Moderate bilateral hydronephrosis. Partial visualization of right nephroureteral stent. Fatty liver.          XR CHEST PORTABLE   Final Result   Negative chest.                 Problem List  Principal Problem:    Acute respiratory failure with hypoxia (HCC)  Active Problems:    GERD (gastroesophageal reflux disease)    SLE (systemic lupus erythematosus) (HCC)    Cervical cancer (HCC)    Bilateral hydronephrosis    Lymphadenopathy    Constipation due to opioid therapy    Status post cystoscopy    Immunocompromised (HCC)    Complicated UTI (urinary tract infection)    Anemia    Seizure disorder (HCC)    Intractable pain    Moderate malnutrition (HCC)    Abnormal finding on MRI of brain    Right flank pain    Malignant neoplasm metastatic to lung ([address]    Brain metastasis ([address]    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 *       Assessment & Plan:   1. Transferred to ICU per Pulmonary   2. Dr Tc Lim ([address] [facility]) reviewed imaging and case; feels patient will not benefit from wedge biopsy  3. Dr Marilin Combs [facility]) has reviewed care in detail with patient,  and mother at bedside  4. Dr Vee Hoffman and Dr Meagan Smalls ([place]) are addressing Philis Noss availability (this is not typically available on inpatient basis; PharmD is working with them)  5. Palliative care consult to address goals of care, code status  6. Discussed case with Blaze Nails (GI PA) who reviewed case with Dr Fouzia Guevara (GI). They do not feel liver biopsy will change course of care at this time   7. MRI Liver ordered to exclude liver metastasis that may be amenable to IR liver biopsy per Oncology  8. Remains on HFNC, patient would allow for intubation at this time  9. Mirta Davis was somehow displaced by patient today; further access issues to be addressed by CCM and Oncology    IV Access: Peripheral  [place]: No (BL PCNT)  Diet: DIET GENERAL;  Dietary Nutrition Supplements: Standard High Calorie Oral Supplement  Code:Full Code  DVT PPX SCD (Will restart Lovenox if no plans for IR bx)  Disposition Unclear    Discussed extensively with patient, Dr Marilin Combs ([place]), Dr Vee Hoffman (Onc), Dr Tc Lim (CTS), Blaze Nails (GI PA), nursing and CM. Reviewed with  and mother. Grim prognosis here. At this time, she is not prepared to proceed with comfort care. Would proceed with intubation and trach if needed despite futile prognosis.       Baylee Mercado MD   8/5/2019 9:03 AM

## 2019-08-05 NOTE — CONSULTS
ventricular hypertrophy.   -Normal diastolic function. E/e\"=6.3. GLS -16.1%   -Moderate tricuspid regurgitation.   -Estimated pulmonary artery systolic pressure is moderately elevated at   58-63 mmHg assuming a right atrial pressure of 3 mmHg.   -The right heart appears borderline enlarged. Allergies   Allergen Reactions    Food Hives     Raw spinach.     Spinach Hives     Current Facility-Administered Medications   Medication Dose Route Frequency Provider Last Rate Last Dose    baclofen (LIORESAL) tablet 10 mg  10 mg Oral TID Rosalino Hills MD   10 mg at 08/05/19 1136    fentaNYL (DURAGESIC) 50 MCG/HR 1 patch  1 patch Transdermal Q72H Rosalino Hills MD   1 patch at 08/03/19 1614    HYDROmorphone (DILAUDID) injection 2 mg  2 mg Intravenous Q3H PRN Rosalino Hills MD   2 mg at 08/05/19 1617    oxyCODONE-acetaminophen (PERCOCET) 7.5-325 MG per tablet 1 tablet  1 tablet Oral Q4H PRN Rosalino Hills MD   1 tablet at 08/04/19 1604    perflutren lipid microspheres (DEFINITY) injection 1.65 mg  1.5 mL Intravenous ONCE PRN Florida Gastelum MD        [facility]) injection 12.5 mg  12.5 mg Intravenous Q4H PRN Betty Matamoros MD   12.5 mg at 08/05/19 1617    ondansetron (ZOFRAN) injection 8 mg  8 mg Intravenous Q8H PRN Betty Matamoros MD   8 mg at 08/04/19 1351    traZODone (DESYREL) tablet 50 mg  50 mg Oral Nightly Dane Patrick MD   50 mg at 08/04/19 2259    calcium carbonate (TUMS) chewable tablet 1,000 mg  1,000 mg Oral 4x Daily PRN Chuck Weaver MD   1,000 mg at 07/30/19 0145    famotidine (PEPCID) tablet 20 mg  20 mg Oral BID PRN Chuck Weaver MD   20 mg at 07/27/19 2030    acetaminophen (TYLENOL) tablet 650 mg  650 mg Oral Q4H PRN Quinn Finley MD   650 mg at 07/30/19 0145    naphazoline-glycerin (CLEAR EYES REDNESS RELIEF) 0.012-0.2 % ophthalmic solution 1 drop  1 drop Right Eye TID Quinn Finley MD   1 drop at 08/05/19 2387    senna (SENOKOT) tablet tingling. No change in gait, balance, coordination, mood, affect, memory, mentation, behavior. · Psychiatric: No anxiety, no depression. · Endocrine: No malaise, fatigue or temperature intolerance. No excessive thirst, fluid intake, or urination. No tremor. · Hematologic/Lymphatic: No abnormal bruising or bleeding, blood clots or swollen lymph nodes. · Allergic/Immunologic: No nasal congestion or hives. Physical Examination:    Vitals:    08/05/19 1211 08/05/19 1227 08/05/19 1300 08/05/19 1400   BP: 116/80  102/69 109/78   Pulse: 115  113 114   Resp: 27  17 26   Temp:   98 °F (36.7 °C)    TempSrc:   Axillary    SpO2: 97%  93% 93%   Weight:  173 lb 8 oz (78.7 kg)     Height:         Body mass index is 27.17 kg/m². Wt Readings from Last 3 Encounters:   08/05/19 173 lb 8 oz (78.7 kg)   07/16/19 175 lb (79.4 kg)   07/07/19 192 lb 11.2 oz (87.4 kg)     BP Readings from Last 3 Encounters:   08/05/19 109/78   07/24/19 133/77   07/19/19 (!) 181/110     Constitutional and General Appearance:   Chronically ill appearing, jaundiced  HEENT:  NC/AT  JASMEET  No problems with hearing  Skin:  Warm, dry  Respiratory:  · Normal excursion and expansion without use of accessory muscles  · Resp Auscultation: Normal breath sounds without dullness  Cardiovascular:  · The apical impulses not displaced  · Heart tones are crisp and normal  · Cervical veins are not engorged  · The carotid upstroke is normal in amplitude and contour without delay or bruit  · JVP 10-11 cm H2O  Regular tachycardia with nl S1 and S2 without m,r,g  · Peripheral pulses are symmetrical and full  · There is no clubbing, cyanosis of the extremities.   · No edema  · Femoral Arteries: 2+ and equal  · Pedal Pulses: 2+ and equal   Neck:  · No thyromegaly  Abdomen:  · No masses or tenderness  · Liver/Spleen: No Abnormalities Noted  Neurological/Psychiatric:  · Alert and oriented in all spheres  · Moves all extremities well  · Exhibits normal gait balance and

## 2019-08-05 NOTE — PROGRESS NOTES
Estee care on pt per family request. Attempted void on bedpan and spilled. Pt. Turned and repositioned with some discomfort. Left nephrostomy dressing no longer occlusive. Dressing changed, site cleansed with chloroprep swab and dry dressing placed and covered with clear occlusive dressing. Site unremarkable, tube sutured intact, draining well, Right Nephrostomy dressing needs changed as well, pt. not most cooperative at this time. May need to change dressing with next turn and reposition.

## 2019-08-05 NOTE — PROGRESS NOTES
Shift assessment complete. VVS, pt complaining of pain on lower abdomen and back, pt confused this AM and Jaundice, pt transfer to ICU due to dyspnea, call light within reach, no other needs reported at this time.

## 2019-08-05 NOTE — PROGRESS NOTES
Nutrition Assessment    Type and Reason for Visit: Reassess    Nutrition Recommendations:   1. Pt now NPO; has had inadequate nutrition x 19 days. If aggressive care is desired, recommend EN support to start. 2. Recommend loading dose 300 mg IV thiamine prior to beginning EN support. Recommend 100 mg PO thiamine daily x 10 days thereafter. 3. Recommend to start Jevity 1.2 (standard formula with fiber) at 20 mL per hour x 24 hours and closely monitor electrolytes (K+, Mg+ Phos). Do not advance rate until lytes WNL. 4. When electrolytes (K+, Mg+ and Phos) are WNL, consider advancing rate by 20 mL q 12 hours until goal rate 75 mL per hour is reached. Recommend to check electrolytes prior to each rate advancement. If electrolytes are not WNL, continue at current rate for another 12 hours and re-check electrolytes prior to rate advancement. 5. Jevity 1.2 (standard formula with fiber) at eventual goal rate 75 mL per hour provides 1800 mL total volume, 2160 calories, 100 grams protein, 1453 mL free water. 6. Recommend water bolus 175 mL q 6 hours. Recommend reevaluate IV fluids at this time. Increase flush if Na increases greater than 145 mEq/L.  7. Ensure head of bed is 30 - 45 degrees during continuous or bolus gastric feeding and for one hour after bolus. Turn off the feeding if head of bed is lowered less than 30 degrees. 8. Monitor for tolerance (residuals greater than 500 mL, bowel habits, N/V, cramping).       Nutrition Assessment: Pt's nutrition status continues to decline. Pt has not been eating or drinking at all over the past few days d/t pain. Pt now with 19 days inadequate nutrition. Pt is now NPO. If aggressive care is desired, recommend EN support to start. See above for recommendations.      Malnutrition Assessment:  · Malnutrition Status: Meets the criteria for moderate malnutrition  · Context: Acute illness or injury  · Findings of the 6 clinical characteristics of malnutrition (Minimum of 2

## 2019-08-05 NOTE — PROGRESS NOTES
reflecting known stent. Moderate bilateral hydronephrosis. Partial visualization of right nephroureteral stent. Fatty liver. Ct Abdomen Pelvis W Iv Contrast Additional Contrast? None    Result Date: 8/1/2019  EXAMINATION: CT OF THE ABDOMEN AND PELVIS WITH CONTRAST 7/31/2019 12:41 am TECHNIQUE: CT of the abdomen and pelvis was performed with the administration of intravenous contrast. Multiplanar reformatted images are provided for review. Dose modulation, iterative reconstruction, and/or weight based adjustment of the mA/kV was utilized to reduce the radiation dose to as low as reasonably achievable. COMPARISON: 07/16/2019 HISTORY: ORDERING SYSTEM PROVIDED HISTORY: ABDOMINAL PAIN TECHNOLOGIST PROVIDED HISTORY: Additional Contrast?->None Reason for Exam: abdominal pain Acuity: Unknown Type of Exam: Unknown Additional signs and symptoms: Abdominal pain; worsening lft, mets Relevant Medical/Surgical History: (Pt with R flank pain, was seen here last night for the same thing, denies dysuira, emesis yesterday) FINDINGS: Lower Chest: Multifocal patchy opacities with smooth interlobular septal thickening may represent edema or multifocal pneumonia. Organs: Fatty liver. Cholecystectomy. Splenomegaly without focal lesion. Adrenal glands are unremarkable. Bilateral nephrostomy tubes. Perinephric edema. Small subcapsular collection associated with the right kidney possibly hematoma related to nephrostomy tube placement. GI/Bowel: Stool throughout the colon. No dilated bowel. Pelvis: Heterogeneous filling defect in the urinary bladder most likely blood products from recent procedure; collection/mass measures 6.2 x 4.5 cm. Vague low-attenuation in the lower uterine segment possibly due to the patient's known cervical malignancy. Peritoneum/Retroperitoneum: Edema in the mesentery, predominantly within the right retroperitoneum and peritoneum.   Prominent but non pathologically enlarged retroperitoneal nodes are mediastinal contours are without acute process. No acute osseous abnormality identified. Persistent mild bilateral airspace opacities, most pronounced in the left lower lung. Findings concerning for multifocal pneumonia and/or edema. Follow-up to resolution recommended given the patient's history. Xr Chest Portable    Result Date: 8/4/2019  EXAMINATION: ONE XRAY VIEW OF THE CHEST 8/4/2019 9:11 am COMPARISON: 06/17/2019 HISTORY: ORDERING SYSTEM PROVIDED HISTORY: hypoxic TECHNOLOGIST PROVIDED HISTORY: Reason for exam:->hypoxic Reason for Exam: hypoxic Acuity: Unknown Type of Exam: Unknown FINDINGS: Cardiac leads project over the chest.  Port is seen in the right chest wall. Heterogeneous bilateral pulmonary opacity has developed. No pleural effusion or pneumothorax. Cardiac and mediastinal silhouettes are reflective of patient rotation. Findings are suggestive of pulmonary edema. Pneumonia is not excluded in the appropriate clinical setting. Xr Chest Portable    Result Date: 7/17/2019  EXAMINATION: ONE XRAY VIEW OF THE CHEST 7/17/2019 1:18 pm COMPARISON: 07/16/2019 HISTORY: ORDERING SYSTEM PROVIDED HISTORY: Abdominal pain Reason for Exam: Flank Pain (Pt with R flank pain, was seen here last night for the same thing, denies dysuira, emesis yesterday) FINDINGS: Right-sided Port-A-Cath remains in place. The lungs are without acute focal process. No effusion or pneumothorax. The cardiomediastinal silhouette is normal.  The osseous structures are intact without acute process. Partially visualized right ureteral stent. Negative chest.     Ct Chest Pulmonary Embolism W Contrast    Result Date: 8/1/2019  EXAMINATION: CTA OF THE CHEST 7/31/2019 12:40 am TECHNIQUE: CTA of the chest was performed after the administration of intravenous contrast.  Multiplanar reformatted images are provided for review. MIP images are provided for review.  Dose modulation, iterative reconstruction, and/or weight based

## 2019-08-05 NOTE — PROGRESS NOTES
punctate focus of enhancement the right insular cortex. No convincing new metastatic lesions are identified. No acute intracranial abnormality or mass effect. RECOMMENDATIONS: Future imaging of the brain performed with cube/isotropic postcontrast sequence. Ir Perc Nephrostomy Proc    Result Date: 7/24/2019  PROCEDURE: PERCUTANEOUS ANTEGRADE PYELOGRAM BILATERAL PERCUTANEOUS NEPHROSTOMY TUBE PLACEMENTS ULTRASOUND GUIDANCE 7/24/2019 HISTORY: ORDERING SYSTEM PROVIDED HISTORY: hydronephrosis TECHNOLOGIST PROVIDED HISTORY: Bilateral perc nephr tube placement, attention dr Marry Boswell,    Ureteral catheter to be placed wed am by gu Reason for exam:->hydronephrosis SEDATION: The patient was under unconscious sedation, monitored by the anesthesia service. Right ureteral stent removal was performed in the operating room by the urologist, prior to the IR procedure. The patient was subsequently transferred to the interventional radiology suite for bilateral nephrostomy tubes. She was maintained under unconscious sedation and monitored by the anesthesia service throughout the procedure. CONTRAST: 25 cc of Isovue 370 FLUOROSCOPY DOSE AND TYPE OR TIME AND EXPOSURES: 9.8 minute, 3 images, cumulative air Kerma 212 mGy for right-side. 5.3 minute, 4 images, cumulative air Kerma 142 mGy for left side. TECHNIQUE Informed consent was obtained following detailed description of the procedure including risks, benefits, and alternatives. Universal protocol was followed. Right nephrostomy tube. The patient was in prone position. The mid back was sterilely prepared and draped bilaterally. A temporary right ureteral catheter remained position, placed by the urologist, following removal of the stent in the operating room. This was utilized to perform nephrostogram.  A lower pole calyx was targeted. A subcostal approach was chosen to an inferior to lower calyx.   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 Increased liver enzymes with cholestatic pattern. Gi evaluating liver biopsy considered but will probably not  at this time    Was planning to proceed with [facility] today, will hold off if pulmonary plans biopsy. Patient just finished 6 cycles taxol carboplatin and avastin in June. I have discussed the above stated plan with the patient and they verbalized understanding and agreed with the plan. Thank you for allowing us to participate in this patients care. Ramonita Burton, APRN - CNP, 8/5/2019, 9:54 AM     The patient was seen and examined. She was transferred to ICU. Family by bedside. She will be started on pembrolizumab but tomorrow per Dr. Aries Narvaez.     Kia Sloan MD

## 2019-08-05 NOTE — PROGRESS NOTES
joint swelling, decreased range of motion and muscle weakness  NEUROLOGICAL:  negative for headaches, slurred speech, unilateral weakness  PSYCHIATRIC/BEHAVIORAL: negative for hallucinations, behavioral problems, confusion and agitation. Objective:   PHYSICAL EXAM:      VITALS:  /78   Pulse 114   Temp 98 °F (36.7 °C) (Axillary)   Resp 26   Ht 5' 7\" (1.702 m)   Wt 173 lb 8 oz (78.7 kg)   SpO2 93%   BMI 27.17 kg/m²      24HR INTAKE/OUTPUT:      Intake/Output Summary (Last 24 hours) at 8/5/2019 1654  Last data filed at 8/5/2019 1300  Gross per 24 hour   Intake --   Output 1025 ml   Net -1025 ml     CONSTITUTIONAL:  awake, alert, cooperative, no apparent distress, and appears stated age  NECK:  Supple, symmetrical, trachea midline, no adenopathy, thyroid symmetric, not enlarged and no tenderness, skin normal  LUNGS:  no increased work of breathing and clear to auscultation. No accessory muscle use  CARDIOVASCULAR: S1 and S2, no edema and no JVD  ABDOMEN:  normal bowel sounds, non-distended and no masses palpated, and no tenderness to palpation. No hepatospleenomegaly  LYMPHADENOPATHY:  no axillary or supraclavicular adenopathy. No cervical adnenopathy  PSYCHIATRIC: Oriented to person place and time. No obvious depression or anxiety. MUSCULOSKELETAL: No obvious misalignment or effusion of the joints. No clubbing, cyanosis of the digits. SKIN:  normal skin color, texture, turgor and no redness, warmth, or swelling.  No palpable nodules    DATA:    Old records have been reviewed    CBC:  Recent Labs     08/03/19  0500 08/04/19  0505 08/05/19  0545   WBC 9.1 9.4 14.1*   RBC 3.18* 3.22* 3.67*   HGB 10.3* 10.3* 11.6*   HCT 31.7* 32.6* 36.7    163 217   MCV 99.6 101.2* 100.0   MCH 32.3 32.1 31.5   MCHC 32.4 31.7 31.5   RDW 18.3* 18.9* 19.5*   BANDSPCT  --   --  1      BMP:  Recent Labs     08/03/19  1347 08/04/19  0505 08/05/19  0545    136 142   K 4.2 4.0 4.2    99 99   CO2 26 24 25   BUN

## 2019-08-06 NOTE — PROGRESS NOTES
Pt keeps sitting up suddenly because she urgently feels as if she needs to void. Sent message to Hospitalist for possible bladder spasm relief therapy. Pt falls back asleep quickly after her pain/spasm episodes.

## 2019-08-06 NOTE — PROGRESS NOTES
Assessment and VS complete. See flowsheet. Pt A&O, but sister states she has been forgetful and saying \"strange things\" such as \"I have a brick in my belly. \" States she keeps pulling at O2 and lines. Pt follows commands and has appropriate conversation, but is tired and lethargic. Refusing to take her medication at this time, despite education. Is willing to take her Phenergan for mild nausea. Phenergan given per MD order. Will reassess. Pain in pelvis/lower abdomen area. Feels like she \"needs to pee. \" Estee pad still in place. Nephrostomy tubes draining clear, yellow urine appropriately. SPO2=95% on 9LHFNC. Tachycardic. Dyspnea with any exertion. RR 25 and stable. Lungs diminished t/o bilateral lung fields. Pt has not eaten or drank much in days per sister. Sister asking about nutrition options. Information given and encouraged sister to discuss with MD's during rounds. Also discussed that end of life, failure to thrive is common. Emotional support provided for sister who is tearful at bedside. Pt repositioned to left side and dressing reinforced to right nephrostomy tube. Tolerated fairly well. Much pain with turning. POC discussed at lenght. Pt denies further needs. Call light in reach.

## 2019-08-06 NOTE — PROGRESS NOTES
Advanced Care Planning Note. Purpose of Encounter: Advanced care planning in light of cervical cancer  Parties In Attendance: Patient, sister, , mother  Decisional Capacity: Limited  Subjective: Patient is SOB  Objective: Cr 0.6  Goals of Care Determination:  and mother want limited support (CPR, no vent, no surgery, no HD, no trach, no PEG)  Plan: CCM management. Keytruda per Oncology. MRI Liver. Palliative care consult. Oncology, CCM/Pulm, GynOnc, Rad Onc, Cardio consults  Code Status: Limited   Time spent on Advanced care Plannin minutes  Advanced Care Planning Documents: Completed advanced directives on chart,  is the POA.     Ziyad Arriaga MD  2019 8:20 AM

## 2019-08-06 NOTE — PROGRESS NOTES
Pt anxious and squirming in bed. Sister states she is more confused. Pt is disoriented to time. Does not know the month or her date of birth. Does know the hospital name and why she is here. Also, does know her name and her sisters name. Pt c/o pain in lower abdomen 10/10. States she feels like she needs to void. Encouraged her to try to void- was unable. Does have scant amount of blood on zackery pad. Dilaudid given per MD Order. Will reassess. Repositioned for comfort, using pillows for support. Nephro tubes emptied. SCD's applied to legs bilaterally. Sister states every time she is awake she tries to get her to drink water. Pt is sipping some applejuice at this time. Attempted to give night time meds again, however pt still declines. Denies needs. Pt resting well at this time. Call light in reach. Encouraged sisterKelley to try and get some sleep as well.

## 2019-08-06 NOTE — PROGRESS NOTES
BMP:  Recent Labs     08/04/19  0505 08/05/19  0545 08/06/19  0435    142 142   K 4.0 4.2 4.2   CL 99 99 100   CO2 24 25 28   BUN 18 29* 35*   CREATININE <0.5* 0.7 0.6   CALCIUM 8.9 9.3 9.3   GLUCOSE 82 111* 110*        Hepatic Function Panel:   Lab Results   Component Value Date    ALKPHOS 653 08/05/2019     08/05/2019     08/05/2019    PROT 5.9 08/05/2019    PROT 6.3 04/18/2012    BILITOT 5.6 08/05/2019    BILIDIR 4.6 08/05/2019    IBILI 1.0 08/05/2019    LABALBU 2.7 08/05/2019       CPK:   Lab Results   Component Value Date    CKTOTAL 771 (H) 07/03/2019     ESR:   Lab Results   Component Value Date    SEDRATE 27 (H) 12/05/2018     CRP:   Lab Results   Component Value Date    CRP 4.7 12/05/2018           Imaging: All pertinent images and reports for the current visit were reviewed by me during this visit. US LIVER SPLEEN   Final Result   Diffuse heterogeneity of the liver with several areas which appear nodular. While this may be related to focal fatty infiltration, given the patient's   history infiltrative metastatic disease is not excluded. Recommend liver   protocol MRI with contrast for further evaluation. XR CHEST PORTABLE   Final Result   Persistent mild bilateral airspace opacities, most pronounced in the left   lower lung. Findings concerning for multifocal pneumonia and/or edema. Follow-up to resolution recommended given the patient's history. XR CHEST PORTABLE   Final Result   Findings are suggestive of pulmonary edema. Pneumonia is not excluded in the   appropriate clinical setting. CT ABDOMEN PELVIS W IV CONTRAST Additional Contrast? None   Final Result   Asymmetric mesenteric edema right greater than left possibly due to an   infectious process. Small subcapsular high attenuating collection associated with the right   kidney possibly postprocedural hematoma. No evidence of compression of the   right kidney.       Heterogeneous bladder

## 2019-08-06 NOTE — PROGRESS NOTES
not   excluded. Attention at follow-up is advised. No central pulmonary embolism. Evidence of pulmonary hypertension. MRI BRAIN W WO CONTRAST   Final Result   Focus of enhancement in superior right frontal lobe similar to slightly   increased in size. Stable punctate focus of enhancement the right insular   cortex. No convincing new metastatic lesions are identified. No acute intracranial abnormality or mass effect. RECOMMENDATIONS:   Future imaging of the brain performed with cube/isotropic postcontrast   sequence. CT CHEST ABDOMEN PELVIS WO CONTRAST   Final Result   Findings in the lungs favored to represent pneumonia. New metastatic disease   is considered unlikely, though follow-up to resolution is recommended. Bilateral nephrostomy tubes in place. Findings in the pelvis again consistent with given history of cervical   malignancy. Slight interval increase in borderline enlarged pelvic nodes. Recommend attention on follow-up imaging. Hyperdense material within the bladder likely due to blood products. Hepatic steatosis. IR PERC NEPHROSTOMY PROC   Final Result   Bilateral hydroureteronephrosis, secondary to known cervical mass. Successful bilateral percutaneous nephrostomy tube placements. IR NEPHROSTOMY COMPLETE   Final Result   Bilateral hydroureteronephrosis, secondary to known cervical mass. Successful bilateral percutaneous nephrostomy tube placements. FL RETROGRADE PYELOGRAM RIGHT   Final Result   Intraprocedural fluoroscopic spot images as above. See separate procedure   report for more information. US RENAL COMPLETE   Final Result   Moderate bilateral hydronephrosis. Partial visualization of right nephroureteral stent. Fatty liver.          XR CHEST PORTABLE   Final Result   Negative chest.         US LIVER SPLEEN    (Results Pending)           Problem List  Principal Problem:    Acute

## 2019-08-06 NOTE — ONCOLOGY
access to the renal pelvis. Therefore tandem needle technique was utilized. A 2nd 21 gauge needle was utilized to enter a lower pole liana. A 6 Sur[place]amese catheter was advanced into the renal pelvis and upper moiety, followed by an 035 angled Glidewire. An 8 [place]amese pigtail catheter was placed over the wire and locked in position. There was moderate to severe left hydronephrosis. The catheter was sutured in position with 2 0 Ethilon and connected to a gravity bag. FINDINGS: Please see above. Bilateral hydroureteronephrosis, secondary to known cervical mass. Successful bilateral percutaneous nephrostomy tube placements. 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 She now has recurrent vaginal bleeding. She has bilateral ureteral obstruction and has had placement of nephrotomy tubes because of tumor extension into ureters. We will proceed with [facility] ([place] today. Chance of response is low Do not think she could tolerate chemotherapy in current state. Discussed in detail with mother sister at bedside. They are concerned about her nutritional status. Do not think tube feeding would palliate her and she is not appropriate for tpn. Pulmonary metastases have not been biopsied but clinically she appears to have metastatic disease with possible lymphagitic spread  Vaginal bleeding ultrasound ordered by dr Mirna Fountain  Liver failure she refused mri yesterday ?  Due to metastatic disease with cholestatic pattern vs passive congestion   Bilateral ureteral obstruction and tumor invading urethra unable to tolerate sargent catheter drainage has bilateral nephrostomy tubes  Discussed possible side effect of keytruda including pneumonitis diarrhea etc Family wishes to proceed          I have discussed the above stated plan with the patient and they verbalized understanding and agreed with the plan. Thank you for allowing us to participate in this patients care.     Paz Castelan MD, 8/6/2019, 7:21 AM

## 2019-08-06 NOTE — PROGRESS NOTES
Advanced Care Planning Note. Purpose of Encounter: Advanced care planning in light of cervical cancer  Parties In Attendance: Patient, , mother  Decisional Capacity: Limited  Subjective: Patient is SOB  Objective: Cr 0.7  Goals of Care Determination: Patient wants full support (CPR, vent, surgery, HD, trach, PEG)  Plan: Transfer to ICU. Keytruda per Oncology. MRI Liver. Palliative care consult. Oncology, CCM/Pulm, GynOnc, Rad Onc, Cardio consults  Code Status: Full code   Time spent on Advanced care Plannin minutes  Advanced Care Planning Documents: Completed advanced directives on chart,  is the POA.     Margarito Anderson MD  2019 1:13 AM

## 2019-08-06 NOTE — PROGRESS NOTES
Shift assessment complete. Pt is lethargic, waking for approximately 30 seconds with painful bladder spasms and returning to somnolence. Pt is tachycardic, vitals otherwise stable. Pt appears quite jaundice. Pt is disoriented to situation and time. Blood gasses and ammonia drawn at bedside by this RN. Family is preparing for a worst case outcome and will be bringing pt's children in for closure, though family would like to be aggressive in treatment at this time.

## 2019-08-06 NOTE — PROGRESS NOTES
systolic pressure is moderately elevated at   58-63 mmHg assuming a right atrial pressure of 3 mmHg.   -The right heart appears borderline enlarged. ROS:  She continues to do very poorly. She is confused. Has been given IV lasix and has diuresed some. BNP higher today. Having some bleeding from vagina. Family has all gathered at bedside. Hoping to get chemo med [facility] today as last resort. Medications/Labs all Reviewed    Lab Results   Component Value Date    WBC 14.6 (H) 08/06/2019    HGB 11.5 (L) 08/06/2019    HCT 37.1 08/06/2019    .2 (H) 08/06/2019     08/06/2019     Lab Results   Component Value Date    CREATININE 0.6 08/06/2019    BUN 35 (H) 08/06/2019     08/06/2019    K 4.2 08/06/2019     08/06/2019    CO2 28 08/06/2019     Lab Results   Component Value Date    INR 1.42 (H) 07/24/2019    PROTIME 16.2 (H) 07/24/2019        Physical Examination:    /65   Pulse 123   Temp 99.2 °F (37.3 °C) (Axillary)   Resp 23   Ht 5' 7\" (1.702 m)   Wt 170 lb 13.7 oz (77.5 kg)   SpO2 94%   BMI 26.76 kg/m²      Acute and chronically ill appearing, jaundiced. Not alert. Unable to answer questions.   HEENT:  NC/AT  Respiratory:  · Resp Assessment: Normal respiratory effort  · Resp Auscultation: Clear to auscultation bilaterally   Cardiovascular:  · Auscultation: regular rate and rhythm, normal S1S2, no murmur, rub or gallop  · Palpation:  Nl PMI  · JVP:  normal  · Extremities: No Edema  Abdomen:  · Soft, non-tender  · Normal bowel sounds  Extremities:  ·  No Cyanosis or Clubbing  Neurological/Psychiatric:  · Oriented to time, place, and person  · Non-anxious  Skin Warm and dry    Lab Results   Component Value Date     08/06/2019     08/05/2019     08/04/2019    K 4.2 08/06/2019    K 4.2 08/05/2019    K 4.0 08/04/2019    BUN 35 08/06/2019    BUN 29 08/05/2019    BUN 18 08/04/2019    CREATININE 0.6 08/06/2019    CREATININE 0.7 08/05/2019    CREATININE <0.5 08/04/2019    GLUCOSE 110 08/06/2019    GLUCOSE 111 08/05/2019     Lab Results   Component Value Date    PROBNP 10,919 (H) 08/06/2019    PROBNP 5,946 (H) 08/05/2019    PROBNP 587 (H) 07/03/2019     Lab Results   Component Value Date     (H) 08/05/2019     (H) 08/04/2019     (H) 08/05/2019     (H) 08/04/2019     Lab Results   Component Value Date    HGB 11.5 08/06/2019    HGB 11.6 08/05/2019    HCT 37.1 08/06/2019    HCT 36.7 08/05/2019     08/06/2019     08/05/2019     Lab Results   Component Value Date    TRIG 132 10/15/2014    HDL 65 10/15/2014    LDLCALC 97 10/15/2014     Labs were reviewed including labs from other hospital systems through [facility]. Cardiac testing was reviewed including echos, nuclear scans, cardiac catheterization, including from other hospital systems through [facility]. Assessment:    1. Right flank pain    2. Gross hematuria    3. Bilateral hydronephrosis    4. Intractable pain    5. Ureteral obstruction, left     6. Ground glass appearance of CT scan, possibly pulmonary edema vs lymphangitic spread  7. Hypoxia  8. Elevated right heart pressures on echo, possibly consistent with volume overload     Plan:  I had a long discussion with mother today that the cause of her hypoxia could be 1 of three things:  Pneumonia, pulmonary edema, or lymphangitic spread of cancer. She has been treated for pneumonia without improvement  We are now treating for pulmonary edema. She received IV lasix yesterday with some improvement. BNP level is high, so possibly pulmonary edema is the cause. continue lasix 40 mg IV bid  Watch renal function  There is also the possibility of acute PE given her cancer diagnosis and relatively acute SOB.  (echo also shows elevated PA pressures that could be due to PE with borderline right heart enlargement, all of which could be due to PE)    Given her ongoing bleeding, nephrostomy tubes, hematuria, and vagina

## 2019-08-07 NOTE — DISCHARGE SUMMARY
Hospital Medicine Discharge Summary    Patient: Mohan Dang     Gender: female  : 1980   Age: 42 y.o. MRN: [id number]    Admitting Physician: Dung Bartlett MD  Discharge Physician: Renata Dukes MD     Code Status: DNR-CC    Admit Date: 2019   Expiration Date: 2019      Disposition:   on 19 @ 0157 from metastatic cervical cancer    Discharge Diagnoses: Active Hospital Problems    Diagnosis Date Noted    Shortness of breath [R06.02]     Hypervolemia [E87.70]     Encephalopathy, metabolic [R08.37]     Transaminitis [R74.0]     Cholestatic jaundice [R17]     Abnormal finding on MRI of brain [R90.89]     Right flank pain [R10.9]     Malignant neoplasm metastatic to lung (HCC) [C78.00]     Brain metastasis ([address] [C79.31]     Cancer involving bladder 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 ([address] [C79.11, C54.1]     Overweight [E66.3]     Acute respiratory failure with hypoxia (HCC) [J96.01] 2019    Moderate malnutrition ([address] [E44.0] 2019    Intractable pain [R52] 2019    Anemia [D64.9]     Status post cystoscopy [Z98.890]     Immunocompromised ([address] [L84.9]     Complicated UTI (urinary tract infection) [N39.0]     Seizure disorder ([address] [G40.909]     Constipation due to opioid therapy [K59.03, Kathryn Bombard 2019    Lymphadenopathy [R59.1]     Bilateral hydronephrosis [N13.30] 02/15/2019    Cervical cancer ([address] [C53.9] 02/15/2019    SLE (systemic lupus erythematosus) ([address] [M32.9] 2019    GERD (gastroesophageal reflux disease) [K21.9] 10/20/2014         Condition at Discharge:  [address] Course:   43 yo F with history metastatic cervical cancer with brain metastases, obstructive BL hydronephrosis s/p BL PCNT, seizure disorder, GERD, SLE who came to ER with worsening pelvic pain. Admitted as inpatient by Oncology. Followed by Palliative care, GI, Neurology, Pulmonology and ID.   Patient was transferred to ICU on  with worsening acute respiratory failure with hypoxia. Has developed jaundice with worsening transaminitis. Echo with normal EF, evidence of R heart disease and PAH. Transferred to ICU on  for acute respiratory failure with hypoxia and worsening encephalopathy. Patient actively worsened during ICU stay. , mother and sister understood rapidly progressive nature of her condition. Patient had worsening respiratory decline and  chose to not intubate. Patient worsened overnight and family requested comfort care. Patient  peacefully with family at bedside on 19 at 200 from metastatic cervical cancer. Discharge Medications:   Discharge Medication List as of 2019  5:16 AM      START taking these medications    Details   fentaNYL (DURAGESIC) 25 MCG/HR Place 1 patch onto the skin every 72 hours for 30 days. , Disp-1 patch, R-0NO PRINT      oxyCODONE (OXY-IR) 15 MG immediate release tablet Take 1 tablet by mouth every 6 hours as needed for Pain for up to 3 days. , Disp-1 tablet, R-0NO PRINT      naloxegol (MOVANTIK) 25 MG TABS tablet Take 1 tablet by mouth every morning, Disp-30 tablet, R-1Normal           Discharge Medication List as of 2019  5:16 AM        Discharge Medication List as of 2019  5:16 AM      CONTINUE these medications which have NOT CHANGED    Details   zolpidem (AMBIEN) 5 MG tablet Take 5 mg by mouth nightly as needed for Sleep. Historical Med      promethazine (PHENERGAN) 12.5 MG tablet Take 12.5 mg by mouth every 6 hours as needed for NauseaHistorical Med      amitriptyline (ELAVIL) 25 MG tablet Take 1 tablet by mouth nightly, Disp-30 tablet, R-5Print      baclofen (LIORESAL) 20 MG tablet Take 0.5 tablets by mouth 2 times daily, Disp-60 tablet, R-0Normal      topiramate (TOPAMAX) 25 MG tablet Take 1 tablet daily for 1 week, 1 tablet twice daily for 1 week and then 1 tablet in the morning and 2 tablets at night., Disp-90 tablet, R-1Normal tamsulosin (FLOMAX) 0.4 MG capsule Take 1 capsule by mouth daily, Disp-30 capsule, R-3Normal      opium-belladonna (B&O SUPPRETTES) 16.2-30 MG suppository Place 1 suppository rectally every 8 hours as needed for Pain for up to 3 days. , Disp-5 suppository, R-0Print      magnesium oxide (MAG-OX) 400 MG tablet Take 1 tablet by mouth daily, Disp-30 tablet, R-1Normal      LORazepam (ATIVAN) 1 MG tablet Take 1 mg by mouth every 8 hours as needed for Anxiety. Historical Med      senna (SENOKOT) 8.6 MG tablet Take 1 tablet by mouth 3 times daily Historical Med      acetaminophen (TYLENOL) 325 MG tablet Take 650 mg by mouth every 6 hours as needed for PainHistorical Med      ondansetron (ZOFRAN) 4 MG tablet Take 4 mg by mouth every 8 hours as needed for Nausea or VomitingHistorical Med      DULoxetine (CYMBALTA) 60 MG extended release capsule Take 60 mg by mouth dailyHistorical Med           Discharge Medication List as of 8/7/2019  5:16 AM      STOP taking these medications       phenazopyridine (PYRIDIUM) 100 MG tablet Comments:   Reason for Stopping:         oxyCODONE-acetaminophen (PERCOCET) 5-325 MG per tablet Comments:   Reason for Stopping:         morphine (MS CONTIN) 30 MG extended release tablet Comments:   Reason for Stopping:                 Discharge Exam:    BP 89/63   Pulse 129   Temp 102.8 °F (39.3 °C) (Axillary)   Resp (!) 50   Ht 5' 7\" (1.702 m)   Wt 170 lb 13.7 oz (77.5 kg)   SpO2 (!) 78%   BMI 26.76 kg/m²       Labs:  For convenience and continuity at follow-up the following most recent labs are provided:    Lab Results   Component Value Date    WBC 14.6 08/06/2019    HGB 11.5 08/06/2019    HCT 37.1 08/06/2019    .2 08/06/2019     08/06/2019     08/06/2019    K 4.2 08/06/2019     08/06/2019    CO2 28 08/06/2019    BUN 35 08/06/2019    CREATININE 0.6 08/06/2019    CALCIUM 9.3 08/06/2019    PHOS 3.5 07/05/2019    ALKPHOS 653 08/05/2019     08/05/2019     demonstrates appropriate intraluminal position. The right retrograde ureteral catheter was withdrawn. The external portion of the percutaneous nephrostomy tube was sutured to skin with 2 Ethilon. It was connected to a gravity bag. Left nephrostomy tube. The left kidney was identified by ultrasound. A left posterolateral, subcostal approach was chosen. The skin entry site was infiltrated with 1% lidocaine. An echogenic 21 gauge needle was advanced to the lower pole, entering a dilated calyx. Nephrostogram was performed. The needle entry was too steep for wire access to the renal pelvis. Therefore tandem needle technique was utilized. A 2nd 21 gauge needle was utilized to enter a lower pole liana. A 6 Citizen of [place] catheter was advanced into the renal pelvis and upper moiety, followed by an 035 angled Glidewire. An 8 Citizen of [place] pigtail catheter was placed over the wire and locked in position. There was moderate to severe left hydronephrosis. The catheter was sutured in position with 2 0 Ethilon and connected to a gravity bag. FINDINGS: Please see above. Bilateral hydroureteronephrosis, secondary to known cervical mass. Successful bilateral percutaneous nephrostomy tube placements. Us Liver Spleen    Result Date: 8/6/2019  EXAMINATION: RIGHT UPPER QUADRANT ULTRASOUND 8/6/2019 10:33 am COMPARISON: CT abdomen and pelvis July 31, 2019. HISTORY: ORDERING SYSTEM PROVIDED HISTORY: transaminitis, jaundice, please do at bedside FINDINGS: LIVER:  The liver is diffusely heterogeneous in echogenicity. There are several areas which appear slightly more nodular and lobulated, primarily within the anterior right and left hepatic lobes. BILIARY SYSTEM:  The gallbladder is surgically absent. Negative sonographic Robins's sign. Common bile duct measures 6 mm at the level angelo hepatis. RIGHT KIDNEY: The right kidney is grossly unremarkable without evidence of hydronephrosis.  Right kidney measures 9 x 5.5 x 4.9 cm with bilateral pulmonary opacity has developed. No pleural effusion or pneumothorax. Cardiac and mediastinal silhouettes are reflective of patient rotation. Findings are suggestive of pulmonary edema. Pneumonia is not excluded in the appropriate clinical setting. Xr Chest Portable    Result Date: 7/17/2019  EXAMINATION: ONE XRAY VIEW OF THE CHEST 7/17/2019 1:18 pm COMPARISON: 07/16/2019 HISTORY: ORDERING SYSTEM PROVIDED HISTORY: Abdominal pain Reason for Exam: Flank Pain (Pt with R flank pain, was seen here last night for the same thing, denies dysuira, emesis yesterday) FINDINGS: Right-sided Port-A-Cath remains in place. The lungs are without acute focal process. No effusion or pneumothorax. The cardiomediastinal silhouette is normal.  The osseous structures are intact without acute process. Partially visualized right ureteral stent. Negative chest.     Ct Chest Pulmonary Embolism W Contrast    Result Date: 8/1/2019  EXAMINATION: CTA OF THE CHEST 7/31/2019 12:40 am TECHNIQUE: CTA of the chest was performed after the administration of intravenous contrast.  Multiplanar reformatted images are provided for review. MIP images are provided for review. Dose modulation, iterative reconstruction, and/or weight based adjustment of the mA/kV was utilized to reduce the radiation dose to as low as reasonably achievable. COMPARISON: 07/30/2019 HISTORY: ORDERING SYSTEM PROVIDED HISTORY: Acute SOB and hypoxia with advanced pelvic cancer r/o PE TECHNOLOGIST PROVIDED HISTORY: Reason for Exam: shortness of breath Acuity: Unknown Type of Exam: Unknown Additional signs and symptoms: Acute SOB and hypoxia with advanced pelvic cancer r/o PE Relevant Medical/Surgical History: (Pt with R flank pain, was seen here last night for the same thing, denies dysuira, emesis yesterday) FINDINGS: Pulmonary Arteries: Enlarged main pulmonary artery suggesting pulmonary hypertension.   Distal branches are suboptimally evaluated due to respiratory motion. Within limitations, no evidence of pulmonary embolism. Mediastinum: Cardiomegaly. No pericardial effusion. Prominent left paratracheal lymph nodes may be reactive but neoplasm is not excluded. Lungs/pleura: Extensive patchy ground-glass opacities and patchy nodular opacities throughout the lungs. Interlobular septal thickening. Multifocal pneumonia is favored. Edema not excluded. Multifocal metastatic disease is possible but less likely. Attention at follow-up is advised. No pneumothorax. No pleural effusion. Upper Abdomen: Fatty liver. No acute upper abdominal process Soft Tissues/Bones: No axillary lymphadenopathy. No supraclavicular mass. Right chemotherapy port. No acute osseous abnormality. No aggressive osseous lesions. Extensive airspace disease possibly a combination of pneumonia and edema. Underlying metastatic disease not excluded. Prominent paratracheal lymph nodes most likely reactive but neoplasm not excluded. Attention at follow-up is advised. No central pulmonary embolism. Evidence of pulmonary hypertension. Fl Retrograde Pyelogram Right    Result Date: 7/24/2019  EXAMINATION: SPOT FLUOROSCOPIC IMAGES 7/24/2019 12:47 pm TECHNIQUE: Fluoroscopy was provided by the radiology department for procedure. Radiologist was not present during examination. FLUOROSCOPY DOSE AND TYPE OR TIME AND EXPOSURES: 16 seconds. 1 image. COMPARISON: Renal ultrasound, 07/19/2019. HISTORY: Intraprocedural imaging. Metastatic cervical carcinoma FINDINGS: 1 spot images of the right upper abdomen were obtained. Ureteral catheter is present with a guidewire, curved in the lower pole of the right kidney. Intraprocedural fluoroscopic spot images as above. See separate procedure report for more information.      Ct Chest Abdomen Pelvis Wo Contrast    Result Date: 7/30/2019  EXAMINATION: CT OF THE CHEST, ABDOMEN, AND PELVIS WITHOUT CONTRAST 7/30/2019 12:45 pm TECHNIQUE: CT of the chest, abdomen and pelvis was performed without the administration of intravenous contrast. Multiplanar reformatted images are provided for review. Dose modulation, iterative reconstruction, and/or weight based adjustment of the mA/kV was utilized to reduce the radiation dose to as low as reasonably achievable. COMPARISON: Abdomen 07/16/2019, chest 07/03/2019 HISTORY: ORDERING SYSTEM PROVIDED HISTORY: confusion cough ca cervix TECHNOLOGIST PROVIDED HISTORY: Reason for exam:->confusion cough ca cervix Additional Contrast?->None Reason for Exam: confusion cough ca cervix Acuity: Unknown Type of Exam: Unknown FINDINGS: Chest: Mediastinum: Port tip terminates in the lower SVC. No mediastinal or axillary lymphadenopathy. Heart and great vessels are unremarkable. Lungs/pleura: Central airways are patent. New scattered ground-glass and consolidative opacities throughout the lungs bilaterally. Soft Tissues/Bones: No suspicious osseous lesions. Abdomen/Pelvis: Organs: Prior cholecystectomy. Hepatic steatosis. Adrenals, spleen and pancreas are normal.  Splenules are present. Bilateral nephrostomy tubes in place. Retained contrast in the collecting systems. Trace perinephric hematoma on the right. Mild residual hydroureter on the right. GI/Bowel: Normal appendix. No evidence of bowel obstruction. Moderate stool burden throughout the colon. Pelvis: Fat stranding surrounding the cervix, similar to the prior study. Uterus and ovaries are otherwise unremarkable. Hyperdense material within the bladder, favored to represent blood products. Peritoneum/Retroperitoneum: No evidence of AAA. Mild mesenteric edema is noted. Increased size of several pelvic lymph nodes including a 12 x 8 mm node in the left external iliac chain on series 7, image 154. Bones/Soft Tissues: No suspicious osseous lesions. Findings in the lungs favored to represent pneumonia.   New metastatic disease is considered unlikely, though follow-up to subcostal approach was chosen to an inferior to lower calyx. Additionally, ultrasound was utilized to identify the hydronephrotic kidney and  needle depth. The entry site was infiltrated with 1% lidocaine and a small incision was made with an 11 gauge scalpel. After aspirating urine via the 21 gauge needle, a 018 wire was placed, followed by 6 [place] dilator and 035 glidewire. 8 Chinese pigtail catheter was placed over the wire and locked in position. Post placement nephrostogram demonstrates appropriate intraluminal position. The right retrograde ureteral catheter was withdrawn. The external portion of the percutaneous nephrostomy tube was sutured to skin with 2 Ethilon. It was connected to a gravity bag. Left nephrostomy tube. The left kidney was identified by ultrasound. A left posterolateral, subcostal approach was chosen. The skin entry site was infiltrated with 1% lidocaine. An echogenic 21 gauge needle was advanced to the lower pole, entering a dilated calyx. Nephrostogram was performed. The needle entry was too steep for wire access to the renal pelvis. Therefore tandem needle technique was utilized. A 2nd 21 gauge needle was utilized to enter a lower pole liana. A 6 Chinese catheter was advanced into the renal pelvis and upper moiety, followed by an 035 angled Glidewire. An 8 Chinese pigtail catheter was placed over the wire and locked in position. There was moderate to severe left hydronephrosis. The catheter was sutured in position with 2 0 Ethilon and connected to a gravity bag. FINDINGS: Please see above. Bilateral hydroureteronephrosis, secondary to known cervical mass. Successful bilateral percutaneous nephrostomy tube placements. The patient was seen and examined on day of discharge and this discharge summary is in conjunction with any daily progress note from day of discharge. Time Spent on discharge is 15 minutes  in the examination, evaluation, counseling and review

## 2019-08-07 NOTE — PROGRESS NOTES
Blood return noted from port. Lactic acid collected and sent to lab. Assessment complete, vitals obtained . Axillary temp 102.2. MD paged about rectal tylenol as pt unable to safely shallow pills at this time. Pt awake and alert and looks at speaker. when asked to say hello pt repeats \"hello\" but does not answer any questions in regards to orientation / pain / comfort. Does not follow commands to squeeze hands. Pt tolerating vapotherm with improved spo2. Lungs clear with faint fine crackles to LLL. Abdomen rounded / soft. Hypoactive bowel sounds. Skin pale and jaundiced. Pedal pulses palpable. SCDs in place. Heels / elbows elevated on pillows. Bilateral nephrostomy tubes draining.  and mom @ bedside.

## 2019-08-07 NOTE — PROGRESS NOTES
LR bolus and vasopressin started, see MAR. Pt appears increasingly distressed. Respirations shallow @ 50 x a minute. HR remains elevated 140s. Pt pale. Dr Lilia Manley called to room to evaluate. MD @ bedside to speak with  about pt presentation and further options for care.  strongly declines intubation and voices not wanting pt to suffer. MD along with this RN and charge RN at bedside with  Randall White and pt's mother. All in agreement for keeping pt comfortable. PRN dilaudid and Ativan given to help ease distress and breathing, see MAR. [place] called in.

## 2019-08-07 NOTE — PROGRESS NOTES
Reassessment complete, vitals obtained. Neuro status unchanged. Tolerating vapotherm. PRN dilaudid given, see MAR. Family remains @ bedside.

## 2019-08-07 NOTE — PROGRESS NOTES
Dr Pura Gilbert @ bedside. Discussed w/ MD low spo2 at times and elevated 's. New order for vapotherm placed per dr Pura Gilbert.  Rt hemal notified of change

## 2021-03-02 NOTE — PROGRESS NOTES
Patient c/o dizziness and nausea.  PRN compazine given at this time and cool washcloth placed on forehead per patient request. pregnant pregnant/titers do not indicate a need to immunize

## 2024-05-10 NOTE — PROGRESS NOTES
Pt continues to tolerate blood transfusion. No s/s of distress at this time. Dr. Beaver Covering for Dr. Beaver

## (undated) DEVICE — CATHETER TRAY 16 FR 5 CC FOL ANTIREFLX SAMPLING PRT DOVER

## (undated) DEVICE — CYSTO PACK: Brand: MEDLINE INDUSTRIES, INC.

## (undated) DEVICE — SYRINGE MED 10ML SLIP TIP BLNT FILL AND LUERLOCK DISP

## (undated) DEVICE — BAG DRNGE L6FT 20L PREFIL ABSRB POLYMER EXP TBNG DISP FOR

## (undated) DEVICE — PEEL-AWAY, INTRODUCER: Brand: PEEL-AWAY

## (undated) DEVICE — GUIDEWIRE ENDOSCP L150CM DIA0.035IN TIP 3CM PTFE NIT

## (undated) DEVICE — GLOVE SURG 7 LTX STRL TRIFLEX LNG FINGER

## (undated) DEVICE — TRAY PREP DRY W/ PREM GLV 2 APPL 6 SPNG 2 UNDPD 1 OVERWRAP

## (undated) DEVICE — Z DUP USE 2522782 SOLUTION IRRIG 1000ML STRL H2O PLAS CONTAINER UROMATIC

## (undated) DEVICE — CATHETER URET 5FR L70CM OPN END SGL LUMN INJ HUB FLEXIMA

## (undated) DEVICE — Device: Brand: MEDEX

## (undated) DEVICE — POSITIONER HD W8XH4XL8.5IN RASPBERRY FOAM SLT

## (undated) DEVICE — BAG,DRAINAGE,4L,A/R TOWER,LL,SLIDE TAP: Brand: MEDLINE

## (undated) DEVICE — SOLUTION IV IRRIG WATER 1000ML POUR BRL 2F7114

## (undated) DEVICE — CYSTO/BLADDER IRRIGATION SET, REGULATING CLAMP

## (undated) DEVICE — GLOVE ORANGE PI 7   MSG9070

## (undated) DEVICE — CATHETER URETH 20FR 30CC BLLN SIL ELASTMR F 3 W SPEC M RND

## (undated) DEVICE — URETERAL DILATOR

## (undated) DEVICE — KIT OR ROOM TURNOVER W/STRAP

## (undated) DEVICE — UPJ OCCLUSION BALLOON CATHETER SET: Brand: COOK